# Patient Record
Sex: MALE | Race: WHITE | NOT HISPANIC OR LATINO | Employment: OTHER | URBAN - NONMETROPOLITAN AREA
[De-identification: names, ages, dates, MRNs, and addresses within clinical notes are randomized per-mention and may not be internally consistent; named-entity substitution may affect disease eponyms.]

---

## 2022-08-19 ENCOUNTER — OFFICE VISIT (OUTPATIENT)
Dept: URGENT CARE | Facility: MEDICAL CENTER | Age: 87
End: 2022-08-19
Payer: COMMERCIAL

## 2022-08-19 VITALS
WEIGHT: 227 LBS | OXYGEN SATURATION: 98 % | BODY MASS INDEX: 32.5 KG/M2 | HEIGHT: 70 IN | TEMPERATURE: 97.9 F | RESPIRATION RATE: 18 BRPM | HEART RATE: 79 BPM

## 2022-08-19 DIAGNOSIS — H02.105 ECTROPION OF LEFT LOWER EYELID, UNSPECIFIED ECTROPION TYPE: Primary | ICD-10-CM

## 2022-08-19 DIAGNOSIS — H00.15 CHALAZION OF LEFT LOWER EYELID: ICD-10-CM

## 2022-08-19 PROCEDURE — S9083 URGENT CARE CENTER GLOBAL: HCPCS | Performed by: PHYSICIAN ASSISTANT

## 2022-08-19 PROCEDURE — 99203 OFFICE O/P NEW LOW 30 MIN: CPT | Performed by: PHYSICIAN ASSISTANT

## 2022-08-19 RX ORDER — CEPHALEXIN 500 MG/1
500 CAPSULE ORAL EVERY 8 HOURS SCHEDULED
Qty: 21 CAPSULE | Refills: 0 | Status: SHIPPED | OUTPATIENT
Start: 2022-08-19 | End: 2022-08-26

## 2022-08-19 RX ORDER — AMLODIPINE BESYLATE 10 MG/1
10 TABLET ORAL DAILY
COMMUNITY
Start: 2022-07-25

## 2022-08-19 RX ORDER — ENALAPRIL MALEATE 2.5 MG/1
2.5 TABLET ORAL 2 TIMES DAILY
COMMUNITY
Start: 2022-06-06

## 2022-08-19 RX ORDER — METOPROLOL TARTRATE 50 MG/1
50 TABLET, FILM COATED ORAL DAILY
COMMUNITY
Start: 2022-07-21

## 2022-08-19 RX ORDER — ATORVASTATIN CALCIUM 40 MG/1
20 TABLET, FILM COATED ORAL DAILY
COMMUNITY
Start: 2022-07-30

## 2022-08-19 RX ORDER — LANOLIN ALCOHOL/MO/W.PET/CERES
CREAM (GRAM) TOPICAL
COMMUNITY

## 2022-08-19 NOTE — PROGRESS NOTES
[facility]        NAME: Cee Sanchez is a 81 y o  male  : 1935    MRN: [id number]  DATE: 2022  TIME: 7:13 PM    Assessment and Plan   Ectropion of left lower eyelid, unspecified ectropion type [H02 105]  1  Ectropion of left lower eyelid, unspecified ectropion type     2  Chalazion of left lower eyelid  cephalexin (KEFLEX) 500 mg capsule         Patient Instructions       Follow up with PCP in 3-5 days  Proceed to  ER if symptoms worsen  Chief Complaint     Chief Complaint   Patient presents with    Eye Problem     Left eye has been draining and he has been rubbing left eye, using otc allergy eye drop,pt  Reports his eye is now red and irritated, small swollen area to lower eye lid         History of Present Illness       Patient presents with redness and drainage to left eye which has been there for the past few days which worsened after he started rubbing the eye  Yuan Heckler He now has swelling and redness the left lateral lower eyelid  Family when out and purchased allergy eyedrops which seem to have made the symptoms worse  He had been using eyedrops for dry eyes but states he was never formally diagnosed with dry eye  Has not seen an eye doctor in the past few years  Review of Systems   Review of Systems   Constitutional: Negative for chills and fever  Eyes: Positive for discharge and redness  Negative for pain and visual disturbance  Hematological: Negative for adenopathy           Current Medications       Current Outpatient Medications:     apixaban (ELIQUIS) 5 mg, Take 5 mg by mouth 2 (two) times a day, Disp: , Rfl:     cephalexin (KEFLEX) 500 mg capsule, Take 1 capsule (500 mg total) by mouth every 8 (eight) hours for 7 days, Disp: 21 capsule, Rfl: 0    amLODIPine (NORVASC) 10 mg tablet, Take 10 mg by mouth daily, Disp: , Rfl:     atorvastatin (LIPITOR) 40 mg tablet, 20 mg daily, Disp: , Rfl:     Cholecalciferol 25 MCG (1000 UT) tablet, Take by mouth, Disp: , Rfl:     enalapril (VASOTEC) 2 5 mg tablet, Take 2 5 mg by mouth 2 (two) times a day, Disp: , Rfl:     folic acid (FOLVITE) 933 mcg tablet, Take by mouth, Disp: , Rfl:     Melatonin 5 MG CAPS, Take by mouth, Disp: , Rfl:     metoprolol tartrate (LOPRESSOR) 50 mg tablet, Take 50 mg by mouth daily, Disp: , Rfl:     Current Allergies     Allergies as of 08/19/2022    (No Known Allergies)            The following portions of the patient's history were reviewed and updated as appropriate: allergies, current medications, past family history, past medical history, past social history, past surgical history and problem list      Past Medical History:   Diagnosis Date    Hyperlipemia     Hypertension        Past Surgical History:   Procedure Laterality Date    A-V CARDIAC PACEMAKER INSERTION      AORTIC VALVE REPLACEMENT      CORONARY ANGIOPLASTY WITH STENT PLACEMENT         No family history on file  Medications have been verified  Objective   Pulse 79   Temp 97 9 °F (36 6 °C)   Resp 18   Ht 5' 10" (1 778 m)   Wt 103 kg (227 lb)   SpO2 98%   BMI 32 57 kg/m²   No LMP for male patient  Physical Exam     Physical Exam  Vitals and nursing note reviewed  Constitutional:       Appearance: Normal appearance  HENT:      Head: Normocephalic and atraumatic  Eyes:      Extraocular Movements: Extraocular movements intact  Pupils: Pupils are equal, round, and reactive to light  Comments: Ectropion of left lower eyelid with erythema and swelling  Pustule at lateral aspect  No purulent drainage  Conjunctiva injected  Pupil reactive  EOMI   Cardiovascular:      Rate and Rhythm: Normal rate and regular rhythm  Pulmonary:      Effort: Pulmonary effort is normal    Skin:     General: Skin is warm  Neurological:      Mental Status: He is alert

## 2022-12-02 ENCOUNTER — OFFICE VISIT (OUTPATIENT)
Dept: CARDIOLOGY CLINIC | Facility: CLINIC | Age: 87
End: 2022-12-02

## 2022-12-02 VITALS
SYSTOLIC BLOOD PRESSURE: 128 MMHG | BODY MASS INDEX: 33.87 KG/M2 | DIASTOLIC BLOOD PRESSURE: 62 MMHG | WEIGHT: 236.6 LBS | HEIGHT: 70 IN | TEMPERATURE: 97.8 F | HEART RATE: 72 BPM

## 2022-12-02 DIAGNOSIS — Z95.2 S/P AVR: ICD-10-CM

## 2022-12-02 DIAGNOSIS — R60.0 LOCALIZED EDEMA: ICD-10-CM

## 2022-12-02 DIAGNOSIS — I65.23 BILATERAL CAROTID ARTERY STENOSIS: ICD-10-CM

## 2022-12-02 DIAGNOSIS — I10 PRIMARY HYPERTENSION: ICD-10-CM

## 2022-12-02 DIAGNOSIS — I25.10 CORONARY ARTERY DISEASE INVOLVING NATIVE CORONARY ARTERY OF NATIVE HEART WITHOUT ANGINA PECTORIS: Primary | ICD-10-CM

## 2022-12-02 DIAGNOSIS — E78.2 MIXED HYPERLIPIDEMIA: ICD-10-CM

## 2022-12-02 DIAGNOSIS — I35.0 AORTIC VALVE STENOSIS, ETIOLOGY OF CARDIAC VALVE DISEASE UNSPECIFIED: ICD-10-CM

## 2022-12-02 NOTE — PROGRESS NOTES
Cardiology Office Visit    Henrry Suggs  [id number]  1935    [facility] [address] [address] [phone]      Dear Gretel Fernández DO,    I had the pleasure of seeing your patient at our [address] [facility] [place] office today 12/2/2022  As you know he is a pleasant 81y o  year old male with a medical history as described below  Reason for office visit: Establish care for coronary artery disease, aortic stenosis s/p AVR, hypertension, hyperlipidemia, carotid artery disease and pacemaker  1  Coronary artery disease involving native coronary artery of native heart without angina pectoris  -     POCT ECG  -     Echo complete w/ contrast if indicated; Future; Expected date: 12/02/2022  -     Basic metabolic panel; Future  -     Magnesium  -     CBC and differential; Future    2  Aortic valve stenosis, etiology of cardiac valve disease unspecified  -     POCT ECG  -     Echo complete w/ contrast if indicated; Future; Expected date: 12/02/2022  -     Basic metabolic panel; Future  -     Magnesium  -     CBC and differential; Future    3  S/P AVR  -     POCT ECG  -     Echo complete w/ contrast if indicated; Future; Expected date: 12/02/2022    4  Primary hypertension  -     Basic metabolic panel; Future  -     Magnesium  -     CBC and differential; Future    5  Mixed hyperlipidemia    6  Bilateral carotid artery stenosis  -     VAS carotid complete study; Future; Expected date: 12/02/2022    7  Localized edema  -     NT-BNP PRO; Future       EKG today shows ventricular paced rhythm with likely underlying atrial fibrillation  Atrial fibrillation is an abnormal heart rhythm which can increase the risk of stroke and this is why replaced on Eliquis  I would recommend continuing Eliquis 5 mg twice daily  I would recommend updated echocardiogram to reassess aortic and mitral valve  Updated carotid ultrasound    Blood work to check kidney function and to look for stretch in the heart  I will transition pacemaker management to our office  HPI     Lucila Mortimer has a past medical history of coronary artery disease, aortic stenosis s/p AVR, hypertension, hyperlipidemia, carotid artery disease and pacemaker placement for sick sinus syndrome  Patient Active Problem List   Diagnosis   • Hypertension   • Hyperlipemia   • Aortic stenosis   • S/P AVR   • Carotid artery disease (HCC)   • Coronary artery disease   • Pacemaker     Past Medical History:   Diagnosis Date   • Hyperlipemia    • Hypertension      Social History     Socioeconomic History   • Marital status:      Spouse name: Not on file   • Number of children: Not on file   • Years of education: Not on file   • Highest education level: Not on file   Occupational History   • Not on file   Tobacco Use   • Smoking status: Former   • Smokeless tobacco: Never   Vaping Use   • Vaping Use: Never used   Substance and Sexual Activity   • Alcohol use: Yes     Comment: social   • Drug use: Never   • Sexual activity: Never   Other Topics Concern   • Not on file   Social History Narrative   • Not on file     Social Determinants of Health     Financial Resource Strain: Not on file   Food Insecurity: Not on file   Transportation Needs: Not on file   Physical Activity: Not on file   Stress: Not on file   Social Connections: Not on file   Intimate Partner Violence: Not on file   Housing Stability: Not on file      History reviewed  No pertinent family history    Past Surgical History:   Procedure Laterality Date   • A-V CARDIAC PACEMAKER INSERTION     • AORTIC VALVE REPLACEMENT     • CARDIAC PACEMAKER PLACEMENT Left    • CORONARY ANGIOPLASTY WITH STENT PLACEMENT         Current Outpatient Medications:   •  amLODIPine (NORVASC) 10 mg tablet, Take 10 mg by mouth daily, Disp: , Rfl:   •  apixaban (ELIQUIS) 5 mg, Take 5 mg by mouth 2 (two) times a day, Disp: , Rfl:   •  atorvastatin (LIPITOR) 40 mg tablet, 20 mg daily, Disp: , Rfl:   •  enalapril (VASOTEC) 2 5 mg tablet, Take 2 5 mg by mouth 2 (two) times a day, Disp: , Rfl:   •  folic acid (FOLVITE) 568 mcg tablet, Take by mouth, Disp: , Rfl:   •  metoprolol tartrate (LOPRESSOR) 50 mg tablet, Take 50 mg by mouth daily, Disp: , Rfl:   •  Cholecalciferol 25 MCG (1000 UT) tablet, Take by mouth (Patient not taking: Reported on 12/2/2022), Disp: , Rfl:   •  Melatonin 5 MG CAPS, Take by mouth (Patient not taking: Reported on 12/2/2022), Disp: , Rfl:   Allergies   Allergen Reactions   • Ezetimibe-Simvastatin Dizziness and Lightheadedness     Other reaction(s): MAKES HIM DIZZY     • Simvastatin Lightheadedness     Other reaction(s): Dizziness         Cardiac Test Results:         Review of Systems:    Review of Systems   Constitutional: Negative for activity change, appetite change and fatigue  HENT: Negative for congestion, hearing loss, tinnitus and trouble swallowing  Eyes: Negative for visual disturbance  Respiratory: Negative for cough, chest tightness, shortness of breath and wheezing  Cardiovascular: Negative for chest pain, palpitations and leg swelling  Gastrointestinal: Negative for abdominal distention, abdominal pain, nausea and vomiting  Genitourinary: Negative for difficulty urinating  Musculoskeletal: Negative for arthralgias  Skin: Negative for rash  Neurological: Negative for dizziness, syncope and light-headedness  Hematological: Does not bruise/bleed easily  Psychiatric/Behavioral: Negative for confusion  The patient is not nervous/anxious  All other systems reviewed and are negative          Vitals:    12/02/22 1329 12/02/22 1404   BP: 152/78 128/62   BP Location: Left arm    Patient Position: Sitting    Cuff Size: Large    Pulse: 72    Temp: 97 8 °F (36 6 °C)    Weight: 107 kg (236 lb 9 6 oz)    Height: 5' 10" (1 778 m)      Vitals:    12/02/22 1329   Weight: 107 kg (236 lb 9 6 oz)     Height: 5' 10" (177 8 cm) Physical Exam  Vitals reviewed  Constitutional:       Appearance: He is well-developed and well-nourished  HENT:      Head: Normocephalic and atraumatic  Eyes:      Conjunctiva/sclera: Conjunctivae normal       Pupils: Pupils are equal, round, and reactive to light  Neck:      Vascular: No JVD  Cardiovascular:      Rate and Rhythm: Normal rate and regular rhythm  Heart sounds: Normal heart sounds  No murmur heard  No friction rub  No gallop  Pulmonary:      Effort: Pulmonary effort is normal       Breath sounds: Normal breath sounds  Abdominal:      General: Bowel sounds are normal       Palpations: Abdomen is soft  Musculoskeletal:         General: No edema  Cervical back: Normal range of motion  Skin:     General: Skin is warm and dry  Neurological:      Mental Status: He is alert and oriented to person, place, and time     Psychiatric:         Mood and Affect: Mood and affect normal          Behavior: Behavior normal

## 2022-12-02 NOTE — PATIENT INSTRUCTIONS
EKG today shows ventricular paced rhythm with likely underlying atrial fibrillation  Atrial fibrillation is an abnormal heart rhythm which can increase the risk of stroke and this is why replaced on Eliquis  I would recommend continuing Eliquis 5 mg twice daily  I would recommend updated echocardiogram to reassess aortic and mitral valve  Updated carotid ultrasound  Blood work to check kidney function and to look for stretch in the heart  I will transition pacemaker management to our office

## 2022-12-03 PROBLEM — Z95.0 PACEMAKER: Status: ACTIVE | Noted: 2022-12-03

## 2022-12-09 ENCOUNTER — APPOINTMENT (OUTPATIENT)
Dept: LAB | Facility: HOSPITAL | Age: 87
End: 2022-12-09

## 2022-12-09 ENCOUNTER — HOSPITAL ENCOUNTER (OUTPATIENT)
Dept: NON INVASIVE DIAGNOSTICS | Facility: HOSPITAL | Age: 87
Discharge: HOME | End: 2022-12-09
Attending: INTERNAL MEDICINE

## 2022-12-09 DIAGNOSIS — I10 PRIMARY HYPERTENSION: ICD-10-CM

## 2022-12-09 DIAGNOSIS — I35.0 AORTIC VALVE STENOSIS, ETIOLOGY OF CARDIAC VALVE DISEASE UNSPECIFIED: ICD-10-CM

## 2022-12-09 DIAGNOSIS — I65.23 BILATERAL CAROTID ARTERY STENOSIS: ICD-10-CM

## 2022-12-09 DIAGNOSIS — R60.0 LOCALIZED EDEMA: ICD-10-CM

## 2022-12-09 DIAGNOSIS — I25.10 CORONARY ARTERY DISEASE INVOLVING NATIVE CORONARY ARTERY OF NATIVE HEART WITHOUT ANGINA PECTORIS: ICD-10-CM

## 2022-12-09 LAB
ANION GAP SERPL CALCULATED.3IONS-SCNC: 11 MMOL/L (ref 4–13)
BASOPHILS # BLD AUTO: 0.08 THOUSANDS/ÂΜL (ref 0–0.1)
BASOPHILS NFR BLD AUTO: 1 % (ref 0–1)
BUN SERPL-MCNC: 21 MG/DL (ref 5–25)
CALCIUM SERPL-MCNC: 9.2 MG/DL (ref 8.3–10.1)
CHLORIDE SERPL-SCNC: 100 MMOL/L (ref 96–108)
CO2 SERPL-SCNC: 25 MMOL/L (ref 21–32)
CREAT SERPL-MCNC: 1.11 MG/DL (ref 0.6–1.3)
EOSINOPHIL # BLD AUTO: 0.14 THOUSAND/ÂΜL (ref 0–0.61)
EOSINOPHIL NFR BLD AUTO: 2 % (ref 0–6)
ERYTHROCYTE [DISTWIDTH] IN BLOOD BY AUTOMATED COUNT: 13.3 % (ref 11.6–15.1)
GFR SERPL CREATININE-BSD FRML MDRD: 59 ML/MIN/1.73SQ M
GLUCOSE SERPL-MCNC: 112 MG/DL (ref 65–140)
HCT VFR BLD AUTO: 43.3 % (ref 36.5–49.3)
HGB BLD-MCNC: 14.3 G/DL (ref 12–17)
IMM GRANULOCYTES # BLD AUTO: 0.04 THOUSAND/UL (ref 0–0.2)
IMM GRANULOCYTES NFR BLD AUTO: 0 % (ref 0–2)
LYMPHOCYTES # BLD AUTO: 2.41 THOUSANDS/ÂΜL (ref 0.6–4.47)
LYMPHOCYTES NFR BLD AUTO: 26 % (ref 14–44)
MAGNESIUM SERPL-MCNC: 2 MG/DL (ref 1.6–2.6)
MCH RBC QN AUTO: 29.8 PG (ref 26.8–34.3)
MCHC RBC AUTO-ENTMCNC: 33 G/DL (ref 31.4–37.4)
MCV RBC AUTO: 90 FL (ref 82–98)
MONOCYTES # BLD AUTO: 1.05 THOUSAND/ÂΜL (ref 0.17–1.22)
MONOCYTES NFR BLD AUTO: 11 % (ref 4–12)
NEUTROPHILS # BLD AUTO: 5.65 THOUSANDS/ÂΜL (ref 1.85–7.62)
NEUTS SEG NFR BLD AUTO: 60 % (ref 43–75)
NRBC BLD AUTO-RTO: 0 /100 WBCS
NT-PROBNP SERPL-MCNC: 1109 PG/ML
PLATELET # BLD AUTO: 234 THOUSANDS/UL (ref 149–390)
PMV BLD AUTO: 10.5 FL (ref 8.9–12.7)
POTASSIUM SERPL-SCNC: 4.4 MMOL/L (ref 3.5–5.3)
RBC # BLD AUTO: 4.8 MILLION/UL (ref 3.88–5.62)
SODIUM SERPL-SCNC: 136 MMOL/L (ref 135–147)
WBC # BLD AUTO: 9.37 THOUSAND/UL (ref 4.31–10.16)

## 2022-12-10 ENCOUNTER — HOSPITAL ENCOUNTER (OUTPATIENT)
Dept: NON INVASIVE DIAGNOSTICS | Facility: HOSPITAL | Age: 87
Discharge: HOME | End: 2022-12-10
Attending: INTERNAL MEDICINE

## 2022-12-10 VITALS
WEIGHT: 236 LBS | DIASTOLIC BLOOD PRESSURE: 62 MMHG | SYSTOLIC BLOOD PRESSURE: 128 MMHG | HEIGHT: 70 IN | BODY MASS INDEX: 33.79 KG/M2 | HEART RATE: 82 BPM

## 2022-12-10 DIAGNOSIS — I35.0 AORTIC VALVE STENOSIS, ETIOLOGY OF CARDIAC VALVE DISEASE UNSPECIFIED: ICD-10-CM

## 2022-12-10 DIAGNOSIS — I25.10 CORONARY ARTERY DISEASE INVOLVING NATIVE CORONARY ARTERY OF NATIVE HEART WITHOUT ANGINA PECTORIS: ICD-10-CM

## 2022-12-10 DIAGNOSIS — Z95.2 S/P AVR: ICD-10-CM

## 2022-12-10 LAB
AORTIC VALVE MEAN VELOCITY: 16.3 M/S
APICAL FOUR CHAMBER EJECTION FRACTION: 68 %
AV AREA BY CONTINUOUS VTI: 1.5 CM2
AV AREA PEAK VELOCITY: 1.3 CM2
AV LVOT MEAN GRADIENT: 4 MMHG
AV LVOT PEAK GRADIENT: 6 MMHG
AV MEAN GRADIENT: 12 MMHG
AV PEAK GRADIENT: 23 MMHG
AV VALVE AREA: 1.45 CM2
AV VELOCITY RATIO: 0.5
DOP CALC AO PEAK VEL: 2.41 M/S
DOP CALC AO VTI: 50.01 CM
DOP CALC LVOT AREA: 2.54 CM2
DOP CALC LVOT DIAMETER: 1.8 CM
DOP CALC LVOT PEAK VEL VTI: 28.59 CM
DOP CALC LVOT PEAK VEL: 1.2 M/S
DOP CALC LVOT STROKE INDEX: 31.2 ML/M2
DOP CALC LVOT STROKE VOLUME: 72.72 CM3
DOP CALC MV VTI: 48.18 CM
FRACTIONAL SHORTENING: 33 % (ref 28–44)
INTERVENTRICULAR SEPTUM IN DIASTOLE (PARASTERNAL SHORT AXIS VIEW): 1.4 CM
INTERVENTRICULAR SEPTUM: 1.4 CM (ref 0.6–1.1)
LAAS-AP2: 34.1 CM2
LAAS-AP4: 31.4 CM2
LEFT ATRIUM AREA SYSTOLE SINGLE PLANE A4C: 33.9 CM2
LEFT ATRIUM SIZE: 4.9 CM
LEFT INTERNAL DIMENSION IN SYSTOLE: 2.4 CM (ref 2.1–4)
LEFT VENTRICULAR INTERNAL DIMENSION IN DIASTOLE: 3.6 CM (ref 3.5–6)
LEFT VENTRICULAR POSTERIOR WALL IN END DIASTOLE: 1.5 CM
LEFT VENTRICULAR STROKE VOLUME: 35 ML
LVSV (TEICH): 35 ML
MV E'TISSUE VEL-LAT: 9 CM/S
MV E'TISSUE VEL-SEP: 8 CM/S
MV MEAN GRADIENT: 6 MMHG
MV PEAK GRADIENT: 15 MMHG
MV STENOSIS PRESSURE HALF TIME: 115 MS
MV VALVE AREA BY CONTINUITY EQUATION: 1.51 CM2
MV VALVE AREA P 1/2 METHOD: 1.91 CM2
RIGHT ATRIUM AREA SYSTOLE A4C: 30.9 CM2
RIGHT VENTRICLE ID DIMENSION: 5.8 CM
SL CV LEFT ATRIUM LENGTH A2C: 6.2 CM
SL CV PED ECHO LEFT VENTRICLE DIASTOLIC VOLUME (MOD BIPLANE) 2D: 54 ML
SL CV PED ECHO LEFT VENTRICLE SYSTOLIC VOLUME (MOD BIPLANE) 2D: 20 ML
TR MAX PG: 49 MMHG
TR PEAK VELOCITY: 3.5 M/S
TRICUSPID VALVE PEAK REGURGITATION VELOCITY: 3.51 M/S

## 2022-12-12 ENCOUNTER — TELEPHONE (OUTPATIENT)
Dept: CARDIOLOGY CLINIC | Facility: CLINIC | Age: 87
End: 2022-12-12

## 2022-12-12 DIAGNOSIS — R60.0 LOCALIZED EDEMA: Primary | ICD-10-CM

## 2022-12-12 RX ORDER — FUROSEMIDE 20 MG/1
20 TABLET ORAL DAILY
Qty: 30 TABLET | Refills: 11 | Status: ON HOLD | OUTPATIENT
Start: 2022-12-12 | End: 2023-01-12

## 2022-12-12 NOTE — TELEPHONE ENCOUNTER
I called the patient to discuss lab results  BNP is elevated in the setting of increased dyspnea on exertion and lower extremity edema  I did recommend a trial of furosemide 20 mg daily with repeat blood work to be done 12/27/2022  He was instructed to call me if he has any difficulty with starting the medication

## 2022-12-30 ENCOUNTER — APPOINTMENT (OUTPATIENT)
Dept: RADIOLOGY | Facility: HOSPITAL | Age: 87
End: 2022-12-30

## 2022-12-30 ENCOUNTER — APPOINTMENT (EMERGENCY)
Dept: CT IMAGING | Facility: HOSPITAL | Age: 87
End: 2022-12-30

## 2022-12-30 ENCOUNTER — TELEPHONE (OUTPATIENT)
Dept: OTHER | Facility: HOSPITAL | Age: 87
End: 2022-12-30

## 2022-12-30 ENCOUNTER — HOSPITAL ENCOUNTER (EMERGENCY)
Facility: HOSPITAL | Age: 87
Discharge: HOME | End: 2022-12-30
Attending: EMERGENCY MEDICINE

## 2022-12-30 VITALS
HEART RATE: 94 BPM | OXYGEN SATURATION: 98 % | BODY MASS INDEX: 34.15 KG/M2 | SYSTOLIC BLOOD PRESSURE: 159 MMHG | RESPIRATION RATE: 16 BRPM | TEMPERATURE: 97.6 F | WEIGHT: 238 LBS | DIASTOLIC BLOOD PRESSURE: 87 MMHG

## 2022-12-30 DIAGNOSIS — N20.1 URETERAL STONE: Primary | ICD-10-CM

## 2022-12-30 LAB
2HR DELTA HS TROPONIN: 0 NG/L
ALBUMIN SERPL BCP-MCNC: 3.5 G/DL (ref 3.5–5)
ALP SERPL-CCNC: 102 U/L (ref 46–116)
ALT SERPL W P-5'-P-CCNC: 21 U/L (ref 12–78)
ANION GAP SERPL CALCULATED.3IONS-SCNC: 13 MMOL/L (ref 4–13)
AST SERPL W P-5'-P-CCNC: 20 U/L (ref 5–45)
BACTERIA UR QL AUTO: ABNORMAL /HPF
BASOPHILS # BLD AUTO: 0.06 THOUSANDS/ÂΜL (ref 0–0.1)
BASOPHILS NFR BLD AUTO: 1 % (ref 0–1)
BILIRUB SERPL-MCNC: 1.09 MG/DL (ref 0.2–1)
BILIRUB UR QL STRIP: ABNORMAL
BUN SERPL-MCNC: 20 MG/DL (ref 5–25)
CALCIUM SERPL-MCNC: 9.6 MG/DL (ref 8.3–10.1)
CARDIAC TROPONIN I PNL SERPL HS: 24 NG/L
CARDIAC TROPONIN I PNL SERPL HS: 24 NG/L
CHLORIDE SERPL-SCNC: 99 MMOL/L (ref 96–108)
CLARITY UR: ABNORMAL
CO2 SERPL-SCNC: 25 MMOL/L (ref 21–32)
COLOR UR: YELLOW
CREAT SERPL-MCNC: 1.2 MG/DL (ref 0.6–1.3)
EOSINOPHIL # BLD AUTO: 0.03 THOUSAND/ÂΜL (ref 0–0.61)
EOSINOPHIL NFR BLD AUTO: 0 % (ref 0–6)
ERYTHROCYTE [DISTWIDTH] IN BLOOD BY AUTOMATED COUNT: 13.8 % (ref 11.6–15.1)
GFR SERPL CREATININE-BSD FRML MDRD: 54 ML/MIN/1.73SQ M
GLUCOSE SERPL-MCNC: 136 MG/DL (ref 65–140)
GLUCOSE UR STRIP-MCNC: NEGATIVE MG/DL
HCT VFR BLD AUTO: 47.6 % (ref 36.5–49.3)
HGB BLD-MCNC: 15.2 G/DL (ref 12–17)
HGB UR QL STRIP.AUTO: ABNORMAL
IMM GRANULOCYTES # BLD AUTO: 0.05 THOUSAND/UL (ref 0–0.2)
IMM GRANULOCYTES NFR BLD AUTO: 0 % (ref 0–2)
KETONES UR STRIP-MCNC: ABNORMAL MG/DL
LEUKOCYTE ESTERASE UR QL STRIP: ABNORMAL
LYMPHOCYTES # BLD AUTO: 1.06 THOUSANDS/ÂΜL (ref 0.6–4.47)
LYMPHOCYTES NFR BLD AUTO: 9 % (ref 14–44)
MCH RBC QN AUTO: 29 PG (ref 26.8–34.3)
MCHC RBC AUTO-ENTMCNC: 31.9 G/DL (ref 31.4–37.4)
MCV RBC AUTO: 91 FL (ref 82–98)
MONOCYTES # BLD AUTO: 0.89 THOUSAND/ÂΜL (ref 0.17–1.22)
MONOCYTES NFR BLD AUTO: 8 % (ref 4–12)
NEUTROPHILS # BLD AUTO: 9.28 THOUSANDS/ÂΜL (ref 1.85–7.62)
NEUTS SEG NFR BLD AUTO: 82 % (ref 43–75)
NITRITE UR QL STRIP: NEGATIVE
NON-SQ EPI CELLS URNS QL MICRO: ABNORMAL /HPF
NRBC BLD AUTO-RTO: 0 /100 WBCS
PH UR STRIP.AUTO: 6 [PH]
PLATELET # BLD AUTO: 207 THOUSANDS/UL (ref 149–390)
PMV BLD AUTO: 10.2 FL (ref 8.9–12.7)
POTASSIUM SERPL-SCNC: 3.8 MMOL/L (ref 3.5–5.3)
PROT SERPL-MCNC: 7.5 G/DL (ref 6.4–8.4)
PROT UR STRIP-MCNC: ABNORMAL MG/DL
RBC # BLD AUTO: 5.25 MILLION/UL (ref 3.88–5.62)
RBC #/AREA URNS AUTO: ABNORMAL /HPF
SODIUM SERPL-SCNC: 137 MMOL/L (ref 135–147)
SP GR UR STRIP.AUTO: 1.02 (ref 1–1.03)
UROBILINOGEN UR QL STRIP.AUTO: 0.2 E.U./DL
WBC # BLD AUTO: 11.37 THOUSAND/UL (ref 4.31–10.16)
WBC #/AREA URNS AUTO: ABNORMAL /HPF

## 2022-12-30 RX ORDER — HYDROCODONE BITARTRATE AND ACETAMINOPHEN 5; 325 MG/1; MG/1
1 TABLET ORAL EVERY 6 HOURS PRN
Qty: 20 TABLET | Refills: 0 | Status: SHIPPED | OUTPATIENT
Start: 2022-12-30 | End: 2023-01-04

## 2022-12-30 RX ORDER — CEPHALEXIN 500 MG/1
500 CAPSULE ORAL EVERY 6 HOURS SCHEDULED
Qty: 20 CAPSULE | Refills: 0 | Status: SHIPPED | OUTPATIENT
Start: 2022-12-30 | End: 2023-01-04

## 2022-12-30 RX ORDER — MORPHINE SULFATE 4 MG/ML
4 INJECTION, SOLUTION INTRAMUSCULAR; INTRAVENOUS ONCE
Status: COMPLETED | OUTPATIENT
Start: 2022-12-30 | End: 2022-12-30

## 2022-12-30 RX ORDER — OXYCODONE HYDROCHLORIDE AND ACETAMINOPHEN 5; 325 MG/1; MG/1
1 TABLET ORAL ONCE
Status: COMPLETED | OUTPATIENT
Start: 2022-12-30 | End: 2022-12-30

## 2022-12-30 RX ORDER — ONDANSETRON 4 MG/1
4 TABLET, FILM COATED ORAL EVERY 8 HOURS PRN
Qty: 9 TABLET | Refills: 0 | Status: ON HOLD | OUTPATIENT
Start: 2022-12-30 | End: 2023-01-12

## 2022-12-30 RX ORDER — CEPHALEXIN 250 MG/1
500 CAPSULE ORAL ONCE
Status: COMPLETED | OUTPATIENT
Start: 2022-12-30 | End: 2022-12-30

## 2022-12-30 RX ORDER — TAMSULOSIN HYDROCHLORIDE 0.4 MG/1
0.4 CAPSULE ORAL
Qty: 30 CAPSULE | Refills: 0 | Status: ON HOLD | OUTPATIENT
Start: 2022-12-30 | End: 2023-01-29

## 2022-12-30 RX ADMIN — MORPHINE SULFATE 4 MG: 4 INJECTION INTRAVENOUS at 15:13

## 2022-12-30 RX ADMIN — OXYCODONE HYDROCHLORIDE AND ACETAMINOPHEN 1 TABLET: 5; 325 TABLET ORAL at 19:24

## 2022-12-30 RX ADMIN — CEPHALEXIN 500 MG: 250 CAPSULE ORAL at 19:24

## 2022-12-30 NOTE — ED PROVIDER NOTES
History  Chief Complaint   Patient presents with   • Fall     Sunday night early Monday morning and now has back pain since the fall  Patient fell on [place], complains of low back pain since that time  No radiation to the legs  No leg numbness or tingling  No bowel or bladder incontinence  No urinary retention  No saddle anesthesia  Patient complains of ongoing pain and difficulty with ambulation  Patient did not hit head, pass out, or throw up  History provided by:  Patient and relative   used: No    Fall  Mechanism of injury: fall    Injury location: Low back  Incident location:  Home  Time since incident:  5 days  Arrived directly from scene: no    Fall:     Fall occurred:  Standing    Impact surface:  Hard floor    Entrapped after fall: no    Suspicion of drug use: no    Prior to arrival data:     Bystander interventions:  None    Patient ambulatory at scene: yes      Blood loss:  None    Responsiveness at scene:  Alert    Orientation at scene:  Person, place, situation and time    Loss of consciousness: no      Amnesic to event: no      Airway interventions:  None    Airway condition since incident:  Stable    Breathing condition since incident:  Stable    Circulation condition since incident:  Stable    Mental status condition since incident:  Stable    Disability condition since incident: No improvement, continues with difficulty with ambulation  Associated symptoms: back pain    Associated symptoms: no abdominal pain, no chest pain, no difficulty breathing, no headaches, no hearing loss, no loss of consciousness, no nausea, no neck pain, no seizures and no vomiting        Prior to Admission Medications   Prescriptions Last Dose Informant Patient Reported? Taking?    Cholecalciferol 25 MCG (1000 UT) tablet Past Week  Yes Yes   Sig: Take by mouth   Melatonin 5 MG CAPS Past Week  Yes Yes   Sig: Take by mouth   amLODIPine (NORVASC) 10 mg tablet Past Week  Yes Yes   Sig: Take 10 mg by mouth daily   apixaban (ELIQUIS) 5 mg   Yes No   Sig: Take 5 mg by mouth 2 (two) times a day   atorvastatin (LIPITOR) 40 mg tablet Past Week  Yes Yes   Si mg daily   enalapril (VASOTEC) 2 5 mg tablet Past Week  Yes Yes   Sig: Take 2 5 mg by mouth 2 (two) times a day   folic acid (FOLVITE) 915 mcg tablet Past Week  Yes Yes   Sig: Take by mouth   furosemide (LASIX) 20 mg tablet Past Week  No Yes   Sig: Take 1 tablet (20 mg total) by mouth daily   metoprolol tartrate (LOPRESSOR) 50 mg tablet Past Week  Yes Yes   Sig: Take 50 mg by mouth daily      Facility-Administered Medications: None       Past Medical History:   Diagnosis Date   • Hyperlipemia    • Hypertension        Past Surgical History:   Procedure Laterality Date   • A-V CARDIAC PACEMAKER INSERTION     • AORTIC VALVE REPLACEMENT     • CARDIAC PACEMAKER PLACEMENT Left    • CORONARY ANGIOPLASTY WITH STENT PLACEMENT         History reviewed  No pertinent family history  I have reviewed and agree with the history as documented  E-Cigarette/Vaping   • E-Cigarette Use Never User      E-Cigarette/Vaping Substances   • Nicotine No    • THC No    • CBD No    • Flavoring No      Social History     Tobacco Use   • Smoking status: Former   • Smokeless tobacco: Never   Vaping Use   • Vaping Use: Never used   Substance Use Topics   • Alcohol use: Yes     Comment: social   • Drug use: Never       Review of Systems   Constitutional: Negative for chills and fever  HENT: Negative for ear pain, hearing loss, sore throat, trouble swallowing and voice change  Eyes: Negative for pain and discharge  Respiratory: Negative for cough, shortness of breath and wheezing  Cardiovascular: Negative for chest pain and palpitations  Gastrointestinal: Negative for abdominal pain, blood in stool, constipation, diarrhea, nausea and vomiting  Genitourinary: Negative for dysuria, flank pain, frequency and hematuria  Musculoskeletal: Positive for back pain  Negative for joint swelling, neck pain and neck stiffness  Skin: Negative for rash and wound  Neurological: Negative for dizziness, seizures, loss of consciousness, syncope, facial asymmetry and headaches  Psychiatric/Behavioral: Negative for hallucinations, self-injury and suicidal ideas  All other systems reviewed and are negative  Physical Exam  Physical Exam  Vitals and nursing note reviewed  Constitutional:       General: He is not in acute distress  Appearance: He is well-developed  HENT:      Head: Normocephalic and atraumatic  Right Ear: External ear normal       Left Ear: External ear normal    Eyes:      General: No scleral icterus  Right eye: No discharge  Left eye: No discharge  Extraocular Movements: Extraocular movements intact  Conjunctiva/sclera: Conjunctivae normal    Cardiovascular:      Rate and Rhythm: Normal rate and regular rhythm  Heart sounds: Normal heart sounds  No murmur heard  Pulmonary:      Effort: Pulmonary effort is normal       Breath sounds: Normal breath sounds  No wheezing or rales  Abdominal:      General: Bowel sounds are normal  There is no distension  Palpations: Abdomen is soft  Tenderness: There is no abdominal tenderness  There is no guarding or rebound  Musculoskeletal:         General: No deformity  Normal range of motion  Cervical back: Normal range of motion and neck supple  Comments: No deformity  Mild lumbar tenderness  No step-off  No cervical or thoracic tenderness or deformity  Skin:     General: Skin is warm and dry  Findings: No rash  Neurological:      General: No focal deficit present  Mental Status: He is alert and oriented to person, place, and time  Cranial Nerves: No cranial nerve deficit  Psychiatric:         Mood and Affect: Mood normal          Behavior: Behavior normal          Thought Content:  Thought content normal          Judgment: Judgment normal          Vital Signs  ED Triage Vitals [12/30/22 1303]   Temperature Pulse Respirations Blood Pressure SpO2   97 6 °F (36 4 °C) 94 16 159/87 98 %      Temp Source Heart Rate Source Patient Position - Orthostatic VS BP Location FiO2 (%)   Temporal Monitor Sitting Left arm --      Pain Score       10 - Worst Possible Pain           Vitals:    12/30/22 1303   BP: 159/87   Pulse: 94   Patient Position - Orthostatic VS: Sitting         Visual Acuity  Visual Acuity    Flowsheet Row Most Recent Value   L Pupil Size (mm) 3   R Pupil Size (mm) 3          ED Medications  Medications   cephalexin (KEFLEX) capsule 500 mg (has no administration in time range)   oxyCODONE-acetaminophen (PERCOCET) 5-325 mg per tablet 1 tablet (has no administration in time range)   morphine injection 4 mg (4 mg Intravenous Given 12/30/22 1513)       Diagnostic Studies  Results Reviewed     Procedure Component Value Units Date/Time    Urine Microscopic [[id number]]  (Abnormal) Collected: 12/30/22 1745    Lab Status: Final result Specimen: Urine, Clean Catch Updated: 12/30/22 1817     RBC, UA None Seen /hpf      WBC, UA Innumerable /hpf      Epithelial Cells None Seen /hpf      Bacteria, UA None Seen /hpf     Urine culture [[id number]] Collected: 12/30/22 1745    Lab Status:  In process Specimen: Urine, Clean Catch Updated: 12/30/22 1817    UA w Reflex to Microscopic w Reflex to Culture [[id number]]  (Abnormal) Collected: 12/30/22 1745    Lab Status: Final result Specimen: Urine, Clean Catch Updated: 12/30/22 1759     Color, UA Yellow     Clarity, UA Cloudy     Specific Carpenter, UA 1 020     pH, UA 6 0     Leukocytes, UA Moderate     Nitrite, UA Negative     Protein, UA 30 (1+) mg/dl      Glucose, UA Negative mg/dl      Ketones, UA 40 (2+) mg/dl      Urobilinogen, UA 0 2 E U /dl      Bilirubin, UA Moderate     Occult Blood, UA Large    HS Troponin I 2hr [[id number]]  (Normal) Collected: 12/30/22 1715    Lab Status: Final result Specimen: Blood from Arm, Left Updated: 12/30/22 1753     hs TnI 2hr 24 ng/L      Delta 2hr hsTnI 0 ng/L     Comprehensive metabolic panel [[id number]]  (Abnormal) Collected: 12/30/22 1512    Lab Status: Final result Specimen: Blood from Arm, Left Updated: 12/30/22 1558     Sodium 137 mmol/L      Potassium 3 8 mmol/L      Chloride 99 mmol/L      CO2 25 mmol/L      ANION GAP 13 mmol/L      BUN 20 mg/dL      Creatinine 1 20 mg/dL      Glucose 136 mg/dL      Calcium 9 6 mg/dL      AST 20 U/L      ALT 21 U/L      Alkaline Phosphatase 102 U/L      Total Protein 7 5 g/dL      Albumin 3 5 g/dL      Total Bilirubin 1 09 mg/dL      eGFR 54 ml/min/1 73sq m     Narrative:      [place] guidelines for Chronic Kidney Disease (CKD):   •  Stage 1 with normal or high GFR (GFR > 90 mL/min/1 73 square meters)  •  Stage 2 Mild CKD (GFR = 60-89 mL/min/1 73 square meters)  •  Stage 3A Moderate CKD (GFR = 45-59 mL/min/1 73 square meters)  •  Stage 3B Moderate CKD (GFR = 30-44 mL/min/1 73 square meters)  •  Stage 4 Severe CKD (GFR = 15-29 mL/min/1 73 square meters)  •  Stage 5 End Stage CKD (GFR <15 mL/min/1 73 square meters)  Note: GFR calculation is accurate only with a steady state creatinine    HS Troponin 0hr (reflex protocol) [[id number]]  (Normal) Collected: 12/30/22 1512    Lab Status: Final result Specimen: Blood from Arm, Left Updated: 12/30/22 1543     hs TnI 0hr 24 ng/L     CBC and differential [[id number]]  (Abnormal) Collected: 12/30/22 1512    Lab Status: Final result Specimen: Blood from Arm, Left Updated: 12/30/22 1520     WBC 11 37 Thousand/uL      RBC 5 25 Million/uL      Hemoglobin 15 2 g/dL      Hematocrit 47 6 %      MCV 91 fL      MCH 29 0 pg      MCHC 31 9 g/dL      RDW 13 8 %      MPV 10 2 fL      Platelets 968 Thousands/uL      nRBC 0 /100 WBCs      Neutrophils Relative 82 %      Immat GRANS % 0 %      Lymphocytes Relative 9 %      Monocytes Relative 8 %      Eosinophils Relative 0 %      Basophils Relative 1 %      Neutrophils Absolute 9 28 Thousands/µL      Immature Grans Absolute 0 05 Thousand/uL      Lymphocytes Absolute 1 06 Thousands/µL      Monocytes Absolute 0 89 Thousand/µL      Eosinophils Absolute 0 03 Thousand/µL      Basophils Absolute 0 06 Thousands/µL                  CT spine lumbar without contrast   Final Result by More Hall MD (12/30 1804)      1  Right 5 mm x 3 mm proximal ureteral stone with upstream severe ureterectasis and pelvocaliectasis  2   Severe discogenic degenerative thecal sac impingement and foraminal stenoses at L4-5  Moderate degenerative changes at other levels  3   Anterior wedging noted on recent radiograph at L3 is mild  No acute fracture planes noted, edematous changes in the osteopenic marrow, or posterior cortical buckling  In the absence of focal point tenderness at the midline at the L3 level, this is    likely chronic and degenerative in nature  No other areas concerning for acute fracture  4   Chronic findings on abdomen/pelvis reconstruction including colonic diverticulosis, cholelithiasis, and large right inguinal hernia containing loop of nonobstructed small bowel  I personally discussed this study with Rosy Doctor on 12/30/2022 at 5:21 PM       Workstation performed: [id number]         XR spine lumbar 2 or 3 views injury   Final Result by Judy Chavez MD (12/30 7530)      Subtle superior endplate wedge compression deformity at L3 which is age indeterminate  No spinal malalignment  Advanced multilevel degenerative changes  Severe atherosclerotic disease  The study was marked in [facility] for immediate notification           Workstation performed: [id number]                    Procedures  Procedures         ED Course  ED Course as of 12/30/22 1920   Fri Dec 30, 2022   1752 Patient able to ambulate to restroom without difficulty   1825 Urology contacted regarding nephrolithiasis and UTI, awaiting call back   1915 Response from urology service as follows:  Kadeem Corley    Chart reviewed  I provided prescriptions for Keflex, Flomax, Zofran and pain medication (Norco)  Clinical education placed in the AVS in addition to our office information  I have personally contacted the office to place a call for symptom reassessment by nursing staff and to schedule same-day/next day evaluating patient in the office for potential FasTRACT  Please provide strainer for straining urine and review emergency room precautions including fever, chills or intractable symptoms  Thank you so much  1915 Strainer provided                                               MDM  Number of Diagnoses or Management Options     Amount and/or Complexity of Data Reviewed  Clinical lab tests: ordered and reviewed  Tests in the radiology section of CPT®: ordered and reviewed  Decide to obtain previous medical records or to obtain history from someone other than the patient: yes  Review and summarize past medical records: yes  Independent visualization of images, tracings, or specimens: yes        Disposition  Final diagnoses:   Ureteral stone     Time reflects when diagnosis was documented in both MDM as applicable and the Disposition within this note     Time User Action Codes Description Comment    12/30/2022  7:09 PM Lili Navarrete Add [N20 1] Ureteral stone     12/30/2022  7:19 PM Jennifer Lainez Modify [N20 1] Ureteral stone       ED Disposition     ED Disposition   Discharge    Condition   Stable    Date/Time   Fri Dec 30, 2022  7:16 PM    Sandip Cheney discharge to home/self care  Follow-up Information     Follow up With Specialties Details Why Contact Info Additional [address] [facility] Follow up in 1 week(s) Service will contact patient/caregiver with hospital follow-up appointment date and time once discharged   [address] [id number] [phone] KARINA [address] [facility] [place], [address] [place], [place], [facility] Lat[address]ie Trejo, DO Family Medicine  As needed [address] [facility]  [phone]             Current Discharge Medication List      START taking these medications    Details   cephalexin (KEFLEX) 500 mg capsule Take 1 capsule (500 mg total) by mouth every 6 (six) hours for 5 days  Qty: 20 capsule, Refills: 0    Associated Diagnoses: Ureteral stone      HYDROcodone-acetaminophen (Norco) 5-325 mg per tablet Take 1 tablet by mouth every 6 (six) hours as needed for pain for up to 5 days Max Daily Amount: 4 tablets  Qty: 20 tablet, Refills: 0    Associated Diagnoses: Ureteral stone      ondansetron (ZOFRAN) 4 mg tablet Take 1 tablet (4 mg total) by mouth every 8 (eight) hours as needed for nausea or vomiting for up to 3 days  Qty: 9 tablet, Refills: 0    Associated Diagnoses: Ureteral stone      tamsulosin (FLOMAX) 0 4 mg Take 1 capsule (0 4 mg total) by mouth daily with dinner  Qty: 30 capsule, Refills: 0    Associated Diagnoses: Ureteral stone         CONTINUE these medications which have NOT CHANGED    Details   amLODIPine (NORVASC) 10 mg tablet Take 10 mg by mouth daily      atorvastatin (LIPITOR) 40 mg tablet 20 mg daily      Cholecalciferol 25 MCG (1000 UT) tablet Take by mouth      enalapril (VASOTEC) 2 5 mg tablet Take 2 5 mg by mouth 2 (two) times a day      folic acid (FOLVITE) 847 mcg tablet Take by mouth      furosemide (LASIX) 20 mg tablet Take 1 tablet (20 mg total) by mouth daily  Qty: 30 tablet, Refills: 11    Associated Diagnoses: Localized edema      Melatonin 5 MG CAPS Take by mouth      metoprolol tartrate (LOPRESSOR) 50 mg tablet Take 50 mg by mouth daily      apixaban (ELIQUIS) 5 mg Take 5 mg by mouth 2 (two) times a day             No discharge procedures on file      PDMP Review     None          ED Provider  Electronically Signed by           Lashay Gamino MD  12/30/22 4758

## 2022-12-30 NOTE — LETTER
[address] [place] [address] Mar[place] Paula [address]      January 12, 2023    MRN: [id number]     Phone: [phone]     Dear Mr Miguel Camara recently had a(n)  performed on  at  [address] [place] [address] that was requested by Marek Ogden MD  The study was reviewed by a radiologist, which is a physician who specializes in medical imaging  The radiologist issued a report describing his or her findings  In that report there was a finding that the radiologist felt warranted further discussion with your health care provider and that discussion would be beneficial to you  The results were sent to Marek Ogden MD on    We recommend that you contact Marek Ogden MD at  or set up an appointment to discuss the results of the imaging test  If you have already heard from Marek Ogden MD regarding the results of your study, you can disregard this letter  This letter is not meant to alarm you, but intended to encourage you to follow-up on your results with the provider that sent you for the imaging study  In addition, we have enclosed answers to frequently asked questions by other patients who have also received a letter to review results with their health care provider (see page two)  Thank you for choosing [facility] for your medical imaging needs  FREQUENTLY ASKED QUESTIONS    1  Why am I receiving this letter? [facility] requires us to notify patients who have findings on imaging exams that may require more testing or follow-up with a health professional within the next 3 months  2  How serious is the finding on the imaging test?  This letter is sent to all patients who may need follow-up or more testing within the next 3 months    Receiving this letter does not necessarily mean you have a life-threatening imaging finding or disease  Recommendations in the radiologist’s imaging report are general in nature and it is up to your healthcare provider to say whether those recommendations make sense for your situation  You are strongly encouraged to talk to your health care provider about the results and ask whether additional steps need to be taken  3  Where can I get a copy of the final report for my recent radiology exam?  To get a full copy of the report you can access your records online at [email or web address] or please contact [facility] Medical Records Department at [phone] Monday through Friday between 8 am and 6 pm          4  What do I need to do now? Please contact your health care provider who requested the imaging study to discuss what further actions (if any) are needed  You may have already heard from (your ordering provider) in regard to this test in which case you can disregard this letter  NOTICE IN ACCORDANCE WITH THE [place] STATE “PATIENT TEST RESULT INFORMATION ACT OF 2018”    You are receiving this notice as a result of a determination by your diagnostic imaging service that further discussions of your test results are warranted and would be beneficial to you  The complete results of your test or tests have been or will be sent to the health care practitioner that ordered the test or tests  It is recommended that you contact your health care practitioner to discuss your results as soon as possible

## 2022-12-31 NOTE — TELEPHONE ENCOUNTER
Armando Ingram is an 15-year-old male passing right ureteral stone  Will require immediate office evaluation for potential blackstrap  Would recommend nurse call/for symptom reassessment within 72 hours  Also scheduling out next day AP visit  Patient was discharged with prescriptions for pain, nausea and Flomax  ER precautions reviewed  These contact patient with hospital follow-up appointment date and time

## 2023-01-01 LAB
QRS AXIS: 128 DEGREES
QRSD INTERVAL: 142 MS
QT INTERVAL: 328 MS
QTC INTERVAL: 433 MS
T WAVE AXIS: 15 DEGREES
VENTRICULAR RATE: 105 BPM

## 2023-01-03 LAB — BACTERIA UR CULT: ABNORMAL

## 2023-01-12 ENCOUNTER — APPOINTMENT (EMERGENCY)
Dept: CT IMAGING | Facility: HOSPITAL | Age: 88
End: 2023-01-12

## 2023-01-12 ENCOUNTER — HOSPITAL ENCOUNTER (INPATIENT)
Facility: HOSPITAL | Age: 88
LOS: 4 days | Discharge: HOME HEALTH | End: 2023-01-16
Attending: EMERGENCY MEDICINE | Admitting: FAMILY MEDICINE

## 2023-01-12 DIAGNOSIS — B95.2 ENTEROCOCCUS UTI: ICD-10-CM

## 2023-01-12 DIAGNOSIS — N39.0 ENTEROCOCCUS UTI: ICD-10-CM

## 2023-01-12 DIAGNOSIS — N30.01 ACUTE CYSTITIS WITH HEMATURIA: ICD-10-CM

## 2023-01-12 DIAGNOSIS — N13.8 URINARY TRACT OBSTRUCTION BY KIDNEY STONE: ICD-10-CM

## 2023-01-12 DIAGNOSIS — N20.0 URINARY TRACT OBSTRUCTION BY KIDNEY STONE: ICD-10-CM

## 2023-01-12 DIAGNOSIS — N30.00 ACUTE CYSTITIS: ICD-10-CM

## 2023-01-12 DIAGNOSIS — N20.1 URETERAL STONE: Primary | ICD-10-CM

## 2023-01-12 PROBLEM — I48.92 ATRIAL FLUTTER (HCC): Status: ACTIVE | Noted: 2023-01-12

## 2023-01-12 LAB
ALBUMIN SERPL BCP-MCNC: 3.4 G/DL (ref 3.5–5)
ALP SERPL-CCNC: 151 U/L (ref 46–116)
ALT SERPL W P-5'-P-CCNC: 18 U/L (ref 12–78)
ANION GAP SERPL CALCULATED.3IONS-SCNC: 8 MMOL/L (ref 4–13)
AST SERPL W P-5'-P-CCNC: 23 U/L (ref 5–45)
BACTERIA UR QL AUTO: ABNORMAL /HPF
BASOPHILS # BLD AUTO: 0.08 THOUSANDS/ÂΜL (ref 0–0.1)
BASOPHILS NFR BLD AUTO: 1 % (ref 0–1)
BILIRUB SERPL-MCNC: 0.78 MG/DL (ref 0.2–1)
BILIRUB UR QL STRIP: ABNORMAL
BUN SERPL-MCNC: 12 MG/DL (ref 5–25)
CALCIUM ALBUM COR SERPL-MCNC: 9.4 MG/DL (ref 8.3–10.1)
CALCIUM SERPL-MCNC: 8.9 MG/DL (ref 8.3–10.1)
CHLORIDE SERPL-SCNC: 103 MMOL/L (ref 96–108)
CLARITY UR: ABNORMAL
CO2 SERPL-SCNC: 25 MMOL/L (ref 21–32)
COLOR UR: YELLOW
CREAT SERPL-MCNC: 1.02 MG/DL (ref 0.6–1.3)
EOSINOPHIL # BLD AUTO: 0.09 THOUSAND/ÂΜL (ref 0–0.61)
EOSINOPHIL NFR BLD AUTO: 1 % (ref 0–6)
ERYTHROCYTE [DISTWIDTH] IN BLOOD BY AUTOMATED COUNT: 14.1 % (ref 11.6–15.1)
GFR SERPL CREATININE-BSD FRML MDRD: 66 ML/MIN/1.73SQ M
GLUCOSE SERPL-MCNC: 104 MG/DL (ref 65–140)
GLUCOSE UR STRIP-MCNC: NEGATIVE MG/DL
HCT VFR BLD AUTO: 43.4 % (ref 36.5–49.3)
HGB BLD-MCNC: 13.8 G/DL (ref 12–17)
HGB UR QL STRIP.AUTO: ABNORMAL
HYALINE CASTS #/AREA URNS LPF: ABNORMAL /LPF
IMM GRANULOCYTES # BLD AUTO: 0.06 THOUSAND/UL (ref 0–0.2)
IMM GRANULOCYTES NFR BLD AUTO: 1 % (ref 0–2)
KETONES UR STRIP-MCNC: ABNORMAL MG/DL
LEUKOCYTE ESTERASE UR QL STRIP: ABNORMAL
LYMPHOCYTES # BLD AUTO: 1.02 THOUSANDS/ÂΜL (ref 0.6–4.47)
LYMPHOCYTES NFR BLD AUTO: 10 % (ref 14–44)
MCH RBC QN AUTO: 29.1 PG (ref 26.8–34.3)
MCHC RBC AUTO-ENTMCNC: 31.8 G/DL (ref 31.4–37.4)
MCV RBC AUTO: 92 FL (ref 82–98)
MONOCYTES # BLD AUTO: 1 THOUSAND/ÂΜL (ref 0.17–1.22)
MONOCYTES NFR BLD AUTO: 10 % (ref 4–12)
NEUTROPHILS # BLD AUTO: 8.06 THOUSANDS/ÂΜL (ref 1.85–7.62)
NEUTS SEG NFR BLD AUTO: 77 % (ref 43–75)
NITRITE UR QL STRIP: NEGATIVE
NON-SQ EPI CELLS URNS QL MICRO: ABNORMAL /HPF
NRBC BLD AUTO-RTO: 0 /100 WBCS
PH UR STRIP.AUTO: 6 [PH]
PLATELET # BLD AUTO: 238 THOUSANDS/UL (ref 149–390)
PMV BLD AUTO: 9.8 FL (ref 8.9–12.7)
POTASSIUM SERPL-SCNC: 4.1 MMOL/L (ref 3.5–5.3)
PROT SERPL-MCNC: 6.5 G/DL (ref 6.4–8.4)
PROT UR STRIP-MCNC: ABNORMAL MG/DL
RBC # BLD AUTO: 4.74 MILLION/UL (ref 3.88–5.62)
RBC #/AREA URNS AUTO: ABNORMAL /HPF
SODIUM SERPL-SCNC: 136 MMOL/L (ref 135–147)
SP GR UR STRIP.AUTO: 1.01 (ref 1–1.03)
UROBILINOGEN UR QL STRIP.AUTO: 0.2 E.U./DL
WBC # BLD AUTO: 10.31 THOUSAND/UL (ref 4.31–10.16)
WBC #/AREA URNS AUTO: ABNORMAL /HPF
WBC CLUMPS # UR AUTO: PRESENT /UL

## 2023-01-12 RX ORDER — LEVOFLOXACIN 5 MG/ML
750 INJECTION, SOLUTION INTRAVENOUS
Status: COMPLETED | OUTPATIENT
Start: 2023-01-13 | End: 2023-01-13

## 2023-01-12 RX ORDER — LANOLIN ALCOHOL/MO/W.PET/CERES
6 CREAM (GRAM) TOPICAL
Status: DISCONTINUED | OUTPATIENT
Start: 2023-01-12 | End: 2023-01-16 | Stop reason: HOSPADM

## 2023-01-12 RX ORDER — OXYCODONE HYDROCHLORIDE 5 MG/1
5 TABLET ORAL EVERY 4 HOURS PRN
Status: DISCONTINUED | OUTPATIENT
Start: 2023-01-12 | End: 2023-01-16 | Stop reason: HOSPADM

## 2023-01-12 RX ORDER — TAMSULOSIN HYDROCHLORIDE 0.4 MG/1
0.4 CAPSULE ORAL
Status: DISCONTINUED | OUTPATIENT
Start: 2023-01-13 | End: 2023-01-16 | Stop reason: HOSPADM

## 2023-01-12 RX ORDER — OXYCODONE HYDROCHLORIDE 5 MG/1
2.5 TABLET ORAL EVERY 4 HOURS PRN
Status: DISCONTINUED | OUTPATIENT
Start: 2023-01-12 | End: 2023-01-16 | Stop reason: HOSPADM

## 2023-01-12 RX ORDER — ATORVASTATIN CALCIUM 20 MG/1
20 TABLET, FILM COATED ORAL DAILY
Status: DISCONTINUED | OUTPATIENT
Start: 2023-01-13 | End: 2023-01-16 | Stop reason: HOSPADM

## 2023-01-12 RX ORDER — AMLODIPINE BESYLATE 10 MG/1
10 TABLET ORAL DAILY
Status: DISCONTINUED | OUTPATIENT
Start: 2023-01-13 | End: 2023-01-16 | Stop reason: HOSPADM

## 2023-01-12 RX ORDER — METOPROLOL TARTRATE 50 MG/1
50 TABLET, FILM COATED ORAL DAILY
Status: DISCONTINUED | OUTPATIENT
Start: 2023-01-13 | End: 2023-01-16 | Stop reason: HOSPADM

## 2023-01-12 RX ORDER — KETOROLAC TROMETHAMINE 30 MG/ML
15 INJECTION, SOLUTION INTRAMUSCULAR; INTRAVENOUS ONCE
Status: COMPLETED | OUTPATIENT
Start: 2023-01-12 | End: 2023-01-12

## 2023-01-12 RX ORDER — ACETAMINOPHEN 325 MG/1
650 TABLET ORAL EVERY 6 HOURS PRN
Status: DISCONTINUED | OUTPATIENT
Start: 2023-01-12 | End: 2023-01-16 | Stop reason: HOSPADM

## 2023-01-12 RX ORDER — CEFAZOLIN SODIUM 2 G/50ML
2000 SOLUTION INTRAVENOUS ONCE
Status: CANCELLED | OUTPATIENT
Start: 2023-01-12 | End: 2023-01-12

## 2023-01-12 RX ADMIN — KETOROLAC TROMETHAMINE 15 MG: 30 INJECTION, SOLUTION INTRAMUSCULAR at 16:25

## 2023-01-12 RX ADMIN — Medication 6 MG: at 21:50

## 2023-01-12 RX ADMIN — MORPHINE SULFATE 2 MG: 2 INJECTION, SOLUTION INTRAMUSCULAR; INTRAVENOUS at 16:25

## 2023-01-12 NOTE — TELEPHONE ENCOUNTER
Patient's daughter called stating patient is having a lot of pain and she will be taking him to the ER   Patient has a new patient appointment for 01/24/23

## 2023-01-12 NOTE — ASSESSMENT & PLAN NOTE
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

## 2023-01-12 NOTE — PLAN OF CARE
Problem: Potential for Falls  Goal: Patient will remain free of falls  Description: INTERVENTIONS:  - Educate patient/family on patient safety including physical limitations  - Instruct patient to call for assistance with activity   - Consult OT/PT to assist with strengthening/mobility   - Keep Call bell within reach  - Keep bed low and locked with side rails adjusted as appropriate  - Keep care items and personal belongings within reach  - Initiate and maintain comfort rounds  - Make Fall Risk Sign visible to staff  - Offer Toileting every 2 Hours, in advance of need  - Initiate/Maintain bed alarm  - Obtain necessary fall risk management equipment: nurse assist  - Apply yellow socks and bracelet for high fall risk patients  - Consider moving patient to room near nurses station  Outcome: Progressing     Problem: PAIN - ADULT  Goal: Verbalizes/displays adequate comfort level or baseline comfort level  Description: Interventions:  - Encourage patient to monitor pain and request assistance  - Assess pain using appropriate pain scale  - Administer analgesics based on type and severity of pain and evaluate response  - Implement non-pharmacological measures as appropriate and evaluate response  - Consider cultural and social influences on pain and pain management  - Notify physician/advanced practitioner if interventions unsuccessful or patient reports new pain  Outcome: Progressing     Problem: INFECTION - ADULT  Goal: Absence or prevention of progression during hospitalization  Description: INTERVENTIONS:  - Assess and monitor for signs and symptoms of infection  - Monitor lab/diagnostic results  - Monitor all insertion sites, i e  indwelling lines, tubes, and drains  - Monitor endotracheal if appropriate and nasal secretions for changes in amount and color  - [place] appropriate cooling/warming therapies per order  - Administer medications as ordered  - Instruct and encourage patient and family to use good hand hygiene technique  - Identify and instruct in appropriate isolation precautions for identified infection/condition  Outcome: Progressing  Goal: Absence of fever/infection during neutropenic period  Description: INTERVENTIONS:  - Monitor WBC    Outcome: Progressing     Problem: SAFETY ADULT  Goal: Patient will remain free of falls  Description: INTERVENTIONS:  - Educate patient/family on patient safety including physical limitations  - Instruct patient to call for assistance with activity   - Consult OT/PT to assist with strengthening/mobility   - Keep Call bell within reach  - Keep bed low and locked with side rails adjusted as appropriate  - Keep care items and personal belongings within reach  - Initiate and maintain comfort rounds  - Make Fall Risk Sign visible to staff  - Offer Toileting every 2 Hours, in advance of need  - Initiate/Maintain bed alarm  - Obtain necessary fall risk management equipment: nurse assist  - Apply yellow socks and bracelet for high fall risk patients  - Consider moving patient to room near nurses station  Outcome: Progressing  Goal: Maintain or return to baseline ADL function  Description: INTERVENTIONS:  -  Assess patient's ability to carry out ADLs; assess patient's baseline for ADL function and identify physical deficits which impact ability to perform ADLs (bathing, care of mouth/teeth, toileting, grooming, dressing, etc )  - Assess/evaluate cause of self-care deficits   - Assess range of motion  - Assess patient's mobility; develop plan if impaired  - Assess patient's need for assistive devices and provide as appropriate  - Encourage maximum independence but intervene and supervise when necessary  - Involve family in performance of ADLs  - Assess for home care needs following discharge   - Consider OT consult to assist with ADL evaluation and planning for discharge  - Provide patient education as appropriate  Outcome: Progressing  Goal: Maintains/Returns to pre admission functional level  Description: INTERVENTIONS:  - Perform BMAT or MOVE assessment daily    - Set and communicate daily mobility goal to care team and patient/family/caregiver  - Collaborate with rehabilitation services on mobility goals if consulted  - Perform Range of Motion 3 times a day  - Reposition patient every 2 hours    - Dangle patient 3 times a day  - Stand patient 3 times a day  - Ambulate patient 3 times a day  - Out of bed to chair 3 times a day   - Out of bed for meals 3 times a day  - Out of bed for toileting  - Record patient progress and toleration of activity level   Outcome: Progressing     Problem: DISCHARGE PLANNING  Goal: Discharge to home or other facility with appropriate resources  Description: INTERVENTIONS:  - Identify barriers to discharge w/patient and caregiver  - Arrange for needed discharge resources and transportation as appropriate  - Identify discharge learning needs (meds, wound care, etc )  - Arrange for interpretive services to assist at discharge as needed  - Refer to Case Management Department for coordinating discharge planning if the patient needs post-hospital services based on physician/advanced practitioner order or complex needs related to functional status, cognitive ability, or social support system  Outcome: Progressing

## 2023-01-12 NOTE — ASSESSMENT & PLAN NOTE
· Trolled continue Norvasc and continue metoprolol we will hold enalapril light of obstructive uropathy

## 2023-01-12 NOTE — ASSESSMENT & PLAN NOTE
· Obstructive 10 mm proximal right ureteral calculus causes severe right hydroureteronephrosis  · Was seen in the ER back in 1230 he just completed a course of antibiotics as he had a urinary infection but he continued to have pain    Discussed with urology on-call n p o  at midnight for stent placement  · Awaiting UA today  · Pain control  · Hold Eliquis  · Fortunately creatinine is normal

## 2023-01-12 NOTE — ED PROVIDER NOTES
History  Chief Complaint   Patient presents with   • Back Pain     Bilat lower back pain for past 2 weeks, Seen here on Dec 30th and dx with kidney stone, still hasnt passed it, pain increasing   Patient seen here on  for back pain, diagnosed at that time with arthritis as well as right-sided ureteral stone, has been unable to see the urologist up to this point, continues to complain of bilateral low back pain  However he denies fevers or chills  He denies nausea or vomiting  He denies abdominal pain  He denies dysuria  No other complaints  History provided by:  Patient   used: No    Back Pain  Pain location: Bilateral paralumbar and parathoracic  Quality:  Aching  Radiates to:  Does not radiate  Pain severity:  Moderate  Pain is:  Same all the time  Onset quality:  Gradual  Duration:  2 weeks  Timing:  Constant  Progression:  Unchanged  Chronicity:  New  Relieved by:  Nothing  Worsened by:  Nothing  Ineffective treatments:  None tried  Associated symptoms: no abdominal pain, no bladder incontinence, no bowel incontinence, no chest pain, no dysuria, no fever, no headaches, no numbness, no paresthesias, no perianal numbness, no tingling and no weakness        Prior to Admission Medications   Prescriptions Last Dose Informant Patient Reported? Taking?    Cholecalciferol 25 MCG (1000 UT) tablet   Yes No   Sig: Take by mouth   Melatonin 5 MG CAPS   Yes No   Sig: Take by mouth   amLODIPine (NORVASC) 10 mg tablet   Yes No   Sig: Take 10 mg by mouth daily   apixaban (ELIQUIS) 5 mg   Yes No   Sig: Take 5 mg by mouth 2 (two) times a day   atorvastatin (LIPITOR) 40 mg tablet   Yes No   Si mg daily   enalapril (VASOTEC) 2 5 mg tablet   Yes No   Sig: Take 2 5 mg by mouth 2 (two) times a day   folic acid (FOLVITE) 151 mcg tablet   Yes No   Sig: Take by mouth   furosemide (LASIX) 20 mg tablet Not Taking  No No   Sig: Take 1 tablet (20 mg total) by mouth daily   Patient not taking: Reported on 1/12/2023   metoprolol tartrate (LOPRESSOR) 50 mg tablet   Yes No   Sig: Take 50 mg by mouth daily   ondansetron (ZOFRAN) 4 mg tablet   No No   Sig: Take 1 tablet (4 mg total) by mouth every 8 (eight) hours as needed for nausea or vomiting for up to 3 days   tamsulosin (FLOMAX) 0 4 mg   No No   Sig: Take 1 capsule (0 4 mg total) by mouth daily with dinner      Facility-Administered Medications: None       Past Medical History:   Diagnosis Date   • Hyperlipemia    • Hypertension        Past Surgical History:   Procedure Laterality Date   • A-V CARDIAC PACEMAKER INSERTION     • AORTIC VALVE REPLACEMENT     • CARDIAC PACEMAKER PLACEMENT Left    • CORONARY ANGIOPLASTY WITH STENT PLACEMENT         History reviewed  No pertinent family history  I have reviewed and agree with the history as documented  E-Cigarette/Vaping   • E-Cigarette Use Never User      E-Cigarette/Vaping Substances   • Nicotine No    • THC No    • CBD No    • Flavoring No      Social History     Tobacco Use   • Smoking status: Former   • Smokeless tobacco: Never   Vaping Use   • Vaping Use: Never used   Substance Use Topics   • Alcohol use: Yes     Comment: social   • Drug use: Never       Review of Systems   Constitutional: Negative for chills and fever  HENT: Negative for ear pain, hearing loss, sore throat, trouble swallowing and voice change  Eyes: Negative for pain and discharge  Respiratory: Negative for cough, shortness of breath and wheezing  Cardiovascular: Negative for chest pain and palpitations  Gastrointestinal: Negative for abdominal pain, blood in stool, bowel incontinence, constipation, diarrhea, nausea and vomiting  Genitourinary: Negative for bladder incontinence, dysuria, flank pain, frequency and hematuria  Musculoskeletal: Positive for back pain  Negative for joint swelling, neck pain and neck stiffness  Skin: Negative for rash and wound     Neurological: Negative for dizziness, tingling, seizures, syncope, facial asymmetry, weakness, numbness, headaches and paresthesias  Psychiatric/Behavioral: Negative for hallucinations, self-injury and suicidal ideas  All other systems reviewed and are negative  Physical Exam  Physical Exam  Vitals and nursing note reviewed  Constitutional:       General: He is not in acute distress  Appearance: He is well-developed  HENT:      Head: Normocephalic and atraumatic  Right Ear: External ear normal       Left Ear: External ear normal    Eyes:      General: No scleral icterus  Right eye: No discharge  Left eye: No discharge  Extraocular Movements: Extraocular movements intact  Conjunctiva/sclera: Conjunctivae normal    Cardiovascular:      Rate and Rhythm: Normal rate and regular rhythm  Heart sounds: Normal heart sounds  No murmur heard  Pulmonary:      Effort: Pulmonary effort is normal       Breath sounds: Normal breath sounds  No wheezing or rales  Abdominal:      General: Bowel sounds are normal  There is no distension  Palpations: Abdomen is soft  Tenderness: There is no abdominal tenderness  There is no right CVA tenderness, left CVA tenderness, guarding or rebound  Musculoskeletal:         General: No deformity  Normal range of motion  Cervical back: Normal range of motion and neck supple  Right lower leg: No edema  Left lower leg: No edema  Comments: Positional changes reproduce back pain   Skin:     General: Skin is warm and dry  Findings: No rash  Neurological:      General: No focal deficit present  Mental Status: He is alert and oriented to person, place, and time  Cranial Nerves: No cranial nerve deficit  Psychiatric:         Mood and Affect: Mood normal          Behavior: Behavior normal          Thought Content:  Thought content normal          Judgment: Judgment normal          Vital Signs  ED Triage Vitals [01/12/23 1548]   Temperature Pulse Respirations Blood Pressure SpO2   (!) 97 4 °F (36 3 °C) 72 22 128/61 97 %      Temp Source Heart Rate Source Patient Position - Orthostatic VS BP Location FiO2 (%)   Temporal Monitor Lying Right arm --      Pain Score       10 - Worst Possible Pain           Vitals:    01/12/23 1548   BP: 128/61   Pulse: 72   Patient Position - Orthostatic VS: Lying         Visual Acuity      ED Medications  Medications   morphine injection 2 mg (2 mg Intravenous Given 1/12/23 1625)   ketorolac (TORADOL) injection 15 mg (15 mg Intravenous Given 1/12/23 1625)       Diagnostic Studies  Results Reviewed     Procedure Component Value Units Date/Time    Comprehensive metabolic panel [[id number]]  (Abnormal) Collected: 01/12/23 1624    Lab Status: Final result Specimen: Blood from Arm, Right Updated: 01/12/23 1653     Sodium 136 mmol/L      Potassium 4 1 mmol/L      Chloride 103 mmol/L      CO2 25 mmol/L      ANION GAP 8 mmol/L      BUN 12 mg/dL      Creatinine 1 02 mg/dL      Glucose 104 mg/dL      Calcium 8 9 mg/dL      Corrected Calcium 9 4 mg/dL      AST 23 U/L      ALT 18 U/L      Alkaline Phosphatase 151 U/L      Total Protein 6 5 g/dL      Albumin 3 4 g/dL      Total Bilirubin 0 78 mg/dL      eGFR 66 ml/min/1 73sq m     Narrative:      [place] guidelines for Chronic Kidney Disease (CKD):   •  Stage 1 with normal or high GFR (GFR > 90 mL/min/1 73 square meters)  •  Stage 2 Mild CKD (GFR = 60-89 mL/min/1 73 square meters)  •  Stage 3A Moderate CKD (GFR = 45-59 mL/min/1 73 square meters)  •  Stage 3B Moderate CKD (GFR = 30-44 mL/min/1 73 square meters)  •  Stage 4 Severe CKD (GFR = 15-29 mL/min/1 73 square meters)  •  Stage 5 End Stage CKD (GFR <15 mL/min/1 73 square meters)  Note: GFR calculation is accurate only with a steady state creatinine    CBC and differential [[id number]]  (Abnormal) Collected: 01/12/23 1624    Lab Status: Final result Specimen: Blood from Arm, Right Updated: 01/12/23 1632     WBC 10 31 Thousand/uL      RBC 4 74 Million/uL      Hemoglobin 13 8 g/dL      Hematocrit 43 4 %      MCV 92 fL      MCH 29 1 pg      MCHC 31 8 g/dL      RDW 14 1 %      MPV 9 8 fL      Platelets 162 Thousands/uL      nRBC 0 /100 WBCs      Neutrophils Relative 77 %      Immat GRANS % 1 %      Lymphocytes Relative 10 %      Monocytes Relative 10 %      Eosinophils Relative 1 %      Basophils Relative 1 %      Neutrophils Absolute 8 06 Thousands/µL      Immature Grans Absolute 0 06 Thousand/uL      Lymphocytes Absolute 1 02 Thousands/µL      Monocytes Absolute 1 00 Thousand/µL      Eosinophils Absolute 0 09 Thousand/µL      Basophils Absolute 0 08 Thousands/µL     UA w Reflex to Microscopic w Reflex to Culture [[id number]]     Lab Status: No result Specimen: Urine                  CT renal stone study abdomen pelvis wo contrast   Final Result by Maico Crandall MD (01/12 1720)      Obstructive 10 mm proximal right ureteral calculus causes severe right hydroureteronephrosis  The study was marked in [place] [facility] for immediate notification  Workstation performed: [id number]                    Procedures  Procedures         ED Course  ED Course as of 01/12/23 1746   Thu Jan 12, 2023   1737 Discussed with on-call urology, recommend medical admission  Discussed with medicine, agreeable to admit patient                                             Medical Decision Making  Ongoing back pain, seen here on 12/30 with ureteral stone and degenerative changes of the spine, has not passed stone, has not had a chance to follow-up with urology continues to complain of back pain without fever or nausea or vomiting  Obtained work-up including repeat CT scan which shows increase in size of stone from 5 mm to 10 mm  Ureteral stone: acute illness or injury  Amount and/or Complexity of Data Reviewed  Independent Historian:      Details: Daughter  External Data Reviewed: labs, radiology and notes  Labs: ordered   Decision-making details documented in ED Course  Details: Essentially negative  Radiology: ordered and independent interpretation performed  Decision-making details documented in ED Course  Details: Increase in size of ureteral stone  Discussion of management or test interpretation with external provider(s): Hospitalist, urology    Risk  Prescription drug management  Decision regarding hospitalization  Disposition  Final diagnoses:   Ureteral stone     Time reflects when diagnosis was documented in both MDM as applicable and the Disposition within this note     Time User Action Codes Description Comment    1/12/2023  5:44 PM Dionisio Torres Add [N20 1] Ureteral stone       ED Disposition     ED Disposition   Admit    Condition   Stable    Date/Time   Thu Jan 12, 2023  5:44 PM    Comment              Follow-up Information    None         Patient's Medications   Discharge Prescriptions    No medications on file       No discharge procedures on file      PDMP Review     None          ED Provider  Electronically Signed by           Rocio Miller MD  01/12/23 4474

## 2023-01-12 NOTE — H&P
[address]  H&P- Shorty Hitchcockle 1935, 81 y o  male MRN: [id number]  Unit/Bed#: -[facility] Encounter: [id number]  Primary Care Provider: Vickie Ojeda DO   Date and time admitted to hospital: 1/12/2023  3:44 PM    * Urinary tract obstruction by kidney stone  Assessment & Plan  · Obstructive 10 mm proximal right ureteral calculus causes severe right hydroureteronephrosis  · Was seen in the ER back in 1230 he just completed a course of antibiotics as he had a urinary infection but he continued to have pain  Discussed with urology on-call n p o  at midnight for stent placement  · Awaiting UA today  · Pain control  · Hold Eliquis  · Fortunately creatinine is normal    Atrial flutter (HCC)  Assessment & Plan  · Continue metoprolol but hold Eliquis for procedure tomorrow    Pacemaker  Assessment & Plan  · History of sick sinus syndrome    Coronary artery disease  Assessment & Plan  · Status post stent continue Lipitor  We will hold enalapril in light of obstructive uropathy  Continue beta-blocker    S/P AVR  Assessment & Plan  · Bioprosthetic valve    Hypertension  Assessment & Plan  · Trolled continue Norvasc and continue metoprolol we will hold enalapril light of obstructive uropathy      VTE Pharmacologic Prophylaxis: VTE Score: 3 Moderate Risk (Score 3-4) - Pharmacological DVT Prophylaxis Contraindicated  Sequential Compression Devices Ordered  Code Status: No Order full code  Discussion with family: Updated  (daughter) at bedside  Anticipated Length of Stay: Patient will be admitted on an inpatient basis with an anticipated length of stay of greater than 2 midnights secondary to Obstruction of the urinary tract with a stone and severe hydro  Total Time for Visit, including Counseling / Coordination of Care: 60 minutes Greater than 50% of this total time spent on direct patient counseling and coordination of care      Chief Complaint: Back pain    History of Present Illness:  Juan Payne is a 81 y o  male with a PMH of bioprosthetic aortic valve who presents with right sided back pain radiating to the left has been going on for 2 weeks he was in the ER before diagnosed with kidney stones and a UTI completed antibiotics but the pain has worsened no dysuria no chest pain no shortness of breath       Review of Systems:  Review of Systems   Constitutional: Negative for chills and fever  HENT: Negative for ear pain and sore throat  Eyes: Negative for pain and visual disturbance  Respiratory: Negative for cough and shortness of breath  Cardiovascular: Negative for chest pain and palpitations  Gastrointestinal: Negative for abdominal pain and vomiting  Genitourinary: Negative for dysuria and hematuria  Musculoskeletal: Positive for back pain  Negative for arthralgias  Skin: Negative for color change and rash  Neurological: Negative for seizures and syncope  All other systems reviewed and are negative  Past Medical and Surgical History:   Past Medical History:   Diagnosis Date   • Coronary artery disease    • H/O atrial flutter    • History of aortic valve replacement with bioprosthetic valve    • Hyperlipemia    • Hypertension    • Pacemaker        Past Surgical History:   Procedure Laterality Date   • A-V CARDIAC PACEMAKER INSERTION     • AORTIC VALVE REPLACEMENT     • CARDIAC PACEMAKER PLACEMENT Left    • CORONARY ANGIOPLASTY WITH STENT PLACEMENT         Meds/Allergies:  Prior to Admission medications    Medication Sig Start Date End Date Taking?  Authorizing Provider   amLODIPine (NORVASC) 10 mg tablet Take 10 mg by mouth daily 7/25/22  Yes Historical Provider, MD   apixaban (ELIQUIS) 5 mg Take 5 mg by mouth 2 (two) times a day 9/1/21 1/12/23 Yes Historical Provider, MD   atorvastatin (LIPITOR) 40 mg tablet 20 mg daily 7/30/22  Yes Historical Provider, MD   Cholecalciferol 25 MCG (1000 UT) tablet Take by mouth   Yes Historical Provider, MD enalapril (VASOTEC) 2 5 mg tablet Take 2 5 mg by mouth 2 (two) times a day 6/6/22  Yes Historical Provider, MD   folic acid (FOLVITE) 901 mcg tablet Take by mouth   Yes Historical Provider, MD   Melatonin 5 MG CAPS Take by mouth   Yes Historical Provider, MD   metoprolol tartrate (LOPRESSOR) 50 mg tablet Take 50 mg by mouth daily 7/21/22  Yes Historical Provider, MD   tamsulosin (FLOMAX) 0 4 mg Take 1 capsule (0 4 mg total) by mouth daily with dinner 12/30/22 1/29/23 Yes MICHAEL Reeder   furosemide (LASIX) 20 mg tablet Take 1 tablet (20 mg total) by mouth daily  Patient not taking: Reported on 1/12/2023 12/12/22 1/12/23  Jeniffer Villalpando DO   ondansetron (ZOFRAN) 4 mg tablet Take 1 tablet (4 mg total) by mouth every 8 (eight) hours as needed for nausea or vomiting for up to 3 days 12/30/22 1/12/23  MICHAEL Reeder     I have reviewed home medications with patient personally  Allergies: Allergies   Allergen Reactions   • Ezetimibe-Simvastatin Dizziness and Lightheadedness     Other reaction(s): MAKES HIM DIZZY     • Simvastatin Lightheadedness     Other reaction(s): Dizziness       Social History:  Marital Status:    Substance Use History:   Social History     Substance and Sexual Activity   Alcohol Use Yes    Comment: social     Social History     Tobacco Use   Smoking Status Former   Smokeless Tobacco Never     Social History     Substance and Sexual Activity   Drug Use Never       Family History:  History reviewed  No pertinent family history  Physical Exam:     Vitals:   Blood Pressure: 145/71 (01/12/23 1834)  Pulse: 76 (01/12/23 1834)  Temperature: 97 9 °F (36 6 °C) (01/12/23 1834)  Temp Source: Temporal (01/12/23 1834)  Respirations: 18 (01/12/23 1834)  Height: 5' 10" (177 8 cm) (01/12/23 1834)  Weight - Scale: 103 kg (227 lb 11 8 oz) (01/12/23 1834)  SpO2: 97 % (01/12/23 1834)    Physical Exam  Vitals and nursing note reviewed     Constitutional:       General: He is not in acute distress  Appearance: He is well-developed  HENT:      Head: Normocephalic and atraumatic  Eyes:      Conjunctiva/sclera: Conjunctivae normal    Cardiovascular:      Rate and Rhythm: Normal rate and regular rhythm  Heart sounds: No murmur heard  Pulmonary:      Effort: Pulmonary effort is normal  No respiratory distress  Breath sounds: Normal breath sounds  Abdominal:      General: There is no distension  Palpations: Abdomen is soft  Tenderness: There is no abdominal tenderness  There is no right CVA tenderness or left CVA tenderness  Musculoskeletal:         General: No swelling  Cervical back: Neck supple  Skin:     General: Skin is warm and dry  Capillary Refill: Capillary refill takes less than 2 seconds  Neurological:      Mental Status: He is alert and oriented to person, place, and time     Psychiatric:         Mood and Affect: Mood normal           Additional Data:     Lab Results:  Results from last 7 days   Lab Units 01/12/23  1624   WBC Thousand/uL 10 31*   HEMOGLOBIN g/dL 13 8   HEMATOCRIT % 43 4   PLATELETS Thousands/uL 238   NEUTROS PCT % 77*   LYMPHS PCT % 10*   MONOS PCT % 10   EOS PCT % 1     Results from last 7 days   Lab Units 01/12/23  1624   SODIUM mmol/L 136   POTASSIUM mmol/L 4 1   CHLORIDE mmol/L 103   CO2 mmol/L 25   BUN mg/dL 12   CREATININE mg/dL 1 02   ANION GAP mmol/L 8   CALCIUM mg/dL 8 9   ALBUMIN g/dL 3 4*   TOTAL BILIRUBIN mg/dL 0 78   ALK PHOS U/L 151*   ALT U/L 18   AST U/L 23   GLUCOSE RANDOM mg/dL 104                       Lines/Drains:  Invasive Devices     Peripheral Intravenous Line  Duration           Peripheral IV 01/12/23 Right;Ventral (anterior) Forearm <1 day                    Imaging: Reviewed radiology reports from this admission including: abdominal/pelvic CT  CT renal stone study abdomen pelvis wo contrast   Final Result by Wesley Hannah MD (01/12 1720)      Obstructive 10 mm proximal right ureteral calculus causes severe right hydroureteronephrosis  The study was marked in [facility] for immediate notification  Workstation performed: [id number]             EKG and Other Studies Reviewed on Admission:   · EKG: Atrial fibrillation  HR 70     ** Please Note: This note has been constructed using a voice recognition system   **

## 2023-01-13 ENCOUNTER — APPOINTMENT (INPATIENT)
Dept: RADIOLOGY | Facility: HOSPITAL | Age: 88
End: 2023-01-13

## 2023-01-13 ENCOUNTER — ANESTHESIA (INPATIENT)
Dept: PERIOP | Facility: HOSPITAL | Age: 88
End: 2023-01-13

## 2023-01-13 ENCOUNTER — ANESTHESIA EVENT (INPATIENT)
Dept: PERIOP | Facility: HOSPITAL | Age: 88
End: 2023-01-13

## 2023-01-13 PROBLEM — N30.01 ACUTE CYSTITIS WITH HEMATURIA: Status: ACTIVE | Noted: 2023-01-13

## 2023-01-13 LAB
ANION GAP SERPL CALCULATED.3IONS-SCNC: 8 MMOL/L (ref 4–13)
BASOPHILS # BLD AUTO: 0.07 THOUSANDS/ÂΜL (ref 0–0.1)
BASOPHILS NFR BLD AUTO: 1 % (ref 0–1)
BUN SERPL-MCNC: 16 MG/DL (ref 5–25)
CALCIUM SERPL-MCNC: 8.3 MG/DL (ref 8.3–10.1)
CHLORIDE SERPL-SCNC: 101 MMOL/L (ref 96–108)
CO2 SERPL-SCNC: 26 MMOL/L (ref 21–32)
CREAT SERPL-MCNC: 1 MG/DL (ref 0.6–1.3)
EOSINOPHIL # BLD AUTO: 0.19 THOUSAND/ÂΜL (ref 0–0.61)
EOSINOPHIL NFR BLD AUTO: 3 % (ref 0–6)
ERYTHROCYTE [DISTWIDTH] IN BLOOD BY AUTOMATED COUNT: 14.3 % (ref 11.6–15.1)
GFR SERPL CREATININE-BSD FRML MDRD: 67 ML/MIN/1.73SQ M
GLUCOSE SERPL-MCNC: 96 MG/DL (ref 65–140)
HCT VFR BLD AUTO: 36.7 % (ref 36.5–49.3)
HGB BLD-MCNC: 11.9 G/DL (ref 12–17)
IMM GRANULOCYTES # BLD AUTO: 0.03 THOUSAND/UL (ref 0–0.2)
IMM GRANULOCYTES NFR BLD AUTO: 0 % (ref 0–2)
LYMPHOCYTES # BLD AUTO: 1.48 THOUSANDS/ÂΜL (ref 0.6–4.47)
LYMPHOCYTES NFR BLD AUTO: 22 % (ref 14–44)
MCH RBC QN AUTO: 29 PG (ref 26.8–34.3)
MCHC RBC AUTO-ENTMCNC: 32.4 G/DL (ref 31.4–37.4)
MCV RBC AUTO: 90 FL (ref 82–98)
MONOCYTES # BLD AUTO: 0.73 THOUSAND/ÂΜL (ref 0.17–1.22)
MONOCYTES NFR BLD AUTO: 11 % (ref 4–12)
NEUTROPHILS # BLD AUTO: 4.32 THOUSANDS/ÂΜL (ref 1.85–7.62)
NEUTS SEG NFR BLD AUTO: 63 % (ref 43–75)
NRBC BLD AUTO-RTO: 0 /100 WBCS
PLATELET # BLD AUTO: 208 THOUSANDS/UL (ref 149–390)
PMV BLD AUTO: 9.4 FL (ref 8.9–12.7)
POTASSIUM SERPL-SCNC: 4 MMOL/L (ref 3.5–5.3)
RBC # BLD AUTO: 4.1 MILLION/UL (ref 3.88–5.62)
SODIUM SERPL-SCNC: 135 MMOL/L (ref 135–147)
WBC # BLD AUTO: 6.82 THOUSAND/UL (ref 4.31–10.16)

## 2023-01-13 PROCEDURE — 0T768DZ DILATION OF RIGHT URETER WITH INTRALUMINAL DEVICE, VIA NATURAL OR ARTIFICIAL OPENING ENDOSCOPIC: ICD-10-PCS | Performed by: UROLOGY

## 2023-01-13 PROCEDURE — BT1D1ZZ FLUOROSCOPY OF RIGHT KIDNEY, URETER AND BLADDER USING LOW OSMOLAR CONTRAST: ICD-10-PCS | Performed by: UROLOGY

## 2023-01-13 DEVICE — INLAY OPTIMA URETERAL STENT W/O GUIDEWIRE
Type: IMPLANTABLE DEVICE | Site: URETER | Status: FUNCTIONAL
Brand: BARD® INLAY OPTIMA® URETERAL STENT

## 2023-01-13 RX ORDER — POLYETHYLENE GLYCOL 3350 17 G/17G
17 POWDER, FOR SOLUTION ORAL DAILY PRN
Status: DISCONTINUED | OUTPATIENT
Start: 2023-01-13 | End: 2023-01-16 | Stop reason: HOSPADM

## 2023-01-13 RX ORDER — HYDROMORPHONE HCL IN WATER/PF 6 MG/30 ML
0.2 PATIENT CONTROLLED ANALGESIA SYRINGE INTRAVENOUS
Status: DISCONTINUED | OUTPATIENT
Start: 2023-01-13 | End: 2023-01-13 | Stop reason: HOSPADM

## 2023-01-13 RX ORDER — SUCCINYLCHOLINE/SOD CL,ISO/PF 100 MG/5ML
SYRINGE (ML) INTRAVENOUS AS NEEDED
Status: DISCONTINUED | OUTPATIENT
Start: 2023-01-13 | End: 2023-01-13

## 2023-01-13 RX ORDER — LIDOCAINE HYDROCHLORIDE 10 MG/ML
0.5 INJECTION, SOLUTION EPIDURAL; INFILTRATION; INTRACAUDAL; PERINEURAL ONCE AS NEEDED
Status: DISCONTINUED | OUTPATIENT
Start: 2023-01-13 | End: 2023-01-13

## 2023-01-13 RX ORDER — ONDANSETRON 2 MG/ML
4 INJECTION INTRAMUSCULAR; INTRAVENOUS ONCE AS NEEDED
Status: DISCONTINUED | OUTPATIENT
Start: 2023-01-13 | End: 2023-01-13 | Stop reason: HOSPADM

## 2023-01-13 RX ORDER — LIDOCAINE HYDROCHLORIDE 10 MG/ML
INJECTION, SOLUTION EPIDURAL; INFILTRATION; INTRACAUDAL; PERINEURAL AS NEEDED
Status: DISCONTINUED | OUTPATIENT
Start: 2023-01-13 | End: 2023-01-13

## 2023-01-13 RX ORDER — FENTANYL CITRATE 50 UG/ML
INJECTION, SOLUTION INTRAMUSCULAR; INTRAVENOUS AS NEEDED
Status: DISCONTINUED | OUTPATIENT
Start: 2023-01-13 | End: 2023-01-13

## 2023-01-13 RX ORDER — FENTANYL CITRATE/PF 50 MCG/ML
50 SYRINGE (ML) INJECTION
Status: DISCONTINUED | OUTPATIENT
Start: 2023-01-13 | End: 2023-01-13 | Stop reason: HOSPADM

## 2023-01-13 RX ORDER — METOCLOPRAMIDE HYDROCHLORIDE 5 MG/ML
10 INJECTION INTRAMUSCULAR; INTRAVENOUS ONCE AS NEEDED
Status: DISCONTINUED | OUTPATIENT
Start: 2023-01-13 | End: 2023-01-13 | Stop reason: HOSPADM

## 2023-01-13 RX ORDER — DIPHENHYDRAMINE HYDROCHLORIDE 50 MG/ML
12.5 INJECTION INTRAMUSCULAR; INTRAVENOUS ONCE AS NEEDED
Status: DISCONTINUED | OUTPATIENT
Start: 2023-01-13 | End: 2023-01-13 | Stop reason: HOSPADM

## 2023-01-13 RX ORDER — HYDROMORPHONE HCL/PF 1 MG/ML
0.5 SYRINGE (ML) INJECTION
Status: DISCONTINUED | OUTPATIENT
Start: 2023-01-13 | End: 2023-01-13 | Stop reason: HOSPADM

## 2023-01-13 RX ORDER — DEXAMETHASONE SODIUM PHOSPHATE 10 MG/ML
INJECTION, SOLUTION INTRAMUSCULAR; INTRAVENOUS AS NEEDED
Status: DISCONTINUED | OUTPATIENT
Start: 2023-01-13 | End: 2023-01-13

## 2023-01-13 RX ORDER — SENNOSIDES 8.6 MG
1 TABLET ORAL
Status: DISCONTINUED | OUTPATIENT
Start: 2023-01-13 | End: 2023-01-16 | Stop reason: HOSPADM

## 2023-01-13 RX ORDER — MAGNESIUM HYDROXIDE 1200 MG/15ML
LIQUID ORAL AS NEEDED
Status: DISCONTINUED | OUTPATIENT
Start: 2023-01-13 | End: 2023-01-13 | Stop reason: HOSPADM

## 2023-01-13 RX ORDER — ONDANSETRON 2 MG/ML
INJECTION INTRAMUSCULAR; INTRAVENOUS AS NEEDED
Status: DISCONTINUED | OUTPATIENT
Start: 2023-01-13 | End: 2023-01-13

## 2023-01-13 RX ORDER — PROPOFOL 10 MG/ML
INJECTION, EMULSION INTRAVENOUS AS NEEDED
Status: DISCONTINUED | OUTPATIENT
Start: 2023-01-13 | End: 2023-01-13

## 2023-01-13 RX ORDER — SODIUM CHLORIDE, SODIUM LACTATE, POTASSIUM CHLORIDE, CALCIUM CHLORIDE 600; 310; 30; 20 MG/100ML; MG/100ML; MG/100ML; MG/100ML
INJECTION, SOLUTION INTRAVENOUS CONTINUOUS PRN
Status: DISCONTINUED | OUTPATIENT
Start: 2023-01-13 | End: 2023-01-13

## 2023-01-13 RX ADMIN — AMPICILLIN SODIUM 2000 MG: 2 INJECTION, POWDER, FOR SOLUTION INTRAMUSCULAR; INTRAVENOUS at 20:21

## 2023-01-13 RX ADMIN — ATORVASTATIN CALCIUM 20 MG: 20 TABLET, FILM COATED ORAL at 12:43

## 2023-01-13 RX ADMIN — AMPICILLIN SODIUM 2000 MG: 2 INJECTION, POWDER, FOR SOLUTION INTRAMUSCULAR; INTRAVENOUS at 12:30

## 2023-01-13 RX ADMIN — FENTANYL CITRATE 25 MCG: 50 INJECTION INTRAMUSCULAR; INTRAVENOUS at 08:56

## 2023-01-13 RX ADMIN — Medication 80 MG: at 08:40

## 2023-01-13 RX ADMIN — FENTANYL CITRATE 25 MCG: 50 INJECTION INTRAMUSCULAR; INTRAVENOUS at 09:04

## 2023-01-13 RX ADMIN — OXYCODONE HYDROCHLORIDE 2.5 MG: 5 TABLET ORAL at 11:56

## 2023-01-13 RX ADMIN — LIDOCAINE HYDROCHLORIDE 50 MG: 10 INJECTION, SOLUTION EPIDURAL; INFILTRATION; INTRACAUDAL at 08:36

## 2023-01-13 RX ADMIN — LEVOFLOXACIN 750 MG: 750 INJECTION, SOLUTION INTRAVENOUS at 07:54

## 2023-01-13 RX ADMIN — AMPICILLIN SODIUM 2000 MG: 2 INJECTION, POWDER, FOR SOLUTION INTRAMUSCULAR; INTRAVENOUS at 16:19

## 2023-01-13 RX ADMIN — DEXAMETHASONE SODIUM PHOSPHATE 10 MG: 10 INJECTION, SOLUTION INTRAMUSCULAR; INTRAVENOUS at 08:36

## 2023-01-13 RX ADMIN — Medication 6 MG: at 20:22

## 2023-01-13 RX ADMIN — METOPROLOL TARTRATE 50 MG: 50 TABLET, FILM COATED ORAL at 12:43

## 2023-01-13 RX ADMIN — PROPOFOL 150 MG: 10 INJECTION, EMULSION INTRAVENOUS at 08:36

## 2023-01-13 RX ADMIN — ONDANSETRON 4 MG: 2 INJECTION INTRAMUSCULAR; INTRAVENOUS at 08:36

## 2023-01-13 RX ADMIN — TAMSULOSIN HYDROCHLORIDE 0.4 MG: 0.4 CAPSULE ORAL at 16:19

## 2023-01-13 RX ADMIN — OXYCODONE HYDROCHLORIDE 5 MG: 5 TABLET ORAL at 20:21

## 2023-01-13 RX ADMIN — FENTANYL CITRATE 50 MCG: 50 INJECTION INTRAMUSCULAR; INTRAVENOUS at 08:36

## 2023-01-13 RX ADMIN — SODIUM CHLORIDE, SODIUM LACTATE, POTASSIUM CHLORIDE, AND CALCIUM CHLORIDE: .6; .31; .03; .02 INJECTION, SOLUTION INTRAVENOUS at 08:31

## 2023-01-13 RX ADMIN — PROPOFOL 50 MG: 10 INJECTION, EMULSION INTRAVENOUS at 08:40

## 2023-01-13 RX ADMIN — AMLODIPINE BESYLATE 10 MG: 10 TABLET ORAL at 12:43

## 2023-01-13 NOTE — PHYSICAL THERAPY NOTE
PHYSICAL THERAPY EVALUATION  NAME:  Magy Gerber  DATE: 01/13/23    AGE:   81 y o  Mrn:   [id number]  ADMIT DX:  Ureteral stone [N20 1]  Flank pain [R10 9]    Past Medical History:   Diagnosis Date   • Coronary artery disease    • H/O atrial flutter    • History of aortic valve replacement with bioprosthetic valve    • Hyperlipemia    • Hypertension    • Pacemaker      Length Of Stay: 1  Performed at least 2 patient identifiers during session: Name and Birthday  PHYSICAL THERAPY EVALUATION :        01/13/23 1023   Note Type   Note type Evaluation   Pain Assessment   Pain Assessment Tool 0-10   Pain Score 10 - Worst Possible Pain   Pain Location/Orientation Location: Back   Restrictions/Precautions   Other Precautions Chair Alarm; Bed Alarm; Fall Risk;Pain   Home Living   Type of Home House  (4 ASH R or 6 ASH R)   Home Layout Two level;Performs ADLs on one level; Able to live on main level with bedroom/bathroom   Bathroom Shower/Tub Tub/shower unit   Bathroom Toilet Raised   Bathroom Equipment Shower chair;Grab bars around toilet   Home Equipment Walker;Cane  (lift chair, no AD use at baseline, RW belongs to [employer] reports it is not currently in use, should pt be unable to use, recommend RW for home)   Additional Comments Pt's dtr present assisting with PLOF and home setup stating pt lives with her and her SO in [address] [place] [address] with 4 or 6 ASH, owns RW and SPC but didn't use PTA  Pt recently staying on first floor, sleeping in lift chair due to back pain for last 1-2 weeks, prior to this pt negotiating stairs to second floor bedroom   Prior Function   Level of [place] Independent with ADLs; Independent with IADLS; Independent with functional mobility   Lives With Daughter  ([employer]TIE)   Receives Help From Family   IADLs Independent with driving   Falls in the last 6 months 1 to 4   Comments Prior to new onset back pain, pt was I for ADLS, light IADLs, mobility without AD and drives   Cognition   Orientation Level Oriented to person;Disoriented to time;Oriented to place;Oriented to situation   Following Commands Follows one step commands without difficulty   RLE Assessment   RLE Assessment WFL  (grossly assessed at least 3+/5 strength)   LLE Assessment   LLE Assessment WFL  (grossly assessed at least 3+/5 strength)   Bed Mobility   Supine to Sit   (SBA)   Additional items Increased time required;Verbal cues;HOB elevated; Bedrails   Additional Comments HOB elevated, bedrails, SBA and VC for technique, log roll completed due to increased back pain   Transfers   Sit to Stand   (SBA)   Additional items Increased time required;Verbal cues   Stand to Sit   (SBA)   Additional items Increased time required;Verbal cues   Stand pivot   (SBA)   Additional items Increased time required;Verbal cues   Additional Comments STS with RW, SBA for safety and hand placement, SPT with SBA and VC for RW management   Ambulation/Elevation   Gait pattern Improper Weight shift; Wide GILMER; Decreased foot clearance; Short stride; Excessively slow   Gait Assistance   (SBA)   Additional items Verbal cues   Assistive Device Rolling walker   Distance 25' with RW, VC for increased step length and RW management, limited by fatigue and back pain   Balance   Static Sitting Good   Dynamic Sitting Fair +   Static Standing Fair   Dynamic Standing Fair -   Ambulatory Fair -   Endurance Deficit   Endurance Deficit Yes   Endurance Deficit Description pain, fatigue   Activity Tolerance   Activity Tolerance Patient limited by pain; Patient limited by fatigue   Medical Staff Made Aware Radha CARUSO   Nurse Made Aware RN aware   Assessment   Prognosis Good   Problem List Decreased strength;Decreased endurance; Impaired balance;Decreased mobility; Decreased safety awareness; Obesity;Pain;Decreased skin integrity   Barriers to Discharge None   Barriers to Discharge Comments pt has assistance at home from [employer] who is retired   Goals   Patient Goals Go home   STG Expiration Date 01/27/23   Plan Treatment/Interventions Functional transfer training;LE strengthening/ROM; Elevations; Therapeutic exercise; Endurance training;Patient/family training;Equipment eval/education; Bed mobility;Gait training; Compensatory technique education;Spoke to nursing;OT   PT Frequency 3-5x/wk   Recommendation   PT Discharge Recommendation Home with home health rehabilitation   Equipment Recommended Maddie Mobley  (pt's family may have RW for pt to use, if unable to use, recommend RW)   AM-PAC Basic Mobility Inpatient   Turning in Flat Bed Without Bedrails 3   Lying on Back to Sitting on Edge of Flat Bed Without Bedrails 3   Moving Bed to Chair 3   Standing Up From Chair Using Arms 3   Walk in Room 3   Climb 3-5 Stairs With Railing 3   Basic Mobility Inpatient Raw Score 18   Basic Mobility Standardized Score 41 05   Highest Level Of Mobility   JH-HLM Goal 6: Walk 10 steps or more   JH-HLM Achieved 7: Walk 25 feet or more   End of Consult   Patient Position at End of Consult Bedside chair;Bed/Chair alarm activated; All needs within reach     The patient's AM-PAC Basic Mobility Inpatient Short Form Raw Score is 18  A Raw score of greater than 16 suggests the patient may benefit from discharge to home  Please also refer to the recommendation of the Physical Therapist for safe discharge planning  (Please find full objective findings from PT assessment regarding body systems outlined above)  Assessment: Pt is a 81 y o  male seen for PT evaluation s/p admission to [address] [place] [address] on 1/12/2023 with Urinary tract obstruction by kidney stone  Order placed for PT services    Upon evaluation: Pt is presenting with impaired functional mobility due to pain, decreased strength, decreased endurance, impaired balance, gait deviations, decreased safety awareness, fall risk and impaired skin integrity requiring SBA assistance for bed mobility, transfers, and ambulation with RW  Pt's clinical presentation is currently unstable/unpredictable given the functional mobility deficits above coupled with fall risks as indicated by AM-PAC 6-Clicks: 42/35 as well as hx of falls, impaired balance, polypharmacy and decreased safety awareness and combined with medical complications of hypertension , abnormal H&H and need for input for mobility technique/safety  Pt's PMHx and comorbidities that may affect physical performance and progress include: HTN, CAD, obesity and a flutter  Personal factors affecting pt at time of IE include: step(s) to enter environment, multi-level environment, advanced age, inability to perform IADLs, inability to navigate level surfaces without external assistance, inability to navigate community distances, decreased initiation and engagement and recent fall(s)/fall history  Pt will benefit from continued skilled PT services to address deficits as defined above and to maximize level of functional mobility to facilitate return toward PLOF and improved QOL  From PT/mobility standpoint, recommendation at time of d/c would be Home PT pending progress in order to reduce fall risk and maximize pt's functional independence and consistency with mobility in order to facilitate return to PLOF  Recommend trial with walker next 1-2 sessions and ther ex next 1-2 sessions  Goals: Pt will: Perform bed mobility tasks with modified I to reposition in bed and prepare for transfers  Pt will perform transfers with modified I to increase Indep in home environment and prepare for ambulation  Pt will ambulate with LRAD for >/= 150' with  modified I  to improve gait quality and promote proper use of assistive device and to access home environment  Pt will complete >/= 12 steps with with unilateral handrail with modified I to return to home with ASH and return to multilevel home  Increase bilateral LE strength 1/2 grade to facilitate independent mobility and Increase all balance 1/2 grade to decrease risk for falls          [address] Emilia Peck, PT,DPT

## 2023-01-13 NOTE — PROGRESS NOTES
[address]  Progress Note Abe Delfino 1935, 81 y o  male MRN: [id number]  Unit/Bed#: -01 Encounter: [id number]  Primary Care Provider: Boyd Leonardo DO   Date and time admitted to hospital: 1/12/2023  3:44 PM    * Urinary tract obstruction by kidney stone  Assessment & Plan  · Obstructive 10 mm proximal right ureteral calculus causes severe right hydroureteronephrosis  · Status post right-sided stent for lithotripsy he will need to be seen as an outpatient  · UA consistent with cystitis and previous culture was Enterococcus  · Pain control  · Hold Eliquis she is having some hematuria Dickens has been in the hematuria resolves will restart Eliquis as per urology tomorrow otherwise if continues we will have to hold further  · Fortunately creatinine is normal    Acute cystitis with hematuria  Assessment & Plan  · Start ampicillin 2 g IV every 4 hours as discussed with infectious disease recent culture of Enterococcus  Due to prosthetic valve and pacemaker he is at risk for bacteremia to check blood cultures if his blood cultures are negative at 24 hours, be discharged on Levaquin till 1/19    Atrial flutter (HCC)  Assessment & Plan  · Continue metoprolol hold Eliquis till hematuria resolves    Pacemaker  Assessment & Plan  · History of sick sinus syndrome    Coronary artery disease  Assessment & Plan  · Status post stent continue Lipitor  We will hold enalapril in light of obstructive uropathy  Continue beta-blocker    S/P AVR  Assessment & Plan  · Bioprosthetic valve    Hypertension  Assessment & Plan  · Trolled continue Norvasc and continue metoprolol we will hold enalapril light of obstructive uropathy if creatinine remains stable in 24 hours as needed we will restart          VTE Pharmacologic Prophylaxis: VTE Score: 3 Moderate Risk (Score 3-4) - Pharmacological DVT Prophylaxis Contraindicated  Sequential Compression Devices Ordered  Patient Centered Rounds:  I performed bedside rounds with nursing staff today  Discussions with Specialists or Other Care Team Provider: I discussed with urology and CM    Education and Discussions with Family / Patient: Updated  (daughter) at bedside  Time Spent for Care: 30 minutes  More than 50% of total time spent on counseling and coordination of care as described above  Current Length of Stay: 1 day(s)  Current Patient Status: Inpatient   Certification Statement: The patient will continue to require additional inpatient hospital stay due to Secondary to stent and hematuria and UTI  Discharge Plan: Anticipate discharge in 48 hrs to discharge location to be determined pending rehab evaluations  Code Status: Level 1 - Full Code    Subjective:   Patient seen and examined at  states the pain has improved after the procedure no chest pain or shortness of breath    Objective:     Vitals:   Temp (24hrs), Av °F (36 7 °C), Min:97 4 °F (36 3 °C), Max:98 6 °F (37 °C)    Temp:  [97 4 °F (36 3 °C)-98 6 °F (37 °C)] 98 1 °F (36 7 °C)  HR:  [72-93] 93  Resp:  [15-22] 18  BP: ()/(58-71) 145/70  SpO2:  [93 %-99 %] 93 %  Body mass index is 32 57 kg/m²  Input and Output Summary (last 24 hours): Intake/Output Summary (Last 24 hours) at 2023 1302  Last data filed at 2023 0930  Gross per 24 hour   Intake 660 ml   Output 550 ml   Net 110 ml       Physical Exam:   Physical Exam  Vitals and nursing note reviewed  Constitutional:       General: He is not in acute distress  Appearance: He is well-developed  HENT:      Head: Normocephalic and atraumatic  Eyes:      Conjunctiva/sclera: Conjunctivae normal    Cardiovascular:      Rate and Rhythm: Normal rate  Rhythm irregular  Heart sounds: No murmur heard  Pulmonary:      Effort: Pulmonary effort is normal  No respiratory distress  Breath sounds: Normal breath sounds  No wheezing or rales  Abdominal:      General: There is no distension  Palpations: Abdomen is soft  Tenderness: There is no abdominal tenderness  Musculoskeletal:         General: No swelling  Cervical back: Neck supple  Skin:     General: Skin is warm and dry  Capillary Refill: Capillary refill takes less than 2 seconds  Neurological:      Mental Status: He is alert and oriented to person, place, and time     Psychiatric:         Mood and Affect: Mood normal          Additional Data:     Labs:  Results from last 7 days   Lab Units 01/13/23  0532   WBC Thousand/uL 6 82   HEMOGLOBIN g/dL 11 9*   HEMATOCRIT % 36 7   PLATELETS Thousands/uL 208   NEUTROS PCT % 63   LYMPHS PCT % 22   MONOS PCT % 11   EOS PCT % 3     Results from last 7 days   Lab Units 01/13/23  0532 01/12/23  1624   SODIUM mmol/L 135 136   POTASSIUM mmol/L 4 0 4 1   CHLORIDE mmol/L 101 103   CO2 mmol/L 26 25   BUN mg/dL 16 12   CREATININE mg/dL 1 00 1 02   ANION GAP mmol/L 8 8   CALCIUM mg/dL 8 3 8 9   ALBUMIN g/dL  --  3 4*   TOTAL BILIRUBIN mg/dL  --  0 78   ALK PHOS U/L  --  151*   ALT U/L  --  18   AST U/L  --  23   GLUCOSE RANDOM mg/dL 96 104                       Lines/Drains:  Invasive Devices     Peripheral Intravenous Line  Duration           Peripheral IV 01/12/23 Right;Ventral (anterior) Forearm <1 day          Drain  Duration           Ureteral Internal Stent Right ureter 6 Fr  <1 day    Urethral Catheter Latex 18 Fr  <1 day              Urinary Catheter:  Goal for removal: Remove after 48 hrs of I/O monitoring               Imaging: Reviewed radiology reports from this admission including: abdominal/pelvic CT    Recent Cultures (last 7 days):         Last 24 Hours Medication List:   Current Facility-Administered Medications   Medication Dose Route Frequency Provider Last Rate   • acetaminophen  650 mg Oral Q6H PRN Anupam Salmeron MD     • amLODIPine  10 mg Oral Daily Anupam Salmeron MD     • ampicillin  2,000 mg Intravenous Q4H Nghia Butler MD 2,000 mg (01/13/23 1230)   • atorvastatin  20 mg Oral Daily Jarrell Gallardo MD     • melatonin  6 mg Oral HS Jarrell Gallardo MD     • metoprolol tartrate  50 mg Oral Daily Jarrell Gallardo MD     • morphine injection  2 mg Intravenous Q6H PRN Jarrell Gallardo MD     • oxyCODONE  2 5 mg Oral Q4H PRN Jarrell Gallardo MD     • oxyCODONE  5 mg Oral Q4H PRN Jarrell Gallardo MD     • tamsulosin  0 4 mg Oral Daily With Susan Fernandez MD          Today, Patient Was Seen By: Maira Velasco MD    **Please Note: This note may have been constructed using a voice recognition system  **

## 2023-01-13 NOTE — ASSESSMENT & PLAN NOTE
· Trolled continue Norvasc and continue metoprolol we will hold enalapril light of obstructive uropathy if creatinine remains stable in 24 hours as needed we will restart

## 2023-01-13 NOTE — OCCUPATIONAL THERAPY NOTE
Occupational Therapy Evaluation     Patient Name: Sondra Palmer  [id number] Date: 1/13/2023  Problem List  Principal Problem:    Urinary tract obstruction by kidney stone  Active Problems:    Hypertension    S/P AVR    Coronary artery disease    Pacemaker    Atrial flutter [facility])    Past Medical History  Past Medical History:   Diagnosis Date    Coronary artery disease     H/O atrial flutter     History of aortic valve replacement with bioprosthetic valve     Hyperlipemia     Hypertension     Pacemaker      Past Surgical History  Past Surgical History:   Procedure Laterality Date    A-V CARDIAC PACEMAKER INSERTION      AORTIC VALVE REPLACEMENT      CARDIAC PACEMAKER PLACEMENT Left     CORONARY ANGIOPLASTY WITH STENT PLACEMENT      CT CYSTO BLADDER W/URETERAL CATHETERIZATION Right 1/13/2023    Procedure: CYSTOSCOPY RETROGRADE PYELOGRAM WITH INSERTION STENT URETERAL;  Surgeon: Gia Krishna MD;  Location:  MAIN OR;  Service: Urology           01/13/23 1024   Note Type   Note type Evaluation   Pain Assessment   Pain Assessment Tool 0-10   Pain Score 10 - Worst Possible Pain   Pain Location/Orientation Orientation: Lower; Location: Back   Restrictions/Precautions   Other Precautions Chair Alarm; Bed Alarm; Fall Risk;Pain   Home Living   Type of Home House  (4 ASH L HR or 6 ASH B HR)   Home Layout Two level;Performs ADLs on one level; Able to live on main level with bedroom/bathroom   Bathroom Shower/Tub Tub/shower unit   Bathroom Toilet Raised   Bathroom Equipment Shower chair;Grab bars around toilet   Home Equipment Walker;Cane  (did not use AD PTA  Pt sleeps in recliner lift chair)   Additional Comments Pt reports living in a 2 story home with although has a full 1st floor set-up  Pt ambulates with no AD at baseline although has availablty for a RW and SPC from his daughter who is not using the AD at this time  Prior Function   Level of [place] Independent with ADLs; Independent with IADLS; Independent with functional mobility   Lives With Daughter  (BETTIE)   Receives Help From Family   IADLs Independent with driving   Falls in the last 6 months 1 to 4   Comments Pt reports completing ADLs, IADLs, functional ambulation and community mobility @ I although recently d/t new onset back pain, Pt has required increased assistance with LB ADLs and IADL tasks   ADL   Where Assessed Edge of bed   Eating Assistance 6  Modified independent   Eating Deficit Setup   Grooming Assistance 6  Modified Independent   Grooming Deficit Setup   UB Dressing Assistance 5  Supervision/Setup   UB Dressing Deficit Setup   LB Dressing Assistance 3  Moderate Assistance   LB Dressing Deficit Requires assistive device for steadying;Verbal cueing;Supervision/safety; Increased time to complete; Don/doff R sock; Don/doff L sock; Thread RLE into pants; Thread LLE into pants   Additional Comments Pt ocmpleting ADL tasks while seated at EOB  UB Dressing @ S after set-up  grooming and self-feeding tasks @ Mod I after set-up  LB Dressing @ Mod A for donning socks/pants around feet d/t increased back pain  Min A for CM around waist while standing at RW for balnace and thoroughness  Pt reports "that feels better" in regards to his back pain while standing   Bed Mobility   Supine to Sit   (SBA)   Additional items Increased time required;Verbal cues; Bedrails;HOB elevated   Additional Comments HOB elevated, use of bedrails  Pt sleeps in recliner lift chair at baseline   Transfers   Sit to Stand   (SBA)   Additional items Increased time required;Verbal cues   Stand to Sit   (SBA)   Additional items Increased time required;Verbal cues   Stand pivot   (SBA)   Additional items Increased time required;Verbal cues   Additional Comments Pt completing functional transfers with use of RW for UB support   SBA for STS and SPT with increased time and vc'ing for asfety/technique   Balance   Static Sitting Good   Dynamic Sitting Fair +   Static Standing [place]ril 83 - Activity Tolerance   Activity Tolerance Patient limited by fatigue;Patient limited by pain   Medical Staff Made Aware Spoke with PT, Kelsy   Nurse Made Aware Spoke with RN   RUE Assessment   RUE Assessment WFL   LUE Assessment   LUE Assessment WFL   Hand Function   Gross Motor Coordination Functional   Fine Motor Coordination Functional   Sensation   Light Touch No apparent deficits   Cognition   Arousal/Participation Alert; Responsive; Cooperative   Attention Attends with cues to redirect   Orientation Level Oriented to person;Disoriented to time;Oriented to place;Oriented to situation   Memory Within functional limits   Following Commands Follows one step commands without difficulty   Assessment   Limitation Decreased ADL status; Decreased UE strength;Decreased Safe judgement during ADL;Decreased endurance;Decreased self-care trans;Decreased high-level ADLs   Prognosis Good   Assessment Pt is a 81 y o  male, admitted to [address] [place] [address] 1/12/2023 d/t experiencing R sided back pain  Dx: urinary tract obstruction by kidney stone  Pt underwent "CYSTOSCOPY RETROGRADE PYELOGRAM WITH INSERTION STENT URETERAL (Right)" on 1/13/2023  Pt with PMHx impacting their performance during ADL tasks, including: CAD, hx A-lfutter, hc aortic valve replacement with bioprosthetic valve, hyperlipidemia, HTN, pacemaker  Prior to admission to the hospital Pt was performing ADLs without physical assistance  IADLs without physical assistance  Functional transfers/ambulation without physical assistance  Cognitive status was PTA was intact  OT order placed to assess Pt's ADLs, cognitive status, and performance during functional tasks in order to maximize safety and independence while making most appropriate d/c recommendations   Pt's clinical presentation is currently unstable/unpredictable given new onset deficits that effect Pt's occupational performance and ability to safely return to OF including decrease activity tolerance, decrease standing balance, decrease performance during ADL tasks, decrease safety awareness , increased pain, decrease generalized strength, decrease activity engagement, decrease performance during functional transfers, high fall risk and limited insight to deficits combined with medical complications of pain impacting overall mobility status, edema/swelling, decreased skin integrity, fear/retreat, need for input for mobility technique/safety and surgery intervention required  Personal factors affecting Pt at time of initial evaluation include: step(s) to enter environment, availability as recommended, inability to perform current job functions, inability to perform IADLs, inability to perform ADLs, new need for AD, inability to ambulate household distances, inability to navigate community distances, limited insight into impairments and decreased initiation and engagement  Pt will benefit from continued skilled OT services to address deficits as defined above and to maximize level independence/participation during ADLs and functional tasks to facilitate return toward PLOF and improved quality of life  From an occupational therapy standpoint, recommendation at time of d/c would be [address] [place] [address] with increased family support  Plan   Treatment Interventions ADL retraining;Functional transfer training;UE strengthening/ROM; Endurance training;Patient/family training;Equipment evaluation/education; Neuromuscular reeducation; Compensatory technique education;Continued evaluation; Energy conservation; Activityengagement   Goal Expiration Date 01/27/23   OT Frequency 3-5x/wk   Recommendation   OT Discharge Recommendation Home with home health rehabilitation   AM-PAC Daily Activity Inpatient   Lower Body Dressing 2   Bathing 3   Toileting 3   Upper Body Dressing 3   Grooming 4   Eating 4   Daily Activity Raw Score 19   Daily Activity Standardized Score (Calc for Raw Score >=11) 40 22   AM-PAC Applied Cognition Inpatient   Following a Speech/Presentation 4   Understanding Ordinary Conversation 3   Taking Medications 3   Remembering Where Things Are Placed or Put Away 4   Remembering List of 4-5 Errands 3   Taking Care of Complicated Tasks 3   Applied Cognition Raw Score 20   Applied Cognition Standardized Score 41 76     The patient's raw score on the AM-PAC Daily Activity inpatient short form is 19, standardized score is 40 22, greater than 39 4  Patients at this level are likely to benefit from DC to home  Please refer to the recommendation of the Occupational Therapist for safe DC planning  Pt goals to be met by 1/27/2023    Pt will demonstrate ability to complete LB dressing @ S in order to increase safety and independence during meaningful tasks  Pt will demonstrate ability to delmer/doff socks/shoes while sitting EOB @ S in order to increase safety and independence during meaningful tasks  Pt will demonstrate ability to complete toileting tasks including CM and pericare @ S in order to increase safety and independence during meaningful tasks  Pt will demonstrate ability to complete EOB, chair, toilet/commode transfers @ S in order to increase performance and participation during functional tasks  Pt will demonstrate ability to stand for 7-8 minutes while maintaining G balance with use of RW for UB support PRN  Pt will demonstrate ability to tolerate 30-35 minute OT session with no vc'ing for deep breathing or use of energy conservation techniques in order to increase activity tolerance during functional tasks  Pt will demonstrate Good carryover of use of energy conservation/compensatory strategies during ADLs and functional tasks in order to increase safety and reduce risk for falls  Pt will demonstrate Good attention and participation in continued evaluation of functional ambulation house hold distances in order to assist with safe d/c planning    Pt will attend to continued cognitive assessments 100% of the time in order to provide most appropriate d/c recommendations  Pt will follow 100% simple 2-step commands and be A&O x4 consistently with environmental cues to increase participation in functional activities  Pt will identify 3 areas of interest/hobbies and 1 intervention on how to incorporate into daily life in order to increase interaction with environment and peers as well as increase participation in meaningful tasks  Pt will demonstrate 100% carryover of BUE HEP in order to increase BUE MS and increase performance during functional tasks upon d/c home      Anila Chan OTR/L

## 2023-01-13 NOTE — PROGRESS NOTES
Blayne Espinoza is a 81 y o  male who is currently ordered Vancomycin IV with management by the Pharmacy Consult service  Relevant clinical data and objective / subjective history reviewed  Vancomycin Assessment:  Indication and Goal AUC/Trough: Urinary tract infection (goal -600, trough >10)  Clinical Status: New Start  Micro:   No Results  Renal Function:  SCr: 1 00 mg/dL  CrCl: 62 8 mL/min  Renal replacement: Not on dialysis  Days of Therapy: 1  Current Dose: --  Vancomycin Plan:  New Dosin mg IV q 12 hrs  Estimated AUC: 987 mcg*hr/mL  Estimated Trough: 15 8 mcg/mL  Next Level: 1/15/23 at 1200  Renal Function Monitoring: Daily BMP and [place] will continue to follow closely for s/sx of nephrotoxicity, infusion reactions and appropriateness of therapy  BMP and CBC will be ordered per protocol  We will continue to follow the patient’s culture results and clinical progress daily      Jaci Andres, Pharmacist CHIEF COMPLAINT:  Chief Complaint   Patient presents with   • Lesion       HISTORY OF PRESENT ILLNESS:  The patient is a 76 year old female.   She called today with concerns regarding a abnormal fingernail on the left middle digit    The patient presents for  Recheck of left middle finger     Specific areas of concern to the patient include:    The patient presents for the following lesion:  Location:  The lesion is located on the following part of the body:   Left middle finger cuticle   Description:  Nail has a hole   Duration:  The lesion was first noticed by the patient around the following time:   2 months   Quality:     The lesion  is asymptomatic  Severity:    The severity of the symptoms are  moderate  Modifying Factors: Previous treatments include none         DERMATOLOGY PMH:      The patient has history of skin cancer:  Yes BCC and SCC     BCC left preauricular area 11/01/2018  BCC Right shin 1/12/2018  SCC left dorsum hand 11/05/2013  Right cheek 06/25/2018  AK       The patient has a history of tanning bed use:  Yes    REVIEW OF SYSTEMS:  Constitutional:   In general, the patient feels well:  Yes  Integument:    The patient denies any other signs or symptoms of skin disease:  Yes  Cardiovascular:   The patient has a pacemaker:  No       The patient has a defibrillator:  No  Hematologic:  The patient bleeds easily because of being on aspirin or an anticoagulant:  Yes  ASA 81 mg     The patient is pregnant:           [] Yes   [] No    [x] Not applicable.    The patient uses sunscreen No  The soap the patient is using is Caress,which is used 1-2 times per week   The moisturizer the patient is using is [employer] brand on legs,which is used occasionally      ALLERGIES:   Allergen Reactions   • Avacados [Avacado] SHORTNESS OF BREATH   • Colcrys DIARRHEA and Other (See Comments)     Tongue purple   • Indomethacin SWELLING and SHORTNESS OF BREATH     Tongue swelling, mouth sore, sore throat, SOB   • Flexeril  [Cyclobenzaprine Hcl] DIZZINESS     (difficulty walking and talking)   • Seasonal Other (See Comments)     Itchy watery eyes   • Hctz [Hydrochlorothiazide] DIZZINESS       Current Outpatient Medications   Medication Sig Dispense Refill   • atorvastatin (LIPITOR) 20 MG tablet Take 1 tablet by mouth daily. 90 tablet 1   • metoPROLOL succinate (TOPROL-XL) 100 MG 24 hr tablet Take 1 tablet by mouth daily. 90 tablet 1   • metformin (GLUCOPHAGE) 1000 MG tablet Take 1 tablet by mouth 2 times daily. 180 tablet 1   • lisinopril (ZESTRIL) 20 MG tablet Take 1 tablet by mouth daily. 90 tablet 1   • famotidine (Pepcid) 20 MG tablet Take 1 tablet by mouth nightly as needed (gerd). 90 tablet 1   • empagliflozin (Jardiance) 10 MG tablet Take 1 tablet by mouth daily (before breakfast). 90 tablet 1   • amLODIPine (NORVASC) 5 MG tablet TAKE 1 TABLET BY MOUTH DAILY 90 tablet 1   • DISPENSE 4 wheeled walker with seat 1 each 0   • Acetaminophen (Tylenol) 325 MG Cap Take 650 mg by mouth as needed. take 2 tablets to equal 650 mg as needed for pain     • docusate sodium (COLACE) 100 MG capsule Take 100 mg by mouth 2 times daily as needed. take 1-3 soft gels daily     • Sennosides (Senna) 8.6 MG Tab Take 8.6 mg by mouth as needed. take one to two tabs daily     • aspirin 81 MG EC tablet Take 1 tablet by mouth daily. 30 tablet 0   • Ferrous Sulfate (IRON) 325 (65 Fe) MG Tab Take 1 tablet by mouth daily. (Patient taking differently: Take 1 tablet by mouth every other day. ) 30 tablet    • cyanocobalamin (VITAMIN B-12) 500 MCG tablet Take 500 mcg by mouth every other day.     • hydroCORTisone (CORTIZONE) 2.5 % ointment Apply topically 2 times daily. 30 g 0   • fluticasone (FLONASE) 50 MCG/ACT nasal spray Spray 1 spray in each nostril daily. (Patient taking differently: Spray 1 spray in each nostril daily. prn with seasonal allergies) 3 Bottle 1   • nystatin (MYCOSTATIN) 560406 UNIT/GM powder Apply to rash area bid (Patient taking differently: 2  times daily. Apply to rash area bid prn) 1 Bottle 0   • fluorouracil (EFUDEX) 5 % cream Use qhs on lesions for 4 weeks 40 g 0   • Simethicone (GAS RELIEF PO) Prn     • CALCIUM CARBONATE PO Take 600 mg by mouth daily.      • calcium carbonate (TUMS) 500 MG chewable tablet Chew 2 tablets by mouth as needed for Heartburn (Pt takes 1 1/2 tabs PRN).      • Fluocinonide (VANOS) 0.1 % CREA Apply  topically 2 times daily as needed. Use after removal of skin lesions       No current facility-administered medications for this visit.       Past Medical History:   Diagnosis Date   • Allergic rhinitis 2/21/2014   • Arthritis    • Arthritis of knee 2/21/2014   • Basal cell carcinoma 01/2018 right shin   • Benign essential hypertension    • Depression    • Diabetes mellitus, type 2 (CMS/HCC)    • Esophageal reflux    • Exertional shortness of breath 4/20/2017    Normal CXR and echo April 2017. Very mild anemia. May be due to deconditioning.   • GERD (gastroesophageal reflux disease)    • HTN (hypertension)    • Hyperlipidemia    • Iron deficiency anemia 8/25/2014   • Keratoacanthoma of hand 10/31/2013   • Keratosis, actinic    • Morbid obesity with BMI of 40.0-44.9, adult (CMS/HCC) 02/25/2015   • Pericarditis 8/6/2014 7/29/14   • Positive colorectal cancer screening using Cologuard test 09/2018    patient informed and refused to have colonoscopy.    • Squamous cell carcinoma     top of the rt hand   • Vertigo 4/7/2014           FH:  The patient has a family history of melanoma, basal cell or squamous cell skin cancer:  Yes Brother had unknown type     SH: Retired , [facility]    PHYSICAL EXAM:    The patient is a 76 year old female who presents for  Up[per body .  The patient is in no acute distress, alert and oriented x3 who appears well and is well groomed.  See the diagram scanned into Epic.  Vitals are reviewed.  Significant findings include:    Lateral posturing of left hand     left hand lateral side   3 rd finger 7 mm  crusting at base of cuticle                No other significant findings on UPPER BODY SKIN EXAMINATION which is a  DETAILED  EXAMINATION  including palpation and inspection of the scalp and hair, and inspection of the head and face, eyelids, lips, neck, chest (including breasts and underarms-including eccrine and apocrine glands), abdomen, back, and right and left upper extremities, digits and nails.      ASSESSMENT AND PLAN:    1. 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    -  I recommended destruction with liquid nitrogen and after a discussion of risks and benefits of liquid nitrogen treatment the patient gave oral consent for the same.  -  Therefore liquid nitrogen was used to destroy the lesion(s).  -  Wound care and expected healing process discussed.  Bactroban ointment given to apply twice a day for 2 weeks   -I explained that the nail is made under the cuticle and it can take 6 months to grow a new 1  Follow up for regular scheduled appointment in July     On 6/14/2021, IRonal CMA, scribed the services personally performed by Leonarda Reyes MD.The documentation recorded by the scribe accurately and completely reflects the service(s) I personally performed and the decisions made by me.

## 2023-01-13 NOTE — PLAN OF CARE
Problem: PAIN - ADULT  Goal: Verbalizes/displays adequate comfort level or baseline comfort level  Description: Interventions:  - Encourage patient to monitor pain and request assistance  - Assess pain using appropriate pain scale0=10  - Administer analgesics based on type and severity of pain and evaluate response  - Implement non-pharmacological measures as appropriate and evaluate response  - Consider cultural and social influences on pain and pain management  - Notify physician/advanced practitioner if interventions unsuccessful or patient reports new pain  Outcome: Progressing

## 2023-01-13 NOTE — ASSESSMENT & PLAN NOTE
· Start ampicillin 2 g IV every 4 hours as discussed with infectious disease recent culture of Enterococcus    Due to prosthetic valve and pacemaker he is at risk for bacteremia to check blood cultures if his blood cultures are negative at 24 hours, be discharged on Levaquin till 1/19

## 2023-01-13 NOTE — CONSULTS
Consultation - Infectious Disease   Jaleesa Velasquez 81 y o  male MRN: [id number]  Unit/Bed#: OR [place] Encounter: [id number]      Inpatient consult to Infectious Diseases  Consult performed by: Christine Young MD  Consult ordered by: Maira Velasco MD            REQUIRED DOCUMENTATION:     1  This service was provided via Telemedicine  2  Provider located at [facility]  3  TeleMed provider: Christine Young MD   4  Identify all parties in room with patient during tele consult:RN  5  After connecting through [employer], patient was identified by name and date of birth and assistant checked wristband  Patient was then informed that this was a Telemedicine visit and that the exam was being conducted confidentially over secure lines  My office door was closed  No one else was in the room  Patient acknowledged consent and understanding of privacy and security of the Telemedicine visit, and gave us permission to have the assistant stay in the room in order to assist with the history and to conduct the exam   I informed the patient that I have reviewed their record in Epic and presented the opportunity for them to ask any questions regarding the visit today  The patient agreed to participate  Assessment/Recommendations     1    Possible acute obstructive UTI  -Diagnosis suspected with back pain, pyuria, obstructing right proximal ureteral stone, however patient is afebrile without leukocytosis, clinically well and has no localizing urinary symptoms  -Recent urine culture on 12/30 with Enterococcus faecalis  -Given his underlying cardiac device, prosthetic valve and recent instrumentation, he is at risk for bacteremia/device seeding from urinary source    · Start ampicillin 2q every 4h  · Check baseline blood cultures   · Follow-up repeat urine cultures  · Monitor clinical course, if patient remains afebrile without leukocytosis and clinically stable with negative blood cultures, patient can be transitioned to p o  Levaquin 750 mg daily to complete 7 days of antibiotics from 1/13 through 1/19    2  Severe right hydroureteronephrosis  -Secondary to large proximal ureteral stone, possible tumor at ureteral orifice  -S/p cystoscopy and stent placement, Intra-Op no purulent urine noted    · Outpatient follow-up with urology    3  Sick sinus syndrome, bioprosthetic aortic valve  -S/p recent pacemaker: Cardiac device and prosthetic valve are risk factors for bacterial seeding    · Follow-up blood cultures    Thank you for involving me in the care of your patient  Please call with questions, change in clinical status or if tests recommended above are abnormal      Discussed in detail with the primary service  History     Reason for Consult: UTI  HPI: Becki France is a 81y o  year old male with sick sinus syndrome, a flutter, s/p pacemaker, bioprosthetic aortic valve  He was seen in the ER on 12/30 for bilateral low back pain  CT imaging at that time noted a right sided ureteral stone  Urine culture had grown E faecalis  He was discharged on Keflex   The patient presented to the ER on 1/12 with worsening bilateral low back pain and had not passed the stone  He was otherwise well with no dysuria, suprapubic pain, no fever or chills  Tolerating PO  In the ER, vitals were notable for tachypnea  Labs were notable for white count of 10 3 and pyuria  EKG noted no acute ST changes  CT abdomen showed obstructive 10 mm proximal right ureteral calculus and severe right hydroureteronephrosis  He was taken to the OR today for cystoscopy and right ureteral stent insertion, Intra-Op there was concern for for possible papillary tumor v/s debris at right ureteral orifice  There were no intra-op signs of infection  Overnight has had no fever or leukocytosis  The patient currently has no complaints except for some left sided back pain  Denies fevers, chills, or sweats  Denies nausea, vomiting, or diarrhea    Infectious disease is being consulted for diagnostic work up and antibiotic management  Review of Systems  Pertinent positives and negatives as noted in HPI  Rest complete 12 point system-based review of systems is otherwise negative  PAST MEDICAL HISTORY:  Past Medical History:   Diagnosis Date   • Coronary artery disease    • H/O atrial flutter    • History of aortic valve replacement with bioprosthetic valve    • Hyperlipemia    • Hypertension    • Pacemaker      Past Surgical History:   Procedure Laterality Date   • A-V CARDIAC PACEMAKER INSERTION     • AORTIC VALVE REPLACEMENT     • CARDIAC PACEMAKER PLACEMENT Left    • CORONARY ANGIOPLASTY WITH STENT PLACEMENT         FAMILY HISTORY:  Non-contributory    SOCIAL HISTORY:  Social History     Social History     Substance and Sexual Activity   Alcohol Use Yes    Comment: social     Social History     Substance and Sexual Activity   Drug Use Never     Social History     Tobacco Use   Smoking Status Former   Smokeless Tobacco Never       ALLERGIES:  Allergies   Allergen Reactions   • Ezetimibe-Simvastatin Dizziness and Lightheadedness     Other reaction(s): MAKES HIM DIZZY     • Simvastatin Lightheadedness     Other reaction(s): Dizziness       MEDICATIONS:  All current active medications have been reviewed      Physical Exam     Temp:  [97 4 °F (36 3 °C)-98 6 °F (37 °C)] 97 5 °F (36 4 °C)  HR:  [72-92] 80  Resp:  [16-22] 18  BP: (120-159)/(59-71) 125/59  SpO2:  [96 %-99 %] 97 %  Temp (24hrs), Av °F (36 7 °C), Min:97 4 °F (36 3 °C), Max:98 6 °F (37 °C)  Current: Temperature: 97 5 °F (36 4 °C)    Intake/Output Summary (Last 24 hours) at 2023 1009  Last data filed at 2023 0930  Gross per 24 hour   Intake 660 ml   Output 550 ml   Net 110 ml         Physical exam findings reported by bedside and primary medical team staff    General Appearance:  Appearing well, nontoxic, and in no distress, appears stated age   Head:  Normocephalic, without obvious abnormality, atraumatic   Eyes:  PERRL, conjunctiva pink and sclera anicteric, both eyes   Nose: Nares normal, mucosa normal, no drainage   Throat: Oropharynx moist without lesions; lips, mucosa, and tongue normal; teeth and gums normal   Neck: Supple, symmetrical, trachea midline, no adenopathy, no tenderness/mass/nodules   Back:   Symmetric, no curvature, ROM normal, no CVA tenderness   Lungs:   Clear to auscultation bilaterally, no audible wheezes, rhonchi and rales, respirations unlabored   Chest Wall:  No tenderness or deformity  Pacemaker site c/d/i   Heart:  Regular rate and rhythm, S1, S2 normal, no murmur, rub or gallop   Abdomen:   Soft, non-tender, non-distended, positive bowel sounds, no masses, no organomegaly    No CVA tenderness   Extremities: Extremities normal, atraumatic, no cyanosis, clubbing or edema   Skin: Skin color, texture, turgor normal, no rashes or lesions  No draining wounds noted  Lymph nodes: Cervical, supraclavicular, and axillary nodes normal   Neurologic: Alert and oriented times 3, extremity strength 5/5 and symmetric       Invasive Devices:   Peripheral IV 01/12/23 Right;Ventral (anterior) Forearm (Active)   Site Assessment WDL; Clean;Dry; Intact 01/13/23 0945   Dressing Type Transparent 01/13/23 0945   Line Status Infusing 01/13/23 0945   Dressing Status Clean;Dry; Intact 01/13/23 0945       Urethral Catheter Latex 18 Fr  (Active)   Reasons to continue Urinary Catheter  Post-operative urological requirements 01/13/23 0945   Goal for Removal No longer needed- Will place order to discontinue 01/13/23 0945   Site Assessment Clean;Skin intact 01/13/23 0945   Dickens Care Done 01/13/23 0945   Collection Container Standard drainage bag 01/13/23 0945   Securement Method Securing device (Describe) 01/13/23 0945       Ureteral Internal Stent Right ureter 6 Fr   (Active)   Site Assessment STARR 01/13/23 0945   Collection Container Standard drainage bag 01/13/23 0945       Labs, Imaging, & Other Studies Lab Results:    I have personally reviewed pertinent labs  Results from last 7 days   Lab Units 01/13/23  0532 01/12/23  1624   WBC Thousand/uL 6 82 10 31*   HEMOGLOBIN g/dL 11 9* 13 8   PLATELETS Thousands/uL 208 238     Results from last 7 days   Lab Units 01/13/23  0532 01/12/23  1624   POTASSIUM mmol/L 4 0 4 1   CHLORIDE mmol/L 101 103   CO2 mmol/L 26 25   BUN mg/dL 16 12   CREATININE mg/dL 1 00 1 02   EGFR ml/min/1 73sq m 67 66   CALCIUM mg/dL 8 3 8 9   AST U/L  --  23   ALT U/L  --  18   ALK PHOS U/L  --  151*           Imaging Studies:   I have personally reviewed pertinent imaging study reports and images in PACS  EKG, Pathology, and Other Studies:   I have personally reviewed pertinent reports and reviewed external records  Counseling/Coordination of care: Total 70 minutes communication with the patient via telehealth  Labs, medical tests and imaging studies were independently and extensively reviewed by me as noted above in HPI and old records were obtained and summarized as noted above in HPI  My recommendations were discussed with the patient in detail who verbalized understanding

## 2023-01-13 NOTE — ASSESSMENT & PLAN NOTE
· Obstructive 10 mm proximal right ureteral calculus causes severe right hydroureteronephrosis  · Status post right-sided stent for lithotripsy he will need to be seen as an outpatient  · UA consistent with cystitis and previous culture was Enterococcus  · Pain control  · Hold Eliquis she is having some hematuria Dickens has been in the hematuria resolves will restart Eliquis as per urology tomorrow otherwise if continues we will have to hold further  · Fortunately creatinine is normal

## 2023-01-13 NOTE — PLAN OF CARE
Problem: Potential for Falls  Goal: Patient will remain free of falls  Description: INTERVENTIONS:  - Educate patient/family on patient safety including physical limitations  - Instruct patient to call for assistance with activity   - Consult OT/PT to assist with strengthening/mobility   - Keep Call bell within reach  - Keep bed low and locked with side rails adjusted as appropriate  - Keep care items and personal belongings within reach  - Initiate and maintain comfort rounds  - Make Fall Risk Sign visible to staff  - Offer Toileting every 2 Hours, in advance of need  - Initiate/Maintain bed alarm  - Obtain necessary fall risk management equipment: nurse assist  - Apply yellow socks and bracelet for high fall risk patients  - Consider moving patient to room near nurses station  Outcome: Progressing     Problem: PAIN - ADULT  Goal: Verbalizes/displays adequate comfort level or baseline comfort level  Description: Interventions:  - Encourage patient to monitor pain and request assistance  - Assess pain using appropriate pain scale0=10  - Administer analgesics based on type and severity of pain and evaluate response  - Implement non-pharmacological measures as appropriate and evaluate response  - Consider cultural and social influences on pain and pain management  - Notify physician/advanced practitioner if interventions unsuccessful or patient reports new pain  Outcome: Progressing     Problem: INFECTION - ADULT  Goal: Absence or prevention of progression during hospitalization  Description: INTERVENTIONS:  - Assess and monitor for signs and symptoms of infection  - Monitor lab/diagnostic results  - Monitor all insertion sites, i e  indwelling lines, tubes, and drains  - Monitor endotracheal if appropriate and nasal secretions for changes in amount and color  - [place] appropriate cooling/warming therapies per order  - Administer medications as ordered  - Instruct and encourage patient and family to use good hand hygiene technique  - Identify and instruct in appropriate isolation precautions for identified infection/condition  Outcome: Progressing  Goal: Absence of fever/infection during neutropenic period  Description: INTERVENTIONS:  - Monitor WBC    Outcome: Progressing     Problem: SAFETY ADULT  Goal: Patient will remain free of falls  Description: INTERVENTIONS:  - Educate patient/family on patient safety including physical limitations  - Instruct patient to call for assistance with activity   - Consult OT/PT to assist with strengthening/mobility   - Keep Call bell within reach  - Keep bed low and locked with side rails adjusted as appropriate  - Keep care items and personal belongings within reach  - Initiate and maintain comfort rounds  - Make Fall Risk Sign visible to staff  - Offer Toileting every 2 Hours, in advance of need  - Initiate/Maintain bed alarm  - Obtain necessary fall risk management equipment: nurse assist  - Apply yellow socks and bracelet for high fall risk patients  - Consider moving patient to room near nurses station  Outcome: Progressing  Goal: Maintain or return to baseline ADL function  Description: INTERVENTIONS:  -  Assess patient's ability to carry out ADLs; assess patient's baseline for ADL function and identify physical deficits which impact ability to perform ADLs (bathing, care of mouth/teeth, toileting, grooming, dressing, etc )  - Assess/evaluate cause of self-care deficits   - Assess range of motion  - Assess patient's mobility; develop plan if impaired  - Assess patient's need for assistive devices and provide as appropriate  - Encourage maximum independence but intervene and supervise when necessary  - Involve family in performance of ADLs  - Assess for home care needs following discharge   - Consider OT consult to assist with ADL evaluation and planning for discharge  - Provide patient education as appropriate  Outcome: Progressing  Goal: Maintains/Returns to pre admission functional level  Description: INTERVENTIONS:  - Perform BMAT or MOVE assessment daily    - Set and communicate daily mobility goal to care team and patient/family/caregiver  - Collaborate with rehabilitation services on mobility goals if consulted  - Out of bed for toileting  - Record patient progress and toleration of activity level   Outcome: Progressing     Problem: DISCHARGE PLANNING  Goal: Discharge to home or other facility with appropriate resources  Description: INTERVENTIONS:  - Identify barriers to discharge w/patient and caregiver  - Arrange for needed discharge resources and transportation as appropriate  - Identify discharge learning needs (meds, wound care, etc )  - Arrange for interpretive services to assist at discharge as needed  - Refer to Case Management Department for coordinating discharge planning if the patient needs post-hospital services based on physician/advanced practitioner order or complex needs related to functional status, cognitive ability, or social support system  Outcome: Progressing     Problem: Knowledge Deficit  Goal: Patient/family/caregiver demonstrates understanding of disease process, treatment plan, medications, and discharge instructions  Description: Complete learning assessment and assess knowledge base    Interventions:  - Provide teaching at level of understanding  - Provide teaching via preferred learning methods  Outcome: Progressing     Problem: MOBILITY - ADULT  Goal: Maintain or return to baseline ADL function  Description: INTERVENTIONS:  -  Assess patient's ability to carry out ADLs; assess patient's baseline for ADL function and identify physical deficits which impact ability to perform ADLs (bathing, care of mouth/teeth, toileting, grooming, dressing, etc )  - Assess/evaluate cause of self-care deficits   - Assess range of motion  - Assess patient's mobility; develop plan if impaired  - Assess patient's need for assistive devices and provide as appropriate  - Encourage maximum independence but intervene and supervise when necessary  - Involve family in performance of ADLs  - Assess for home care needs following discharge   - Consider OT consult to assist with ADL evaluation and planning for discharge  - Provide patient education as appropriate  Outcome: Progressing  Goal: Maintains/Returns to pre admission functional level  Description: INTERVENTIONS:  - Perform BMAT or MOVE assessment daily    - Set and communicate daily mobility goal to care team and patient/family/caregiver  - Collaborate with rehabilitation services on mobility goals if consulted  - Reposition patient every 2 hours    - Dangle patient 3 times a day  - Stand patient 3 times a day  - Ambulate patient 3 times a day  - Out of bed to chair 3 times a day   - Out of bed for meals 3 times a day  - Out of bed for toileting  - Record patient progress and toleration of activity level   Outcome: Progressing

## 2023-01-13 NOTE — UTILIZATION REVIEW
Initial Clinical Review    Admission: Date/Time/Statement:   Admission Orders (From admission, onward)     Ordered        01/12/23 [address] [place]   Once                      Orders Placed This Encounter   Procedures   • INPATIENT ADMISSION     Standing Status:   Standing     Number of Occurrences:   1     Order Specific Question:   Level of Care     Answer:   Med Surg [16]     Order Specific Question:   Estimated length of stay     Answer:   More than 2 Midnights     Order Specific Question:   Certification     Answer:   I certify that inpatient services are medically necessary for this patient for a duration of greater than two midnights  See H&P and MD Progress Notes for additional information about the patient's course of treatment  ED Arrival Information     Expected   -    Arrival   1/12/2023 15:15    Acuity   Urgent            Means of arrival   Wheelchair    Escorted by   Spouse    Service   Hospitalist    Admission type   Emergency            Arrival complaint   flank pain           Chief Complaint   Patient presents with   • Back Pain     Bilat lower back pain for past 2 weeks, Seen here on Dec 30th and dx with kidney stone, still hasnt passed it, pain increasing   Initial Presentation: 81 y o  male with PMHx of CAD, s/p AVR, HTN, A-flutter presents to ED via wheelchair with c/o R-sided back pain radiating to the left on going for 2 wks, previously dx'd in ED 2 wks ago with kidney stones and a UTI, completed bx but the pain has worsened  WBC 10 31, Alk phos 151  CT renal stone study a/p -- obstructive 10 mm proximal right ureteral calculus causes severe right hydroureteronephrosis  Cr wnl  EKG: Atrial fibrillation  HR 70  Admit inpatient to M/S unit with Urinary tract obstruction and severe R-sided hydronephrosis -- UA pending  Analgesics prn  Hold Eliquis  Continue pta home po meds  Urology consulted with plan for OR in the AM for cysto with retrograded R stent placement      Date: 1/13  Day 2:     OPERATIVE REPORT  SURGERY DATE: 1/13/2023  Procedure(s) (LRB):  CYSTOSCOPY RETROGRADE PYELOGRAM WITH INSERTION STENT URETERAL (Right)  Anesthesia Type:   General   Operative Indications:  Ureteral stone [N20 1]  With hydronephrosis  Patient anticoagulated  Failed medical management  Large proximal stone  Operative Findings:  Patient brought to the operating room  He was identified  He had SCDs in place  He had perioperative antibiotics on board  Timeout was performed  He is placed in dorsal thigh position prepped and draped in sterile fashion  Informed consent has been obtained  Cystoscopy with a 25 [place] was performed  This revealed a normal urethra up until the bulbous urethra where a pinpoint stricture was noted  A guidewire was placed through this  The stricture was dilated  The 25 [place] scope was then used to visualize the enlarged prostate gland  The bladder had no tumors and 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  A biopsy was not performed because of the anticoagulation  Eventually I was able to get a guidewire with an open-ended catheter to get into the orifice  A retrograde was performed  There was an obstructing nonopaque stone in the proximal ureter with no contrast going past it  Subsequently with an angled wire and eventually was able to get past the stone with the help of an open-ended catheter again  Once I got past the stone I was able to place a 6 [place] 28 cm double-J stent  Most of the string was removed  Coil in the kidney coil in the bladder  A [place] Dickens was placed 18 [place] Leeann over guidewire  Urine was slightly bloody      Patient will need right ureteroscopy with biopsy of ureteral tumor  Laser lithotripsy  Di Gabe He will need to be off of his anticoagulation we will do this in 1 to 2-week  I will discuss this with the patient and his daughter  The patient can go home on antibiotics    I would wait until tomorrow to restart his anticoagulation  If he is bleeding it may need to be held  ID consult -- Possible acute obstructive UTI, Severe right hydroureteronephrosis  Recent urine cx on 12/30 with Enterococcus faecalis  Given his underlying cardiac device, prosthetic valve and recent instrumentation, he is at risk for bacteremia/device seeding from urinary source  Start ampicillin 2q every 4h  Check baseline blood cxs  F/u repeat urine cxs  Date: 1/14    Day 3: Has surpassed a 2nd midnight with active treatments and services  Continue monitor post-op -- Urine of wynn bag noted to be very dark red  Manual irrigation has been ordered Q4  Continue to hold Eliquis   Monitor H&H        ED Triage Vitals [01/12/23 1548]   Temperature Pulse Respirations Blood Pressure SpO2   (!) 97 4 °F (36 3 °C) 72 22 128/61 97 %      Temp Source Heart Rate Source Patient Position - Orthostatic VS BP Location FiO2 (%)   Temporal Monitor Lying Right arm --      Pain Score       10 - Worst Possible Pain          Wt Readings from Last 1 Encounters:   01/13/23 103 kg (227 lb)     Additional Vital Signs:   Date/Time Temp Pulse Resp BP MAP (mmHg) SpO2 O2 Device Patient Position - Orthostatic VS   01/13/23 12:31:05 -- 93 -- 145/70 95 93 % -- --   01/13/23 11:57:50 -- 93 -- 99/71 80 97 % -- --   01/13/23 1024 98 1 °F (36 7 °C) 84 18 128/70 89 95 % -- --   01/13/23 1000 -- 80 16 129/58 84 98 % None (Room air) --   01/13/23 0950 -- 82 15 130/62 88 97 % None (Room air) --   01/13/23 0945 -- 80 18 125/59 85 97 % None (Room air) --   01/13/23 0935 97 5 °F (36 4 °C) 78 16 120/59 85 99 % None (Room air) --   01/13/23 0744 98 2 °F (36 8 °C) 92 20 159/69 -- 97 % None (Room air) --   01/13/23 0725 98 2 °F (36 8 °C) 86 16 124/67 -- 97 % None (Room air) Lying   01/13/23 07:18:11 98 2 °F (36 8 °C) 84 -- 124/67 86 96 % -- --   01/12/23 22:13:18 98 6 °F (37 °C) 77 17 133/59 84 96 % -- --   01/12/23 2200 -- -- -- -- -- -- None (Room air) -- 01/12/23 18:34:11 97 9 °F (36 6 °C) 76 18 145/71 96 97 % -- --   01/12/23 1633 -- -- -- -- -- -- None (Room air) --   01/12/23 1548 97 4 °F (36 3 °C) Abnormal  72 22 128/61 -- 97 % None (Room air) Lying     Pertinent Labs/Diagnostic Test Results:   XR abdomen 1 view kub   Final Result by Brett Gupta MD (01/13 1113)      CT renal stone study abdomen pelvis wo contrast   Final Result by Reny Ardon MD (01/12 1720)      Obstructive 10 mm proximal right ureteral calculus causes severe right hydroureteronephrosis                 Results from last 7 days   Lab Units 01/13/23  0532 01/12/23  1624   WBC Thousand/uL 6 82 10 31*   HEMOGLOBIN g/dL 11 9* 13 8   HEMATOCRIT % 36 7 43 4   PLATELETS Thousands/uL 208 238   NEUTROS ABS Thousands/µL 4 32 8 06*     Results from last 7 days   Lab Units 01/13/23  0532 01/12/23  1624   SODIUM mmol/L 135 136   POTASSIUM mmol/L 4 0 4 1   CHLORIDE mmol/L 101 103   CO2 mmol/L 26 25   ANION GAP mmol/L 8 8   BUN mg/dL 16 12   CREATININE mg/dL 1 00 1 02   EGFR ml/min/1 73sq m 67 66   CALCIUM mg/dL 8 3 8 9     Results from last 7 days   Lab Units 01/12/23  1624   AST U/L 23   ALT U/L 18   ALK PHOS U/L 151*   TOTAL PROTEIN g/dL 6 5   ALBUMIN g/dL 3 4*   TOTAL BILIRUBIN mg/dL 0 78       Results from last 7 days   Lab Units 01/13/23  0532 01/12/23  1624   GLUCOSE RANDOM mg/dL 96 104     Results from last 7 days   Lab Units 01/12/23 2057   CLARITY UA  Slightly Cloudy   COLOR UA  Yellow   SPEC GRAV UA  1 015   PH UA  6 0   GLUCOSE UA mg/dl Negative   KETONES UA mg/dl Trace*   BLOOD UA  Large*   PROTEIN UA mg/dl Trace*   NITRITE UA  Negative   BILIRUBIN UA  Small*   UROBILINOGEN UA E U /dl 0 2   LEUKOCYTES UA  Moderate*   WBC UA /hpf 30-50*   RBC UA /hpf 30-50*   BACTERIA UA /hpf Occasional   EPITHELIAL CELLS WET PREP /hpf Occasional       ED Treatment:   Medication Administration from 01/12/2023 1514 to 01/12/2023 1830       Date/Time Order Dose Route Action 01/12/2023 1625 EST morphine injection 2 mg 2 mg Intravenous Given     01/12/2023 1625 EST ketorolac (TORADOL) injection 15 mg 15 mg Intravenous Given     Past Medical History:   Diagnosis Date   • Coronary artery disease    • H/O atrial flutter    • History of aortic valve replacement with bioprosthetic valve    • Hyperlipemia    • Hypertension    • Pacemaker      Present on Admission:  • Coronary artery disease  • Hypertension  • Pacemaker      Admitting Diagnosis: Ureteral stone [N20 1]  Flank pain [R10 9]  Age/Sex: 81 y o  male  Admission Orders:  Scheduled Medications:  amLODIPine, 10 mg, Oral, Daily  ampicillin, 2,000 mg, Intravenous, Q4H  atorvastatin, 20 mg, Oral, Daily  melatonin, 6 mg, Oral, HS  metoprolol tartrate, 50 mg, Oral, Daily  tamsulosin, 0 4 mg, Oral, Daily With Dinner    PRN Meds:  acetaminophen, 650 mg, Oral, Q6H PRN  morphine injection, 2 mg, Intravenous, Q6H PRN  oxyCODONE, 2 5 mg, Oral, Q4H PRN 1/13 x1  oxyCODONE, 5 mg, Oral, Q4H PRN          Network Utilization Review Department  ATTENTION: Please call with any questions or concerns to [phone] and carefully listen to the prompts so that you are directed to the right person  All voicemails are confidential   Liberty Massey all requests for admission clinical reviews, approved or denied determinations and any other requests to dedicated fax number below belonging to the campus where the patient is receiving treatment   List of dedicated fax numbers for the Facilities:  [address] DENIALS (Administrative/Medical Necessity) [phone]   [address] (Maternity/NICU/Pediatrics) Jeanie Rivera 172 [phone]   [facility] 77 [phone]   [address] 7 [address] 310 [facility] [place] [address] [phone]

## 2023-01-13 NOTE — ANESTHESIA PREPROCEDURE EVALUATION
Procedure:  CYSTOSCOPY RETROGRADE PYELOGRAM WITH INSERTION STENT URETERAL (Right: Ureter)    Relevant Problems   ANESTHESIA (within normal limits)      CARDIO   (+) Aortic stenosis   (+) Atrial flutter (HCC)   (+) Carotid artery disease (HCC)   (+) Coronary artery disease   (+) Hyperlipemia   (+) Hypertension   (+) Pacemaker   (+) S/P AVR      ENDO (within normal limits)      GI/HEPATIC (within normal limits)      /RENAL (within normal limits)      HEMATOLOGY (within normal limits)      MUSCULOSKELETAL (within normal limits)      NEURO/PSYCH (within normal limits)      PULMONARY (within normal limits)      Genitourinary   (+) Urinary tract obstruction by kidney stone        Physical Exam    Airway    Mallampati score: II  TM Distance: >3 FB  Neck ROM: full     Dental   No notable dental hx     Cardiovascular  Rhythm: regular, Rate: normal, Cardiovascular exam normal    Pulmonary  Pulmonary exam normal Breath sounds clear to auscultation,     Other Findings        Anesthesia Plan  ASA Score- 3     Anesthesia Type- general with ASA Monitors  Additional Monitors:   Airway Plan: LMA  Plan Factors-Exercise tolerance (METS): >4 METS  Chart reviewed  EKG reviewed  Existing labs reviewed  Patient summary reviewed  Induction- intravenous  Postoperative Plan- Plan for postoperative opioid use  Informed Consent- Anesthetic plan and risks discussed with patient  I personally reviewed this patient with the CRNA  Discussed and agreed on the Anesthesia Plan with the CRNA  Rubén Jeffries Recent labs personally reviewed:  Lab Results   Component Value Date    WBC 6 82 01/13/2023    HGB 11 9 (L) 01/13/2023     01/13/2023     Lab Results   Component Value Date    K 4 0 01/13/2023    BUN 16 01/13/2023    CREATININE 1 00 01/13/2023     I, Sabas Mueller MD, have personally seen and evaluated the patient prior to anesthetic care    I have reviewed the pre-anesthetic record, and other medical records if appropriate to the anesthetic care  If a CRNA is involved in the case, I have reviewed the CRNA assessment, if present, and agree  Risks/benefits and alternatives discussed with patient including possible PONV, sore throat, and possibility of rare anesthetic and surgical emergencies

## 2023-01-13 NOTE — OP NOTE
OPERATIVE REPORT  PATIENT NAME: Armand Primrose    :  1935  MRN: [id number]  Pt Location: [facility] OR ROOM 02    SURGERY DATE: 2023    Surgeon(s) and Role:     * Silvestre Ibrahim MD - Primary    Preop Diagnosis:  Ureteral stone [N20 1]    Post-Op Diagnosis Codes:     * Ureteral stone [N20 1]    Procedure(s) (LRB):  CYSTOSCOPY RETROGRADE PYELOGRAM WITH INSERTION STENT URETERAL (Right)    Specimen(s):  * No specimens in log *    Estimated Blood Loss:   Minimal    Drains:  Urethral Catheter Latex 18 Fr  (Active)   Number of days: 0       Ureteral Internal Stent Right ureter 6 Fr  (Active)   Number of days: 0       Anesthesia Type:   General    Operative Indications:  Ureteral stone [N20 1]  With hydronephrosis  Patient anticoagulated  Failed medical management  Large proximal stone  Operative Findings:  Patient brought to the operating room  He was identified  He had SCDs in place  He had perioperative antibiotics on board  Timeout was performed  He is placed in dorsal thigh position prepped and draped in sterile fashion  Informed consent has been obtained  Cystoscopy with a 25 [place] was performed  This revealed a normal urethra up until the bulbous urethra where a pinpoint stricture was noted  A guidewire was placed through this  The stricture was dilated  The 25 [place] scope was then used to visualize the enlarged prostate gland  The bladder had no tumors and 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  A biopsy was not performed because of the anticoagulation  Eventually I was able to get a guidewire with an open-ended catheter to get into the orifice  A retrograde was performed  There was an obstructing nonopaque stone in the proximal ureter with no contrast going past it  Subsequently with an angled wire and eventually was able to get past the stone with the help of an open-ended catheter again    Once I got past the stone I was able to place a 6 [place] 28 cm double-J stent  Most of the string was removed  Coil in the kidney coil in the bladder  A [place] Dickens was placed 18 [place] over guidewire  Urine was slightly bloody  Patient will need right ureteroscopy with biopsy of ureteral tumor  Laser lithotripsy  Abby Dubon He will need to be off of his anticoagulation we will do this in 1 to 2-week  I will discuss this with the patient and his daughter  The patient can go home on antibiotics  I would wait until tomorrow to restart his anticoagulation  If he is bleeding it may need to be held      Complications:   None    Procedure and Technique:  See above   I was present for the entire procedure    Patient Disposition:  PACU         SIGNATURE: Marquis Mikayla MD  DATE: January 13, 2023  TIME: 9:26 AM

## 2023-01-13 NOTE — PLAN OF CARE
Problem: PHYSICAL THERAPY ADULT  Goal: Performs mobility at highest level of function for planned discharge setting  See evaluation for individualized goals  Description: Treatment/Interventions: Functional transfer training, LE strengthening/ROM, Elevations, Therapeutic exercise, Endurance training, Patient/family training, Equipment eval/education, Bed mobility, Gait training, Compensatory technique education, Spoke to nursing, OT  Equipment Recommended: Beni Garrett (pt's family may have RW for pt to use, if unable to use, recommend RW)       See flowsheet documentation for full assessment, interventions and recommendations  Note: Prognosis: Good  Problem List: Decreased strength, Decreased endurance, Impaired balance, Decreased mobility, Decreased safety awareness, Obesity, Pain, Decreased skin integrity  Assessment: Pt is a 81 y o  male seen for PT evaluation s/p admission to [address] [place] [address] on 1/12/2023 with Urinary tract obstruction by kidney stone  Order placed for PT services  Upon evaluation: Pt is presenting with impaired functional mobility due to pain, decreased strength, decreased endurance, impaired balance, gait deviations, decreased safety awareness, fall risk and impaired skin integrity requiring SBA assistance for bed mobility, transfers, and ambulation with RW  Pt's clinical presentation is currently unstable/unpredictable given the functional mobility deficits above coupled with fall risks as indicated by AM-PAC 6-Clicks: 02/89 as well as hx of falls, impaired balance, polypharmacy and decreased safety awareness and combined with medical complications of hypertension , abnormal H&H and need for input for mobility technique/safety  Pt's PMHx and comorbidities that may affect physical performance and progress include: HTN, CAD, obesity and a flutter   Personal factors affecting pt at time of IE include: step(s) to enter environment, multi-level environment, advanced age, inability to perform IADLs, inability to navigate level surfaces without external assistance, inability to navigate community distances, decreased initiation and engagement and recent fall(s)/fall history  Pt will benefit from continued skilled PT services to address deficits as defined above and to maximize level of functional mobility to facilitate return toward PLOF and improved QOL  From PT/mobility standpoint, recommendation at time of d/c would be Home PT pending progress in order to reduce fall risk and maximize pt's functional independence and consistency with mobility in order to facilitate return to PLOF  Recommend trial with walker next 1-2 sessions and ther ex next 1-2 sessions  Barriers to Discharge: None  Barriers to Discharge Comments: pt has assistance at home from BETTIE who is retired  PT Discharge Recommendation: Home with home health rehabilitation    See flowsheet documentation for full assessment

## 2023-01-13 NOTE — NURSING NOTE
PCA reported blood coming out through the urethra around the catheter  RN assessed and reached out to urology  Provider is aware

## 2023-01-13 NOTE — PLAN OF CARE
Problem: OCCUPATIONAL THERAPY ADULT  Goal: Performs self-care activities at highest level of function for planned discharge setting  See evaluation for individualized goals  Description: Treatment Interventions: ADL retraining, Functional transfer training, UE strengthening/ROM, Endurance training, Patient/family training, Equipment evaluation/education, Neuromuscular reeducation, Compensatory technique education, Continued evaluation, Energy conservation, Activityengagement          See flowsheet documentation for full assessment, interventions and recommendations  Note: Limitation: Decreased ADL status, Decreased UE strength, Decreased Safe judgement during ADL, Decreased endurance, Decreased self-care trans, Decreased high-level ADLs  Prognosis: Good  Assessment: Pt is a 81 y o  male, admitted to [address] [place] [address] 1/12/2023 d/t experiencing R sided back pain  Dx: urinary tract obstruction by kidney stone  Pt underwent "CYSTOSCOPY RETROGRADE PYELOGRAM WITH INSERTION STENT URETERAL (Right)" on 1/13/2023  Pt with PMHx impacting their performance during ADL tasks, including: CAD, hx A-lfutter, hc aortic valve replacement with bioprosthetic valve, hyperlipidemia, HTN, pacemaker  Prior to admission to the hospital Pt was performing ADLs without physical assistance  IADLs without physical assistance  Functional transfers/ambulation without physical assistance  Cognitive status was PTA was intact  OT order placed to assess Pt's ADLs, cognitive status, and performance during functional tasks in order to maximize safety and independence while making most appropriate d/c recommendations   Pt's clinical presentation is currently unstable/unpredictable given new onset deficits that effect Pt's occupational performance and ability to safely return to OF including decrease activity tolerance, decrease standing balance, decrease performance during ADL tasks, decrease safety awareness , increased pain, decrease generalized strength, decrease activity engagement, decrease performance during functional transfers, high fall risk and limited insight to deficits combined with medical complications of pain impacting overall mobility status, edema/swelling, decreased skin integrity, fear/retreat, need for input for mobility technique/safety and surgery intervention required  Personal factors affecting Pt at time of initial evaluation include: step(s) to enter environment, availability as recommended, inability to perform current job functions, inability to perform IADLs, inability to perform ADLs, new need for AD, inability to ambulate household distances, inability to navigate community distances, limited insight into impairments and decreased initiation and engagement  Pt will benefit from continued skilled OT services to address deficits as defined above and to maximize level independence/participation during ADLs and functional tasks to facilitate return toward PLOF and improved quality of life  From an occupational therapy standpoint, recommendation at time of d/c would be [facility] [address] with increased family support       OT Discharge Recommendation: Home with home health rehabilitation

## 2023-01-13 NOTE — ANESTHESIA POSTPROCEDURE EVALUATION
Post-Op Assessment Note    CV Status:  Stable  Pain Score: 0    Pain management: adequate     Mental Status:  Alert and awake   Hydration Status:  Euvolemic   PONV Controlled:  Controlled   Airway Patency:  Patent      Post Op Vitals Reviewed: Yes      Staff: CRNA         No notable events documented      BP   120/59   Temp   97 5   Pulse  80   Resp   18   SpO2   97

## 2023-01-13 NOTE — CONSULTS
Consultation - Urology   Jillian Sibley 81 y o  male MRN: [id number]  Unit/Bed#: [facility] Encounter: [id number]      Assessment/Plan      Assessment:  Right ureteral calculus with hydronephrosis  He has failed medical management  We are planning on a stent  Ureteroscopy laser lithotripsy at a later date  He is currently anticoagulated  Plan:  Cystoscopy retrograde right stent  Ureteroscopy laser lithotripsy 1 to 2 weeks from now  History of Present Illness   Attending: Deedee Clement MD  Reason for Consult / Principal Problem: Right ureteral calculus  HPI: Jillian Sibley is a 81y o  year old male who presents with large right ureteral stone 1 cm  With severe hydronephrosis  Patient has continued pain  He has failed medical management  No nausea or vomiting  No fever or chills  He does have some frequency and urgency of urination  His Eliquis was stopped      REVIEW OF SYSTEMS  Const: Denies chills, fever and weight loss  CV: Denies chest pain  Resp: Denies SOB  GI: Denies abdominal pain, nausea and vomiting  : Denies symptoms other than stated above  Musculo: Denies back pain      Historical Information   Past Medical History:   Diagnosis Date   • Coronary artery disease    • H/O atrial flutter    • History of aortic valve replacement with bioprosthetic valve    • Hyperlipemia    • Hypertension    • Pacemaker      Past Surgical History:   Procedure Laterality Date   • A-V CARDIAC PACEMAKER INSERTION     • AORTIC VALVE REPLACEMENT     • CARDIAC PACEMAKER PLACEMENT Left    • CORONARY ANGIOPLASTY WITH STENT PLACEMENT       Social History   Social History     Substance and Sexual Activity   Alcohol Use Yes    Comment: social     @[email or web address]  E-Cigarette/Vaping   • E-Cigarette Use Never User      E-Cigarette/Vaping Substances   • Nicotine No    • THC No    • CBD No    • Flavoring No      Social History     Tobacco Use   Smoking Status Former   Smokeless Tobacco Never     Family History: non-contributory    Meds/Allergies   all current active meds have been reviewed, current meds:   Current Facility-Administered Medications   Medication Dose Route Frequency   • [MAR Hold] acetaminophen (TYLENOL) tablet 650 mg  650 mg Oral Q6H PRN   • [MAR Hold] amLODIPine (NORVASC) tablet 10 mg  10 mg Oral Daily   • [MAR Hold] atorvastatin (LIPITOR) tablet 20 mg  20 mg Oral Daily   • levofloxacin (LEVAQUIN) IVPB (premix in dextrose) 750 mg 150 mL  750 mg Intravenous On Call To OR   • lidocaine (PF) (XYLOCAINE-MPF) 1 % injection 0 5 mL  0 5 mL Infiltration Once PRN   • [MAR Hold] melatonin tablet 6 mg  6 mg Oral HS   • [MAR Hold] metoprolol tartrate (LOPRESSOR) tablet 50 mg  50 mg Oral Daily   • [MAR Hold] morphine injection 2 mg  2 mg Intravenous Q6H PRN   • [MAR Hold] oxyCODONE (ROXICODONE) IR tablet 2 5 mg  2 5 mg Oral Q4H PRN   • [MAR Hold] oxyCODONE (ROXICODONE) IR tablet 5 mg  5 mg Oral Q4H PRN   • [MAR Hold] tamsulosin (FLOMAX) capsule 0 4 mg  0 4 mg Oral Daily With Dinner   , and PTA meds:   Prior to Admission Medications   Prescriptions Last Dose Informant Patient Reported? Taking?    Cholecalciferol 25 MCG (1000 UT) tablet 2023  Yes Yes   Sig: Take by mouth   Melatonin 5 MG CAPS 2023  Yes Yes   Sig: Take by mouth   amLODIPine (NORVASC) 10 mg tablet 2023  Yes Yes   Sig: Take 10 mg by mouth daily   apixaban (ELIQUIS) 5 mg   Yes Yes   Sig: Take 5 mg by mouth 2 (two) times a day   atorvastatin (LIPITOR) 40 mg tablet 2023  Yes Yes   Si mg daily   enalapril (VASOTEC) 2 5 mg tablet 2023  Yes Yes   Sig: Take 2 5 mg by mouth 2 (two) times a day   folic acid (FOLVITE) 020 mcg tablet 2023  Yes Yes   Sig: Take by mouth   metoprolol tartrate (LOPRESSOR) 50 mg tablet 2023  Yes Yes   Sig: Take 50 mg by mouth daily   tamsulosin (FLOMAX) 0 4 mg 2023  No Yes   Sig: Take 1 capsule (0 4 mg total) by mouth daily with dinner      Facility-Administered Medications: None     Allergies Allergen Reactions   • Ezetimibe-Simvastatin Dizziness and Lightheadedness     Other reaction(s): MAKES HIM DIZZY     • Simvastatin Lightheadedness     Other reaction(s): Dizziness       Objective   Vitals: Blood pressure 159/69, pulse 92, temperature 98 2 °F (36 8 °C), temperature source Oral, resp  rate 20, height 5' 10" (1 778 m), weight 103 kg (227 lb), SpO2 97 %  I/O last 24 hours: In: 360 [P O :360]  Out: 550 [Urine:550]    Invasive Devices     Peripheral Intravenous Line  Duration           Peripheral IV 01/12/23 Right;Ventral (anterior) Forearm <1 day                PHYSICAL EXAM  Const: Appears healthy and well developed  No signs of acute distress present  Resp: Respirations are regular and unlabored  CV: Rate is regular  Rhythm is regular  Abdomen: Abdomen is soft, nontender, and nondistended  Kidneys are not palpable  : Abdomen soft and nontender  No CVA tenderness  No suprapubic tenderness  Psych: Patient's attitude is cooperative  Mood is normal  Affect is normal     Lab Results: I have personally reviewed pertinent reports      CBC:   Lab Results   Component Value Date    WBC 6 82 01/13/2023    HGB 11 9 (L) 01/13/2023    HCT 36 7 01/13/2023    MCV 90 01/13/2023     01/13/2023    MCH 29 0 01/13/2023    MCHC 32 4 01/13/2023    RDW 14 3 01/13/2023    MPV 9 4 01/13/2023    NRBC 0 01/13/2023     CMP:   Lab Results   Component Value Date    SODIUM 135 01/13/2023     01/13/2023    CO2 26 01/13/2023    BUN 16 01/13/2023    CREATININE 1 00 01/13/2023    CALCIUM 8 3 01/13/2023    AST 23 01/12/2023    ALT 18 01/12/2023    ALKPHOS 151 (H) 01/12/2023    EGFR 67 01/13/2023     Urinalysis:   Lab Results   Component Value Date    COLORU Yellow 01/12/2023    CLARITYU Slightly Cloudy 01/12/2023    SPECGRAV 1 015 01/12/2023    PHUR 6 0 01/12/2023    LEUKOCYTESUR Moderate (A) 01/12/2023    NITRITE Negative 01/12/2023    GLUCOSEU Negative 01/12/2023    KETONESU Trace (A) 01/12/2023 BILIRUBINUR Small (A) 01/12/2023    BLOODU Large (A) 01/12/2023     Urine Culture: No results found for: URINECX  PSA: No results found for: PSA  Imaging Studies: I have personally reviewed pertinent reports  and I have personally reviewed pertinent films in PACS  EKG, Pathology, and Other Studies: I have personally reviewed pertinent reports  and I have personally reviewed pertinent films in PACS  VTE Prophylaxis: Sequential compression device (Venodyne)     Code Status: Level 1 - Full Code  Advance Directive and Living Will:      Power of :    POLST:      Counseling / Coordination of Care  Total floor / unit time spent today 30 minutes  Greater than 50% of total time was spent with the patient and / or family counseling and / or coordination of care   A description of the counseling / coordination of care: 45

## 2023-01-14 LAB
ANION GAP SERPL CALCULATED.3IONS-SCNC: 9 MMOL/L (ref 4–13)
BASOPHILS # BLD AUTO: 0.01 THOUSANDS/ÂΜL (ref 0–0.1)
BASOPHILS NFR BLD AUTO: 0 % (ref 0–1)
BUN SERPL-MCNC: 16 MG/DL (ref 5–25)
CALCIUM SERPL-MCNC: 8.6 MG/DL (ref 8.3–10.1)
CHLORIDE SERPL-SCNC: 102 MMOL/L (ref 96–108)
CO2 SERPL-SCNC: 24 MMOL/L (ref 21–32)
CREAT SERPL-MCNC: 0.98 MG/DL (ref 0.6–1.3)
EOSINOPHIL # BLD AUTO: 0.01 THOUSAND/ÂΜL (ref 0–0.61)
EOSINOPHIL NFR BLD AUTO: 0 % (ref 0–6)
ERYTHROCYTE [DISTWIDTH] IN BLOOD BY AUTOMATED COUNT: 14.2 % (ref 11.6–15.1)
FLUAV RNA RESP QL NAA+PROBE: NEGATIVE
FLUBV RNA RESP QL NAA+PROBE: NEGATIVE
GFR SERPL CREATININE-BSD FRML MDRD: 69 ML/MIN/1.73SQ M
GLUCOSE SERPL-MCNC: 157 MG/DL (ref 65–140)
HCT VFR BLD AUTO: 38 % (ref 36.5–49.3)
HGB BLD-MCNC: 12.3 G/DL (ref 12–17)
IMM GRANULOCYTES # BLD AUTO: 0.04 THOUSAND/UL (ref 0–0.2)
IMM GRANULOCYTES NFR BLD AUTO: 1 % (ref 0–2)
LYMPHOCYTES # BLD AUTO: 0.71 THOUSANDS/ÂΜL (ref 0.6–4.47)
LYMPHOCYTES NFR BLD AUTO: 9 % (ref 14–44)
MCH RBC QN AUTO: 28.9 PG (ref 26.8–34.3)
MCHC RBC AUTO-ENTMCNC: 32.4 G/DL (ref 31.4–37.4)
MCV RBC AUTO: 89 FL (ref 82–98)
MONOCYTES # BLD AUTO: 0.41 THOUSAND/ÂΜL (ref 0.17–1.22)
MONOCYTES NFR BLD AUTO: 5 % (ref 4–12)
NEUTROPHILS # BLD AUTO: 6.39 THOUSANDS/ÂΜL (ref 1.85–7.62)
NEUTS SEG NFR BLD AUTO: 85 % (ref 43–75)
NRBC BLD AUTO-RTO: 0 /100 WBCS
PLATELET # BLD AUTO: 231 THOUSANDS/UL (ref 149–390)
PMV BLD AUTO: 10.2 FL (ref 8.9–12.7)
POTASSIUM SERPL-SCNC: 4.5 MMOL/L (ref 3.5–5.3)
RBC # BLD AUTO: 4.25 MILLION/UL (ref 3.88–5.62)
RSV RNA RESP QL NAA+PROBE: NEGATIVE
SARS-COV-2 RNA RESP QL NAA+PROBE: NEGATIVE
SODIUM SERPL-SCNC: 135 MMOL/L (ref 135–147)
WBC # BLD AUTO: 7.57 THOUSAND/UL (ref 4.31–10.16)

## 2023-01-14 RX ADMIN — AMPICILLIN SODIUM 2000 MG: 2 INJECTION, POWDER, FOR SOLUTION INTRAMUSCULAR; INTRAVENOUS at 01:00

## 2023-01-14 RX ADMIN — METOPROLOL TARTRATE 50 MG: 50 TABLET, FILM COATED ORAL at 08:27

## 2023-01-14 RX ADMIN — AMPICILLIN SODIUM 2000 MG: 2 INJECTION, POWDER, FOR SOLUTION INTRAMUSCULAR; INTRAVENOUS at 05:00

## 2023-01-14 RX ADMIN — ATORVASTATIN CALCIUM 20 MG: 20 TABLET, FILM COATED ORAL at 08:27

## 2023-01-14 RX ADMIN — AMPICILLIN SODIUM 2000 MG: 2 INJECTION, POWDER, FOR SOLUTION INTRAMUSCULAR; INTRAVENOUS at 12:28

## 2023-01-14 RX ADMIN — AMPICILLIN SODIUM 2000 MG: 2 INJECTION, POWDER, FOR SOLUTION INTRAMUSCULAR; INTRAVENOUS at 08:28

## 2023-01-14 RX ADMIN — AMPICILLIN SODIUM 2000 MG: 2 INJECTION, POWDER, FOR SOLUTION INTRAMUSCULAR; INTRAVENOUS at 16:55

## 2023-01-14 RX ADMIN — AMLODIPINE BESYLATE 10 MG: 10 TABLET ORAL at 08:27

## 2023-01-14 RX ADMIN — AMPICILLIN SODIUM 2000 MG: 2 INJECTION, POWDER, FOR SOLUTION INTRAMUSCULAR; INTRAVENOUS at 20:17

## 2023-01-14 RX ADMIN — OXYCODONE HYDROCHLORIDE 5 MG: 5 TABLET ORAL at 01:23

## 2023-01-14 RX ADMIN — Medication 6 MG: at 20:21

## 2023-01-14 RX ADMIN — OXYCODONE HYDROCHLORIDE 5 MG: 5 TABLET ORAL at 20:20

## 2023-01-14 RX ADMIN — TAMSULOSIN HYDROCHLORIDE 0.4 MG: 0.4 CAPSULE ORAL at 16:54

## 2023-01-14 RX ADMIN — STANDARDIZED SENNA CONCENTRATE 8.6 MG: 8.6 TABLET ORAL at 20:20

## 2023-01-14 NOTE — NURSING NOTE
Pt transferred from room 334 to 336-1  Report received from [facility]  Patient transported via bed with nursing staff  A&Ox4, Wynn catheter in place, bloody drainage in wynn bag; patient denies pain at this time; 20G IV to R FA flushed and patent; patient denies any needs at this time, call light within reach

## 2023-01-14 NOTE — ASSESSMENT & PLAN NOTE
· Pressure controlled on Norvasc and metoprolol we will hold off on restarting ARB to avoid any hypotension in light of hematuria

## 2023-01-14 NOTE — ASSESSMENT & PLAN NOTE
· Obstructive 10 mm proximal right ureteral calculus causes severe right hydroureteronephrosis  · Status post right-sided stent for lithotripsy he will need to be seen as an outpatient  · UA consistent with cystitis and previous culture was Enterococcus  · Pain control  · Patient continues to have hematuria continue to hold Eliquis I did discuss with urology due to having a bulbar dilatation yesterday on anticoagulation as the cause of the hematuria and just manually irrigate we will start irrigation with normal saline 150 mL every 4 hours    Hemoglobin is stable no hypotensive  · Fortunately creatinine is normal

## 2023-01-14 NOTE — PROGRESS NOTES
[address]  Progress Note Gonzalo Horse 1935, 81 y o  male MRN: [id number]  Unit/Bed#: [facility] Encounter: [id number]  Primary Care Provider: Jasen Tam DO   Date and time admitted to hospital: 1/12/2023  3:44 PM    * Urinary tract obstruction by kidney stone  Assessment & Plan  · Obstructive 10 mm proximal right ureteral calculus causes severe right hydroureteronephrosis  · Status post right-sided stent for lithotripsy he will need to be seen as an outpatient  · UA consistent with cystitis and previous culture was Enterococcus  · Pain control  · Patient continues to have hematuria continue to hold Eliquis I did discuss with urology due to having a bulbar dilatation yesterday on anticoagulation as the cause of the hematuria and just manually irrigate we will start irrigation with normal saline 150 mL every 4 hours  Hemoglobin is stable no hypotensive  · Fortunately creatinine is normal    Acute cystitis with hematuria  Assessment & Plan  · Start ampicillin 2 g IV every 4 hours as discussed with infectious disease recent culture of Enterococcus  Due to prosthetic valve and pacemaker he is at risk for bacteremia to check blood cultures if his blood cultures are negative at 24 hours, be discharged on Levaquin till 1/19    Atrial flutter (HCC)  Assessment & Plan  · Continue metoprolol hold Eliquis till hematuria resolves    Pacemaker  Assessment & Plan  · History of sick sinus syndrome    Coronary artery disease  Assessment & Plan  · Status post stent continue Lipitor  We will hold enalapril in light of obstructive uropathy    Continue beta-blocker    S/P AVR  Assessment & Plan  · Bioprosthetic valve    Hypertension  Assessment & Plan  · Pressure controlled on Norvasc and metoprolol we will hold off on restarting ARB to avoid any hypotension in light of hematuria          VTE Pharmacologic Prophylaxis: VTE Score: 3 Moderate Risk (Score 3-4) - Pharmacological DVT Prophylaxis Contraindicated  Sequential Compression Devices Ordered  Patient Centered Rounds: I performed bedside rounds with nursing staff today  Discussions with Specialists or Other Care Team Provider: Discussed with urology    Education and Discussions with Family / Patient: Patient will update daughter  Time Spent for Care: 30 minutes  More than 50% of total time spent on counseling and coordination of care as described above  Current Length of Stay: 2 day(s)  Current Patient Status: Inpatient   Certification Statement: The patient will continue to require additional inpatient hospital stay due to Gross hematuria postprocedure  Discharge Plan: Anticipate discharge in 48-72 hrs to home with home services  Code Status: Level 1 - Full Code    Subjective:   Patient seen and examined he denies any complaints    Objective:     Vitals:   Temp (24hrs), Av 1 °F (36 7 °C), Min:98 1 °F (36 7 °C), Max:98 2 °F (36 8 °C)    Temp:  [98 1 °F (36 7 °C)-98 2 °F (36 8 °C)] 98 1 °F (36 7 °C)  HR:  [80-94] 94  Resp:  [18-20] 18  BP: ()/(69-71) 124/69  SpO2:  [93 %-97 %] 95 %  Body mass index is 32 57 kg/m²  Input and Output Summary (last 24 hours): Intake/Output Summary (Last 24 hours) at 2023 1015  Last data filed at 2023 0536  Gross per 24 hour   Intake 330 ml   Output 975 ml   Net -645 ml       Physical Exam:   Physical Exam  Vitals and nursing note reviewed  Constitutional:       General: He is not in acute distress  Appearance: He is well-developed  HENT:      Head: Normocephalic and atraumatic  Eyes:      Conjunctiva/sclera: Conjunctivae normal    Cardiovascular:      Rate and Rhythm: Normal rate and regular rhythm  Heart sounds: No murmur heard  Pulmonary:      Effort: Pulmonary effort is normal  No respiratory distress  Breath sounds: Normal breath sounds  No wheezing or rales  Abdominal:      General: There is no distension  Palpations: Abdomen is soft        Tenderness: There is no abdominal tenderness  Musculoskeletal:         General: No swelling  Cervical back: Neck supple  Skin:     General: Skin is warm and dry  Capillary Refill: Capillary refill takes less than 2 seconds  Neurological:      Mental Status: He is alert and oriented to person, place, and time  Psychiatric:         Mood and Affect: Mood normal          Additional Data:     Labs:  Results from last 7 days   Lab Units 01/14/23  0533   WBC Thousand/uL 7 57   HEMOGLOBIN g/dL 12 3   HEMATOCRIT % 38 0   PLATELETS Thousands/uL 231   NEUTROS PCT % 85*   LYMPHS PCT % 9*   MONOS PCT % 5   EOS PCT % 0     Results from last 7 days   Lab Units 01/14/23  0533 01/13/23  0532 01/12/23  1624   SODIUM mmol/L 135   < > 136   POTASSIUM mmol/L 4 5   < > 4 1   CHLORIDE mmol/L 102   < > 103   CO2 mmol/L 24   < > 25   BUN mg/dL 16   < > 12   CREATININE mg/dL 0 98   < > 1 02   ANION GAP mmol/L 9   < > 8   CALCIUM mg/dL 8 6   < > 8 9   ALBUMIN g/dL  --   --  3 4*   TOTAL BILIRUBIN mg/dL  --   --  0 78   ALK PHOS U/L  --   --  151*   ALT U/L  --   --  18   AST U/L  --   --  23   GLUCOSE RANDOM mg/dL 157*   < > 104    < > = values in this interval not displayed  Lines/Drains:  Invasive Devices     Peripheral Intravenous Line  Duration           Peripheral IV 01/12/23 Right;Ventral (anterior) Forearm 1 day          Drain  Duration           Ureteral Internal Stent Right ureter 6 Fr  1 day    Urethral Catheter Latex 18 Fr  1 day              Urinary Catheter:  Goal for removal: Remove after 48 hrs of I/O monitoring               Imaging: Reviewed radiology reports from this admission including: abdominal/pelvic CT    Recent Cultures (last 7 days):   Results from last 7 days   Lab Units 01/13/23  1153 01/13/23  1152 01/12/23 2057   BLOOD CULTURE  Received in Microbiology Lab  Culture in Progress  Received in Microbiology Lab  Culture in Progress    --    URINE CULTURE   --   --  Culture results to follow  Last 24 Hours Medication List:   Current Facility-Administered Medications   Medication Dose Route Frequency Provider Last Rate   • acetaminophen  650 mg Oral Q6H PRN Cornelia Muhammad MD     • amLODIPine  10 mg Oral Daily Cornelia Muhammad MD     • ampicillin  2,000 mg Intravenous Q4H Jordan Banks MD 2,000 mg (01/14/23 9370)   • atorvastatin  20 mg Oral Daily Cornelia Muhammad MD     • glycerin-hypromellose-  1 drop Both Eyes Q6H PRN MICHAEL Francis     • melatonin  6 mg Oral HS Cornelia Muhammad MD     • metoprolol tartrate  50 mg Oral Daily Cornelia Muhammad MD     • morphine injection  2 mg Intravenous Q6H PRN Cornelia Muhammad MD     • oxyCODONE  2 5 mg Oral Q4H PRN Cornelia Muhammad MD     • oxyCODONE  5 mg Oral Q4H PRN Cornelia Muhammad MD     • polyethylene glycol  17 g Oral Daily PRN MICHAEL Francis     • senna  1 tablet Oral HS MICHAEL Francis     • tamsulosin  0 4 mg Oral Daily With Velia Brady MD          Today, Patient Was Seen By: Jordan Banks MD    **Please Note: This note may have been constructed using a voice recognition system  **

## 2023-01-14 NOTE — QUICK NOTE
Patient seen during morning rounds due to medicine team concern for blood in urine  Urine of wynn bag noted to be very dark red  Manual irrigation has been ordered Q4  Patient explains he just had irrigation performed by nursing  There is evidence of some improvement in coloration although urine of wynn line remains cherry colored  No significant clots noted  Hgb and patient stable at this time  Continue to monitor      Annell Paget Billig  1/14/2023

## 2023-01-14 NOTE — CASE MANAGEMENT
Case Management Discharge Planning Note    Patient name Teresa Alonzo  Location /-96 MRN [id number]  : 1935 Date 2023       Current Admission Date: 2023  Current Admission Diagnosis:Urinary tract obstruction by kidney stone   Patient Active Problem List    Diagnosis Date Noted   • Acute cystitis with hematuria 2023   • Urinary tract obstruction by kidney stone 2023   • Atrial flutter ([address] ) 2023   • Pacemaker 2022   • Hypertension    • Hyperlipemia    • Aortic stenosis    • S/P AVR    • Carotid artery disease ([address] )    • Coronary artery disease       LOS (days): 2  Geometric Mean LOS (GMLOS) (days): 2 00  Days to GMLOS:0 1     OBJECTIVE:  Risk of Unplanned Readmission Score: 8 82         Current admission status: Inpatient   Preferred Pharmacy:   [facility] [address], [address] [place] [address]  Phone: [phone] Fax: [phone]    [facility] #[id number] [place]  Devin Johnsonneftali [facility] [address]  Phone: [phone] Fax: [phone]    Primary Care Provider: Zhane Beltre DO    Primary Insurance: Norma Hoffmann [facility]  Secondary Insurance:     DISCHARGE DETAILS:           HHC has been recommended for pt at time of discharge  Cm discussing HHC options with pt  Pt sts he would like to discuss with his daughter, sts he will let CM know after he speaks with daughter   Cm to follow

## 2023-01-14 NOTE — PLAN OF CARE
Problem: Potential for Falls  Goal: Patient will remain free of falls  Description: INTERVENTIONS:  - Educate patient/family on patient safety including physical limitations  - Instruct patient to call for assistance with activity   - Consult OT/PT to assist with strengthening/mobility   - Keep Call bell within reach  - Keep bed low and locked with side rails adjusted as appropriate  - Keep care items and personal belongings within reach  - Initiate and maintain comfort rounds  - Make Fall Risk Sign visible to staff  - Offer Toileting every 2 Hours, in advance of need  - Initiate/Maintain bed alarm  - Obtain necessary fall risk management equipment: nurse assist  - Apply yellow socks and bracelet for high fall risk patients  - Consider moving patient to room near nurses station  Outcome: Progressing     Problem: PAIN - ADULT  Goal: Verbalizes/displays adequate comfort level or baseline comfort level  Description: Interventions:  - Encourage patient to monitor pain and request assistance  - Assess pain using appropriate pain scale0=10  - Administer analgesics based on type and severity of pain and evaluate response  - Implement non-pharmacological measures as appropriate and evaluate response  - Consider cultural and social influences on pain and pain management  - Notify physician/advanced practitioner if interventions unsuccessful or patient reports new pain  Outcome: Progressing     Problem: INFECTION - ADULT  Goal: Absence or prevention of progression during hospitalization  Description: INTERVENTIONS:  - Assess and monitor for signs and symptoms of infection  - Monitor lab/diagnostic results  - Monitor all insertion sites, i e  indwelling lines, tubes, and drains  - Monitor endotracheal if appropriate and nasal secretions for changes in amount and color  - [place] appropriate cooling/warming therapies per order  - Administer medications as ordered  - Instruct and encourage patient and family to use good hand hygiene technique  - Identify and instruct in appropriate isolation precautions for identified infection/condition  Outcome: Progressing  Goal: Absence of fever/infection during neutropenic period  Description: INTERVENTIONS:  - Monitor WBC    Outcome: Progressing     Problem: SAFETY ADULT  Goal: Patient will remain free of falls  Description: INTERVENTIONS:  - Educate patient/family on patient safety including physical limitations  - Instruct patient to call for assistance with activity   - Consult OT/PT to assist with strengthening/mobility   - Keep Call bell within reach  - Keep bed low and locked with side rails adjusted as appropriate  - Keep care items and personal belongings within reach  - Initiate and maintain comfort rounds  - Make Fall Risk Sign visible to staff  - Offer Toileting every 2 Hours, in advance of need  - Initiate/Maintain bed alarm  - Obtain necessary fall risk management equipment: nurse assist  - Apply yellow socks and bracelet for high fall risk patients  - Consider moving patient to room near nurses station  Outcome: Progressing  Goal: Maintain or return to baseline ADL function  Description: INTERVENTIONS:  -  Assess patient's ability to carry out ADLs; assess patient's baseline for ADL function and identify physical deficits which impact ability to perform ADLs (bathing, care of mouth/teeth, toileting, grooming, dressing, etc )  - Assess/evaluate cause of self-care deficits   - Assess range of motion  - Assess patient's mobility; develop plan if impaired  - Assess patient's need for assistive devices and provide as appropriate  - Encourage maximum independence but intervene and supervise when necessary  - Involve family in performance of ADLs  - Assess for home care needs following discharge   - Consider OT consult to assist with ADL evaluation and planning for discharge  - Provide patient education as appropriate  Outcome: Progressing  Goal: Maintains/Returns to pre admission functional level  Description: INTERVENTIONS:  - Perform BMAT or MOVE assessment daily    - Set and communicate daily mobility goal to care team and patient/family/caregiver  - Collaborate with rehabilitation services on mobility goals if consulted  - Out of bed for toileting  - Record patient progress and toleration of activity level   Outcome: Progressing     Problem: DISCHARGE PLANNING  Goal: Discharge to home or other facility with appropriate resources  Description: INTERVENTIONS:  - Identify barriers to discharge w/patient and caregiver  - Arrange for needed discharge resources and transportation as appropriate  - Identify discharge learning needs (meds, wound care, etc )  - Arrange for interpretive services to assist at discharge as needed  - Refer to Case Management Department for coordinating discharge planning if the patient needs post-hospital services based on physician/advanced practitioner order or complex needs related to functional status, cognitive ability, or social support system  Outcome: Progressing     Problem: Knowledge Deficit  Goal: Patient/family/caregiver demonstrates understanding of disease process, treatment plan, medications, and discharge instructions  Description: Complete learning assessment and assess knowledge base    Interventions:  - Provide teaching at level of understanding  - Provide teaching via preferred learning methods  Outcome: Progressing     Problem: MOBILITY - ADULT  Goal: Maintain or return to baseline ADL function  Description: INTERVENTIONS:  -  Assess patient's ability to carry out ADLs; assess patient's baseline for ADL function and identify physical deficits which impact ability to perform ADLs (bathing, care of mouth/teeth, toileting, grooming, dressing, etc )  - Assess/evaluate cause of self-care deficits   - Assess range of motion  - Assess patient's mobility; develop plan if impaired  - Assess patient's need for assistive devices and provide as appropriate  - Encourage maximum independence but intervene and supervise when necessary  - Involve family in performance of ADLs  - Assess for home care needs following discharge   - Consider OT consult to assist with ADL evaluation and planning for discharge  - Provide patient education as appropriate  Outcome: Progressing  Goal: Maintains/Returns to pre admission functional level  Description: INTERVENTIONS:  - Perform BMAT or MOVE assessment daily    - Set and communicate daily mobility goal to care team and patient/family/caregiver     - Collaborate with rehabilitation services on mobility goals if consulted  - Dangle patient 3 times a day  - Stand patient 3 times a day  - Ambulate patient 3 times a day  - Out of bed to chair 3 times a day   - Out of bed for meals 3 times a day  - Out of bed for toileting  - Record patient progress and toleration of activity level   Outcome: Progressing     Problem: Prexisting or High Potential for Compromised Skin Integrity  Goal: Skin integrity is maintained or improved  Description: INTERVENTIONS:  - Identify patients at risk for skin breakdown  - Assess and monitor skin integrity  - Assess and monitor nutrition and hydration status  - Monitor labs   - Assess for incontinence   - Turn and reposition patient  - Assist with mobility/ambulation  - Relieve pressure over bony prominences  - Avoid friction and shearing  - Provide appropriate hygiene as needed including keeping skin clean and dry  - Evaluate need for skin moisturizer/barrier cream  - Collaborate with interdisciplinary team   - Patient/family teaching  - Consider wound care consult   Outcome: Progressing

## 2023-01-15 LAB
ANION GAP SERPL CALCULATED.3IONS-SCNC: 8 MMOL/L (ref 4–13)
BACTERIA UR CULT: ABNORMAL
BASOPHILS # BLD AUTO: 0.01 THOUSANDS/ÂΜL (ref 0–0.1)
BASOPHILS NFR BLD AUTO: 0 % (ref 0–1)
BUN SERPL-MCNC: 26 MG/DL (ref 5–25)
CALCIUM SERPL-MCNC: 8.4 MG/DL (ref 8.3–10.1)
CHLORIDE SERPL-SCNC: 102 MMOL/L (ref 96–108)
CO2 SERPL-SCNC: 26 MMOL/L (ref 21–32)
CREAT SERPL-MCNC: 1.07 MG/DL (ref 0.6–1.3)
EOSINOPHIL # BLD AUTO: 0.01 THOUSAND/ÂΜL (ref 0–0.61)
EOSINOPHIL NFR BLD AUTO: 0 % (ref 0–6)
ERYTHROCYTE [DISTWIDTH] IN BLOOD BY AUTOMATED COUNT: 14.4 % (ref 11.6–15.1)
GFR SERPL CREATININE-BSD FRML MDRD: 62 ML/MIN/1.73SQ M
GLUCOSE SERPL-MCNC: 124 MG/DL (ref 65–140)
HCT VFR BLD AUTO: 35.7 % (ref 36.5–49.3)
HGB BLD-MCNC: 11.4 G/DL (ref 12–17)
IMM GRANULOCYTES # BLD AUTO: 0.04 THOUSAND/UL (ref 0–0.2)
IMM GRANULOCYTES NFR BLD AUTO: 1 % (ref 0–2)
LYMPHOCYTES # BLD AUTO: 1.02 THOUSANDS/ÂΜL (ref 0.6–4.47)
LYMPHOCYTES NFR BLD AUTO: 13 % (ref 14–44)
MCH RBC QN AUTO: 29 PG (ref 26.8–34.3)
MCHC RBC AUTO-ENTMCNC: 31.9 G/DL (ref 31.4–37.4)
MCV RBC AUTO: 91 FL (ref 82–98)
MONOCYTES # BLD AUTO: 0.69 THOUSAND/ÂΜL (ref 0.17–1.22)
MONOCYTES NFR BLD AUTO: 9 % (ref 4–12)
NEUTROPHILS # BLD AUTO: 5.88 THOUSANDS/ÂΜL (ref 1.85–7.62)
NEUTS SEG NFR BLD AUTO: 77 % (ref 43–75)
NRBC BLD AUTO-RTO: 0 /100 WBCS
PLATELET # BLD AUTO: 194 THOUSANDS/UL (ref 149–390)
PMV BLD AUTO: 9.8 FL (ref 8.9–12.7)
POTASSIUM SERPL-SCNC: 4.1 MMOL/L (ref 3.5–5.3)
RBC # BLD AUTO: 3.93 MILLION/UL (ref 3.88–5.62)
SODIUM SERPL-SCNC: 136 MMOL/L (ref 135–147)
WBC # BLD AUTO: 7.65 THOUSAND/UL (ref 4.31–10.16)

## 2023-01-15 RX ORDER — LIDOCAINE 50 MG/G
1 PATCH TOPICAL DAILY
Status: DISCONTINUED | OUTPATIENT
Start: 2023-01-15 | End: 2023-01-16 | Stop reason: HOSPADM

## 2023-01-15 RX ORDER — LANOLIN ALCOHOL/MO/W.PET/CERES
3 CREAM (GRAM) TOPICAL ONCE
Status: COMPLETED | OUTPATIENT
Start: 2023-01-15 | End: 2023-01-15

## 2023-01-15 RX ADMIN — LIDOCAINE 5% 1 PATCH: 700 PATCH TOPICAL at 10:45

## 2023-01-15 RX ADMIN — TAMSULOSIN HYDROCHLORIDE 0.4 MG: 0.4 CAPSULE ORAL at 17:12

## 2023-01-15 RX ADMIN — AMPICILLIN SODIUM 2000 MG: 2 INJECTION, POWDER, FOR SOLUTION INTRAMUSCULAR; INTRAVENOUS at 13:58

## 2023-01-15 RX ADMIN — ATORVASTATIN CALCIUM 20 MG: 20 TABLET, FILM COATED ORAL at 08:13

## 2023-01-15 RX ADMIN — Medication 3 MG: at 03:55

## 2023-01-15 RX ADMIN — METOPROLOL TARTRATE 50 MG: 50 TABLET, FILM COATED ORAL at 08:13

## 2023-01-15 RX ADMIN — AMPICILLIN SODIUM 2000 MG: 2 INJECTION, POWDER, FOR SOLUTION INTRAMUSCULAR; INTRAVENOUS at 05:20

## 2023-01-15 RX ADMIN — AMLODIPINE BESYLATE 10 MG: 10 TABLET ORAL at 08:13

## 2023-01-15 RX ADMIN — AMPICILLIN SODIUM 2000 MG: 2 INJECTION, POWDER, FOR SOLUTION INTRAMUSCULAR; INTRAVENOUS at 08:20

## 2023-01-15 RX ADMIN — STANDARDIZED SENNA CONCENTRATE 8.6 MG: 8.6 TABLET ORAL at 20:20

## 2023-01-15 RX ADMIN — AMPICILLIN SODIUM 2000 MG: 2 INJECTION, POWDER, FOR SOLUTION INTRAMUSCULAR; INTRAVENOUS at 01:58

## 2023-01-15 RX ADMIN — Medication 6 MG: at 20:20

## 2023-01-15 RX ADMIN — AMPICILLIN SODIUM 2000 MG: 2 INJECTION, POWDER, FOR SOLUTION INTRAMUSCULAR; INTRAVENOUS at 18:01

## 2023-01-15 RX ADMIN — AMPICILLIN SODIUM 2000 MG: 2 INJECTION, POWDER, FOR SOLUTION INTRAMUSCULAR; INTRAVENOUS at 22:21

## 2023-01-15 RX ADMIN — OXYCODONE HYDROCHLORIDE 5 MG: 5 TABLET ORAL at 20:20

## 2023-01-15 NOTE — ASSESSMENT & PLAN NOTE
· Start ampicillin 2 g IV every 4 hours as discussed with infectious disease recent culture of Enterococcus    Due to prosthetic valve and pacemaker he is at risk for bacteremia to check blood cultures if his blood cultures are negative at 24 hours, be discharged on Levaquin till 1/19 blood cultures are negative we will switch on discharge to Levaquin

## 2023-01-15 NOTE — PROGRESS NOTES
[address]  Progress Note Geoffrey Penny 1935, 81 y o  male MRN: [id number]  Unit/Bed#: -01 Encounter: [id number]  Primary Care Provider: Jannet Vilchis DO   Date and time admitted to hospital: 1/12/2023  3:44 PM    * Urinary tract obstruction by kidney stone  Assessment & Plan  · Obstructive 10 mm proximal right ureteral calculus causes severe right hydroureteronephrosis  · Status post right-sided stent for lithotripsy he will need to be seen as an outpatient  · UA consistent with cystitis and previous culture was Enterococcus  · Pain control  · Hematuria is improving with manual irrigation will continue for another day if completely resolved we will discharge to hold Eliquis for another 3 days  This is secondary to 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 and being on Eliquis prior as discussed with urology  Hemoglobin is stable vitals are stable  · Fortunately creatinine is normal    Acute cystitis with hematuria  Assessment & Plan  · Start ampicillin 2 g IV every 4 hours as discussed with infectious disease recent culture of Enterococcus  Due to prosthetic valve and pacemaker he is at risk for bacteremia to check blood cultures if his blood cultures are negative at 24 hours, be discharged on Levaquin till 1/19 blood cultures are negative we will switch on discharge to Levaquin    Atrial flutter (HCC)  Assessment & Plan  · Continue metoprolol hold Eliquis till hematuria resolves    Pacemaker  Assessment & Plan  · History of sick sinus syndrome    Coronary artery disease  Assessment & Plan  · Status post stent continue Lipitor  We will hold enalapril in light of obstructive uropathy    Continue beta-blocker    S/P AVR  Assessment & Plan  · Bioprosthetic valve    Hypertension  Assessment & Plan  · Pressure controlled on Norvasc and metoprolol we will hold off on restarting ARB to avoid any hypotension in light of hematuria          VTE Pharmacologic Prophylaxis: VTE Score: 3 Moderate Risk (Score 3-4) - Pharmacological DVT Prophylaxis Contraindicated  Sequential Compression Devices Ordered  Patient Centered Rounds: I performed bedside rounds with nursing staff today  Discussions with Specialists or Other Care Team Provider: Discussed with urology    Education and Discussions with Family / Patient: Patient will update daughter  Time Spent for Care: 30 minutes  More than 50% of total time spent on counseling and coordination of care as described above  Current Length of Stay: 3 day(s)  Current Patient Status: Inpatient   Certification Statement: The patient will continue to require additional inpatient hospital stay due to Secondary to hematuria  Discharge Plan: Anticipate discharge in 24-48 hrs to home with home services  Code Status: Level 1 - Full Code    Subjective:   Patient seen and examined he is just complaining of some back pain asking for patch  Objective:     Vitals:   Temp (24hrs), Av 1 °F (36 7 °C), Min:98 1 °F (36 7 °C), Max:98 1 °F (36 7 °C)    Temp:  [98 1 °F (36 7 °C)] 98 1 °F (36 7 °C)  HR:  [75-78] 78  Resp:  [18] 18  BP: (129-147)/(69-85) 147/85  SpO2:  [93 %] 93 %  Body mass index is 32 57 kg/m²  Input and Output Summary (last 24 hours): Intake/Output Summary (Last 24 hours) at 1/15/2023 1128  Last data filed at 1/15/2023 1045  Gross per 24 hour   Intake 380 ml   Output 2050 ml   Net -1670 ml       Physical Exam:   Physical Exam  Vitals and nursing note reviewed  Constitutional:       General: He is not in acute distress  Appearance: He is well-developed  HENT:      Head: Normocephalic and atraumatic  Eyes:      Conjunctiva/sclera: Conjunctivae normal    Cardiovascular:      Rate and Rhythm: Normal rate and regular rhythm  Heart sounds: No murmur heard  Pulmonary:      Effort: Pulmonary effort is normal  No respiratory distress  Breath sounds: Normal breath sounds  No wheezing or rales     Abdominal:      General: There is no distension  Palpations: Abdomen is soft  Tenderness: There is no abdominal tenderness  Musculoskeletal:         General: No swelling  Cervical back: Neck supple  Skin:     General: Skin is warm and dry  Capillary Refill: Capillary refill takes less than 2 seconds  Neurological:      Mental Status: He is alert and oriented to person, place, and time  Psychiatric:         Mood and Affect: Mood normal          Additional Data:     Labs:  Results from last 7 days   Lab Units 01/15/23  0517   WBC Thousand/uL 7 65   HEMOGLOBIN g/dL 11 4*   HEMATOCRIT % 35 7*   PLATELETS Thousands/uL 194   NEUTROS PCT % 77*   LYMPHS PCT % 13*   MONOS PCT % 9   EOS PCT % 0     Results from last 7 days   Lab Units 01/15/23  0517 01/13/23  0532 01/12/23  1624   SODIUM mmol/L 136   < > 136   POTASSIUM mmol/L 4 1   < > 4 1   CHLORIDE mmol/L 102   < > 103   CO2 mmol/L 26   < > 25   BUN mg/dL 26*   < > 12   CREATININE mg/dL 1 07   < > 1 02   ANION GAP mmol/L 8   < > 8   CALCIUM mg/dL 8 4   < > 8 9   ALBUMIN g/dL  --   --  3 4*   TOTAL BILIRUBIN mg/dL  --   --  0 78   ALK PHOS U/L  --   --  151*   ALT U/L  --   --  18   AST U/L  --   --  23   GLUCOSE RANDOM mg/dL 124   < > 104    < > = values in this interval not displayed  Lines/Drains:  Invasive Devices     Peripheral Intravenous Line  Duration           Peripheral IV 01/12/23 Right;Ventral (anterior) Forearm 2 days          Drain  Duration           Ureteral Internal Stent Right ureter 6 Fr  2 days    Urethral Catheter Latex 18 Fr  2 days              Urinary Catheter:  Goal for removal: N/A- Discharging with [place]               Imaging: Reviewed radiology reports from this admission including: abdominal/pelvic CT and Personally reviewed the following imaging: abdominal/pelvic CT    Recent Cultures (last 7 days):   Results from last 7 days   Lab Units 01/13/23  1153 01/13/23  1152 01/12/23 2057   BLOOD CULTURE  No Growth at 24 hrs   No Growth at 24 hrs   --    URINE CULTURE   --   --  50,000-59,000 cfu/ml Enterococcus faecalis*       Last 24 Hours Medication List:   Current Facility-Administered Medications   Medication Dose Route Frequency Provider Last Rate   • acetaminophen  650 mg Oral Q6H PRN Ray De La Garza MD     • amLODIPine  10 mg Oral Daily Ray De La Garza MD     • ampicillin  2,000 mg Intravenous Q4H Adry Prasad MD 2,000 mg (01/15/23 0820)   • atorvastatin  20 mg Oral Daily Ray De La Garza MD     • glycerin-hypromellose-  1 drop Both Eyes Q6H PRN MICHAEL Madrigal     • lidocaine  1 patch Topical Daily Adry Prasad MD     • melatonin  6 mg Oral HS Ray De La Garza MD     • metoprolol tartrate  50 mg Oral Daily Ray De La Garza MD     • morphine injection  2 mg Intravenous Q6H PRN Ray De La Garza MD     • oxyCODONE  2 5 mg Oral Q4H PRN Ray De La Garza MD     • oxyCODONE  5 mg Oral Q4H PRN Ray De La Garza MD     • polyethylene glycol  17 g Oral Daily PRN MICHAEL Madrigal     • senna  1 tablet Oral HS MICHAEL Madrigal     • tamsulosin  0 4 mg Oral Daily With Rosalee Millan MD          Today, Patient Was Seen By: Adry Prasad MD    **Please Note: This note may have been constructed using a voice recognition system  **

## 2023-01-15 NOTE — PLAN OF CARE
Problem: Potential for Falls  Goal: Patient will remain free of falls  Description: INTERVENTIONS:  - Educate patient/family on patient safety including physical limitations  - Instruct patient to call for assistance with activity   - Consult OT/PT to assist with strengthening/mobility   - Keep Call bell within reach  - Keep bed low and locked with side rails adjusted as appropriate  - Keep care items and personal belongings within reach  - Initiate and maintain comfort rounds  - Make Fall Risk Sign visible to staff  - Offer Toileting every 2 Hours, in advance of need  - Initiate/Maintain bed alarm  - Obtain necessary fall risk management equipment: nurse assist  - Apply yellow socks and bracelet for high fall risk patients  - Consider moving patient to room near nurses station  Outcome: Progressing     Problem: PAIN - ADULT  Goal: Verbalizes/displays adequate comfort level or baseline comfort level  Description: Interventions:  - Encourage patient to monitor pain and request assistance  - Assess pain using appropriate pain scale0=10  - Administer analgesics based on type and severity of pain and evaluate response  - Implement non-pharmacological measures as appropriate and evaluate response  - Consider cultural and social influences on pain and pain management  - Notify physician/advanced practitioner if interventions unsuccessful or patient reports new pain  Outcome: Progressing     Problem: INFECTION - ADULT  Goal: Absence or prevention of progression during hospitalization  Description: INTERVENTIONS:  - Assess and monitor for signs and symptoms of infection  - Monitor lab/diagnostic results  - Monitor all insertion sites, i e  indwelling lines, tubes, and drains  - Monitor endotracheal if appropriate and nasal secretions for changes in amount and color  - [place] appropriate cooling/warming therapies per order  - Administer medications as ordered  - Instruct and encourage patient and family to use good hand hygiene technique  - Identify and instruct in appropriate isolation precautions for identified infection/condition  Outcome: Progressing  Goal: Absence of fever/infection during neutropenic period  Description: INTERVENTIONS:  - Monitor WBC    Outcome: Progressing     Problem: SAFETY ADULT  Goal: Patient will remain free of falls  Description: INTERVENTIONS:  - Educate patient/family on patient safety including physical limitations  - Instruct patient to call for assistance with activity   - Consult OT/PT to assist with strengthening/mobility   - Keep Call bell within reach  - Keep bed low and locked with side rails adjusted as appropriate  - Keep care items and personal belongings within reach  - Initiate and maintain comfort rounds  - Make Fall Risk Sign visible to staff  - Offer Toileting every 2 Hours, in advance of need  - Initiate/Maintain bed alarm  - Obtain necessary fall risk management equipment: nurse assist  - Apply yellow socks and bracelet for high fall risk patients  - Consider moving patient to room near nurses station  Outcome: Progressing  Goal: Maintain or return to baseline ADL function  Description: INTERVENTIONS:  -  Assess patient's ability to carry out ADLs; assess patient's baseline for ADL function and identify physical deficits which impact ability to perform ADLs (bathing, care of mouth/teeth, toileting, grooming, dressing, etc )  - Assess/evaluate cause of self-care deficits   - Assess range of motion  - Assess patient's mobility; develop plan if impaired  - Assess patient's need for assistive devices and provide as appropriate  - Encourage maximum independence but intervene and supervise when necessary  - Involve family in performance of ADLs  - Assess for home care needs following discharge   - Consider OT consult to assist with ADL evaluation and planning for discharge  - Provide patient education as appropriate  Outcome: Progressing  Goal: Maintains/Returns to pre admission functional level  Description: INTERVENTIONS:  - Perform BMAT or MOVE assessment daily    - Set and communicate daily mobility goal to care team and patient/family/caregiver  - Collaborate with rehabilitation services on mobility goals if consulted  - Out of bed for toileting  - Record patient progress and toleration of activity level   Outcome: Progressing     Problem: DISCHARGE PLANNING  Goal: Discharge to home or other facility with appropriate resources  Description: INTERVENTIONS:  - Identify barriers to discharge w/patient and caregiver  - Arrange for needed discharge resources and transportation as appropriate  - Identify discharge learning needs (meds, wound care, etc )  - Arrange for interpretive services to assist at discharge as needed  - Refer to Case Management Department for coordinating discharge planning if the patient needs post-hospital services based on physician/advanced practitioner order or complex needs related to functional status, cognitive ability, or social support system  Outcome: Progressing     Problem: Knowledge Deficit  Goal: Patient/family/caregiver demonstrates understanding of disease process, treatment plan, medications, and discharge instructions  Description: Complete learning assessment and assess knowledge base    Interventions:  - Provide teaching at level of understanding  - Provide teaching via preferred learning methods  Outcome: Progressing     Problem: MOBILITY - ADULT  Goal: Maintain or return to baseline ADL function  Description: INTERVENTIONS:  -  Assess patient's ability to carry out ADLs; assess patient's baseline for ADL function and identify physical deficits which impact ability to perform ADLs (bathing, care of mouth/teeth, toileting, grooming, dressing, etc )  - Assess/evaluate cause of self-care deficits   - Assess range of motion  - Assess patient's mobility; develop plan if impaired  - Assess patient's need for assistive devices and provide as appropriate  - Encourage maximum independence but intervene and supervise when necessary  - Involve family in performance of ADLs  - Assess for home care needs following discharge   - Consider OT consult to assist with ADL evaluation and planning for discharge  - Provide patient education as appropriate  Outcome: Progressing  Goal: Maintains/Returns to pre admission functional level  Description: INTERVENTIONS:  - Perform BMAT or MOVE assessment daily    - Set and communicate daily mobility goal to care team and patient/family/caregiver     - Collaborate with rehabilitation services on mobility goals if consulted  - Out of bed for toileting  - Record patient progress and toleration of activity level   Outcome: Progressing     Problem: Prexisting or High Potential for Compromised Skin Integrity  Goal: Skin integrity is maintained or improved  Description: INTERVENTIONS:  - Identify patients at risk for skin breakdown  - Assess and monitor skin integrity  - Assess and monitor nutrition and hydration status  - Monitor labs   - Assess for incontinence   - Turn and reposition patient  - Assist with mobility/ambulation  - Relieve pressure over bony prominences  - Avoid friction and shearing  - Provide appropriate hygiene as needed including keeping skin clean and dry  - Evaluate need for skin moisturizer/barrier cream  - Collaborate with interdisciplinary team   - Patient/family teaching  - Consider wound care consult   Outcome: Progressing

## 2023-01-15 NOTE — PLAN OF CARE
Problem: Potential for Falls  Goal: Patient will remain free of falls  Description: INTERVENTIONS:  - Educate patient/family on patient safety including physical limitations  - Instruct patient to call for assistance with activity   - Consult OT/PT to assist with strengthening/mobility   - Keep Call bell within reach  - Keep bed low and locked with side rails adjusted as appropriate  - Keep care items and personal belongings within reach  - Initiate and maintain comfort rounds  - Make Fall Risk Sign visible to staff  - Offer Toileting every 2 Hours, in advance of need  - Initiate/Maintain bed alarm  - Obtain necessary fall risk management equipment: nurse assist  - Apply yellow socks and bracelet for high fall risk patients  - Consider moving patient to room near nurses station  Outcome: Progressing     Problem: PAIN - ADULT  Goal: Verbalizes/displays adequate comfort level or baseline comfort level  Description: Interventions:  - Encourage patient to monitor pain and request assistance  - Assess pain using appropriate pain scale0=10  - Administer analgesics based on type and severity of pain and evaluate response  - Implement non-pharmacological measures as appropriate and evaluate response  - Consider cultural and social influences on pain and pain management  - Notify physician/advanced practitioner if interventions unsuccessful or patient reports new pain  Outcome: Progressing     Problem: INFECTION - ADULT  Goal: Absence or prevention of progression during hospitalization  Description: INTERVENTIONS:  - Assess and monitor for signs and symptoms of infection  - Monitor lab/diagnostic results  - Monitor all insertion sites, i e  indwelling lines, tubes, and drains  - Monitor endotracheal if appropriate and nasal secretions for changes in amount and color  - [place] appropriate cooling/warming therapies per order  - Administer medications as ordered  - Instruct and encourage patient and family to use good hand hygiene technique  - Identify and instruct in appropriate isolation precautions for identified infection/condition  Outcome: Progressing

## 2023-01-15 NOTE — ASSESSMENT & PLAN NOTE
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

## 2023-01-15 NOTE — QUICK NOTE
Checked in on patient POD2 s/p cysto retrograde pyelogram with stent insertion on the right  Patient remains without pain and is feeling well  His urine is noted to be significantly improved from prior exam  Urine in wynn tubing appears cleared with some streaks of blood  Wynn bag has lightened - red/orange in color  No significant clot or sediment noted  Discussed with Taylor Padilla, Urology CRNP  Would recommend continued irrigation x 1 more day monitoring urine  Hold A/c x 3 more days      Juan Pablo Nava [place]  1/15/2023

## 2023-01-16 VITALS
SYSTOLIC BLOOD PRESSURE: 155 MMHG | BODY MASS INDEX: 32.5 KG/M2 | TEMPERATURE: 97.9 F | RESPIRATION RATE: 19 BRPM | WEIGHT: 227 LBS | HEIGHT: 70 IN | DIASTOLIC BLOOD PRESSURE: 75 MMHG | HEART RATE: 86 BPM | OXYGEN SATURATION: 95 %

## 2023-01-16 LAB
ANION GAP SERPL CALCULATED.3IONS-SCNC: 7 MMOL/L (ref 4–13)
BASOPHILS # BLD AUTO: 0.02 THOUSANDS/ÂΜL (ref 0–0.1)
BASOPHILS NFR BLD AUTO: 0 % (ref 0–1)
BUN SERPL-MCNC: 27 MG/DL (ref 5–25)
CALCIUM SERPL-MCNC: 8.4 MG/DL (ref 8.3–10.1)
CHLORIDE SERPL-SCNC: 102 MMOL/L (ref 96–108)
CO2 SERPL-SCNC: 28 MMOL/L (ref 21–32)
CREAT SERPL-MCNC: 1.01 MG/DL (ref 0.6–1.3)
EOSINOPHIL # BLD AUTO: 0.09 THOUSAND/ÂΜL (ref 0–0.61)
EOSINOPHIL NFR BLD AUTO: 1 % (ref 0–6)
ERYTHROCYTE [DISTWIDTH] IN BLOOD BY AUTOMATED COUNT: 14.4 % (ref 11.6–15.1)
GFR SERPL CREATININE-BSD FRML MDRD: 67 ML/MIN/1.73SQ M
GLUCOSE SERPL-MCNC: 118 MG/DL (ref 65–140)
HCT VFR BLD AUTO: 37.9 % (ref 36.5–49.3)
HGB BLD-MCNC: 12.1 G/DL (ref 12–17)
IMM GRANULOCYTES # BLD AUTO: 0.02 THOUSAND/UL (ref 0–0.2)
IMM GRANULOCYTES NFR BLD AUTO: 0 % (ref 0–2)
LYMPHOCYTES # BLD AUTO: 1.33 THOUSANDS/ÂΜL (ref 0.6–4.47)
LYMPHOCYTES NFR BLD AUTO: 19 % (ref 14–44)
MCH RBC QN AUTO: 29 PG (ref 26.8–34.3)
MCHC RBC AUTO-ENTMCNC: 31.9 G/DL (ref 31.4–37.4)
MCV RBC AUTO: 91 FL (ref 82–98)
MONOCYTES # BLD AUTO: 0.77 THOUSAND/ÂΜL (ref 0.17–1.22)
MONOCYTES NFR BLD AUTO: 11 % (ref 4–12)
NEUTROPHILS # BLD AUTO: 4.85 THOUSANDS/ÂΜL (ref 1.85–7.62)
NEUTS SEG NFR BLD AUTO: 69 % (ref 43–75)
NRBC BLD AUTO-RTO: 0 /100 WBCS
PLATELET # BLD AUTO: 202 THOUSANDS/UL (ref 149–390)
PMV BLD AUTO: 9.7 FL (ref 8.9–12.7)
POTASSIUM SERPL-SCNC: 4.1 MMOL/L (ref 3.5–5.3)
RBC # BLD AUTO: 4.17 MILLION/UL (ref 3.88–5.62)
SODIUM SERPL-SCNC: 137 MMOL/L (ref 135–147)
WBC # BLD AUTO: 7.08 THOUSAND/UL (ref 4.31–10.16)

## 2023-01-16 RX ORDER — LEVOFLOXACIN 750 MG/1
750 TABLET ORAL EVERY 24 HOURS
Qty: 3 TABLET | Refills: 0 | Status: SHIPPED | OUTPATIENT
Start: 2023-01-16 | End: 2023-01-19

## 2023-01-16 RX ADMIN — AMPICILLIN SODIUM 2000 MG: 2 INJECTION, POWDER, FOR SOLUTION INTRAMUSCULAR; INTRAVENOUS at 03:13

## 2023-01-16 RX ADMIN — LIDOCAINE 5% 1 PATCH: 700 PATCH TOPICAL at 09:13

## 2023-01-16 RX ADMIN — AMLODIPINE BESYLATE 10 MG: 10 TABLET ORAL at 09:12

## 2023-01-16 RX ADMIN — AMPICILLIN SODIUM 2000 MG: 2 INJECTION, POWDER, FOR SOLUTION INTRAMUSCULAR; INTRAVENOUS at 09:12

## 2023-01-16 RX ADMIN — ATORVASTATIN CALCIUM 20 MG: 20 TABLET, FILM COATED ORAL at 09:12

## 2023-01-16 RX ADMIN — METOPROLOL TARTRATE 50 MG: 50 TABLET, FILM COATED ORAL at 09:12

## 2023-01-16 RX ADMIN — AMPICILLIN SODIUM 2000 MG: 2 INJECTION, POWDER, FOR SOLUTION INTRAMUSCULAR; INTRAVENOUS at 06:05

## 2023-01-16 NOTE — PROGRESS NOTES
-- Patient:  -- MRN: [id number]  -- Aidin Request ID: [id number]  -- Level of care reserved: [address] [place] [address]  -- Partner Reserved: CORINA SALAS [facility], [address] [facility] [phone]  -- Clinical needs requested: occupational therapy, skilled nursing, physical therapy  -- Geography searched: [id number]  -- Start of Service:  -- Request sent: 10:01am EST on 1/16/2023 by Shine Cook  -- Partner reserved: 2:59pm EST on 1/16/2023 by Marco Antonio Sanchez  -- Choice list shared: 12:29pm EST on 1/16/2023 by Shine Cook

## 2023-01-16 NOTE — ASSESSMENT & PLAN NOTE
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

## 2023-01-16 NOTE — PLAN OF CARE
Problem: Potential for Falls  Goal: Patient will remain free of falls  Description: INTERVENTIONS:  - Educate patient/family on patient safety including physical limitations  - Instruct patient to call for assistance with activity   - Consult OT/PT to assist with strengthening/mobility   - Keep Call bell within reach  - Keep bed low and locked with side rails adjusted as appropriate  - Keep care items and personal belongings within reach  - Initiate and maintain comfort rounds  - Make Fall Risk Sign visible to staff  - Offer Toileting every 2 Hours, in advance of need  - Initiate/Maintain bed alarm  - Obtain necessary fall risk management equipment: nurse assist  - Apply yellow socks and bracelet for high fall risk patients  - Consider moving patient to room near nurses station  Outcome: Progressing     Problem: PAIN - ADULT  Goal: Verbalizes/displays adequate comfort level or baseline comfort level  Description: Interventions:  - Encourage patient to monitor pain and request assistance  - Assess pain using appropriate pain scale0=10  - Administer analgesics based on type and severity of pain and evaluate response  - Implement non-pharmacological measures as appropriate and evaluate response  - Consider cultural and social influences on pain and pain management  - Notify physician/advanced practitioner if interventions unsuccessful or patient reports new pain  Outcome: Progressing     Problem: INFECTION - ADULT  Goal: Absence or prevention of progression during hospitalization  Description: INTERVENTIONS:  - Assess and monitor for signs and symptoms of infection  - Monitor lab/diagnostic results  - Monitor all insertion sites, i e  indwelling lines, tubes, and drains  - Monitor endotracheal if appropriate and nasal secretions for changes in amount and color  - [place] appropriate cooling/warming therapies per order  - Administer medications as ordered  - Instruct and encourage patient and family to use good hand hygiene technique  - Identify and instruct in appropriate isolation precautions for identified infection/condition  Outcome: Progressing  Goal: Absence of fever/infection during neutropenic period  Description: INTERVENTIONS:  - Monitor WBC    Outcome: Progressing     Problem: SAFETY ADULT  Goal: Patient will remain free of falls  Description: INTERVENTIONS:  - Educate patient/family on patient safety including physical limitations  - Instruct patient to call for assistance with activity   - Consult OT/PT to assist with strengthening/mobility   - Keep Call bell within reach  - Keep bed low and locked with side rails adjusted as appropriate  - Keep care items and personal belongings within reach  - Initiate and maintain comfort rounds  - Make Fall Risk Sign visible to staff  - Offer Toileting every 2 Hours, in advance of need  - Initiate/Maintain bed alarm  - Obtain necessary fall risk management equipment: nurse assist  - Apply yellow socks and bracelet for high fall risk patients  - Consider moving patient to room near nurses station  Outcome: Progressing  Goal: Maintain or return to baseline ADL function  Description: INTERVENTIONS:  -  Assess patient's ability to carry out ADLs; assess patient's baseline for ADL function and identify physical deficits which impact ability to perform ADLs (bathing, care of mouth/teeth, toileting, grooming, dressing, etc )  - Assess/evaluate cause of self-care deficits   - Assess range of motion  - Assess patient's mobility; develop plan if impaired  - Assess patient's need for assistive devices and provide as appropriate  - Encourage maximum independence but intervene and supervise when necessary  - Involve family in performance of ADLs  - Assess for home care needs following discharge   - Consider OT consult to assist with ADL evaluation and planning for discharge  - Provide patient education as appropriate  Outcome: Progressing  Goal: Maintains/Returns to pre admission functional level  Description: INTERVENTIONS:  - Perform BMAT or MOVE assessment daily    - Set and communicate daily mobility goal to care team and patient/family/caregiver  - Collaborate with rehabilitation services on mobility goals if consulted  - Out of bed for toileting  - Record patient progress and toleration of activity level   Outcome: Progressing     Problem: DISCHARGE PLANNING  Goal: Discharge to home or other facility with appropriate resources  Description: INTERVENTIONS:  - Identify barriers to discharge w/patient and caregiver  - Arrange for needed discharge resources and transportation as appropriate  - Identify discharge learning needs (meds, wound care, etc )  - Arrange for interpretive services to assist at discharge as needed  - Refer to Case Management Department for coordinating discharge planning if the patient needs post-hospital services based on physician/advanced practitioner order or complex needs related to functional status, cognitive ability, or social support system  Outcome: Progressing     Problem: Knowledge Deficit  Goal: Patient/family/caregiver demonstrates understanding of disease process, treatment plan, medications, and discharge instructions  Description: Complete learning assessment and assess knowledge base    Interventions:  - Provide teaching at level of understanding  - Provide teaching via preferred learning methods  Outcome: Progressing     Problem: MOBILITY - ADULT  Goal: Maintain or return to baseline ADL function  Description: INTERVENTIONS:  -  Assess patient's ability to carry out ADLs; assess patient's baseline for ADL function and identify physical deficits which impact ability to perform ADLs (bathing, care of mouth/teeth, toileting, grooming, dressing, etc )  - Assess/evaluate cause of self-care deficits   - Assess range of motion  - Assess patient's mobility; develop plan if impaired  - Assess patient's need for assistive devices and provide as appropriate  - Encourage maximum independence but intervene and supervise when necessary  - Involve family in performance of ADLs  - Assess for home care needs following discharge   - Consider OT consult to assist with ADL evaluation and planning for discharge  - Provide patient education as appropriate  Outcome: Progressing  Goal: Maintains/Returns to pre admission functional level  Description: INTERVENTIONS:  - Perform BMAT or MOVE assessment daily    - Set and communicate daily mobility goal to care team and patient/family/caregiver     - Collaborate with rehabilitation services on mobility goals if consulted  - Out of bed for toileting  - Record patient progress and toleration of activity level   Outcome: Progressing     Problem: Prexisting or High Potential for Compromised Skin Integrity  Goal: Skin integrity is maintained or improved  Description: INTERVENTIONS:  - Identify patients at risk for skin breakdown  - Assess and monitor skin integrity  - Assess and monitor nutrition and hydration status  - Monitor labs   - Assess for incontinence   - Turn and reposition patient  - Assist with mobility/ambulation  - Relieve pressure over bony prominences  - Avoid friction and shearing  - Provide appropriate hygiene as needed including keeping skin clean and dry  - Evaluate need for skin moisturizer/barrier cream  - Collaborate with interdisciplinary team   - Patient/family teaching  - Consider wound care consult   Outcome: Progressing

## 2023-01-16 NOTE — CASE MANAGEMENT
Case Management Assessment & Discharge Planning Note    Patient name Juan Payne  Location /-37 MRN [id number]  : 1935 Date 2023       Current Admission Date: 2023  Current Admission Diagnosis:Urinary tract obstruction by kidney stone   Patient Active Problem List    Diagnosis Date Noted   • Acute cystitis with hematuria 2023   • Urinary tract obstruction by kidney stone 2023   • Atrial flutter ([address] ) 2023   • Pacemaker 2022   • Hypertension    • Hyperlipemia    • Aortic stenosis    • S/P AVR    • Carotid artery disease ([address] )    • Coronary artery disease       LOS (days): 4  Geometric Mean LOS (GMLOS) (days): 2 00  Days to GMLOS:-1 7     OBJECTIVE:    Risk of Unplanned Readmission Score: 10 45     CM met with patient at the bedside,baseline information  was obtained  CM discussed the role of CM in helping the patient develop a discharge plan and assist the patient in carry out their plan  Current admission status: Inpatient  Referral Reason: Other (Discharge planning)    Preferred Pharmacy:   [facility] Anjana Padilla, [address] [place] [id number]  Phone: [phone] Fax: [phone]    [id number] [place] [address] [place] - [address]  [address]  [facility] [address]  Phone: [phone] Fax: [phone]    Primary Care Provider: Ally Hernandez DO    Primary Insurance: [facility] [place] [facility]  Secondary Insurance:     ASSESSMENT:  [place] 26 Proxies    There are no active Health Care Proxies on file         Advance Directives  Does patient have a [address] [facility] [address]?: Yes  Does patient have Advance Directives?: Yes  Advance Directives: Living will  Primary Contact: Donny Holland         Readmission Root Cause  30 Day Readmission: No    Patient Information  Admitted from[de-identified] Home  Mental Status: Alert  During Assessment patient was accompanied by: Not accompanied during assessment  Assessment information provided by[de-identified] Patient  Primary Caregiver: Self  Support Systems: Family members, Daughter  [place] of Residence: [facility] do you live in?: [address] [place] [address],[facility] E entry access options   Select all that apply : Stairs  Number of steps to enter home : 6  Do the steps have railings?: Yes  Type of Current Residence: [address] [place] home  Upon entering residence, is there a bedroom on the main floor (no further steps)?: Yes  Upon entering residence, is there a bathroom on the main floor (no further steps)?: Yes  In the last 12 months, was there a time when you were not able to pay the mortgage or rent on time?: No  In the last 12 months, how many places have you lived?: 1  In the last 12 months, was there a time when you did not have a steady place to sleep or slept in a shelter (including now)?: No  Homeless/housing insecurity resource given?: No  Living Arrangements: Lives w/ Daughter  Is patient a ?: Yes    Activities of Daily Living Prior to Admission  Functional Status: Assistance  Completes ADLs independently?: No  Level of ADL dependence: Assistance  Ambulates independently?: No  Level of ambulatory dependence: Assistance  Does patient use assisted devices?: Yes  Assisted Devices (DME) used: [employer]  Does patient currently own DME?: Yes  What DME does the patient currently own?: [employer]  Does patient have a history of Outpatient Therapy (PT/OT)?: Yes  Does the patient have a history of Short-Term Rehab?: No  Does patient have a history of C?: Yes  [facility]: No         Patient Information Continued  Income Source: Pension/long term  Does patient have prescription coverage?: Yes  Within the past 12 months, you worried that your food would run out before you got the money to buy more : Never true  Within the past 12 months, the food you bought just didn't last and you didn't have money to get more : Never true  Food insecurity resource given?: No  Does patient receive dialysis treatments?: No  Does patient have a history of substance abuse?: No  Does patient have a history of Mental Health Diagnosis?: No         Means of Transportation  Means of Transport to Appts[de-identified] Family transport  In the past 12 months, has lack of transportation kept you from medical appointments or from getting medications?: No  In the past 12 months, has lack of transportation kept you from meetings, work, or from getting things needed for daily living?: No  Was application for public transport provided?: No        DISCHARGE DETAILS:  Discussed need for SN for wynn care and PT,OT for mobility with pt and with pt's daughter on the phone at bedside  Explained a blanket referral would be made for [facility]  Referral made in [place]  IMM explained to pt and pt's daughter  Copy of IMM given to pt  Pt does not need a walker as family has one

## 2023-01-16 NOTE — ASSESSMENT & PLAN NOTE
· Start ampicillin 2 g IV every 4 hours as discussed with infectious disease recent culture of Enterococcus    Due to prosthetic valve and pacemaker he is at risk for bacteremia to check blood cultures if his blood cultures are negative at 24 hours, be discharged on Levaquin till 1/19 blood cultures are negative for 24 hours will discharge on Levaquin

## 2023-01-16 NOTE — CASE MANAGEMENT
Case Management Discharge Planning Note    Patient name Shorty Saavedra  Location /-34 MRN [id number]  : 1935 Date 2023       Current Admission Date: 2023  Current Admission Diagnosis:Urinary tract obstruction by kidney stone   Patient Active Problem List    Diagnosis Date Noted   • Acute cystitis with hematuria 2023   • Urinary tract obstruction by kidney stone 2023   • Atrial flutter ([facility] [address] [facility] ) 2023   • Pacemaker 2022   • Hypertension    • Hyperlipemia    • Aortic stenosis    • S/P AVR    • Carotid artery disease ([address] )    • Coronary artery disease       LOS (days): 4  Geometric Mean LOS (GMLOS) (days): 2 00  Days to GMLOS:-1 8     OBJECTIVE:  Risk of Unplanned Readmission Score: 10 59         Current admission status: Inpatient   Preferred Pharmacy:   [address] [place] [address], [address] [place] [id number]  Phone: [phone] Fax: [phone]    [facility] [address]  [address] [place] [address]  Phone: [phone] Fax: [phone]    Primary Care Provider: Vickie Ojeda DO    Primary Insurance: [facility]  Secondary Insurance:     DISCHARGE DETAILS:    Patient was dc prior to following up with accepted [facility]  CM called and spoke to daughter Arcelia Hussein via Phone and reviewed the accepting home health agencies-- Choice is [facility]  CM called [facility], spoke to liaison and they will see Rosy Albert tomorrow  They are calling Rosy Albert today to schedule an appointment  CM faxed the AVS to [facility]  3:35  CM received a call from Ramos at [facility] that she spoke to the Daughter and they do not have a walker at home  CM called Daughter and she would like me to order a walker, informed of the business relationship with [employer], she would like to use [employer]  CM ordered Walker via Lilliana Aguilar         4:25 CM called the Daughter and she is coming to the hospital and will  the walker at the lobby  Delivery slip was signed

## 2023-01-16 NOTE — CASE MANAGEMENT
Case Management Discharge Planning Note    Patient name Jaleesa Ennis /-45 MRN [id number]  : 1935 Date 2023       Current Admission Date: 2023  Current Admission Diagnosis:Urinary tract obstruction by kidney stone   Patient Active Problem List    Diagnosis Date Noted   • Acute cystitis with hematuria 2023   • Urinary tract obstruction by kidney stone 2023   • Atrial flutter ([facility] ) 2023   • Pacemaker 2022   • Hypertension    • Hyperlipemia    • Aortic stenosis    • S/P AVR    • Carotid artery disease ([address] )    • Coronary artery disease       LOS (days): 4  Geometric Mean LOS (GMLOS) (days): 2 00  Days to GMLOS:-1 7     OBJECTIVE:  Risk of Unplanned Readmission Score: 10 45         Current admission status: Inpatient   Preferred Pharmacy:   [address] [place] [address], [address] [place] [address]  Phone: [phone] Fax: [phone]    [id number] [address]  [address]  [address] [facility] [address]  Phone: [phone] Fax: [phone]    Primary Care Provider: Jean Paul Carson DO    Primary Insurance: [facility]  Secondary Insurance:     DISCHARGE DETAILS:    Discharge planning discussed with[de-identified] Pt        CM contacted family/caregiver?: Yes [facility])  Were Treatment Team discharge recommendations reviewed with patient/caregiver?: Yes  Did patient/caregiver verbalize understanding of patient care needs?: Yes  Were patient/caregiver advised of the risks associated with not following Treatment Team discharge recommendations?: Yes    Contacts  Reason/Outcome: Discharge [address]         Is the patient interested in [facility] at discharge?: Yes  [address] requested[de-identified] Occupational Therapy, Physical Therapy, [facility] Provider[de-identified] PCP  Supporting Clincal Findings[de-identified] Limited Endurance         Other Referral/Resources/Interventions Provided:  Interventions: Re Gutierrez         Treatment Team Recommendation: Home with [address] [place] [address]  Discharge Destination Plan[de-identified] Home with [place] at Discharge : Family      Currently pending accepting Re Gutierrez provider

## 2023-01-16 NOTE — PLAN OF CARE
Problem: Potential for Falls  Goal: Patient will remain free of falls  Description: INTERVENTIONS:  - Educate patient/family on patient safety including physical limitations  - Instruct patient to call for assistance with activity   - Consult OT/PT to assist with strengthening/mobility   - Keep Call bell within reach  - Keep bed low and locked with side rails adjusted as appropriate  - Keep care items and personal belongings within reach  - Initiate and maintain comfort rounds  - Make Fall Risk Sign visible to staff  - Offer Toileting every 2 Hours, in advance of need  - Initiate/Maintain bed alarm  - Obtain necessary fall risk management equipment: nurse assist  - Apply yellow socks and bracelet for high fall risk patients  - Consider moving patient to room near nurses station  Outcome: Progressing     Problem: PAIN - ADULT  Goal: Verbalizes/displays adequate comfort level or baseline comfort level  Description: Interventions:  - Encourage patient to monitor pain and request assistance  - Assess pain using appropriate pain scale0=10  - Administer analgesics based on type and severity of pain and evaluate response  - Implement non-pharmacological measures as appropriate and evaluate response  - Consider cultural and social influences on pain and pain management  - Notify physician/advanced practitioner if interventions unsuccessful or patient reports new pain  Outcome: Progressing     Problem: INFECTION - ADULT  Goal: Absence or prevention of progression during hospitalization  Description: INTERVENTIONS:  - Assess and monitor for signs and symptoms of infection  - Monitor lab/diagnostic results  - Monitor all insertion sites, i e  indwelling lines, tubes, and drains  - Monitor endotracheal if appropriate and nasal secretions for changes in amount and color  - [place] appropriate cooling/warming therapies per order  - Administer medications as ordered  - Instruct and encourage patient and family to use good hand hygiene technique  - Identify and instruct in appropriate isolation precautions for identified infection/condition  Outcome: Progressing  Goal: Absence of fever/infection during neutropenic period  Description: INTERVENTIONS:  - Monitor WBC    Outcome: Progressing     Problem: SAFETY ADULT  Goal: Patient will remain free of falls  Description: INTERVENTIONS:  - Educate patient/family on patient safety including physical limitations  - Instruct patient to call for assistance with activity   - Consult OT/PT to assist with strengthening/mobility   - Keep Call bell within reach  - Keep bed low and locked with side rails adjusted as appropriate  - Keep care items and personal belongings within reach  - Initiate and maintain comfort rounds  - Make Fall Risk Sign visible to staff  - Offer Toileting every 2 Hours, in advance of need  - Initiate/Maintain bed alarm  - Obtain necessary fall risk management equipment: nurse assist  - Apply yellow socks and bracelet for high fall risk patients  - Consider moving patient to room near nurses station  Outcome: Progressing  Goal: Maintain or return to baseline ADL function  Description: INTERVENTIONS:  -  Assess patient's ability to carry out ADLs; assess patient's baseline for ADL function and identify physical deficits which impact ability to perform ADLs (bathing, care of mouth/teeth, toileting, grooming, dressing, etc )  - Assess/evaluate cause of self-care deficits   - Assess range of motion  - Assess patient's mobility; develop plan if impaired  - Assess patient's need for assistive devices and provide as appropriate  - Encourage maximum independence but intervene and supervise when necessary  - Involve family in performance of ADLs  - Assess for home care needs following discharge   - Consider OT consult to assist with ADL evaluation and planning for discharge  - Provide patient education as appropriate  Outcome: Progressing  Goal: Maintains/Returns to pre admission functional level  Description: INTERVENTIONS:  - Perform BMAT or MOVE assessment daily    - Set and communicate daily mobility goal to care team and patient/family/caregiver  - Collaborate with rehabilitation services on mobility goals if consulted  - Perform Range of Motion   times a day  - Reposition patient every   hours  - Dangle patient   times a day  - Stand patient   times a day  - Ambulate patient   times a day  - Out of bed to chair   times a day   - Out of bed for meals     times a day  - Out of bed for toileting  - Record patient progress and toleration of activity level   Outcome: Progressing     Problem: DISCHARGE PLANNING  Goal: Discharge to home or other facility with appropriate resources  Description: INTERVENTIONS:  - Identify barriers to discharge w/patient and caregiver  - Arrange for needed discharge resources and transportation as appropriate  - Identify discharge learning needs (meds, wound care, etc )  - Arrange for interpretive services to assist at discharge as needed  - Refer to Case Management Department for coordinating discharge planning if the patient needs post-hospital services based on physician/advanced practitioner order or complex needs related to functional status, cognitive ability, or social support system  Outcome: Progressing     Problem: Knowledge Deficit  Goal: Patient/family/caregiver demonstrates understanding of disease process, treatment plan, medications, and discharge instructions  Description: Complete learning assessment and assess knowledge base    Interventions:  - Provide teaching at level of understanding  - Provide teaching via preferred learning methods  Outcome: Progressing     Problem: MOBILITY - ADULT  Goal: Maintain or return to baseline ADL function  Description: INTERVENTIONS:  -  Assess patient's ability to carry out ADLs; assess patient's baseline for ADL function and identify physical deficits which impact ability to perform ADLs (bathing, care of mouth/teeth, toileting, grooming, dressing, etc )  - Assess/evaluate cause of self-care deficits   - Assess range of motion  - Assess patient's mobility; develop plan if impaired  - Assess patient's need for assistive devices and provide as appropriate  - Encourage maximum independence but intervene and supervise when necessary  - Involve family in performance of ADLs  - Assess for home care needs following discharge   - Consider OT consult to assist with ADL evaluation and planning for discharge  - Provide patient education as appropriate  Outcome: Progressing  Goal: Maintains/Returns to pre admission functional level  Description: INTERVENTIONS:  - Perform BMAT or MOVE assessment daily    - Set and communicate daily mobility goal to care team and patient/family/caregiver  - Collaborate with rehabilitation services on mobility goals if consulted  - Perform Range of Motion   [place]   times a day  - Reposition patient every   hours  - Dangle patient   times a day  - Stand patient   times a day  - Ambulate patient   times a day  - Out of bed to chair   times a day   - Out of bed for meals    times a day  - Out of bed for toileting  - Record patient progress and toleration of activity level   Outcome: Progressing     Problem: Prexisting or High Potential for Compromised Skin Integrity  Goal: Skin integrity is maintained or improved  Description: INTERVENTIONS:  - Identify patients at risk for skin breakdown  - Assess and monitor skin integrity  - Assess and monitor nutrition and hydration status  - Monitor labs   - Assess for incontinence   - Turn and reposition patient  - Assist with mobility/ambulation  - Relieve pressure over bony prominences  - Avoid friction and shearing  - Provide appropriate hygiene as needed including keeping skin clean and dry  - Evaluate need for skin moisturizer/barrier cream  - Collaborate with interdisciplinary team   - Patient/family teaching  - Consider wound care consult   Outcome: Progressing     Problem: GENITOURINARY - ADULT  Goal: Maintains or returns to baseline urinary function  Description: INTERVENTIONS:  - Assess urinary function  - Encourage oral fluids to ensure adequate hydration if ordered  - Administer IV fluids as ordered to ensure adequate hydration  - Administer ordered medications as needed  - Offer frequent toileting  - Follow urinary retention protocol if ordered  Outcome: Progressing  Goal: Absence of urinary retention  Description: INTERVENTIONS:  - Assess patient’s ability to void and empty bladder  - Monitor I/O  - Bladder scan as needed  - Discuss with physician/AP medications to alleviate retention as needed  - Discuss catheterization for long term situations as appropriate  Outcome: Progressing  Goal: Urinary catheter remains patent  Description: INTERVENTIONS:  - Assess patency of urinary catheter  - If patient has a chronic wynn, consider changing catheter if non-functioning  - Follow guidelines for intermittent irrigation of non-functioning urinary catheter  Outcome: Progressing     Problem: MUSCULOSKELETAL - ADULT  Goal: Maintain or return mobility to safest level of function  Description: INTERVENTIONS:  - Assess patient's ability to carry out ADLs; assess patient's baseline for ADL function and identify physical deficits which impact ability to perform ADLs (bathing, care of mouth/teeth, toileting, grooming, dressing, etc )  - Assess/evaluate cause of self-care deficits   - Assess range of motion  - Assess patient's mobility  - Assess patient's need for assistive devices and provide as appropriate  - Encourage maximum independence but intervene and supervise when necessary  - Involve family in performance of ADLs  - Assess for home care needs following discharge   - Consider OT consult to assist with ADL evaluation and planning for discharge  - Provide patient education as appropriate  Outcome: Progressing  Goal: Maintain proper alignment of affected body part  Description: INTERVENTIONS:  - Support, maintain and protect limb and body alignment  - Provide patient/ family with appropriate education  Outcome: Progressing

## 2023-01-16 NOTE — ASSESSMENT & PLAN NOTE
· Continue metoprolol continue to hold Eliquis till his stent is removed later this week I did discuss with his cardiologist as well and let her know

## 2023-01-16 NOTE — DISCHARGE SUMMARY
[address]  Discharge- [place] [facility] 1935, 81 y o  male MRN: [id number]  Unit/Bed#: -01 Encounter: [id number]  Primary Care Provider: Bird Camarena DO   Date and time admitted to hospital: 1/12/2023  3:44 PM    * Urinary tract obstruction by kidney stone  Assessment & Plan  · Obstructive 10 mm proximal right ureteral calculus causes severe right hydroureteronephrosis  · Status post right-sided stent for lithotripsy he will need to be seen as an outpatient  · UA consistent with cystitis and previous culture was Enterococcus  · Pain control  · Hematuria resolved with manual irrigation will continue for another day if completely resolved we will discharge to hold Eliquis until after he has his stent removal on Wednesday  this is secondary to 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 and being on Eliquis prior as discussed with urology  Hemoglobin is stable vitals are stable  · Fortunately creatinine is normal    Acute cystitis with hematuria  Assessment & Plan  · Start ampicillin 2 g IV every 4 hours as discussed with infectious disease recent culture of Enterococcus  Due to prosthetic valve and pacemaker he is at risk for bacteremia to check blood cultures if his blood cultures are negative at 24 hours, be discharged on Levaquin till 1/19 blood cultures are negative for 24 hours will discharge on Levaquin    Atrial flutter ([address] )  Assessment & Plan  · Continue metoprolol continue to hold Eliquis till his stent is removed later this week I did discuss with his cardiologist as well and let her know    Pacemaker  Assessment & Plan  · History of sick sinus syndrome    Coronary artery disease  Assessment & Plan  · Status post stent continue Lipitor    continue enalapril continue beta-blocker    S/P AVR  Assessment & Plan  · Bioprosthetic valve    Hypertension  Assessment & Plan  · stable will continue patient's home medications          Medical Problems     Resolved Problems  Date Reviewed: 1/16/2023   None       Discharging Physician / Practitioner: Zen Barnes MD  PCP: Collins Lilly DO  Admission Date:   Admission Orders (From admission, onward)     Ordered        01/12/23 115[facility]  Once                      Discharge Date: 01/16/23    Consultations During Hospital Stay:  · Urology  · Infectious disease    Procedures Performed:   CYSTOSCOPY RETROGRADE PYELOGRAM WITH INSERTION STENT URETERAL (Right)    Significant Findings / Test Results:   · CT abdomen and pelvis- Obstructive 10 mm proximal right ureteral calculus causes severe right hydroureteronephrosis    Incidental Findings:   · None    Test Results Pending at Discharge (will require follow up):   · none     Outpatient Tests Requested:  · none    Complications:  none    Reason for Admission: Obstructive bone    Hospital Course:   Orlando Limon is a 81 y o  male patient who originally presented to the hospital on 1/12/2023 due to UTI secondary to a obstructive 10 mm right ureteral calculus causing severe right hydronephrosis was placed on antibiotics ampicillin with Enterococcus previously recent urine culture also urology was consulted and infectious disease he underwent a cystoscopy with a stent placement and also dilatation of the urethra complication is hematuria post that as he was on anticoagulation which has resolved with manual irrigation he will be discharged with a Dickens till he follows up with urology as an outpatient later this week to have the stent removed as discussed with urology to keep Eliquis on hold till then I did a discussed that with his cardiologist as well and let him know  Otherwise labs were stable    Vitals were stable he is medical cleared to be discharged home is blood cultures were negative for 24 hours as discussed with infectious disease we will to switch to Levaquin 750 mg daily till January 19        Please see above list of diagnoses and related plan for additional information  Condition at Discharge: stable    Discharge Day Visit / Exam:   Subjective: Patient seen and examined denies any complaints  Vitals: Blood Pressure: 155/75 (01/16/23 0727)  Pulse: 86 (01/16/23 0727)  Temperature: 97 9 °F (36 6 °C) (01/16/23 0727)  Temp Source: Temporal (01/14/23 2230)  Respirations: 19 (01/16/23 0727)  Height: 5' 10" (177 8 cm) (01/13/23 0744)  Weight - Scale: 103 kg (227 lb) (01/13/23 0744)  SpO2: 95 % (01/16/23 0727)  Exam:   Physical Exam  Vitals and nursing note reviewed  Constitutional:       General: He is not in acute distress  Appearance: He is well-developed  HENT:      Head: Normocephalic and atraumatic  Eyes:      Conjunctiva/sclera: Conjunctivae normal    Cardiovascular:      Rate and Rhythm: Normal rate  Rhythm irregular  Heart sounds: No murmur heard  Pulmonary:      Effort: Pulmonary effort is normal  No respiratory distress  Breath sounds: Normal breath sounds  No wheezing or rales  Abdominal:      Palpations: Abdomen is soft  Tenderness: There is no abdominal tenderness  Musculoskeletal:         General: No swelling  Cervical back: Neck supple  Skin:     General: Skin is warm and dry  Capillary Refill: Capillary refill takes less than 2 seconds  Neurological:      Mental Status: He is alert and oriented to person, place, and time  Psychiatric:         Mood and Affect: Mood normal          Discussion with Family: Updated  (daughter) via phone  Discharge instructions/Information to patient and family:   See after visit summary for information provided to patient and family  Provisions for Follow-Up Care:  See after visit summary for information related to follow-up care and any pertinent home health orders  Disposition:   Home with VNA Services (Reminder: Complete face to face encounter)    Planned Readmission: no     Discharge Statement:  I spent >35 minutes discharging the patient   This time was spent on the day of discharge  I had direct contact with the patient on the day of discharge  Greater than 50% of the total time was spent examining patient, answering all patient questions, arranging and discussing plan of care with patient as well as directly providing post-discharge instructions  Additional time then spent on discharge activities  Discharge Medications:  See after visit summary for reconciled discharge medications provided to patient and/or family        **Please Note: This note may have been constructed using a voice recognition system**

## 2023-01-16 NOTE — PROGRESS NOTES
-- Patient:  -- MRN: [id number]  -- Aidin Request ID: [id number]  -- Level of care reserved: [address] [place] [address]  -- Partner Reserved: CORINA SALAS [facility], [address] [facility] [phone]  -- Clinical needs requested: occupational therapy, skilled nursing, physical therapy  -- Geography searched: [phone]  -- Start of Service: 1/17/2023  -- Request sent: 10:01am EST on 1/16/2023 by Lieutenant Carcamo  -- Partner reserved: 2:59pm EST on 1/16/2023 by Tarun Atkinson  -- Choice list shared: 12:29pm EST on 1/16/2023 by Lieutenant Carcamo

## 2023-01-16 NOTE — PROGRESS NOTES
Progress Note - Mariana Ren 81 y o  male MRN: [id number]    Unit/Bed#: -01 Encounter: [id number]      Assessment:  Patient with right ureteral obstruction as well as potential right distal ureteral tumor status post right ureteral stent placement  Urine is clear today  Possible discharge today  Spoke with internal medicine  Plan:  Okay for discharge today urologically  DC with Dickens catheter in place  We will see patient in the office  Urologic findings discussed with the patient  DC on Levaquin per ID    Subjective:   Doing well and urine clear today  No pain  No flank pain  Objective:     Vitals: Blood pressure 155/75, pulse 86, temperature 97 9 °F (36 6 °C), resp  rate 19, height 5' 10" (1 778 m), weight 103 kg (227 lb), SpO2 95 %  ,Body mass index is 32 57 kg/m²  Intake/Output Summary (Last 24 hours) at 1/16/2023 0956  Last data filed at 1/16/2023 0900  Gross per 24 hour   Intake 1986 66 ml   Output 1830 ml   Net 156 66 ml       Physical Exam:  Const: Appears healthy and well developed  No signs of acute distress present  Resp: Respirations are regular and unlabored  CV: Rate is regular  Rhythm is regular  Abdomen: Abdomen is soft, nontender, and nondistended  Kidneys are not palpable  : Abdomen soft and nontender bladder not percussible or tender no flank tenderness  Dickens catheter in place  Urine clear  Psych: Patient's attitude is cooperative  Mood is normal  Affect is normal     Invasive Devices     Peripheral Intravenous Line  Duration           Peripheral IV 01/12/23 Right;Ventral (anterior) Forearm 3 days          Drain  Duration           Ureteral Internal Stent Right ureter 6 Fr  3 days    Urethral Catheter Latex 18 Fr  3 days                Lab, Imaging and other studies: I have personally reviewed pertinent reports      VTE Pharmacologic Prophylaxis: RX contraindicated due to: Urologic bleeding  VTE Mechanical Prophylaxis: sequential compression device

## 2023-01-17 NOTE — UTILIZATION REVIEW
NOTIFICATION OF ADMISSION DISCHARGE   This is a Notification of Discharge from [address]  Please be advised that this patient has been discharge from our facility  Below you will find the admission and discharge date and time including the patient’s disposition  UTILIZATION REVIEW CONTACT:  [address] [place]  Utilization   Network Utilization Review Department  Phone: [phone] x carefully listen to the prompts  All voicemails are confidential   Email: [email or web address]     ADMISSION INFORMATION  PRESENTATION DATE: 1/12/2023  3:44 PM  OBERVATION ADMISSION DATE:   INPATIENT ADMISSION DATE: 1/12/23  5:44 PM   DISCHARGE DATE: 1/16/2023 12:19 PM   DISPOSITION:Home with Home Health Care    IMPORTANT INFORMATION:  Send all requests for admission clinical reviews, approved or denied determinations and any other requests to dedicated fax number below belonging to the campus where the patient is receiving treatment   List of dedicated fax numbers:  [address] DENIALS (Administrative/Medical Necessity) [phone]   [address] (Maternity/NICU/Pediatrics) [phone]   [facility] [phone]   [place] [address] [phone]   [address] [phone]   [address] [facility] [phone] [facility] [phone]   [address] [place] [address] 119 [phone]   [facility]  [phone] [address] [facility] [phone]   412 [facility] [address] [facility] [phone]

## 2023-01-18 ENCOUNTER — OFFICE VISIT (OUTPATIENT)
Dept: UROLOGY | Facility: CLINIC | Age: 88
End: 2023-01-18

## 2023-01-18 ENCOUNTER — PREP FOR PROCEDURE (OUTPATIENT)
Dept: UROLOGY | Facility: CLINIC | Age: 88
End: 2023-01-18

## 2023-01-18 ENCOUNTER — TELEPHONE (OUTPATIENT)
Dept: UROLOGY | Facility: CLINIC | Age: 88
End: 2023-01-18

## 2023-01-18 ENCOUNTER — TELEPHONE (OUTPATIENT)
Dept: UROLOGY | Facility: MEDICAL CENTER | Age: 88
End: 2023-01-18

## 2023-01-18 VITALS
SYSTOLIC BLOOD PRESSURE: 142 MMHG | HEART RATE: 70 BPM | HEIGHT: 70 IN | OXYGEN SATURATION: 99 % | BODY MASS INDEX: 31.9 KG/M2 | DIASTOLIC BLOOD PRESSURE: 78 MMHG | WEIGHT: 222.8 LBS

## 2023-01-18 DIAGNOSIS — N20.1 RIGHT URETERAL STONE: Primary | ICD-10-CM

## 2023-01-18 LAB
BACTERIA BLD CULT: NORMAL
BACTERIA BLD CULT: NORMAL

## 2023-01-18 RX ORDER — HYDROCODONE BITARTRATE AND ACETAMINOPHEN 5; 325 MG/1; MG/1
1-2 TABLET ORAL EVERY 6 HOURS PRN
Qty: 20 TABLET | Refills: 0 | Status: SHIPPED | OUTPATIENT
Start: 2023-01-18

## 2023-01-18 RX ORDER — CEFUROXIME AXETIL 500 MG/1
500 TABLET ORAL EVERY 12 HOURS SCHEDULED
Qty: 10 TABLET | Refills: 0 | Status: SHIPPED | OUTPATIENT
Start: 2023-01-18 | End: 2023-01-23

## 2023-01-18 RX ORDER — CEFAZOLIN SODIUM 1 G/50ML
1000 SOLUTION INTRAVENOUS ONCE
Status: CANCELLED | OUTPATIENT
Start: 2023-01-26 | End: 2023-01-18

## 2023-01-18 RX ORDER — DEXTROSE, SODIUM CHLORIDE, SODIUM LACTATE, POTASSIUM CHLORIDE, AND CALCIUM CHLORIDE 5; .6; .31; .03; .02 G/100ML; G/100ML; G/100ML; G/100ML; G/100ML
125 INJECTION, SOLUTION INTRAVENOUS CONTINUOUS
Status: CANCELLED | OUTPATIENT
Start: 2023-01-26

## 2023-01-18 NOTE — PROGRESS NOTES
UROLOGY PROGRESS NOTE         NAME: Nacho Schofield  AGE: 81 y o  SEX: male  : 1935   MRN: [id number]    DATE: 2023  TIME: 10:27 AM    Assessment and Plan      Impression:   1  Right ureteral stone  -     Bladder instillation  -     cefuroxime (CEFTIN) 500 mg tablet; Take 1 tablet (500 mg total) by mouth every 12 (twelve) hours for 10 doses  -     HYDROcodone-acetaminophen (NORCO) 5-325 mg per tablet; Take 1-2 tablets by mouth every 6 (six) hours as needed for pain Max Daily Amount: 8 tablets       Plan: Patient will receive oral antibiotic as well as pain medication  I spoke with Dr Eric Huynh   We will proceed with right ureteroscopy with laser lithotripsy of 1 cm impacted stone  Patient will be further evaluated at that time regarding potential tumor of right ureter  Patient will need to be off Eliquis for a few days prior to the procedure  Informed consent was obtained  All pertinent risk imitations outcomes and alternatives were reviewed with the patient and his daughter  Their questions were answered  Dr Eric Huynh did also discussed the above with him  Chief Complaint     Chief Complaint   Patient presents with   • Post-op     Post CYSTOSCOPY RETROGRADE PYELOGRAM WITH INSERTION STENT URETERAL (Right: Ureter) 2023- daughter reports that they did not receive any paperwork from the nurse at discharge- he had flomax Monday but not since then  States that he has not eaten enough and that insert says ti have with large meal       History of Present Illness     HPI: Nacho Schofield is a 81y o  year old male who presents with history of right ureteral calculus upper ureter with obstruction status post right ureteral stent placement  The time of that procedure tumor was noted coming out of the right ureteral orifice  Patient has a Dickens catheter in place and urine is clear at this point                The following portions of the patient's history were reviewed and updated as appropriate: allergies, current medications, past family history, past medical history, past social history, past surgical history and problem list   Past Medical History:   Diagnosis Date   • Coronary artery disease    • H/O atrial flutter    • History of aortic valve replacement with bioprosthetic valve    • Hyperlipemia    • Hypertension    • Pacemaker      Past Surgical History:   Procedure Laterality Date   • A-V CARDIAC PACEMAKER INSERTION     • AORTIC VALVE REPLACEMENT     • CARDIAC PACEMAKER PLACEMENT Left    • CORONARY ANGIOPLASTY WITH STENT PLACEMENT     • DC CYSTO BLADDER W/URETERAL CATHETERIZATION Right 1/13/2023    Procedure: CYSTOSCOPY RETROGRADE PYELOGRAM WITH INSERTION STENT URETERAL;  Surgeon: Marquis Mikayla MD;  [facility]: [facility];  Service: Urology     shoulder  Review of Systems     Const: Denies chills, fever and weight loss  CV: Denies chest pain  Resp: Denies SOB  GI: Denies abdominal pain, nausea and vomiting  : Denies symptoms other than stated above  Musculo: Denies back pain  Objective   /78 (BP Location: Left arm, Patient Position: Sitting, Cuff Size: Standard)   Pulse 70   Ht 5' 10" (1 778 m)   Wt 101 kg (222 lb 12 8 oz)   SpO2 99%   BMI 31 97 kg/m²     Physical Exam  Const: Appears healthy and well developed  No signs of acute distress present  Resp: Respirations are regular and unlabored  CV: Rate is regular  Rhythm is regular  Abdomen: Abdomen is soft, nontender, and nondistended  Kidneys are not palpable  : Dickens catheter in place draining clear urine  Psych: Patient's attitude is cooperative  Mood is normal  Affect is normal     Procedure         Bladder instillation     Date/Time 1/18/2023 10:11 AM     Performed by  Parris Barber MD     Authorized by Parris Barber MD      Universal Protocol   Timeout called at: 1/18/2023 10:11 AM      Procedure Details   Procedure Notes: Bladder irrigated with 150 cc of saline  No clots no debris    Dickens catheter removed without difficulty    Patient tolerance: patient tolerated the procedure well with no immediate complications              Current Medications     Current Outpatient Medications:   •  amLODIPine (NORVASC) 10 mg tablet, Take 10 mg by mouth daily, Disp: , Rfl:   •  atorvastatin (LIPITOR) 40 mg tablet, 20 mg daily, Disp: , Rfl:   •  cefuroxime (CEFTIN) 500 mg tablet, Take 1 tablet (500 mg total) by mouth every 12 (twelve) hours for 10 doses, Disp: 10 tablet, Rfl: 0  •  Cholecalciferol 25 MCG (1000 UT) tablet, Take by mouth, Disp: , Rfl:   •  enalapril (VASOTEC) 2 5 mg tablet, Take 2 5 mg by mouth 2 (two) times a day, Disp: , Rfl:   •  folic acid (FOLVITE) 570 mcg tablet, Take by mouth, Disp: , Rfl:   •  HYDROcodone-acetaminophen (NORCO) 5-325 mg per tablet, Take 1-2 tablets by mouth every 6 (six) hours as needed for pain Max Daily Amount: 8 tablets, Disp: 20 tablet, Rfl: 0  •  levofloxacin (LEVAQUIN) 750 mg tablet, Take 1 tablet (750 mg total) by mouth every 24 hours for 3 days, Disp: 3 tablet, Rfl: 0  •  Melatonin 5 MG CAPS, Take by mouth, Disp: , Rfl:   •  metoprolol tartrate (LOPRESSOR) 50 mg tablet, Take 50 mg by mouth daily, Disp: , Rfl:   •  tamsulosin (FLOMAX) 0 4 mg, Take 1 capsule (0 4 mg total) by mouth daily with dinner (Patient not taking: Reported on 1/18/2023), Disp: 30 capsule, Rfl: 0        Omaira Greenberg MD

## 2023-01-18 NOTE — H&P (VIEW-ONLY)
UROLOGY PROGRESS NOTE         NAME: Damien Cheney  AGE: 81 y o  SEX: male  : 1935   MRN: [id number]    DATE: 2023  TIME: 10:27 AM    Assessment and Plan      Impression:   1  Right ureteral stone  -     Bladder instillation  -     cefuroxime (CEFTIN) 500 mg tablet; Take 1 tablet (500 mg total) by mouth every 12 (twelve) hours for 10 doses  -     HYDROcodone-acetaminophen (NORCO) 5-325 mg per tablet; Take 1-2 tablets by mouth every 6 (six) hours as needed for pain Max Daily Amount: 8 tablets       Plan: Patient will receive oral antibiotic as well as pain medication  I spoke with Dr Juan Manuel Farmer   We will proceed with right ureteroscopy with laser lithotripsy of 1 cm impacted stone  Patient will be further evaluated at that time regarding potential tumor of right ureter  Patient will need to be off Eliquis for a few days prior to the procedure  Informed consent was obtained  All pertinent risk imitations outcomes and alternatives were reviewed with the patient and his daughter  Their questions were answered  Dr Juan Manuel Farmer did also discussed the above with him  Chief Complaint     Chief Complaint   Patient presents with   • Post-op     Post CYSTOSCOPY RETROGRADE PYELOGRAM WITH INSERTION STENT URETERAL (Right: Ureter) 2023- daughter reports that they did not receive any paperwork from the nurse at discharge- he had flomax Monday but not since then  States that he has not eaten enough and that insert says ti have with large meal       History of Present Illness     HPI: Damien Cheney is a 81y o  year old male who presents with history of right ureteral calculus upper ureter with obstruction status post right ureteral stent placement  The time of that procedure tumor was noted coming out of the right ureteral orifice  Patient has a Dickens catheter in place and urine is clear at this point                The following portions of the patient's history were reviewed and updated as appropriate: allergies, current medications, past family history, past medical history, past social history, past surgical history and problem list   Past Medical History:   Diagnosis Date   • Coronary artery disease    • H/O atrial flutter    • History of aortic valve replacement with bioprosthetic valve    • Hyperlipemia    • Hypertension    • Pacemaker      Past Surgical History:   Procedure Laterality Date   • A-V CARDIAC PACEMAKER INSERTION     • AORTIC VALVE REPLACEMENT     • CARDIAC PACEMAKER PLACEMENT Left    • CORONARY ANGIOPLASTY WITH STENT PLACEMENT     • GA CYSTO BLADDER W/URETERAL CATHETERIZATION Right 1/13/2023    Procedure: CYSTOSCOPY RETROGRADE PYELOGRAM WITH INSERTION STENT URETERAL;  Surgeon: Belkys Neff MD;  Location:  MAIN OR;  Service: Urology     shoulder  Review of Systems     Const: Denies chills, fever and weight loss  CV: Denies chest pain  Resp: Denies SOB  GI: Denies abdominal pain, nausea and vomiting  : Denies symptoms other than stated above  Musculo: Denies back pain  Objective   /78 (BP Location: Left arm, Patient Position: Sitting, Cuff Size: Standard)   Pulse 70   Ht 5' 10" (1 778 m)   Wt 101 kg (222 lb 12 8 oz)   SpO2 99%   BMI 31 97 kg/m²     Physical Exam  Const: Appears healthy and well developed  No signs of acute distress present  Resp: Respirations are regular and unlabored  CV: Rate is regular  Rhythm is regular  Abdomen: Abdomen is soft, nontender, and nondistended  Kidneys are not palpable  : Dickens catheter in place draining clear urine  Psych: Patient's attitude is cooperative  Mood is normal  Affect is normal     Procedure         Bladder instillation     Date/Time 1/18/2023 10:11 AM     Performed by  Marsha Liu MD     Authorized by Marsha Liu MD      Universal Protocol   Timeout called at: 1/18/2023 10:11 AM      Procedure Details   Procedure Notes: Bladder irrigated with 150 cc of saline  No clots no debris    Dickens catheter removed without difficulty    Patient tolerance: patient tolerated the procedure well with no immediate complications              Current Medications     Current Outpatient Medications:   •  amLODIPine (NORVASC) 10 mg tablet, Take 10 mg by mouth daily, Disp: , Rfl:   •  atorvastatin (LIPITOR) 40 mg tablet, 20 mg daily, Disp: , Rfl:   •  cefuroxime (CEFTIN) 500 mg tablet, Take 1 tablet (500 mg total) by mouth every 12 (twelve) hours for 10 doses, Disp: 10 tablet, Rfl: 0  •  Cholecalciferol 25 MCG (1000 UT) tablet, Take by mouth, Disp: , Rfl:   •  enalapril (VASOTEC) 2 5 mg tablet, Take 2 5 mg by mouth 2 (two) times a day, Disp: , Rfl:   •  folic acid (FOLVITE) 069 mcg tablet, Take by mouth, Disp: , Rfl:   •  HYDROcodone-acetaminophen (NORCO) 5-325 mg per tablet, Take 1-2 tablets by mouth every 6 (six) hours as needed for pain Max Daily Amount: 8 tablets, Disp: 20 tablet, Rfl: 0  •  levofloxacin (LEVAQUIN) 750 mg tablet, Take 1 tablet (750 mg total) by mouth every 24 hours for 3 days, Disp: 3 tablet, Rfl: 0  •  Melatonin 5 MG CAPS, Take by mouth, Disp: , Rfl:   •  metoprolol tartrate (LOPRESSOR) 50 mg tablet, Take 50 mg by mouth daily, Disp: , Rfl:   •  tamsulosin (FLOMAX) 0 4 mg, Take 1 capsule (0 4 mg total) by mouth daily with dinner (Patient not taking: Reported on 1/18/2023), Disp: 30 capsule, Rfl: 0        Bill Humphreys MD

## 2023-01-18 NOTE — TELEPHONE ENCOUNTER
Questioned Katie Mooney regarding adding pt case to Dr Edgar Siegel for next Thurs  He does not have any OR time that day  Hina Morales gave permission to add  Called pt to notify of scheduled OR procedure Right #5 and preop instructions of NPO,  needed, remain off Eliquis and any blood thinning meds   Faxed consent to [facility]

## 2023-01-18 NOTE — H&P
UROLOGY PROGRESS NOTE         NAME: Delfina Whitfield  AGE: 81 y o  SEX: male  : 1935   MRN: [id number]    DATE: 2023  TIME: 10:27 AM    Assessment and Plan      Impression:   1  Right ureteral stone  -     Bladder instillation  -     cefuroxime (CEFTIN) 500 mg tablet; Take 1 tablet (500 mg total) by mouth every 12 (twelve) hours for 10 doses  -     HYDROcodone-acetaminophen (NORCO) 5-325 mg per tablet; Take 1-2 tablets by mouth every 6 (six) hours as needed for pain Max Daily Amount: 8 tablets       Plan: Patient will receive oral antibiotic as well as pain medication  I spoke with Dr Christal Buck   We will proceed with right ureteroscopy with laser lithotripsy of 1 cm impacted stone  Patient will be further evaluated at that time regarding potential tumor of right ureter  Patient will need to be off Eliquis for a few days prior to the procedure  Informed consent was obtained  All pertinent risk imitations outcomes and alternatives were reviewed with the patient and his daughter  Their questions were answered  Dr Christal Buck did also discussed the above with him  Chief Complaint     Chief Complaint   Patient presents with   • Post-op     Post CYSTOSCOPY RETROGRADE PYELOGRAM WITH INSERTION STENT URETERAL (Right: Ureter) 2023- daughter reports that they did not receive any paperwork from the nurse at discharge- he had flomax Monday but not since then  States that he has not eaten enough and that insert says ti have with large meal       History of Present Illness     HPI: Delfina Whitfield is a 81y o  year old male who presents with history of right ureteral calculus upper ureter with obstruction status post right ureteral stent placement  The time of that procedure tumor was noted coming out of the right ureteral orifice  Patient has a Dickens catheter in place and urine is clear at this point                The following portions of the patient's history were reviewed and updated as appropriate: allergies, current medications, past family history, past medical history, past social history, past surgical history and problem list   Past Medical History:   Diagnosis Date   • Coronary artery disease    • H/O atrial flutter    • History of aortic valve replacement with bioprosthetic valve    • Hyperlipemia    • Hypertension    • Pacemaker      Past Surgical History:   Procedure Laterality Date   • A-V CARDIAC PACEMAKER INSERTION     • AORTIC VALVE REPLACEMENT     • CARDIAC PACEMAKER PLACEMENT Left    • CORONARY ANGIOPLASTY WITH STENT PLACEMENT     • NM CYSTO BLADDER W/URETERAL CATHETERIZATION Right 1/13/2023    Procedure: CYSTOSCOPY RETROGRADE PYELOGRAM WITH INSERTION STENT URETERAL;  Surgeon: Malik Burleson MD;  [facility];  Service: Urology     shoulder  Review of Systems     Const: Denies chills, fever and weight loss  CV: Denies chest pain  Resp: Denies SOB  GI: Denies abdominal pain, nausea and vomiting  : Denies symptoms other than stated above  Musculo: Denies back pain  Objective   /78 (BP Location: Left arm, Patient Position: Sitting, Cuff Size: Standard)   Pulse 70   Ht 5' 10" (1 778 m)   Wt 101 kg (222 lb 12 8 oz)   SpO2 99%   BMI 31 97 kg/m²     Physical Exam  Const: Appears healthy and well developed  No signs of acute distress present  Resp: Respirations are regular and unlabored  CV: Rate is regular  Rhythm is regular  Abdomen: Abdomen is soft, nontender, and nondistended  Kidneys are not palpable  : Dickens catheter in place draining clear urine  Psych: Patient's attitude is cooperative  Mood is normal  Affect is normal     Procedure         Bladder instillation     Date/Time 1/18/2023 10:11 AM     Performed by  Ted Barth MD     Authorized by Ted Barth MD      [facility] Protocol   Timeout called at: 1/18/2023 10:11 AM      Procedure Details   Procedure Notes: Bladder irrigated with 150 cc of saline  No clots no debris    Dickens catheter removed without difficulty    Patient tolerance: patient tolerated the procedure well with no immediate complications              Current Medications     Current Outpatient Medications:   •  amLODIPine (NORVASC) 10 mg tablet, Take 10 mg by mouth daily, Disp: , Rfl:   •  atorvastatin (LIPITOR) 40 mg tablet, 20 mg daily, Disp: , Rfl:   •  cefuroxime (CEFTIN) 500 mg tablet, Take 1 tablet (500 mg total) by mouth every 12 (twelve) hours for 10 doses, Disp: 10 tablet, Rfl: 0  •  Cholecalciferol 25 MCG (1000 UT) tablet, Take by mouth, Disp: , Rfl:   •  enalapril (VASOTEC) 2 5 mg tablet, Take 2 5 mg by mouth 2 (two) times a day, Disp: , Rfl:   •  folic acid (FOLVITE) 707 mcg tablet, Take by mouth, Disp: , Rfl:   •  HYDROcodone-acetaminophen (NORCO) 5-325 mg per tablet, Take 1-2 tablets by mouth every 6 (six) hours as needed for pain Max Daily Amount: 8 tablets, Disp: 20 tablet, Rfl: 0  •  levofloxacin (LEVAQUIN) 750 mg tablet, Take 1 tablet (750 mg total) by mouth every 24 hours for 3 days, Disp: 3 tablet, Rfl: 0  •  Melatonin 5 MG CAPS, Take by mouth, Disp: , Rfl:   •  metoprolol tartrate (LOPRESSOR) 50 mg tablet, Take 50 mg by mouth daily, Disp: , Rfl:   •  tamsulosin (FLOMAX) 0 4 mg, Take 1 capsule (0 4 mg total) by mouth daily with dinner (Patient not taking: Reported on 1/18/2023), Disp: 30 capsule, Rfl: 0        Pamela Kahn MD

## 2023-01-18 NOTE — PATIENT INSTRUCTIONS
Jaswant Santoro will be calling you regarding scheduling of your procedure for next week  This will be with Dr Americo Jamison   Please  your medications at the pharmacy which will include an antibiotic and a pain pill  Please continue your Flomax  Call if issues    Stay off Eliquis

## 2023-01-19 LAB
DME PARACHUTE DELIVERY DATE ACTUAL: NORMAL
DME PARACHUTE DELIVERY DATE REQUESTED: NORMAL
DME PARACHUTE ITEM DESCRIPTION: NORMAL
DME PARACHUTE ORDER STATUS: NORMAL
DME PARACHUTE SUPPLIER NAME: NORMAL
DME PARACHUTE SUPPLIER PHONE: NORMAL

## 2023-01-23 NOTE — CASE MANAGEMENT
Case Management Progress Note    Patient name Carmen Steele  Location /-87 MRN [id number]  : 1935 Date 2023       LOS (days): 4  Geometric Mean LOS (GMLOS) (days): 2 00  Days to GMLOS:-1 8        OBJECTIVE:        Current admission status: Inpatient  Preferred Pharmacy:   [address] [place] [address] [id number] [place] [id number]  Phone: [phone] Fax: [phone]    [id number] [place], [place] - [address]  [facility] [address]  Phone: [phone] Fax: [phone]    Primary Care Provider: Fuad Saldana DO    Primary Insurance: [facility]  Secondary Insurance:     PROGRESS NOTE:   Received info from [facility] in [address] post discharge  Family was upset that agency was not able to supply and change pt's briefs  PT attempted to schedule and eval but was cancelled by family  Family declined continued [facility]

## 2023-01-24 RX ORDER — ACETAMINOPHEN 500 MG
1000 TABLET ORAL EVERY 6 HOURS PRN
Status: ON HOLD | COMMUNITY

## 2023-01-24 RX ORDER — NAPROXEN SODIUM 220 MG
220 TABLET ORAL 2 TIMES DAILY WITH MEALS
COMMUNITY
End: 2023-02-01

## 2023-01-24 NOTE — PRE-PROCEDURE INSTRUCTIONS
Pre-Surgery Instructions:   Medication Instructions   • acetaminophen (TYLENOL) 500 mg tablet Uses PRN- OK to take day of surgery   • amLODIPine (NORVASC) 10 mg tablet Take day of surgery  • atorvastatin (LIPITOR) 40 mg tablet Take night before surgery   • Cholecalciferol 25 MCG (1000 UT) tablet Instructions provided by MD   • folic acid (FOLVITE) 823 mcg tablet Hold day of surgery  • HYDROcodone-acetaminophen (NORCO) 5-325 mg per tablet Uses PRN- OK to take day of surgery   • Melatonin 5 MG CAPS Take night before surgery   • metoprolol tartrate (LOPRESSOR) 50 mg tablet Take day of surgery  • naproxen sodium (Aleve) 220 MG tablet Stop taking 3 days prior to surgery     • tamsulosin (FLOMAX) 0 4 mg Take night before surgery   See above

## 2023-01-25 ENCOUNTER — ANESTHESIA EVENT (OUTPATIENT)
Dept: PERIOP | Facility: HOSPITAL | Age: 88
End: 2023-01-25

## 2023-01-26 ENCOUNTER — HOSPITAL ENCOUNTER (OUTPATIENT)
Facility: HOSPITAL | Age: 88
Setting detail: OUTPATIENT SURGERY
Discharge: HOME | End: 2023-01-26
Attending: UROLOGY | Admitting: UROLOGY

## 2023-01-26 ENCOUNTER — APPOINTMENT (OUTPATIENT)
Dept: RADIOLOGY | Facility: HOSPITAL | Age: 88
End: 2023-01-26

## 2023-01-26 ENCOUNTER — ANESTHESIA (OUTPATIENT)
Dept: PERIOP | Facility: HOSPITAL | Age: 88
End: 2023-01-26

## 2023-01-26 ENCOUNTER — TELEPHONE (OUTPATIENT)
Dept: UROLOGY | Facility: CLINIC | Age: 88
End: 2023-01-26

## 2023-01-26 VITALS
RESPIRATION RATE: 18 BRPM | DIASTOLIC BLOOD PRESSURE: 60 MMHG | OXYGEN SATURATION: 98 % | TEMPERATURE: 97.8 F | SYSTOLIC BLOOD PRESSURE: 133 MMHG | HEIGHT: 70 IN | WEIGHT: 222 LBS | HEART RATE: 74 BPM | BODY MASS INDEX: 31.78 KG/M2

## 2023-01-26 DIAGNOSIS — N20.1 RIGHT URETERAL STONE: Primary | ICD-10-CM

## 2023-01-26 DIAGNOSIS — N20.1 RIGHT URETERAL STONE: ICD-10-CM

## 2023-01-26 PROBLEM — E66.9 CLASS 1 OBESITY IN ADULT: Status: ACTIVE | Noted: 2023-01-26

## 2023-01-26 DEVICE — STENT URETERAL 6 FR 26CM INLAY OPTIMA: Type: IMPLANTABLE DEVICE | Site: URETER | Status: FUNCTIONAL

## 2023-01-26 RX ORDER — FENTANYL CITRATE 50 UG/ML
INJECTION, SOLUTION INTRAMUSCULAR; INTRAVENOUS AS NEEDED
Status: DISCONTINUED | OUTPATIENT
Start: 2023-01-26 | End: 2023-01-26

## 2023-01-26 RX ORDER — CEFAZOLIN SODIUM 1 G/3ML
INJECTION, POWDER, FOR SOLUTION INTRAMUSCULAR; INTRAVENOUS AS NEEDED
Status: DISCONTINUED | OUTPATIENT
Start: 2023-01-26 | End: 2023-01-26

## 2023-01-26 RX ORDER — PHENYLEPHRINE HCL IN 0.9% NACL 1 MG/10 ML
SYRINGE (ML) INTRAVENOUS AS NEEDED
Status: DISCONTINUED | OUTPATIENT
Start: 2023-01-26 | End: 2023-01-26

## 2023-01-26 RX ORDER — LIDOCAINE HYDROCHLORIDE 10 MG/ML
0.5 INJECTION, SOLUTION EPIDURAL; INFILTRATION; INTRACAUDAL; PERINEURAL ONCE AS NEEDED
Status: DISCONTINUED | OUTPATIENT
Start: 2023-01-26 | End: 2023-01-26 | Stop reason: HOSPADM

## 2023-01-26 RX ORDER — MAGNESIUM HYDROXIDE 1200 MG/15ML
LIQUID ORAL AS NEEDED
Status: DISCONTINUED | OUTPATIENT
Start: 2023-01-26 | End: 2023-01-26 | Stop reason: HOSPADM

## 2023-01-26 RX ORDER — ONDANSETRON 2 MG/ML
4 INJECTION INTRAMUSCULAR; INTRAVENOUS ONCE AS NEEDED
Status: DISCONTINUED | OUTPATIENT
Start: 2023-01-26 | End: 2023-01-26 | Stop reason: HOSPADM

## 2023-01-26 RX ORDER — SUCCINYLCHOLINE/SOD CL,ISO/PF 100 MG/5ML
SYRINGE (ML) INTRAVENOUS AS NEEDED
Status: DISCONTINUED | OUTPATIENT
Start: 2023-01-26 | End: 2023-01-26

## 2023-01-26 RX ORDER — HYDROCODONE BITARTRATE AND ACETAMINOPHEN 5; 325 MG/1; MG/1
1-2 TABLET ORAL EVERY 6 HOURS PRN
Qty: 20 TABLET | Refills: 0 | Status: ON HOLD | OUTPATIENT
Start: 2023-01-26

## 2023-01-26 RX ORDER — DEXTROSE, SODIUM CHLORIDE, SODIUM LACTATE, POTASSIUM CHLORIDE, AND CALCIUM CHLORIDE 5; .6; .31; .03; .02 G/100ML; G/100ML; G/100ML; G/100ML; G/100ML
125 INJECTION, SOLUTION INTRAVENOUS CONTINUOUS
Status: DISCONTINUED | OUTPATIENT
Start: 2023-01-26 | End: 2023-01-26 | Stop reason: HOSPADM

## 2023-01-26 RX ORDER — DEXAMETHASONE SODIUM PHOSPHATE 10 MG/ML
INJECTION, SOLUTION INTRAMUSCULAR; INTRAVENOUS AS NEEDED
Status: DISCONTINUED | OUTPATIENT
Start: 2023-01-26 | End: 2023-01-26

## 2023-01-26 RX ORDER — SODIUM CHLORIDE 9 MG/ML
INJECTION, SOLUTION INTRAVENOUS AS NEEDED
Status: DISCONTINUED | OUTPATIENT
Start: 2023-01-26 | End: 2023-01-26 | Stop reason: HOSPADM

## 2023-01-26 RX ORDER — SODIUM CHLORIDE, SODIUM LACTATE, POTASSIUM CHLORIDE, CALCIUM CHLORIDE 600; 310; 30; 20 MG/100ML; MG/100ML; MG/100ML; MG/100ML
INJECTION, SOLUTION INTRAVENOUS CONTINUOUS PRN
Status: DISCONTINUED | OUTPATIENT
Start: 2023-01-26 | End: 2023-01-26

## 2023-01-26 RX ORDER — CEFUROXIME AXETIL 500 MG/1
500 TABLET ORAL EVERY 12 HOURS SCHEDULED
Qty: 10 TABLET | Refills: 0 | Status: ON HOLD | OUTPATIENT
Start: 2023-01-26 | End: 2023-02-01

## 2023-01-26 RX ORDER — ONDANSETRON 2 MG/ML
INJECTION INTRAMUSCULAR; INTRAVENOUS AS NEEDED
Status: DISCONTINUED | OUTPATIENT
Start: 2023-01-26 | End: 2023-01-26

## 2023-01-26 RX ORDER — CEFAZOLIN SODIUM 1 G/50ML
1000 SOLUTION INTRAVENOUS ONCE
Status: COMPLETED | OUTPATIENT
Start: 2023-01-26 | End: 2023-01-26

## 2023-01-26 RX ORDER — EPHEDRINE SULFATE 50 MG/ML
INJECTION INTRAVENOUS AS NEEDED
Status: DISCONTINUED | OUTPATIENT
Start: 2023-01-26 | End: 2023-01-26

## 2023-01-26 RX ORDER — PROPOFOL 10 MG/ML
INJECTION, EMULSION INTRAVENOUS AS NEEDED
Status: DISCONTINUED | OUTPATIENT
Start: 2023-01-26 | End: 2023-01-26

## 2023-01-26 RX ORDER — FENTANYL CITRATE/PF 50 MCG/ML
25 SYRINGE (ML) INJECTION
Status: DISCONTINUED | OUTPATIENT
Start: 2023-01-26 | End: 2023-01-26 | Stop reason: HOSPADM

## 2023-01-26 RX ORDER — LIDOCAINE HYDROCHLORIDE 10 MG/ML
INJECTION, SOLUTION EPIDURAL; INFILTRATION; INTRACAUDAL; PERINEURAL AS NEEDED
Status: DISCONTINUED | OUTPATIENT
Start: 2023-01-26 | End: 2023-01-26

## 2023-01-26 RX ADMIN — CEFAZOLIN 1000 MG: 1 INJECTION, POWDER, FOR SOLUTION INTRAMUSCULAR; INTRAVENOUS at 07:49

## 2023-01-26 RX ADMIN — EPHEDRINE SULFATE 5 MG: 50 INJECTION, SOLUTION INTRAVENOUS at 09:09

## 2023-01-26 RX ADMIN — EPHEDRINE SULFATE 10 MG: 50 INJECTION, SOLUTION INTRAVENOUS at 08:08

## 2023-01-26 RX ADMIN — FENTANYL CITRATE 25 MCG: 50 INJECTION INTRAMUSCULAR; INTRAVENOUS at 07:16

## 2023-01-26 RX ADMIN — EPHEDRINE SULFATE 10 MG: 50 INJECTION, SOLUTION INTRAVENOUS at 08:14

## 2023-01-26 RX ADMIN — PROPOFOL 40 MG: 10 INJECTION, EMULSION INTRAVENOUS at 07:40

## 2023-01-26 RX ADMIN — ONDANSETRON 4 MG: 2 INJECTION INTRAMUSCULAR; INTRAVENOUS at 07:57

## 2023-01-26 RX ADMIN — DEXAMETHASONE SODIUM PHOSPHATE 10 MG: 10 INJECTION, SOLUTION INTRAMUSCULAR; INTRAVENOUS at 07:36

## 2023-01-26 RX ADMIN — FENTANYL CITRATE 25 MCG: 50 INJECTION INTRAMUSCULAR; INTRAVENOUS at 07:36

## 2023-01-26 RX ADMIN — Medication 100 MCG: at 07:57

## 2023-01-26 RX ADMIN — EPHEDRINE SULFATE 10 MG: 50 INJECTION, SOLUTION INTRAVENOUS at 08:45

## 2023-01-26 RX ADMIN — SODIUM CHLORIDE, SODIUM LACTATE, POTASSIUM CHLORIDE, AND CALCIUM CHLORIDE: .6; .31; .03; .02 INJECTION, SOLUTION INTRAVENOUS at 07:51

## 2023-01-26 RX ADMIN — SODIUM CHLORIDE, SODIUM LACTATE, POTASSIUM CHLORIDE, AND CALCIUM CHLORIDE: .6; .31; .03; .02 INJECTION, SOLUTION INTRAVENOUS at 07:31

## 2023-01-26 RX ADMIN — SODIUM CHLORIDE, SODIUM LACTATE, POTASSIUM CHLORIDE, AND CALCIUM CHLORIDE: .6; .31; .03; .02 INJECTION, SOLUTION INTRAVENOUS at 08:47

## 2023-01-26 RX ADMIN — FENTANYL CITRATE 25 MCG: 50 INJECTION INTRAMUSCULAR; INTRAVENOUS at 09:09

## 2023-01-26 RX ADMIN — Medication 100 MG: at 07:42

## 2023-01-26 RX ADMIN — Medication 200 MCG: at 08:02

## 2023-01-26 RX ADMIN — EPHEDRINE SULFATE 5 MG: 50 INJECTION, SOLUTION INTRAVENOUS at 08:25

## 2023-01-26 RX ADMIN — Medication 100 MCG: at 07:53

## 2023-01-26 RX ADMIN — Medication 100 MCG: at 08:14

## 2023-01-26 RX ADMIN — LIDOCAINE HYDROCHLORIDE 50 MG: 10 INJECTION, SOLUTION EPIDURAL; INFILTRATION; INTRACAUDAL at 07:36

## 2023-01-26 RX ADMIN — PROPOFOL 100 MG: 10 INJECTION, EMULSION INTRAVENOUS at 07:37

## 2023-01-26 RX ADMIN — CEFAZOLIN SODIUM 1000 MG: 1 SOLUTION INTRAVENOUS at 07:30

## 2023-01-26 RX ADMIN — FENTANYL CITRATE 25 MCG: 50 INJECTION INTRAMUSCULAR; INTRAVENOUS at 08:25

## 2023-01-26 RX ADMIN — Medication 100 MCG: at 08:25

## 2023-01-26 RX ADMIN — PROPOFOL 30 MG: 10 INJECTION, EMULSION INTRAVENOUS at 08:25

## 2023-01-26 RX ADMIN — Medication 100 MCG: at 07:46

## 2023-01-26 NOTE — TELEPHONE ENCOUNTER
----- Message from Anupam Salmeron MD sent at 1/26/2023  1:33 PM EST -----  Please have him in for a voiding trial next Tuesday    Thank you

## 2023-01-26 NOTE — INTERVAL H&P NOTE
H&P reviewed  After examining the patient I find no changes in the patients condition since the H&P had been written      Vitals:    01/26/23 0614   BP: 130/67   Pulse: 98   Resp: 18   Temp: 98 °F (36 7 °C)   SpO2: 97%

## 2023-01-26 NOTE — ANESTHESIA POSTPROCEDURE EVALUATION
Post-Op Assessment Note    CV Status:  Stable  Pain Score: 0    Pain management: adequate     Mental Status:  Alert and awake   Hydration Status:  Stable   PONV Controlled:  Controlled   Airway Patency:  Patent      Post Op Vitals Reviewed: Yes      Staff: CRNA         No notable events documented      /68 (01/26/23 0920)    Temp 97 7 °F (36 5 °C) (01/26/23 0920)    Pulse 78 (01/26/23 0920)   Resp 16 (01/26/23 0920)    SpO2 99 % (01/26/23 0920)

## 2023-01-26 NOTE — OP NOTE
OPERATIVE REPORT  PATIENT NAME: Katrina Lynn    :  1935  MRN: [id number]  Pt Location: [facility] OR ROOM 01    SURGERY DATE: 2023    Surgeon(s) and Role:     Dima Reinoso MD - Primary    Preop Diagnosis:  Right ureteral stone [N20 1]    Post-Op Diagnosis Codes:     * Right ureteral stone [N20 1]    Procedure(s):  Right - CYSTOSCOPY URETEROSCOPY WITH LITHOTRIPSY HOLMIUM LASER  RETROGRADE PYELOGRAM AND INSERTION STENT URETERAL  Right - CYSTOSCOPY WITH RETROGRADE PYELOGRAM    Specimen(s):  ID Type Source Tests Collected by Time Destination   A : right ureteral stone  Calculus Ureter, Right STONE ANALYSIS Kate Kumar MD 2023  8:30 AM        Estimated Blood Loss:   Minimal    Drains:  Urethral Catheter Latex 18 Fr  (Active)   Number of days: 13       Ureteral Internal Stent Right ureter 6 Fr  (Active)   Number of days: 13       Anesthesia Type:   General    Operative Indications:  Right ureteral stone [N20 1]  Possible right distal ureteral tumor    Operative Findings:  Cystoscopy reveals an enlarged prostate gland with no significant median lobe  No tumors in the bladder  Inflammatory changes in the distal ureter stent was removed  Impacted stone in the proximal ureter  Fragmented  5 mm fragment removed  Ureter quite inflamed will need to heal   Stent left in place 6 x [facility]  No strength    Complications:   None    Procedure and Technique:  Patient brought to the operating room he was given a general anesthetic  He was identified  He had SCDs in place  A timeout was taken place  He was given a perioperative antibiotic  Cystoscopy with a 25 Polish scope revealed normal urethra  His urethra was well-healed  His prostate was mildly obstructing  Stent was noted coming from the right orifice  There was no evidence of tumor at that point  The stent was brought out to the tip of the meatus  A guidewire was placed through the stent up to the level the kidney  Fluoroscopy was used  An semirigid ureteroscope was used to visualize the distal right ureter  There was no evidence of ureteral tumor  The original findings were likely inflammatory  Subsequently semirigid scope was removed  A reentry sheath was placed up to the level of the stone  12 x 14 [place] 45 cm  Flexible disposable ureteroscope was used to visualize the stone which was impacted in the ureter  Ureter was severely inflamed  It was difficult to fragment the stone eventually I was able to fragment the stone and remove one of the pieces with an engage basket some of the fragments washed out  Some of the fragments washed up in the kidney and were followed  This kidney was severely hydronephrotic  At the end of the case a double-J stent was placed 6 [employer] by 26 cm  Coil in the kidney coil in the bladder under fluoroscopic guidance and direct vision  There were no complication of the procedure  Given the significant inflammation the stent will need to stay in for 3 weeks    Patient was brought the recovery room with plan discharge I was present for the entire procedure    Patient Disposition:  PACU         SIGNATURE: Sharee Tay MD  DATE: January 26, 2023  TIME: 9:17 AM

## 2023-01-26 NOTE — ANESTHESIA PREPROCEDURE EVALUATION
Procedure:  CYSTOSCOPY URETEROSCOPY WITH LITHOTRIPSY HOLMIUM LASER, RETROGRADE PYELOGRAM AND INSERTION STENT URETERAL (Right: Ureter)  CYSTOSCOPY WITH RETROGRADE PYELOGRAM (Right: Ureter)    Relevant Problems   ANESTHESIA (within normal limits)      CARDIO  Echo: 65%EF,   Aortic Valve: Patient is status post bioprosthetic aortic valve replacement  Size of the prosthesis is not known at this time  Measured transaortic velocity of 2 4 m/s corresponds to peak and mean gradients of 23 and 12 mmHg respectively  Findings suggest appropriate leaflet excursion  There is no clear insufficiency  •  Mitral Valve: Heavy mitral annular calcification  Measured mean transmitral inflow gradient of 6 mmHg suggest moderate mitral stenosis  There is trace mitral vegetation  •  Tricuspid Valve: Mild thickening of the tricuspid leaflets with mild insufficiency  Measure tricuspid regurgitant velocity of 3 5 m/s corresponds to a gradient of 49 mmHg  With addition of 10 mmHg for estimated right atrial pressure, estimated pulmonary pressure is increased to 59 mmHg  (+) Aortic stenosis   (+) Atrial flutter (HCC)   (+) Carotid artery disease (HCC)   (+) Coronary artery disease   (+) Hyperlipemia   (+) Hypertension   (+) MS (mitral stenosis)   (+) Pacemaker   (+) S/P AVR      PULMONARY (within normal limits)      Genitourinary   (+) Acute cystitis with hematuria   (+) Right ureteral stone      Other   (+) Class 1 obesity in adult   (+) H/O heart artery stent        Physical Exam    Airway    Mallampati score: II  TM Distance: >3 FB  Neck ROM: full     Dental   No notable dental hx     Cardiovascular      Pulmonary      Other Findings        Anesthesia Plan  ASA Score- 3     Anesthesia Type- general with ASA Monitors  Additional Monitors:   Airway Plan: LMA  Comment: Poss ETT if needed  Plan Factors-Exercise tolerance (METS): >4 METS  Chart reviewed  EKG reviewed  Existing labs reviewed   Patient summary reviewed  Patient is not a current smoker  Induction- intravenous  Postoperative Plan-     Informed Consent- Anesthetic plan and risks discussed with patient  I personally reviewed this patient with the CRNA  Discussed and agreed on the Anesthesia Plan with the CRNA  Geovanna Bernal

## 2023-01-31 LAB
CALCIUM OXALATE DIHYDRATE MFR STONE IR: 30 %
COLOR STONE: NORMAL
COM MFR STONE: 65 %
COMMENT-STONE3: NORMAL
COMPOSITION: NORMAL
HYDROXYAPATITE 24H ENGDIFF UR: 5 %
LABORATORY COMMENT REPORT: NORMAL
PHOTO: NORMAL
SIZE STONE: NORMAL MM
SPEC SOURCE SUBJ: NORMAL
STONE ANALYSIS-IMP: NORMAL
WT STONE: 15 MG

## 2023-02-01 ENCOUNTER — HOSPITAL ENCOUNTER (INPATIENT)
Facility: HOSPITAL | Age: 88
LOS: 14 days | Discharge: NURSING, REHAB OR LONG-TERM CARE | End: 2023-02-15
Attending: SURGERY | Admitting: SURGERY

## 2023-02-01 ENCOUNTER — HOSPITAL ENCOUNTER (EMERGENCY)
Facility: HOSPITAL | Age: 88
End: 2023-02-01
Attending: EMERGENCY MEDICINE

## 2023-02-01 ENCOUNTER — APPOINTMENT (EMERGENCY)
Dept: RADIOLOGY | Facility: HOSPITAL | Age: 88
End: 2023-02-01

## 2023-02-01 ENCOUNTER — APPOINTMENT (EMERGENCY)
Dept: CT IMAGING | Facility: HOSPITAL | Age: 88
End: 2023-02-01

## 2023-02-01 VITALS
OXYGEN SATURATION: 98 % | HEART RATE: 82 BPM | TEMPERATURE: 97.8 F | SYSTOLIC BLOOD PRESSURE: 138 MMHG | RESPIRATION RATE: 18 BRPM | DIASTOLIC BLOOD PRESSURE: 65 MMHG

## 2023-02-01 DIAGNOSIS — I65.23 BILATERAL CAROTID ARTERY STENOSIS: ICD-10-CM

## 2023-02-01 DIAGNOSIS — I25.10 CORONARY ARTERY DISEASE INVOLVING NATIVE CORONARY ARTERY OF NATIVE HEART WITHOUT ANGINA PECTORIS: ICD-10-CM
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
## 2023-02-01 DIAGNOSIS — M54.9 BACK PAIN: ICD-10-CM

## 2023-02-01 DIAGNOSIS — I48.92 ATRIAL FLUTTER, UNSPECIFIED TYPE (HCC): ICD-10-CM
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
## 2023-02-01 DIAGNOSIS — Z98.890 POST-OPERATIVE STATE: ICD-10-CM

## 2023-02-01 DIAGNOSIS — N20.0 URINARY TRACT OBSTRUCTION BY KIDNEY STONE: ICD-10-CM

## 2023-02-01 DIAGNOSIS — S22.089A TRAUMATIC FRACTURES OF T12 AND L1 VERTEBRAE (HCC): Primary | ICD-10-CM

## 2023-02-01 DIAGNOSIS — S22.089A CLOSED T12 FRACTURE (HCC): ICD-10-CM

## 2023-02-01 DIAGNOSIS — I35.0 AORTIC VALVE STENOSIS, ETIOLOGY OF CARDIAC VALVE DISEASE UNSPECIFIED: ICD-10-CM

## 2023-02-01 DIAGNOSIS — N13.8 URINARY TRACT OBSTRUCTION BY KIDNEY STONE: ICD-10-CM

## 2023-02-01 DIAGNOSIS — Z95.0 PACEMAKER: ICD-10-CM

## 2023-02-01 DIAGNOSIS — N20.1 RIGHT URETERAL STONE: ICD-10-CM

## 2023-02-01 DIAGNOSIS — S32.019A TRAUMATIC FRACTURES OF T12 AND L1 VERTEBRAE (HCC): Primary | ICD-10-CM

## 2023-02-01 PROBLEM — M25.551 RIGHT HIP PAIN: Status: ACTIVE | Noted: 2023-02-01

## 2023-02-01 LAB
ALBUMIN SERPL BCP-MCNC: 3.7 G/DL (ref 3.5–5)
ALP SERPL-CCNC: 141 U/L (ref 34–104)
ALT SERPL W P-5'-P-CCNC: 10 U/L (ref 7–52)
ANION GAP SERPL CALCULATED.3IONS-SCNC: 10 MMOL/L (ref 4–13)
APTT PPP: 40 SECONDS (ref 23–37)
AST SERPL W P-5'-P-CCNC: 11 U/L (ref 13–39)
BASOPHILS # BLD AUTO: 0.03 THOUSANDS/ÂΜL (ref 0–0.1)
BASOPHILS NFR BLD AUTO: 0 % (ref 0–1)
BILIRUB SERPL-MCNC: 1 MG/DL (ref 0.2–1)
BUN SERPL-MCNC: 8 MG/DL (ref 5–25)
CALCIUM SERPL-MCNC: 8.5 MG/DL (ref 8.4–10.2)
CHLORIDE SERPL-SCNC: 101 MMOL/L (ref 96–108)
CO2 SERPL-SCNC: 24 MMOL/L (ref 21–32)
CREAT SERPL-MCNC: 0.75 MG/DL (ref 0.6–1.3)
EOSINOPHIL # BLD AUTO: 0.07 THOUSAND/ÂΜL (ref 0–0.61)
EOSINOPHIL NFR BLD AUTO: 1 % (ref 0–6)
ERYTHROCYTE [DISTWIDTH] IN BLOOD BY AUTOMATED COUNT: 14.6 % (ref 11.6–15.1)
GFR SERPL CREATININE-BSD FRML MDRD: 83 ML/MIN/1.73SQ M
GLUCOSE SERPL-MCNC: 127 MG/DL (ref 65–140)
HCT VFR BLD AUTO: 43.5 % (ref 36.5–49.3)
HGB BLD-MCNC: 14.1 G/DL (ref 12–17)
IMM GRANULOCYTES # BLD AUTO: 0.04 THOUSAND/UL (ref 0–0.2)
IMM GRANULOCYTES NFR BLD AUTO: 0 % (ref 0–2)
INR PPP: 1.29 (ref 0.84–1.19)
LYMPHOCYTES # BLD AUTO: 1.44 THOUSANDS/ÂΜL (ref 0.6–4.47)
LYMPHOCYTES NFR BLD AUTO: 13 % (ref 14–44)
MCH RBC QN AUTO: 28.5 PG (ref 26.8–34.3)
MCHC RBC AUTO-ENTMCNC: 32.4 G/DL (ref 31.4–37.4)
MCV RBC AUTO: 88 FL (ref 82–98)
MONOCYTES # BLD AUTO: 0.6 THOUSAND/ÂΜL (ref 0.17–1.22)
MONOCYTES NFR BLD AUTO: 6 % (ref 4–12)
NEUTROPHILS # BLD AUTO: 8.73 THOUSANDS/ÂΜL (ref 1.85–7.62)
NEUTS SEG NFR BLD AUTO: 80 % (ref 43–75)
NRBC BLD AUTO-RTO: 0 /100 WBCS
PLATELET # BLD AUTO: 233 THOUSANDS/UL (ref 149–390)
PMV BLD AUTO: 10.1 FL (ref 8.9–12.7)
POTASSIUM SERPL-SCNC: 3.9 MMOL/L (ref 3.5–5.3)
PROT SERPL-MCNC: 6.4 G/DL (ref 6.4–8.4)
PROTHROMBIN TIME: 16.2 SECONDS (ref 11.6–14.5)
RBC # BLD AUTO: 4.94 MILLION/UL (ref 3.88–5.62)
SODIUM SERPL-SCNC: 135 MMOL/L (ref 135–147)
WBC # BLD AUTO: 10.91 THOUSAND/UL (ref 4.31–10.16)

## 2023-02-01 RX ORDER — GABAPENTIN 100 MG/1
100 CAPSULE ORAL
Status: DISCONTINUED | OUTPATIENT
Start: 2023-02-01 | End: 2023-02-07

## 2023-02-01 RX ORDER — LISINOPRIL 10 MG/1
10 TABLET ORAL DAILY
Status: DISCONTINUED | OUTPATIENT
Start: 2023-02-01 | End: 2023-02-11

## 2023-02-01 RX ORDER — ACETAMINOPHEN 325 MG/1
975 TABLET ORAL EVERY 8 HOURS SCHEDULED
Status: DISCONTINUED | OUTPATIENT
Start: 2023-02-01 | End: 2023-02-15 | Stop reason: HOSPADM

## 2023-02-01 RX ORDER — HYDROCODONE BITARTRATE AND ACETAMINOPHEN 5; 325 MG/1; MG/1
1 TABLET ORAL EVERY 6 HOURS PRN
Status: DISCONTINUED | OUTPATIENT
Start: 2023-02-01 | End: 2023-02-01

## 2023-02-01 RX ORDER — OXYCODONE HYDROCHLORIDE 5 MG/1
5 TABLET ORAL EVERY 4 HOURS PRN
Status: DISCONTINUED | OUTPATIENT
Start: 2023-02-01 | End: 2023-02-15 | Stop reason: HOSPADM

## 2023-02-01 RX ORDER — OXYCODONE HYDROCHLORIDE 5 MG/1
2.5 TABLET ORAL EVERY 4 HOURS PRN
Status: DISCONTINUED | OUTPATIENT
Start: 2023-02-01 | End: 2023-02-15 | Stop reason: HOSPADM

## 2023-02-01 RX ORDER — TAMSULOSIN HYDROCHLORIDE 0.4 MG/1
0.4 CAPSULE ORAL
Status: DISCONTINUED | OUTPATIENT
Start: 2023-02-01 | End: 2023-02-15 | Stop reason: HOSPADM

## 2023-02-01 RX ORDER — MORPHINE SULFATE 4 MG/ML
4 INJECTION, SOLUTION INTRAMUSCULAR; INTRAVENOUS ONCE
Status: COMPLETED | OUTPATIENT
Start: 2023-02-01 | End: 2023-02-01

## 2023-02-01 RX ORDER — ENOXAPARIN SODIUM 100 MG/ML
30 INJECTION SUBCUTANEOUS EVERY 12 HOURS
Status: DISCONTINUED | OUTPATIENT
Start: 2023-02-01 | End: 2023-02-03

## 2023-02-01 RX ORDER — ONDANSETRON 2 MG/ML
4 INJECTION INTRAMUSCULAR; INTRAVENOUS EVERY 6 HOURS PRN
Status: DISCONTINUED | OUTPATIENT
Start: 2023-02-01 | End: 2023-02-15 | Stop reason: HOSPADM

## 2023-02-01 RX ORDER — LIDOCAINE 50 MG/G
1 PATCH TOPICAL DAILY
Status: DISCONTINUED | OUTPATIENT
Start: 2023-02-01 | End: 2023-02-15 | Stop reason: HOSPADM

## 2023-02-01 RX ORDER — POLYETHYLENE GLYCOL 3350 17 G/17G
17 POWDER, FOR SOLUTION ORAL DAILY
Status: DISCONTINUED | OUTPATIENT
Start: 2023-02-01 | End: 2023-02-15 | Stop reason: HOSPADM

## 2023-02-01 RX ORDER — SODIUM CHLORIDE 9 MG/ML
125 INJECTION, SOLUTION INTRAVENOUS CONTINUOUS
Status: DISCONTINUED | OUTPATIENT
Start: 2023-02-01 | End: 2023-02-02

## 2023-02-01 RX ORDER — LANOLIN ALCOHOL/MO/W.PET/CERES
400 CREAM (GRAM) TOPICAL DAILY
Status: DISCONTINUED | OUTPATIENT
Start: 2023-02-01 | End: 2023-02-15 | Stop reason: HOSPADM

## 2023-02-01 RX ORDER — MELATONIN
1000 DAILY
Status: DISCONTINUED | OUTPATIENT
Start: 2023-02-01 | End: 2023-02-15 | Stop reason: HOSPADM

## 2023-02-01 RX ORDER — METOPROLOL TARTRATE 50 MG/1
50 TABLET, FILM COATED ORAL DAILY
Status: DISCONTINUED | OUTPATIENT
Start: 2023-02-01 | End: 2023-02-11

## 2023-02-01 RX ORDER — CEFUROXIME AXETIL 250 MG/1
500 TABLET ORAL EVERY 12 HOURS SCHEDULED
Status: DISCONTINUED | OUTPATIENT
Start: 2023-02-01 | End: 2023-02-02

## 2023-02-01 RX ORDER — METHOCARBAMOL 500 MG/1
500 TABLET, FILM COATED ORAL EVERY 6 HOURS SCHEDULED
Status: DISCONTINUED | OUTPATIENT
Start: 2023-02-01 | End: 2023-02-15 | Stop reason: HOSPADM

## 2023-02-01 RX ORDER — AMLODIPINE BESYLATE 10 MG/1
10 TABLET ORAL DAILY
Status: DISCONTINUED | OUTPATIENT
Start: 2023-02-01 | End: 2023-02-11

## 2023-02-01 RX ORDER — DOCUSATE SODIUM 100 MG/1
100 CAPSULE, LIQUID FILLED ORAL 2 TIMES DAILY
Status: DISCONTINUED | OUTPATIENT
Start: 2023-02-01 | End: 2023-02-12

## 2023-02-01 RX ORDER — HYDROMORPHONE HCL IN WATER/PF 6 MG/30 ML
0.2 PATIENT CONTROLLED ANALGESIA SYRINGE INTRAVENOUS EVERY 4 HOURS PRN
Status: DISCONTINUED | OUTPATIENT
Start: 2023-02-01 | End: 2023-02-12

## 2023-02-01 RX ORDER — LANOLIN ALCOHOL/MO/W.PET/CERES
6 CREAM (GRAM) TOPICAL
Status: DISCONTINUED | OUTPATIENT
Start: 2023-02-01 | End: 2023-02-15 | Stop reason: HOSPADM

## 2023-02-01 RX ADMIN — FOLIC ACID TAB 400 MCG 400 MCG: 400 TAB at 17:08

## 2023-02-01 RX ADMIN — HYDROMORPHONE HYDROCHLORIDE 0.2 MG: 0.2 INJECTION, SOLUTION INTRAMUSCULAR; INTRAVENOUS; SUBCUTANEOUS at 17:07

## 2023-02-01 RX ADMIN — MORPHINE SULFATE 4 MG: 4 INJECTION INTRAVENOUS at 12:11

## 2023-02-01 RX ADMIN — Medication 1000 UNITS: at 17:08

## 2023-02-01 RX ADMIN — METOPROLOL TARTRATE 50 MG: 50 TABLET, FILM COATED ORAL at 17:08

## 2023-02-01 RX ADMIN — LISINOPRIL 10 MG: 5 TABLET ORAL at 17:08

## 2023-02-01 RX ADMIN — ACETAMINOPHEN 975 MG: 325 TABLET, FILM COATED ORAL at 20:49

## 2023-02-01 RX ADMIN — IOHEXOL 100 ML: 350 INJECTION, SOLUTION INTRAVENOUS at 09:45

## 2023-02-01 RX ADMIN — CEFUROXIME AXETIL 500 MG: 250 TABLET, FILM COATED ORAL at 20:57

## 2023-02-01 RX ADMIN — LIDOCAINE 5% 1 PATCH: 700 PATCH TOPICAL at 17:08

## 2023-02-01 RX ADMIN — DOCUSATE SODIUM 100 MG: 100 CAPSULE, LIQUID FILLED ORAL at 17:08

## 2023-02-01 RX ADMIN — METHOCARBAMOL TABLETS 500 MG: 500 TABLET, COATED ORAL at 18:05

## 2023-02-01 RX ADMIN — GABAPENTIN 100 MG: 100 CAPSULE ORAL at 21:01

## 2023-02-01 RX ADMIN — POLYETHYLENE GLYCOL 3350 17 G: 17 POWDER, FOR SOLUTION ORAL at 17:07

## 2023-02-01 RX ADMIN — TAMSULOSIN HYDROCHLORIDE 0.4 MG: 0.4 CAPSULE ORAL at 17:08

## 2023-02-01 RX ADMIN — AMLODIPINE BESYLATE 10 MG: 10 TABLET ORAL at 17:08

## 2023-02-01 RX ADMIN — SODIUM CHLORIDE 125 ML/HR: 0.9 INJECTION, SOLUTION INTRAVENOUS at 17:13

## 2023-02-01 RX ADMIN — MELATONIN 6 MG: at 21:00

## 2023-02-01 RX ADMIN — ACETAMINOPHEN 975 MG: 325 TABLET, FILM COATED ORAL at 17:07

## 2023-02-01 RX ADMIN — MORPHINE SULFATE 4 MG: 4 INJECTION INTRAVENOUS at 09:36

## 2023-02-01 RX ADMIN — ENOXAPARIN SODIUM 30 MG: 30 INJECTION SUBCUTANEOUS at 17:09

## 2023-02-01 NOTE — ASSESSMENT & PLAN NOTE
S/p right lithotripsy and insertion of ureteral stent with Dr Virginia Landaverde on 1/26/2023    Was due for f/u on 2/1/2023 for wynn removal

## 2023-02-01 NOTE — ASSESSMENT & PLAN NOTE
T12 fracture with slight extension into pedicles  · S/p mechanical fall Sunday, 1/29  · Additional fall 12/25/2022 - fracture evident retrospectively although not read at the time of prior imaging  · Endorsing significant low back pain that improved following lithotripsy  · Relates new right anterior thigh pain that shoots from hip to knee since urologic procedure on 1/26  · On exam, some mild right hip flexion/extension weakness that is antalgic  Sensation intact  No saddle anesthesia  Imaging:  · CT CAP, 2/1/2023: Transversely oriented fracture through the T12 vertebral body with very slight extension into the pedicles  No significant displacement of fracture fragments or spinal malalignment  Very slight retropulsion of bony fragments  In retrospect, this fracture was probably acute (though very difficult to detect) on CT examination of December 30, 2022  Advanced lumbar and thoracic degenerative changes including multiple level degenerative fusion is in the lower thoracic and upper lumbar spine  Plan:  · Continue to monitor neuro exam closely  · TLSO brace when upright > 45 degrees and OOB  · Upright thoracolumbar x-rays pending  · Unclear etiology of right hip/thigh pain  Does not correspond with fracture location  Could be radicular pain in setting of diffuse degenerative disease of spine  · Multimodal pain control  · Mobilize with PT/OT  · Will attempt conservative management of fracture as any surgical intervention would likely be extensive and concern for ability to tolerate surgery  · DVT ppx: LINDSEYs, Lovenox  Neurosurgery will review x-rays once completed  Please call with questions or concerns

## 2023-02-01 NOTE — EMTALA/ACUTE CARE TRANSFER
[address] [facility] [address]  [place] [address]  Dept: [phone]      EMTALA TRANSFER CONSENT    NAME Elizabet Bai                                         1935                              MRN [id number]    I have been informed of my rights regarding examination, treatment, and transfer   by Dr Pierre Schwartz DO    Benefits: Specialized equipment and/or services available at the receiving facility (Include comment)________________________ (Trauma and spine surgery services)    Risks: Potential for delay in receiving treatment, Potential deterioration of medical condition, Loss of IV, Possible worsening of condition or death during transfer, Increased discomfort during transfer      Consent for Transfer:  I acknowledge that my medical condition has been evaluated and explained to me by the emergency department physician or other qualified medical person and/or my attending physician, who has recommended that I be transferred to the service of  Accepting Physician: Dr Jeffrey Oliveira at [address], [address] : [facility]  The above potential benefits of such transfer, the potential risks associated with such transfer, and the probable risks of not being transferred have been explained to me, and I fully understand them  The doctor has explained that, in my case, the benefits of transfer outweigh the risks  I agree to be transferred  I authorize the performance of emergency medical procedures and treatments upon me in both transit and upon arrival at the receiving facility  Additionally, I authorize the release of any and all medical records to the receiving facility and request they be transported with me, if possible  I understand that the safest mode of transportation during a medical emergency is an ambulance and that the Hospital advocates the use of this mode of transport   Risks of traveling to the receiving facility by car, including absence of medical control, life sustaining equipment, such as oxygen, and medical personnel has been explained to me and I fully understand them  (ARTURO CORRECT BOX BELOW)  [  ]  I consent to the stated transfer and to be transported by ambulance/helicopter  [  ]  I consent to the stated transfer, but refuse transportation by ambulance and accept full responsibility for my transportation by car  I understand the risks of non-ambulance transfers and I exonerate the Hospital and its staff from any deterioration in my condition that results from this refusal     X___________________________________________    DATE  23  TIME________  Signature of patient or legally responsible individual signing on patient behalf           RELATIONSHIP TO PATIENT_________________________          Provider Certification    NAME Sondra Palmer                                         1935                              MRN [id number]    A medical screening exam was performed on the above named patient  Based on the examination:    Condition Necessitating Transfer The primary encounter diagnosis was Fall, initial encounter  Diagnoses of Closed T12 fracture ([address] ) and Back pain were also pertinent to this visit      Patient Condition: The patient has been stabilized such that within reasonable medical probability, no material deterioration of the patient condition or the condition of the unborn child(jair) is likely to result from the transfer    Reason for Transfer: Level of Care needed not available at this facility    Transfer Requirements: Facility [facility]   · Space available and qualified personnel available for treatment as acknowledged by    · Agreed to accept transfer and to provide appropriate medical treatment as acknowledged by       Dr Melissa Wright  · Appropriate medical records of the examination and treatment of the patient are provided at the time of transfer   [address] _______  · Transfer will be performed by qualified personnel from \[facility]\""  and appropriate transfer equipment as required, including the use of necessary and appropriate life support measures  Provider Certification: I have examined the patient and explained the following risks and benefits of being transferred/refusing transfer to the patient/family:  General risk, such as traffic hazards, adverse weather conditions, rough terrain or turbulence, possible failure of equipment (including vehicle or aircraft), or consequences of actions of persons outside the control of the transport personnel, Unanticipated needs of medical equipment and personnel during transport, Risk of worsening condition, The possibility of a transport vehicle being unavailable      Based on these reasonable risks and benefits to the patient and/or the unborn child(jair), and based upon the information available at the time of the patient’s examination, I certify that the medical benefits reasonably to be expected from the provision of appropriate medical treatments at another medical facility outweigh the increasing risks, if any, to the individual’s medical condition, and in the case of labor to the unborn child, from effecting the transfer      X____________________________________________ DATE 02/01/23        TIME_______      ORIGINAL - SEND TO MEDICAL RECORDS   COPY - SEND WITH PATIENT DURING TRANSFER

## 2023-02-01 NOTE — ED PROVIDER NOTES
Emergency Department Trauma Note  Wilber Montes 81 y o  male MRN: [id number]  Unit/Bed#: ED 09/ED 09 Encounter: [id number]      Trauma Alert: Trauma Acuity: Trauma Evaluation  Model of Arrival: Mode of Arrival: BLS via    Trauma Team: Current Providers  Attending Provider: Amada Tijerina DO  Registered Nurse: Mila Sandhu RN  Consultants:     None      History of Present Illness     Chief Complaint:   Chief Complaint   Patient presents with   • Trauma     Pt presented to this ED via EMS c/o right hip pain and lower back pain after slipping and falling in bathroom last evening at 2030  Denies hitting head, loc  +blood thinners  HPI:  Wilber Montes is a 81 y o  male who presents with fall  Mechanism:Details of Incident: Pt presented to this ED via EMS c/o right hip pain and lower back pain after slipping and falling in bathroom last evening at 2030  Denies hitting head, loc  +blood thinners  Injury Date: 01/31/23 Injury Time: 2030 Injury Occurence Location - [address] [place] [address] Way: ana    Patient is an 81-year-old male presents emergency department due to trip and fall in the bathroom last evening injured his right hip and lower back  Patient is on anticoagulant for atrial fibrillation denies striking head or loss of consciousness no neck pain no other injury  Patient was able to get up and back into bed with a walker and assistance but was unable to stand thereafter          History provided by:  Patient and EMS personnel  Fall  Mechanism of injury: fall    Injury location:  Pelvis  Pelvic injury location:  R hip  Time since incident:  1 day  Arrived directly from scene: no    Suspicion of alcohol use: no    Suspicion of drug use: no    Prior to arrival data:     Patient ambulatory at scene: no      Blood loss:  None    Responsiveness at scene:  Alert    Orientation at scene:  Person, time, situation and place    Loss of consciousness: no      Amnesic to event: no    Associated symptoms: back pain Associated symptoms: no abdominal pain, no chest pain, no headaches, no nausea and no vomiting      Review of Systems   Constitutional: Negative for activity change, appetite change, chills, fatigue and fever  HENT: Negative for congestion, ear pain, rhinorrhea and sore throat  Eyes: Negative for discharge, redness and visual disturbance  Respiratory: Negative for cough, chest tightness, shortness of breath and wheezing  Cardiovascular: Negative for chest pain and palpitations  Gastrointestinal: Negative for abdominal pain, constipation, diarrhea, nausea and vomiting  Endocrine: Negative for polydipsia and polyuria  Genitourinary: Negative for difficulty urinating, dysuria, frequency, hematuria and urgency  Musculoskeletal: Positive for back pain  Negative for arthralgias and myalgias  Right hip pain   Skin: Negative for color change, pallor and rash  Neurological: Negative for dizziness, weakness, light-headedness, numbness and headaches  Hematological: Negative for adenopathy  Does not bruise/bleed easily  All other systems reviewed and are negative        Historical Information     Immunizations:   Immunization History   Administered Date(s) Administered   • COVID-19 PFIZER VACCINE 0 3 ML IM 05/05/2021, 05/26/2021, 01/06/2022   • INFLUENZA 11/04/2016, 11/03/2017, 11/27/2018   • Pneumococcal Conjugate 13-Valent 07/15/2016   • Tdap 05/22/2018       Past Medical History:   Diagnosis Date   • Coronary artery disease    • H/O atrial flutter    • History of aortic valve replacement with bioprosthetic valve    • Hyperlipemia    • Hypertension    • Pacemaker    • Skin cancer, basal cell    • Walker as ambulation aid    • Wears dentures        Family History   Problem Relation Age of Onset   • No Known Problems Father    • No Known Problems Mother      Past Surgical History:   Procedure Laterality Date   • A-V CARDIAC PACEMAKER INSERTION     • AORTIC VALVE REPLACEMENT     • CARDIAC PACEMAKER PLACEMENT Left     2021   • CATARACT EXTRACTION     • CORONARY ANGIOPLASTY WITH STENT PLACEMENT      stents times 3   • NC CYSTO BLADDER W/URETERAL CATHETERIZATION Right 01/13/2023    Procedure: CYSTOSCOPY RETROGRADE PYELOGRAM WITH INSERTION STENT URETERAL;  Surgeon: Eloina Dave MD;  Location: [facility];  Service: Urology   • NC CYSTO BLADDER W/URETERAL CATHETERIZATION Right 1/26/2023    Procedure: CYSTOSCOPY WITH RETROGRADE PYELOGRAM;  Surgeon: Eloina Dave MD;  Location: [facility];  Service: Urology   • NC CYSTO/URETERO W/LITHOTRIPSY &INDWELL STENT INSRT Right 1/26/2023    Procedure: CYSTOSCOPY URETEROSCOPY WITH LITHOTRIPSY HOLMIUM LASER, RETROGRADE PYELOGRAM AND INSERTION STENT URETERAL;  Surgeon: Eloina Dave MD;  Location: [facility];  Service: Urology     Social History     Tobacco Use   • Smoking status: Former   • Smokeless tobacco: Never   Vaping Use   • Vaping Use: Never used   Substance Use Topics   • Alcohol use: Yes     Comment: social   • Drug use: Never     E-Cigarette/Vaping   • E-Cigarette Use Never User      E-Cigarette/Vaping Substances   • Nicotine No    • THC No    • CBD No    • Flavoring No        Family History: non-contributory    Meds/Allergies   Prior to Admission Medications   Prescriptions Last Dose Informant Patient Reported? Taking?    Apixaban (ELIQUIS PO)   Yes No   Sig: Take by mouth   Cholecalciferol 25 MCG (1000 UT) tablet   Yes No   Sig: Take by mouth   HYDROcodone-acetaminophen (NORCO) 5-325 mg per tablet   No No   Sig: Take 1-2 tablets by mouth every 6 (six) hours as needed for pain Max Daily Amount: 8 tablets   HYDROcodone-acetaminophen (NORCO) 5-325 mg per tablet   No No   Sig: Take 1-2 tablets by mouth every 6 (six) hours as needed for pain for up to 20 doses Max Daily Amount: 8 tablets   Melatonin 5 MG CAPS   Yes No   Sig: Take 5 mg by mouth if needed   acetaminophen (TYLENOL) 500 mg tablet   Yes No   Sig: Take 1,000 mg by mouth every 6 (six) hours as needed for mild pain   amLODIPine (NORVASC) 10 mg tablet   Yes No   Sig: Take 10 mg by mouth daily   atorvastatin (LIPITOR) 40 mg tablet   Yes No   Sig: Take 20 mg by mouth daily   cefuroxime (CEFTIN) 500 mg tablet   No No   Sig: Take 1 tablet (500 mg total) by mouth every 12 (twelve) hours for 5 days   enalapril (VASOTEC) 2 5 mg tablet   Yes No   Sig: Take 2 5 mg by mouth 2 (two) times a day   folic acid (FOLVITE) 813 mcg tablet   Yes No   Sig: Take 400 mcg by mouth daily   metoprolol tartrate (LOPRESSOR) 50 mg tablet   Yes No   Sig: Take 50 mg by mouth daily   naproxen sodium (Aleve) 220 MG tablet   Yes No   Sig: Take 220 mg by mouth 2 (two) times a day with meals   tamsulosin (FLOMAX) 0 4 mg   No No   Sig: Take 1 capsule (0 4 mg total) by mouth daily with dinner      Facility-Administered Medications: None       Allergies   Allergen Reactions   • Ezetimibe-Simvastatin Dizziness and Lightheadedness     Other reaction(s): MAKES HIM DIZZY     • Simvastatin Lightheadedness     Other reaction(s): Dizziness       PHYSICAL EXAM    PE limited by: none    Objective   Vitals:   First set: Temperature: 97 8 °F (36 6 °C) (02/01/23 0925)  Pulse: 85 (02/01/23 0925)  Respirations: 12 (02/01/23 0925)  Blood Pressure: 150/86 (02/01/23 0925)  SpO2: 98 % (02/01/23 0920)    Primary Survey:   (A) Airway: intact  (B) Breathing: clear b/l bs  (C) Circulation: Pulses:   normal  (D) Disabliity:  GCS Total:  15  (E) Expose:  Completed    Secondary Survey: (Click on Physical Exam tab above)  Physical Exam  Vitals and nursing note reviewed  Constitutional:       Appearance: He is well-developed  HENT:      Head: Normocephalic and atraumatic  Right Ear: External ear normal       Left Ear: External ear normal       Nose: Nose normal    Eyes:      Conjunctiva/sclera: Conjunctivae normal       Pupils: Pupils are equal, round, and reactive to light  Cardiovascular:      Rate and Rhythm: Normal rate and regular rhythm        Heart sounds: Normal heart sounds  Pulmonary:      Effort: Pulmonary effort is normal  No respiratory distress  Breath sounds: Normal breath sounds  No wheezing or rales  Chest:      Chest wall: No tenderness  Abdominal:      General: Bowel sounds are normal  There is no distension  Palpations: Abdomen is soft  Tenderness: There is no abdominal tenderness  There is no guarding  Musculoskeletal:      Cervical back: Normal range of motion and neck supple  Lumbar back: Spasms and tenderness present  Right hip: Tenderness present  No deformity  Decreased range of motion  Skin:     General: Skin is warm and dry  Neurological:      Mental Status: He is alert and oriented to person, place, and time  Cranial Nerves: No cranial nerve deficit  Sensory: No sensory deficit  Cervical spine cleared by clinical criteria?  No (imaging required)      Invasive Devices     Peripheral Intravenous Line  Duration           Peripheral IV 02/01/23 Right Antecubital <1 day          Drain  Duration           Ureteral Internal Stent Right ureter 6 Fr  19 days    Urethral Catheter 16 Fr  5 days                Lab Results:   Results Reviewed     Procedure Component Value Units Date/Time    Protime-INR [[id number]]  (Abnormal) Collected: 02/01/23 0933    Lab Status: Final result Specimen: Blood from Arm, Right Updated: 02/01/23 1004     Protime 16 2 seconds      INR 1 29    APTT [[id number]]  (Abnormal) Collected: 02/01/23 0933    Lab Status: Final result Specimen: Blood from Arm, Right Updated: 02/01/23 1004     PTT 40 seconds     CBC and differential [[id number]]  (Abnormal) Collected: 02/01/23 0933    Lab Status: Final result Specimen: Blood from Arm, Right Updated: 02/01/23 0949     WBC 10 91 Thousand/uL      RBC 4 94 Million/uL      Hemoglobin 14 1 g/dL      Hematocrit 43 5 %      MCV 88 fL      MCH 28 5 pg      MCHC 32 4 g/dL      RDW 14 6 %      MPV 10 1 fL      Platelets 841 Thousands/uL nRBC 0 /100 WBCs      Neutrophils Relative 80 %      Immat GRANS % 0 %      Lymphocytes Relative 13 %      Monocytes Relative 6 %      Eosinophils Relative 1 %      Basophils Relative 0 %      Neutrophils Absolute 8 73 Thousands/µL      Immature Grans Absolute 0 04 Thousand/uL      Lymphocytes Absolute 1 44 Thousands/µL      Monocytes Absolute 0 60 Thousand/µL      Eosinophils Absolute 0 07 Thousand/µL      Basophils Absolute 0 03 Thousands/µL     Comprehensive metabolic panel [[id number]] Collected: 02/01/23 0933    Lab Status: In process Specimen: Blood from Arm, Right Updated: 02/01/23 0941                 Imaging Studies:   Direct to CT: No  TRAUMA - CT head wo contrast   Final Result by Jeramie Decker MD (02/01 1005)      No acute intracranial abnormality  Workstation performed: [id number]         TRAUMA - CT spine cervical wo contrast   Final Result by Jeramie Decker MD (02/01 1007)      No cervical spine fracture or traumatic malalignment  Workstation performed: [id number]         TRAUMA - CT abdomen pelvis w contrast   Final Result by Jeramie Decker MD (02/01 1028)      Transversely oriented fracture through the T12 vertebral body with very slight extension into the pedicles  No significant displacement of fracture fragments or spinal malalignment  Very slight retropulsion of bony fragments  In retrospect, this    fracture was probably acute (though very difficult to detect) on CT examination of December 30, 2022  Advanced lumbar and thoracic degenerative changes including multiple level degenerative fusion is in the lower thoracic and upper lumbar spine  Renal cortical thinning and chronic moderate right-sided hydronephrosis, persistent despite the presence of a right ureteral stent  Urothelial thickening involving right renal collecting system and proximal right ureter, similar when compared to January 12, 2023        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  Trace left pleural effusion, incompletely evaluated  Reidentified large right inguinal hernia with nonobstructed loops of small bowel in the hernia    sac  No other acute or subacute fractures  No traumatic visceral injury in the abdomen or pelvis  I personally discussed this study with Konstantin Orta on 2/1/2023 at 10:25 AM                Workstation performed: [id number]         XR Trauma chest portable   ED Interpretation by Alice Kirby DO (02/01 1009)   No acute disease or post traumatic abnormality      XR pelvis ap only 1 or 2 vw    (Results Pending)         Procedures  ECG 12 Lead Documentation Only    Date/Time: 2/1/2023 9:41 AM  Performed by: Alice Kirby DO  Authorized by: Alice Kirby DO     ECG reviewed by me, the ED Provider: yes    Patient location:  ED  Previous ECG:     Comparison to cardiac monitor: Yes    Quality:     Tracing quality:  Limited by artifact  Rate:     ECG rate:  87    ECG rate assessment: normal    Rhythm:     Rhythm: atrial fibrillation and paced    Pacing:     Capture:  Complete    Type of pacing:  Ventricular  QRS:     QRS axis:  Right    QRS intervals: Wide  Conduction:     Conduction: abnormal      Abnormal conduction: complete RBBB, LPFB and non-specific intraventricular conduction delay    ST segments:     ST segments:  Non-specific  T waves:     T waves: non-specific      POC FAST US    Date/Time: 2/1/2023 10:05 AM  Performed by: Alice Kirby DO  Authorized by:  Alice Kirby DO     Patient location:  ED  Other Assisting Provider: No    Procedure details:     Exam Type:  Diagnostic    Indications: blunt abdominal trauma      Assess for:  Intra-abdominal fluid    Technique: FAST      Views obtained:  Heart - Pericardial sac, RUQ - Valadez's Pouch, LUQ - Splenorenal space and Suprapubic - Pouch of Julio C    Image availability:  Not saved  FAST Findings:     RUQ (Hepatorenal) free fluid: absent      LUQ (Splenorenal) free fluid: absent      Suprapubic free fluid: absent      Cardiac wall motion: identified      Pericardial effusion: absent    Interpretation:     Impressions: negative               ED Course  ED Course as of 02/01/23 1040   Wed Feb 01, 2023   1009 Cervical Collar Clearance: The patient had a CT scan of the cervical spine demonstrating no acute injury  On exam, the patient had no midline point tenderness or paresthesias/numbness/weakness in the extremities  The patient had full range of motion (was then able to flex, extend, and rotate head laterally) without pain  There were no distracting injuries and the patient was not intoxicated  The patient's cervical spine was cleared radiologically and clinically  Cervical collar removed at this time  Fei Trevino DO  2/1/2023 10:09 AM        1034 Spoke with Dr Sim Lopez trauma attending on-call reviewed case and findings in the emergency department accepts for transfer  Medical Decision Making  Back pain: acute illness or injury  Closed T12 fracture ([facility] [address] ): acute illness or injury  Fall, initial encounter: acute illness or injury  Amount and/or Complexity of Data Reviewed  External Data Reviewed: labs, radiology and notes  Labs: ordered  Decision-making details documented in ED Course  Radiology: ordered and independent interpretation performed  Decision-making details documented in ED Course  ECG/medicine tests: ordered and independent interpretation performed  Decision-making details documented in ED Course  Risk  Prescription drug management  Decision regarding hospitalization                    Disposition  [facility] Transfer: No  Final diagnoses:   Fall, initial encounter   Closed T12 fracture ([facility] [address] ) - Chance fracture   Back pain     Time reflects when diagnosis was documented in both MDM as applicable and the Disposition within this note     Time User Action Codes Description Comment    2/1/2023 10:35 AM Fleet Bale Add [O34  PLQL] Fall, initial encounter     2/1/2023 10:35 AM Fleet Bale Add [Q17 026X] Closed T12 fracture ([facility] [address] )     2/1/2023 10:35 AM Fleet Bale Modify [S22 089A] Closed T12 fracture ([address] ) Chance fracture    2/1/2023 10:35 AM Fleet Bale Add [M54 9] Back pain       ED Disposition     ED Disposition   Transfer to Another Facility-In Network    Condition   --    Date/Time   Wed Feb 1, 2023 10:35 AM    Comment   Carmen Steele should be transferred out to B             MD Documentation    Abhishek Bernal Most Recent Value   Patient Condition The patient has been stabilized such that within reasonable medical probability, no material deterioration of the patient condition or the condition of the unborn child(jair) is likely to result from the transfer   Reason for Transfer Level of Care needed not available at this facility   Benefits of Transfer Specialized equipment and/or services available at the receiving facility (Include comment)________________________  [facility]ula and spine surgery services]   Risks of Transfer Potential for delay in receiving treatment, Potential deterioration of medical condition, Loss of IV, Possible worsening of condition or death during transfer, Increased discomfort during transfer   Accepting Physician Bessy Ibrahim 75 by (Company and Unit #) BLS   Sending MD [facility]   Provider Certification General risk, such as traffic hazards, adverse weather conditions, rough terrain or turbulence, possible failure of equipment (including vehicle or aircraft), or consequences of actions of persons outside the control of the transport personnel, Unanticipated needs of medical equipment and personnel during transport, Risk of worsening condition, The possibility of a transport vehicle being unavailable      RN Documentation    [address] Jeanie Britt, [facility]  SLB   Transported by Eloy Garza and Unit #) BLS      Follow-up Information    None       Patient's Medications   Discharge Prescriptions    No medications on file     No discharge procedures on file      PDMP Review     None          ED Provider  Electronically Signed by         Pierre Schwartz DO  02/01/23 104

## 2023-02-01 NOTE — H&P
H&P - Trauma   Teresa Alonzo 81 y o  male MRN: [id number]  Unit/Bed#: ED 13 Encounter: [id number]    Trauma Alert: Other Transfer   Model of Arrival: Ambulance    Trauma Team: Attending Glendy Valles, [facility] and [employer]  Consultants:     Neurosurgery: routine consult; Epic consult order placed; Assessment/Plan   Active Problems / Assessment:   S/p fall in bathroom w/o HS or LOC, on blood thinners  T12 vert body fx     Plan:   Admit to Trauma  Neurosurg consult: upright XR and TLSO brace  Log roll and Tspine precautions  Diet as tolerated-pending official neurosurg recs  Hold eliquis for now  Lovenox dvt px      History of Present Illness     Chief Complaint: low back pain and R hip pain  Mechanism:Fall     HPI:    Teresa Alonzo is a 81 y o  male with pmhx of CAD, aflutter, bioprosthetic AVR, 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, R ureteral stones/hydro s/p R cysto, renal stent 1/13/23, HLD, pacemaker, skin cancer, who presents as a transfer from [address] [place] [address] after a fall with no headstrike or LOC  Pt found to have an subacute T12 fracture  The remaining workup was negative  Pt has wynn catheter placed for recent cysto and was to follow-up today with urology in clinic  He has history of recent fall around [place] of last year where workup was significant for R ureteral stone only  He states that he has had worsening back pain and limited mobility due to the pain, that they attributed to the kidney stone  Prior to his fall in December, pt daughter states that pt walked with out a cane or walker and independently performed ADL's and IADL's  Since that initial fall, he has had pain with ambulating in his back  After his cysto last Thursday, he initally felt great the first day, but then developed back pain that radiated down his right anterior thigh that he described as shooting pain  He states that has worsened since his fall last night  Review of Systems   All other systems reviewed and are negative      12-point, complete review of systems was reviewed and negative except as stated above  Historical Information     Past Medical History:   Diagnosis Date   • Coronary artery disease    • H/O atrial flutter    • History of aortic valve replacement with bioprosthetic valve    • Hyperlipemia    • Hypertension    • Pacemaker    • Skin cancer, basal cell    • Walker as ambulation aid    • Wears dentures      Past Surgical History:   Procedure Laterality Date   • A-V CARDIAC PACEMAKER INSERTION     • AORTIC VALVE REPLACEMENT     • CARDIAC PACEMAKER PLACEMENT Left     2021   • CATARACT EXTRACTION     • CORONARY ANGIOPLASTY WITH STENT PLACEMENT      stents times 3   • GA CYSTO BLADDER W/URETERAL CATHETERIZATION Right 01/13/2023    Procedure: CYSTOSCOPY RETROGRADE PYELOGRAM WITH INSERTION STENT URETERAL;  Surgeon: Josh Villanueva MD;  Location: [facility];  Service: Urology   • GA CYSTO BLADDER W/URETERAL CATHETERIZATION Right 1/26/2023    Procedure: CYSTOSCOPY WITH RETROGRADE PYELOGRAM;  Surgeon: Josh Villanueva MD;  Location: [facility];  Service: Urology   • GA CYSTO/URETERO W/LITHOTRIPSY &INDWELL STENT INSRT Right 1/26/2023    Procedure: CYSTOSCOPY URETEROSCOPY WITH LITHOTRIPSY HOLMIUM LASER, RETROGRADE PYELOGRAM AND INSERTION STENT URETERAL;  Surgeon: Josh Villanueva MD;  Location: [facility];  Service: Urology        Social History     Tobacco Use   • Smoking status: Former   • Smokeless tobacco: Never   Vaping Use   • Vaping Use: Never used   Substance Use Topics   • Alcohol use: Yes     Comment: social   • Drug use: Never     Immunization History   Administered Date(s) Administered   • COVID-19 PFIZER VACCINE 0 3 ML IM 05/05/2021, 05/26/2021, 01/06/2022   • INFLUENZA 11/04/2016, 11/03/2017, 11/27/2018   • Pneumococcal Conjugate 13-Valent 07/15/2016   • Tdap 05/22/2018     Last Tetanus: 2018  Family History: Non-contributory    1   Before the illness or injury that brought you to the Emergency, did you need someone to help you on a regular basis? 1=Yes   2  Since the illness or injury that brought you to the Emergency, have you needed more help than usual to take care of yourself? 1=Yes   3  Have you been hospitalized for one or more nights during the past 6 months (excluding a stay in the Emergency Department)? 1=Yes   4  In general, do you see well? 0=Yes   5  In general, do you have serious problems with your memory? 0=No   6  Do you take more than three different medications everyday?  1=Yes   TOTAL   4     Did you order a geriatric consult if the score was 2 or greater?: yes     Meds/Allergies   current meds:   Current Facility-Administered Medications   Medication Dose Route Frequency   • acetaminophen (TYLENOL) tablet 975 mg  975 mg Oral Q8H [address]   • amLODIPine (NORVASC) tablet 10 mg  10 mg Oral Daily   • cefuroxime (CEFTIN) tablet 500 mg  500 mg Oral Q12H [address]   • cholecalciferol (VITAMIN D3) tablet 1,000 Units  1,000 Units Oral Daily   • docusate sodium (COLACE) capsule 100 mg  100 mg Oral BID   • enoxaparin (LOVENOX) subcutaneous injection 30 mg  30 mg Subcutaneous N03R   • folic acid (FOLVITE) tablet 400 mcg  400 mcg Oral Daily   • gabapentin (NEURONTIN) capsule 100 mg  100 mg Oral HS   • HYDROmorphone HCl (DILAUDID) injection 0 2 mg  0 2 mg Intravenous Q4H PRN   • lidocaine (LIDODERM) 5 % patch 1 patch  1 patch Topical Daily   • lisinopril (ZESTRIL) tablet 10 mg  10 mg Oral Daily   • melatonin tablet 6 mg  6 mg Oral HS   • metoprolol tartrate (LOPRESSOR) tablet 50 mg  50 mg Oral Daily   • naloxone (NARCAN) 0 04 mg/mL syringe 0 04 mg  0 04 mg Intravenous Q1MIN PRN   • ondansetron (ZOFRAN) injection 4 mg  4 mg Intravenous Q6H PRN   • oxyCODONE (ROXICODONE) IR tablet 2 5 mg  2 5 mg Oral Q4H PRN   • oxyCODONE (ROXICODONE) IR tablet 5 mg  5 mg Oral Q4H PRN   • polyethylene glycol (MIRALAX) packet 17 g  17 g Oral Daily   • sodium chloride 0 9 % infusion  125 mL/hr Intravenous Continuous   • tamsulosin (FLOMAX) capsule 0 4 mg  0 4 mg Oral Daily With Dinner     Allergies have not been reviewed; Allergies   Allergen Reactions   • Ezetimibe-Simvastatin Dizziness and Lightheadedness     Other reaction(s): MAKES HIM DIZZY     • Simvastatin Lightheadedness     Other reaction(s): Dizziness       Objective   Initial Vitals:   Temperature: 97 5 °F (36 4 °C) (02/01/23 1345)  Pulse: 83 (02/01/23 1345)  Respirations: 18 (02/01/23 1345)  Blood Pressure: 134/69 (02/01/23 1345)    Primary Survey:   Airway:                         Breathing:        Pre-hospital Interventions: none       Effort: normal       Right breath sounds: normal       Left breath sounds: normal  Circulation:        Rhythm: regular       Rate: regular   Right Pulses Left Pulses    R radial: 2+  R femoral: 2+  R pedal: 2+     L radial: 2+  L femoral: 2+  L pedal: 2+       Disability:        GCS: Eye: 4; Verbal: 5 Motor: 6 Total: 15       Right Pupil: round;  reactive         Left Pupil:  round;  reactive      R Motor Strength L Motor Strength    R : 5/5  R dorsiflex: 5/5  R plantarflex: 5/5 L : 5/5  L dorsiflex: 5/5  L plantarflex: 5/5          Exposure:       Completed: Yes      Secondary Survey:  Physical Exam  Vitals reviewed  Constitutional:       General: He is not in acute distress  Appearance: He is not ill-appearing, toxic-appearing or diaphoretic  HENT:      Head: Normocephalic and atraumatic  Nose: Nose normal       Mouth/Throat:      Mouth: Mucous membranes are moist    Eyes:      General:         Left eye: Discharge present  Extraocular Movements: Extraocular movements intact  Pupils: Pupils are equal, round, and reactive to light  Cardiovascular:      Rate and Rhythm: Normal rate  Pulses:           Radial pulses are 2+ on the right side and 2+ on the left side  Femoral pulses are 2+ on the right side and 2+ on the left side  Dorsalis pedis pulses are 2+ on the right side and 2+ on the left side     Pulmonary:      Effort: Pulmonary effort is normal  No respiratory distress  Breath sounds: Normal breath sounds  Chest:      Chest wall: No lacerations, swelling or tenderness  Abdominal:      General: There is no distension  Palpations: Abdomen is soft  Tenderness: There is abdominal tenderness (Right lower quadrant/flank)  There is no guarding or rebound  Musculoskeletal:        Arms:       Cervical back: Neck supple  No rigidity or tenderness  Right lower leg: No edema  Left lower leg: No edema  Skin:     General: Skin is warm and dry  Findings: Bruising present  Neurological:      Mental Status: He is alert and oriented to person, place, and time  Mental status is at baseline  Cranial Nerves: No cranial nerve deficit  Sensory: No sensory deficit  Motor: Weakness (with R leg raise) present  Psychiatric:         Mood and Affect: Mood normal          Behavior: Behavior normal          Invasive Devices     Peripheral Intravenous Line  Duration           Peripheral IV 02/01/23 Right Antecubital <1 day          Drain  Duration           Ureteral Internal Stent Right ureter 6 Fr  19 days    Urethral Catheter 16 Fr  6 days              Lab Results:   BMP/CMP:   Lab Results   Component Value Date    SODIUM 135 02/01/2023    K 3 9 02/01/2023     02/01/2023    CO2 24 02/01/2023    BUN 8 02/01/2023    CREATININE 0 75 02/01/2023    CALCIUM 8 5 02/01/2023    AST 11 (L) 02/01/2023    ALT 10 02/01/2023    ALKPHOS 141 (H) 02/01/2023    EGFR 83 02/01/2023   , CBC:   Lab Results   Component Value Date    WBC 10 91 (H) 02/01/2023    HGB 14 1 02/01/2023    HCT 43 5 02/01/2023    MCV 88 02/01/2023     02/01/2023    MCH 28 5 02/01/2023    MCHC 32 4 02/01/2023    RDW 14 6 02/01/2023    MPV 10 1 02/01/2023    NRBC 0 02/01/2023    and Coagulation:   Lab Results   Component Value Date    INR 1 29 (H) 02/01/2023       Imaging Results: I have personally reviewed pertinent reports      Chest Xray(s): N/A FAST exam(s): N/A   CT Scan(s): negative for acute findings   Additional Xray(s): negative for acute findings     Other Studies: subacute T12 fracture noted on CTCAP    Code Status: Prior  Advance Directive and Living Will:      Power of :    POLST:    I have spent 35 minutes with Pt and Family today in which greater than 50% of this time was spent in counseling/coordination of care regarding Diagnostic results, Prognosis and Intructions for management

## 2023-02-01 NOTE — CONSULTS
[address] [place] [address] 1935, 81 y o  male MRN: [id number]  Unit/Bed#: ED 13 Encounter: [id number]  Primary Care Provider: Fuad Saldana DO   Date and time admitted to hospital: 2/1/2023  1:44 PM    Inpatient consult to Neurosurgery  Consult performed by: MICHAEL Ennis  Consult ordered by: Holly Garcia MD          Right hip pain  Assessment & Plan  See above  Closed T12 fracture [facility])  Assessment & Plan  T12 fracture with slight extension into pedicles  · S/p mechanical fall Sunday, 1/29  · Additional fall 12/25/2022 - fracture evident retrospectively although not read at the time of prior imaging  · Endorsing significant low back pain that improved following lithotripsy  · Relates new right anterior thigh pain that shoots from hip to knee since urologic procedure on 1/26  · On exam, some mild right hip flexion/extension weakness that is antalgic  Sensation intact  No saddle anesthesia  Imaging:  · CT CAP, 2/1/2023: Transversely oriented fracture through the T12 vertebral body with very slight extension into the pedicles  No significant displacement of fracture fragments or spinal malalignment  Very slight retropulsion of bony fragments  In retrospect, this fracture was probably acute (though very difficult to detect) on CT examination of December 30, 2022  Advanced lumbar and thoracic degenerative changes including multiple level degenerative fusion is in the lower thoracic and upper lumbar spine  Plan:  · Continue to monitor neuro exam closely  · TLSO brace when upright > 45 degrees and OOB  · Upright thoracolumbar x-rays pending  · Unclear etiology of right hip/thigh pain  Does not correspond with fracture location  Could be radicular pain in setting of diffuse degenerative disease of spine  · Multimodal pain control  · Mobilize with PT/OT    · Will attempt conservative management of fracture as any surgical intervention would likely be extensive and concern for ability to tolerate surgery  · DVT ppx: SCDs, Lovenox  Neurosurgery will review x-rays once completed  Please call with questions or concerns  Urinary tract obstruction by kidney stone  Assessment & Plan  S/p right lithotripsy and insertion of ureteral stent with Dr Colletta Qua on 1/26/2023  Was due for f/u on 2/1/2023 for wynn removal     S/P AVR  Assessment & Plan  On Eliquis  History of Present Illness     HPI: Sage Jaime is a 81 y o  male with PMH including atrial flutter, status post permanent pacemaker, coronary artery disease, on Eliquis, status post aortic valve replacement with bioprosthetic valve, hypertension, who presented to the [address] [place] [address] emergency department complaining of a fall 2 days prior  He had sustained a fall over [place] as well where he was noted with obstructive 10 mm proximal right ureteral calculus with severe right hydroureteral nephrosis  He states prior to the procedure he was experiencing a significant amount of low back pain  After the procedure he felt better and was ambulating up stairs and using his walker  He did develop a new right anterior thigh pain that was sharp and radiating in nature from his hip  He states that any movement exacerbates his pain  He relates that his back pain is improved somewhat although he still experiences significant discomfort with sitting upright or standing  Per his daughter lives prior to his fall in December he was in good shape and healthy  [place] and had no issues walking  He was due for an outpatient appointment with urology today to assess his Wynn catheter  Review of Systems   Constitutional: Positive for activity change  Negative for chills and fever  HENT: Negative for ear pain and sore throat  Eyes: Negative for pain and visual disturbance  Respiratory: Negative for cough and shortness of breath  Cardiovascular: Negative for chest pain and palpitations  Gastrointestinal: Negative for abdominal pain and vomiting  Genitourinary: Negative for dysuria and hematuria  Musculoskeletal: Positive for arthralgias and back pain  Skin: Negative for color change and rash  Neurological: Negative for dizziness, tremors, seizures, syncope, facial asymmetry, speech difficulty, weakness, light-headedness, numbness and headaches  All other systems reviewed and are negative        Historical Information   Past Medical History:   Diagnosis Date   • Coronary artery disease    • H/O atrial flutter    • History of aortic valve replacement with bioprosthetic valve    • Hyperlipemia    • Hypertension    • Pacemaker    • Skin cancer, basal cell    • Walker as ambulation aid    • Wears dentures      Past Surgical History:   Procedure Laterality Date   • A-V CARDIAC PACEMAKER INSERTION     • AORTIC VALVE REPLACEMENT     • CARDIAC PACEMAKER PLACEMENT Left     2021   • CATARACT EXTRACTION     • CORONARY ANGIOPLASTY WITH STENT PLACEMENT      stents times 3   • DC CYSTO BLADDER W/URETERAL CATHETERIZATION Right 01/13/2023    Procedure: CYSTOSCOPY RETROGRADE PYELOGRAM WITH INSERTION STENT URETERAL;  Surgeon: Marquis Mikayla MD;  Location: [facility];  Service: Urology   • DC CYSTO BLADDER W/URETERAL CATHETERIZATION Right 1/26/2023    Procedure: CYSTOSCOPY WITH RETROGRADE PYELOGRAM;  Surgeon: Marquis Mikayla MD;  Location: [facility];  Service: Urology   • DC CYSTO/URETERO W/LITHOTRIPSY &INDWELL STENT INSRT Right 1/26/2023    Procedure: CYSTOSCOPY URETEROSCOPY WITH LITHOTRIPSY HOLMIUM LASER, RETROGRADE PYELOGRAM AND INSERTION STENT URETERAL;  Surgeon: Marquis Mikayla MD;  Location[facility];  Service: Urology     Social History     Substance and Sexual Activity   Alcohol Use Yes    Comment: social     Social History     Substance and Sexual Activity   Drug Use Never     Social History     Tobacco Use   Smoking Status Former Smokeless Tobacco Never     Family History   Problem Relation Age of Onset   • No Known Problems Father    • No Known Problems Mother        Meds/Allergies   all current active meds have been reviewed and current meds:   Current Facility-Administered Medications   Medication Dose Route Frequency   • acetaminophen (TYLENOL) tablet 975 mg  975 mg Oral Q8H [facility]   • amLODIPine (NORVASC) tablet 10 mg  10 mg Oral Daily   • cefuroxime (CEFTIN) tablet 500 mg  500 mg Oral Q12H [facility]   • cholecalciferol (VITAMIN D3) tablet 1,000 Units  1,000 Units Oral Daily   • docusate sodium (COLACE) capsule 100 mg  100 mg Oral BID   • enoxaparin (LOVENOX) subcutaneous injection 30 mg  30 mg Subcutaneous Q55X   • folic acid (FOLVITE) tablet 400 mcg  400 mcg Oral Daily   • gabapentin (NEURONTIN) capsule 100 mg  100 mg Oral HS   • HYDROmorphone HCl (DILAUDID) injection 0 2 mg  0 2 mg Intravenous Q4H PRN   • lidocaine (LIDODERM) 5 % patch 1 patch  1 patch Topical Daily   • lisinopril (ZESTRIL) tablet 10 mg  10 mg Oral Daily   • melatonin tablet 6 mg  6 mg Oral HS   • metoprolol tartrate (LOPRESSOR) tablet 50 mg  50 mg Oral Daily   • naloxone (NARCAN) 0 04 mg/mL syringe 0 04 mg  0 04 mg Intravenous Q1MIN PRN   • ondansetron (ZOFRAN) injection 4 mg  4 mg Intravenous Q6H PRN   • oxyCODONE (ROXICODONE) IR tablet 2 5 mg  2 5 mg Oral Q4H PRN   • oxyCODONE (ROXICODONE) IR tablet 5 mg  5 mg Oral Q4H PRN   • polyethylene glycol (MIRALAX) packet 17 g  17 g Oral Daily   • sodium chloride 0 9 % infusion  125 mL/hr Intravenous Continuous   • tamsulosin (FLOMAX) capsule 0 4 mg  0 4 mg Oral Daily With Dinner     Allergies   Allergen Reactions   • Ezetimibe-Simvastatin Dizziness and Lightheadedness     Other reaction(s): MAKES HIM DIZZY     • Simvastatin Lightheadedness     Other reaction(s): Dizziness       Objective   I/O     None          Physical Exam  Constitutional:       General: He is not in acute distress  Appearance: He is well-developed   He is not diaphoretic  Eyes:      General:         Right eye: No discharge  Left eye: No discharge  Extraocular Movements: EOM normal       Conjunctiva/sclera: Conjunctivae normal       Pupils: Pupils are equal, round, and reactive to light  Pulmonary:      Effort: Pulmonary effort is normal  No respiratory distress  Abdominal:      General: Bowel sounds are normal  There is no distension  Palpations: Abdomen is soft  Tenderness: There is no abdominal tenderness  Musculoskeletal:         General: Tenderness present  Normal range of motion  Cervical back: Normal range of motion and neck supple  Skin:     General: Skin is warm and dry  Neurological:      General: No focal deficit present  Mental Status: He is alert and oriented to person, place, and time  Mental status is at baseline  Cranial Nerves: No cranial nerve deficit  Motor: Weakness present  Coordination: Finger-Nose-Finger Test normal       Deep Tendon Reflexes: Reflexes normal       Reflex Scores:       Patellar reflexes are 2+ on the right side and 2+ on the left side  Achilles reflexes are 2+ on the right side and 2+ on the left side  Psychiatric:         Speech: Speech normal          Behavior: Behavior normal          Thought Content: Thought content normal          Judgment: Judgment normal        Neurologic Exam     Mental Status   Oriented to person, place, and time  Oriented to person  Oriented to place  Oriented to time  Oriented to year, month and date  Registration: recalls 3 of 3 objects  Attention: normal  Concentration: normal    Speech: speech is normal   Level of consciousness: alert  Knowledge: good and consistent with education  Able to name object  Cranial Nerves     CN III, IV, VI   Pupils are equal, round, and reactive to light  Extraocular motions are normal    Right pupil: Size: 3 mm  Shape: regular  Reactivity: brisk  Consensual response: intact  Accommodation: intact  Left pupil: Size: 3 mm  Shape: regular  Reactivity: brisk  Consensual response: intact  Accommodation: intact  Nystagmus: none   Diplopia: none  Conjugate gaze: present    CN V   Right facial sensation deficit: none  Left facial sensation deficit: none    CN VII   Facial expression full, symmetric  CN VIII   Hearing: intact    CN IX, X   Palate: symmetric    CN XI   Right sternocleidomastoid strength: normal  Left sternocleidomastoid strength: normal  Right trapezius strength: normal  Left trapezius strength: normal    CN XII   Tongue: not atrophic  Fasciculations: absent  Tongue deviation: none    Motor Exam   Muscle bulk: normal  Overall muscle tone: normal  Right arm pronator drift: absent  Left arm pronator drift: absent    Strength   Right deltoid: 5/5  Left deltoid: 5/5  Right biceps: 5/5  Left biceps: 5/5  Right triceps: 5/5  Left triceps: 5/5  Right iliopsoas: 4/5  Left iliopsoas: 5/5  Right quadriceps: 5/5  Left quadriceps: 5/5  Right hamstrin/5  Left hamstrin/5  Right glutei: 5/5  Left glutei: 5/5  Right anterior tibial: 5/5  Left anterior tibial: 5/5  Right posterior tibial: 5/5  Left posterior tibial: 5/5  Right peroneal: 5/5  Left peroneal: 5/5  Right gastroc: 5/5  Left gastroc: 5/5    Sensory Exam   Light touch normal    Proprioception normal      Gait, Coordination, and Reflexes     Coordination   Finger to nose coordination: normal    Tremor   Resting tremor: absent  Intention tremor: absent  Action tremor: absent    Reflexes   Right patellar: 2+  Left patellar: 2+  Right achilles: 2+  Left achilles: 2+  Right Linares: absent  Left Linares: absent  Right ankle clonus: absent  Left ankle clonus: absent      Vitals:Blood pressure 137/56, pulse 84, temperature 97 5 °F (36 4 °C), temperature source Oral, resp  rate 18, SpO2 99 %  ,There is no height or weight on file to calculate BMI       Lab Results:   Results from last 7 days   Lab Units 23  0933   WBC Thousand/uL 10 91*   HEMOGLOBIN g/dL 14 1   HEMATOCRIT % 43 5   PLATELETS Thousands/uL 233   NEUTROS PCT % 80*   MONOS PCT % 6     Results from last 7 days   Lab Units 02/01/23  0933   POTASSIUM mmol/L 3 9   CHLORIDE mmol/L 101   CO2 mmol/L 24   BUN mg/dL 8   CREATININE mg/dL 0 75   CALCIUM mg/dL 8 5   ALK PHOS U/L 141*   ALT U/L 10   AST U/L 11*             Results from last 7 days   Lab Units 02/01/23  0933   INR  1 29*   PTT seconds 40*     No results found for: TROPONINT  ABG:No results found for: PHART, MDE6DCP, PO2ART, WPP9EQC, U3VFDLIS, BEART, SOURCE    Imaging Studies: I have personally reviewed pertinent reports  and I have personally reviewed pertinent films in PACS    XR Trauma chest portable    Result Date: 2/1/2023  Impression: No acute cardiopulmonary disease  Workstation performed: [id number]     TRAUMA - CT head wo contrast    Result Date: 2/1/2023  Impression: No acute intracranial abnormality  Workstation performed: [id number]     TRAUMA - CT spine cervical wo contrast    Result Date: 2/1/2023  Impression: No cervical spine fracture or traumatic malalignment  Workstation performed: [id number]     TRAUMA - CT abdomen pelvis w contrast    Result Date: 2/1/2023  Impression: Transversely oriented fracture through the T12 vertebral body with very slight extension into the pedicles  No significant displacement of fracture fragments or spinal malalignment  Very slight retropulsion of bony fragments  In retrospect, this fracture was probably acute (though very difficult to detect) on CT examination of December 30, 2022  Advanced lumbar and thoracic degenerative changes including multiple level degenerative fusion is in the lower thoracic and upper lumbar spine  Renal cortical thinning and chronic moderate right-sided hydronephrosis, persistent despite the presence of a right ureteral stent    Urothelial thickening involving right renal collecting system and proximal right ureter, similar when compared to January 12, 2023  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  Trace left pleural effusion, incompletely evaluated  Reidentified large right inguinal hernia with nonobstructed loops of small bowel in the hernia sac  No other acute or subacute fractures  No traumatic visceral injury in the abdomen or pelvis  I personally discussed this study with Konstantin Orta on 2/1/2023 at 10:25 AM  Workstation performed: [id number]       EKG, Pathology, and Other Studies: I have personally reviewed pertinent reports  and I have personally reviewed pertinent films in PACS    VTE Prophylaxis: Sequential compression device (Venodyne)  and Enoxaparin (Lovenox)    Code Status: Level 1 - Full Code  Advance Directive and Living Will:      Power of :    POLST:      Counseling / Coordination of Care  I spent 20 minutes with the patient

## 2023-02-01 NOTE — RESTORATIVE TECHNICIAN NOTE
Restorative Technician Note      Patient Name: Clara Sawyer     Restorative Tech Visit Date: 02/01/23  Note Type: Bracing, Initial consult  Patient Position Upon Consult: Supine  Brace Applied: Other (Comment) ([facility])  Education Provided: Yes  Patient Position at End of Consult: Supine; All needs within reach  Nurse Communication: Nurse aware of consult, application of brace    Please call Mobility Coordinator at ext  [phone] or on [place] text " [facility]-PT-Restorative Tech" role in regards to bracing instruction and/or adjustment      Harley Martinez BA, RT

## 2023-02-02 ENCOUNTER — APPOINTMENT (INPATIENT)
Dept: RADIOLOGY | Facility: HOSPITAL | Age: 88
End: 2023-02-02

## 2023-02-02 PROBLEM — G89.11 ACUTE PAIN DUE TO TRAUMA: Status: ACTIVE | Noted: 2023-02-02

## 2023-02-02 LAB
ANION GAP SERPL CALCULATED.3IONS-SCNC: 7 MMOL/L (ref 4–13)
BASOPHILS # BLD AUTO: 0.05 THOUSANDS/ÂΜL (ref 0–0.1)
BASOPHILS NFR BLD AUTO: 1 % (ref 0–1)
BUN SERPL-MCNC: 9 MG/DL (ref 5–25)
CALCIUM SERPL-MCNC: 8.5 MG/DL (ref 8.3–10.1)
CHLORIDE SERPL-SCNC: 107 MMOL/L (ref 96–108)
CO2 SERPL-SCNC: 25 MMOL/L (ref 21–32)
CREAT SERPL-MCNC: 0.57 MG/DL (ref 0.6–1.3)
EOSINOPHIL # BLD AUTO: 0.16 THOUSAND/ÂΜL (ref 0–0.61)
EOSINOPHIL NFR BLD AUTO: 2 % (ref 0–6)
ERYTHROCYTE [DISTWIDTH] IN BLOOD BY AUTOMATED COUNT: 14.7 % (ref 11.6–15.1)
GFR SERPL CREATININE-BSD FRML MDRD: 92 ML/MIN/1.73SQ M
GLUCOSE SERPL-MCNC: 92 MG/DL (ref 65–140)
HCT VFR BLD AUTO: 40.1 % (ref 36.5–49.3)
HGB BLD-MCNC: 12.7 G/DL (ref 12–17)
IMM GRANULOCYTES # BLD AUTO: 0.03 THOUSAND/UL (ref 0–0.2)
IMM GRANULOCYTES NFR BLD AUTO: 0 % (ref 0–2)
LYMPHOCYTES # BLD AUTO: 1.29 THOUSANDS/ÂΜL (ref 0.6–4.47)
LYMPHOCYTES NFR BLD AUTO: 18 % (ref 14–44)
MAGNESIUM SERPL-MCNC: 2.1 MG/DL (ref 1.6–2.6)
MCH RBC QN AUTO: 28.5 PG (ref 26.8–34.3)
MCHC RBC AUTO-ENTMCNC: 31.7 G/DL (ref 31.4–37.4)
MCV RBC AUTO: 90 FL (ref 82–98)
MONOCYTES # BLD AUTO: 0.56 THOUSAND/ÂΜL (ref 0.17–1.22)
MONOCYTES NFR BLD AUTO: 8 % (ref 4–12)
NEUTROPHILS # BLD AUTO: 5.13 THOUSANDS/ÂΜL (ref 1.85–7.62)
NEUTS SEG NFR BLD AUTO: 71 % (ref 43–75)
NRBC BLD AUTO-RTO: 0 /100 WBCS
PHOSPHATE SERPL-MCNC: 2.9 MG/DL (ref 2.3–4.1)
PLATELET # BLD AUTO: 198 THOUSANDS/UL (ref 149–390)
PMV BLD AUTO: 10.4 FL (ref 8.9–12.7)
POTASSIUM SERPL-SCNC: 3.8 MMOL/L (ref 3.5–5.3)
RBC # BLD AUTO: 4.45 MILLION/UL (ref 3.88–5.62)
SODIUM SERPL-SCNC: 139 MMOL/L (ref 135–147)
WBC # BLD AUTO: 7.22 THOUSAND/UL (ref 4.31–10.16)

## 2023-02-02 RX ORDER — ATORVASTATIN CALCIUM 40 MG/1
40 TABLET, FILM COATED ORAL
Status: DISCONTINUED | OUTPATIENT
Start: 2023-02-02 | End: 2023-02-15 | Stop reason: HOSPADM

## 2023-02-02 RX ADMIN — POLYETHYLENE GLYCOL 3350 17 G: 17 POWDER, FOR SOLUTION ORAL at 08:27

## 2023-02-02 RX ADMIN — ACETAMINOPHEN 975 MG: 325 TABLET, FILM COATED ORAL at 13:05

## 2023-02-02 RX ADMIN — OXYCODONE HYDROCHLORIDE 5 MG: 5 TABLET ORAL at 13:05

## 2023-02-02 RX ADMIN — AMLODIPINE BESYLATE 10 MG: 10 TABLET ORAL at 08:26

## 2023-02-02 RX ADMIN — TAMSULOSIN HYDROCHLORIDE 0.4 MG: 0.4 CAPSULE ORAL at 17:37

## 2023-02-02 RX ADMIN — METOPROLOL TARTRATE 50 MG: 50 TABLET, FILM COATED ORAL at 08:26

## 2023-02-02 RX ADMIN — Medication 1000 UNITS: at 08:26

## 2023-02-02 RX ADMIN — ACETAMINOPHEN 975 MG: 325 TABLET, FILM COATED ORAL at 05:23

## 2023-02-02 RX ADMIN — ATORVASTATIN CALCIUM 40 MG: 40 TABLET, FILM COATED ORAL at 17:37

## 2023-02-02 RX ADMIN — DOCUSATE SODIUM 100 MG: 100 CAPSULE, LIQUID FILLED ORAL at 17:37

## 2023-02-02 RX ADMIN — ACETAMINOPHEN 975 MG: 325 TABLET, FILM COATED ORAL at 21:49

## 2023-02-02 RX ADMIN — ENOXAPARIN SODIUM 30 MG: 30 INJECTION SUBCUTANEOUS at 17:37

## 2023-02-02 RX ADMIN — LISINOPRIL 10 MG: 5 TABLET ORAL at 08:27

## 2023-02-02 RX ADMIN — METHOCARBAMOL TABLETS 500 MG: 500 TABLET, COATED ORAL at 13:05

## 2023-02-02 RX ADMIN — METHOCARBAMOL TABLETS 500 MG: 500 TABLET, COATED ORAL at 05:23

## 2023-02-02 RX ADMIN — DOCUSATE SODIUM 100 MG: 100 CAPSULE, LIQUID FILLED ORAL at 08:26

## 2023-02-02 RX ADMIN — MELATONIN 6 MG: at 21:49

## 2023-02-02 RX ADMIN — OXYCODONE HYDROCHLORIDE 5 MG: 5 TABLET ORAL at 08:26

## 2023-02-02 RX ADMIN — METHOCARBAMOL TABLETS 500 MG: 500 TABLET, COATED ORAL at 17:37

## 2023-02-02 RX ADMIN — METHOCARBAMOL TABLETS 500 MG: 500 TABLET, COATED ORAL at 00:08

## 2023-02-02 RX ADMIN — OXYCODONE HYDROCHLORIDE 5 MG: 5 TABLET ORAL at 21:49

## 2023-02-02 RX ADMIN — CEFUROXIME AXETIL 500 MG: 250 TABLET, FILM COATED ORAL at 08:27

## 2023-02-02 RX ADMIN — GABAPENTIN 100 MG: 100 CAPSULE ORAL at 21:49

## 2023-02-02 RX ADMIN — FOLIC ACID TAB 400 MCG 400 MCG: 400 TAB at 08:26

## 2023-02-02 RX ADMIN — HYDROMORPHONE HYDROCHLORIDE 0.2 MG: 0.2 INJECTION, SOLUTION INTRAMUSCULAR; INTRAVENOUS; SUBCUTANEOUS at 09:17

## 2023-02-02 RX ADMIN — HYDROMORPHONE HYDROCHLORIDE 0.2 MG: 0.2 INJECTION, SOLUTION INTRAMUSCULAR; INTRAVENOUS; SUBCUTANEOUS at 15:05

## 2023-02-02 RX ADMIN — OXYCODONE HYDROCHLORIDE 5 MG: 5 TABLET ORAL at 17:37

## 2023-02-02 RX ADMIN — ENOXAPARIN SODIUM 30 MG: 30 INJECTION SUBCUTANEOUS at 05:23

## 2023-02-02 NOTE — ASSESSMENT & PLAN NOTE
- bioprosthetic AVR  - follows outpatient with  Cardiology at [facility], [facility]

## 2023-02-02 NOTE — PHYSICAL THERAPY NOTE
PHYSICAL THERAPY EVALUATION  NAME:  Valentin Cochran  DATE: 02/02/23    AGE:   81 y o  Mrn:   [id number]  ADMIT DX:  Injury Gary Shara  Fall, initial encounter [W19  XXXA]  Traumatic fractures of T12 and L1 vertebrae ([address] ) [S22 089A, M91 191Q]    Past Medical History:   Diagnosis Date   • Coronary artery disease    • H/O atrial flutter    • History of aortic valve replacement with bioprosthetic valve    • Hyperlipemia    • Hypertension    • Pacemaker    • Skin cancer, basal cell    • Walker as ambulation aid    • Wears dentures        Past Surgical History:   Procedure Laterality Date   • A-V CARDIAC PACEMAKER INSERTION     • AORTIC VALVE REPLACEMENT     • CARDIAC PACEMAKER PLACEMENT Left     2021   • CATARACT EXTRACTION     • CORONARY ANGIOPLASTY WITH STENT PLACEMENT      stents times 3   • CA CYSTO BLADDER W/URETERAL CATHETERIZATION Right 01/13/2023    Procedure: CYSTOSCOPY RETROGRADE PYELOGRAM WITH INSERTION STENT URETERAL;  Surgeon: Caleb Hansen MD;  [facility];  Service: Urology   • CA CYSTO BLADDER W/URETERAL CATHETERIZATION Right 1/26/2023    Procedure: CYSTOSCOPY WITH RETROGRADE PYELOGRAM;  Surgeon: Caleb Hansen MD;  [facility];  Service: Urology   • CA CYSTO/URETERO W/LITHOTRIPSY &INDWELL STENT INSRT Right 1/26/2023    Procedure: CYSTOSCOPY URETEROSCOPY WITH LITHOTRIPSY HOLMIUM LASER, RETROGRADE PYELOGRAM AND INSERTION STENT URETERAL;  Surgeon: Caleb Hansen MD;  [facility];  Service: Urology       Length Of Stay: 1    PHYSICAL THERAPY EVALUATION:        02/02/23 0918   Note Type   Note type Evaluation   Pain Assessment   Pain Assessment Tool 0-10   Pain Score 8   Pain Location/Orientation Location: Back   Pain Onset/Description Onset: Ongoing;Frequency: Constant/Continuous; Descriptor: Aching   Effect of Pain on Daily Activities increased pain with activity   Patient's Stated Pain Goal No pain   Hospital Pain Intervention(s) Ambulation/increased activity;Repositioned Restrictions/Precautions   Weight Bearing Precautions Per Order No   Braces or Orthoses (S)  TLSO  (backpack TLSO)   Other Precautions Chair Alarm; Bed Alarm;Multiple lines; Fall Risk;Pain;Spinal precautions   Home Living   Type of [address] Two level; Able to live on main level with bedroom/bathroom;Stairs to enter with rails  (Pt with hospital bed on first floor of home)   Home Equipment Walker;Cane   Additional Comments Patient reports living with daughter and son-in-law who are able to assist as needed  Patient states his son-in-law is retired and able to be home with him   Prior Function   Level of [place] Independent with functional mobility   Lives With Daughter;Family   Receives Help From [facility] in the last 6 months 1 to 4   Comments Patient reports use of rolling walker for ambulation prior to admission   General   Family/Caregiver Present No   Cognition   Overall Cognitive Status WFL   Arousal/Participation Alert   Orientation Level Oriented X4   Memory Decreased recall of precautions   Following Commands Follows one step commands without difficulty   RUE Assessment   RUE Assessment WFL   LUE Assessment   LUE Assessment WFL   RLE Assessment   RLE Assessment WFL   Strength RLE   RLE Overall Strength 4-/5   LLE Assessment   LLE Assessment WFL   Strength LLE   LLE Overall Strength 4-/5   Bed Mobility   Supine to Sit 3  Moderate assistance   Additional items Assist x 1; Increased time required;Verbal cues   Transfers   Sit to Stand 4  Minimal assistance   Additional items Assist x 1; Increased time required;Verbal cues   Stand to Sit 4  Minimal assistance   Additional items Assist x 1; Increased time required;Verbal cues   Additional Comments Cues needed for hand placement during transfers   Ambulation/Elevation   Gait pattern Short stride; Foward flexed; Inconsistent nataly   Gait Assistance 4  Minimal assist   Additional items Assist x 1   Assistive Device Rolling walker   Distance 15ft  (limited by pain)   Balance   Static Sitting Fair -   Static Standing Poor +   Ambulatory Poor   Endurance Deficit   Endurance Deficit Yes   Endurance Deficit Description fatigue, pain   Activity Tolerance   Activity Tolerance Patient limited by fatigue;Patient limited by pain   Medical Staff Made [address], OT; OT present for co evaluation due to pts current medical presentation   Nurse Made Aware Pt appropriate to be seen and mobilize per nsg   Assessment   Prognosis Good   Problem List Decreased strength;Decreased endurance; Impaired balance;Decreased mobility; Decreased range of motion;Pain;Orthopedic restrictions   Assessment Pt is 81 y o  male seen for PT evaluation s/p admit to [facility] on 2/1/2023  Two pt identifiers were used to confirm  Pt presented s/p fall  Pt originally presented at   Pt was admitted with a primary dx of: Closed T12 fracture, and other active problems including right hip pain, urinary tract obstruction by kidney stone, pacemaker in place, CAD, history of AVR, aortic stenosis, HLD, HTN  Neurosurgery recommending TLSO brace when out of bed PT now consulted for assessment of mobility and d/c needs  Pt with Up in chair orders  Pts current co morbidities affecting treatment include:  has a past medical history of Coronary artery disease, H/O atrial flutter, History of aortic valve replacement with bioprosthetic valve, Hyperlipemia, Hypertension, Pacemaker, Skin cancer, basal cell, Walker as ambulation aid, and Wears dentures    Pts current clinical presentation is Unstable/ Unpredictable (high complexity) due to Ongoing medical management for primary dx, Decreased activity tolerance compared to baseline, Fall risk, Increased assistance needed from caregiver at current time, Spinal precautions at current time, Continuous pulse oximetry monitoring     Upon evaluation, pt currently is requiring Mod Ax1 for bed mobility; Min Ax1 for transfers and Min Ax1 for ambulation w/ RW  Pt presents at PT eval functioning below baseline and currently w/ overall mobility deficits 2* to: BLE weakness, decreased ROM, impaired balance, decreased endurance, gait deviations, pain, decreased activity tolerance compared to baseline, fall risk, spinal precautions  Pt currently at a fall risk 2* to impairments listed above  Based on the aforementioned PT evaluation, pt will continue to benefit from skilled Acute PT interventions to address stated impairments; to maximize functional mobility; for ongoing pt/ family training; and DME needs  At conclusion of PT session pt returned back in chair and chair alarm engaged with phone and call bell within reach  Pt denies any further questions at this time  PT is currently recommending Home PT and Home with increased family support pending progress and pain control  PT will continue to follow during hospital stay  Barriers to Discharge None   Barriers to Discharge Comments Pt denies and mobility or safety concerns about returning home at time of d/c   Goals   Patient Goals " to have less pain"   [facility] Date 02/12/23   Short Term Goal #1 In 10 days pt will complete: 1) Bed mobility skills with mod I to increase safety and independence as well as decrease caregiver burden  2) Functional transfers with mod I to promote increased independence, safety, and QOL  3) Ambulate 150' using least restrictive AD with S without LOB and stable vitals so that pt can negotiate previous living environment safely and promote independence with functional mobility and return to PLOF  4) Stair training up/ down 3 step/s using rail/s with S so that pt can enter/negotiate previous living environment safely and decrease fall risk  5) Improve balance grades by 1/2 grade to increase safety with all mobility and decrease fall risk  6) Improve BLE strength by 1/2 grade to help increase overall functional mobility and decrease fall risk     Plan   Treatment/Interventions Functional transfer training;LE strengthening/ROM; Elevations; Therapeutic exercise; Endurance training;Patient/family training;Equipment eval/education; Bed mobility;Gait training;Spoke to nursing;OT   PT Frequency 3-5x/wk   Recommendation   PT Discharge Recommendation Home with home health rehabilitation  (pending progress and pain control)   Equipment Recommended Lynn Brown Package Recommended Wheeled walker   AM-PAC Basic Mobility Inpatient   Turning in Flat Bed Without Bedrails 3   Lying on Back to Sitting on Edge of Flat Bed Without Bedrails 3   Moving Bed to Chair 3   Standing Up From Chair Using Arms 3   Walk in Room 3   Climb 3-5 Stairs With Railing 2   Basic Mobility Inpatient Raw Score 17   Basic Mobility Standardized Score 39 67   Highest Level Of Mobility   -HL Goal 5: Stand one or more mins   -HLM Achieved 6: Walk 10 steps or more   Modified Anderson Scale   Modified [place] Scale 4   Barthel Index   Feeding 10   Bathing 0   Grooming Score 5   Dressing Score 5   Bladder Score 10   Bowels Score 10   Toilet Use Score 5   Transfers (Bed/Chair) Score 10   Mobility (Level Surface) Score 0   Stairs Score 0   Barthel Index Score 55   Portions of the documentation may have been created using voice recognition software  Occasional wrong word or sound alike substitutions may have occurred due to the inherent limitations of the voice recognition software  Read the chart carefully and recognize, using context, where substitutions have occurred      Leodan Pérez, PT, DPT

## 2023-02-02 NOTE — ASSESSMENT & PLAN NOTE
T12 fracture with slight extension into pedicles  · S/p mechanical fall Sunday, 1/29  · Additional fall 12/25/2022 - fracture evident retrospectively although not read at the time of prior imaging  · Endorsing significant low back pain that improved following lithotripsy  · Relates new right anterior thigh pain that shoots from hip to knee since urologic procedure on 1/26, no on left side as well  · On exam, some mild right hip flexion/extension weakness that is antalgic  Sensation intact  No saddle anesthesia  Pain greatly exacerbated with movement  Imaging:  · CT CAP, 2/1/2023: Transversely oriented fracture through the T12 vertebral body with very slight extension into the pedicles  No significant displacement of fracture fragments or spinal malalignment  Very slight retropulsion of bony fragments  In retrospect, this fracture was probably acute (though very difficult to detect) on CT examination of December 30, 2022  Advanced lumbar and thoracic degenerative changes including multiple level degenerative fusion is in the lower thoracic and upper lumbar spine  Plan:  · Continue to monitor neuro exam closely  · TLSO brace when upright > 45 degrees and OOB  · Upright thoracolumbar x-rays pending  · Unclear etiology of right hip/thigh pain  Does not correspond with fracture location  Could be radicular pain in setting of diffuse degenerative disease of spine  · MRI thoracolumbar spine ordered to further evaluate  · Multimodal pain control  · Mobilize with PT/OT  · Will attempt conservative management of fracture as any surgical intervention would likely be extensive and concern for ability to tolerate surgery  · DVT ppx: SCDs, Lovenox  Neurosurgery will continue to follow  Please call with questions or concerns

## 2023-02-02 NOTE — UTILIZATION REVIEW
Initial Clinical Review    Admission: Date/Time/Statement:   Admission Orders (From admission, onward)     Ordered        02/01/23 1414  Inpatient Admission  Once                      Orders Placed This Encounter   Procedures   • Inpatient Admission     Standing Status:   Standing     Number of Occurrences:   1     Order Specific Question:   Level of Care     Answer:   Med Surg [16]     Order Specific Question:   Bed Type     Answer:   Trauma [7]     Order Specific Question:   Estimated length of stay     Answer:   More than 2 Midnights     Order Specific Question:   Certification     Answer:   I certify that inpatient services are medically necessary for this patient for a duration of greater than two midnights  See H&P and MD Progress Notes for additional information about the patient's course of treatment  ED Arrival Information     Expected   2/1/2023 10:35    Arrival   2/1/2023 13:44    Acuity   Urgent            Means of arrival   Ambulance    Escorted by   Unknown    Service   Trauma    Admission type   Emergency            Arrival complaint   t12 fx           Chief Complaint   Patient presents with   • Trauma     Trauma transfer from [facility]  Initial Presentation: 81 y o  male  with pmhx of CAD, aflutter, bioprosthetic AVR, 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, R ureteral stones/hydro s/p R cysto, renal stent 1/13/23, HLD, pacemaker, skin cancer, who presents as a transfer from [address] [place] [address] after a fall with no headstrike or LOC  Pt found to have an subacute T12 fracture  The remaining workup was negative  Pt has wynn catheter placed for recent cysto and was to follow-up today with urology in clinic  He has history of recent fall around [place] of last year where workup was significant for R ureteral stone only  He states that he has had worsening back pain and limited mobility due to the pain, that they attributed to the kidney stone   Prior to his fall in December, pt daughter states that pt walked with out a cane or walker and independently performed ADL's and IADL's  Since that initial fall, he has had pain with ambulating in his back  After his cysto last Thursday, he initally felt great the first day, but then developed back pain that radiated down his right anterior thigh that he described as shooting pain  He states that has worsened since his fall last night  Plan: Inpatient admission for evaluation and treatment of T12 vertebral body farcture: Neurosurgery consult, upright Xray and TLSO brace, log roll and T spine precautions, diet as tolerated, hold Eliquis  Neurosurgery consult: TLSO brace when upright > 45 degrees and OOB  Upright thoracolumbar x-rays pending  Pain management  Will attempt conservative management of fracture as any surgical intervention would likely be extensive and concern for ability to tolerate surgery  Date: 2/2   Day 2:     Trauma: PT/OT eval pending, CM for dispo planning, MRI L spine and upright xrays pending, TLSO brace, pain control  Patient noted to have a renal stone on 1/13 and underwent lithotripsy and right renal stent placement on 1/26 and has a chronic wynn catheter  It appears he was to have followed up for a voiding trial with urology on 1/31  I spoke with urology who recommends voiding trial and discontinuation of Ceftin  Neurosurgery: TLSO brace when upright > 45 degrees and OOB  Upright thoracolumbar x-rays pending  MRI thoracolumbar spine ordered to further evaluate  Pain control  Mobilize with PT/OT  Will attempt conservative management of fracture as any surgical intervention would likely be extensive and concern for ability to tolerate surgery  Date: 2/3    Day 3: Has surpassed a 2nd midnight with active treatments and services  Pt requiring continued stay for PT and pain control       ED Triage Vitals   Temperature Pulse Respirations Blood Pressure SpO2   02/01/23 1345 02/01/23 1345 02/01/23 1345 02/01/23 1345 02/01/23 1345   97 5 °F (36 4 °C) 83 18 134/69 99 % Temp Source Heart Rate Source Patient Position - Orthostatic VS BP Location FiO2 (%)   02/01/23 1345 02/01/23 1345 02/01/23 1345 02/01/23 1500 --   Oral Monitor Lying Right arm       Pain Score       02/01/23 1345       4          Wt Readings from Last 1 Encounters:   01/26/23 101 kg (222 lb)     Additional Vital Signs:     Date/Time Temp Pulse Resp BP MAP (mmHg) SpO2 O2 Device   02/02/23 10:36:10 98 7 °F (37 1 °C) 72 -- 122/68 86 95 % --   02/02/23 07:51:35 98 2 °F (36 8 °C) 82 -- 125/70 88 96 % --   02/02/23 03:51:52 97 6 °F (36 4 °C) 65 18 121/64 83 98 % --   02/01/23 21:59:40 98 5 °F (36 9 °C) 68 17 121/61 81 97 % --   02/01/23 2030 98 6 °F (37 °C) 65 -- 128/65 -- 96 % None (Room air)   02/01/23 17:01:43 98 8 °F (37 1 °C) 88 -- 136/69 91 97 % --   02/01/23 1630 -- -- -- -- -- -- None (Room air)   02/01/23 1500 -- 84 18 137/56 87 99 % None (Room air)   02/01/23 1400 -- 82 20 128/67 91 99 % --     Pertinent Labs/Diagnostic Test Results:   XR spine thoracolumbar 2 vw    (Results Pending)         Results from last 7 days   Lab Units 02/02/23  0457 02/01/23  0933   WBC Thousand/uL 7 22 10 91*   HEMOGLOBIN g/dL 12 7 14 1   HEMATOCRIT % 40 1 43 5   PLATELETS Thousands/uL 198 233   NEUTROS ABS Thousands/µL 5 13 8 73*         Results from last 7 days   Lab Units 02/02/23  0457 02/01/23  0933   SODIUM mmol/L 139 135   POTASSIUM mmol/L 3 8 3 9   CHLORIDE mmol/L 107 101   CO2 mmol/L 25 24   ANION GAP mmol/L 7 10   BUN mg/dL 9 8   CREATININE mg/dL 0 57* 0 75   EGFR ml/min/1 73sq m 92 83   CALCIUM mg/dL 8 5 8 5   MAGNESIUM mg/dL 2 1  --    PHOSPHORUS mg/dL 2 9  --      Results from last 7 days   Lab Units 02/01/23  0933   AST U/L 11*   ALT U/L 10   ALK PHOS U/L 141*   TOTAL PROTEIN g/dL 6 4   ALBUMIN g/dL 3 7   TOTAL BILIRUBIN mg/dL 1 00         Results from last 7 days   Lab Units 02/02/23  0457 02/01/23  0933   GLUCOSE RANDOM mg/dL 92 127           Results from last 7 days   Lab Units 02/01/23  0933   PROTIME seconds 16 2*   INR  1 29*   PTT seconds 40*         ED Treatment:   Medication Administration from 02/01/2023 1035 to 02/01/2023 1643     None        Past Medical History:   Diagnosis Date   • Coronary artery disease    • H/O atrial flutter    • History of aortic valve replacement with bioprosthetic valve    • Hyperlipemia    • Hypertension    • Pacemaker    • Skin cancer, basal cell    • Walker as ambulation aid    • Wears dentures      Present on Admission:  • Hypertension  • Hyperlipemia  • Aortic stenosis  • [facility] artery [facility] ([facility] )  • Pacemaker  • Urinary tract obstruction by kidney stone      Admitting Diagnosis: Injury Amisha Pandey  Fall, initial encounter [W19  XXXA]  Traumatic fractures of T12 and L1 vertebrae ([facility] ) [S22 089A, P21 175H]  Age/Sex: 81 y o  male  Admission Orders:  Scheduled Medications:  acetaminophen, 975 mg, Oral, Q8H RONAK  amLODIPine, 10 mg, Oral, Daily  atorvastatin, 40 mg, Oral, Daily With Dinner  cefuroxime, 500 mg, Oral, Q12H [facility]  cholecalciferol, 1,000 Units, Oral, Daily  docusate sodium, 100 mg, Oral, BID  enoxaparin, 30 mg, Subcutaneous, W30B  folic acid, 140 mcg, Oral, Daily  gabapentin, 100 mg, Oral, HS  lidocaine, 1 patch, Topical, Daily  lisinopril, 10 mg, Oral, Daily  melatonin, 6 mg, Oral, HS  methocarbamol, 500 mg, Oral, Q6H RONAK  metoprolol tartrate, 50 mg, Oral, Daily  polyethylene glycol, 17 g, Oral, Daily  tamsulosin, 0 4 mg, Oral, Daily With Dinner      Continuous IV Infusions:     PRN Meds:  HYDROmorphone, 0 2 mg, Intravenous, Q4H PRN x2 2/2  naloxone, 0 04 mg, Intravenous, Q1MIN PRN  ondansetron, 4 mg, Intravenous, Q6H PRN  oxyCODONE, 2 5 mg, Oral, Q4H PRN  oxyCODONE, 5 mg, Oral, Q4H PRN x4 2/2        IP CONSULT TO NEUROSURGERY  IP CONSULT TO CASE MANAGEMENT  IP CONSULT TO GERONTOLOGY    Network Utilization Review Department  ATTENTION: Please call with any questions or concerns to [phone] and carefully listen to the prompts so that you are directed to the right person  All voicemails are confidential   Sen Zhu all requests for admission clinical reviews, approved or denied determinations and any other requests to dedicated fax number below belonging to the campus where the patient is receiving treatment   List of dedicated fax numbers for the Facilities:  [address] DENIALS (Administrative/Medical Necessity) [phone]   [address] (Maternity/NICU/Pediatrics) [phone]   [address] [phone]   Soco Crews  [phone]   [address] [place] [address] [phone]   1551 [facility] 134 815 [facility] [phone]

## 2023-02-02 NOTE — PROGRESS NOTES
[address] [facility]  Progress Note Paresh Ovens 1935, 81 y o  male MRN: [id number]  Unit/Bed#: [facility] 606-01 Encounter: [id number]  Primary Care Provider: Vamsi Rojas DO   Date and time admitted to hospital: 2/1/2023  1:44 PM    * Fall  Assessment & Plan  - mechanical fall, sustained below stated injury  - PT/OT evaluations pending  - Appreciate geriatrics' consulation  -  for disposition planning    Closed T12 fracture ([address] )  Assessment & Plan  - T 12 fracture evaluated by neurosurgery and recommended conservative management for now but will further discuss risks vs benefits of surgery/kyphoplasty with family today  - MRI L spine ordered per neurosurgery recommendations  - Upright x-rays pending  - TLSO brace  - neuro intact- has a h/o R ankle surgery with weakness with dorsi- /plantar flexion which is chronic  RLE with weakness due to pain in the R hip noted with hip flexion and extension    - pain control with multi modal regimen  - f/u neurosurgery recommendations after upright x-rays and MRI L spine obtained    Acute pain due to trauma  Assessment & Plan  - Acute pain secondary to traumatic injuries  - Continue multi modal analgesic regimen  - Bowel regimen as long as using opioids   - Continue to monitor pain and adjust regimen as indicated  Right hip pain  Assessment & Plan  - CT A/P negative for injury  - likely contusion and muscle strain  - PT/OT and pain control  - WBAT on RLE    Urinary tract obstruction by kidney stone  Assessment & Plan  - patient noted to have a renal stone on 1/13 and underwent lithotripsy and right renal stent placement on 1/26 and has a chronic wynn catheter  It appears he was to have followed up for a voiding trial with urology on 1/31  He also remains on Ceftin, which should have been for a 5-day course from 1/18-1/23 from chart review  - I spoke with urology who recommends voiding trial and discontinuation of Ceftin  Pacemaker  Assessment & Plan  - h/o A  Fib s/p PPM   - f/u outpatient with cardiology at [facility]    Carotid artery disease [facility])  Assessment & Plan  - s/p CABG x3  - continue home metoprolol and statin  - resume home Eliquis    S/P AVR  Assessment & Plan  - bioprosthetic AVR  - follows outpatient with  Cardiology at [facility]    Aortic stenosis  Assessment & Plan  - h/o bioprosthetic AVR    Hyperlipemia  Assessment & Plan  - continue home statin    Hypertension  Assessment & Plan  - continue home lisinopril and amlodipine  - BP adequately controlled      Bowel Regimen: colace,  miralax  VTE Prophylaxis:Sequential compression device (Venodyne)  and Enoxaparin (Lovenox)     Disposition: continue med-surg status, f/u neurosurgery plans and MRI L spine/uprights, voiding trial    Subjective   Chief Complaint: "I'm having pain in my back"    Subjective: Patient is interested in hearing about possibility of surgical options  Having pain in his back which is moderate to severe as he describes  Also having pain in his legs, moreso on the right  Confirmed no hip fracture with CT A/P yesterdya  Objective   Vitals:   Temp:  [97 5 °F (36 4 °C)-98 8 °F (37 1 °C)] 98 7 °F (37 1 °C)  HR:  [65-88] 72  Resp:  [17-20] 18  BP: (121-137)/(56-70) 122/68    I/O       01/31 0701  02/01 0700 02/01 0701  02/02 0700 02/02 0701  02/03 0700    P  O   360 480    I V    1000    Total Intake  360 1480    Urine   450    Total Output   450    Net  +360 +1030                  Physical Exam:   GENERAL APPEARANCE: NAD  NEURO: GCS 15,non-focal except decreased strength with R foot plantar and dorsiflexion and R hip/flexion and extension due to pain in the R hip and prior surgery in the R ankle  HEENT: NCAT  CV: RRR, no MGR  LUNGS: CTA bilaterally  GI: soft,non-tender, non-distended  : + wynn in place, draining clear, yellow urine  MSK: no edema, contusion or deformity  SKIN: pink, warm, dry    Invasive Devices     Peripheral Intravenous Line  Duration           Peripheral IV 02/01/23 Right Antecubital 1 day          Drain  Duration           Ureteral Internal Stent Right ureter 6 Fr  20 days    Urethral Catheter 16 Fr  6 days                      Lab Results:   Results: I have personally reviewed all pertinent laboratory/tests results, BMP/CMP:   Lab Results   Component Value Date    SODIUM 139 02/02/2023    K 3 8 02/02/2023     02/02/2023    CO2 25 02/02/2023    BUN 9 02/02/2023    CREATININE 0 57 (L) 02/02/2023    CALCIUM 8 5 02/02/2023    EGFR 92 02/02/2023    and CBC:   Lab Results   Component Value Date    WBC 7 22 02/02/2023    HGB 12 7 02/02/2023    HCT 40 1 02/02/2023    MCV 90 02/02/2023     02/02/2023    MCH 28 5 02/02/2023    MCHC 31 7 02/02/2023    RDW 14 7 02/02/2023    MPV 10 4 02/02/2023    NRBC 0 02/02/2023     Imaging: I have personally reviewed pertinent reports       Other Studies: no new

## 2023-02-02 NOTE — CASE MANAGEMENT
Case Management Assessment & Discharge Planning Note    Patient name [facility]o [facility]  Location [address] [place] [address] [address] [id number]  : 1935 Date 2023       Current Admission Date: 2023  Current Admission Diagnosis:Fall   Patient Active Problem List    Diagnosis Date Noted   • Acute pain due to trauma 2023   • Fall 2023   • Closed T12 fracture ([address] ) 2023   • Right hip pain 2023   • Class 1 obesity in adult 2023   • H/O heart artery stent    • MS (mitral stenosis)    • Right ureteral stone 2023   • Acute cystitis with hematuria 2023   • Urinary tract obstruction by kidney stone 2023   • Atrial flutter ([address] ) 2023   • Pacemaker 2022   • Hypertension    • Hyperlipemia    • Aortic stenosis    • S/P AVR    • Carotid artery disease ([address] )    • Coronary artery disease       LOS (days): 1  Geometric Mean LOS (GMLOS) (days): 2 80  Days to GMLOS:1 8     OBJECTIVE:  PATIENT READMITTED [address]  of Unplanned Readmission Score: 16 57         Current admission status: Inpatient       Preferred Pharmacy:   [facility] Anjana Padilla, [address] [place], [place] [address]nelda Paula [address]  Phone: [phone] Fax: [phone]    [id number] [place]  [address]in [address]  [address] [place], [place] [address] [address]  Phone: [phone] Fax: [phone]    Primary Care Provider: Rocio Bullock DO    Primary Insurance: [facility]  Secondary Insurance:     ASSESSMENT:  Lan Jarrett Proxies    There are no active Health Care Proxies on file         Advance Directives  Primary Contact: Nazario Truong (Daughter) [phone] (H) [phone]    Readmission Root Cause  30 Day Readmission: No    Patient Information  Admitted from[de-identified] Home  Mental Status: Alert  During Assessment patient was accompanied by: Not accompanied during assessment  Assessment information provided by[de-identified] Patient  Primary Caregiver: Self  Support Systems: Self, Daughter, Family members  [place] of Residence: [address] do you live in?: [place]  Type of Current Residence: 2 story home  In the last 12 months, was there a time when you were not able to pay the mortgage or rent on time?: No  In the last 12 months, how many places have you lived?: 1  In the last 12 months, was there a time when you did not have a steady place to sleep or slept in a shelter (including now)?: No  Homeless/housing insecurity resource given?: N/A  Living Arrangements: Lives w/ Daughter    Activities of Daily Living Prior to Admission  Functional Status: Independent  Completes ADLs independently?: Yes  Ambulates independently?: Yes  Does patient use assisted devices?: No  Does patient currently own DME?: Yes  What DME does the patient currently own?: Ingrid Tracy  Does patient have a history of Outpatient Therapy (PT/OT)?: Yes  Does the patient have a history of Short-Term Rehab?: No  Does patient have a history of HHC?: Yes  Does patient currently have [facility]?: Yes    Current [facility]  Type of Current Home Care Services: Home health aide, Nurse visit  [address][de-identified] [facility] Provider[de-identified] PCP    Patient Information Continued  Income Source: SSI/SSD  Does patient have prescription coverage?: Yes  Within the past 12 months, you worried that your food would run out before you got the money to buy more : Never true  Within the past 12 months, the food you bought just didn't last and you didn't have money to get more : Never true  Food insecurity resource given?: N/A  Does patient receive dialysis treatments?: No  Does patient have a history of substance abuse?: No  Does patient have a history of Mental Health Diagnosis?: No    Means of Transportation  Means of Transport to Appts[de-identified] Family transport  In the past 12 months, has lack of transportation kept you from medical appointments or from getting medications?: No  In the past 12 months, has lack of transportation kept you from meetings, work, or from getting things needed for daily living?: No  Was application for public transport provided?: N/A    DISCHARGE DETAILS:  Freedom of Choice: Yes     CM contacted family/caregiver?: Yes  Were Treatment Team discharge recommendations reviewed with patient/caregiver?: Yes     Were patient/caregiver advised of the risks associated with not following Treatment Team discharge recommendations?: Yes  Contacts  Patient Contacts: Lori Alcocer (Daughter) [phone] (H) [phone]  Relationship to Patient[de-identified] Family  Contact Method: Phone  Phone Number: [phone] Nati Wilkins  Reason/Outcome: Continuity of Care, Emergency Contact      Pt was seen by OT/PT and recommended for a home d/c with [facility]  Pt was previously active with [address] [place] [address]/[facility]  CM placed new referrals for them and will follow up  Family will transport pt home when medically stable    CM reviewed d/c planning process including the following: identifying help at home, patient preference for d/c planning needs, Discharge Lounge, [facility] Meds to Bed program, availability of treatment team to discuss questions or concerns patient and/or family may have regarding understanding medications and recognizing signs and symptoms once discharged  CM also encouraged patient to follow up with all recommended appointments after discharge  Patient advised of importance for patient and family to participate in managing patient’s medical well being

## 2023-02-02 NOTE — PLAN OF CARE
Problem: MOBILITY - ADULT  Goal: Maintain or return to baseline ADL function  Description: INTERVENTIONS:  -  Assess patient's ability to carry out ADLs; assess patient's baseline for ADL function and identify physical deficits which impact ability to perform ADLs (bathing, care of mouth/teeth, toileting, grooming, dressing, etc )  - Assess/evaluate cause of self-care deficits   - Assess range of motion  - Assess patient's mobility; develop plan if impaired  - Assess patient's need for assistive devices and provide as appropriate  - Encourage maximum independence but intervene and supervise when necessary  - Involve family in performance of ADLs  - Assess for home care needs following discharge   - Consider OT consult to assist with ADL evaluation and planning for discharge  - Provide patient education as appropriate  Outcome: Progressing  Goal: Maintains/Returns to pre admission functional level  Description: INTERVENTIONS:  - Perform BMAT or MOVE assessment daily    - Set and communicate daily mobility goal to care team and patient/family/caregiver  - Collaborate with rehabilitation services on mobility goals if consulted  - Perform Range of Motion *** times a day  - Reposition patient every *** hours    - Dangle patient *** times a day  - Stand patient *** times a day  - Ambulate patient *** times a day  - Out of bed to chair *** times a day   - Out of bed for meals *** times a day  - Out of bed for toileting  - Record patient progress and toleration of activity level   Outcome: Progressing

## 2023-02-02 NOTE — ASSESSMENT & PLAN NOTE
- patient noted to have a renal stone on 1/13 and underwent lithotripsy and right renal stent placement on 1/26 and has a chronic wynn catheter  It appears he was to have followed up for a voiding trial with urology on 1/31  He also remains on Ceftin, which should have been for a 5-day course from 1/18-1/23 from chart review  - I spoke with urology who recommends voiding trial and discontinuation of Ceftin

## 2023-02-02 NOTE — ASSESSMENT & PLAN NOTE
- CT A/P negative for injury  - likely contusion and muscle strain  - PT/OT and pain control  - WBAT on RLE

## 2023-02-02 NOTE — ASSESSMENT & PLAN NOTE
- h/o A   Fib s/p PPM   - f/u outpatient with cardiology at [facility]

## 2023-02-02 NOTE — ASSESSMENT & PLAN NOTE
S/p right lithotripsy and insertion of ureteral stent with Dr Eric Huynh on 1/26/2023    Was due for f/u on 2/1/2023 for wynn removal

## 2023-02-02 NOTE — PLAN OF CARE
Problem: OCCUPATIONAL THERAPY ADULT  Goal: Performs self-care activities at highest level of function for planned discharge setting  See evaluation for individualized goals  Description: Treatment Interventions: ADL retraining, Functional transfer training, Endurance training, Patient/family training, Equipment evaluation/education, Compensatory technique education, Activityengagement          See flowsheet documentation for full assessment, interventions and recommendations  Note: Limitation: Decreased ADL status, Decreased endurance, Decreased self-care trans, Decreased high-level ADLs  Prognosis: Good  Assessment: Pt is a 81 y o  male who was admitted to [facility] on 2/1/2023 with Fall   Pt's problem list also includes PMH of HTN, previous surgery and CAD, AVR, HLD, PPM, basal cell skin CA  At baseline pt was completing adls and mobility independently - was not using AD prior to fall in December but has had increased difficulty and decline in function since 2* pain  Pt lives with dtr and vijay in 2 story home with [address] with hospital bed since fall in December - family assists with iadls  Currently pt requires moderate assist for overall ADLS and min assist for functional mobility/transfers  Pt currently presents with impairments in the following categories -steps to enter environment, difficulty performing ADLS, difficulty performing IADLS , decreased initiation and engagement  and environment activity tolerance, endurance, standing balance/tolerance and sitting balance/tolerance   These impairments, as well as pt's fatigue, pain, spinal precautions, risk for falls and home environment  limit pt's ability to safely engage in all baseline areas of occupation, includingbathing, dressing, toileting, functional mobility/transfers, community mobility, laundry , driving, house maintenance, meal prep, cleaning, social participation  and leisure activities  From OT standpoint, recommend home with increased family support and home OT pending progress, pain control and families ability to provide increased support - if unable, may need inpt rehab upon D/C  OT will continue to follow to address the below stated goals       OT Discharge Recommendation: Home with home health rehabilitation (pending progress, pain control and ability for family to provide increased support)

## 2023-02-02 NOTE — OCCUPATIONAL THERAPY NOTE
Occupational Therapy Evaluation     Patient Name: Jillian DIXONIPS'A Date: 2/2/2023  Problem List  Principal Problem:    Fall  Active Problems:    Hypertension    Hyperlipemia    Aortic stenosis    S/P AVR    Carotid artery disease (HCC)    Pacemaker    Urinary tract obstruction by kidney stone    Closed T12 fracture (HCC)    Right hip pain    Acute pain due to trauma    Past Medical History  Past Medical History:   Diagnosis Date    Coronary artery disease     H/O atrial flutter     History of aortic valve replacement with bioprosthetic valve     Hyperlipemia     Hypertension     Pacemaker     Skin cancer, basal cell     Walker as ambulation aid     Wears dentures      Past Surgical History  Past Surgical History:   Procedure Laterality Date    A-V CARDIAC PACEMAKER INSERTION      AORTIC VALVE REPLACEMENT      CARDIAC PACEMAKER PLACEMENT Left     2021    CATARACT EXTRACTION      CORONARY ANGIOPLASTY WITH STENT PLACEMENT      stents times 3    MO CYSTO BLADDER W/URETERAL CATHETERIZATION Right 01/13/2023    Procedure: CYSTOSCOPY RETROGRADE PYELOGRAM WITH INSERTION STENT URETERAL;  Surgeon: Herman Velázquez MD;  [facility];  Service: Urology    MO CYSTO BLADDER W/URETERAL CATHETERIZATION Right 1/26/2023    Procedure: CYSTOSCOPY WITH RETROGRADE PYELOGRAM;  Surgeon: Herman Velázquez MD;  [facility];  Service: Urology    MO CYSTO/URETERO W/LITHOTRIPSY &INDWELL STENT INSRT Right 1/26/2023    Procedure: CYSTOSCOPY URETEROSCOPY WITH LITHOTRIPSY HOLMIUM LASER, RETROGRADE PYELOGRAM AND INSERTION STENT URETERAL;  Surgeon: Herman Velázquez MD;  [facility];  Service: Urology         02/02/23 0920   OT Last Visit   OT Visit Date 02/02/23   Note Type   Note type Evaluation   Pain Assessment   Pain Assessment Tool 0-10   Pain Score 8   Pain Location/Orientation Location: Back   Hospital Pain Intervention(s) Repositioned; Ambulation/increased activity; Emotional support;Relaxation technique Restrictions/Precautions   Weight Bearing Precautions Per Order No   Braces or Orthoses (S)  TLSO  (backpack TLSO)   Other Precautions Fall Risk;Pain;Spinal precautions; Chair Alarm; Bed Alarm   Home Living   Type of [address] [facility] Two level; Able to live on main level with bedroom/bathroom;Stairs to enter with rails   Bathroom Shower/Tub Tub/shower unit   Bathroom Toilet Standard   Home Equipment Walker;Cane   Prior Function   Level of [place] Independent with ADLs; Independent with functional mobility; Needs assistance with IADLS   Lives With [facility] Help From Family   IADLs Family/Friend/Other provides transportation; Family/Friend/Other provides meals; Family/Friend/Other provides medication management   Falls in the last 6 months 1 to 4  (2)   Vocational Retired   Lifestyle   Autonomy I adls (reports 1* spongebathing) and HH mobility with RW- reports limited mobility since first fall in December with transition to [address] with hospital bed - family assists with iadls   Reciprocal Relationships supportive family - lives with dtr and son in law - reports vijay is retired and home during the day   Service to Others retired   Intrinsic Gratification sedentary  - reports he used to go to [employer] a few x's/wk and go to grocery store for dtr prior to fall in December   Subjective   Subjective "I can't do that" in re: to getting OOB - agreeable to try with encouragement   ADL   Eating Assistance 5  Supervision/Setup   Grooming Assistance 5  Supervision/Setup   UB Bathing Assistance 4  Minimal Assistance   LB [address] 3  Moderate Assistance    [facility] 4  Minimal Assistance   LB Dressing Assistance 3  Moderate [address]  4  Minimal Assistance   Bed Mobility   Supine to Sit 4  Minimal assistance   Transfers   Sit to Stand 4  Minimal assistance   Stand to Sit 4  Minimal assistance   Functional Mobility   Functional Mobility 4  Minimal assistance   Additional items Rolling walker   Balance   Static Sitting Fair   Dynamic Sitting Fair -   Static Standing Fair -   Dynamic Standing Poor +   Ambulatory Poor +   Activity Tolerance   Activity Tolerance Patient limited by fatigue;Patient limited by pain   Medical Staff Made Aware Edin Swartz DPT present for coeval 2* medical complexity, comorbidities and limited tolerance to activity   RUE Assessment   RUE Assessment WFL   LUE Assessment   LUE Assessment WFL   Cognition   Arousal/Participation Arousable   Attention Attends with cues to redirect   Orientation Level Oriented X4   Memory Decreased recall of precautions   Following Commands Follows one step commands with increased time or repetition   Assessment   Limitation Decreased ADL status; Decreased endurance;Decreased self-care trans;Decreased high-level ADLs   Prognosis Good   Assessment Pt is a 81 y o  male who was admitted to [facility] on 2/1/2023 with Fall   Pt's problem list also includes PMH of HTN, previous surgery and CAD, AVR, HLD, PPM, basal cell skin CA  At baseline pt was completing adls and mobility independently - was not using AD prior to fall in December but has had increased difficulty and decline in function since 2* pain  Pt lives with dtr and vijay in 2 story home [address] bed since fall in December - family assists with iadls  Currently pt requires moderate assist for overall ADLS and min assist for functional mobility/transfers  Pt currently presents with impairments in the following categories -steps to enter environment, difficulty performing ADLS, difficulty performing IADLS , decreased initiation and engagement  and environment activity tolerance, endurance, standing balance/tolerance and sitting balance/tolerance   These impairments, as well as pt's fatigue, pain, spinal precautions, risk for falls and home environment  limit pt's ability to safely engage in all baseline areas of occupation, includingbathing, dressing, toileting, functional mobility/transfers, community mobility, laundry , driving, house maintenance, meal prep, cleaning, social participation  and leisure activities  From OT standpoint, recommend home with increased family support and home OT pending progress, pain control and families ability to provide increased support - if unable, may need inpt rehab upon D/C  OT will continue to follow to address the below stated goals  Goals   Patient Goals "to have less pain"   LTG Time Frame 10-14   Long Term Goal #1 refer to established goals below   Plan   Treatment Interventions ADL retraining;Functional transfer training; Endurance training;Patient/family training;Equipment evaluation/education; Compensatory technique education; Activityengagement   Goal Expiration Date 02/16/23   OT Frequency 3-5x/wk   Recommendation   OT Discharge Recommendation Home with home health rehabilitation  (pending progress, pain control and ability for family to provide increased support)   AM-PAC Daily Activity Inpatient   Lower Body Dressing 2   Bathing 2   Toileting 3   Upper Body Dressing 3   Grooming 4   Eating 4   Daily Activity Raw Score 18   Daily Activity Standardized Score (Calc for Raw Score >=11) 38 66   AM-PAC Applied Cognition Inpatient   Following a Speech/Presentation 4   Understanding Ordinary Conversation 4   Taking Medications 4   Remembering Where Things Are Placed or Put Away 3   Remembering List of 4-5 Errands 3   Taking Care of Complicated Tasks 3   Applied Cognition Raw Score 21   Applied Cognition Standardized Score 44 3   End of Consult   Education Provided Yes   Patient Position at End of Consult Bedside chair;Bed/Chair alarm activated; All needs within reach   Nurse Communication Nurse aware of consult;Nurse aware of brace application       OCCUPATIONAL THERAPY GOALS:    *SBA adls after setup with use of AE PRN  *SBA toileting and clothing management   *SBA functional mobility and transfers to/from all surfaces with fair+ dynamic balance and safety for participation in dynamic adls and iadl tasks   *Demonstrate good carryover with safe use of RW, management of TLSO and carryover with spinal precautions/use of proper body mechanics during functional tasks   *Assess DME needs   *Increase activity tolerance to 30-35 minutes for participation in adls and enjoyable activities  *Assist with safe d/c recommendations     The patient's raw score on the AM-PAC Daily Activity inpatient short form is 18, standardized score is 38 66, less than 39 4  Patients at this level are likely to benefit from discharge to post-acute rehabilitation services however anticipate pt may be able to progress to home with increased pain control and support from family   Please refer to the recommendation of the Occupational Therapist for safe discharge planning          [facility]

## 2023-02-02 NOTE — PROGRESS NOTES
[address] [facility]  Progress Note Marbella Caro 1935, 81 y o  male MRN: [id number]  Unit/Bed#: [facility] 606-01 Encounter: [id number]  Primary Care Provider: Dayan Hicks DO   Date and time admitted to hospital: 2/1/2023  1:44 PM    Right hip pain  Assessment & Plan  See above  Closed T12 fracture [facility])  Assessment & Plan  T12 fracture with slight extension into pedicles  · S/p mechanical fall Sunday, 1/29  · Additional fall 12/25/2022 - fracture evident retrospectively although not read at the time of prior imaging  · Endorsing significant low back pain that improved following lithotripsy  · Relates new right anterior thigh pain that shoots from hip to knee since urologic procedure on 1/26, no on left side as well  · On exam, some mild right hip flexion/extension weakness that is antalgic  Sensation intact  No saddle anesthesia  Pain greatly exacerbated with movement  Imaging:  · CT CAP, 2/1/2023: Transversely oriented fracture through the T12 vertebral body with very slight extension into the pedicles  No significant displacement of fracture fragments or spinal malalignment  Very slight retropulsion of bony fragments  In retrospect, this fracture was probably acute (though very difficult to detect) on CT examination of December 30, 2022  Advanced lumbar and thoracic degenerative changes including multiple level degenerative fusion is in the lower thoracic and upper lumbar spine  Plan:  · Continue to monitor neuro exam closely  · TLSO brace when upright > 45 degrees and OOB  · Upright thoracolumbar x-rays pending  · Unclear etiology of right hip/thigh pain  Does not correspond with fracture location  Could be radicular pain in setting of diffuse degenerative disease of spine  · MRI thoracolumbar spine ordered to further evaluate  · Multimodal pain control  · Mobilize with PT/OT    · Will attempt conservative management of fracture as any surgical intervention would likely be extensive and concern for ability to tolerate surgery  · DVT ppx: SCDs, Lovenox  Neurosurgery will continue to follow  Please call with questions or concerns  Urinary tract obstruction by kidney stone  Assessment & Plan  S/p right lithotripsy and insertion of ureteral stent with Dr Amada Bryson on 1/26/2023  Was due for f/u on 2/1/2023 for wynn removal     S/P AVR  Assessment & Plan  On Eliquis  Subjective/Objective   Chief Complaint: "I'm not doing too well "    Subjective: Continue to endorse significant pain across low back, worse with movement  Also endorses shooting pain radiating down anterior right thigh to knee and now also to left thigh  Denies numbness  States only tolerated mobilizing to chair for 55 mins  Sitting upright was very painful  Objective: NAD  Laying in bed flat on back  I/O       01/31 0701  02/01 0700 02/01 0701  02/02 0700 02/02 0701  02/03 0700    P  O   360 480    I V    1000    Total Intake  360 1480    Urine   450    Total Output   450    Net  +360 +1030                 Invasive Devices     Peripheral Intravenous Line  Duration           Peripheral IV 02/01/23 Right Antecubital 1 day          Drain  Duration           Ureteral Internal Stent Right ureter 6 Fr  20 days    Urethral Catheter 16 Fr  6 days                Physical Exam:  Vitals: Blood pressure 122/68, pulse 72, temperature 98 7 °F (37 1 °C), resp  rate 18, SpO2 95 %  ,There is no height or weight on file to calculate BMI        General appearance: alert, appears stated age, cooperative and no distress  Head: Normocephalic, without obvious abnormality, atraumatic  Eyes: EOMI, PERRL  Neck: supple, symmetrical, trachea midline and NT  Back: Tender to T12 area  Lungs: non labored breathing  Heart: regular heart rate  Neurologic:   Mental status: Alert, oriented x3, thought content appropriate  Cranial nerves: grossly intact (Cranial nerves II-XII)  Sensory: normal to LT  Motor: bilateral HF/HE 4/5  Lab Results:  Results from last 7 days   Lab Units 02/02/23  0457 02/01/23  0933   WBC Thousand/uL 7 22 10 91*   HEMOGLOBIN g/dL 12 7 14 1   HEMATOCRIT % 40 1 43 5   PLATELETS Thousands/uL 198 233   NEUTROS PCT % 71 80*   MONOS PCT % 8 6     Results from last 7 days   Lab Units 02/02/23  0457 02/01/23  0933   POTASSIUM mmol/L 3 8 3 9   CHLORIDE mmol/L 107 101   CO2 mmol/L 25 24   BUN mg/dL 9 8   CREATININE mg/dL 0 57* 0 75   CALCIUM mg/dL 8 5 8 5   ALK PHOS U/L  --  141*   ALT U/L  --  10   AST U/L  --  11*     Results from last 7 days   Lab Units 02/02/23  0457   MAGNESIUM mg/dL 2 1     Results from last 7 days   Lab Units 02/02/23  0457   PHOSPHORUS mg/dL 2 9     Results from last 7 days   Lab Units 02/01/23  0933   INR  1 29*   PTT seconds 40*     No results found for: TROPONINT  ABG:No results found for: PHART, RGK5XMF, PO2ART, DPX2DDX, P8VBYRJO, BEART, SOURCE    Imaging Studies: I have personally reviewed pertinent reports  and I have personally reviewed pertinent films in PACS    XR Trauma chest portable    Result Date: 2/1/2023  Impression: No acute cardiopulmonary disease  Workstation performed: [id number]     TRAUMA - CT head wo contrast    Result Date: 2/1/2023  Impression: No acute intracranial abnormality  Workstation performed: [id number]     TRAUMA - CT spine cervical wo contrast    Result Date: 2/1/2023  Impression: No cervical spine fracture or traumatic malalignment  Workstation performed: [id number]     XR pelvis ap only 1 or 2 vw    Result Date: 2/1/2023  Impression: No acute osseous abnormality  Workstation performed: [id number]     TRAUMA - CT abdomen pelvis w contrast    Result Date: 2/1/2023  Impression: Transversely oriented fracture through the T12 vertebral body with very slight extension into the pedicles  No significant displacement of fracture fragments or spinal malalignment  Very slight retropulsion of bony fragments    In retrospect, this fracture was probably acute (though very difficult to detect) on CT examination of December 30, 2022  Advanced lumbar and thoracic degenerative changes including multiple level degenerative fusion is in the lower thoracic and upper lumbar spine  Renal cortical thinning and chronic moderate right-sided hydronephrosis, persistent despite the presence of a right ureteral stent  Urothelial thickening involving right renal collecting system and proximal right ureter, similar when compared to January 12, 2023  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  Trace left pleural effusion, incompletely evaluated  Reidentified large right inguinal hernia with nonobstructed loops of small bowel in the hernia sac  No other acute or subacute fractures  No traumatic visceral injury in the abdomen or pelvis  I personally discussed this study with Griffin Avelar on 2/1/2023 at 10:25 AM  Workstation performed: [id number]       EKG, Pathology, and Other Studies: I have personally reviewed pertinent reports        VTE Pharmacologic Prophylaxis: Sequential compression device (Venodyne)  and Enoxaparin (Lovenox)    VTE Mechanical Prophylaxis: sequential compression device

## 2023-02-02 NOTE — PLAN OF CARE
Problem: PHYSICAL THERAPY ADULT  Goal: Performs mobility at highest level of function for planned discharge setting  See evaluation for individualized goals  Description: Treatment/Interventions: Functional transfer training, LE strengthening/ROM, Elevations, Therapeutic exercise, Endurance training, Patient/family training, Equipment eval/education, Bed mobility, Gait training, Spoke to nursing, OT  Equipment Recommended: Anthony Linder       See flowsheet documentation for full assessment, interventions and recommendations  Note: Prognosis: Good  Problem List: Decreased strength, Decreased endurance, Impaired balance, Decreased mobility, Decreased range of motion, Pain, Orthopedic restrictions  Assessment: Pt is 81 y o  male seen for PT evaluation s/p admit to [facility] on 2/1/2023  Two pt identifiers were used to confirm  Pt presented s/p fall  Pt originally presented at   Pt was admitted with a primary dx of: Closed T12 fracture, and other active problems including right hip pain, urinary tract obstruction by kidney stone, pacemaker in place, CAD, history of AVR, aortic stenosis, HLD, HTN  Neurosurgery recommending TLSO brace when out of bed PT now consulted for assessment of mobility and d/c needs  Pt with Up in chair orders  Pts current co morbidities affecting treatment include:  has a past medical history of Coronary artery disease, H/O atrial flutter, History of aortic valve replacement with bioprosthetic valve, Hyperlipemia, Hypertension, Pacemaker, Skin cancer, basal cell, Walker as ambulation aid, and Wears dentures    Pts current clinical presentation is Unstable/ Unpredictable (high complexity) due to Ongoing medical management for primary dx, Decreased activity tolerance compared to baseline, Fall risk, Increased assistance needed from caregiver at current time, Spinal precautions at current time, Continuous pulse oximetry monitoring       Upon evaluation, pt currently is requiring Mod Ax1 for bed mobility; Min Ax1 for transfers and Min Ax1 for ambulation w/ RW  Pt presents at PT eval functioning below baseline and currently w/ overall mobility deficits 2* to: BLE weakness, decreased ROM, impaired balance, decreased endurance, gait deviations, pain, decreased activity tolerance compared to baseline, fall risk, spinal precautions  Pt currently at a fall risk 2* to impairments listed above  Based on the aforementioned PT evaluation, pt will continue to benefit from skilled Acute PT interventions to address stated impairments; to maximize functional mobility; for ongoing pt/ family training; and DME needs  At conclusion of PT session pt returned back in chair and chair alarm engaged with phone and call bell within reach  Pt denies any further questions at this time  PT is currently recommending Home PT and Home with increased family support pending progress and pain control  PT will continue to follow during hospital stay  Barriers to Discharge: None  Barriers to Discharge Comments: Pt denies and mobility or safety concerns about returning home at time of d/c  PT Discharge Recommendation: Home with home health rehabilitation  (Pending progress and pain control)    See flowsheet documentation for full assessment

## 2023-02-02 NOTE — ASSESSMENT & PLAN NOTE
- T 12 fracture evaluated by neurosurgery and recommended conservative management for now but will further discuss risks vs benefits of surgery/kyphoplasty with family today  - MRI L spine ordered per neurosurgery recommendations  - Upright x-rays pending  - TLSO brace  - neuro intact- has a h/o R ankle surgery with weakness with dorsi- /plantar flexion which is chronic   RLE with weakness due to pain in the R hip noted with hip flexion and extension    - pain control with multi modal regimen  - f/u neurosurgery recommendations after upright x-rays and MRI L spine obtained

## 2023-02-02 NOTE — CONSULTS
Consultation - Geriatric Medicine   Orlando Chaneyr 81 y o  male MRN: [id number]  [facility] Encounter: [id number]      Assessment/Plan     Ambulatory dysfunction with fall  -Reportedly mechanical fall x2, 12/25 and 1/29  -(-) head strike (-) loss of consciousness  -injuries as outlined below  -Utilizing walker since 12/25, no assist device at baseline prior  -No prior history recurrent falls  -high risk future falls due to age, hx fall, deconditioning/debility and unfamiliar environment   -encourage good body mechanics and assist with all transfers  -keep personal items and call bell close to prevent reaching  -maintain environment free of fall hazards  -encourage appropriate footwear and adequate lighting at all times when out of bed  -consider home fall risk assessment and personal fall alert system if returning home  -PT and OT pending    Fracture of T12 vertebrae  -S/p fall as above  -Noted on CT abdomen and pelvis obtained on admission  -Continue acute pain control  -Thoracolumbar spine precautions  -Neurovascular checks per protocol  -TLSO brace   -Upright XR pending  -Nsx on consult    Acute pain due to trauma  -continue pain control per Geriatric pain protocol:  Tylenol 975mg Q8H scheduled  Roxicodone 2 5mg Q4H PRN moderate pain  Roxicodone 5mg Q4H PRN severe pain  Dilaudid 0 2mg Q4H PRN  -Consider adjuncts such as lidocaine patch topically  -encourage addition of non-pharmacologic pain treatment including ice and frequent repositioning  -recommend  bowel regimen to prevent and treat constipation due to increased risk with acute pain and opiate pain medications    Right-sided hydronephrosis  -Due to right ureteral stone  -S/p cystoscopy with lithotripsy and ureteral stent insertion 1/26/22  -Maintained on Flomax  -Persistent chronic moderate right-sided hydronephrosis, right ureteral stent in place  -Dickens catheter remains in place  -Continue close follow-up with Urology    Atrial flutter  -Home regimen includes rate control with metoprolol and anticoagulation with Eliquis  -Eliquis held for recent ureteral stent placement, not yet resumed per patient as was instructed to hold until urine is clear    Sick sinus syndrome  -S/p pacemaker placement  -Continue close outpatient follow-up with Cardiology    CAD  -S/p PCI  -Maintained on chronic systemic anticoagulation with Eliquis (for A  Fib/flutter)  -Continue secondary risk factor modifications and encouraged healthy lifestyle    Cognitive screening  -Alert and fully oriented, denies memory or cognitive concerns  -Reportedly independent with ADLs and IADLs at baseline  -No prior cognitive testing on record for review  -CTh obtained admission personally viewed, severe chronic microtraumatic changes appreciated most densely noted temporal regions R>L  -No recent TSH or B12 on record for review, consider checking with next routine labs  -Encourage use of any necessary sensory assist devices such as hearing aids or glasses to reduce risk since impairment continue to isolation, confusion, encephalopathy and cognitive decline  -Encourage patient remain physically, socially, cognitively active and engaged to maintain cognitive acuity    Impaired mastication  -Requires use of dentures -encourage use all appropriate times  -ensure meal consistency appropriate for abilities  -continue aspiration precautions    Deconditioning/debility/frailty  -Clinical frailty scale stage V, mildly frail   -Multifactorial including age, ambulatory dysfunction with recent falls, CAD, and multiple additional chronic medical comorbidities now with acute traumatic injury superimposed in elderly individual with limited physiologic and metabolic reserve  -Albumin 2 7, encourage well-balanced nutrition, consider nutritional supplements between meals if oral intake is poor  -Monitor for and treat any anxiety/mood/depression symptoms as if present may impact patient's response to therapies as well as sense of wellbeing and quality of life  -Continue to optimize chronic conditions and address acute metabolic derangements advised    Delirium precautions  -Patient is high risk of delirium due to age, fall, tragic injuries, acute pain, hospitalization  -Initiate delirium precautions  -maintain normal sleep/wake cycle  -minimize overnight interruptions, group overnight vitals/labs/nursing checks as possible  -dim lights, close blinds and turn off tv to minimize stimulation and encourage sleep environment in evenings  -ensure that pain is well controlled   -monitor for fecal and urinary retention which may precipitate delirium  -encourage early mobilization and ambulation with assistance once cleared to safely do so  -provide frequent reorientation and redirection  -Minimize use of medications which may precipitate or worsen delirium such as tramadol, benzodiazepine, anticholinergics, and benadry as much as possible  -encourage hydration and nutrition   -redirect unwanted behaviors as first line    Home medication review  [facility] [phone]:     Norvasc 10 mg daily  Eliquis 5 Mg twice daily (held from recent urologic procedure)  Atorvastatin 40 mg  Enalapril 2 5 Mg twice daily  Hydrocodone-acetaminophen 5-325 mg take 1-2 tabs Q6H PRN for up to 20 doses staring 1/26/23  Metoprolol tartrate 50 Mg daily  Flomax 0 4 mg daily    Care coordination: Rounded with Consuelo Dominguez (JERI)    History of Present Illness   Physician Requesting Consult: Kierra Leonard,*  Reason for Consult / Principal Problem: Fall   Hx and PE limited by: N/A  Additional history obtained from: Chart review and patient evaluation    HPI: Mariana Ren is a 81y o  year old male with sick sinus syndrome, CAD with PCI, vitamin D deficiency, aortic stenosis s/p AVR, mitral stenosis, hyperlipidemia, atrial flutter, hypertension, and ambulatory dysfunction who is admitted to the trauma service with fall found to have T12 vertebrae fracture on admission imaging, he is being seen in consultation by geriatrics for high risk developing delirium during hospitalization  Rosy Albert is seen in June at the bedside where he is lying resting comfortably, he explains that he sustained a mechanical fall on Sunday, 1/29/23 when he was emptying out his Dickens catheter and felt his legs get weak and go out from under him causing him to fall backwards striking his mid back, he denies head strike or loss of consciousness  He noted some immediate soreness, he did not immediately seek medical attention as he did not think had any significant injuries however as pain in his back and down right leg intensified over time he sought medical attention, he initially presented to the [place] [address] and was transferred to [facility] for further evaluation by the trauma service  He reports that since admission his pain has been well controlled and he is feeling in his normal state health otherwise  Prior to admission Rosy Albert residing home with his daughter, he is typically dependent with all ADLs and IADLs  He does not use any assist device prior to fall this past [place] since which time he has felt unsteady and has been utilizing a walker  He sustained an additional fall 1/29 as noted above, he denies history of falls prior to these recent two  He requires use of dentures, denies use of hearing aids or glasses, denies memory or cognitive concerns  Inpatient consult to Gerontology  Consult performed by: Jamie De DO  Consult ordered by: Boston Crystal MD        Review of Systems   Constitutional: Negative  Negative for chills and fever  HENT: Positive for dental problem (Wears dentures)  Eyes: Negative  Negative for visual disturbance  Respiratory: Negative  Negative for shortness of breath  Cardiovascular: Negative  Negative for chest pain  Gastrointestinal: Negative  Negative for nausea and vomiting     Genitourinary: Negative for difficulty urinating (Not currently -Dickens catheter in place from recent urologic procedure)  Musculoskeletal: Positive for back pain and gait problem  Shooting pain down right leg   Skin: Negative  Neurological: Negative for dizziness, weakness, light-headedness and headaches  Hematological: Negative  Psychiatric/Behavioral: Negative  Negative for sleep disturbance  All other systems reviewed and are negative      Historical Information   Past Medical History:   Diagnosis Date   • Coronary artery disease    • H/O atrial flutter    • History of aortic valve replacement with bioprosthetic valve    • Hyperlipemia    • Hypertension    • Pacemaker    • Skin cancer, basal cell    • Walker as ambulation aid    • Wears dentures      Past Surgical History:   Procedure Laterality Date   • A-V CARDIAC PACEMAKER INSERTION     • AORTIC VALVE REPLACEMENT     • CARDIAC PACEMAKER PLACEMENT Left     2021   • CATARACT EXTRACTION     • CORONARY ANGIOPLASTY WITH STENT PLACEMENT      stents times 3   • MO CYSTO BLADDER W/URETERAL CATHETERIZATION Right 01/13/2023    Procedure: CYSTOSCOPY RETROGRADE PYELOGRAM WITH INSERTION STENT URETERAL;  Surgeon: Debra De Santiago MD;  Location:  MAIN OR;  Service: Urology   • MO CYSTO BLADDER W/URETERAL CATHETERIZATION Right 1/26/2023    Procedure: CYSTOSCOPY WITH RETROGRADE PYELOGRAM;  Surgeon: Debra De Santiago MD;  Location:  MAIN OR;  Service: Urology   • MO CYSTO/URETERO W/LITHOTRIPSY &INDWELL STENT INSRT Right 1/26/2023    Procedure: CYSTOSCOPY URETEROSCOPY WITH LITHOTRIPSY HOLMIUM LASER, RETROGRADE PYELOGRAM AND INSERTION STENT URETERAL;  Surgeon: Debra De Santiago MD;  Location:  MAIN OR;  Service: Urology     Social History   Social History     Substance and Sexual Activity   Alcohol Use Yes    Comment: social     Social History     Substance and Sexual Activity   Drug Use Never     Social History     Tobacco Use   Smoking Status Former   Smokeless Tobacco Never     Family History:   Family History   Problem Relation Age of Onset   • No Known Problems Father    • No Known Problems Mother      Meds/Allergies   all current active meds have been reviewed    Allergies   Allergen Reactions   • Ezetimibe-Simvastatin Dizziness and Lightheadedness     Other reaction(s): MAKES HIM DIZZY     • Simvastatin Lightheadedness     Other reaction(s): Dizziness     Objective     Intake/Output Summary (Last 24 hours) at 2/2/2023 0612  Last data filed at 2/1/2023 1801  Gross per 24 hour   Intake 360 ml   Output --   Net 360 ml     Invasive Devices     Peripheral Intravenous Line  Duration           Peripheral IV 02/01/23 Right Antecubital <1 day          Drain  Duration           Ureteral Internal Stent Right ureter 6 Fr  19 days    Urethral Catheter 16 Fr  6 days              Physical Exam  Vitals and nursing note reviewed  Constitutional:       General: He is not in acute distress  Appearance: He is obese  He is not toxic-appearing  HENT:      Head: Normocephalic  Nose: Nose normal       Mouth/Throat:      Mouth: Mucous membranes are dry  Eyes:      General: No scleral icterus  Right eye: No discharge  Left eye: No discharge  Conjunctiva/sclera: Conjunctivae normal    Neck:      Comments: Trachea midline, phonation normal  Cardiovascular:      Rate and Rhythm: Normal rate  Heart sounds: Murmur heard  Pulmonary:      Effort: Pulmonary effort is normal  No respiratory distress  Breath sounds: No wheezing  Comments: Saturating well on room air  Abdominal:      General: There is no distension  Palpations: Abdomen is soft  Tenderness: There is no abdominal tenderness  Comments: Bowel sounds slow but present   Musculoskeletal:      Cervical back: Neck supple  Right lower leg: No edema  Left lower leg: No edema  Comments: Normal overall muscle mass for age and activity level   Skin:     General: Skin is warm and dry  Comments: Ecchymosis dorsum left hand   Neurological:      Mental Status: He is alert  Comments: Awake and alert, oriented, answers questions appropriately, speech clear fluent   Psychiatric:         Mood and Affect: Mood normal          Behavior: Behavior normal       Comments: Polite and cooperative       Lab Results:     I have personally reviewed pertinent lab results  I have personally reviewed the pertinent imaging study reports in PACS    -  Therapies:   PT: Pending  OT: Pending    VTE Prophylaxis: Enoxaparin (Lovenox)    Code Status: Level 1 - Full Code  Advance Directive and Living Will:      Power of :    POLST:      Family and Social Support: daughter     Goals of Care: pain control

## 2023-02-02 NOTE — ASSESSMENT & PLAN NOTE
- mechanical fall, sustained below stated injury  - PT/OT evaluations pending  - Appreciate geriatrics' consulation  - CM for disposition planning

## 2023-02-03 LAB
CK SERPL-CCNC: 20 U/L (ref 39–308)
GLUCOSE SERPL-MCNC: 143 MG/DL (ref 65–140)

## 2023-02-03 RX ADMIN — OXYCODONE HYDROCHLORIDE 5 MG: 5 TABLET ORAL at 17:25

## 2023-02-03 RX ADMIN — TAMSULOSIN HYDROCHLORIDE 0.4 MG: 0.4 CAPSULE ORAL at 17:25

## 2023-02-03 RX ADMIN — AMLODIPINE BESYLATE 10 MG: 10 TABLET ORAL at 09:35

## 2023-02-03 RX ADMIN — ACETAMINOPHEN 975 MG: 325 TABLET, FILM COATED ORAL at 22:01

## 2023-02-03 RX ADMIN — LIDOCAINE 5% 1 PATCH: 700 PATCH TOPICAL at 17:25

## 2023-02-03 RX ADMIN — APIXABAN 5 MG: 5 TABLET, FILM COATED ORAL at 17:25

## 2023-02-03 RX ADMIN — Medication 1000 UNITS: at 09:35

## 2023-02-03 RX ADMIN — FOLIC ACID TAB 400 MCG 400 MCG: 400 TAB at 09:35

## 2023-02-03 RX ADMIN — METOPROLOL TARTRATE 50 MG: 50 TABLET, FILM COATED ORAL at 09:35

## 2023-02-03 RX ADMIN — METHOCARBAMOL TABLETS 500 MG: 500 TABLET, COATED ORAL at 17:25

## 2023-02-03 RX ADMIN — MELATONIN 6 MG: at 22:01

## 2023-02-03 RX ADMIN — ACETAMINOPHEN 975 MG: 325 TABLET, FILM COATED ORAL at 05:33

## 2023-02-03 RX ADMIN — GABAPENTIN 100 MG: 100 CAPSULE ORAL at 22:01

## 2023-02-03 RX ADMIN — METHOCARBAMOL TABLETS 500 MG: 500 TABLET, COATED ORAL at 05:33

## 2023-02-03 RX ADMIN — OXYCODONE HYDROCHLORIDE 5 MG: 5 TABLET ORAL at 09:34

## 2023-02-03 RX ADMIN — METHOCARBAMOL TABLETS 500 MG: 500 TABLET, COATED ORAL at 12:13

## 2023-02-03 RX ADMIN — POLYETHYLENE GLYCOL 3350 17 G: 17 POWDER, FOR SOLUTION ORAL at 09:38

## 2023-02-03 RX ADMIN — ACETAMINOPHEN 975 MG: 325 TABLET, FILM COATED ORAL at 13:09

## 2023-02-03 RX ADMIN — DOCUSATE SODIUM 100 MG: 100 CAPSULE, LIQUID FILLED ORAL at 17:25

## 2023-02-03 RX ADMIN — ENOXAPARIN SODIUM 30 MG: 30 INJECTION SUBCUTANEOUS at 05:33

## 2023-02-03 RX ADMIN — ATORVASTATIN CALCIUM 40 MG: 40 TABLET, FILM COATED ORAL at 17:25

## 2023-02-03 RX ADMIN — METHOCARBAMOL TABLETS 500 MG: 500 TABLET, COATED ORAL at 00:36

## 2023-02-03 RX ADMIN — HYDROMORPHONE HYDROCHLORIDE 0.2 MG: 0.2 INJECTION, SOLUTION INTRAMUSCULAR; INTRAVENOUS; SUBCUTANEOUS at 00:37

## 2023-02-03 RX ADMIN — OXYCODONE HYDROCHLORIDE 5 MG: 5 TABLET ORAL at 05:32

## 2023-02-03 RX ADMIN — LISINOPRIL 10 MG: 5 TABLET ORAL at 09:35

## 2023-02-03 RX ADMIN — OXYCODONE HYDROCHLORIDE 5 MG: 5 TABLET ORAL at 22:01

## 2023-02-03 RX ADMIN — DOCUSATE SODIUM 100 MG: 100 CAPSULE, LIQUID FILLED ORAL at 09:35

## 2023-02-03 NOTE — ASSESSMENT & PLAN NOTE
- bioprosthetic AVR  - follows outpatient with  Cardiology at [facility], [facility]

## 2023-02-03 NOTE — ASSESSMENT & PLAN NOTE
T12 fracture with slight extension into pedicles  · S/p mechanical fall Sunday, 1/29  · Additional fall 12/25/2022 - fracture evident retrospectively although not read at the time of prior imaging  · Endorsing significant low back pain that improved following lithotripsy  · Relates new right anterior thigh pain that shoots from hip to knee since urologic procedure on 1/26, no on left side as well  · On exam, now with significant hip flexion/extension weakness that is antalgic  Sensation intact  No saddle anesthesia  Pain greatly exacerbated with movement  Imaging:  · Upright thoracolumbar x-rays, 2/2/2023: read pending  Stable appearance of T12 fracture with slight kyphosis  · CT CAP, 2/1/2023: Transversely oriented fracture through the T12 vertebral body with very slight extension into the pedicles  No significant displacement of fracture fragments or spinal malalignment  Very slight retropulsion of bony fragments  In retrospect, this fracture was probably acute (though very difficult to detect) on CT examination of December 30, 2022  Advanced lumbar and thoracic degenerative changes including multiple level degenerative fusion is in the lower thoracic and upper lumbar spine  Plan:  · Continue to monitor neuro exam closely  · TLSO brace when upright > 45 degrees and OOB  · Unclear etiology of bilateral hip/thigh pain  Does not correspond with fracture location  Could be radicular pain in setting of diffuse degenerative disease of spine  · MRI thoracolumbar spine ordered to further evaluate  · Multimodal pain control - APS consulted  · Mobilize with PT/OT  · Will attempt conservative management of fracture as any surgical intervention would likely be extensive and concern for ability to tolerate surgery  · DVT ppx: SCDs, Lovenox  Neurosurgery will continue to follow  Please call with questions or concerns

## 2023-02-03 NOTE — ASSESSMENT & PLAN NOTE
S/p right lithotripsy and insertion of ureteral stent with Dr Erin Wang on 1/26/2023    Was due for f/u on 2/1/2023 for wynn removal

## 2023-02-03 NOTE — RESTORATIVE TECHNICIAN NOTE
Restorative Technician Note      Patient Name: Teresa Alonzo     Restorative Tech Visit Date: 02/03/23  Note Type: Mobility  Patient Position Upon Consult: Supine    Pt declined mobility at this time 2* pain in BLE      Moon Stauffer Restorative Technician

## 2023-02-03 NOTE — ASSESSMENT & PLAN NOTE
- h/o A   Fib s/p PPM   - f/u outpatient with cardiology at [facility], [facility]

## 2023-02-03 NOTE — PROGRESS NOTES
[facility]  Progress Note Jennifer Garcia 1935, 81 y o  male MRN: [id number]  Unit/Bed#: [facility] 606-01 Encounter: [id number]  Primary Care Provider: Stephen Dale DO   Date and time admitted to hospital: 2/1/2023  1:44 PM    Right hip pain  Assessment & Plan  See above  Closed T12 fracture [facility])  Assessment & Plan  T12 fracture with slight extension into pedicles  · S/p mechanical fall Sunday, 1/29  · Additional fall 12/25/2022 - fracture evident retrospectively although not read at the time of prior imaging  · Endorsing significant low back pain that improved following lithotripsy  · Relates new right anterior thigh pain that shoots from hip to knee since urologic procedure on 1/26, no on left side as well  · On exam, now with significant hip flexion/extension weakness that is antalgic  Sensation intact  No saddle anesthesia  Pain greatly exacerbated with movement  Imaging:  · Upright thoracolumbar x-rays, 2/2/2023: read pending  Stable appearance of T12 fracture with slight kyphosis  · CT CAP, 2/1/2023: Transversely oriented fracture through the T12 vertebral body with very slight extension into the pedicles  No significant displacement of fracture fragments or spinal malalignment  Very slight retropulsion of bony fragments  In retrospect, this fracture was probably acute (though very difficult to detect) on CT examination of December 30, 2022  Advanced lumbar and thoracic degenerative changes including multiple level degenerative fusion is in the lower thoracic and upper lumbar spine  Plan:  · Continue to monitor neuro exam closely  · TLSO brace when upright > 45 degrees and OOB  · Unclear etiology of bilateral hip/thigh pain  Does not correspond with fracture location  Could be radicular pain in setting of diffuse degenerative disease of spine  · MRI thoracolumbar spine ordered to further evaluate    · Multimodal pain control - APS consulted  · Mobilize with PT/OT  · Will attempt conservative management of fracture as any surgical intervention would likely be extensive and concern for ability to tolerate surgery  · DVT ppx: SCDs, Lovenox  Neurosurgery will continue to follow  Please call with questions or concerns  Urinary tract obstruction by kidney stone  Assessment & Plan  S/p right lithotripsy and insertion of ureteral stent with Dr Radha Jerez on 1/26/2023  Was due for f/u on 2/1/2023 for wynn removal     S/P AVR  Assessment & Plan  On Eliquis  Subjective/Objective   Chief Complaint: "I am still in a lot of pain "    Subjective: Patient continues to endorse severe pain radiating from hips down anterior thighs and stops at knees bilaterally  He states any movement makes this pain as well has low back pain much worse  He was barely able to tolerate x-rays overnight and once he got back to the room was in too much pain to proceed with MRI  Objective: NAD  Resting flat in bed  I/O       02/01 0701 02/02 0700 02/02 0701 02/03 0700 02/03 0701 02/04 0700    P  O  360 480     I V   1000     Total Intake 360 1480     Urine  1400     Total Output  1400     Net +360 +80                  Invasive Devices     Peripheral Intravenous Line  Duration           Peripheral IV 02/01/23 Right Antecubital 2 days          Drain  Duration           Ureteral Internal Stent Right ureter 6 Fr  21 days                Physical Exam:  Vitals: Blood pressure 136/63, pulse 91, temperature 97 5 °F (36 4 °C), resp  rate 18, SpO2 95 %  ,There is no height or weight on file to calculate BMI        General appearance: alert, appears stated age, cooperative and no distress  Head: Normocephalic, without obvious abnormality, atraumatic  Eyes: EOMI, PERRL  Neck: supple, symmetrical, trachea midline and NT  Back: Pain across low back  Lungs: non labored breathing  Heart: regular heart rate  Neurologic:   Mental status: Alert, oriented and 3, thought content appropriate  Cranial nerves: grossly intact (Cranial nerves II-XII)  Sensory: normal to touch  Motor: Bilateral hip extension and flexion 3/5, appears grossly pain inhibited      Lab Results:  Results from last 7 days   Lab Units 02/02/23  0457 02/01/23  0933   WBC Thousand/uL 7 22 10 91*   HEMOGLOBIN g/dL 12 7 14 1   HEMATOCRIT % 40 1 43 5   PLATELETS Thousands/uL 198 233   NEUTROS PCT % 71 80*   MONOS PCT % 8 6     Results from last 7 days   Lab Units 02/02/23  0457 02/01/23  0933   POTASSIUM mmol/L 3 8 3 9   CHLORIDE mmol/L 107 101   CO2 mmol/L 25 24   BUN mg/dL 9 8   CREATININE mg/dL 0 57* 0 75   CALCIUM mg/dL 8 5 8 5   ALK PHOS U/L  --  141*   ALT U/L  --  10   AST U/L  --  11*     Results from last 7 days   Lab Units 02/02/23  0457   MAGNESIUM mg/dL 2 1     Results from last 7 days   Lab Units 02/02/23  0457   PHOSPHORUS mg/dL 2 9     Results from last 7 days   Lab Units 02/01/23  0933   INR  1 29*   PTT seconds 40*     No results found for: TROPONINT  ABG:No results found for: PHART, DMD9ENE, PO2ART, FYV8VRD, E9ODEWQW, BEART, SOURCE    Imaging Studies: I have personally reviewed pertinent reports  and I have personally reviewed pertinent films in PACS    XR Trauma chest portable    Result Date: 2/1/2023  Impression: No acute cardiopulmonary disease  Workstation performed: [id number]     TRAUMA - CT head wo contrast    Result Date: 2/1/2023  Impression: No acute intracranial abnormality  Workstation performed: [id number]     TRAUMA - CT spine cervical wo contrast    Result Date: 2/1/2023  Impression: No cervical spine fracture or traumatic malalignment  Workstation performed: [id number]     XR pelvis ap only 1 or 2 vw    Result Date: 2/1/2023  Impression: No acute osseous abnormality  Workstation performed: [id number]     TRAUMA - CT abdomen pelvis w contrast    Result Date: 2/1/2023  Impression: Transversely oriented fracture through the T12 vertebral body with very slight extension into the pedicles    No significant displacement of fracture fragments or spinal malalignment  Very slight retropulsion of bony fragments  In retrospect, this fracture was probably acute (though very difficult to detect) on CT examination of December 30, 2022  Advanced lumbar and thoracic degenerative changes including multiple level degenerative fusion is in the lower thoracic and upper lumbar spine  Renal cortical thinning and chronic moderate right-sided hydronephrosis, persistent despite the presence of a right ureteral stent  Urothelial thickening involving right renal collecting system and proximal right ureter, similar when compared to January 12, 2023  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  Trace left pleural effusion, incompletely evaluated  Reidentified large right inguinal hernia with nonobstructed loops of small bowel in the hernia sac  No other acute or subacute fractures  No traumatic visceral injury in the abdomen or pelvis  I personally discussed this study with Michaela Baxter on 2/1/2023 at 10:25 AM  Workstation performed: [id number]       EKG, Pathology, and Other Studies: I have personally reviewed pertinent reports        VTE Pharmacologic Prophylaxis: Sequential compression device (Venodyne)  and Enoxaparin (Lovenox)    VTE Mechanical Prophylaxis: sequential compression device

## 2023-02-03 NOTE — ASSESSMENT & PLAN NOTE
- patient noted to have a renal stone on 1/13 and underwent lithotripsy and right renal stent placement on 1/26 and has a chronic wynn catheter  It appears he was to have followed up for a voiding trial with urology on 1/31  He also remains on Ceftin, which should have been for a 5-day course from 1/18-1/23 from chart review  - I spoke with urology who recommended voiding trial on 2/2 and discontinuation of Ceftin  Patient has required SC x2 and may require replacement of wynn catheter

## 2023-02-03 NOTE — PROGRESS NOTES
[address] [facility]  Progress Note Chilo Quarles 1935, 81 y o  male MRN: [id number]  Unit/Bed#: [facility] 606-01 Encounter: [id number]  Primary Care Provider: Desiree Pierre DO   Date and time admitted to hospital: 2/1/2023  1:44 PM    * Fall  Assessment & Plan  - mechanical fall, sustained below stated injury  - PT/OT evaluations pending  - Appreciate geriatrics' consulation  -  for disposition planning    Closed T12 fracture ([address] )  Assessment & Plan  - T 12 fracture evaluated by neurosurgery and recommended conservative management for now but will further discuss risks vs benefits of surgery/kyphoplasty with family today  - MRI T/L spine ordered per neurosurgery recommendations - Per MRI this will happen on Monday since patient requires rep to be present to put PPM in "MRI Mode"  - Upright x-rays show stability  - TLSO brace  - neuro intact- has a h/o R ankle surgery with weakness with dorsi- /plantar flexion which is chronic  RLE with weakness due to pain in the R hip noted with hip flexion and extension    - pain control with multi modal regimen  - f/u neurosurgery recommendations after MRI T/L spine obtained    Acute pain due to trauma  Assessment & Plan  - Acute pain secondary to traumatic injuries  - Continue multi modal analgesic regimen  - Bowel regimen as long as using opioids   - Continue to monitor pain and adjust regimen as indicated  Right hip pain  Assessment & Plan  - CT A/P negative for injury  - likely contusion and muscle strain  - PT/OT and pain control  - WBAT on RLE    Urinary tract obstruction by kidney stone  Assessment & Plan  - patient noted to have a renal stone on 1/13 and underwent lithotripsy and right renal stent placement on 1/26 and has a chronic wynn catheter  It appears he was to have followed up for a voiding trial with urology on 1/31   He also remains on Ceftin, which should have been for a 5-day course from 1/18-1/23 from chart review  - I spoke with urology who recommended voiding trial on 2/2 and discontinuation of Ceftin  Patient has required SC x2 and may require replacement of wynn catheter  Pacemaker  Assessment & Plan  - h/o A  Fib s/p PPM   - f/u outpatient with cardiology at [facility]     Carotid artery disease [facility])  Assessment & Plan  - s/p CABG x3  - continue home metoprolol and statin  - resume home Eliquis    S/P AVR  Assessment & Plan  - bioprosthetic AVR  - follows outpatient with  Cardiology at [facility]     Aortic stenosis  Assessment & Plan  - h/o bioprosthetic AVR    Hyperlipemia  Assessment & Plan  - continue home statin    Hypertension  Assessment & Plan  - continue home lisinopril and amlodipine  - BP adequately controlled      Bowel Regimen: colace, miralax  VTE Prophylaxis:Sequential compression device (Venodyne)  and Enoxaparin (Lovenox)     Disposition: continue med-surg status, MRI T/L spine pending- Monday due to needing rep present to turn of PPM      Subjective   Chief Complaint: "I"m feeling sore"    Subjective: Patient reports pain in his back and hips  He is aware that he is awaiting MRI  He denies new weakness or numbness  He is unable to void since the wynn was removed  Objective   Vitals:   Temp:  [97 5 °F (36 4 °C)-98 6 °F (37 °C)] 98 5 °F (36 9 °C)  HR:  [73-91] 73  Resp:  [16-18] 16  BP: (112-138)/(61-68) 128/68    I/O       02/01 0701  02/02 0700 02/02 0701  02/03 0700 02/03 0701  02/04 0700    P  O  360 480     I V   1000     Total Intake 360 1480     Urine  1400     Total Output  1400     Net +360 +80                   Physical Exam:   GENERAL APPEARANCE: NAD  NEURO: GCS 15,non-focal  HEENT: NCAT  CV: RRR, no MGR  LUNGS: CTA bilaterally  GI: soft, non-tender,non-distended  : Urinary retention requiring straight cath  MSK: no edema, contusion or deformity  SKIN: pink, warm, dry    Invasive Devices     Peripheral Intravenous Line  Duration           Peripheral IV 02/01/23 Right Antecubital 2 days          Drain  Duration           Ureteral Internal Stent Right ureter 6 Fr  21 days                      Lab Results: Results: I have personally reviewed all pertinent laboratory/tests results, BMP/CMP: No results found for: SODIUM, K, CL, CO2, ANIONGAP, BUN, CREATININE, GLUCOSE, CALCIUM, AST, ALT, ALKPHOS, PROT, BILITOT, EGFR and CBC: No results found for: WBC, HGB, HCT, MCV, PLT, ADJUSTEDWBC, MCH, MCHC, RDW, MPV, NRBC  Imaging: I have personally reviewed pertinent reports       Other Studies: no new

## 2023-02-03 NOTE — PLAN OF CARE
Problem: MOBILITY - ADULT  Goal: Maintain or return to baseline ADL function  Description: INTERVENTIONS:  -  Assess patient's ability to carry out ADLs; assess patient's baseline for ADL function and identify physical deficits which impact ability to perform ADLs (bathing, care of mouth/teeth, toileting, grooming, dressing, etc )  - Assess/evaluate cause of self-care deficits   - Assess range of motion  - Assess patient's mobility; develop plan if impaired  - Assess patient's need for assistive devices and provide as appropriate  - Encourage maximum independence but intervene and supervise when necessary  - Involve family in performance of ADLs  - Assess for home care needs following discharge   - Consider OT consult to assist with ADL evaluation and planning for discharge  - Provide patient education as appropriate  Outcome: Progressing  Goal: Maintains/Returns to pre admission functional level  Description: INTERVENTIONS:  - Perform BMAT or MOVE assessment daily    - Set and communicate daily mobility goal to care team and patient/family/caregiver  - Collaborate with rehabilitation services on mobility goals if consulted  - Perform Range of Motion  times a day  - Reposition patient every  hours    - Dangle patient times a day  - Stand patient times a day  - Ambulate patient times a day  - Out of bed to chair times a day   - Out of bed for meals  times a day  - Out of bed for toileting  - Record patient progress and toleration of activity level   Outcome: Progressing     Problem: Prexisting or High Potential for Compromised Skin Integrity  Goal: Skin integrity is maintained or improved  Description: INTERVENTIONS:  - Identify patients at risk for skin breakdown  - Assess and monitor skin integrity  - Assess and monitor nutrition and hydration status  - Monitor labs   - Assess for incontinence   - Turn and reposition patient  - Assist with mobility/ambulation  - Relieve pressure over bony prominences  - Avoid friction and shearing  - Provide appropriate hygiene as needed including keeping skin clean and dry  - Evaluate need for skin moisturizer/barrier cream  - Collaborate with interdisciplinary team   - Patient/family teaching  - Consider wound care consult   Outcome: Progressing

## 2023-02-03 NOTE — CONSULTS
Consult Note- Acute Pain Service   Shirley Payan 81 y o  male MRN: [id number]  Unit/Bed#: [facility] 606-01 Encounter: [id number]               Assessment/Plan     Assessment:   Patient Active Problem List   Diagnosis   • Hypertension   • Hyperlipemia   • Aortic stenosis   • S/P AVR   • Carotid artery disease (HCC)   • Coronary artery disease   • Pacemaker   • Urinary tract obstruction by kidney stone   • Atrial flutter ([facility])   • Acute cystitis with hematuria   • Right ureteral stone   • Class 1 obesity in adult   • H/O heart artery stent   • MS (mitral stenosis)   • Fall   • Closed T12 fracture (HCC)   • Right hip pain   • Acute pain due to trauma      Shirley Payan is a 81 y o  male with a history of CAD, atrial flutter, AVR, CABG on Eliquis, right ureteral stone/hydrostatus post cystoscopy and stent, and pacemaker who presented status post fall  Imaging revealed T12 fracture  Neurosurgical team following, planning for conservative management at this time, MRI of thoracic and lumbar spine are ordered for further evaluation  Timing of MRI to be determined  Plan:   Multimodal analgesia:  · Tylenol 975 mg every 8 hours scheduled  · Gabapentin 100 mg at bedtime  · Estimated Creatinine Clearance: 110 8 mL/min (A) (by C-G formula based on SCr of 0 57 mg/dL (L))    · Lidocaine patch daily, on for 12 hours off for 12 hours  · Robaxin 500 mg every 6 hours scheduled, hold for sedation  · Oxycodone 2 5 mg every 4 hours as needed for moderate pain  · Oxycodone 5 mg every 4 hours as needed for severe pain  · Can consider upward titration of oxycodone to 7 5 mg for severe pain if patient continues to require IV Dilaudid for breakthrough pain or that pain is intractable despite current medication regimen  · Dilaudid 0 2 mg every 4 hours as needed for breakthrough pain    Bowel Regimen:  · Colace 100 mg twice a day  · MiraLAX daily    Treatment recommendations and plan of care discussed with bedside nursing staff and primary care service  Discussed medication options, including increasing oxycodone with patient today, however is focused on obtaining MRI, he is agreeable to continue current medication regimen  APS will continue to follow  Please contact Acute Pain Service - [facility] via [employer] from [phone] with additional questions or concerns  See Ellen or Flor for additional contacts and after hours information  History of Present Illness    Admit Date:  2/1/2023  Hospital Day:  2 days  Primary Service:  Trauma  Attending Provider:  Crystal Sanchez,*  Reason for Consult / Principal Problem: acute pain due to trauma  HPI: Becki France is a 81 y o  male with a history of CAD, atrial flutter, AVR, CABG on Eliquis, right ureteral stone/hydrostatus post cystoscopy and stent, and pacemaker who presented status post fall  Imaging revealed T12 fracture  Neurosurgical team following, planning for conservative management at this time, MRI of thoracic and lumbar spine are ordered for further evaluation  Current pain location(s): back, radiates to hips and thighs b/l  Pain Scale:   4-10  Quality: sharp, shooting   Current Analgesic regimen: Patient resting in bed, no acute distress  Continues to report pain in his back that radiates to his hips and thighs bilaterally which is mild at rest and increases with sitting up in bed or ambulation  Was ambulating to the bathroom and reported that he felt his weakness/buckeling in his knees  Tolerating current analgesic regimen, reports improvement in pain with oxycodone administration  Utilized 20 mg of oxycodone and 0 4 mg of IV Dilaudid in the last 24 hours  Pain History: No history of chronic pain  I have reviewed the patient's controlled substance dispensing history in the Prescription Drug Monitoring Program in compliance with the [facility] regulations before prescribing any controlled substances       Inpatient consult to Acute Pain Service  Consult performed by: MICHAEL Maher  Consult ordered by: Basil Robertson PA-C          Review of Systems   Respiratory: Negative for shortness of breath  Cardiovascular: Negative for chest pain  Gastrointestinal: Positive for abdominal pain and constipation  Negative for diarrhea, nausea and vomiting  Genitourinary: Negative for difficulty urinating  Musculoskeletal: Positive for back pain  Neurological: Positive for weakness  Negative for dizziness, facial asymmetry and numbness  All other systems reviewed and are negative        Historical Information   Past Medical History:   Diagnosis Date   • Coronary artery disease    • H/O atrial flutter    • History of aortic valve replacement with bioprosthetic valve    • Hyperlipemia    • Hypertension    • Pacemaker    • Skin cancer, basal cell    • Walker as ambulation aid    • Wears dentures      Past Surgical History:   Procedure Laterality Date   • A-V CARDIAC PACEMAKER INSERTION     • AORTIC VALVE REPLACEMENT     • CARDIAC PACEMAKER PLACEMENT Left     2021   • CATARACT EXTRACTION     • CORONARY ANGIOPLASTY WITH STENT PLACEMENT      stents times 3   • TX CYSTO BLADDER W/URETERAL CATHETERIZATION Right 01/13/2023    Procedure: CYSTOSCOPY RETROGRADE PYELOGRAM WITH INSERTION STENT URETERAL;  Surgeon: Belkys Neff MD;  Location: [facility];  Service: Urology   • TX CYSTO BLADDER W/URETERAL CATHETERIZATION Right 1/26/2023    Procedure: CYSTOSCOPY WITH RETROGRADE PYELOGRAM;  Surgeon: Belkys Neff MD;  Location: [facility];  Service: Urology   • TX CYSTO/URETERO W/LITHOTRIPSY &INDWELL STENT INSRT Right 1/26/2023    Procedure: CYSTOSCOPY URETEROSCOPY WITH LITHOTRIPSY HOLMIUM LASER, RETROGRADE PYELOGRAM AND INSERTION STENT URETERAL;  Surgeon: Belkys Neff MD;  Location: [facility];  Service: Urology     Social History   Social History     Substance and Sexual Activity   Alcohol Use Yes    Comment: social     Social History     Substance and Sexual Activity   Drug Use Never     Social History     Tobacco Use   Smoking Status Former   Smokeless Tobacco Never     Family History: non-contributory    Meds/Allergies   all current active meds have been reviewed, current meds:   Current Facility-Administered Medications   Medication Dose Route Frequency   • acetaminophen (TYLENOL) tablet 975 mg  975 mg Oral Q8H [address]   • amLODIPine (NORVASC) tablet 10 mg  10 mg Oral Daily   • atorvastatin (LIPITOR) tablet 40 mg  40 mg Oral Daily With Dinner   • cholecalciferol (VITAMIN D3) tablet 1,000 Units  1,000 Units Oral Daily   • docusate sodium (COLACE) capsule 100 mg  100 mg Oral BID   • enoxaparin (LOVENOX) subcutaneous injection 30 mg  30 mg Subcutaneous Q30G   • folic acid (FOLVITE) tablet 400 mcg  400 mcg Oral Daily   • gabapentin (NEURONTIN) capsule 100 mg  100 mg Oral HS   • HYDROmorphone HCl (DILAUDID) injection 0 2 mg  0 2 mg Intravenous Q4H PRN   • lidocaine (LIDODERM) 5 % patch 1 patch  1 patch Topical Daily   • lisinopril (ZESTRIL) tablet 10 mg  10 mg Oral Daily   • melatonin tablet 6 mg  6 mg Oral HS   • methocarbamol (ROBAXIN) tablet 500 mg  500 mg Oral Q6H RONAK   • metoprolol tartrate (LOPRESSOR) tablet 50 mg  50 mg Oral Daily   • naloxone (NARCAN) 0 04 mg/mL syringe 0 04 mg  0 04 mg Intravenous Q1MIN PRN   • ondansetron (ZOFRAN) injection 4 mg  4 mg Intravenous Q6H PRN   • oxyCODONE (ROXICODONE) IR tablet 2 5 mg  2 5 mg Oral Q4H PRN   • oxyCODONE (ROXICODONE) IR tablet 5 mg  5 mg Oral Q4H PRN   • polyethylene glycol (MIRALAX) packet 17 g  17 g Oral Daily   • tamsulosin (FLOMAX) capsule 0 4 mg  0 4 mg Oral Daily With Dinner    and PTA meds:   Prior to Admission Medications   Prescriptions Last Dose Informant Patient Reported? Taking?    Cholecalciferol 25 MCG (1000 UT) tablet   Yes No   Sig: Take by mouth   HYDROcodone-acetaminophen (NORCO) 5-325 mg per tablet   No No   Sig: Take 1-2 tablets by mouth every 6 (six) hours as needed for pain for up to 20 doses Max Daily Amount: 8 tablets   Melatonin 5 MG CAPS   Yes No   Sig: Take 5 mg by mouth if needed   acetaminophen (TYLENOL) 500 mg tablet   Yes No   Sig: Take 1,000 mg by mouth every 6 (six) hours as needed for mild pain   amLODIPine (NORVASC) 10 mg tablet   Yes No   Sig: Take 10 mg by mouth daily   apixaban (Eliquis) 5 mg   Yes No   Sig: Take 5 mg by mouth 2 (two) times a day   atorvastatin (LIPITOR) 40 mg tablet   Yes No   Sig: Take 40 mg by mouth daily   cefuroxime (CEFTIN) 500 mg tablet   No No   Sig: Take 1 tablet (500 mg total) by mouth every 12 (twelve) hours for 5 days   enalapril (VASOTEC) 2 5 mg tablet   Yes No   Sig: Take 2 5 mg by mouth 2 (two) times a day   folic acid (FOLVITE) 047 mcg tablet   Yes No   Sig: Take 400 mcg by mouth daily   metoprolol tartrate (LOPRESSOR) 50 mg tablet   Yes No   Sig: Take 50 mg by mouth daily   tamsulosin (FLOMAX) 0 4 mg   No No   Sig: Take 1 capsule (0 4 mg total) by mouth daily with dinner      Facility-Administered Medications: None       Allergies   Allergen Reactions   • Ezetimibe-Simvastatin Dizziness and Lightheadedness     Other reaction(s): MAKES HIM DIZZY     • Simvastatin Lightheadedness     Other reaction(s): Dizziness       Objective   Temp:  [97 5 °F (36 4 °C)-98 6 °F (37 °C)] 97 5 °F (36 4 °C)  HR:  [83-91] 87  Resp:  [16-18] 18  BP: (112-138)/(61-65) 130/65    Intake/Output Summary (Last 24 hours) at 2/3/2023 1437  Last data filed at 2/3/2023 0501  Gross per 24 hour   Intake --   Output 800 ml   Net -800 ml       Physical Exam  Vitals reviewed  Constitutional:       General: He is awake  He is not in acute distress  Appearance: Normal appearance  He is not ill-appearing, toxic-appearing or diaphoretic  HENT:      Head: Normocephalic and atraumatic  Nose: Nose normal  No congestion or rhinorrhea  Mouth/Throat:      Mouth: Mucous membranes are moist    Eyes:      Extraocular Movements: Extraocular movements intact     Cardiovascular:      Rate and Rhythm: Normal rate    Pulmonary:      Effort: Pulmonary effort is normal  No tachypnea, bradypnea or respiratory distress  Breath sounds: No wheezing, rhonchi or rales  Abdominal:      General: Bowel sounds are normal  There is no distension  Palpations: Abdomen is soft  Tenderness: There is no abdominal tenderness  Skin:     General: Skin is warm and dry  Coloration: Skin is not pale  Findings: No rash  Neurological:      Mental Status: He is alert and oriented to person, place, and time  Mental status is at baseline  Psychiatric:         Attention and Perception: Attention normal          Mood and Affect: Mood normal  Mood is not anxious  Speech: Speech normal          Behavior: Behavior normal  Behavior is cooperative  Cognition and Memory: Cognition and memory normal          Lab Results: I have personally reviewed pertinent labs  , CBC: No results found for: WBC, HGB, HCT, MCV, PLT, ADJUSTEDWBC, MCH, MCHC, RDW, MPV, NRBC, CMP: No results found for: SODIUM, K, CL, CO2, ANIONGAP, BUN, CREATININE, GLUCOSE, CALCIUM, AST, ALT, ALKPHOS, PROT, BILITOT, EGFR, BMP:No results found for: SODIUM, K, CL, CO2, BUN, CREATININE, GLUC, GLUF, CALCIUM, AGAP, EGFR, PT/PTT:No results found for: PT, PTT    Imaging Studies: I have personally reviewed pertinent reports  Please note that the APS provides consultative services regarding pain management only  With the exception of ketamine and epidural infusions and except when indicated, final decisions regarding starting or changing doses of analgesic medications are at the discretion of the consulting service  Off hours consultation and/or medication management is generally not available      MICHAEL Jonas  Acute Pain Service

## 2023-02-03 NOTE — ASSESSMENT & PLAN NOTE
- T 12 fracture evaluated by neurosurgery and recommended conservative management for now but will further discuss risks vs benefits of surgery/kyphoplasty with family today  - MRI T/L spine ordered per neurosurgery recommendations - Per MRI this will happen on Monday since patient requires rep to be present to put PPM in "MRI Mode"  - Upright x-rays show stability  - TLSO brace  - neuro intact- has a h/o R ankle surgery with weakness with dorsi- /plantar flexion which is chronic   RLE with weakness due to pain in the R hip noted with hip flexion and extension    - pain control with multi modal regimen  - f/u neurosurgery recommendations after MRI T/L spine obtained

## 2023-02-03 NOTE — QUICK NOTE
Called patient's daughter, Kar Kat, per her request and updated her on plan for MRI next week due to need for rep to be present to turn off MRI  She is very frustrated by this but verbalizes understanding  All questions were answered to her satisfaction

## 2023-02-04 RX ADMIN — APIXABAN 5 MG: 5 TABLET, FILM COATED ORAL at 17:42

## 2023-02-04 RX ADMIN — DOCUSATE SODIUM 100 MG: 100 CAPSULE, LIQUID FILLED ORAL at 08:54

## 2023-02-04 RX ADMIN — METHOCARBAMOL TABLETS 500 MG: 500 TABLET, COATED ORAL at 11:50

## 2023-02-04 RX ADMIN — TAMSULOSIN HYDROCHLORIDE 0.4 MG: 0.4 CAPSULE ORAL at 17:42

## 2023-02-04 RX ADMIN — METHOCARBAMOL TABLETS 500 MG: 500 TABLET, COATED ORAL at 17:41

## 2023-02-04 RX ADMIN — FOLIC ACID TAB 400 MCG 400 MCG: 400 TAB at 08:54

## 2023-02-04 RX ADMIN — ACETAMINOPHEN 975 MG: 325 TABLET, FILM COATED ORAL at 14:44

## 2023-02-04 RX ADMIN — AMLODIPINE BESYLATE 10 MG: 10 TABLET ORAL at 08:55

## 2023-02-04 RX ADMIN — OXYCODONE HYDROCHLORIDE 5 MG: 5 TABLET ORAL at 17:41

## 2023-02-04 RX ADMIN — METHOCARBAMOL TABLETS 500 MG: 500 TABLET, COATED ORAL at 00:24

## 2023-02-04 RX ADMIN — METHOCARBAMOL TABLETS 500 MG: 500 TABLET, COATED ORAL at 23:52

## 2023-02-04 RX ADMIN — Medication 1000 UNITS: at 08:54

## 2023-02-04 RX ADMIN — LISINOPRIL 10 MG: 5 TABLET ORAL at 08:55

## 2023-02-04 RX ADMIN — OXYCODONE HYDROCHLORIDE 5 MG: 5 TABLET ORAL at 11:50

## 2023-02-04 RX ADMIN — ACETAMINOPHEN 975 MG: 325 TABLET, FILM COATED ORAL at 21:57

## 2023-02-04 RX ADMIN — ACETAMINOPHEN 975 MG: 325 TABLET, FILM COATED ORAL at 05:37

## 2023-02-04 RX ADMIN — METHOCARBAMOL TABLETS 500 MG: 500 TABLET, COATED ORAL at 05:37

## 2023-02-04 RX ADMIN — DOCUSATE SODIUM 100 MG: 100 CAPSULE, LIQUID FILLED ORAL at 17:42

## 2023-02-04 RX ADMIN — MELATONIN 6 MG: at 21:56

## 2023-02-04 RX ADMIN — ATORVASTATIN CALCIUM 40 MG: 40 TABLET, FILM COATED ORAL at 17:41

## 2023-02-04 RX ADMIN — APIXABAN 5 MG: 5 TABLET, FILM COATED ORAL at 08:54

## 2023-02-04 RX ADMIN — METOPROLOL TARTRATE 50 MG: 50 TABLET, FILM COATED ORAL at 08:55

## 2023-02-04 RX ADMIN — GABAPENTIN 100 MG: 100 CAPSULE ORAL at 21:57

## 2023-02-04 NOTE — ASSESSMENT & PLAN NOTE
- Acute pain secondary to traumatic injuries  - Continue multi modal analgesic regimen     - Bowel regimen as long as using opioids   - Continue to monitor pain and adjust regimen as indicated, PAtient states pain is improved today

## 2023-02-04 NOTE — PHYSICAL THERAPY NOTE
Physical Therapy Progress Note     02/04/23 1230   PT Last Visit   PT Visit Date 02/04/23   Note Type   Note Type Treatment   Pain Assessment   Pain Assessment Tool 0-10   Pain Score 7   Pain Location/Orientation Location: Back   Hospital Pain Intervention(s) Repositioned; Emotional support   Restrictions/Precautions   Braces or Orthoses TLSO   Other Precautions Spinal precautions;Pain; Fall Risk   Subjective   Subjective The patient expressed frustration about not being told outright about the need to wait for his MRI  He reported severe pain with mobilizing before, but he did note that it was improved somewhat today  He also attested to BLE buckling while in stance  The patient is thankful of his care, but frustrated with his situation  Bed Mobility   Rolling L 4  Minimal assistance   Additional items Assist x 1; Increased time required; Bedrails   Supine to Sit 4  Minimal assistance   Additional items Assist x 1; Increased time required;Verbal cues; Bedrails;HOB elevated  (Log roll and bed placed in full reverse Trendelenberg position to reduce pain with supine to sit )   Sit to Supine 4  Minimal assistance   Additional items Assist x 1; Increased time required;LE management   Transfers   Sit to Stand 3  Moderate assistance  (Min assist from the elevated bed, mod assist from the chair )   Additional items Assist x 1; Increased time required;Verbal cues   Stand to Sit 4  Minimal assistance   Additional items Assist x 1;Verbal cues   Stand pivot 4  Minimal assistance   Additional items Assist x 1; Increased time required;Verbal cues   Ambulation/Elevation   Gait pattern Excessively slow; Step to;Short stride; Inconsistent nataly;Decreased foot clearance;L Knee Estela;R Knee Estela   Gait Assistance 3  Moderate assist   Additional items Assist x 1;Verbal cues; Tactile cues   Assistive Device Rolling walker   Distance (S)  3 feet, 4 feet    (3 feet with BLE buckling, 4 feet with no overt buckling when returning to the bed ) Balance   Static Sitting Fair +   Dynamic Sitting Fair   Static Standing Fair -   Ambulatory Poor   Activity Tolerance   Activity Tolerance Patient limited by pain   Nurse Made Aware [address], RN  Assessment   Prognosis Good   Problem List Decreased strength;Decreased endurance; Impaired balance;Decreased mobility; Decreased range of motion;Pain;Orthopedic restrictions   Assessment The patient remains limited due to pain, and he had buckling when getting out to the chair, but when he returned to bed later he had no buckling  The patient demonstrated good strength and balance as well as keen awareness of his deficits  He is frustrated with his limitations, but he was able to persevere through his pain today  He will certainly benefit from continued therapies to facilitate his return to full independence  Barriers to Discharge Other (Comment)  (Pending improvement in pain )   Goals   Patient Goals To have his MRI and to have less pain  STG Expiration Date 02/12/23   PT Treatment Day 1   Plan   Treatment/Interventions Functional transfer training;LE strengthening/ROM; Therapeutic exercise; Endurance training;Patient/family training;Bed mobility;Gait training;Elevations   Progress Slow progress, decreased activity tolerance   PT Frequency 3-5x/wk   Recommendation   PT Discharge Recommendation   (Pending progress with improved pain and motor control )   Equipment Recommended Dietra Kaska Package Recommended Wheeled walker   AM-PAC Basic Mobility Inpatient   Turning in Flat Bed Without Bedrails 3   Lying on Back to Sitting on Edge of Flat Bed Without Bedrails 3   Moving Bed to Chair 3   Standing Up From Chair Using Arms 3   Walk in Room 2   Climb 3-5 Stairs With Railing 1   Basic Mobility Inpatient Raw Score 15   Basic Mobility Standardized Score 36 97   Highest Level Of Mobility   -[facility] Goal 4: Move to chair/commode   -HL Achieved 5: Stand (1 or more minutes)         An AM-PAC Basic Mobility raw score less than 16 suggests the patient may benefit from discharge to post-acute rehab services      Marsha Horn, PTA

## 2023-02-04 NOTE — ASSESSMENT & PLAN NOTE
- mechanical fall, sustained below stated injury  - PT/OT evaluations pending  - Appreciate geriatrics' consulation  - CM for disposition planning 1

## 2023-02-04 NOTE — PLAN OF CARE
Problem: MOBILITY - ADULT  Goal: Maintain or return to baseline ADL function  Description: INTERVENTIONS:  -  Assess patient's ability to carry out ADLs; assess patient's baseline for ADL function and identify physical deficits which impact ability to perform ADLs (bathing, care of mouth/teeth, toileting, grooming, dressing, etc )  - Assess/evaluate cause of self-care deficits   - Assess range of motion  - Assess patient's mobility; develop plan if impaired  - Assess patient's need for assistive devices and provide as appropriate  - Encourage maximum independence but intervene and supervise when necessary  - Involve family in performance of ADLs  - Assess for home care needs following discharge   - Consider OT consult to assist with ADL evaluation and planning for discharge  - Provide patient education as appropriate  Outcome: Progressing  Goal: Maintains/Returns to pre admission functional level  Description: INTERVENTIONS:  - Perform BMAT or MOVE assessment daily    - Set and communicate daily mobility goal to care team and patient/family/caregiver  - Collaborate with rehabilitation services on mobility goals if consulted  - Perform Range of Motion 3 times a day  - Reposition patient every 2 hours    - Dangle patient  3 times a day  - Stand patient  3 times a day  - Ambulate patient 3 times a day  - Out of bed to chair 3 times a day   - Out of bed for meals 3 times a day  - Out of bed for toileting  - Record patient progress and toleration of activity level   Outcome: Progressing     Problem: Prexisting or High Potential for Compromised Skin Integrity  Goal: Skin integrity is maintained or improved  Description: INTERVENTIONS:  - Identify patients at risk for skin breakdown  - Assess and monitor skin integrity  - Assess and monitor nutrition and hydration status  - Monitor labs   - Assess for incontinence   - Turn and reposition patient  - Assist with mobility/ambulation  - Relieve pressure over bony prominences  - Avoid friction and shearing  - Provide appropriate hygiene as needed including keeping skin clean and dry  - Evaluate need for skin moisturizer/barrier cream  - Collaborate with interdisciplinary team   - Patient/family teaching  - Consider wound care consult   Outcome: Progressing

## 2023-02-04 NOTE — PROGRESS NOTES
[address] [facility]  Progress Note Srinivas Fall 1935, 81 y o  male MRN: [id number]  Unit/Bed#: [facility] 606-01 Encounter: [id number]  Primary Care Provider: Vickie Ojeda DO   Date and time admitted to hospital: 2/1/2023  1:44 PM    Acute pain due to trauma  Assessment & Plan  - Acute pain secondary to traumatic injuries  - Continue multi modal analgesic regimen  - Bowel regimen as long as using opioids   - Continue to monitor pain and adjust regimen as indicated, PAtient states pain is improved today      Right hip pain  Assessment & Plan  - CT A/P negative for injury  - likely contusion and muscle strain  - PT/OT and pain control  - WBAT on RLE    Closed T12 fracture (HCC)  Assessment & Plan  - T 12 fracture evaluated by neurosurgery and recommended conservative management for now but will further discuss risks vs benefits of surgery/kyphoplasty with family today  - MRI T/L spine ordered per neurosurgery recommendations - Per MRI this will happen on Monday since patient requires rep to be present to put PPM in "MRI Mode"  - Upright x-rays show stability  - TLSO brace  - neuro intact- has a h/o R ankle surgery with weakness with dorsi- /plantar flexion which is chronic  RLE with weakness due to pain in the R hip noted with hip flexion and extension    - pain control with multi modal regimen  - f/u neurosurgery recommendations after MRI T/L spine obtained    Urinary tract obstruction by kidney stone  Assessment & Plan  - patient noted to have a renal stone on 1/13 and underwent lithotripsy and right renal stent placement on 1/26 and has a chronic wynn catheter  It appears he was to have followed up for a voiding trial with urology on 1/31  He also remains on Ceftin, which should have been for a 5-day course from 1/18-1/23 from chart review  - I spoke with urology who recommended voiding trial on 2/2 and discontinuation of Ceftin   Patient has required SC x2 and may require replacement of wynn catheter  Pacemaker  Assessment & Plan  - h/o A  Fib s/p PPM   - f/u outpatient with cardiology at [facility]   Per Neuroradiologist, no MRI's with Pacemakers on the weekend    Carotid artery disease [facility])  Assessment & Plan  - s/p CABG x3  - continue home metoprolol and statin  - resume home Eliquis    S/P AVR  Assessment & Plan  - bioprosthetic AVR  - follows outpatient with  Cardiology at [facility], [facility]     Aortic stenosis  Assessment & Plan  - h/o bioprosthetic AVR    Hyperlipemia  Assessment & Plan  - continue home statin    Hypertension  Assessment & Plan  - continue home lisinopril and amlodipine  - BP adequately controlled    * Fall  Assessment & Plan  - mechanical fall, sustained below stated injury  - PT/OT evaluations pending  - Appreciate geriatrics' consulation  - CM for disposition planning        Bowel Regimen: colace  VTE Prophylaxis:Sequential compression device (Venodyne)  , eliqous    Disposition: rehab vs home    Subjective   Chief Complaint: Back pain    Subjective: " I actually feel a little better today"     Objective   Vitals:   Temp:  [98 2 °F (36 8 °C)-98 6 °F (37 °C)] 98 6 °F (37 °C)  HR:  [79-98] 84  Resp:  [16-20] 19  BP: (118-177)/(64-75) 156/68    I/O       02/02 0701  02/03 0700 02/03 0701  02/04 0700 02/04 0701  02/05 0700    P  O  480  480    I V  1000      Total Intake 1480  480    Urine 1400 675     Stool  0     Total Output 1400 675     Net +80 -675 +480           Unmeasured Stool Occurrence  1 x            Physical Exam:   GENERAL APPEARANCE: comfortable  NEURO: intact, GCS - 15  HEENT: EOm's intact  CV: RRR< no complaints of chest pain  LUNGS: CTA bilaterally, no shortness of breath  GI: tolerating a diet  : wynn catether  MSK: moving extremities  SKIN: warm and dry    Invasive Devices     Peripheral Intravenous Line  Duration           Peripheral IV 02/01/23 Right Antecubital 3 days          Drain  Duration           Ureteral Internal Stent Right ureter 6 Fr  22 days    Urethral Catheter Non-latex 18 Fr  <1 day                      Lab Results: none  Imaging: none  Other Studies: none

## 2023-02-04 NOTE — ASSESSMENT & PLAN NOTE
- h/o A   Fib s/p PPM   - f/u outpatient with cardiology at [facility]   Per Neuroradiologist, no MRI's with Pacemakers on the weekend

## 2023-02-04 NOTE — PLAN OF CARE
Problem: PHYSICAL THERAPY ADULT  Goal: Performs mobility at highest level of function for planned discharge setting  See evaluation for individualized goals  Description: Treatment/Interventions: Functional transfer training, LE strengthening/ROM, Elevations, Therapeutic exercise, Endurance training, Patient/family training, Equipment eval/education, Bed mobility, Gait training, Spoke to nursing, OT  Equipment Recommended: Velasquez Glez       See flowsheet documentation for full assessment, interventions and recommendations  Outcome: Progressing  Note: Prognosis: Good  Problem List: Decreased strength, Decreased endurance, Impaired balance, Decreased mobility, Decreased range of motion, Pain, Orthopedic restrictions  Assessment: The patient remains limited due to pain, and he had buckling when getting out to the chair, but when he returned to bed later he had no buckling  The patient demonstrated good strength and balance as well as keen awareness of his deficits  He is frustrated with his limitations, but he was able to persevere through his pain today  He will certainly benefit from continued therapies to facilitate his return to full independence  Barriers to Discharge: Other (Comment) (Pending improvement in pain )  Barriers to Discharge Comments: Pt denies and mobility or safety concerns about returning home at time of d/c  PT Discharge Recommendation:  (Pending progress with improved pain and motor control )    See flowsheet documentation for full assessment

## 2023-02-05 LAB
ANION GAP SERPL CALCULATED.3IONS-SCNC: 6 MMOL/L (ref 4–13)
BASOPHILS # BLD AUTO: 0.03 THOUSANDS/ÂΜL (ref 0–0.1)
BASOPHILS NFR BLD AUTO: 0 % (ref 0–1)
BUN SERPL-MCNC: 9 MG/DL (ref 5–25)
CALCIUM SERPL-MCNC: 8.7 MG/DL (ref 8.3–10.1)
CHLORIDE SERPL-SCNC: 105 MMOL/L (ref 96–108)
CO2 SERPL-SCNC: 25 MMOL/L (ref 21–32)
CREAT SERPL-MCNC: 0.59 MG/DL (ref 0.6–1.3)
EOSINOPHIL # BLD AUTO: 0.14 THOUSAND/ÂΜL (ref 0–0.61)
EOSINOPHIL NFR BLD AUTO: 2 % (ref 0–6)
ERYTHROCYTE [DISTWIDTH] IN BLOOD BY AUTOMATED COUNT: 15 % (ref 11.6–15.1)
GFR SERPL CREATININE-BSD FRML MDRD: 91 ML/MIN/1.73SQ M
GLUCOSE SERPL-MCNC: 114 MG/DL (ref 65–140)
HCT VFR BLD AUTO: 38.1 % (ref 36.5–49.3)
HGB BLD-MCNC: 12.4 G/DL (ref 12–17)
IMM GRANULOCYTES # BLD AUTO: 0.03 THOUSAND/UL (ref 0–0.2)
IMM GRANULOCYTES NFR BLD AUTO: 0 % (ref 0–2)
LYMPHOCYTES # BLD AUTO: 1.35 THOUSANDS/ÂΜL (ref 0.6–4.47)
LYMPHOCYTES NFR BLD AUTO: 19 % (ref 14–44)
MCH RBC QN AUTO: 29.2 PG (ref 26.8–34.3)
MCHC RBC AUTO-ENTMCNC: 32.5 G/DL (ref 31.4–37.4)
MCV RBC AUTO: 90 FL (ref 82–98)
MONOCYTES # BLD AUTO: 0.7 THOUSAND/ÂΜL (ref 0.17–1.22)
MONOCYTES NFR BLD AUTO: 10 % (ref 4–12)
NEUTROPHILS # BLD AUTO: 4.79 THOUSANDS/ÂΜL (ref 1.85–7.62)
NEUTS SEG NFR BLD AUTO: 69 % (ref 43–75)
NRBC BLD AUTO-RTO: 0 /100 WBCS
PLATELET # BLD AUTO: 204 THOUSANDS/UL (ref 149–390)
PMV BLD AUTO: 9.9 FL (ref 8.9–12.7)
POTASSIUM SERPL-SCNC: 3.7 MMOL/L (ref 3.5–5.3)
RBC # BLD AUTO: 4.24 MILLION/UL (ref 3.88–5.62)
SODIUM SERPL-SCNC: 136 MMOL/L (ref 135–147)
WBC # BLD AUTO: 7.04 THOUSAND/UL (ref 4.31–10.16)

## 2023-02-05 RX ADMIN — HYDROMORPHONE HYDROCHLORIDE 0.2 MG: 0.2 INJECTION, SOLUTION INTRAMUSCULAR; INTRAVENOUS; SUBCUTANEOUS at 04:45

## 2023-02-05 RX ADMIN — METHOCARBAMOL TABLETS 500 MG: 500 TABLET, COATED ORAL at 04:45

## 2023-02-05 RX ADMIN — AMLODIPINE BESYLATE 10 MG: 10 TABLET ORAL at 08:30

## 2023-02-05 RX ADMIN — METHOCARBAMOL TABLETS 500 MG: 500 TABLET, COATED ORAL at 23:51

## 2023-02-05 RX ADMIN — OXYCODONE HYDROCHLORIDE 5 MG: 5 TABLET ORAL at 08:31

## 2023-02-05 RX ADMIN — FOLIC ACID TAB 400 MCG 400 MCG: 400 TAB at 08:30

## 2023-02-05 RX ADMIN — APIXABAN 5 MG: 5 TABLET, FILM COATED ORAL at 08:30

## 2023-02-05 RX ADMIN — MELATONIN 6 MG: at 21:44

## 2023-02-05 RX ADMIN — METHOCARBAMOL TABLETS 500 MG: 500 TABLET, COATED ORAL at 12:01

## 2023-02-05 RX ADMIN — METOPROLOL TARTRATE 50 MG: 50 TABLET, FILM COATED ORAL at 08:30

## 2023-02-05 RX ADMIN — APIXABAN 5 MG: 5 TABLET, FILM COATED ORAL at 17:00

## 2023-02-05 RX ADMIN — ACETAMINOPHEN 975 MG: 325 TABLET, FILM COATED ORAL at 13:00

## 2023-02-05 RX ADMIN — GABAPENTIN 100 MG: 100 CAPSULE ORAL at 21:44

## 2023-02-05 RX ADMIN — Medication 1000 UNITS: at 08:30

## 2023-02-05 RX ADMIN — ACETAMINOPHEN 975 MG: 325 TABLET, FILM COATED ORAL at 04:46

## 2023-02-05 RX ADMIN — ATORVASTATIN CALCIUM 40 MG: 40 TABLET, FILM COATED ORAL at 16:59

## 2023-02-05 RX ADMIN — DOCUSATE SODIUM 100 MG: 100 CAPSULE, LIQUID FILLED ORAL at 17:00

## 2023-02-05 RX ADMIN — OXYCODONE HYDROCHLORIDE 5 MG: 5 TABLET ORAL at 12:59

## 2023-02-05 RX ADMIN — DOCUSATE SODIUM 100 MG: 100 CAPSULE, LIQUID FILLED ORAL at 08:30

## 2023-02-05 RX ADMIN — LISINOPRIL 10 MG: 5 TABLET ORAL at 08:29

## 2023-02-05 RX ADMIN — ACETAMINOPHEN 975 MG: 325 TABLET, FILM COATED ORAL at 21:44

## 2023-02-05 RX ADMIN — METHOCARBAMOL TABLETS 500 MG: 500 TABLET, COATED ORAL at 17:00

## 2023-02-05 RX ADMIN — OXYCODONE HYDROCHLORIDE 5 MG: 5 TABLET ORAL at 02:44

## 2023-02-05 RX ADMIN — OXYCODONE HYDROCHLORIDE 5 MG: 5 TABLET ORAL at 21:44

## 2023-02-05 RX ADMIN — OXYCODONE HYDROCHLORIDE 5 MG: 5 TABLET ORAL at 16:59

## 2023-02-05 RX ADMIN — TAMSULOSIN HYDROCHLORIDE 0.4 MG: 0.4 CAPSULE ORAL at 16:59

## 2023-02-05 RX ADMIN — POLYETHYLENE GLYCOL 3350 17 G: 17 POWDER, FOR SOLUTION ORAL at 08:29

## 2023-02-05 NOTE — PLAN OF CARE
Problem: MOBILITY - ADULT  Goal: Maintain or return to baseline ADL function  Description: INTERVENTIONS:  -  Assess patient's ability to carry out ADLs; assess patient's baseline for ADL function and identify physical deficits which impact ability to perform ADLs (bathing, care of mouth/teeth, toileting, grooming, dressing, etc )  - Assess/evaluate cause of self-care deficits   - Assess range of motion  - Assess patient's mobility; develop plan if impaired  - Assess patient's need for assistive devices and provide as appropriate  - Encourage maximum independence but intervene and supervise when necessary  - Involve family in performance of ADLs  - Assess for home care needs following discharge   - Consider OT consult to assist with ADL evaluation and planning for discharge  - Provide patient education as appropriate  Outcome: Progressing  Goal: Maintains/Returns to pre admission functional level  Description: INTERVENTIONS:  - Perform BMAT or MOVE assessment daily    - Set and communicate daily mobility goal to care team and patient/family/caregiver  - Collaborate with rehabilitation services on mobility goals if consulted  - Perform Range of Motion *3** times a day  - Reposition patient every *2** hours    - Dangle patient *3** times a day  - Stand patient *3** times a day  - Ambulate patient *3** times a day  - Out of bed to chair **3* times a day   - Out of bed for meals *3** times a day  - Out of bed for toileting  - Record patient progress and toleration of activity level   Outcome: Progressing     Problem: Prexisting or High Potential for Compromised Skin Integrity  Goal: Skin integrity is maintained or improved  Description: INTERVENTIONS:  - Identify patients at risk for skin breakdown  - Assess and monitor skin integrity  - Assess and monitor nutrition and hydration status  - Monitor labs   - Assess for incontinence   - Turn and reposition patient  - Assist with mobility/ambulation  - Relieve pressure over bony prominences  - Avoid friction and shearing  - Provide appropriate hygiene as needed including keeping skin clean and dry  - Evaluate need for skin moisturizer/barrier cream  - Collaborate with interdisciplinary team   - Patient/family teaching  - Consider wound care consult   Outcome: Progressing     Problem: NEUROSENSORY - ADULT  Goal: Achieves stable or improved neurological status  Description: INTERVENTIONS  - Monitor and report changes in neurological status  - Monitor vital signs such as temperature, blood pressure, glucose, and any other labs ordered   - Initiate measures to prevent increased intracranial pressure  - Monitor for seizure activity and implement precautions if appropriate      Outcome: Progressing  Goal: Achieves maximal functionality and self care  Description: INTERVENTIONS  - Monitor swallowing and airway patency with patient fatigue and changes in neurological status  - Encourage and assist patient to increase activity and self care     - Encourage visually impaired, hearing impaired and aphasic patients to use assistive/communication devices  Outcome: Progressing

## 2023-02-05 NOTE — ASSESSMENT & PLAN NOTE
- Acute pain secondary to traumatic injuries  - Continue multi modal analgesic regimen     - Bowel regimen as long as using opioids   - Continue to monitor pain and adjust regimen as indicated, PAtient states pain is improving

## 2023-02-05 NOTE — ASSESSMENT & PLAN NOTE
- patient noted to have a renal stone on 1/13 and underwent lithotripsy and right renal stent placement on 1/26 and has a chronic wynn catheter  It appears he was to have followed up for a voiding trial with urology on 1/31  He also remains on Ceftin, which should have been for a 5-day course from 1/18-1/23 from chart review  - I spoke with urology who recommended voiding trial on 2/2 and discontinuation of Ceftin  Patient has required SC x2 and may require replacement of wynn catheter  - per patient cannot void lying down   Catheter in place

## 2023-02-05 NOTE — ASSESSMENT & PLAN NOTE
- h/o A   Fib s/p PPM   - f/u outpatient with cardiology at [facility]  Per Neuroradiologist, no MRI's with Pacemakers on the weekend

## 2023-02-05 NOTE — PROGRESS NOTES
[address] [facility]  Progress Note Beckie Braun 1935, 81 y o  male MRN: [id number]  Unit/Bed#: [facility] 606-01 Encounter: [id number]  Primary Care Provider: Kaylan Reed DO   Date and time admitted to hospital: 2/1/2023  1:44 PM    Acute pain due to trauma  Assessment & Plan  - Acute pain secondary to traumatic injuries  - Continue multi modal analgesic regimen  - Bowel regimen as long as using opioids   - Continue to monitor pain and adjust regimen as indicated, PAtient states pain is improving      Right hip pain  Assessment & Plan  - CT A/P negative for injury  - likely contusion and muscle strain  - PT/OT and pain control  - WBAT on RLE    Closed T12 fracture (HCC)  Assessment & Plan  - T 12 fracture evaluated by neurosurgery and recommended conservative management for now but will further discuss risks vs benefits of surgery/kyphoplasty with family today  - MRI T/L spine ordered per neurosurgery recommendations - Per MRI this will happen on Monday since patient requires rep to be present to put PPM in "MRI Mode"  - Upright x-rays show stability  - TLSO brace  - neuro intact- has a h/o R ankle surgery with weakness with dorsi- /plantar flexion which is chronic  RLE with weakness due to pain in the R hip noted with hip flexion and extension    - pain control with multi modal regimen  - f/u neurosurgery recommendations after MRI T/L spine obtained    Urinary tract obstruction by kidney stone  Assessment & Plan  - patient noted to have a renal stone on 1/13 and underwent lithotripsy and right renal stent placement on 1/26 and has a chronic wynn catheter  It appears he was to have followed up for a voiding trial with urology on 1/31  He also remains on Ceftin, which should have been for a 5-day course from 1/18-1/23 from chart review  - I spoke with urology who recommended voiding trial on 2/2 and discontinuation of Ceftin   Patient has required SC x2 and may require replacement of wynn catheter  - per patient cannot void lying down  Catheter in place    Pacemaker  Assessment & Plan  - h/o A  Fib s/p PPM   - f/u outpatient with cardiology at [facility]   Per Neuroradiologist, no MRI's with Pacemakers on the weekend    Carotid artery disease [facility])  Assessment & Plan  - s/p CABG x3  - continue home metoprolol and statin  - resume home Eliquis    S/P AVR  Assessment & Plan  - bioprosthetic AVR  - follows outpatient with  Cardiology at [facility], [facility]     Aortic stenosis  Assessment & Plan  - h/o bioprosthetic AVR    Hyperlipemia  Assessment & Plan  - continue home statin    Hypertension  Assessment & Plan  - continue home lisinopril and amlodipine  - BP adequately controlled    * Fall  Assessment & Plan  - mechanical fall, sustained below stated injury  - PT/OT evaluations pending  - Appreciate geriatrics' consulation  - CM for disposition planning        Bowel Regimen: Colace  VTE Prophylaxis:Sequential compression device (Venodyne) , Eliqous     Disposition: rehab    Subjective   Chief Complaint: b/l leg pain    Subjective: I get the MRI tomorrow"     Objective   Vitals:   Temp:  [98 2 °F (36 8 °C)-98 6 °F (37 °C)] 98 2 °F (36 8 °C)  HR:  [71-98] 80  Resp:  [18-20] 18  BP: (126-177)/(67-71) 126/68    I/O       02/03 0701  02/04 0700 02/04 0701  02/05 0700 02/05 0701  02/06 0700    P  O   480     I V         Total Intake  480     Urine 675 1000     Stool 0      Total Output 675 1000     Net -675 -520            Unmeasured Stool Occurrence 1 x             Physical Exam:   GENERAL APPEARANCE: pleasant  NEURO: intact, GCS - 15  HEENT: EOm's intact  CV: RRR< no complaints of chest pain  LUNGS: CTA bilaterally, no shortness of breath  GI: tolerating a diet  : Catheter  MSK: moving extremities  SKIN: warm and dry    Invasive Devices     Peripheral Intravenous Line  Duration           Peripheral IV 02/05/23 Left Wrist <1 day          Drain  Duration           Ureteral Internal Stent Right ureter 6 Fr  22 days    Urethral Catheter Non-latex 18 Fr  <1 day                      Lab Results:    Latest Reference Range & Units 02/05/23 04:47   Sodium 135 - 147 mmol/L 136   Potassium 3 5 - 5 3 mmol/L 3 7   Chloride 96 - 108 mmol/L 105   CO2 21 - 32 mmol/L 25   Anion Gap 4 - 13 mmol/L 6   BUN 5 - 25 mg/dL 9   Creatinine 0 60 - 1 30 mg/dL 0 59 (L)   Glucose, Random 65 - 140 mg/dL 114   Calcium 8 3 - 10 1 mg/dL 8 7   eGFR ml/min/1 73sq m 91   WBC 4 31 - 10 16 Thousand/uL 7 04   Red Blood Cell Count 3 88 - 5 62 Million/uL 4 24   Hemoglobin 12 0 - 17 0 g/dL 12 4   HCT 36 5 - 49 3 % 38 1   (L): Data is abnormally low  Imaging: none  Other Studies: none

## 2023-02-05 NOTE — ASSESSMENT & PLAN NOTE
- bioprosthetic AVR  - follows outpatient with  Cardiology at [facility]

## 2023-02-06 ENCOUNTER — APPOINTMENT (INPATIENT)
Dept: RADIOLOGY | Facility: HOSPITAL | Age: 88
End: 2023-02-06

## 2023-02-06 LAB
ANION GAP SERPL CALCULATED.3IONS-SCNC: 6 MMOL/L (ref 4–13)
BASOPHILS # BLD AUTO: 0.04 THOUSANDS/ÂΜL (ref 0–0.1)
BASOPHILS NFR BLD AUTO: 1 % (ref 0–1)
BUN SERPL-MCNC: 12 MG/DL (ref 5–25)
CALCIUM SERPL-MCNC: 9.1 MG/DL (ref 8.3–10.1)
CHLORIDE SERPL-SCNC: 102 MMOL/L (ref 96–108)
CK SERPL-CCNC: 21 U/L (ref 39–308)
CO2 SERPL-SCNC: 29 MMOL/L (ref 21–32)
CREAT SERPL-MCNC: 0.7 MG/DL (ref 0.6–1.3)
EOSINOPHIL # BLD AUTO: 0.15 THOUSAND/ÂΜL (ref 0–0.61)
EOSINOPHIL NFR BLD AUTO: 2 % (ref 0–6)
ERYTHROCYTE [DISTWIDTH] IN BLOOD BY AUTOMATED COUNT: 15 % (ref 11.6–15.1)
GFR SERPL CREATININE-BSD FRML MDRD: 85 ML/MIN/1.73SQ M
GLUCOSE SERPL-MCNC: 119 MG/DL (ref 65–140)
HCT VFR BLD AUTO: 43.4 % (ref 36.5–49.3)
HGB BLD-MCNC: 13.9 G/DL (ref 12–17)
IMM GRANULOCYTES # BLD AUTO: 0.04 THOUSAND/UL (ref 0–0.2)
IMM GRANULOCYTES NFR BLD AUTO: 1 % (ref 0–2)
LYMPHOCYTES # BLD AUTO: 1.33 THOUSANDS/ÂΜL (ref 0.6–4.47)
LYMPHOCYTES NFR BLD AUTO: 17 % (ref 14–44)
MCH RBC QN AUTO: 29 PG (ref 26.8–34.3)
MCHC RBC AUTO-ENTMCNC: 32 G/DL (ref 31.4–37.4)
MCV RBC AUTO: 91 FL (ref 82–98)
MONOCYTES # BLD AUTO: 0.58 THOUSAND/ÂΜL (ref 0.17–1.22)
MONOCYTES NFR BLD AUTO: 7 % (ref 4–12)
NEUTROPHILS # BLD AUTO: 5.83 THOUSANDS/ÂΜL (ref 1.85–7.62)
NEUTS SEG NFR BLD AUTO: 72 % (ref 43–75)
NRBC BLD AUTO-RTO: 0 /100 WBCS
PLATELET # BLD AUTO: 214 THOUSANDS/UL (ref 149–390)
PMV BLD AUTO: 9.9 FL (ref 8.9–12.7)
POTASSIUM SERPL-SCNC: 4.1 MMOL/L (ref 3.5–5.3)
QRS AXIS: 154 DEGREES
QRSD INTERVAL: 158 MS
QT INTERVAL: 408 MS
QTC INTERVAL: 490 MS
RBC # BLD AUTO: 4.79 MILLION/UL (ref 3.88–5.62)
SODIUM SERPL-SCNC: 137 MMOL/L (ref 135–147)
T WAVE AXIS: 18 DEGREES
VENTRICULAR RATE: 87 BPM
WBC # BLD AUTO: 7.97 THOUSAND/UL (ref 4.31–10.16)

## 2023-02-06 RX ORDER — OLANZAPINE 5 MG/1
2.5 TABLET, ORALLY DISINTEGRATING ORAL ONCE
Status: COMPLETED | OUTPATIENT
Start: 2023-02-06 | End: 2023-02-06

## 2023-02-06 RX ORDER — LEVOFLOXACIN 750 MG/1
750 TABLET ORAL EVERY 24 HOURS
Status: DISCONTINUED | OUTPATIENT
Start: 2023-02-06 | End: 2023-02-06

## 2023-02-06 RX ORDER — SODIUM CHLORIDE, SODIUM GLUCONATE, SODIUM ACETATE, POTASSIUM CHLORIDE, MAGNESIUM CHLORIDE, SODIUM PHOSPHATE, DIBASIC, AND POTASSIUM PHOSPHATE .53; .5; .37; .037; .03; .012; .00082 G/100ML; G/100ML; G/100ML; G/100ML; G/100ML; G/100ML; G/100ML
75 INJECTION, SOLUTION INTRAVENOUS CONTINUOUS
Status: DISCONTINUED | OUTPATIENT
Start: 2023-02-06 | End: 2023-02-09

## 2023-02-06 RX ADMIN — METOPROLOL TARTRATE 50 MG: 50 TABLET, FILM COATED ORAL at 08:38

## 2023-02-06 RX ADMIN — OXYCODONE HYDROCHLORIDE 5 MG: 5 TABLET ORAL at 07:36

## 2023-02-06 RX ADMIN — OXYCODONE HYDROCHLORIDE 5 MG: 5 TABLET ORAL at 17:26

## 2023-02-06 RX ADMIN — POLYETHYLENE GLYCOL 3350 17 G: 17 POWDER, FOR SOLUTION ORAL at 13:14

## 2023-02-06 RX ADMIN — APIXABAN 5 MG: 5 TABLET, FILM COATED ORAL at 08:38

## 2023-02-06 RX ADMIN — METHOCARBAMOL TABLETS 500 MG: 500 TABLET, COATED ORAL at 17:26

## 2023-02-06 RX ADMIN — GABAPENTIN 100 MG: 100 CAPSULE ORAL at 21:27

## 2023-02-06 RX ADMIN — ACETAMINOPHEN 975 MG: 325 TABLET, FILM COATED ORAL at 21:27

## 2023-02-06 RX ADMIN — ACETAMINOPHEN 975 MG: 325 TABLET, FILM COATED ORAL at 13:14

## 2023-02-06 RX ADMIN — METHOCARBAMOL TABLETS 500 MG: 500 TABLET, COATED ORAL at 13:14

## 2023-02-06 RX ADMIN — ATORVASTATIN CALCIUM 40 MG: 40 TABLET, FILM COATED ORAL at 17:26

## 2023-02-06 RX ADMIN — METHOCARBAMOL TABLETS 500 MG: 500 TABLET, COATED ORAL at 05:12

## 2023-02-06 RX ADMIN — Medication 1000 UNITS: at 08:38

## 2023-02-06 RX ADMIN — TAMSULOSIN HYDROCHLORIDE 0.4 MG: 0.4 CAPSULE ORAL at 17:25

## 2023-02-06 RX ADMIN — DOCUSATE SODIUM 100 MG: 100 CAPSULE, LIQUID FILLED ORAL at 08:38

## 2023-02-06 RX ADMIN — OXYCODONE HYDROCHLORIDE 5 MG: 5 TABLET ORAL at 13:14

## 2023-02-06 RX ADMIN — ACETAMINOPHEN 975 MG: 325 TABLET, FILM COATED ORAL at 05:12

## 2023-02-06 RX ADMIN — MELATONIN 6 MG: at 21:27

## 2023-02-06 RX ADMIN — LISINOPRIL 10 MG: 5 TABLET ORAL at 08:38

## 2023-02-06 RX ADMIN — OLANZAPINE 2.5 MG: 5 TABLET, ORALLY DISINTEGRATING ORAL at 09:52

## 2023-02-06 RX ADMIN — APIXABAN 5 MG: 5 TABLET, FILM COATED ORAL at 17:25

## 2023-02-06 RX ADMIN — SODIUM CHLORIDE, SODIUM GLUCONATE, SODIUM ACETATE, POTASSIUM CHLORIDE, MAGNESIUM CHLORIDE, SODIUM PHOSPHATE, DIBASIC, AND POTASSIUM PHOSPHATE 75 ML/HR: .53; .5; .37; .037; .03; .012; .00082 INJECTION, SOLUTION INTRAVENOUS at 08:39

## 2023-02-06 RX ADMIN — FOLIC ACID TAB 400 MCG 400 MCG: 400 TAB at 08:38

## 2023-02-06 RX ADMIN — OXYCODONE HYDROCHLORIDE 5 MG: 5 TABLET ORAL at 02:31

## 2023-02-06 RX ADMIN — AMLODIPINE BESYLATE 10 MG: 10 TABLET ORAL at 08:38

## 2023-02-06 RX ADMIN — HYDROMORPHONE HYDROCHLORIDE 0.2 MG: 0.2 INJECTION, SOLUTION INTRAMUSCULAR; INTRAVENOUS; SUBCUTANEOUS at 20:00

## 2023-02-06 RX ADMIN — DOCUSATE SODIUM 100 MG: 100 CAPSULE, LIQUID FILLED ORAL at 17:25

## 2023-02-06 NOTE — ASSESSMENT & PLAN NOTE
- T 12 fracture evaluated by neurosurgery and recommended conservative management for now but will further discuss risks vs benefits of surgery/kyphoplasty with family today  - MRI T/L spine ordered per neurosurgery recommendations - Per MRI this will happen on Monday since patient requires rep to be present to put PPM in "MRI Mode"  - Upright x-rays show stability  - TLSO brace  - neuro intact- has a h/o R ankle surgery with weakness with dorsi- /plantar flexion which is chronic   RLE with weakness due to pain in the R hip noted with hip flexion and extension    - pain control with multi modal regimen  - f/u neurosurgery recommendations after MRI T/L spine obtained - today at 10 am

## 2023-02-06 NOTE — ASSESSMENT & PLAN NOTE
- patient noted to have a renal stone on 1/13 and underwent lithotripsy and right renal stent placement on 1/26 and has a chronic wynn catheter  It appears he was to have followed up for a voiding trial with urology on 1/31  He also remains on Ceftin, which should have been for a 5-day course from 1/18-1/23 from chart review  - I spoke with urology who recommended voiding trial on 2/2 and discontinuation of Ceftin  Patient has required SC x2 and may require replacement of wynn catheter  - per patient cannot void lying down  Catheter in place  Urine is dark with sediment    Fluids started to see if increase output and urine clears

## 2023-02-06 NOTE — PROGRESS NOTES
Progress Note - Geriatric Medicine   Nahum Delacruz 81 y o  male MRN: [id number]  Unit/Bed#: [facility] 606-01 Encounter: [id number]      Assessment/Plan:    Ambulatory dysfunction with fall  -Reportedly mechanical fall x2, 12/25 and 1/29, none prior   -using walker since initial fall, no assist device required prior  -injuries as outlined below   -Remains high future falls, continue fall precautions, assist w transfers   -PT/OT following      Fracture of T12 vertebrae  -Noted on CT abdomen and pelvis 2/1/23  -Nsx following, recommend conservative management   -continue acute pain control, APS on consult   -d/w Nsx, pain radiating down legs worsening and cannot be fully attributed to this specific injury, MRI thoracic and lumbar spine obtained earlier today for further evaluation, read pending   -continue thoracolumbar spine precautions  -Neurovascular checks per protocol  -TLSO brace as directed     Acute pain due to trauma  -Continue acute multimodal pain control  -APS on consult, currently on Geriatric pain protocol      Right-sided hydronephrosis  -Due to right ureteral stone  -S/p cystoscopy with lithotripsy and ureteral stent insertion 1/26/22  -Maintained on Flomax  -Persistent chronic moderate right-sided hydronephrosis, right ureteral stent in place  -wynn removed earlier in admission and since replaced due to failed retention protocol      Atrial flutter  -Home regimen includes rate control with metoprolol and anticoagulation with Eliquis  -Eliquis held for recent ureteral stent placement, since been resumed     Sick sinus syndrome  -S/p pacemaker placement  -required rep to place in "MRI" mode for studies earlier today  -Continue close outpatient follow-up with Cardiology     CAD  -S/p PCI  -Maintained on chronic systemic anticoagulation with Eliquis (for A  Fib/flutter)  -Continue secondary risk factor modifications and encourage healthy lifestyle modifications       Impaired mastication  -Requires use of dentures -encourage use all appropriate times  -ensure meal consistency appropriate for abilities  -continue aspiration precautions     Deconditioning/debility/frailty  -Clinical frailty scale stage V, mildly frail  -Due to age, ambulatory dysfunction with recent falls, CAD, and multiple additional chronic medical co-morbidities  -Optimize chronic conditions  -Encourage well-balanced nutritional intake     Delirium precautions  -high risk due to age, pain, prolonged hospitalization   -maintain normal sleep/wake cycle  -Ensure pain controlled  -Monitor for fecal and urinary retention, now with replacement of wynn for failure retention protocol     Care coordination: rounded with Steph Vergara (RN)    Subjective:     Lottie Oquendo is seen and examined at bedside where he is lying resting, pain is currently exacerbated from lying still for MRI, he notes that it is progressing further down his legs daily now down as far as his knees  He has been sleeping well and appetite is good, he offers no additional acute complaints  Review of Systems   Constitutional: Negative  Negative for appetite change, chills and fever  HENT: Negative  Eyes: Negative  Respiratory: Negative  Cardiovascular: Negative  Gastrointestinal: Negative  Genitourinary: Negative  Musculoskeletal: Negative  Skin: Negative  Neurological: Negative for numbness  Shooting pain from lower back down legs bilaterally wrapping around hips and down to knees, worse with any movement    Hematological: Negative  Psychiatric/Behavioral: Negative  All other systems reviewed and are negative  Objective:     Vitals: Blood pressure 115/64, pulse 81, temperature 98 4 °F (36 9 °C), resp  rate 16, SpO2 95 %  ,There is no height or weight on file to calculate BMI        Intake/Output Summary (Last 24 hours) at 2/6/2023 1317  Last data filed at 2/6/2023 0954  Gross per 24 hour   Intake 480 ml   Output 975 ml   Net -495 ml     Current Medications: Reviewed    Physical Exam:   Physical Exam  Vitals and nursing note reviewed  Constitutional:       General: He is not in acute distress  Comments: Elderly male appears uncomfortable   HENT:      Head: Normocephalic  Nose: Nose normal       Mouth/Throat:      Mouth: Mucous membranes are dry  Eyes:      General: No scleral icterus  Right eye: No discharge  Left eye: No discharge  Conjunctiva/sclera: Conjunctivae normal    Neck:      Comments: Trachea midline   Cardiovascular:      Rate and Rhythm: Normal rate and regular rhythm  Pulmonary:      Effort: Pulmonary effort is normal  No respiratory distress  Breath sounds: No wheezing  Abdominal:      General: There is no distension  Palpations: Abdomen is soft  Tenderness: There is no abdominal tenderness  Genitourinary:     Comments: Urine in wynn appears mid to dark brown  Musculoskeletal:      Cervical back: Neck supple  Comments: Reduced overall muscle mass    Skin:     General: Skin is warm and dry  Neurological:      Mental Status: He is alert  Comments: Awake and alert, ans ques appropriately    Psychiatric:      Comments: Calm and cooperative        Invasive Devices     Peripheral Intravenous Line  Duration           Peripheral IV 02/05/23 Left Wrist 1 day          Drain  Duration           Ureteral Internal Stent Right ureter 6 Fr  24 days    Urethral Catheter Non-latex 18 Fr  1 day              Lab, Imaging and other studies: I have personally reviewed pertinent reports

## 2023-02-06 NOTE — PLAN OF CARE
Problem: MOBILITY - ADULT  Goal: Maintain or return to baseline ADL function  Description: INTERVENTIONS:  -  Assess patient's ability to carry out ADLs; assess patient's baseline for ADL function and identify physical deficits which impact ability to perform ADLs (bathing, care of mouth/teeth, toileting, grooming, dressing, etc )  - Assess/evaluate cause of self-care deficits   - Assess range of motion  - Assess patient's mobility; develop plan if impaired  - Assess patient's need for assistive devices and provide as appropriate  - Encourage maximum independence but intervene and supervise when necessary  - Involve family in performance of ADLs  - Assess for home care needs following discharge   - Consider OT consult to assist with ADL evaluation and planning for discharge  - Provide patient education as appropriate  Outcome: Progressing     Problem: Prexisting or High Potential for Compromised Skin Integrity  Goal: Skin integrity is maintained or improved  Description: INTERVENTIONS:  - Identify patients at risk for skin breakdown  - Assess and monitor skin integrity  - Assess and monitor nutrition and hydration status  - Monitor labs   - Assess for incontinence   - Turn and reposition patient  - Assist with mobility/ambulation  - Relieve pressure over bony prominences  - Avoid friction and shearing  - Provide appropriate hygiene as needed including keeping skin clean and dry  - Evaluate need for skin moisturizer/barrier cream  - Collaborate with interdisciplinary team   - Patient/family teaching  - Consider wound care consult   Outcome: Progressing     Problem: NEUROSENSORY - ADULT  Goal: Achieves stable or improved neurological status  Description: INTERVENTIONS  - Monitor and report changes in neurological status  - Monitor vital signs such as temperature, blood pressure, glucose, and any other labs ordered   - Initiate measures to prevent increased intracranial pressure  - Monitor for seizure activity and implement precautions if appropriate      Outcome: Progressing

## 2023-02-06 NOTE — ASSESSMENT & PLAN NOTE
T12 fracture with slight extension into pedicles  · S/p mechanical fall Sunday, 1/29  · Additional fall 12/25/2022 - fracture evident retrospectively although not read at the time of prior imaging  · On exam, endorsing significant bilateral leg pain R>L, cannot lift legs off of bed due to pain, up-going babinski reflex, could not tolerate exam for clonus, sensation intact no saddle marc     Imaging:  · Upright thoracolumbar x-rays, 2/2/2023:Stable appearance of T12 fracture with slight kyphosis  · CT CAP, 2/1/2023: Transversely oriented fracture through the T12 vertebral body with very slight extension into the pedicles  No significant displacement of fracture fragments or spinal malalignment  Very slight retropulsion of bony fragments  In retrospect, this fracture was probably acute (though very difficult to detect) on CT examination of December 30, 2022  Advanced lumbar and thoracic degenerative changes including multiple level degenerative fusion is in the lower thoracic and upper lumbar spine  · MRI 2/6/2023: Subacute T12 compression fracture with a new, acute compression fracture of the superior endplate of L1  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 of T11-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 best seen on series 3 image Despite this compression, the cord maintains normal signal   Severe right foraminal narrowing noted at the T12-L1 level  See separate MRI thoracic spine dictation also performed today for more detailed description of canal stenosis in the lower thoracic spine  Lumbar degenerative disc disease L2-3, L3-4 and L4-5 with multilevel canal stenosis and foraminal narrowing, most pronounced at the L4-5 level  Plan:  · Continue to monitor neuro exam closely  · TLSO brace when upright > 45 degrees and OOB  · Unclear etiology of bilateral hip/thigh pain  Does not correspond with fracture location   Could be radicular pain in setting of diffuse degenerative disease of spine  · Repeat upright XR ordered thoracic & lumbar spine  · Multimodal pain control - APS consulted  · Mobilize with PT/OT  · Will attempt conservative management of fracture as any surgical intervention would likely be extensive and concern for ability to tolerate surgery  · DVT ppx: SCDs, Lovenox  Neurosurgery will continue to follow  Please call with questions or concerns

## 2023-02-06 NOTE — PROGRESS NOTES
[address] [facility]  Progress Note Geoffrey Penny 1935, 81 y o  male MRN: [id number]  Unit/Bed#: [facility] 606-01 Encounter: [id number]  Primary Care Provider: Jannet Vilchis DO   Date and time admitted to hospital: 2/1/2023  1:44 PM    Closed T12 fracture [facility])  Assessment & Plan  T12 fracture with slight extension into pedicles  · S/p mechanical fall Sunday, 1/29  · Additional fall 12/25/2022 - fracture evident retrospectively although not read at the time of prior imaging  · On exam, endorsing significant bilateral leg pain R>L, cannot lift legs off of bed due to pain, up-going babinski reflex, could not tolerate exam for clonus, sensation intact no saddle marc     Imaging:  · Upright thoracolumbar x-rays, 2/2/2023:Stable appearance of T12 fracture with slight kyphosis  · CT CAP, 2/1/2023: Transversely oriented fracture through the T12 vertebral body with very slight extension into the pedicles  No significant displacement of fracture fragments or spinal malalignment  Very slight retropulsion of bony fragments  In retrospect, this fracture was probably acute (though very difficult to detect) on CT examination of December 30, 2022  Advanced lumbar and thoracic degenerative changes including multiple level degenerative fusion is in the lower thoracic and upper lumbar spine  · MRI 2/6/2023: Subacute T12 compression fracture with a new, acute compression fracture of the superior endplate of L1  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 of T11-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 best seen on series 3 image Despite this compression, the cord maintains normal signal   Severe right foraminal narrowing noted at the T12-L1 level  See separate MRI thoracic spine dictation also performed today for more detailed description of canal stenosis in the lower thoracic spine   Lumbar degenerative disc disease L2-3, L3-4 and L4-5 with multilevel canal stenosis and foraminal narrowing, most pronounced at the L4-5 level  Plan:  · Continue to monitor neuro exam closely  · TLSO brace when upright > 45 degrees and OOB  · Unclear etiology of bilateral hip/thigh pain  Does not correspond with fracture location  Could be radicular pain in setting of diffuse degenerative disease of spine  · Repeat upright XR ordered thoracic & lumbar spine  · Multimodal pain control - APS consulted  · Mobilize with PT/OT  · Will attempt conservative management of fracture as any surgical intervention would likely be extensive and concern for ability to tolerate surgery  · DVT ppx: SCDs, Lovenox  Neurosurgery will continue to follow  Please call with questions or concerns  Subjective/Objective   Chief Complaint: Back Pain    Subjective: patient has significant bilateral leg pain that is excruciating and worse since MRI     Objective:     I/O       02/04 0701 02/05 0700 02/05 0701 02/06 0700 02/06 0701 02/07 0700    P  O  480 720     Total Intake 480 720     Urine 1000 775 200    Stool       Total Output 1000 775 200    Net -520 -55 -200                 Invasive Devices     Peripheral Intravenous Line  Duration           Peripheral IV 02/05/23 Left Wrist 1 day          Drain  Duration           Ureteral Internal Stent Right ureter 6 Fr  24 days    Urethral Catheter Non-latex 18 Fr  1 day                Physical Exam:  Vitals: Blood pressure 115/64, pulse 81, temperature 98 4 °F (36 9 °C), resp  rate 16, SpO2 95 %  ,There is no height or weight on file to calculate BMI      Hemodynamic Monitoring: MAP:      General appearance: alert, appears stated age, cooperative and no distress  Head: Normocephalic, without obvious abnormality, atraumatic  Eyes: EOMI, PERRL  Neck: supple, symmetrical, trachea midline and NT  Back: no kyphosis present, no tenderness to percussion or palpation, pain with movement of bilateral legs  Lungs: non labored breathing  Heart: regular heart rate  Neurologic: Mental status: Alert, oriented x3, thought content appropriate  Cranial nerves: grossly intact (Cranial nerves II-XII)  Sensory: normal throughout without saddle marc or paraesthesia   Motor: patient is not moving lower extremities secondary to pain, he can wiggle toes b/l but will not bend knees, babinski upwards bilaterally   Reflexes: could not test secondary to pain, patient yelling   Coordination: finger to nose normal bilaterally, no drift bilaterally      Lab Results:  Results from last 7 days   Lab Units 02/06/23  1252 02/05/23 0447 02/02/23  0457   WBC Thousand/uL 7 97 7 04 7 22   HEMOGLOBIN g/dL 13 9 12 4 12 7   HEMATOCRIT % 43 4 38 1 40 1   PLATELETS Thousands/uL 214 204 198   NEUTROS PCT % 72 69 71   MONOS PCT % 7 10 8     Results from last 7 days   Lab Units 02/06/23  1252 02/05/23 0447 02/02/23  0457 02/01/23  0933   POTASSIUM mmol/L 4 1 3 7 3 8 3 9   CHLORIDE mmol/L 102 105 107 101   CO2 mmol/L 29 25 25 24   BUN mg/dL 12 9 9 8   CREATININE mg/dL 0 70 0 59* 0 57* 0 75   CALCIUM mg/dL 9 1 8 7 8 5 8 5   ALK PHOS U/L  --   --   --  141*   ALT U/L  --   --   --  10   AST U/L  --   --   --  11*     Results from last 7 days   Lab Units 02/02/23  0457   MAGNESIUM mg/dL 2 1     Results from last 7 days   Lab Units 02/02/23  0457   PHOSPHORUS mg/dL 2 9     Results from last 7 days   Lab Units 02/01/23  0933   INR  1 29*   PTT seconds 40*     No results found for: TROPONINT  ABG:No results found for: PHART, JOV3CQG, PO2ART, PJT4TGO, C7ANJNUV, BEART, SOURCE    Imaging Studies: I have personally reviewed pertinent reports  and I have personally reviewed pertinent films in PACS    XR Trauma chest portable    Result Date: 2/1/2023  Impression: No acute cardiopulmonary disease  Workstation performed: [id number]     XR spine thoracolumbar 2 vw    Result Date: 2/3/2023  Impression: Poorly visualized T12 fracture with intact posterior column  Mild to moderate loss of height suggested   Workstation performed: [id number]     TRAUMA - CT head wo contrast    Result Date: 2/1/2023  Impression: No acute intracranial abnormality  Workstation performed: [id number]     TRAUMA - CT spine cervical wo contrast    Result Date: 2/1/2023  Impression: No cervical spine fracture or traumatic malalignment  Workstation performed: [id number]     XR pelvis ap only 1 or 2 vw    Result Date: 2/1/2023  Impression: No acute osseous abnormality  Workstation performed: [id number]     TRAUMA - CT abdomen pelvis w contrast    Result Date: 2/1/2023  Impression: Transversely oriented fracture through the T12 vertebral body with very slight extension into the pedicles  No significant displacement of fracture fragments or spinal malalignment  Very slight retropulsion of bony fragments  In retrospect, this fracture was probably acute (though very difficult to detect) on CT examination of December 30, 2022  Advanced lumbar and thoracic degenerative changes including multiple level degenerative fusion is in the lower thoracic and upper lumbar spine  Renal cortical thinning and chronic moderate right-sided hydronephrosis, persistent despite the presence of a right ureteral stent  Urothelial thickening involving right renal collecting system and proximal right ureter, similar when compared to January 12, 2023  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  Trace left pleural effusion, incompletely evaluated  Reidentified large right inguinal hernia with nonobstructed loops of small bowel in the hernia sac  No other acute or subacute fractures  No traumatic visceral injury in the abdomen or pelvis  I personally discussed this study with Geovanni Lawson on 2/1/2023 at 10:25 AM  Workstation performed: [id number]       EKG, Pathology, and Other Studies: I have personally reviewed pertinent reports        VTE Pharmacologic Prophylaxis: Sequential compression device (Venodyne)     VTE Mechanical Prophylaxis: sequential compression device

## 2023-02-06 NOTE — PROGRESS NOTES
[address] [facility]  Progress Note Jt Olmedo 1935, 81 y o  male MRN: [id number]  Unit/Bed#: [facility] 606-01 Encounter: [id number]  Primary Care Provider: Sondra Christianson DO   Date and time admitted to hospital: 2/1/2023  1:44 PM    Acute pain due to trauma  Assessment & Plan  - Acute pain secondary to traumatic injuries  - Continue multi modal analgesic regimen  - Bowel regimen as long as using opioids   - Continue to monitor pain and adjust regimen as indicated, PAtient states pain is improving, however continues with spasms in his legs      Right hip pain  Assessment & Plan  - CT A/P negative for injury  - likely contusion and muscle strain  - PT/OT and pain control  - WBAT on RLE   - continues to have spasms    Closed T12 fracture (HCC)  Assessment & Plan  - T 12 fracture evaluated by neurosurgery and recommended conservative management for now but will further discuss risks vs benefits of surgery/kyphoplasty with family today  - MRI T/L spine ordered per neurosurgery recommendations - Per MRI this will happen on Monday since patient requires rep to be present to put PPM in "MRI Mode"  - Upright x-rays show stability  - TLSO brace  - neuro intact- has a h/o R ankle surgery with weakness with dorsi- /plantar flexion which is chronic  RLE with weakness due to pain in the R hip noted with hip flexion and extension    - pain control with multi modal regimen  - f/u neurosurgery recommendations after MRI T/L spine obtained - today at 10 am    Urinary tract obstruction by kidney stone  Assessment & Plan  - patient noted to have a renal stone on 1/13 and underwent lithotripsy and right renal stent placement on 1/26 and has a chronic wynn catheter  It appears he was to have followed up for a voiding trial with urology on 1/31  He also remains on Ceftin, which should have been for a 5-day course from 1/18-1/23 from chart review     - I spoke with urology who recommended voiding trial on 2/2 and discontinuation of Ceftin  Patient has required SC x2 and may require replacement of wynn catheter  - per patient cannot void lying down  Catheter in place  Urine is dark with sediment  Fluids started to see if increase output and urine clears    Pacemaker  Assessment & Plan  - h/o A  Fib s/p PPM   - f/u outpatient with cardiology at [facility]  Per Neuroradiologist, no MRI's with Pacemakers on the weekend, scheduled for 10 am today    Carotid artery disease ([address] )  Assessment & Plan  - s/p CABG x3  - continue home metoprolol and statin  - resume home Eliquis    S/P AVR  Assessment & Plan  - bioprosthetic AVR  - follows outpatient with  Cardiology at [facility]    Aortic stenosis  Assessment & Plan  - h/o bioprosthetic AVR    Hyperlipemia  Assessment & Plan  - continue home statin    Hypertension  Assessment & Plan  - continue home lisinopril and amlodipine  - BP adequately controlled    * Fall  Assessment & Plan  - mechanical fall, sustained below stated injury  - PT/OT evaluations pending  - Appreciate geriatrics' consulation  - CM for disposition planning        Bowel Regimen: colace, will add senna  VTE Prophylaxis:Sequential compression device (Venodyne)  , Eliqous    Disposition: rehab    Subjective   Chief Complaint: b/l leg spasms and pain    Subjective: "I would like something to keep me calm for the MRI"     Objective   Vitals:   Temp:  [98 3 °F (36 8 °C)-98 5 °F (36 9 °C)] 98 4 °F (36 9 °C)  HR:  [65-83] 81  Resp:  [16-18] 16  BP: (115-118)/(57-65) 115/64    I/O       02/04 0701  02/05 0700 02/05 0701  02/06 0700 02/06 0701  02/07 0700    P  O  480 720     Total Intake 480 720     Urine 1000 775     Stool       Total Output 1000 775     Net -520 -55                   Physical Exam:   GENERAL APPEARANCE: comfortable  NEURO: intact, GCS - 15  HEENT: EOm's intact  CV: RRR< no complaints of chest pain  LUNGS: CTA bilaterally, no shortness of breath  GI: tolerating a diet  : catheter in place, urine dark with sediment, fluids initiated  MSK: moving extremities  SKIN: warm and dry    Invasive Devices     Peripheral Intravenous Line  Duration           Peripheral IV 02/05/23 Left Wrist 1 day          Drain  Duration           Ureteral Internal Stent Right ureter 6 Fr  24 days    Urethral Catheter Non-latex 18 Fr  1 day                      Lab Results: Urinalysis  Imaging: MRI this morning at 10 am  Other Studies: none

## 2023-02-06 NOTE — PROGRESS NOTES
Progress Note - Acute Pain Service    Juanita Joya 81 y o  male MRN: [id number]  Unit/Bed#: [facility] 606-01 Encounter: [id number]      Assessment:   Principal Problem:    Fall  Active Problems:    Hypertension    Hyperlipemia    Aortic stenosis    S/P AVR    Carotid artery disease ([address] )    Pacemaker    Urinary tract obstruction by kidney stone    Closed T12 fracture ([facility])    Right hip pain    Acute pain due to trauma    Juanita Joya is a 81 y o  male with history of CAD, atrial flutter, AVR, CABG on Eliquis, right ureteral stone/hydro status post cystoscopy and stent placement, and pacemaker who presented status post fall on 2/1/2023  Imaging revealed T12 fracture  Neurosurgical team following, planning for conservative management at this time, he is s/p MRI of thoracic and lumbar spine today  Acute pain service was consulted for assistant in management of acute pain due to trauma  Patient seen at bedside this afternoon, s/p MRI of thoracic and lumbar spine  Patient continues to report pain in his back with radiation to his hips and thighs bilaterally  He reports that overall his pain has improved throughout the weekend, and he was able to ambulate on Saturday  He reports good pain reduction with use of oxycodone and has not required any IV opioids for breakthrough pain in previous 24 hours  Continues to report bilateral lower extremity muscle spasms, but reports improvement with use of Robaxin  Plan:   Multimodal analgesia:  · Tylenol 975 mg every 8 hours scheduled  · Gabapentin 100 mg daily at bedtime  Estimated Creatinine Clearance: 107 mL/min (A) (by C-G formula based on SCr of 0 59 mg/dL (L))     · Lidocaine patch daily, 12 hours on/12 hours off  · Robaxin 500 mg every 6 hours scheduled, hold for sedation  · Oxycodone 2 5 mg every 4 hours as needed, for moderate pain  · Oxycodone 5 mg every 4 hours as needed, for severe pain  · IV Dilaudid 0 2 mg every 4 hours as needed, for breakthrough pain    Bowel Regimen:  · Colace 100 mg twice daily  · MiraLAX daily    Treatment plan discussed with bedside nursing staff and primary care service  At this time we will continue with current multimodal regimen, patient is in agreement  APS will continue to follow  Please contact Acute Pain Service - [facility] via [employer] from [phone] with additional questions or concerns  See Ellen or Flor for additional contacts and after hours information  Pain History  Current pain location(s): Back, radiation to bilateral hips and thighs  Pain Scale:  7-10/10  Quality: Sharp, shooting  24 hour history: Patient resting in bed, no current acute distress  Patient is tolerating current medication regimen and reports good improvement in pain levels with use of as needed oxycodone (2/10)  No current complaints of shortness of breath, nausea/vomiting, constipation/diarrhea      Opioid requirement previous 24 hours:   20 mg oxycodone  0 mg IV Dilaudid for breakthrough pain    Meds/Allergies   all current active meds have been reviewed and current meds:   Current Facility-Administered Medications   Medication Dose Route Frequency   • acetaminophen (TYLENOL) tablet 975 mg  975 mg Oral Q8H [facility]   • amLODIPine (NORVASC) tablet 10 mg  10 mg Oral Daily   • apixaban (ELIQUIS) tablet 5 mg  5 mg Oral BID   • atorvastatin (LIPITOR) tablet 40 mg  40 mg Oral Daily With Dinner   • cholecalciferol (VITAMIN D3) tablet 1,000 Units  1,000 Units Oral Daily   • docusate sodium (COLACE) capsule 100 mg  100 mg Oral BID   • folic acid (FOLVITE) tablet 400 mcg  400 mcg Oral Daily   • gabapentin (NEURONTIN) capsule 100 mg  100 mg Oral HS   • HYDROmorphone HCl (DILAUDID) injection 0 2 mg  0 2 mg Intravenous Q4H PRN   • lidocaine (LIDODERM) 5 % patch 1 patch  1 patch Topical Daily   • lisinopril (ZESTRIL) tablet 10 mg  10 mg Oral Daily   • melatonin tablet 6 mg  6 mg Oral HS   • methocarbamol (ROBAXIN) tablet 500 mg  500 mg Oral Q6H [facility]   • metoprolol tartrate (LOPRESSOR) tablet 50 mg  50 mg Oral Daily   • multi-electrolyte (PLASMALYTE-A/ISOLYTE-S PH 7 4) IV solution  75 mL/hr Intravenous Continuous   • naloxone (NARCAN) 0 04 mg/mL syringe 0 04 mg  0 04 mg Intravenous Q1MIN PRN   • ondansetron (ZOFRAN) injection 4 mg  4 mg Intravenous Q6H PRN   • oxyCODONE (ROXICODONE) IR tablet 2 5 mg  2 5 mg Oral Q4H PRN   • oxyCODONE (ROXICODONE) IR tablet 5 mg  5 mg Oral Q4H PRN   • polyethylene glycol (MIRALAX) packet 17 g  17 g Oral Daily   • tamsulosin (FLOMAX) capsule 0 4 mg  0 4 mg Oral Daily With Dinner       Allergies   Allergen Reactions   • Ezetimibe-Simvastatin Dizziness and Lightheadedness     Other reaction(s): MAKES HIM DIZZY     • Simvastatin Lightheadedness     Other reaction(s): Dizziness       Objective     Temp:  [98 3 °F (36 8 °C)-98 4 °F (36 9 °C)] 98 4 °F (36 9 °C)  HR:  [79-83] 81  Resp:  [16-18] 16  BP: (115-118)/(57-65) 115/64    Physical Exam  Vitals reviewed  Constitutional:       General: He is not in acute distress  Appearance: He is not ill-appearing, toxic-appearing or diaphoretic  HENT:      Nose: Nose normal    Cardiovascular:      Rate and Rhythm: Normal rate  Pulmonary:      Effort: Pulmonary effort is normal  No tachypnea, bradypnea or respiratory distress  Abdominal:      General: There is no distension  Tenderness: There is no abdominal tenderness  Musculoskeletal:         General: Normal range of motion  Cervical back: Normal range of motion  Right lower leg: No edema  Left lower leg: No edema  Skin:     General: Skin is warm and dry  Coloration: Skin is not pale  Findings: No rash  Neurological:      Mental Status: He is alert and oriented to person, place, and time  Mental status is at baseline  Sensory: No sensory deficit     Psychiatric:         Mood and Affect: Mood normal          Behavior: Behavior normal          Lab Results:   Results from last 7 days   Lab Units 02/05/23  0447   WBC Thousand/uL 7 04   HEMOGLOBIN g/dL 12 4   HEMATOCRIT % 38 1   PLATELETS Thousands/uL 204      Results from last 7 days   Lab Units 02/05/23 0447 02/02/23  0457 02/01/23  0933   POTASSIUM mmol/L 3 7   < > 3 9   CHLORIDE mmol/L 105   < > 101   CO2 mmol/L 25   < > 24   BUN mg/dL 9   < > 8   CREATININE mg/dL 0 59*   < > 0 75   CALCIUM mg/dL 8 7   < > 8 5   ALK PHOS U/L  --   --  141*   ALT U/L  --   --  10   AST U/L  --   --  11*    < > = values in this interval not displayed  Imaging Studies: I have personally reviewed pertinent reports  Please note that the APS provides consultative services regarding pain management only  With the exception of ketamine and epidural infusions and except when indicated, final decisions regarding starting or changing doses of analgesic medications are at the discretion of the consulting service  Off hours consultation and/or medication management is generally not available      Debbe Curling, CRNP  Acute Pain Service

## 2023-02-06 NOTE — QUICK NOTE
Patient found to have a UTI today  Chose to treat with LEvofloxacin, was previously treated after a cysto withCefuroxine      Chose Levofloxacin based off current culture from this admission

## 2023-02-06 NOTE — ASSESSMENT & PLAN NOTE
- h/o A   Fib s/p PPM   - f/u outpatient with cardiology at [facility]   Per Neuroradiologist, no MRI's with Pacemakers on the weekend, scheduled for 10 am today

## 2023-02-06 NOTE — ASSESSMENT & PLAN NOTE
- CT A/P negative for injury  - likely contusion and muscle strain  - PT/OT and pain control  - WBAT on RLE   - continues to have spasms

## 2023-02-06 NOTE — ASSESSMENT & PLAN NOTE
- Acute pain secondary to traumatic injuries  - Continue multi modal analgesic regimen     - Bowel regimen as long as using opioids   - Continue to monitor pain and adjust regimen as indicated, PAtient states pain is improving, however continues with spasms in his legs

## 2023-02-07 ENCOUNTER — APPOINTMENT (INPATIENT)
Dept: RADIOLOGY | Facility: HOSPITAL | Age: 88
End: 2023-02-07

## 2023-02-07 LAB
BACTERIA UR QL AUTO: ABNORMAL /HPF
BILIRUB UR QL STRIP: NEGATIVE
BUDDING YEAST: PRESENT
CLARITY UR: ABNORMAL
COLOR UR: ABNORMAL
GLUCOSE UR STRIP-MCNC: ABNORMAL MG/DL
HGB UR QL STRIP.AUTO: ABNORMAL
KETONES UR STRIP-MCNC: NEGATIVE MG/DL
LEUKOCYTE ESTERASE UR QL STRIP: ABNORMAL
MUCOUS THREADS UR QL AUTO: ABNORMAL
NITRITE UR QL STRIP: POSITIVE
NON-SQ EPI CELLS URNS QL MICRO: ABNORMAL /HPF
PH UR STRIP.AUTO: 7 [PH]
PROT UR STRIP-MCNC: ABNORMAL MG/DL
RBC #/AREA URNS AUTO: ABNORMAL /HPF
SP GR UR STRIP.AUTO: 1.01 (ref 1–1.03)
UROBILINOGEN UR STRIP-ACNC: <2 MG/DL
WBC #/AREA URNS AUTO: ABNORMAL /HPF

## 2023-02-07 RX ORDER — GABAPENTIN 100 MG/1
100 CAPSULE ORAL 3 TIMES DAILY
Status: DISCONTINUED | OUTPATIENT
Start: 2023-02-07 | End: 2023-02-15 | Stop reason: HOSPADM

## 2023-02-07 RX ADMIN — ATORVASTATIN CALCIUM 40 MG: 40 TABLET, FILM COATED ORAL at 17:27

## 2023-02-07 RX ADMIN — LIDOCAINE 5% 1 PATCH: 700 PATCH TOPICAL at 17:26

## 2023-02-07 RX ADMIN — LISINOPRIL 10 MG: 5 TABLET ORAL at 08:44

## 2023-02-07 RX ADMIN — METHOCARBAMOL TABLETS 500 MG: 500 TABLET, COATED ORAL at 05:23

## 2023-02-07 RX ADMIN — DOCUSATE SODIUM 100 MG: 100 CAPSULE, LIQUID FILLED ORAL at 08:44

## 2023-02-07 RX ADMIN — ACETAMINOPHEN 975 MG: 325 TABLET, FILM COATED ORAL at 05:24

## 2023-02-07 RX ADMIN — APIXABAN 5 MG: 5 TABLET, FILM COATED ORAL at 08:43

## 2023-02-07 RX ADMIN — TAMSULOSIN HYDROCHLORIDE 0.4 MG: 0.4 CAPSULE ORAL at 17:26

## 2023-02-07 RX ADMIN — METHOCARBAMOL TABLETS 500 MG: 500 TABLET, COATED ORAL at 12:44

## 2023-02-07 RX ADMIN — OXYCODONE HYDROCHLORIDE 5 MG: 5 TABLET ORAL at 12:46

## 2023-02-07 RX ADMIN — DOCUSATE SODIUM 100 MG: 100 CAPSULE, LIQUID FILLED ORAL at 17:27

## 2023-02-07 RX ADMIN — MELATONIN 6 MG: at 21:28

## 2023-02-07 RX ADMIN — METOPROLOL TARTRATE 50 MG: 50 TABLET, FILM COATED ORAL at 08:44

## 2023-02-07 RX ADMIN — AMLODIPINE BESYLATE 10 MG: 10 TABLET ORAL at 08:44

## 2023-02-07 RX ADMIN — ACETAMINOPHEN 975 MG: 325 TABLET, FILM COATED ORAL at 21:28

## 2023-02-07 RX ADMIN — Medication 1000 UNITS: at 08:44

## 2023-02-07 RX ADMIN — GABAPENTIN 100 MG: 100 CAPSULE ORAL at 17:26

## 2023-02-07 RX ADMIN — ACETAMINOPHEN 975 MG: 325 TABLET, FILM COATED ORAL at 15:49

## 2023-02-07 RX ADMIN — METHOCARBAMOL TABLETS 500 MG: 500 TABLET, COATED ORAL at 00:13

## 2023-02-07 RX ADMIN — GABAPENTIN 100 MG: 100 CAPSULE ORAL at 21:28

## 2023-02-07 RX ADMIN — POLYETHYLENE GLYCOL 3350 17 G: 17 POWDER, FOR SOLUTION ORAL at 08:44

## 2023-02-07 RX ADMIN — FOLIC ACID TAB 400 MCG 400 MCG: 400 TAB at 08:44

## 2023-02-07 RX ADMIN — METHOCARBAMOL TABLETS 500 MG: 500 TABLET, COATED ORAL at 17:26

## 2023-02-07 NOTE — CASE MANAGEMENT
Case Management Discharge Planning Note    Patient name Elizabet Bai  Location [address] [place] [address] [address] [facility] [id number]  : 1935 Date 2023       Current Admission Date: 2023  Current Admission Diagnosis:Fall   Patient Active Problem List    Diagnosis Date Noted   • Acute pain due to trauma 2023   • Fall 2023   • Closed T12 fracture ([address] ) 2023   • Right hip pain 2023   • Class 1 obesity in adult 2023   • H/O heart artery stent    • MS (mitral stenosis)    • Right ureteral stone 2023   • Acute cystitis with hematuria 2023   • Urinary tract obstruction by kidney stone 2023   • Atrial flutter ([address] ) 2023   • Pacemaker 2022   • Hypertension    • Hyperlipemia    • Aortic stenosis    • S/P AVR    • Carotid artery disease ([address] )    • Coronary artery disease       LOS (days): 6  Geometric Mean LOS (GMLOS) (days): 2 80  Days to GMLOS:-3 2     OBJECTIVE:  Risk of Unplanned Readmission Score: 14 56         Current admission status: Inpatient   Preferred Pharmacy:   [address] [place] [address], [address] [place] [address] [address] [facility] [address]  Phone: [phone] Fax: [phone]    [facility]/pharmacy #[id number] [place] [address] Devin Johnson[address] [address]  Phone: [phone] Fax: [phone]    Primary Care Provider: Jesse Escobar DO    Primary Insurance: Natalya Browne [place] REP  Secondary Insurance:     DISCHARGE DETAILS:    [address]         Is the patient interested in [employer] at discharge?: No    DME Referral Provided  Referral made for DME?: No    Other Referral/Resources/Interventions Provided:  Interventions: SNF    Treatment Team Recommendation: SNF  Discharge Destination Plan[de-identified] SNF    CM met with pt and his dtr to discuss d/c plan  Pt and dtr acknowledged the pt's decline from a therapy perspective and would like SNF referrals placed at [facility], [facility], and [facility] placed and will follow up

## 2023-02-07 NOTE — ASSESSMENT & PLAN NOTE
T12 fracture with slight extension into pedicles  · S/p mechanical fall Sunday, 1/29  · Additional fall 12/25/2022 - fracture evident retrospectively although not read at the time of prior imaging  · On exam, endorsing significant bilateral leg pain R>L, cannot lift legs off of bed due to pain, up-going babinski reflex, could not tolerate exam for clonus, sensation intact no saddle marc     Imaging:  · Upright thoracolumbar x-rays, 2/2/2023:Stable appearance of T12 fracture with slight kyphosis  · CT CAP, 2/1/2023: Transversely oriented fracture through the T12 vertebral body with very slight extension into the pedicles  No significant displacement of fracture fragments or spinal malalignment  Very slight retropulsion of bony fragments  In retrospect, this fracture was probably acute (though very difficult to detect) on CT examination of December 30, 2022  Advanced lumbar and thoracic degenerative changes including multiple level degenerative fusion is in the lower thoracic and upper lumbar spine  · MRI 2/6/2023: Subacute T12 compression fracture with a new, acute compression fracture of the superior endplate of L1  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 of T11-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 best seen on series 3 image Despite this compression, the cord maintains normal signal   Severe right foraminal narrowing noted at the T12-L1 level  See separate MRI thoracic spine dictation also performed today for more detailed description of canal stenosis in the lower thoracic spine  Lumbar degenerative disc disease L2-3, L3-4 and L4-5 with multilevel canal stenosis and foraminal narrowing, most pronounced at the L4-5 level  Plan:  · Continue to monitor neuro exam closely  · TLSO brace when upright > 45 degrees and OOB  · Unclear etiology of bilateral hip/thigh pain  Does not correspond with fracture location   Could be radicular pain in setting of diffuse degenerative disease of spine  · Repeat upright XR ordered thoracic & lumbar spine for 2/8  · Multimodal pain control - APS consulted  · Mobilize with PT/OT  · Will attempt conservative management of fracture as any surgical intervention would likely be extensive and concern for ability to tolerate surgery  · DVT ppx: SCDs, Lovenox  Neurosurgery will continue to follow  Please call with questions or concerns

## 2023-02-07 NOTE — PLAN OF CARE
Problem: PAIN - ADULT  Goal: Verbalizes/displays adequate comfort level or baseline comfort level  Description: Interventions:  - Encourage patient to monitor pain and request assistance  - Assess pain using appropriate pain scale  - Administer analgesics based on type and severity of pain and evaluate response  - Implement non-pharmacological measures as appropriate and evaluate response  - Consider cultural and social influences on pain and pain management  - Notify physician/advanced practitioner if interventions unsuccessful or patient reports new pain  Outcome: Progressing     Problem: INFECTION - ADULT  Goal: Absence or prevention of progression during hospitalization  Description: INTERVENTIONS:  - Assess and monitor for signs and symptoms of infection  - Monitor lab/diagnostic results  - Monitor all insertion sites, i e  indwelling lines, tubes, and drains  - [place] appropriate cooling/warming therapies per order  - Administer medications as ordered  - Instruct and encourage patient and family to use good hand hygiene technique  - Identify and instruct in appropriate isolation precautions for identified infection/condition  Outcome: Progressing

## 2023-02-07 NOTE — ASSESSMENT & PLAN NOTE
- h/o A   Fib s/p PPM   - f/u outpatient with cardiology at [facility]   Per Neuroradiologist, no MRI's with Pacemaker, did have MRI coordinated with EP

## 2023-02-07 NOTE — PROGRESS NOTES
Progress Note - Acute Pain Service    Juanita Joya 81 y o  male MRN: [id number]  Unit/Bed#: [facility] 606-01 Encounter: [id number]      Assessment:   Principal Problem:    Fall  Active Problems:    Hypertension    Hyperlipemia    Aortic stenosis    S/P AVR    Carotid artery disease ([address] )    Pacemaker    Urinary tract obstruction by kidney stone    Closed T12 fracture ([facility])    Right hip pain    Acute pain due to trauma    Juanita Joya is a 81 y o  male  with history of CAD, atrial flutter, AVR, CABG on Eliquis, right ureteral stone/hydro status post cystoscopy and stent placement, and pacemaker who presented status post fall on 2/1/2023  Imaging revealed T12 fracture  Neurosurgical team following, planning for conservative management at this time, he is s/p MRI of thoracic and lumbar spine today which again showed subacute T12 compression fracture also with new acute compression fracture of the superior endplate of L1  Acute pain service was consulted for assistant in management of acute pain due to trauma  Patient seen and examined at bedside this morning  He complains of bilateral thigh pain R>L, and he is unable to lift his legs up off the bed secondary to pain  Pain is described as sharp and shooting, also with complaints of lower back pain  He reports moderate pain reduction with use of oxycodone, which she has been using sparingly      Plan:   Continue low-dose oxycodone per geriatric order set:  · Oxycodone 2 5 mg every 4 hours as needed, for moderate pain  · Oxycodone 5 mg every 4 hours as needed, for severe pain   -Educated patient on appropriate use of as needed oxycodone regimen, instructed patient to utilize prior to movement and ambulation to facilitate better pain control    · IV Dilaudid 0 2 mg every 4 hours as needed, for breakthrough pain   -If pain remains stable would consider decreased frequency or discontinuation of IV opioids    Multimodal analgesia:  · Tylenol 975 mg every 8 hours scheduled  · Increase gabapentin to 100 mg 3 times daily  · Estimated Creatinine Clearance: 90 2 mL/min (by C-G formula based on SCr of 0 7 mg/dL)  · Lidocaine patch x1, 12 hours on/12 hours off, to affected area  · Robaxin 500 mg every 6 hours scheduled, hold for sedation    Bowel Regimen:  MiraLAX daily  Colace 100 mg twice daily    Plan discussed with bedside nursing staff and primary care service  APS will continue to follow  Please contact Acute Pain Service - [facility] via [employer] from [phone] with additional questions or concerns  See Ellen or Flor for additional contacts and after hours information  Pain History  Current pain location(s): B/L thighs, low back  Pain Scale:   5/10  Quality: Sharp/shooting  24 hour history: No acute events overnight, patient hemodynamically stable at this time  Patient underwent MRI imaging of his back yesterday, initially complained of increased pain after his scan  And relatively well controlled while at rest, also reports moderate improvement of pain with use of oxycodone  No current complaints of shortness of breath, nausea/vomiting, constipation/diarrhea      Opioid requirement previous 24 hours:   10 mg oxycodone  0 2 mg IV Dilaudid    Meds/Allergies   all current active meds have been reviewed and current meds:   Current Facility-Administered Medications   Medication Dose Route Frequency   • acetaminophen (TYLENOL) tablet 975 mg  975 mg Oral Q8H [address]   • amLODIPine (NORVASC) tablet 10 mg  10 mg Oral Daily   • apixaban (ELIQUIS) tablet 5 mg  5 mg Oral BID   • atorvastatin (LIPITOR) tablet 40 mg  40 mg Oral Daily With Dinner   • cholecalciferol (VITAMIN D3) tablet 1,000 Units  1,000 Units Oral Daily   • docusate sodium (COLACE) capsule 100 mg  100 mg Oral BID   • folic acid (FOLVITE) tablet 400 mcg  400 mcg Oral Daily   • gabapentin (NEURONTIN) capsule 100 mg  100 mg Oral TID   • HYDROmorphone HCl (DILAUDID) injection 0 2 mg  0 2 mg Intravenous Q4H PRN   • lidocaine (LIDODERM) 5 % patch 1 patch  1 patch Topical Daily   • lisinopril (ZESTRIL) tablet 10 mg  10 mg Oral Daily   • melatonin tablet 6 mg  6 mg Oral HS   • methocarbamol (ROBAXIN) tablet 500 mg  500 mg Oral Q6H [address]   • metoprolol tartrate (LOPRESSOR) tablet 50 mg  50 mg Oral Daily   • multi-electrolyte (PLASMALYTE-A/ISOLYTE-S PH 7 4) IV solution  75 mL/hr Intravenous Continuous   • naloxone (NARCAN) 0 04 mg/mL syringe 0 04 mg  0 04 mg Intravenous Q1MIN PRN   • ondansetron (ZOFRAN) injection 4 mg  4 mg Intravenous Q6H PRN   • oxyCODONE (ROXICODONE) IR tablet 2 5 mg  2 5 mg Oral Q4H PRN   • oxyCODONE (ROXICODONE) IR tablet 5 mg  5 mg Oral Q4H PRN   • polyethylene glycol (MIRALAX) packet 17 g  17 g Oral Daily   • tamsulosin (FLOMAX) capsule 0 4 mg  0 4 mg Oral Daily With Dinner       Allergies   Allergen Reactions   • Ezetimibe-Simvastatin Dizziness and Lightheadedness     Other reaction(s): MAKES HIM DIZZY     • Simvastatin Lightheadedness     Other reaction(s): Dizziness       Objective     Temp:  [97 3 °F (36 3 °C)-98 4 °F (36 9 °C)] 97 3 °F (36 3 °C)  HR:  [80-92] 92  Resp:  [16-18] 16  BP: (101-143)/(52-75) 143/71    Physical Exam  Vitals reviewed  Constitutional:       General: He is not in acute distress  Appearance: He is not ill-appearing, toxic-appearing or diaphoretic  HENT:      Head: Normocephalic and atraumatic  Cardiovascular:      Rate and Rhythm: Normal rate  Pulmonary:      Effort: Pulmonary effort is normal  No respiratory distress  Abdominal:      General: There is no distension  Tenderness: There is no abdominal tenderness  Musculoskeletal:      Cervical back: Normal range of motion  Right lower leg: No edema  Left lower leg: No edema  Skin:     General: Skin is warm and dry  Coloration: Skin is not pale  Findings: No rash  Neurological:      Mental Status: He is alert and oriented to person, place, and time  Mental status is at baseline  Sensory: No sensory deficit  Motor: Weakness (B/L LE secondary to pain) present  Lab Results:   Results from last 7 days   Lab Units 02/06/23  1252   WBC Thousand/uL 7 97   HEMOGLOBIN g/dL 13 9   HEMATOCRIT % 43 4   PLATELETS Thousands/uL 214      Results from last 7 days   Lab Units 02/06/23  1252 02/02/23  0457 02/01/23  0933   POTASSIUM mmol/L 4 1   < > 3 9   CHLORIDE mmol/L 102   < > 101   CO2 mmol/L 29   < > 24   BUN mg/dL 12   < > 8   CREATININE mg/dL 0 70   < > 0 75   CALCIUM mg/dL 9 1   < > 8 5   ALK PHOS U/L  --   --  141*   ALT U/L  --   --  10   AST U/L  --   --  11*    < > = values in this interval not displayed  Imaging Studies: I have personally reviewed pertinent reports  Please note that the APS provides consultative services regarding pain management only  With the exception of ketamine and epidural infusions and except when indicated, final decisions regarding starting or changing doses of analgesic medications are at the discretion of the consulting service  Off hours consultation and/or medication management is generally not available      MICHAEL Putnam  Acute Pain Service

## 2023-02-07 NOTE — ASSESSMENT & PLAN NOTE
- bioprosthetic AVR  - follows outpatient with  Cardiology at [facility]

## 2023-02-07 NOTE — PROGRESS NOTES
[address] [facility]  Progress Note Alena Number 1935, 81 y o  male MRN: [id number]  Unit/Bed#: [facility] 606-01 Encounter: [id number]  Primary Care Provider: Joe Bonilla DO   Date and time admitted to hospital: 2/1/2023  1:44 PM    Acute pain due to trauma  Assessment & Plan  - Acute pain secondary to traumatic injuries  - Continue multi modal analgesic regimen  - Bowel regimen as long as using opioids   - Continue to monitor pain and adjust regimen as indicated, PAtient states pain is improving, however continues with spasms in his legs      Right hip pain  Assessment & Plan  - CT A/P negative for injury  - likely contusion and muscle strain  - PT/OT and pain control  - WBAT on RLE   - continues to have spasms    Closed T12 fracture (HCC)  Assessment & Plan  - T 12 fracture evaluated by neurosurgery and recommended conservative management for now but will further discuss risks vs benefits of surgery/kyphoplasty with family today  - MRI T/L spine ordered per neurosurgery recommendations - Per MRI this will happen on Monday since patient requires rep to be present to put PPM in "MRI Mode"  Completed  - Upright x-rays show stability  - TLSO brace  - neuro intact- has a h/o R ankle surgery with weakness with dorsi- /plantar flexion which is chronic   RLE with weakness due to pain in the R hip noted with hip flexion and extension    - pain control with multi modal regimen  - f/u neurosurgery recommendations after MRI T/L spine obtained - yesterday  - MRI: MRI 2/6/2023: Subacute T12 compression fracture with a new, acute compression fracture of the superior endplate of L1   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 of T11-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 best seen on series 3 image Despite this compression, the cord maintains normal signal   Severe right foraminal narrowing noted at the T12-L1 level   See separate MRI thoracic spine dictation also performed today for more detailed description of canal stenosis in the lower thoracic spine  Lumbar degenerative disc disease L2-3, L3-4 and L4-5 with multilevel canal stenosis and foraminal narrowing, most pronounced at the L4-5 level    Urinary tract obstruction by kidney stone  Assessment & Plan  - patient noted to have a renal stone on 1/13 and underwent lithotripsy and right renal stent placement on 1/26 and has a chronic wynn catheter  It appears he was to have followed up for a voiding trial with urology on 1/31  He also remains on Ceftin, which should have been for a 5-day course from 1/18-1/23 from chart review  - I spoke with urology who recommended voiding trial on 2/2 and discontinuation of Ceftin  Patient has required SC x2 and may require replacement of wynn catheter  - per patient cannot void lying down  Catheter in place  Urine is dark with sediment  Fluids started to see if increase output and urine clears, discontinued  - Sending UA today to check     Pacemaker  Assessment & Plan  - h/o A   Fib s/p PPM   - f/u outpatient with cardiology at [facility], [facility]   Per Neuroradiologist, no MRI's with Pacemaker, did have MRI coordinated with EP    Carotid artery disease ([facility] [address] )  Assessment & Plan  - s/p CABG x3  - continue home metoprolol and statin  - resume home Eliquis    S/P AVR  Assessment & Plan  - bioprosthetic AVR  - follows outpatient with  Cardiology at [facility], [facility]     Aortic stenosis  Assessment & Plan  - h/o bioprosthetic AVR    Hyperlipemia  Assessment & Plan  - continue home statin    Hypertension  Assessment & Plan  - continue home lisinopril and amlodipine  - BP adequately controlled    * Fall  Assessment & Plan  - mechanical fall, sustained below stated injury  - PT/OT evaluations pending  - Appreciate geriatrics' consulation  - CM for disposition planning      Bowel Regimen: Colace, MiraLAX  VTE Prophylaxis:Eliquis restarted 2/3    Disposition: TBD    Subjective   Chief Complaint: B/L femur pain    Subjective: Patient complains of B/L femur pain specifically right greater than left, can pin point anterior area of midshaft femur  Unable to lift right leg off bed  Has difficulty states due to pain  Objective   Vitals:   Temp:  [97 3 °F (36 3 °C)-98 4 °F (36 9 °C)] 97 3 °F (36 3 °C)  HR:  [80-92] 92  Resp:  [16-18] 16  BP: (101-143)/(52-75) 143/71    I/O       02/05 0701 02/06 0700 02/06 0701 02/07 0700 02/07 0701 02/08 0700    P  O  720      Total Intake 720      Urine 775 750     Total Output 775 750     Net -55 -750                   Physical Exam:   GENERAL APPEARANCE: NAD, appears comfortable in bed  NEURO: Intact, GCS 15 alert and oriented x 3  HEENT: PERRL  CV: RRR  LUNGS: CTA B/L throughout  GI: Abdomen soft, NT, ND<, +BS x 4, last BM 2/4  : Dickens catheter with some bloody drainage and sediment  MSK: Right leg unable to lift, does have 4/5 strength with extension, left leg able to lift off bed, 4/5 str  SKIN: intact    Invasive Devices     Peripheral Intravenous Line  Duration           Peripheral IV 02/05/23 Left Wrist 2 days          Drain  Duration           Ureteral Internal Stent Right ureter 6 Fr  25 days    Urethral Catheter Non-latex 18 Fr  2 days                      Lab Results:   Results: I have personally reviewed all pertinent laboratory/tests results, BMP/CMP:   Lab Results   Component Value Date    SODIUM 137 02/06/2023    K 4 1 02/06/2023     02/06/2023    CO2 29 02/06/2023    BUN 12 02/06/2023    CREATININE 0 70 02/06/2023    CALCIUM 9 1 02/06/2023    EGFR 85 02/06/2023    and CBC:   Lab Results   Component Value Date    WBC 7 97 02/06/2023    HGB 13 9 02/06/2023    HCT 43 4 02/06/2023    MCV 91 02/06/2023     02/06/2023    MCH 29 0 02/06/2023    MCHC 32 0 02/06/2023    RDW 15 0 02/06/2023    MPV 9 9 02/06/2023    NRBC 0 02/06/2023     Imaging: I have personally reviewed pertinent reports       Other Studies: XRAY of Right femur and left pending

## 2023-02-07 NOTE — RESTORATIVE TECHNICIAN NOTE
Restorative Technician Note      Patient Name: Teresa Alonzo     Restorative Tech Visit Date: 02/07/23  Note Type: Mobility  Patient Position Upon Consult: Supine  Activity Performed: Repositioned  Patient Position at End of Consult: Supine;  All needs within reach; Bed/Chair alarm activated      Moon Stauffer Restorative Technician

## 2023-02-07 NOTE — PROGRESS NOTES
[address] [facility]  Progress Note Richard Socks 1935, 81 y o  male MRN: [id number]  Unit/Bed#: [facility] 606-01 Encounter: [id number]  Primary Care Provider: Marley Goldmann, DO   Date and time admitted to hospital: 2/1/2023  1:44 PM    Addendum:  Discussed imaging results again with attending  Will tentatively plan for fixation fusion and decompression on Friday of this week if patient is agreeable and he is found to be medically stable for the OR  Given his fracture appearance, 2 levels of stenosis and clinical complaints, would likely benefit from surgery  Will need to hold eliquis at this time  Will need medical and cardiac clearance  Will discuss this with primary team   Will continue to follow  Closed T12 fracture [facility])  Assessment & Plan  T12 fracture with slight extension into pedicles  · S/p mechanical fall Sunday, 1/29  · Additional fall 12/25/2022 - fracture evident retrospectively although not read at the time of prior imaging  · On exam, endorsing significant bilateral leg pain R>L, cannot lift legs off of bed due to pain, up-going babinski reflex, could not tolerate exam for clonus, sensation intact no saddle marc     Imaging:  · Upright thoracolumbar x-rays, 2/2/2023:Stable appearance of T12 fracture with slight kyphosis  · CT CAP, 2/1/2023: Transversely oriented fracture through the T12 vertebral body with very slight extension into the pedicles  No significant displacement of fracture fragments or spinal malalignment  Very slight retropulsion of bony fragments  In retrospect, this fracture was probably acute (though very difficult to detect) on CT examination of December 30, 2022  Advanced lumbar and thoracic degenerative changes including multiple level degenerative fusion is in the lower thoracic and upper lumbar spine  · MRI 2/6/2023: Subacute T12 compression fracture with a new, acute compression fracture of the superior endplate of L1  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 of T11-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 best seen on series 3 image Despite this compression, the cord maintains normal signal   Severe right foraminal narrowing noted at the T12-L1 level  See separate MRI thoracic spine dictation also performed today for more detailed description of canal stenosis in the lower thoracic spine  Lumbar degenerative disc disease L2-3, L3-4 and L4-5 with multilevel canal stenosis and foraminal narrowing, most pronounced at the L4-5 level  Plan:  · Continue to monitor neuro exam closely  · TLSO brace when upright > 45 degrees and OOB  · Unclear etiology of bilateral hip/thigh pain  Does not correspond with fracture location  Could be radicular pain in setting of diffuse degenerative disease of spine  · Repeat upright XR ordered thoracic & lumbar spine for 2/8  · Multimodal pain control - APS consulted  · Mobilize with PT/OT  · Will attempt conservative management of fracture as any surgical intervention would likely be extensive and concern for ability to tolerate surgery  · DVT ppx: SCDs, Lovenox  Neurosurgery will continue to follow  Please call with questions or concerns  Subjective/Objective   Chief Complaint: Lower Extremity Pain    Subjective: Patient has stable lower extremity pain    Objective:     I/O       02/05 0701 02/06 0700 02/06 0701 02/07 0700 02/07 0701 02/08 0700    P  O  720      Total Intake 720      Urine 775 750     Total Output 775 750     Net -55 -750                  Invasive Devices     Peripheral Intravenous Line  Duration           Peripheral IV 02/05/23 Left Wrist 2 days          Drain  Duration           Ureteral Internal Stent Right ureter 6 Fr  25 days    Urethral Catheter Non-latex 18 Fr  2 days                Physical Exam:  Vitals: Blood pressure 140/66, pulse 64, temperature 97 5 °F (36 4 °C), resp  rate 16, SpO2 96 %  ,There is no height or weight on file to calculate BMI     Hemodynamic Monitoring: MAP:      General appearance: alert, appears stated age, cooperative and no distress  Head: Normocephalic, without obvious abnormality, atraumatic  Eyes: EOMI, PERRL  Neck: supple, symmetrical, trachea midline and NT  Back: no kyphosis present, tenderness to palpation  Lungs: non labored breathing  Heart: regular heart rate  Neurologic:   Mental status: Alert, oriented x3, thought content appropriate  Cranial nerves: grossly intact (Cranial nerves II-XII)  Sensory: normal throughout UE/LE  Motor: moving all extremities without focal weakness, movement of LE 2/2 pain  Reflexes: 2+ and symmetric  Coordination: finger to nose normal bilaterally, no drift bilaterally      Lab Results:  Results from last 7 days   Lab Units 02/06/23  1252 02/05/23  0447 02/02/23  0457   WBC Thousand/uL 7 97 7 04 7 22   HEMOGLOBIN g/dL 13 9 12 4 12 7   HEMATOCRIT % 43 4 38 1 40 1   PLATELETS Thousands/uL 214 204 198   NEUTROS PCT % 72 69 71   MONOS PCT % 7 10 8     Results from last 7 days   Lab Units 02/06/23  1252 02/05/23 0447 02/02/23  0457 02/01/23  0933   POTASSIUM mmol/L 4 1 3 7 3 8 3 9   CHLORIDE mmol/L 102 105 107 101   CO2 mmol/L 29 25 25 24   BUN mg/dL 12 9 9 8   CREATININE mg/dL 0 70 0 59* 0 57* 0 75   CALCIUM mg/dL 9 1 8 7 8 5 8 5   ALK PHOS U/L  --   --   --  141*   ALT U/L  --   --   --  10   AST U/L  --   --   --  11*     Results from last 7 days   Lab Units 02/02/23  0457   MAGNESIUM mg/dL 2 1     Results from last 7 days   Lab Units 02/02/23  0457   PHOSPHORUS mg/dL 2 9     Results from last 7 days   Lab Units 02/01/23  0933   INR  1 29*   PTT seconds 40*     No results found for: TROPONINT  ABG:No results found for: PHART, EQE4LUM, PO2ART, LXD1BUJ, K4TKMTCV, BEART, SOURCE    Imaging Studies: I have personally reviewed pertinent reports     and I have personally reviewed pertinent films in PACS    XR Trauma chest portable    Result Date: 2/1/2023  Impression: No acute cardiopulmonary disease  Workstation performed: [id number]     XR spine thoracolumbar 2 vw    Result Date: 2/3/2023  Impression: Poorly visualized T12 fracture with intact posterior column  Mild to moderate loss of height suggested  Workstation performed: [id number]     TRAUMA - CT head wo contrast    Result Date: 2/1/2023  Impression: No acute intracranial abnormality  Workstation performed: [id number]     TRAUMA - CT spine cervical wo contrast    Result Date: 2/1/2023  Impression: No cervical spine fracture or traumatic malalignment  Workstation performed: [id number]     MRI thoracic spine wo contrast    Result Date: 2/6/2023  Impression: There is a subacute compression fracture of T12 with a horizontally oriented fracture cleft through the vertebral body now filled with fluid  Fracture does extend to the posterior elements in particular on the right where there is involvement of the pedicle and lamina  Stable approximately 10% loss of height of the vertebral body  There is a new mild compression fracture of the L1 superior endplate with moderate amount of edema present within the vertebral body  Advanced canal stenosis within the lower thoracic spine worst at the T11-12 level secondary to degenerative disc disease, bony retropulsion of the T12 vertebral body into the anterior epidural space and posterior element hypertrophic change with compression of the distal cord/conus  At T10-11 there is degenerative disc disease as well as a small right posterior lateral extradural mass/fluid collection which may represent hematoma, extending inferiorly to the T11-12 level  Moderate canal stenosis at T10-11 with bilateral foraminal narrowing  Severe right foraminal narrowing at the T12-L1 level  This examination was marked "immediate notification" in Epic in order to begin the standard process by which the radiology reading room liaison alerts the referring practitioner    Workstation performed: [id number]     MRI lumbar spine wo contrast    Result Date: 2/6/2023  Impression: Subacute T12 compression fracture with a new, acute compression fracture of the superior endplate of L1  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 of T11-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 best seen on series 3 image 9  Despite this  compression, the cord maintains normal signal   Severe right foraminal narrowing noted at the T12-L1 level  See separate MRI thoracic spine dictation also performed today for more detailed description of canal stenosis in the lower thoracic spine  Lumbar degenerative disc disease L2-3, L3-4 and L4-5 with multilevel canal stenosis and foraminal narrowing, most pronounced at the L4-5 level  Workstation performed: [id number]     XR pelvis ap only 1 or 2 vw    Result Date: 2/1/2023  Impression: No acute osseous abnormality  Workstation performed: [id number]     TRAUMA - CT abdomen pelvis w contrast    Result Date: 2/1/2023  Impression: Transversely oriented fracture through the T12 vertebral body with very slight extension into the pedicles  No significant displacement of fracture fragments or spinal malalignment  Very slight retropulsion of bony fragments  In retrospect, this fracture was probably acute (though very difficult to detect) on CT examination of December 30, 2022  Advanced lumbar and thoracic degenerative changes including multiple level degenerative fusion is in the lower thoracic and upper lumbar spine  Renal cortical thinning and chronic moderate right-sided hydronephrosis, persistent despite the presence of a right ureteral stent  Urothelial thickening involving right renal collecting system and proximal right ureter, similar when compared to January 12, 2023  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

## 2023-02-07 NOTE — ASSESSMENT & PLAN NOTE
- T 12 fracture evaluated by neurosurgery and recommended conservative management for now but will further discuss risks vs benefits of surgery/kyphoplasty with family today  - MRI T/L spine ordered per neurosurgery recommendations - Per MRI this will happen on Monday since patient requires rep to be present to put PPM in "MRI Mode"  Completed  - Upright x-rays show stability  - TLSO brace  - neuro intact- has a h/o R ankle surgery with weakness with dorsi- /plantar flexion which is chronic  RLE with weakness due to pain in the R hip noted with hip flexion and extension    - pain control with multi modal regimen  - f/u neurosurgery recommendations after MRI T/L spine obtained - yesterday  - MRI: MRI 2/6/2023: Subacute T12 compression fracture with a new, acute compression fracture of the superior endplate of L1   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 of T11-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 best seen on series 3 image Despite this compression, the cord maintains normal signal   Severe right foraminal narrowing noted at the T12-L1 level   See separate MRI thoracic spine dictation also performed today for more detailed description of canal stenosis in the lower thoracic spine   Lumbar degenerative disc disease L2-3, L3-4 and L4-5 with multilevel canal stenosis and foraminal narrowing, most pronounced at the L4-5 level

## 2023-02-07 NOTE — OCCUPATIONAL THERAPY NOTE
Occupational Therapy Treatment Note:      02/07/23 0900   OT Last Visit   OT Visit Date 02/07/23   Note Type   Note Type Cancelled Session   Cancel Reasons   (pt for mri yesterday and is pending xrays   tx cancelled, will reattempt as appropriate )

## 2023-02-07 NOTE — ASSESSMENT & PLAN NOTE
- patient noted to have a renal stone on 1/13 and underwent lithotripsy and right renal stent placement on 1/26 and has a chronic wynn catheter  It appears he was to have followed up for a voiding trial with urology on 1/31  He also remains on Ceftin, which should have been for a 5-day course from 1/18-1/23 from chart review  - I spoke with urology who recommended voiding trial on 2/2 and discontinuation of Ceftin  Patient has required SC x2 and may require replacement of wynn catheter  - per patient cannot void lying down  Catheter in place  Urine is dark with sediment  Fluids started to see if increase output and urine clears, discontinued    - Sending UA today to check

## 2023-02-08 ENCOUNTER — APPOINTMENT (INPATIENT)
Dept: RADIOLOGY | Facility: HOSPITAL | Age: 88
End: 2023-02-08

## 2023-02-08 RX ORDER — SENNOSIDES 8.6 MG
2 TABLET ORAL
Status: DISCONTINUED | OUTPATIENT
Start: 2023-02-08 | End: 2023-02-12

## 2023-02-08 RX ORDER — ENOXAPARIN SODIUM 100 MG/ML
30 INJECTION SUBCUTANEOUS EVERY 12 HOURS SCHEDULED
Status: COMPLETED | OUTPATIENT
Start: 2023-02-08 | End: 2023-02-09

## 2023-02-08 RX ADMIN — ENOXAPARIN SODIUM 30 MG: 30 INJECTION SUBCUTANEOUS at 11:59

## 2023-02-08 RX ADMIN — Medication 1000 UNITS: at 08:22

## 2023-02-08 RX ADMIN — OXYCODONE HYDROCHLORIDE 5 MG: 5 TABLET ORAL at 17:49

## 2023-02-08 RX ADMIN — OXYCODONE HYDROCHLORIDE 5 MG: 5 TABLET ORAL at 12:01

## 2023-02-08 RX ADMIN — OXYCODONE HYDROCHLORIDE 5 MG: 5 TABLET ORAL at 08:13

## 2023-02-08 RX ADMIN — ACETAMINOPHEN 975 MG: 325 TABLET, FILM COATED ORAL at 21:16

## 2023-02-08 RX ADMIN — METOPROLOL TARTRATE 50 MG: 50 TABLET, FILM COATED ORAL at 08:25

## 2023-02-08 RX ADMIN — ACETAMINOPHEN 975 MG: 325 TABLET, FILM COATED ORAL at 05:22

## 2023-02-08 RX ADMIN — DOCUSATE SODIUM 100 MG: 100 CAPSULE, LIQUID FILLED ORAL at 08:19

## 2023-02-08 RX ADMIN — METHOCARBAMOL TABLETS 500 MG: 500 TABLET, COATED ORAL at 11:59

## 2023-02-08 RX ADMIN — AMLODIPINE BESYLATE 10 MG: 10 TABLET ORAL at 08:27

## 2023-02-08 RX ADMIN — METHOCARBAMOL TABLETS 500 MG: 500 TABLET, COATED ORAL at 17:46

## 2023-02-08 RX ADMIN — GABAPENTIN 100 MG: 100 CAPSULE ORAL at 21:16

## 2023-02-08 RX ADMIN — METHOCARBAMOL TABLETS 500 MG: 500 TABLET, COATED ORAL at 05:22

## 2023-02-08 RX ADMIN — OXYCODONE HYDROCHLORIDE 5 MG: 5 TABLET ORAL at 03:03

## 2023-02-08 RX ADMIN — ATORVASTATIN CALCIUM 40 MG: 40 TABLET, FILM COATED ORAL at 17:47

## 2023-02-08 RX ADMIN — LISINOPRIL 10 MG: 5 TABLET ORAL at 08:22

## 2023-02-08 RX ADMIN — GABAPENTIN 100 MG: 100 CAPSULE ORAL at 17:46

## 2023-02-08 RX ADMIN — MELATONIN 6 MG: at 21:16

## 2023-02-08 RX ADMIN — DOCUSATE SODIUM 100 MG: 100 CAPSULE, LIQUID FILLED ORAL at 17:46

## 2023-02-08 RX ADMIN — ENOXAPARIN SODIUM 30 MG: 30 INJECTION SUBCUTANEOUS at 21:16

## 2023-02-08 RX ADMIN — ACETAMINOPHEN 975 MG: 325 TABLET, FILM COATED ORAL at 14:13

## 2023-02-08 RX ADMIN — POLYETHYLENE GLYCOL 3350 17 G: 17 POWDER, FOR SOLUTION ORAL at 08:18

## 2023-02-08 RX ADMIN — GABAPENTIN 100 MG: 100 CAPSULE ORAL at 08:21

## 2023-02-08 RX ADMIN — TAMSULOSIN HYDROCHLORIDE 0.4 MG: 0.4 CAPSULE ORAL at 17:47

## 2023-02-08 RX ADMIN — FOLIC ACID TAB 400 MCG 400 MCG: 400 TAB at 08:21

## 2023-02-08 NOTE — ASSESSMENT & PLAN NOTE
T12 fracture with slight extension into pedicles  · S/p mechanical fall Sunday, 1/29  · Additional fall 12/25/2022 - fracture evident retrospectively although not read at the time of prior imaging  · On exam, endorsing significant bilateral leg pain R>L, cannot lift legs off of bed due to pain, up-going babinski reflex, could not tolerate exam for clonus, sensation intact no saddle marc     Imaging:  · Upright thoracolumbar x-rays, 2/2/2023:Stable appearance of T12 fracture with slight kyphosis  · CT CAP, 2/1/2023: Transversely oriented fracture through the T12 vertebral body with very slight extension into the pedicles  No significant displacement of fracture fragments or spinal malalignment  Very slight retropulsion of bony fragments  In retrospect, this fracture was probably acute (though very difficult to detect) on CT examination of December 30, 2022  Advanced lumbar and thoracic degenerative changes including multiple level degenerative fusion is in the lower thoracic and upper lumbar spine  · MRI 2/6/2023: Subacute T12 compression fracture with a new, acute compression fracture of the superior endplate of L1  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 of T11-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 best seen on series 3 image Despite this compression, the cord maintains normal signal   Severe right foraminal narrowing noted at the T12-L1 level  See separate MRI thoracic spine dictation also performed today for more detailed description of canal stenosis in the lower thoracic spine  Lumbar degenerative disc disease L2-3, L3-4 and L4-5 with multilevel canal stenosis and foraminal narrowing, most pronounced at the L4-5 level  Plan:  · Continue to monitor neuro exam closely  · TLSO brace when upright > 45 degrees and OOB  · Unclear etiology of bilateral hip/thigh pain  Does not correspond with fracture location   Could be radicular pain in setting of diffuse degenerative disease of spine  · Repeat upright XR ordered thoracic & lumbar spine for 2/8  · Multimodal pain control - APS consulted  · Mobilize with PT/OT  · DVT ppx: SCDs, Lovenox  · Surgical planning for Friday due to continued failure of conservative management pending uprights     Neurosurgery will continue to follow  Please call with questions or concerns

## 2023-02-08 NOTE — ASSESSMENT & PLAN NOTE
- patient noted to have a renal stone on 1/13 and underwent lithotripsy and right renal stent placement on 1/26 and has a chronic wynn catheter  It appears he was to have followed up for a voiding trial with urology on 1/31  He also remains on Ceftin, which should have been for a 5-day course from 1/18-1/23 from chart review  - I spoke with urology who recommended voiding trial on 2/2 and discontinuation of Ceftin  Patient has required SC x2 and may require replacement of wynn catheter  - per patient cannot void lying down  Catheter in place  Urine is dark with sediment  Fluids started to see if increase output and urine clears, discontinued  26.8

## 2023-02-08 NOTE — ASSESSMENT & PLAN NOTE
- h/o A   Fib s/p PPM   - f/u outpatient with cardiology at [facility]   Per Neuroradiologist, no MRI's with Pacemaker, did have MRI coordinated with EP - completed

## 2023-02-08 NOTE — PLAN OF CARE
Problem: MOBILITY - ADULT  Goal: Maintain or return to baseline ADL function  Description: INTERVENTIONS:  -  Assess patient's ability to carry out ADLs; assess patient's baseline for ADL function and identify physical deficits which impact ability to perform ADLs (bathing, care of mouth/teeth, toileting, grooming, dressing, etc )  - Assess/evaluate cause of self-care deficits   - Assess range of motion  - Assess patient's mobility; develop plan if impaired  - Assess patient's need for assistive devices and provide as appropriate  - Encourage maximum independence but intervene and supervise when necessary  - Involve family in performance of ADLs  - Assess for home care needs following discharge   - Consider OT consult to assist with ADL evaluation and planning for discharge  - Provide patient education as appropriate  Outcome: Progressing  Goal: Maintains/Returns to pre admission functional level  Description: INTERVENTIONS:  - Perform BMAT or MOVE assessment daily    - Set and communicate daily mobility goal to care team and patient/family/caregiver  - Collaborate with rehabilitation services on mobility goals if consulted  - Perform Range of Motion    times a day  - Reposition patient every    hours    - Dangle patient    times a day  - Stand patient      times a day  - Ambulate patient    times a day  - Out of bed to chair    times a day   - Out of bed for meals    times a day  - Out of bed for toileting  - Record patient progress and toleration of activity level   Outcome: Progressing     Problem: Prexisting or High Potential for Compromised Skin Integrity  Goal: Skin integrity is maintained or improved  Description: INTERVENTIONS:  - Identify patients at risk for skin breakdown  - Assess and monitor skin integrity  - Assess and monitor nutrition and hydration status  - Monitor labs   - Assess for incontinence   - Turn and reposition patient  - Assist with mobility/ambulation  - Relieve pressure over bony prominences  - Avoid friction and shearing  - Provide appropriate hygiene as needed including keeping skin clean and dry  - Evaluate need for skin moisturizer/barrier cream  - Collaborate with interdisciplinary team   - Patient/family teaching  - Consider wound care consult   Outcome: Progressing     Problem: NEUROSENSORY - ADULT  Goal: Achieves stable or improved neurological status  Description: INTERVENTIONS  - Monitor and report changes in neurological status  - Monitor vital signs such as temperature, blood pressure, glucose, and any other labs ordered   - Initiate measures to prevent increased intracranial pressure  - Monitor for seizure activity and implement precautions if appropriate      Outcome: Progressing  Goal: Achieves maximal functionality and self care  Description: INTERVENTIONS  - Monitor swallowing and airway patency with patient fatigue and changes in neurological status  - Encourage and assist patient to increase activity and self care     - Encourage visually impaired, hearing impaired and aphasic patients to use assistive/communication devices  Outcome: Progressing     Problem: PAIN - ADULT  Goal: Verbalizes/displays adequate comfort level or baseline comfort level  Description: Interventions:  - Encourage patient to monitor pain and request assistance  - Assess pain using appropriate pain scale  - Administer analgesics based on type and severity of pain and evaluate response  - Implement non-pharmacological measures as appropriate and evaluate response  - Consider cultural and social influences on pain and pain management  - Notify physician/advanced practitioner if interventions unsuccessful or patient reports new pain  Outcome: Progressing     Problem: SAFETY ADULT  Goal: Maintain or return to baseline ADL function  Description: INTERVENTIONS:  -  Assess patient's ability to carry out ADLs; assess patient's baseline for ADL function and identify physical deficits which impact ability to perform ADLs (bathing, care of mouth/teeth, toileting, grooming, dressing, etc )  - Assess/evaluate cause of self-care deficits   - Assess range of motion  - Assess patient's mobility; develop plan if impaired  - Assess patient's need for assistive devices and provide as appropriate  - Encourage maximum independence but intervene and supervise when necessary  - Involve family in performance of ADLs  - Assess for home care needs following discharge   - Consider OT consult to assist with ADL evaluation and planning for discharge  - Provide patient education as appropriate  Outcome: Progressing  Goal: Maintains/Returns to pre admission functional level  Description: INTERVENTIONS:  - Perform BMAT or MOVE assessment daily    - Set and communicate daily mobility goal to care team and patient/family/caregiver  - Collaborate with rehabilitation services on mobility goals if consulted  - Perform Range of Motion    times a day  - Reposition patient every    hours    - Dangle patient      times a day  - Stand patient    times a day  - Ambulate patient    times a day  - Out of bed to chair    times a day   - Out of bed for meals      times a day  - Out of bed for toileting  - Record patient progress and toleration of activity level   Outcome: Progressing  Goal: Patient will remain free of falls  Description: INTERVENTIONS:  - Educate patient/family on patient safety including physical limitations  - Instruct patient to call for assistance with activity   - Consult OT/PT to assist with strengthening/mobility   - Keep Call bell within reach  - Keep bed low and locked with side rails adjusted as appropriate  - Keep care items and personal belongings within reach  - Initiate and maintain comfort rounds  - Make Fall Risk Sign visible to staff  - Offer Toileting every Hours, in advance of need  - Initiate/Maintain alarm  - Obtain necessary fall risk management equipment:   - Apply yellow socks and bracelet for high fall risk patients  - Consider moving patient to room near nurses station  Outcome: Progressing     Problem: DISCHARGE PLANNING  Goal: Discharge to home or other facility with appropriate resources  Description: INTERVENTIONS:  - Identify barriers to discharge w/patient and caregiver  - Arrange for needed discharge resources and transportation as appropriate  - Identify discharge learning needs (meds, wound care, etc )  - Arrange for interpretive services to assist at discharge as needed  - Refer to Case Management Department for coordinating discharge planning if the patient needs post-hospital services based on physician/advanced practitioner order or complex needs related to functional status, cognitive ability, or social support system  Outcome: Progressing     Problem: Knowledge Deficit  Goal: Patient/family/caregiver demonstrates understanding of disease process, treatment plan, medications, and discharge instructions  Description: Complete learning assessment and assess knowledge base    Interventions:  - Provide teaching at level of understanding  - Provide teaching via preferred learning methods  Outcome: Progressing

## 2023-02-08 NOTE — PROGRESS NOTES
[facility]  Progress Note Gustabo Donato 1935, 81 y o  male MRN: [id number]  Unit/Bed#: [facility] 606-01 Encounter: [id number]  Primary Care Provider: Rosi Beverly DO   Date and time admitted to hospital: 2/1/2023  1:44 PM    Acute pain due to trauma  Assessment & Plan  - Acute pain secondary to traumatic injuries  - Continue multi modal analgesic regimen  - Bowel regimen as long as using opioids   - Continue to monitor pain and adjust regimen as indicated, PAtient states pain is improving, however continues with spasms in his legs      Right hip pain  Assessment & Plan  - CT A/P negative for injury  - likely contusion and muscle strain  - PT/OT and pain control  - WBAT on RLE   - continues to have spasms    Closed T12 fracture (HCC)  Assessment & Plan  - T 12 fracture evaluated by neurosurgery and recommended conservative management for now but will further discuss risks vs benefits of surgery/kyphoplasty with family today  - MRI T/L spine ordered per neurosurgery recommendations - Per MRI this will happen on Monday since patient requires rep to be present to put PPM in "MRI Mode"  Completed  - Upright x-rays show stability  - TLSO brace  - neuro intact- has a h/o R ankle surgery with weakness with dorsi- /plantar flexion which is chronic   RLE with weakness due to pain in the R hip noted with hip flexion and extension    - pain control with multi modal regimen  - f/u neurosurgery recommendations after MRI T/L spine obtained - yesterday  - MRI: MRI 2/6/2023: Subacute T12 compression fracture with a new, acute compression fracture of the superior endplate of L1   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 of T11-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 best seen on series 3 image Despite this compression, the cord maintains normal signal   Severe right foraminal narrowing noted at the T12-L1 level   See separate MRI thoracic spine dictation also performed today for more detailed description of canal stenosis in the lower thoracic spine  Lumbar degenerative disc disease L2-3, L3-4 and L4-5 with multilevel canal stenosis and foraminal narrowing, most pronounced at the L4-5 level    Urinary tract obstruction by kidney stone  Assessment & Plan  - patient noted to have a renal stone on 1/13 and underwent lithotripsy and right renal stent placement on 1/26 and has a chronic wynn catheter  It appears he was to have followed up for a voiding trial with urology on 1/31  He also remains on Ceftin, which should have been for a 5-day course from 1/18-1/23 from chart review  - I spoke with urology who recommended voiding trial on 2/2 and discontinuation of Ceftin  Patient has required SC x2 and may require replacement of wynn catheter  - per patient cannot void lying down  Catheter in place  Urine is dark with sediment  Fluids started to see if increase output and urine clears, discontinued  Pacemaker  Assessment & Plan  - h/o A   Fib s/p PPM   - f/u outpatient with cardiology at [facility]   Per Neuroradiologist, no MRI's with Pacemaker, did have MRI coordinated with EP - completed    Carotid artery disease ([facility] [address] )  Assessment & Plan  - s/p CABG x3  - continue home metoprolol and statin  - resume home Eliquis    S/P AVR  Assessment & Plan  - bioprosthetic AVR  - follows outpatient with  Cardiology at [facility]     Aortic stenosis  Assessment & Plan  - h/o bioprosthetic AVR    Hyperlipemia  Assessment & Plan  - continue home statin    Hypertension  Assessment & Plan  - continue home lisinopril and amlodipine  - BP adequately controlled    * Fall  Assessment & Plan  - mechanical fall, sustained below stated injury  - PT/OT evaluations pending  - Appreciate geriatrics' consulation  - CM for disposition planning        Bowel Regimen: colace, SEnna  VTE Prophylaxis:Sequential compression device Afhad Eyad)      Disposition: rehab    Subjective   Chief Complaint: I can't really do much, pain in legs  Subjective: " I guess I am doing okay"     Objective   Vitals:   Temp:  [97 3 °F (36 3 °C)-98 °F (36 7 °C)] 97 5 °F (36 4 °C)  HR:  [64-92] 73  Resp:  [16-18] 18  BP: (137-143)/(66-75) 138/75    I/O       02/06 0701  02/07 0700 02/07 0701  02/08 0700 02/08 0701  02/09 0700    P  O   358     Total Intake  358     Urine 750 850     Stool  0     Total Output 750 850     Net -750 -492            Unmeasured Stool Occurrence  1 x            Physical Exam:   GENERAL APPEARANCE: comfortable  NEURO: GCS - 15  HEENT: EOm's intact  CV: RRR< no complaints of chest pain  LUNGS: CTA bilaterally, no shortness of breath  GI: tolerating a diet, adding Senna to Bowel regimen  : wynn catheter  MSK: Sensation intact, has a back brace  SKIN: warm and dry    Invasive Devices     Peripheral Intravenous Line  Duration           Peripheral IV 02/05/23 Left Wrist 3 days          Drain  Duration           Ureteral Internal Stent Right ureter 6 Fr  25 days    Urethral Catheter Non-latex 18 Fr  3 days                      Lab Results: none  Imaging: none  Other Studies: none

## 2023-02-08 NOTE — CASE MANAGEMENT
Case Management Discharge Planning Note    Patient name Delfina Whitfield  Location [address] [place] [address] [address] [facility] [id number]  : 1935 Date 2023       Current Admission Date: 2023  Current Admission Diagnosis:Fall   Patient Active Problem List    Diagnosis Date Noted   • Acute pain due to trauma 2023   • Fall 2023   • Closed T12 fracture ([address] ) 2023   • Right hip pain 2023   • Class 1 obesity in adult 2023   • H/O heart artery stent    • MS (mitral stenosis)    • Right ureteral stone 2023   • Acute cystitis with hematuria 2023   • Urinary tract obstruction by kidney stone 2023   • Atrial flutter ([address] ) 2023   • Pacemaker 2022   • Hypertension    • Hyperlipemia    • Aortic stenosis    • S/P AVR    • Carotid artery disease ([address] )    • Coronary artery disease       LOS (days): 7  Geometric Mean LOS (GMLOS) (days): 2 80  Days to GMLOS:-4     OBJECTIVE:  Risk of Unplanned Readmission Score: 14 85         Current admission status: Inpatient   Preferred Pharmacy:   [address] [place] [address], [address] [place] [id number]  Phone: [phone] Fax: [phone]    [facility]/pharmacy #[id number] Devin Falk [address] [place] [address]  Phone: [phone] Fax: [phone]    Primary Care Provider: Jose Antonio Galvez DO    Primary Insurance: Karan Cano [facility] REP  Secondary Insurance:     DISCHARGE DETAILS:    Pt accepted to [facility]  [address] can't accept and [facility] doesn't accept the pt's insurance     CM reserved [facility] but did tell the facility there are talks of possible OR this week with Nsg

## 2023-02-08 NOTE — PROGRESS NOTES
Progress Note - Acute Pain Service    Valentin Cochran 81 y o  male MRN: [id number]  Unit/Bed#: [facility] 606-01 Encounter: [id number]      Assessment:   Principal Problem:    Fall  Active Problems:    Hypertension    Hyperlipemia    Aortic stenosis    S/P AVR    Carotid artery disease ([address] )    Pacemaker    Urinary tract obstruction by kidney stone    Closed T12 fracture (HCC)    Right hip pain    Acute pain due to trauma    Valentin Cochran is a 81 y o  male  with history of CAD, atrial flutter, AVR, CABG on Eliquis, right ureteral stone/hydro status post cystoscopy and stent placement, and pacemaker who presented status post fall on 2/1/2023  Debbie Callow revealed T12 fracture   Neurosurgical team following, planning for conservative management at this time, he is s/p MRI of thoracic and lumbar spine today which again showed subacute T12 compression fracture also with new acute compression fracture of the superior endplate of L1  Patient is scheduled for potential operative intervention with neurosurgery on Friday   Acute pain service was consulted for assistant in management of acute pain due to trauma  Plan:   Continue low-dose oxycodone per geriatric order set:  · Oxycodone 2 5 mg every 4 hours as needed, for moderate pain  · Oxycodone 5 mg every 4 hours as needed, for severe pain  · IV Dilaudid 0 2 mg every 4 hours as needed, for breakthrough pain    Multimodal analgesia:  · Tylenol 975 mg every 8 hours scheduled  · Gabapentin 100 mg 3 times daily  · Estimated Creatinine Clearance: 90 2 mL/min (by C-G formula based on SCr of 0 7 mg/dL)  · Lidocaine patch x1, 12 hours on/12 hours off, to affected area  · Robaxin 500 mg every 6 hours scheduled, hold for sedation    Bowel Regimen:  MiraLAX daily  Colace 100 mg twice daily    Treatment plan discussed with bedside nursing and primary care service  APS will continue to follow   Please contact Acute Pain Service - [facility] via [employer] from [phone] with additional questions or concerns  See Ellen Ray for additional contacts and after hours information  Pain History  Current pain location(s): Bilateral lower extremities  Pain Scale:   5-10  Quality: Sharp  24 hour history: No acute events overnight, patient hemodynamically stable at this time  Patient reports increased pain after upright x-rays yesterday  He is plan for potential surgery this Friday with neurosurgery  Tolerating current medication regimen and reports adequate pain reduction with use of oxycodone  No current complaints of shortness of breath, nausea/vomiting, constipation/diarrhea      Opioid requirement previous 24 hours:   Oxycodone 15 mg    Meds/Allergies   all current active meds have been reviewed and current meds:   Current Facility-Administered Medications   Medication Dose Route Frequency   • acetaminophen (TYLENOL) tablet 975 mg  975 mg Oral Q8H [address]   • amLODIPine (NORVASC) tablet 10 mg  10 mg Oral Daily   • atorvastatin (LIPITOR) tablet 40 mg  40 mg Oral Daily With Dinner   • cholecalciferol (VITAMIN D3) tablet 1,000 Units  1,000 Units Oral Daily   • docusate sodium (COLACE) capsule 100 mg  100 mg Oral BID   • enoxaparin (LOVENOX) subcutaneous injection 30 mg  30 mg Subcutaneous E75G RONAK   • folic acid (FOLVITE) tablet 400 mcg  400 mcg Oral Daily   • gabapentin (NEURONTIN) capsule 100 mg  100 mg Oral TID   • HYDROmorphone HCl (DILAUDID) injection 0 2 mg  0 2 mg Intravenous Q4H PRN   • lidocaine (LIDODERM) 5 % patch 1 patch  1 patch Topical Daily   • lisinopril (ZESTRIL) tablet 10 mg  10 mg Oral Daily   • melatonin tablet 6 mg  6 mg Oral HS   • methocarbamol (ROBAXIN) tablet 500 mg  500 mg Oral Q6H RONAK   • metoprolol tartrate (LOPRESSOR) tablet 50 mg  50 mg Oral Daily   • multi-electrolyte (PLASMALYTE-A/ISOLYTE-S PH 7 4) IV solution  75 mL/hr Intravenous Continuous   • naloxone (NARCAN) 0 04 mg/mL syringe 0 04 mg  0 04 mg Intravenous Q1MIN PRN   • ondansetron (ZOFRAN) injection 4 mg  4 mg Intravenous Q6H PRN   • oxyCODONE (ROXICODONE) IR tablet 2 5 mg  2 5 mg Oral Q4H PRN   • oxyCODONE (ROXICODONE) IR tablet 5 mg  5 mg Oral Q4H PRN   • polyethylene glycol (MIRALAX) packet 17 g  17 g Oral Daily   • senna (SENOKOT) tablet 17 2 mg  2 tablet Oral HS   • tamsulosin (FLOMAX) capsule 0 4 mg  0 4 mg Oral Daily With Dinner       Allergies   Allergen Reactions   • Ezetimibe-Simvastatin Dizziness and Lightheadedness     Other reaction(s): MAKES HIM DIZZY     • Simvastatin Lightheadedness     Other reaction(s): Dizziness       Objective     Temp:  [97 5 °F (36 4 °C)-98 °F (36 7 °C)] 97 8 °F (36 6 °C)  HR:  [64-88] 88  Resp:  [16-18] 16  BP: (137-147)/(66-77) 147/77    Physical Exam  Vitals reviewed  Constitutional:       General: He is not in acute distress  Appearance: He is not ill-appearing  HENT:      Head: Normocephalic and atraumatic  Nose: Nose normal  No congestion or rhinorrhea  Cardiovascular:      Rate and Rhythm: Normal rate  Pulmonary:      Effort: Pulmonary effort is normal  No tachypnea, bradypnea or respiratory distress  Abdominal:      General: There is no distension  Tenderness: There is no abdominal tenderness  Musculoskeletal:         General: Tenderness (B/L LE) present  Cervical back: Normal range of motion  Skin:     General: Skin is warm and dry  Coloration: Skin is not pale  Findings: No rash  Neurological:      Mental Status: He is alert and oriented to person, place, and time  Mental status is at baseline  Sensory: Sensation is intact  No sensory deficit  Motor: Weakness (LE secondary to pain) present  No tremor     Psychiatric:         Attention and Perception: Attention normal          Mood and Affect: Mood normal          Speech: Speech normal          Behavior: Behavior normal          Lab Results:   Results from last 7 days   Lab Units 02/06/23  1252   WBC Thousand/uL 7 97   HEMOGLOBIN g/dL 13 9   HEMATOCRIT % 43 4   PLATELETS Thousands/uL 214      Results from last 7 days   Lab Units 02/06/23  1252   POTASSIUM mmol/L 4 1   CHLORIDE mmol/L 102   CO2 mmol/L 29   BUN mg/dL 12   CREATININE mg/dL 0 70   CALCIUM mg/dL 9 1       Imaging Studies: I have personally reviewed pertinent reports  Please note that the APS provides consultative services regarding pain management only  With the exception of ketamine and epidural infusions and except when indicated, final decisions regarding starting or changing doses of analgesic medications are at the discretion of the consulting service  Off hours consultation and/or medication management is generally not available      MICHAEL Mendosa  Acute Pain Service

## 2023-02-08 NOTE — CONSULTS
Consultation - General Cardiology Team 2  Chay Villalta 81 y o  male MRN: [id number]  Unit/Bed#: [facility] 606-01 Encounter: [id number]      Inpatient consult to Cardiology  Consult performed by: Dulce Barry  Consult ordered by: MICHAEL Nelson        PCP: Brando Riddle DO   Outpatient Cardiologist: Jessica Sanchez DO    History of Present Illness   Physician Requesting Consult: Melissa Yoo,*  Reason for Consult / Principal Problem: Clearance for neurosurgical intervention given AVR, pacemaker, bilateral carotid artery stenosis, CAD with multiple stents    HPI: Chay Villalta is a 81y o  year old male with a PMHx of CAD s/p multiple stent placement, pacemaker placement in October 2021 for sick sinus syndrome, AVR with bioprosthetic valve for aortic stenosis in 2007, bilateral internal carotid artery stenosis, severe external carotid artery stenosis, HTN, and HLD who presented s/p fall on 1/29  He reports tripping, which led to the fall  He denies any loss of consciousness, head strike, or any issues prior to the fall  He also reportedly fell on 12/25 and states he had a bit too much to drink that night and tripped  He again denies any head strike, loss of consciousness, or difficulty remembering the event  X-ray showed a poorly visualized T12 fracture  MRI showed involvement of the pedicle and lamina and advanced canal stenosis worst at the T11-T12 level from degenerative disc disease and retropulsion of T12 vertebral body into the anterior epidural space  Medical management was attempted without improvement, and the decision was made to pursue with surgery  Cardiology was consulted for clearance  He was also recently hospitalized in mid-January for a UTI with hydronephrosis 2/2 obstructive ureteral calculus  He was treated with antibiotics, stent placement, and urethral dilation  His cardiology history is significant for CAD with multiple stents placed in the past for occlusions  He had two stents placed in the left circumflex artery in 2011 and had another one placed before that, but is not entirely sure when (per Care Everywhere it appears to be 2009)  He had an aortic valve replacement (bovine) in 2007 for aortic stenosis  In 2021 he had a single chamber pacemaker (Medtronic DC PPM) placed for sick sinus syndrome  He has also been noted to have bilateral carotid artery stenosis, with <50% stenosis of bilateral internal carotid arteries and severe left external carotid artery stenosis seen on VAS on 12/9/22  Patient is unsure of his last cardiac cath or nuclear stress test  Per Care Everywhere, the last stress test on file was in 2017 and was non-ischemic  His last cardiac cath appears to have been in 2011  Patient reports that about 1 year ago he was able to walk a mile, but does not feel he can walk that far anymore  He reports he can go up 1 flight of stairs without issue, but does not typically do more because he doesn't need to  He denies any recent worsening of shortness of breath or chest pain with exertion  He denies any syncopal episodes, recent illness, fever, chills, nausea, vomiting, or other acute symptoms  He continues to have back pain and pain in both legs  He denies any weakness  Review of Systems   Constitutional: Negative for chills and fever  HENT: Negative  Eyes: Negative  Respiratory: Negative for chest tightness and shortness of breath  Cardiovascular: Negative for chest pain, palpitations and leg swelling  Gastrointestinal: Negative for abdominal pain, constipation, diarrhea, nausea and vomiting  Genitourinary: Negative for difficulty urinating and dysuria  Musculoskeletal: Positive for back pain  Leg pain   Skin: Negative  Neurological: Negative for dizziness, weakness and light-headedness  Review of system was conducted and was negative except for as stated in the HPI        Historical Information   Past Medical History: Diagnosis Date   • Coronary artery disease    • H/O atrial flutter    • History of aortic valve replacement with bioprosthetic valve    • Hyperlipemia    • Hypertension    • Pacemaker    • Skin cancer, basal cell    • Walker as ambulation aid    • Wears dentures      Past Surgical History:   Procedure Laterality Date   • A-V CARDIAC PACEMAKER INSERTION     • AORTIC VALVE REPLACEMENT     • CARDIAC PACEMAKER PLACEMENT Left     2021   • CATARACT EXTRACTION     • CORONARY ANGIOPLASTY WITH STENT PLACEMENT      stents times 3   • OK CYSTO BLADDER W/URETERAL CATHETERIZATION Right 01/13/2023    Procedure: CYSTOSCOPY RETROGRADE PYELOGRAM WITH INSERTION STENT URETERAL;  Surgeon: Eloina Dave MD;  Location: [facility];  Service: Urology   • OK CYSTO BLADDER W/URETERAL CATHETERIZATION Right 1/26/2023    Procedure: CYSTOSCOPY WITH RETROGRADE PYELOGRAM;  Surgeon: Eloina Dave MD;  Location: [facility];  Service: Urology   • OK CYSTO/URETERO W/LITHOTRIPSY &INDWELL STENT INSRT Right 1/26/2023    Procedure: CYSTOSCOPY URETEROSCOPY WITH LITHOTRIPSY HOLMIUM LASER, RETROGRADE PYELOGRAM AND INSERTION STENT URETERAL;  Surgeon: Eloina Dave MD;  Location: [facility];  Service: Urology     Social History     Substance and Sexual Activity   Alcohol Use Yes    Comment: social     Social History     Substance and Sexual Activity   Drug Use Never     Social History     Tobacco Use   Smoking Status Former   Smokeless Tobacco Never       Meds/Allergies   Hospital Medications:   Current Facility-Administered Medications   Medication Dose Route Frequency   • acetaminophen (TYLENOL) tablet 975 mg  975 mg Oral Q8H [address]   • amLODIPine (NORVASC) tablet 10 mg  10 mg Oral Daily   • atorvastatin (LIPITOR) tablet 40 mg  40 mg Oral Daily With Dinner   • cholecalciferol (VITAMIN D3) tablet 1,000 Units  1,000 Units Oral Daily   • docusate sodium (COLACE) capsule 100 mg  100 mg Oral BID   • enoxaparin (LOVENOX) subcutaneous injection 30 mg  30 mg Subcutaneous I40Y [address]   • folic acid (FOLVITE) tablet 400 mcg  400 mcg Oral Daily   • gabapentin (NEURONTIN) capsule 100 mg  100 mg Oral TID   • HYDROmorphone HCl (DILAUDID) injection 0 2 mg  0 2 mg Intravenous Q4H PRN   • lidocaine (LIDODERM) 5 % patch 1 patch  1 patch Topical Daily   • lisinopril (ZESTRIL) tablet 10 mg  10 mg Oral Daily   • melatonin tablet 6 mg  6 mg Oral HS   • methocarbamol (ROBAXIN) tablet 500 mg  500 mg Oral Q6H [address]   • metoprolol tartrate (LOPRESSOR) tablet 50 mg  50 mg Oral Daily   • multi-electrolyte (PLASMALYTE-A/ISOLYTE-S PH 7 4) IV solution  75 mL/hr Intravenous Continuous   • naloxone (NARCAN) 0 04 mg/mL syringe 0 04 mg  0 04 mg Intravenous Q1MIN PRN   • ondansetron (ZOFRAN) injection 4 mg  4 mg Intravenous Q6H PRN   • oxyCODONE (ROXICODONE) IR tablet 2 5 mg  2 5 mg Oral Q4H PRN   • oxyCODONE (ROXICODONE) IR tablet 5 mg  5 mg Oral Q4H PRN   • polyethylene glycol (MIRALAX) packet 17 g  17 g Oral Daily   • senna (SENOKOT) tablet 17 2 mg  2 tablet Oral HS   • tamsulosin (FLOMAX) capsule 0 4 mg  0 4 mg Oral Daily With Dinner     Home Medications:   Medications Prior to Admission   Medication   • acetaminophen (TYLENOL) 500 mg tablet   • amLODIPine (NORVASC) 10 mg tablet   • apixaban (Eliquis) 5 mg   • atorvastatin (LIPITOR) 40 mg tablet   • [] cefuroxime (CEFTIN) 500 mg tablet   • Cholecalciferol 25 MCG (1000 UT) tablet   • enalapril (VASOTEC) 2 5 mg tablet   • folic acid (FOLVITE) 144 mcg tablet   • HYDROcodone-acetaminophen (NORCO) 5-325 mg per tablet   • Melatonin 5 MG CAPS   • metoprolol tartrate (LOPRESSOR) 50 mg tablet   • tamsulosin (FLOMAX) 0 4 mg       Allergies   Allergen Reactions   • Ezetimibe-Simvastatin Dizziness and Lightheadedness     Other reaction(s): MAKES HIM DIZZY     • Simvastatin Lightheadedness     Other reaction(s): Dizziness       Objective   Vitals: Blood pressure 118/58, pulse 71, temperature 97 7 °F (36 5 °C), resp  rate 16, SpO2 96 %    Orthostatic Blood Pressures    Flowsheet Row Most Recent Value   Blood Pressure 118/58 filed at 02/08/2023 1058   Patient Position - Orthostatic VS Lying filed at 02/05/2023 0818            Invasive Devices     Peripheral Intravenous Line  Duration           Peripheral IV 02/05/23 Left Wrist 3 days          Drain  Duration           Ureteral Internal Stent Right ureter 6 Fr  26 days    Urethral Catheter Non-latex 18 Fr  3 days                Physical Exam  Vitals reviewed  Constitutional:       General: He is not in acute distress  Appearance: Normal appearance  HENT:      Head: Normocephalic and atraumatic  Right Ear: External ear normal       Left Ear: External ear normal       Nose: Nose normal    Eyes:      Extraocular Movements: Extraocular movements intact  Conjunctiva/sclera: Conjunctivae normal    Cardiovascular:      Rate and Rhythm: Normal rate and regular rhythm  Pulses: Normal pulses  Heart sounds: Normal heart sounds  No murmur heard  Pulmonary:      Effort: Pulmonary effort is normal  No respiratory distress  Breath sounds: Normal breath sounds  Abdominal:      General: Bowel sounds are normal  There is no distension  Palpations: Abdomen is soft  Tenderness: There is no abdominal tenderness  Musculoskeletal:      Right lower leg: No edema  Left lower leg: No edema  Skin:     General: Skin is warm and dry  Neurological:      Mental Status: He is alert  Lab Results: I have personally reviewed pertinent lab results              Results from last 7 days   Lab Units 02/06/23  1252 02/05/23  0447 02/02/23  0457   POTASSIUM mmol/L 4 1 3 7 3 8   CO2 mmol/L 29 25 25   CHLORIDE mmol/L 102 105 107   BUN mg/dL 12 9 9   CREATININE mg/dL 0 70 0 59* 0 57*     Results from last 7 days   Lab Units 02/06/23  1252 02/05/23  0447 02/02/23  0457   HEMOGLOBIN g/dL 13 9 12 4 12 7   HEMATOCRIT % 43 4 38 1 40 1   PLATELETS Thousands/uL 214 204 198             Imaging: I have personally reviewed pertinent reports  EKG:   Date: 2/1/23  Interpretation: Atrial fibrillation with premature ventricular or aberrantly conducted complexes, right bundle branch block, left posterior fascicular block      Previous STRESS TEST:  No results found for this or any previous visit  No results found for this or any previous visit  No results found for this or any previous visit  Previous Cath/PCI:  No results found for this or any previous visit  No results found for this or any previous visit  No results found for this or any previous visit  ECHO:  No results found for this or any previous visit  Results for orders placed during the hospital encounter of 12/10/22    Echo complete w/ contrast if indicated    Interpretation Summary  •  Left Ventricle: Moderate concentric left ventricular hypertrophy with normal LV cavity size and preserved function  Estimated ejection fraction 65%  No obvious segmental wall motion normality identified  There is variability in contractility with rhythm  Diastolic characterization was not assessed  •  Right Ventricle: The right ventricle appears enlarged with intact RV function  Lead artifact seen within the right ventricle  •  Left Atrium: The atrium is dilated  •  Right Atrium: The atrium is dilated  •  Aortic Valve: Patient is status post bioprosthetic aortic valve replacement  Size of the prosthesis is not known at this time  Measured transaortic velocity of 2 4 m/s corresponds to peak and mean gradients of 23 and 12 mmHg respectively  Findings suggest appropriate leaflet excursion  There is no clear insufficiency  •  Mitral Valve: Heavy mitral annular calcification  Measured mean transmitral inflow gradient of 6 mmHg suggest moderate mitral stenosis  There is trace mitral vegetation  •  Tricuspid Valve: Mild thickening of the tricuspid leaflets with mild insufficiency    Measure tricuspid regurgitant velocity of 3 5 m/s corresponds to a gradient of 49 mmHg  With addition of 10 mmHg for estimated right atrial pressure, estimated pulmonary pressure is increased to 59 mmHg  •  Pulmonic Valve: Grossly normal-appearing pulmonic leaflets with trace insufficiency  Compared to study performed 8/2021 at an outside institution, findings at that time were as follows:  Images are not available for review:  •  Left Ventricle: Left ventricle is normal in size  There is mild  concentric hypertrophy  Systolic function is normal with an ejection  fraction of 55-60%  Wall motion is within normal limits  •  Left Atrium: Left atrium cavity is moderately dilated  •  Aortic Valve: There is a bioprosthetic aortic valve  The valve appears  well-seated without evidence of bioprosthetic restenosis or insufficiency  •  Mitral Valve: There is severe posterior annular calcification  Also  noted is severely calcified anterior annulus along with subvalvular  apparatus  There is trivial to mild stenosis caused by annular  calcification, mean pressure gradient of 7 mmHg  •  IVC/SVC: The inferior vena cava demonstrates a diameter of <=21 mm and  collapses >50%; therefore, the right atrial pressure is estimated at 0-5  mmHg  •  Pulmonic Valve: The estimated pulmonary artery systolic pressure is  53 2 mmHg  DAVID:  No results found for this or any previous visit  No results found for this or any previous visit  CMR:  No results found for this or any previous visit  No results found for this or any previous visit  No results found for this or any previous visit  HOLTER  No results found for this or any previous visit  No results found for this or any previous visit  VTE Prophylaxis: Enoxaparin (Lovenox)       Assessment/Plan     Assessment/Plan:    1  Closed T12 Fracture  Patient with closed T12 fracture after fall   MRI showing involvement of the pedicle and lamina with retropulsion of T12 vertebral body into the anterior epidural space  Nonsurgical management was attempted, but surgical fixation, fusion, and decompression has been considered due to non-improving status  He reports having occlusions in the past necessitating stents in his left circumflex artery  RCRI score of 1 point (Class II risk) for potential history of ischemic heart disease requiring stents  He does not have current or worsening anginal symptoms and he was able to ambulate at home prior to admission  There is no cardiac contraindication for surgery as he is moderate-high risk for a moderate risk surgery, and do not anticipate that additional testing and further optimization from a cardiac standpoint will significantly alter his risk profile  Has moderate mitral stenosis - try to avoid tachycardia  · Continue Lipitor and Lopressor    2  Bioprosthetic Aortic Valve  Patient with history of aortic valve repair with bovine valve placed in 2007 due to severe aortic stenosis  12/10/22 TTE suggesting appropriate leaflet excursion and no clear insufficiency  3  Single Chamber Pacemaker for Sick Sinus Syndrome  Patient developed sick sinus syndrome in 2021 and had single-chamber pacemaker placed (Medtronic DC PPM)  12/10 TTE with lead artifact within right ventricle  4  Bilateral Carotid Artery Stenosis  History of bilateral carotid artery stenosis  Most recent VAS of carotids in December 2022 showing <50% stenosis of right and left internal carotid arteries  Severe stenosis seen of left external carotid artery  Patient without reported syncopal events, lightheadedness, or dizziness at home  · Continue Lipitor and anticoagulation    5  History of CAD s/p Multiple Stent Placement  Patient with history of CAD s/p multiple stent placement (2 in left circumflex artery in 2011 and another stent believed to be placed in 2009)  He has not had anginal symptoms and states as of last year he was able to walk one mile   He is no longer able to do that, but does not find that chest pain or shortness of breath have altered his lifestyle  · Continue Lipitor and anticoagulation    6  Atrial fibrillation  Patient with Afib on EKG on 2/1  Also noted to be in Afib in outpatient cardiology appointment in December  Has been rate controlled with HR 66-88 over last 24 hours  · Continue Lopressor 50 mg  · Should be on therapeutic anticoagulation    Case discussed and reviewed with Dr Tyler Flores who agrees with my assessment and plan  Thank you for involving us in the care of your patient        Marely Lorenzo  MS-4

## 2023-02-08 NOTE — PHYSICAL THERAPY NOTE
Physical Therapy Progress Note     02/08/23 1109   PT Last Visit   PT Visit Date 02/08/23   Note Type   Note Type Treatment   Pain Assessment   Pain Assessment Tool FLACC   Pain Rating: FLACC (Rest) - Face 1   Pain Rating: FLACC (Rest) - Legs 1   Pain Rating: FLACC (Rest) - Activity 1   Pain Rating: FLACC (Rest) - Cry 1   Pain Rating: FLACC (Rest) - Consolability 0   Score: FLACC (Rest) 4   Pain Rating: FLACC (Activity) - Face 2   Pain Rating: FLACC (Activity) - Legs 1   Pain Rating: FLACC (Activity) - Activity 1   Pain Rating: FLACC (Activity) - Cry 2   Pain Rating: FLACC (Activity) - Consolability 1   Score: FLACC (Activity) 7   Restrictions/Precautions   Braces or Orthoses TLSO   Other Precautions Spinal precautions;Pain; Fall Risk; Chair Alarm; Bed Alarm   Subjective   Subjective Pt encountered supine in bed, pleasant and agreeable to treatment  reports ongoing pain into thighs at rest, extending to feet with activity  Pt reports anxiousness regarding LE weakness & daughter reports he is more fearful of falling recently  Bed Mobility   Rolling L 3  Moderate assistance   Additional items Assist x 1; Increased time required;LE management;Verbal cues   Supine to Sit 3  Moderate assistance   Additional items Assist x 1; Increased time required;Verbal cues;LE management  (log roll)   Transfers   Sit to Stand 3  Moderate assistance   Additional items Assist x 1; Armrests; Increased time required;Verbal cues   Stand to Sit 3  Moderate assistance   Additional items Assist x 1; Armrests; Increased time required   Stand pivot 3  Moderate assistance   Additional items Assist x 2  (+ R knee guarding)   Additional Comments ~1 minute static standing from recliner at RW with mod A & R knee blocking   Balance   Static Sitting Fair +   Static Standing Poor +   Dynamic Standing Poor   Activity Tolerance   Activity Tolerance Patient tolerated treatment well;Patient limited by pain; Patient limited by fatigue   Nurse Made Aware Ye Ochoa RN   Assessment   Prognosis Good   Problem List Decreased strength;Decreased endurance; Impaired balance;Decreased mobility; Decreased range of motion;Pain;Orthopedic restrictions   Assessment pt continues to require Ax1-2 for all tasks due to pain & associated LE weakness, combined with pt's general anxiousness regarding pain & risk for falls  He demosntrated good understanding of mobiltiy techniques with instructions throughout for safety, but requires instructiosn to redirect when pain increases at nilson  he is dependent for fitting TLSO while EOB & adjusting it s/p activity  Discussed importance of mobility at this time to maintain strength in anticipation of surgical interventions this week, with goals to return to baseline mobitiy as pain & weakness reside  Both pt & family report understanding & offer no further questions regarding the same  PT will continue to follow pending evolving medical POC  If pt does have surgical intervention prior to next session, will require updated mobilty orders after once cleared for activity  Goals   Patient Goals to have less pain   STG Expiration Date 02/12/23   PT Treatment Day 2   Plan   Treatment/Interventions LE strengthening/ROM; Functional transfer training; Therapeutic exercise; Endurance training;Patient/family training;Equipment eval/education; Bed mobility;Gait training;Elevations   Progress Slow progress, decreased activity tolerance   PT Frequency 3-5x/wk   Recommendation   PT Discharge Recommendation Post acute rehabilitation services  (pending medical course & pt progress)   Equipment Recommended [facility] Recommended Wheeled walker   AM-PAC Basic Mobility Inpatient   Turning in Flat Bed Without Bedrails 2   Lying on Back to Sitting on Edge of Flat Bed Without Bedrails 2   Moving Bed to Chair 2   Standing Up From Chair Using Arms 2   Walk in Room 1   Climb 3-5 Stairs With Railing 1   Basic Mobility Inpatient Raw Score 10   Turning Head Towards Sound 3   Follow Simple Instructions 4   Low Function Basic Mobility Raw Score 17   Low Function Basic Mobility Standardized Score 27 46   Highest Level Of Mobility   -M Goal 4: Move to chair/commode   -HLM Achieved 5: Stand (1 or more minutes)       Jhoan English, PTA    An [facility] Basic Mobility Raw Score less than 17 suggests pt would benefit from post acute rehab  Please also refer to the recommendation of the Physical Therapist for safe discharge planning

## 2023-02-08 NOTE — PLAN OF CARE
Problem: PHYSICAL THERAPY ADULT  Goal: Performs mobility at highest level of function for planned discharge setting  See evaluation for individualized goals  Description: Treatment/Interventions: Functional transfer training, LE strengthening/ROM, Elevations, Therapeutic exercise, Endurance training, Patient/family training, Equipment eval/education, Bed mobility, Gait training, Spoke to nursing, OT  Equipment Recommended: Gabrielle Rodriguez       See flowsheet documentation for full assessment, interventions and recommendations  Outcome: Progressing  Note: Prognosis: Good  Problem List: Decreased strength, Decreased endurance, Impaired balance, Decreased mobility, Decreased range of motion, Pain, Orthopedic restrictions  Assessment: pt continues to require Ax1-2 for all tasks due to pain & associated LE weakness, combined with pt's general anxiousness regarding pain & risk for falls  He demosntrated good understanding of mobiltiy techniques with instructions throughout for safety, but requires instructiosn to redirect when pain increases at nilson  he is dependent for fitting TLSO while EOB & adjusting it s/p activity  Discussed importance of mobility at this time to maintain strength in anticipation of surgical interventions this week, with goals to return to baseline mobitiy as pain & weakness reside  Both pt & family report understanding & offer no further questions regarding the same  PT will continue to follow pending evolving medical POC  If pt does have surgical intervention prior to next session, will require updated mobilty orders after once cleared for activity  Barriers to Discharge: Other (Comment) (Pending improvement in pain )  Barriers to Discharge Comments: Pt denies and mobility or safety concerns about returning home at time of d/c  PT Discharge Recommendation: Post acute rehabilitation services (pending medical course & pt progress)    See flowsheet documentation for full assessment

## 2023-02-08 NOTE — PROGRESS NOTES
[address] [facility]  Progress Note Abe Saleh 1935, 81 y o  male MRN: [id number]  Unit/Bed#: [facility] 606-01 Encounter: [id number]  Primary Care Provider: Boyd Leonardo DO   Date and time admitted to hospital: 2/1/2023  1:44 PM    Closed T12 fracture [facility])  Assessment & Plan  T12 fracture with slight extension into pedicles  · S/p mechanical fall Sunday, 1/29  · Additional fall 12/25/2022 - fracture evident retrospectively although not read at the time of prior imaging  · On exam, endorsing significant bilateral leg pain R>L, cannot lift legs off of bed due to pain, up-going babinski reflex, could not tolerate exam for clonus, sensation intact no saddle marc     Imaging:  · Upright thoracolumbar x-rays, 2/2/2023:Stable appearance of T12 fracture with slight kyphosis  · CT CAP, 2/1/2023: Transversely oriented fracture through the T12 vertebral body with very slight extension into the pedicles  No significant displacement of fracture fragments or spinal malalignment  Very slight retropulsion of bony fragments  In retrospect, this fracture was probably acute (though very difficult to detect) on CT examination of December 30, 2022  Advanced lumbar and thoracic degenerative changes including multiple level degenerative fusion is in the lower thoracic and upper lumbar spine  · MRI 2/6/2023: Subacute T12 compression fracture with a new, acute compression fracture of the superior endplate of L1  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 of T11-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 best seen on series 3 image Despite this compression, the cord maintains normal signal   Severe right foraminal narrowing noted at the T12-L1 level  See separate MRI thoracic spine dictation also performed today for more detailed description of canal stenosis in the lower thoracic spine   Lumbar degenerative disc disease L2-3, L3-4 and L4-5 with multilevel canal stenosis and foraminal narrowing, most pronounced at the L4-5 level  Plan:  · Continue to monitor neuro exam closely  · TLSO brace when upright > 45 degrees and OOB  · Unclear etiology of bilateral hip/thigh pain  Does not correspond with fracture location  Could be radicular pain in setting of diffuse degenerative disease of spine  · Repeat upright XR ordered thoracic & lumbar spine for 2/8  · Multimodal pain control - APS consulted  · Mobilize with PT/OT  · DVT ppx: SCDs, Lovenox  · Surgical planning for Friday due to continued failure of conservative management pending uprights     Neurosurgery will continue to follow  Please call with questions or concerns  Subjective/Objective   Chief Complaint: Bilateral leg pain    Subjective: patient has stable leg pain    Objective:     I/O       02/06 0701 02/07 0700 02/07 0701 02/08 0700 02/08 0701 02/09 0700    P  O   358     Total Intake  358     Urine 750 850     Stool  0     Total Output 750 850     Net -750 -492            Unmeasured Stool Occurrence  1 x           Invasive Devices     Peripheral Intravenous Line  Duration           Peripheral IV 02/05/23 Left Wrist 3 days          Drain  Duration           Ureteral Internal Stent Right ureter 6 Fr  25 days    Urethral Catheter Non-latex 18 Fr  3 days                Physical Exam:  Vitals: Blood pressure 147/77, pulse 88, temperature 97 8 °F (36 6 °C), resp  rate 16, SpO2 96 %  ,There is no height or weight on file to calculate BMI      Hemodynamic Monitoring: MAP:      General appearance: alert, appears stated age, cooperative and no distress  Head: Normocephalic, without obvious abnormality, atraumatic  Eyes: EOMI, PERRL  Neck: supple, symmetrical, trachea midline and NT  Back: no kyphosis present, tenderness to lumbar percussion  Lungs: non labored breathing  Heart: regular heart rate  Neurologic:   Mental status: Alert, oriented x3, thought content appropriate  Cranial nerves: grossly intact (Cranial nerves II-XII)  Sensory: normal throughout   Motor: moving all extremities without focal weakness, LE movement limited due to pain      Lab Results:  Results from last 7 days   Lab Units 02/06/23  1252 02/05/23  0447 02/02/23  0457   WBC Thousand/uL 7 97 7 04 7 22   HEMOGLOBIN g/dL 13 9 12 4 12 7   HEMATOCRIT % 43 4 38 1 40 1   PLATELETS Thousands/uL 214 204 198   NEUTROS PCT % 72 69 71   MONOS PCT % 7 10 8     Results from last 7 days   Lab Units 02/06/23  1252 02/05/23  0447 02/02/23  0457 02/01/23  0933   POTASSIUM mmol/L 4 1 3 7 3 8 3 9   CHLORIDE mmol/L 102 105 107 101   CO2 mmol/L 29 25 25 24   BUN mg/dL 12 9 9 8   CREATININE mg/dL 0 70 0 59* 0 57* 0 75   CALCIUM mg/dL 9 1 8 7 8 5 8 5   ALK PHOS U/L  --   --   --  141*   ALT U/L  --   --   --  10   AST U/L  --   --   --  11*     Results from last 7 days   Lab Units 02/02/23  0457   MAGNESIUM mg/dL 2 1     Results from last 7 days   Lab Units 02/02/23  0457   PHOSPHORUS mg/dL 2 9     Results from last 7 days   Lab Units 02/01/23  0933   INR  1 29*   PTT seconds 40*     No results found for: TROPONINT  ABG:No results found for: PHART, EPQ1AYJ, PO2ART, SVL3NFY, A5QLJPBX, BEART, SOURCE    Imaging Studies: I have personally reviewed pertinent reports  and I have personally reviewed pertinent films in PACS    XR Trauma chest portable    Result Date: 2/1/2023  Impression: No acute cardiopulmonary disease  Workstation performed: [id number]     XR spine thoracolumbar 2 vw    Result Date: 2/3/2023  Impression: Poorly visualized T12 fracture with intact posterior column  Mild to moderate loss of height suggested  Workstation performed: [id number]     TRAUMA - CT head wo contrast    Result Date: 2/1/2023  Impression: No acute intracranial abnormality  Workstation performed: [id number]     TRAUMA - CT spine cervical wo contrast    Result Date: 2/1/2023  Impression: No cervical spine fracture or traumatic malalignment    Workstation performed: [id number]     MRI thoracic spine wo contrast    Result Date: 2/6/2023  Impression: There is a subacute compression fracture of T12 with a horizontally oriented fracture cleft through the vertebral body now filled with fluid  Fracture does extend to the posterior elements in particular on the right where there is involvement of the pedicle and lamina  Stable approximately 10% loss of height of the vertebral body  There is a new mild compression fracture of the L1 superior endplate with moderate amount of edema present within the vertebral body  Advanced canal stenosis within the lower thoracic spine worst at the T11-12 level secondary to degenerative disc disease, bony retropulsion of the T12 vertebral body into the anterior epidural space and posterior element hypertrophic change with compression of the distal cord/conus  At T10-11 there is degenerative disc disease as well as a small right posterior lateral extradural mass/fluid collection which may represent hematoma, extending inferiorly to the T11-12 level  Moderate canal stenosis at T10-11 with bilateral foraminal narrowing  Severe right foraminal narrowing at the T12-L1 level  This examination was marked "immediate notification" in Epic in order to begin the standard process by which the radiology reading room liaison alerts the referring practitioner  Workstation performed: [id number]     MRI lumbar spine wo contrast    Result Date: 2/6/2023  Impression: Subacute T12 compression fracture with a new, acute compression fracture of the superior endplate of L1  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 of T11-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 best seen on series 3 image 9  Despite this  compression, the cord maintains normal signal   Severe right foraminal narrowing noted at the T12-L1 level  See separate MRI thoracic spine dictation also performed today for more detailed description of canal stenosis in the lower thoracic spine   Lumbar degenerative disc disease L2-3, L3-4 and L4-5 with multilevel canal stenosis and foraminal narrowing, most pronounced at the L4-5 level  Workstation performed: [id number]     XR pelvis ap only 1 or 2 vw    Result Date: 2/1/2023  Impression: No acute osseous abnormality  Workstation performed: [id number]     TRAUMA - CT abdomen pelvis w contrast    Result Date: 2/1/2023  Impression: Transversely oriented fracture through the T12 vertebral body with very slight extension into the pedicles  No significant displacement of fracture fragments or spinal malalignment  Very slight retropulsion of bony fragments  In retrospect, this fracture was probably acute (though very difficult to detect) on CT examination of December 30, 2022  Advanced lumbar and thoracic degenerative changes including multiple level degenerative fusion is in the lower thoracic and upper lumbar spine  Renal cortical thinning and chronic moderate right-sided hydronephrosis, persistent despite the presence of a right ureteral stent  Urothelial thickening involving right renal collecting system and proximal right ureter, similar when compared to January 12, 2023  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

## 2023-02-09 ENCOUNTER — APPOINTMENT (INPATIENT)
Dept: RADIOLOGY | Facility: HOSPITAL | Age: 88
End: 2023-02-09

## 2023-02-09 LAB
ANION GAP SERPL CALCULATED.3IONS-SCNC: 5 MMOL/L (ref 4–13)
BACTERIA UR CULT: ABNORMAL
BASOPHILS # BLD AUTO: 0.05 THOUSANDS/ÂΜL (ref 0–0.1)
BASOPHILS NFR BLD AUTO: 1 % (ref 0–1)
BUN SERPL-MCNC: 12 MG/DL (ref 5–25)
CALCIUM SERPL-MCNC: 8.9 MG/DL (ref 8.3–10.1)
CHLORIDE SERPL-SCNC: 103 MMOL/L (ref 96–108)
CO2 SERPL-SCNC: 27 MMOL/L (ref 21–32)
CREAT SERPL-MCNC: 0.7 MG/DL (ref 0.6–1.3)
EOSINOPHIL # BLD AUTO: 0.2 THOUSAND/ÂΜL (ref 0–0.61)
EOSINOPHIL NFR BLD AUTO: 2 % (ref 0–6)
ERYTHROCYTE [DISTWIDTH] IN BLOOD BY AUTOMATED COUNT: 15 % (ref 11.6–15.1)
GFR SERPL CREATININE-BSD FRML MDRD: 85 ML/MIN/1.73SQ M
GLUCOSE SERPL-MCNC: 139 MG/DL (ref 65–140)
HCT VFR BLD AUTO: 40.4 % (ref 36.5–49.3)
HGB BLD-MCNC: 13.1 G/DL (ref 12–17)
IMM GRANULOCYTES # BLD AUTO: 0.04 THOUSAND/UL (ref 0–0.2)
IMM GRANULOCYTES NFR BLD AUTO: 0 % (ref 0–2)
LYMPHOCYTES # BLD AUTO: 1.6 THOUSANDS/ÂΜL (ref 0.6–4.47)
LYMPHOCYTES NFR BLD AUTO: 18 % (ref 14–44)
MCH RBC QN AUTO: 29.2 PG (ref 26.8–34.3)
MCHC RBC AUTO-ENTMCNC: 32.4 G/DL (ref 31.4–37.4)
MCV RBC AUTO: 90 FL (ref 82–98)
MONOCYTES # BLD AUTO: 0.75 THOUSAND/ÂΜL (ref 0.17–1.22)
MONOCYTES NFR BLD AUTO: 8 % (ref 4–12)
NEUTROPHILS # BLD AUTO: 6.27 THOUSANDS/ÂΜL (ref 1.85–7.62)
NEUTS SEG NFR BLD AUTO: 71 % (ref 43–75)
NRBC BLD AUTO-RTO: 0 /100 WBCS
PLATELET # BLD AUTO: 230 THOUSANDS/UL (ref 149–390)
PMV BLD AUTO: 10.1 FL (ref 8.9–12.7)
POTASSIUM SERPL-SCNC: 4.8 MMOL/L (ref 3.5–5.3)
RBC # BLD AUTO: 4.48 MILLION/UL (ref 3.88–5.62)
SODIUM SERPL-SCNC: 135 MMOL/L (ref 135–147)
WBC # BLD AUTO: 8.91 THOUSAND/UL (ref 4.31–10.16)

## 2023-02-09 RX ADMIN — FOLIC ACID TAB 400 MCG 400 MCG: 400 TAB at 08:59

## 2023-02-09 RX ADMIN — ACETAMINOPHEN 975 MG: 325 TABLET, FILM COATED ORAL at 21:16

## 2023-02-09 RX ADMIN — AMLODIPINE BESYLATE 10 MG: 10 TABLET ORAL at 08:59

## 2023-02-09 RX ADMIN — VANCOMYCIN HYDROCHLORIDE 2000 MG: 10 INJECTION, POWDER, LYOPHILIZED, FOR SOLUTION INTRAVENOUS at 14:58

## 2023-02-09 RX ADMIN — ENOXAPARIN SODIUM 30 MG: 30 INJECTION SUBCUTANEOUS at 21:17

## 2023-02-09 RX ADMIN — GABAPENTIN 100 MG: 100 CAPSULE ORAL at 08:59

## 2023-02-09 RX ADMIN — ATORVASTATIN CALCIUM 40 MG: 40 TABLET, FILM COATED ORAL at 17:27

## 2023-02-09 RX ADMIN — LISINOPRIL 10 MG: 5 TABLET ORAL at 08:59

## 2023-02-09 RX ADMIN — METOPROLOL TARTRATE 50 MG: 50 TABLET, FILM COATED ORAL at 08:59

## 2023-02-09 RX ADMIN — ACETAMINOPHEN 975 MG: 325 TABLET, FILM COATED ORAL at 14:23

## 2023-02-09 RX ADMIN — GABAPENTIN 100 MG: 100 CAPSULE ORAL at 17:30

## 2023-02-09 RX ADMIN — METHOCARBAMOL TABLETS 500 MG: 500 TABLET, COATED ORAL at 02:06

## 2023-02-09 RX ADMIN — OXYCODONE HYDROCHLORIDE 5 MG: 5 TABLET ORAL at 21:17

## 2023-02-09 RX ADMIN — OXYCODONE HYDROCHLORIDE 5 MG: 5 TABLET ORAL at 11:54

## 2023-02-09 RX ADMIN — ACETAMINOPHEN 975 MG: 325 TABLET, FILM COATED ORAL at 05:45

## 2023-02-09 RX ADMIN — GABAPENTIN 100 MG: 100 CAPSULE ORAL at 21:17

## 2023-02-09 RX ADMIN — DOCUSATE SODIUM 100 MG: 100 CAPSULE, LIQUID FILLED ORAL at 17:27

## 2023-02-09 RX ADMIN — METHOCARBAMOL TABLETS 500 MG: 500 TABLET, COATED ORAL at 17:27

## 2023-02-09 RX ADMIN — DOCUSATE SODIUM 100 MG: 100 CAPSULE, LIQUID FILLED ORAL at 08:59

## 2023-02-09 RX ADMIN — ENOXAPARIN SODIUM 30 MG: 30 INJECTION SUBCUTANEOUS at 08:58

## 2023-02-09 RX ADMIN — POLYETHYLENE GLYCOL 3350 17 G: 17 POWDER, FOR SOLUTION ORAL at 08:58

## 2023-02-09 RX ADMIN — OXYCODONE HYDROCHLORIDE 5 MG: 5 TABLET ORAL at 05:45

## 2023-02-09 RX ADMIN — METHOCARBAMOL TABLETS 500 MG: 500 TABLET, COATED ORAL at 05:45

## 2023-02-09 RX ADMIN — MELATONIN 6 MG: at 21:17

## 2023-02-09 RX ADMIN — OXYCODONE HYDROCHLORIDE 5 MG: 5 TABLET ORAL at 17:30

## 2023-02-09 RX ADMIN — Medication 1000 UNITS: at 08:59

## 2023-02-09 RX ADMIN — TAMSULOSIN HYDROCHLORIDE 0.4 MG: 0.4 CAPSULE ORAL at 17:27

## 2023-02-09 RX ADMIN — METHOCARBAMOL TABLETS 500 MG: 500 TABLET, COATED ORAL at 11:54

## 2023-02-09 NOTE — ASSESSMENT & PLAN NOTE
- patient noted to have a renal stone on 1/13 and underwent lithotripsy and right renal stent placement on 1/26 and has a chronic wynn catheter  It appears he was to have followed up for a voiding trial with urology on 1/31  He also remains on Ceftin, which should have been for a 5-day course from 1/18-1/23 from chart review  - I spoke with urology who recommended voiding trial on 2/2 and discontinuation of Ceftin  Patient has required SC x2 and may require replacement of wynn catheter  - per patient cannot void lying down  Catheter in place  Urine is dark with sediment  Fluids started to see if increase output and urine clears, discontinued    Started vancomycin due to patient resistant on urine culture- pharmacy following Addended by: NEENA CONTRERAS on: 11/9/2021 11:45 AM     Modules accepted: Orders

## 2023-02-09 NOTE — PROGRESS NOTES
Carmen Steele is a 81 y o  male who is currently ordered Vancomycin IV with management by the Pharmacy Consult service  Relevant clinical data and objective / subjective history reviewed  Vancomycin Assessment:  Indication and Goal AUC/Trough: Urinary tract infection (goal -600, trough >10)    Micro:     Renal Function:  SCr: 0 7 mg/dL  CrCl: 90 mL/min  Renal replacement: Not on dialysis  Days of Therapy: 1  Current Dose: 1500mg IV Q12h  Vancomycin Plan:  New Dosinmg IV once; then 1750mg IV Q24h  Estimated AUC: 426 mcg*hr/mL  Estimated Trough: 11 2 mcg/mL  Next Level: Trough level on  @1400  Renal Function Monitoring: Daily BMP and UOP  Pharmacy will continue to follow closely for s/sx of nephrotoxicity, infusion reactions and appropriateness of therapy  BMP and CBC will be ordered per protocol  We will continue to follow the patient’s culture results and clinical progress daily  Maria Del Carmen Mackenzie, Pharmacist

## 2023-02-09 NOTE — ASSESSMENT & PLAN NOTE
- h/o A   Fib s/p PPM   - f/u outpatient with cardiology at [facility]   Per Neuroradiologist, no MRI's with Pacemaker, did have MRI coordinated with EP - completed

## 2023-02-09 NOTE — ASSESSMENT & PLAN NOTE
T12 fracture with slight extension into pedicles  · S/p mechanical fall Sunday, 1/29  · Additional fall 12/25/2022 - fracture evident retrospectively although not read at the time of prior imaging  · On exam, endorsing significant bilateral leg pain R>L, cannot lift legs off of bed due to pain, up-going babinski reflex, sensation intact no saddle marc     Imaging:  · Upright thoracolumbar x-rays, 2/2/2023:Stable appearance of T12 fracture with slight kyphosis  · CT CAP, 2/1/2023: Transversely oriented fracture through the T12 vertebral body with very slight extension into the pedicles  No significant displacement of fracture fragments or spinal malalignment  Very slight retropulsion of bony fragments  In retrospect, this fracture was probably acute (though very difficult to detect) on CT examination of December 30, 2022  Advanced lumbar and thoracic degenerative changes including multiple level degenerative fusion is in the lower thoracic and upper lumbar spine  · MRI 2/6/2023: Subacute T12 compression fracture with a new, acute compression fracture of the superior endplate of L1  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 of T11-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 best seen on series 3 image Despite this compression, the cord maintains normal signal   Severe right foraminal narrowing noted at the T12-L1 level  See separate MRI thoracic spine dictation also performed today for more detailed description of canal stenosis in the lower thoracic spine  Lumbar degenerative disc disease L2-3, L3-4 and L4-5 with multilevel canal stenosis and foraminal narrowing, most pronounced at the L4-5 level  Plan:  · Continue to monitor neuro exam closely  · TLSO brace when upright > 45 degrees and OOB  · Unclear etiology of bilateral hip/thigh pain  Does not correspond with fracture location  Could be radicular pain in setting of diffuse degenerative disease of spine    · Repeat upright XR ordered thoracic & lumbar spine for 2/8, read pending  · Multimodal pain control - APS consulted  · Mobilize with PT/OT  · DVT ppx: SCDs, Lovenox  OR Friday with Dr Radha Rincon, cleared by cardiology, NPO at midnight   Neurosurgery will continue to follow  Please call with questions or concerns

## 2023-02-09 NOTE — ASSESSMENT & PLAN NOTE
- T 12 fracture evaluated by neurosurgery and recommended conservative management for now but will further discuss risks vs benefits of surgery/kyphoplasty with family today  - Upright x-rays show stability  - TLSO brace  - neuro intact- has a h/o R ankle surgery with weakness with dorsi- /plantar flexion which is chronic  RLE with weakness due to pain in the R hip noted with hip flexion and extension    - pain control with multi modal regimen  - f/u neurosurgery recommendations after MRI T/L spine obtained   - MRI: MRI 2/6/2023: Subacute T12 compression fracture with a new, acute compression fracture of the superior endplate of L1   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 of T11-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 best seen on series 3 image Despite this compression, the cord maintains normal signal   Severe right foraminal narrowing noted at the T12-L1 level   See separate MRI thoracic spine dictation also performed today for more detailed description of canal stenosis in the lower thoracic spine   Lumbar degenerative disc disease L2-3, L3-4 and L4-5 with multilevel canal stenosis and foraminal narrowing, most pronounced at the L4-5 level  -Neurosurgery taking patient to OR 2/10/23

## 2023-02-09 NOTE — ASSESSMENT & PLAN NOTE
- bioprosthetic AVR  - follows outpatient with  Cardiology at [facility], [facility]

## 2023-02-09 NOTE — PROGRESS NOTES
[address] [facility]  Progress Note Hernán Myrick 1935, 81 y o  male MRN: [id number]  Unit/Bed#: [facility] 606-01 Encounter: [id number]  Primary Care Provider: Chadd Uribe DO   Date and time admitted to hospital: 2/1/2023  1:44 PM    Acute pain due to trauma  Assessment & Plan  - Acute pain secondary to traumatic injuries  - Continue multi modal analgesic regimen  - Bowel regimen as long as using opioids   - Continue to monitor pain and adjust regimen as indicated, Patient states pain is improving, however continues with spasms in his legs      Right hip pain  Assessment & Plan  - CT A/P negative for injury  - likely contusion and muscle strain  - PT/OT and pain control  - WBAT on RLE   - continues to have spasms    Closed T12 fracture (HCC)  Assessment & Plan  - T 12 fracture evaluated by neurosurgery and recommended conservative management for now but will further discuss risks vs benefits of surgery/kyphoplasty with family today  - Upright x-rays show stability  - TLSO brace  - neuro intact- has a h/o R ankle surgery with weakness with dorsi- /plantar flexion which is chronic  RLE with weakness due to pain in the R hip noted with hip flexion and extension    - pain control with multi modal regimen  - f/u neurosurgery recommendations after MRI T/L spine obtained   - MRI: MRI 2/6/2023: Subacute T12 compression fracture with a new, acute compression fracture of the superior endplate of L1   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 of T11-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 best seen on series 3 image Despite this compression, the cord maintains normal signal   Severe right foraminal narrowing noted at the T12-L1 level   See separate MRI thoracic spine dictation also performed today for more detailed description of canal stenosis in the lower thoracic spine   Lumbar degenerative disc disease L2-3, L3-4 and L4-5 with multilevel canal stenosis and foraminal narrowing, most pronounced at the L4-5 level  -Neurosurgery taking patient to OR 2/10/23    Urinary tract obstruction by kidney stone  Assessment & Plan  - patient noted to have a renal stone on 1/13 and underwent lithotripsy and right renal stent placement on 1/26 and has a chronic wynn catheter  It appears he was to have followed up for a voiding trial with urology on 1/31  He also remains on Ceftin, which should have been for a 5-day course from 1/18-1/23 from chart review  - I spoke with urology who recommended voiding trial on 2/2 and discontinuation of Ceftin  Patient has required SC x2 and may require replacement of wynn catheter  - per patient cannot void lying down  Catheter in place  Urine is dark with sediment  Fluids started to see if increase output and urine clears, discontinued  Started vancomycin due to patient resistant on urine culture   Urology consults      Pacemaker  Assessment & Plan  - h/o A   Fib s/p PPM   - f/u outpatient with cardiology at [facility], [facility]   Per Neuroradiologist, no MRI's with Pacemaker, did have MRI coordinated with EP - completed    Carotid artery disease ([address] )  Assessment & Plan  - s/p CABG x3  - continue home metoprolol and statin  - resume home Eliquis    S/P AVR  Assessment & Plan  - bioprosthetic AVR  - follows outpatient with  Cardiology at [facility]     Aortic stenosis  Assessment & Plan  - h/o bioprosthetic AVR    Hyperlipemia  Assessment & Plan  - continue home statin    Hypertension  Assessment & Plan  - continue home lisinopril and amlodipine  - BP adequately controlled    * Fall  Assessment & Plan  - mechanical fall, sustained below stated injury  - PT/OT evaluations pending  - Appreciate geriatrics' consulation  -  for disposition planning      Bowel Regimen: Colace, Senna  VTE Prophylaxis:Sequential compression device Yael Moulding)      Disposition: OR tomorrow with neurosurgery    Subjective   Chief Complaint: "I am tired"    Subjective: Patient stated that this writer woke him up and would like to go back to sleep  Patient stated that he didn't get much sleep last night  Complains of pain in b/l legs primarily thighs  Patient started that this is not new from what he was having  Denies any other complaints  Tolerating PO intake  Objective   Vitals:   Temp:  [97 4 °F (36 3 °C)-98 5 °F (36 9 °C)] 98 4 °F (36 9 °C)  HR:  [62-86] 62  Resp:  [16-18] 16  BP: (104-120)/(37-60) 104/60    I/O       02/07 0701  02/08 0700 02/08 0701  02/09 0700 02/09 0701  02/10 0700    P  O  358 118     Total Intake 358 118     Urine 850 1225     Stool 0 0     Total Output 850 1225     Net -492 -1107            Unmeasured Stool Occurrence 1 x 2 x            Physical Exam  Cardiovascular:      Rate and Rhythm: Normal rate and regular rhythm  Pulses: Normal pulses  Heart sounds: Normal heart sounds  Pulmonary:      Effort: Pulmonary effort is normal       Breath sounds: Normal breath sounds  Abdominal:      General: Abdomen is protuberant  Bowel sounds are normal       Palpations: Abdomen is soft  Genitourinary:     Comments: Dickens in place  Musculoskeletal:      Comments: Pain in b/l thighs  Pain increased with ROM  Neurovascularly intact   Skin:     General: Skin is warm  Neurological:      Mental Status: Mental status is at baseline     Psychiatric:         Mood and Affect: Mood normal          Invasive Devices     Peripheral Intravenous Line  Duration           Peripheral IV 02/09/23 Distal;Right;Ventral (anterior) Forearm <1 day          Drain  Duration           Ureteral Internal Stent Right ureter 6 Fr  27 days    Urethral Catheter Non-latex 18 Fr  4 days                      Lab Results:   BMP/CMP:   Lab Results   Component Value Date    SODIUM 135 02/09/2023    K 4 8 02/09/2023     02/09/2023    CO2 27 02/09/2023    BUN 12 02/09/2023    CREATININE 0 70 02/09/2023    CALCIUM 8 9 02/09/2023    EGFR 85 02/09/2023    and CBC:   Lab Results   Component Value Date    WBC 8 91 02/09/2023    HGB 13 1 02/09/2023    HCT 40 4 02/09/2023    MCV 90 02/09/2023     02/09/2023    MCH 29 2 02/09/2023    MCHC 32 4 02/09/2023    RDW 15 0 02/09/2023    MPV 10 1 02/09/2023    NRBC 0 02/09/2023     Imaging: I have personally reviewed pertinent reports       XR spine uprights pending  Other Studies: no other imaging

## 2023-02-09 NOTE — ASSESSMENT & PLAN NOTE
- T 12 fracture evaluated by neurosurgery and recommended conservative management for now but will further discuss risks vs benefits of surgery/kyphoplasty with family today  - Upright x-rays show stability  - TLSO brace  - neuro intact- has a h/o R ankle surgery with weakness with dorsi- /plantar flexion which is chronic  RLE with weakness due to pain in the R hip noted with hip flexion and extension    - pain control with multi modal regimen  - f/u neurosurgery recommendations after MRI T/L spine obtained   - MRI: MRI 2/6/2023: Subacute T12 compression fracture with a new, acute compression fracture of the superior endplate of L1   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 of T11-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 best seen on series 3 image Despite this compression, the cord maintains normal signal   Severe right foraminal narrowing noted at the T12-L1 level   See separate MRI thoracic spine dictation also performed today for more detailed description of canal stenosis in the lower thoracic spine   Lumbar degenerative disc disease L2-3, L3-4 and L4-5 with multilevel canal stenosis and foraminal narrowing, most pronounced at the L4-5 level  -Neurosurgery taking patient to OR today

## 2023-02-09 NOTE — ASSESSMENT & PLAN NOTE
- h/o A   Fib s/p PPM   - f/u outpatient with cardiology at [facility], [facility]

## 2023-02-09 NOTE — ASSESSMENT & PLAN NOTE
- patient noted to have a renal stone on 1/13 and underwent lithotripsy and right renal stent placement on 1/26 and has a chronic wynn catheter  It appears he was to have followed up for a voiding trial with urology on 1/31  He also remains on Ceftin, which should have been for a 5-day course from 1/18-1/23 from chart review  - I spoke with urology who recommended voiding trial on 2/2 and discontinuation of Ceftin  Patient has required SC x2 and may require replacement of wynn catheter  - per patient cannot void lying down  Catheter in place  Urine is dark with sediment  Fluids started to see if increase output and urine clears, discontinued

## 2023-02-09 NOTE — PROGRESS NOTES
Pastoral Care Progress Note    2023  Patient: Carina Cruz : 1935  Admission Date & Time: 2023 1344  MRN: [id number] CSN: [id number]         23 1000   Clinical Encounter Type   Visited With Patient   Zoroastrianism Encounters   Zoroastrianism Needs Prayer   Sacramental Encounters   Sacrament of Sick-Anointing Patient declined anointing     Chas Fleming visited with the pt and provided prayers and blessings  The pt declined Fr's offer for anointing  No further needs expressed at this time      Chaplains still remain available

## 2023-02-09 NOTE — PROGRESS NOTES
[address] [facility]  Progress Note Nohelia Figueredo 1935, 81 y o  male MRN: [id number]  Unit/Bed#: [facility] 606-01 Encounter: [id number]  Primary Care Provider: Katherin Meyers DO   Date and time admitted to hospital: 2/1/2023  1:44 PM    Closed T12 fracture [facility])  Assessment & Plan  T12 fracture with slight extension into pedicles  · S/p mechanical fall Sunday, 1/29  · Additional fall 12/25/2022 - fracture evident retrospectively although not read at the time of prior imaging  · On exam, endorsing significant bilateral leg pain R>L, cannot lift legs off of bed due to pain, up-going babinski reflex, sensation intact no saddle marc     Imaging:  · Upright thoracolumbar x-rays, 2/2/2023:Stable appearance of T12 fracture with slight kyphosis  · CT CAP, 2/1/2023: Transversely oriented fracture through the T12 vertebral body with very slight extension into the pedicles  No significant displacement of fracture fragments or spinal malalignment  Very slight retropulsion of bony fragments  In retrospect, this fracture was probably acute (though very difficult to detect) on CT examination of December 30, 2022  Advanced lumbar and thoracic degenerative changes including multiple level degenerative fusion is in the lower thoracic and upper lumbar spine  · MRI 2/6/2023: Subacute T12 compression fracture with a new, acute compression fracture of the superior endplate of L1  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 of T11-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 best seen on series 3 image Despite this compression, the cord maintains normal signal   Severe right foraminal narrowing noted at the T12-L1 level  See separate MRI thoracic spine dictation also performed today for more detailed description of canal stenosis in the lower thoracic spine   Lumbar degenerative disc disease L2-3, L3-4 and L4-5 with multilevel canal stenosis and foraminal narrowing, most pronounced at the L4-5 level  Plan:  · Continue to monitor neuro exam closely  · TLSO brace when upright > 45 degrees and OOB  · Unclear etiology of bilateral hip/thigh pain  Does not correspond with fracture location  Could be radicular pain in setting of diffuse degenerative disease of spine  · Repeat upright XR ordered thoracic & lumbar spine for 2/8, read pending  · Multimodal pain control - APS consulted  · Mobilize with PT/OT  · DVT ppx: SCDs, Lovenox  OR Friday with Dr Yeison Qureshi, cleared by cardiology, NPO at midnight   Neurosurgery will continue to follow  Please call with questions or concerns  Subjective/Objective   Chief Complaint: leg pain    Subjective: patient is doing the same  Objective:     I/O       02/07 0701  02/08 0700 02/08 0701  02/09 0700 02/09 0701  02/10 0700    P  O  358 118     Total Intake 358 118     Urine 850 1225     Stool 0 0     Total Output 850 1225     Net -492 -1107            Unmeasured Stool Occurrence 1 x 2 x           Invasive Devices     Peripheral Intravenous Line  Duration           Peripheral IV 02/09/23 Distal;Right;Ventral (anterior) Forearm <1 day          Drain  Duration           Ureteral Internal Stent Right ureter 6 Fr  27 days    Urethral Catheter Non-latex 18 Fr  4 days                Physical Exam:  Vitals: Blood pressure 120/58, pulse 86, temperature (!) 97 4 °F (36 3 °C), resp  rate 16, SpO2 98 %  ,There is no height or weight on file to calculate BMI      Hemodynamic Monitoring: MAP:      General appearance: alert, appears stated age, cooperative and no distress  Head: Normocephalic, without obvious abnormality, atraumatic  Eyes: EOMI, PERRL  Neck: supple, symmetrical, trachea midline and NT  Back: no kyphosis present, tenderness to lumbar palpation  Lungs: non labored breathing  Heart: regular heart rate  Neurologic:   Mental status: Alert, oriented x3, thought content appropriate  Cranial nerves: grossly intact (Cranial nerves II-XII)  Sensory: normal b/l  Motor: moving all extremities movement limited by bilateral pain   Reflexes: 2+ and symmetric  Coordination: finger to nose normal bilaterally, no drift bilaterally      Lab Results:  Results from last 7 days   Lab Units 02/09/23  0556 02/06/23  1252 02/05/23  0447   WBC Thousand/uL 8 91 7 97 7 04   HEMOGLOBIN g/dL 13 1 13 9 12 4   HEMATOCRIT % 40 4 43 4 38 1   PLATELETS Thousands/uL 230 214 204   NEUTROS PCT % 71 72 69   MONOS PCT % 8 7 10     Results from last 7 days   Lab Units 02/09/23  0556 02/06/23  1252 02/05/23  0447   POTASSIUM mmol/L 4 8 4 1 3 7   CHLORIDE mmol/L 103 102 105   CO2 mmol/L 27 29 25   BUN mg/dL 12 12 9   CREATININE mg/dL 0 70 0 70 0 59*   CALCIUM mg/dL 8 9 9 1 8 7                 No results found for: TROPONINT  ABG:No results found for: PHART, UYL9STH, PO2ART, MHG9SDD, S9MLQSWW, BEART, SOURCE    Imaging Studies: I have personally reviewed pertinent reports  and I have personally reviewed pertinent films in PACS    XR Trauma chest portable    Result Date: 2/1/2023  Impression: No acute cardiopulmonary disease  Workstation performed: [id number]     XR spine thoracolumbar 2 vw    Result Date: 2/3/2023  Impression: Poorly visualized T12 fracture with intact posterior column  Mild to moderate loss of height suggested  Workstation performed: [id number]     TRAUMA - CT head wo contrast    Result Date: 2/1/2023  Impression: No acute intracranial abnormality  Workstation performed: [id number]     TRAUMA - CT spine cervical wo contrast    Result Date: 2/1/2023  Impression: No cervical spine fracture or traumatic malalignment  Workstation performed: [id number]     MRI thoracic spine wo contrast    Result Date: 2/6/2023  Impression: There is a subacute compression fracture of T12 with a horizontally oriented fracture cleft through the vertebral body now filled with fluid    Fracture does extend to the posterior elements in particular on the right where there is involvement of the pedicle and lamina  Stable approximately 10% loss of height of the vertebral body  There is a new mild compression fracture of the L1 superior endplate with moderate amount of edema present within the vertebral body  Advanced canal stenosis within the lower thoracic spine worst at the T11-12 level secondary to degenerative disc disease, bony retropulsion of the T12 vertebral body into the anterior epidural space and posterior element hypertrophic change with compression of the distal cord/conus  At T10-11 there is degenerative disc disease as well as a small right posterior lateral extradural mass/fluid collection which may represent hematoma, extending inferiorly to the T11-12 level  Moderate canal stenosis at T10-11 with bilateral foraminal narrowing  Severe right foraminal narrowing at the T12-L1 level  This examination was marked "immediate notification" in Epic in order to begin the standard process by which the radiology reading room liaison alerts the referring practitioner  Workstation performed: [id number]     MRI lumbar spine wo contrast    Result Date: 2/6/2023  Impression: Subacute T12 compression fracture with a new, acute compression fracture of the superior endplate of L1  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 of T11-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 best seen on series 3 image 9  Despite this  compression, the cord maintains normal signal   Severe right foraminal narrowing noted at the T12-L1 level  See separate MRI thoracic spine dictation also performed today for more detailed description of canal stenosis in the lower thoracic spine  Lumbar degenerative disc disease L2-3, L3-4 and L4-5 with multilevel canal stenosis and foraminal narrowing, most pronounced at the L4-5 level  Workstation performed: [id number]     XR pelvis ap only 1 or 2 vw    Result Date: 2/1/2023  Impression: No acute osseous abnormality   Workstation performed: [id number]     TRAUMA - CT abdomen pelvis w contrast    Result Date: 2/1/2023  Impression: Transversely oriented fracture through the T12 vertebral body with very slight extension into the pedicles  No significant displacement of fracture fragments or spinal malalignment  Very slight retropulsion of bony fragments  In retrospect, this fracture was probably acute (though very difficult to detect) on CT examination of December 30, 2022  Advanced lumbar and thoracic degenerative changes including multiple level degenerative fusion is in the lower thoracic and upper lumbar spine  Renal cortical thinning and chronic moderate right-sided hydronephrosis, persistent despite the presence of a right ureteral stent  Urothelial thickening involving right renal collecting system and proximal right ureter, similar when compared to January 12, 2023  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

## 2023-02-09 NOTE — CASE MANAGEMENT
Case Management Discharge Planning Note    Patient name Fransisca Ao  [facility] [address] [facility] [id number]  : 1935 Date 2023       Current Admission Date: 2023  Current Admission Diagnosis:Fall   Patient Active Problem List    Diagnosis Date Noted   • Acute pain due to trauma 2023   • Fall 2023   • Closed T12 fracture ([address] ) 2023   • Right hip pain 2023   • Class 1 obesity in adult 2023   • H/O heart artery stent    • MS (mitral stenosis)    • Right ureteral stone 2023   • Acute cystitis with hematuria 2023   • Urinary tract obstruction by kidney stone 2023   • Atrial flutter ([address] ) 2023   • Pacemaker 2022   • Hypertension    • Hyperlipemia    • Aortic stenosis    • S/P AVR    • Carotid artery disease ([address] )    • Coronary artery disease       LOS (days): 8  Geometric Mean LOS (GMLOS) (days): 2 80  Days to GMLOS:-5 2     OBJECTIVE:  Risk of Unplanned Readmission Score: 15 43         Current admission status: Inpatient   Preferred Pharmacy:   [address] [place] [address], [address] [place], [place] [id number]  Phone: [phone] Fax: [phone]    [facility]/pharmacy [address] [place] [address] [place] - Devin Falk [address] [place] [id number]  Phone: [phone] Fax: [phone]    Primary Care Provider: Boyd Leonardo DO    Primary Insurance: [facility] REP  Secondary Insurance:     DISCHARGE DETAILS:    Plan for OR tomorrow with Nsg  CM will follow for post op recs   Pt is accepted to [facility] for SNF

## 2023-02-09 NOTE — ASSESSMENT & PLAN NOTE
- bioprosthetic AVR  - follows outpatient with  Cardiology at [facility], [facility]

## 2023-02-09 NOTE — PROGRESS NOTES
Progress Note - Geriatric Medicine   Shorty Saavedra 81 y o  male MRN: [id number]  [facility] Encounter: [id number]      Assessment/Plan:    Ambulatory dysfunction with fall  -Reportedly mechanical fall x2, 12/25 and 1/29, no prior hx of recurrent falls   -using walker since initial fall, no assist device required prior  -injuries as outlined below   -high future falls, continue fall precautions, assist w transfers and maintain env free of fall hazards  -PT/OT following      Fracture of T12 vertebrae  -Noted on CT abdomen and pelvis 2/1/23  -Nsx following, failed conservative management with persistent pain and progressive LE weakness, tentative for surgery tomorrow   -continue thoracolumbar spine precautions  -acute pain control   -Neurovascular checks per protocol  -TLSO brace as directed     Acute pain due to trauma  -Continue acute multimodal pain control  -APS on consult, currently on Geriatric pain protocol      Right-sided hydronephrosis  -Due to right ureteral stone  -S/p cystoscopy with lithotripsy and ureteral stent insertion 1/26/22  -Maintained on Flomax  -Persistent chronic moderate right-sided hydronephrosis, right ureteral stent in place  -wynn removed earlier in admission and since replaced due to failed retention protocol      Atrial flutter  -Home regimen includes rate control with metoprolol and anticoagulation with Eliquis  -Eliquis held for recent ureteral stent placement, since been resumed     Sick sinus syndrome  -S/p pacemaker placement  -Continue close outpatient follow-up with Cardiology     CAD  -S/p PCI  -Maintained on chronic systemic anticoagulation with Eliquis (for A  Fib/flutter)  -Continue secondary risk factor modifications and encourage healthy lifestyle modifications       Impaired mastication  -Requires use of dentures -encourage use all appropriate times  -ensure meal consistency appropriate for abilities  -continue aspiration precautions     Deconditioning/debility/frailty  -Clinical frailty scale stage V, mildly frail  -Due to age, ambulatory dysfunction with recent falls, CAD, and multiple additional chronic medical co-morbidities  -Optimize chronic conditions  -Encourage well-balanced nutritional intake     Delirium precautions  -high risk due to age, pain, prolonged hospitalization   -maintain normal sleep/wake cycle  -Ensure pain controlled  -Monitor for fecal and urinary retention, now with replacement of wynn for failure retention protocol     Care coordination: rounded with Awilda Hensley (RN)    Subjective:     [place] is seen and examined at bedside where he is lying resting, he reports continued pain down his legs to his knees, it is well controlled with his current medication regimen  He is anticipating surgery tomorrow with Neurosurgery, he offers no additional acute complaints  Review of Systems   Constitutional: Negative for appetite change, chills and fever  HENT: Negative  Eyes: Negative  Respiratory: Negative  Cardiovascular: Negative  Gastrointestinal: Negative  Genitourinary: Negative  Musculoskeletal: Positive for back pain and gait problem  Pain radiating down legs to knees, improves with current analgesic regimen    Skin: Negative  Neurological: Positive for weakness (BLE)  Negative for dizziness, light-headedness and headaches  Hematological: Negative  Psychiatric/Behavioral: Negative  Negative for sleep disturbance  All other systems reviewed and are negative  Objective:     Vitals: Blood pressure 104/60, pulse 62, temperature 98 4 °F (36 9 °C), resp  rate 16, SpO2 95 %  ,There is no height or weight on file to calculate BMI        Intake/Output Summary (Last 24 hours) at 2/9/2023 1143  Last data filed at 2/8/2023 2243  Gross per 24 hour   Intake 118 ml   Output 500 ml   Net -382 ml     Current Medications: Reviewed    Physical Exam:   Physical Exam  Vitals and nursing note reviewed  Constitutional:       General: He is not in acute distress  HENT:      Head: Normocephalic  Nose: Nose normal       Mouth/Throat:      Mouth: Mucous membranes are dry  Eyes:      General: No scleral icterus  Right eye: No discharge  Left eye: No discharge  Conjunctiva/sclera: Conjunctivae normal    Neck:      Comments: Phonation normal   Cardiovascular:      Rate and Rhythm: Normal rate  Pulmonary:      Effort: Pulmonary effort is normal  No respiratory distress  Breath sounds: No wheezing  Comments: Sat well on room air   Abdominal:      General: Bowel sounds are normal  There is no distension  Palpations: Abdomen is soft  Tenderness: There is no abdominal tenderness  Musculoskeletal:      Cervical back: Neck supple  Right lower leg: No edema  Left lower leg: No edema  Comments: Reduced overall muscle mass   Skin:     General: Skin is warm and dry  Coloration: Skin is pale  Neurological:      Mental Status: He is alert  Comments: Awake and alert, oriented and answering questions appropriately   Psychiatric:      Comments: Pleasant and cooperative        Invasive Devices     Peripheral Intravenous Line  Duration           Peripheral IV 02/09/23 Distal;Right;Ventral (anterior) Forearm <1 day          Drain  Duration           Ureteral Internal Stent Right ureter 6 Fr  27 days    Urethral Catheter Non-latex 18 Fr  4 days              Lab, Imaging and other studies: I have personally reviewed pertinent reports

## 2023-02-09 NOTE — PLAN OF CARE
Problem: MOBILITY - ADULT  Goal: Maintain or return to baseline ADL function  Description: INTERVENTIONS:  -  Assess patient's ability to carry out ADLs; assess patient's baseline for ADL function and identify physical deficits which impact ability to perform ADLs (bathing, care of mouth/teeth, toileting, grooming, dressing, etc )  - Assess/evaluate cause of self-care deficits   - Assess range of motion  - Assess patient's mobility; develop plan if impaired  - Assess patient's need for assistive devices and provide as appropriate  - Encourage maximum independence but intervene and supervise when necessary  - Involve family in performance of ADLs  - Assess for home care needs following discharge   - Consider OT consult to assist with ADL evaluation and planning for discharge  - Provide patient education as appropriate  Outcome: Progressing  Goal: Maintains/Returns to pre admission functional level  Description: INTERVENTIONS:  - Perform BMAT or MOVE assessment daily    - Set and communicate daily mobility goal to care team and patient/family/caregiver  - Collaborate with rehabilitation services on mobility goals if consulted  - Perform Range of Motion    times a day  - Reposition patient every    hours    - Dangle patient    times a day  - Stand patient    times a day  - Ambulate patient    times a day  - Out of bed to chair    times a day   - Out of bed for meals    times a day  - Out of bed for toileting  - Record patient progress and toleration of activity level   Outcome: Progressing     Problem: Prexisting or High Potential for Compromised Skin Integrity  Goal: Skin integrity is maintained or improved  Description: INTERVENTIONS:  - Identify patients at risk for skin breakdown  - Assess and monitor skin integrity  - Assess and monitor nutrition and hydration status  - Monitor labs   - Assess for incontinence   - Turn and reposition patient  - Assist with mobility/ambulation  - Relieve pressure over bony prominences  - Avoid friction and shearing  - Provide appropriate hygiene as needed including keeping skin clean and dry  - Evaluate need for skin moisturizer/barrier cream  - Collaborate with interdisciplinary team   - Patient/family teaching  - Consider wound care consult   Outcome: Progressing     Problem: NEUROSENSORY - ADULT  Goal: Achieves stable or improved neurological status  Description: INTERVENTIONS  - Monitor and report changes in neurological status  - Monitor vital signs such as temperature, blood pressure, glucose, and any other labs ordered   - Initiate measures to prevent increased intracranial pressure  - Monitor for seizure activity and implement precautions if appropriate      Outcome: Progressing  Goal: Achieves maximal functionality and self care  Description: INTERVENTIONS  - Monitor swallowing and airway patency with patient fatigue and changes in neurological status  - Encourage and assist patient to increase activity and self care     - Encourage visually impaired, hearing impaired and aphasic patients to use assistive/communication devices  Outcome: Progressing     Problem: PAIN - ADULT  Goal: Verbalizes/displays adequate comfort level or baseline comfort level  Description: Interventions:  - Encourage patient to monitor pain and request assistance  - Assess pain using appropriate pain scale  - Administer analgesics based on type and severity of pain and evaluate response  - Implement non-pharmacological measures as appropriate and evaluate response  - Consider cultural and social influences on pain and pain management  - Notify physician/advanced practitioner if interventions unsuccessful or patient reports new pain  Outcome: Progressing     Problem: INFECTION - ADULT  Goal: Absence or prevention of progression during hospitalization  Description: INTERVENTIONS:  - Assess and monitor for signs and symptoms of infection  - Monitor lab/diagnostic results  - Monitor all insertion sites, i e  indwelling lines, tubes, and drains  - Monitor endotracheal if appropriate and nasal secretions for changes in amount and color  - [place] appropriate cooling/warming therapies per order  - Administer medications as ordered  - Instruct and encourage patient and family to use good hand hygiene technique  - Identify and instruct in appropriate isolation precautions for identified infection/condition  Outcome: Progressing  Goal: Absence of fever/infection during neutropenic period  Description: INTERVENTIONS:  - Monitor WBC    Outcome: Progressing     Problem: SAFETY ADULT  Goal: Maintain or return to baseline ADL function  Description: INTERVENTIONS:  -  Assess patient's ability to carry out ADLs; assess patient's baseline for ADL function and identify physical deficits which impact ability to perform ADLs (bathing, care of mouth/teeth, toileting, grooming, dressing, etc )  - Assess/evaluate cause of self-care deficits   - Assess range of motion  - Assess patient's mobility; develop plan if impaired  - Assess patient's need for assistive devices and provide as appropriate  - Encourage maximum independence but intervene and supervise when necessary  - Involve family in performance of ADLs  - Assess for home care needs following discharge   - Consider OT consult to assist with ADL evaluation and planning for discharge  - Provide patient education as appropriate  Outcome: Progressing  Goal: Maintains/Returns to pre admission functional level  Description: INTERVENTIONS:  - Perform BMAT or MOVE assessment daily    - Set and communicate daily mobility goal to care team and patient/family/caregiver  - Collaborate with rehabilitation services on mobility goals if consulted  - Perform Range of Motion    times a day  - Reposition patient every    hours    - Dangle patient    times a day  - Stand patient    times a day  - Ambulate patient    times a day  - Out of bed to chair    times a day   - Out of bed for meals    times a day  - Out of bed for toileting  - Record patient progress and toleration of activity level   Outcome: Progressing  Goal: Patient will remain free of falls  Description: INTERVENTIONS:  - Educate patient/family on patient safety including physical limitations  - Instruct patient to call for assistance with activity   - Consult OT/PT to assist with strengthening/mobility   - Keep Call bell within reach  - Keep bed low and locked with side rails adjusted as appropriate  - Keep care items and personal belongings within reach  - Initiate and maintain comfort rounds  - Make Fall Risk Sign visible to staff  - Offer Toileting every    Hours, in advance of need  - Initiate/Maintain   alarm  - Obtain necessary fall risk management equipment:     - Apply yellow socks and bracelet for high fall risk patients  - Consider moving patient to room near nurses station  Outcome: Progressing     Problem: DISCHARGE PLANNING  Goal: Discharge to home or other facility with appropriate resources  Description: INTERVENTIONS:  - Identify barriers to discharge w/patient and caregiver  - Arrange for needed discharge resources and transportation as appropriate  - Identify discharge learning needs (meds, wound care, etc )  - Arrange for interpretive services to assist at discharge as needed  - Refer to Case Management Department for coordinating discharge planning if the patient needs post-hospital services based on physician/advanced practitioner order or complex needs related to functional status, cognitive ability, or social support system  Outcome: Progressing     Problem: Knowledge Deficit  Goal: Patient/family/caregiver demonstrates understanding of disease process, treatment plan, medications, and discharge instructions  Description: Complete learning assessment and assess knowledge base    Interventions:  - Provide teaching at level of understanding  - Provide teaching via preferred learning methods  Outcome: Progressing Problem: MOBILITY - ADULT  Goal: Maintain or return to baseline ADL function  Description: INTERVENTIONS:  -  Assess patient's ability to carry out ADLs; assess patient's baseline for ADL function and identify physical deficits which impact ability to perform ADLs (bathing, care of mouth/teeth, toileting, grooming, dressing, etc )  - Assess/evaluate cause of self-care deficits   - Assess range of motion  - Assess patient's mobility; develop plan if impaired  - Assess patient's need for assistive devices and provide as appropriate  - Encourage maximum independence but intervene and supervise when necessary  - Involve family in performance of ADLs  - Assess for home care needs following discharge   - Consider OT consult to assist with ADL evaluation and planning for discharge  - Provide patient education as appropriate  Outcome: Progressing  Goal: Maintains/Returns to pre admission functional level  Description: INTERVENTIONS:  - Perform BMAT or MOVE assessment daily    - Set and communicate daily mobility goal to care team and patient/family/caregiver  - Collaborate with rehabilitation services on mobility goals if consulted  - Perform Range of Motion    times a day  - Reposition patient every  hours    - Dangle patient    times a day  - Stand patient    times a day  - Ambulate patient    times a day  - Out of bed to chair    times a day   - Out of bed for meals    times a day  - Out of bed for toileting  - Record patient progress and toleration of activity level   Outcome: Progressing     Problem: Prexisting or High Potential for Compromised Skin Integrity  Goal: Skin integrity is maintained or improved  Description: INTERVENTIONS:  - Identify patients at risk for skin breakdown  - Assess and monitor skin integrity  - Assess and monitor nutrition and hydration status  - Monitor labs   - Assess for incontinence   - Turn and reposition patient  - Assist with mobility/ambulation  - Relieve pressure over bony prominences  - Avoid friction and shearing  - Provide appropriate hygiene as needed including keeping skin clean and dry  - Evaluate need for skin moisturizer/barrier cream  - Collaborate with interdisciplinary team   - Patient/family teaching  - Consider wound care consult   Outcome: Progressing     Problem: NEUROSENSORY - ADULT  Goal: Achieves stable or improved neurological status  Description: INTERVENTIONS  - Monitor and report changes in neurological status  - Monitor vital signs such as temperature, blood pressure, glucose, and any other labs ordered   - Initiate measures to prevent increased intracranial pressure  - Monitor for seizure activity and implement precautions if appropriate      Outcome: Progressing  Goal: Achieves maximal functionality and self care  Description: INTERVENTIONS  - Monitor swallowing and airway patency with patient fatigue and changes in neurological status  - Encourage and assist patient to increase activity and self care     - Encourage visually impaired, hearing impaired and aphasic patients to use assistive/communication devices  Outcome: Progressing     Problem: PAIN - ADULT  Goal: Verbalizes/displays adequate comfort level or baseline comfort level  Description: Interventions:  - Encourage patient to monitor pain and request assistance  - Assess pain using appropriate pain scale  - Administer analgesics based on type and severity of pain and evaluate response  - Implement non-pharmacological measures as appropriate and evaluate response  - Consider cultural and social influences on pain and pain management  - Notify physician/advanced practitioner if interventions unsuccessful or patient reports new pain  Outcome: Progressing     Problem: INFECTION - ADULT  Goal: Absence or prevention of progression during hospitalization  Description: INTERVENTIONS:  - Assess and monitor for signs and symptoms of infection  - Monitor lab/diagnostic results  - Monitor all insertion sites, i e  indwelling lines, tubes, and drains  - Monitor endotracheal if appropriate and nasal secretions for changes in amount and color  - [place] appropriate cooling/warming therapies per order  - Administer medications as ordered  - Instruct and encourage patient and family to use good hand hygiene technique  - Identify and instruct in appropriate isolation precautions for identified infection/condition  Outcome: Progressing  Goal: Absence of fever/infection during neutropenic period  Description: INTERVENTIONS:  - Monitor WBC    Outcome: Progressing     Problem: SAFETY ADULT  Goal: Maintain or return to baseline ADL function  Description: INTERVENTIONS:  -  Assess patient's ability to carry out ADLs; assess patient's baseline for ADL function and identify physical deficits which impact ability to perform ADLs (bathing, care of mouth/teeth, toileting, grooming, dressing, etc )  - Assess/evaluate cause of self-care deficits   - Assess range of motion  - Assess patient's mobility; develop plan if impaired  - Assess patient's need for assistive devices and provide as appropriate  - Encourage maximum independence but intervene and supervise when necessary  - Involve family in performance of ADLs  - Assess for home care needs following discharge   - Consider OT consult to assist with ADL evaluation and planning for discharge  - Provide patient education as appropriate  Outcome: Progressing  Goal: Maintains/Returns to pre admission functional level  Description: INTERVENTIONS:  - Perform BMAT or MOVE assessment daily    - Set and communicate daily mobility goal to care team and patient/family/caregiver  - Collaborate with rehabilitation services on mobility goals if consulted  - Perform Range of Motion    times a day  - Reposition patient every    hours    - Dangle patient    times a day  - Stand patient    times a day  - Ambulate patient    times a day  - Out of bed to chair    times a day   - Out of bed for meals    times a day  - Out of bed for toileting  - Record patient progress and toleration of activity level   Outcome: Progressing  Goal: Patient will remain free of falls  Description: INTERVENTIONS:  - Educate patient/family on patient safety including physical limitations  - Instruct patient to call for assistance with activity   - Consult OT/PT to assist with strengthening/mobility   - Keep Call bell within reach  - Keep bed low and locked with side rails adjusted as appropriate  - Keep care items and personal belongings within reach  - Initiate and maintain comfort rounds  - Make Fall Risk Sign visible to staff  - Offer Toileting every    Hours, in advance of need  - Initiate/Maintain   alarm  - Obtain necessary fall risk management equipment:     - Apply yellow socks and bracelet for high fall risk patients  - Consider moving patient to room near nurses station  Outcome: Progressing     Problem: SAFETY ADULT  Goal: Maintain or return to baseline ADL function  Description: INTERVENTIONS:  -  Assess patient's ability to carry out ADLs; assess patient's baseline for ADL function and identify physical deficits which impact ability to perform ADLs (bathing, care of mouth/teeth, toileting, grooming, dressing, etc )  - Assess/evaluate cause of self-care deficits   - Assess range of motion  - Assess patient's mobility; develop plan if impaired  - Assess patient's need for assistive devices and provide as appropriate  - Encourage maximum independence but intervene and supervise when necessary  - Involve family in performance of ADLs  - Assess for home care needs following discharge   - Consider OT consult to assist with ADL evaluation and planning for discharge  - Provide patient education as appropriate  Outcome: Progressing  Goal: Maintains/Returns to pre admission functional level  Description: INTERVENTIONS:  - Perform BMAT or MOVE assessment daily    - Set and communicate daily mobility goal to care team and patient/family/caregiver  - Collaborate with rehabilitation services on mobility goals if consulted  - Perform Range of Motion    times a day  - Reposition patient every    hours    - Dangle patient    times a day  - Stand patient    times a day  - Ambulate patient    times a day  - Out of bed to chair    times a day   - Out of bed for meals    times a day  - Out of bed for toileting  - Record patient progress and toleration of activity level   Outcome: Progressing  Goal: Patient will remain free of falls  Description: INTERVENTIONS:  - Educate patient/family on patient safety including physical limitations  - Instruct patient to call for assistance with activity   - Consult OT/PT to assist with strengthening/mobility   - Keep Call bell within reach  - Keep bed low and locked with side rails adjusted as appropriate  - Keep care items and personal belongings within reach  - Initiate and maintain comfort rounds  - Make Fall Risk Sign visible to staff  - Offer Toileting every    Hours, in advance of need  - Initiate/Maintain   alarm  - Obtain necessary fall risk management equipment:     - Apply yellow socks and bracelet for high fall risk patients  - Consider moving patient to room near nurses station  Outcome: Progressing     Problem: DISCHARGE PLANNING  Goal: Discharge to home or other facility with appropriate resources  Description: INTERVENTIONS:  - Identify barriers to discharge w/patient and caregiver  - Arrange for needed discharge resources and transportation as appropriate  - Identify discharge learning needs (meds, wound care, etc )  - Arrange for interpretive services to assist at discharge as needed  - Refer to Case Management Department for coordinating discharge planning if the patient needs post-hospital services based on physician/advanced practitioner order or complex needs related to functional status, cognitive ability, or social support system  Outcome: Progressing     Problem: Knowledge Deficit  Goal: Patient/family/caregiver demonstrates understanding of disease process, treatment plan, medications, and discharge instructions  Description: Complete learning assessment and assess knowledge base    Interventions:  - Provide teaching at level of understanding  - Provide teaching via preferred learning methods  Outcome: Progressing

## 2023-02-10 ENCOUNTER — ANESTHESIA EVENT (INPATIENT)
Dept: PERIOP | Facility: HOSPITAL | Age: 88
End: 2023-02-10

## 2023-02-10 ENCOUNTER — ANESTHESIA (INPATIENT)
Dept: PERIOP | Facility: HOSPITAL | Age: 88
End: 2023-02-10

## 2023-02-10 ENCOUNTER — APPOINTMENT (INPATIENT)
Dept: RADIOLOGY | Facility: HOSPITAL | Age: 88
End: 2023-02-10

## 2023-02-10 LAB
ABO GROUP BLD: NORMAL
ABO GROUP BLD: NORMAL
APTT PPP: 38 SECONDS (ref 23–37)
BLD GP AB SCN SERPL QL: NEGATIVE
GLUCOSE SERPL-MCNC: 196 MG/DL (ref 65–140)
INR PPP: 1.1 (ref 0.84–1.19)
PLATELET # BLD AUTO: 194 THOUSANDS/UL (ref 149–390)
PMV BLD AUTO: 9.5 FL (ref 8.9–12.7)
PROTHROMBIN TIME: 14.4 SECONDS (ref 11.6–14.5)
RH BLD: POSITIVE
RH BLD: POSITIVE
SPECIMEN EXPIRATION DATE: NORMAL

## 2023-02-10 PROCEDURE — 0RG74J1 FUSION OF 2 TO 7 THORACIC VERTEBRAL JOINTS WITH SYNTHETIC SUBSTITUTE, POSTERIOR APPROACH, POSTERIOR COLUMN, PERCUTANEOUS ENDOSCOPIC APPROACH: ICD-10-PCS | Performed by: NEUROLOGICAL SURGERY

## 2023-02-10 PROCEDURE — 8E0W3CZ ROBOTIC ASSISTED PROCEDURE OF TRUNK REGION, PERCUTANEOUS APPROACH: ICD-10-PCS | Performed by: NEUROLOGICAL SURGERY

## 2023-02-10 PROCEDURE — 00NX4ZZ RELEASE THORACIC SPINAL CORD, PERCUTANEOUS ENDOSCOPIC APPROACH: ICD-10-PCS | Performed by: NEUROLOGICAL SURGERY

## 2023-02-10 PROCEDURE — 0SG04J1 FUSION OF LUMBAR VERTEBRAL JOINT WITH SYNTHETIC SUBSTITUTE, POSTERIOR APPROACH, POSTERIOR COLUMN, PERCUTANEOUS ENDOSCOPIC APPROACH: ICD-10-PCS | Performed by: NEUROLOGICAL SURGERY

## 2023-02-10 PROCEDURE — 0RGA4J1 FUSION OF THORACOLUMBAR VERTEBRAL JOINT WITH SYNTHETIC SUBSTITUTE, POSTERIOR APPROACH, POSTERIOR COLUMN, PERCUTANEOUS ENDOSCOPIC APPROACH: ICD-10-PCS | Performed by: NEUROLOGICAL SURGERY

## 2023-02-10 DEVICE — SCREW SET 4.75MM SOLERA PERC: Type: IMPLANTABLE DEVICE | Site: SPINE LUMBAR | Status: FUNCTIONAL

## 2023-02-10 DEVICE — SCREW 54850045545 4.75VOYAGER ATS 5.5X45
Type: IMPLANTABLE DEVICE | Site: SPINE LUMBAR | Status: FUNCTIONAL
Brand: CD HORIZON® SOLERA® VOYAGER™ SPINAL SYSTEM

## 2023-02-10 DEVICE — ROD 1476006220 4.75 CCM+ STRT PERC 220MM
Type: IMPLANTABLE DEVICE | Site: SPINE LUMBAR | Status: FUNCTIONAL
Brand: CD HORIZON® SPINAL SYSTEM

## 2023-02-10 RX ORDER — CEFAZOLIN SODIUM 2 G/50ML
2000 SOLUTION INTRAVENOUS EVERY 8 HOURS
Status: COMPLETED | OUTPATIENT
Start: 2023-02-10 | End: 2023-02-11

## 2023-02-10 RX ORDER — CHLORHEXIDINE GLUCONATE 0.12 MG/ML
15 RINSE ORAL ONCE
Status: DISCONTINUED | OUTPATIENT
Start: 2023-02-10 | End: 2023-02-10

## 2023-02-10 RX ORDER — CEFAZOLIN SODIUM 2 G/50ML
2000 SOLUTION INTRAVENOUS ONCE
Status: DISCONTINUED | OUTPATIENT
Start: 2023-02-10 | End: 2023-02-10 | Stop reason: SDUPTHER

## 2023-02-10 RX ORDER — BUPIVACAINE HYDROCHLORIDE AND EPINEPHRINE 5; 5 MG/ML; UG/ML
INJECTION, SOLUTION EPIDURAL; INTRACAUDAL; PERINEURAL AS NEEDED
Status: DISCONTINUED | OUTPATIENT
Start: 2023-02-10 | End: 2023-02-10 | Stop reason: HOSPADM

## 2023-02-10 RX ORDER — CHLORHEXIDINE GLUCONATE 0.12 MG/ML
15 RINSE ORAL ONCE
Status: COMPLETED | OUTPATIENT
Start: 2023-02-10 | End: 2023-02-10

## 2023-02-10 RX ORDER — MAGNESIUM HYDROXIDE 1200 MG/15ML
LIQUID ORAL AS NEEDED
Status: DISCONTINUED | OUTPATIENT
Start: 2023-02-10 | End: 2023-02-10 | Stop reason: HOSPADM

## 2023-02-10 RX ORDER — LIDOCAINE HYDROCHLORIDE AND EPINEPHRINE 10; 10 MG/ML; UG/ML
INJECTION, SOLUTION INFILTRATION; PERINEURAL AS NEEDED
Status: DISCONTINUED | OUTPATIENT
Start: 2023-02-10 | End: 2023-02-10 | Stop reason: HOSPADM

## 2023-02-10 RX ORDER — CHLORHEXIDINE GLUCONATE 0.12 MG/ML
15 RINSE ORAL ONCE
Status: DISCONTINUED | OUTPATIENT
Start: 2023-02-10 | End: 2023-02-10 | Stop reason: SDUPTHER

## 2023-02-10 RX ORDER — NOREPINEPHRINE BITARTRATE 1 MG/ML
INJECTION, SOLUTION INTRAVENOUS AS NEEDED
Status: DISCONTINUED | OUTPATIENT
Start: 2023-02-10 | End: 2023-02-10

## 2023-02-10 RX ORDER — HEPARIN SODIUM 5000 [USP'U]/ML
5000 INJECTION, SOLUTION INTRAVENOUS; SUBCUTANEOUS EVERY 8 HOURS SCHEDULED
Status: DISCONTINUED | OUTPATIENT
Start: 2023-02-11 | End: 2023-02-15 | Stop reason: HOSPADM

## 2023-02-10 RX ORDER — INSULIN LISPRO 100 [IU]/ML
2-12 INJECTION, SOLUTION INTRAVENOUS; SUBCUTANEOUS
Status: DISCONTINUED | OUTPATIENT
Start: 2023-02-11 | End: 2023-02-15 | Stop reason: HOSPADM

## 2023-02-10 RX ORDER — CEFAZOLIN SODIUM 2 G/50ML
2000 SOLUTION INTRAVENOUS ONCE
Status: COMPLETED | OUTPATIENT
Start: 2023-02-10 | End: 2023-02-10

## 2023-02-10 RX ORDER — SODIUM CHLORIDE 9 MG/ML
75 INJECTION, SOLUTION INTRAVENOUS CONTINUOUS
Status: DISCONTINUED | OUTPATIENT
Start: 2023-02-10 | End: 2023-02-10

## 2023-02-10 RX ORDER — ONDANSETRON 2 MG/ML
4 INJECTION INTRAMUSCULAR; INTRAVENOUS ONCE AS NEEDED
Status: DISCONTINUED | OUTPATIENT
Start: 2023-02-10 | End: 2023-02-10 | Stop reason: HOSPADM

## 2023-02-10 RX ORDER — FENTANYL CITRATE 50 UG/ML
INJECTION, SOLUTION INTRAMUSCULAR; INTRAVENOUS AS NEEDED
Status: DISCONTINUED | OUTPATIENT
Start: 2023-02-10 | End: 2023-02-10

## 2023-02-10 RX ORDER — ALBUMIN, HUMAN INJ 5% 5 %
SOLUTION INTRAVENOUS CONTINUOUS PRN
Status: DISCONTINUED | OUTPATIENT
Start: 2023-02-10 | End: 2023-02-10

## 2023-02-10 RX ORDER — CEFAZOLIN SODIUM 1 G/3ML
INJECTION, POWDER, FOR SOLUTION INTRAMUSCULAR; INTRAVENOUS AS NEEDED
Status: DISCONTINUED | OUTPATIENT
Start: 2023-02-10 | End: 2023-02-10

## 2023-02-10 RX ORDER — HYDROMORPHONE HCL/PF 1 MG/ML
0.5 SYRINGE (ML) INJECTION
Status: DISCONTINUED | OUTPATIENT
Start: 2023-02-10 | End: 2023-02-10 | Stop reason: HOSPADM

## 2023-02-10 RX ORDER — PROPOFOL 10 MG/ML
INJECTION, EMULSION INTRAVENOUS CONTINUOUS PRN
Status: DISCONTINUED | OUTPATIENT
Start: 2023-02-10 | End: 2023-02-10

## 2023-02-10 RX ORDER — SODIUM CHLORIDE 9 MG/ML
75 INJECTION, SOLUTION INTRAVENOUS CONTINUOUS
Status: DISCONTINUED | OUTPATIENT
Start: 2023-02-10 | End: 2023-02-11

## 2023-02-10 RX ORDER — ROCURONIUM BROMIDE 10 MG/ML
INJECTION, SOLUTION INTRAVENOUS AS NEEDED
Status: DISCONTINUED | OUTPATIENT
Start: 2023-02-10 | End: 2023-02-10

## 2023-02-10 RX ORDER — LIDOCAINE HYDROCHLORIDE 10 MG/ML
INJECTION, SOLUTION EPIDURAL; INFILTRATION; INTRACAUDAL; PERINEURAL AS NEEDED
Status: DISCONTINUED | OUTPATIENT
Start: 2023-02-10 | End: 2023-02-10

## 2023-02-10 RX ORDER — BISACODYL 10 MG
10 SUPPOSITORY, RECTAL RECTAL DAILY PRN
Status: DISCONTINUED | OUTPATIENT
Start: 2023-02-10 | End: 2023-02-15 | Stop reason: HOSPADM

## 2023-02-10 RX ORDER — HYDROMORPHONE HCL/PF 1 MG/ML
SYRINGE (ML) INJECTION AS NEEDED
Status: DISCONTINUED | OUTPATIENT
Start: 2023-02-10 | End: 2023-02-10

## 2023-02-10 RX ORDER — DEXAMETHASONE SODIUM PHOSPHATE 10 MG/ML
INJECTION, SOLUTION INTRAMUSCULAR; INTRAVENOUS AS NEEDED
Status: DISCONTINUED | OUTPATIENT
Start: 2023-02-10 | End: 2023-02-10

## 2023-02-10 RX ORDER — ONDANSETRON 2 MG/ML
INJECTION INTRAMUSCULAR; INTRAVENOUS AS NEEDED
Status: DISCONTINUED | OUTPATIENT
Start: 2023-02-10 | End: 2023-02-10

## 2023-02-10 RX ORDER — PROPOFOL 10 MG/ML
INJECTION, EMULSION INTRAVENOUS AS NEEDED
Status: DISCONTINUED | OUTPATIENT
Start: 2023-02-10 | End: 2023-02-10

## 2023-02-10 RX ADMIN — CHLORHEXIDINE GLUCONATE 0.12% ORAL RINSE 15 ML: 1.2 LIQUID ORAL at 07:48

## 2023-02-10 RX ADMIN — GABAPENTIN 100 MG: 100 CAPSULE ORAL at 07:51

## 2023-02-10 RX ADMIN — HYDROMORPHONE HYDROCHLORIDE 0.5 MG: 1 INJECTION, SOLUTION INTRAMUSCULAR; INTRAVENOUS; SUBCUTANEOUS at 14:36

## 2023-02-10 RX ADMIN — CEFAZOLIN SODIUM 2000 MG: 2 SOLUTION INTRAVENOUS at 11:07

## 2023-02-10 RX ADMIN — AMLODIPINE BESYLATE 10 MG: 10 TABLET ORAL at 07:51

## 2023-02-10 RX ADMIN — METHOCARBAMOL TABLETS 500 MG: 500 TABLET, COATED ORAL at 05:07

## 2023-02-10 RX ADMIN — CEFAZOLIN SODIUM 2000 MG: 2 SOLUTION INTRAVENOUS at 22:33

## 2023-02-10 RX ADMIN — SODIUM CHLORIDE: 0.9 INJECTION, SOLUTION INTRAVENOUS at 12:18

## 2023-02-10 RX ADMIN — NOREPINEPHRINE BITARTRATE 3 MCG/MIN: 1 INJECTION INTRAVENOUS at 11:33

## 2023-02-10 RX ADMIN — Medication 1000 UNITS: at 07:48

## 2023-02-10 RX ADMIN — PHENYLEPHRINE HYDROCHLORIDE 100 MCG/MIN: 10 INJECTION INTRAVENOUS at 10:04

## 2023-02-10 RX ADMIN — ACETAMINOPHEN 975 MG: 325 TABLET, FILM COATED ORAL at 05:07

## 2023-02-10 RX ADMIN — ALBUMIN (HUMAN): 12.5 INJECTION, SOLUTION INTRAVENOUS at 11:57

## 2023-02-10 RX ADMIN — OXYCODONE HYDROCHLORIDE 2.5 MG: 5 TABLET ORAL at 20:30

## 2023-02-10 RX ADMIN — HYDROMORPHONE HYDROCHLORIDE 0.5 MG: 1 INJECTION, SOLUTION INTRAMUSCULAR; INTRAVENOUS; SUBCUTANEOUS at 11:07

## 2023-02-10 RX ADMIN — SODIUM CHLORIDE: 0.9 INJECTION, SOLUTION INTRAVENOUS at 13:37

## 2023-02-10 RX ADMIN — METHOCARBAMOL TABLETS 500 MG: 500 TABLET, COATED ORAL at 17:59

## 2023-02-10 RX ADMIN — HYDROMORPHONE HYDROCHLORIDE 0.5 MG: 1 INJECTION, SOLUTION INTRAMUSCULAR; INTRAVENOUS; SUBCUTANEOUS at 16:44

## 2023-02-10 RX ADMIN — MELATONIN 6 MG: at 22:31

## 2023-02-10 RX ADMIN — FENTANYL CITRATE 50 MCG: 50 INJECTION, SOLUTION INTRAMUSCULAR; INTRAVENOUS at 10:28

## 2023-02-10 RX ADMIN — OXYCODONE HYDROCHLORIDE 5 MG: 5 TABLET ORAL at 02:07

## 2023-02-10 RX ADMIN — DOCUSATE SODIUM 100 MG: 100 CAPSULE, LIQUID FILLED ORAL at 17:59

## 2023-02-10 RX ADMIN — LIDOCAINE HYDROCHLORIDE 50 MG: 10 INJECTION, SOLUTION EPIDURAL; INFILTRATION; INTRACAUDAL; PERINEURAL at 10:08

## 2023-02-10 RX ADMIN — ROCURONIUM BROMIDE 50 MG: 10 INJECTION INTRAVENOUS at 10:08

## 2023-02-10 RX ADMIN — METOPROLOL TARTRATE 50 MG: 50 TABLET, FILM COATED ORAL at 07:48

## 2023-02-10 RX ADMIN — ONDANSETRON HYDROCHLORIDE 4 MG: 2 INJECTION, SOLUTION INTRAMUSCULAR; INTRAVENOUS at 15:19

## 2023-02-10 RX ADMIN — FENTANYL CITRATE 50 MCG: 50 INJECTION, SOLUTION INTRAMUSCULAR; INTRAVENOUS at 10:05

## 2023-02-10 RX ADMIN — SODIUM CHLORIDE 75 ML/HR: 0.9 INJECTION, SOLUTION INTRAVENOUS at 07:37

## 2023-02-10 RX ADMIN — PHENYLEPHRINE HYDROCHLORIDE 10 MCG/MIN: 10 INJECTION INTRAVENOUS at 16:48

## 2023-02-10 RX ADMIN — PROPOFOL 15 MCG/KG/MIN: 10 INJECTION, EMULSION INTRAVENOUS at 10:33

## 2023-02-10 RX ADMIN — NOREPINEPHRINE BITARTRATE 8 MCG: 1 INJECTION INTRAVENOUS at 11:26

## 2023-02-10 RX ADMIN — DOCUSATE SODIUM 100 MG: 100 CAPSULE, LIQUID FILLED ORAL at 07:48

## 2023-02-10 RX ADMIN — STANDARDIZED SENNA CONCENTRATE 17.2 MG: 8.6 TABLET ORAL at 22:31

## 2023-02-10 RX ADMIN — CEFAZOLIN 2000 MG: 1 INJECTION, POWDER, FOR SOLUTION INTRAMUSCULAR; INTRAVENOUS at 14:47

## 2023-02-10 RX ADMIN — ACETAMINOPHEN 975 MG: 325 TABLET, FILM COATED ORAL at 22:31

## 2023-02-10 RX ADMIN — NOREPINEPHRINE BITARTRATE 8 MCG: 1 INJECTION INTRAVENOUS at 11:17

## 2023-02-10 RX ADMIN — GABAPENTIN 100 MG: 100 CAPSULE ORAL at 20:30

## 2023-02-10 RX ADMIN — PROPOFOL 100 MG: 10 INJECTION, EMULSION INTRAVENOUS at 10:08

## 2023-02-10 RX ADMIN — ALBUMIN (HUMAN): 12.5 INJECTION, SOLUTION INTRAVENOUS at 13:16

## 2023-02-10 RX ADMIN — PROPOFOL 50 MG: 10 INJECTION, EMULSION INTRAVENOUS at 14:51

## 2023-02-10 RX ADMIN — HYDROMORPHONE HYDROCHLORIDE 0.5 MG: 1 INJECTION, SOLUTION INTRAMUSCULAR; INTRAVENOUS; SUBCUTANEOUS at 16:08

## 2023-02-10 RX ADMIN — SODIUM CHLORIDE: 0.9 INJECTION, SOLUTION INTRAVENOUS at 15:18

## 2023-02-10 RX ADMIN — OXYCODONE HYDROCHLORIDE 5 MG: 5 TABLET ORAL at 07:49

## 2023-02-10 RX ADMIN — HYDROMORPHONE HYDROCHLORIDE 0.5 MG: 1 INJECTION, SOLUTION INTRAMUSCULAR; INTRAVENOUS; SUBCUTANEOUS at 16:00

## 2023-02-10 RX ADMIN — DEXAMETHASONE SODIUM PHOSPHATE 10 MG: 10 INJECTION INTRAMUSCULAR; INTRAVENOUS at 10:22

## 2023-02-10 RX ADMIN — METHOCARBAMOL TABLETS 500 MG: 500 TABLET, COATED ORAL at 00:33

## 2023-02-10 RX ADMIN — LISINOPRIL 10 MG: 5 TABLET ORAL at 07:48

## 2023-02-10 RX ADMIN — PHENYLEPHRINE HYDROCHLORIDE 20 MCG/MIN: 10 INJECTION INTRAVENOUS at 17:04

## 2023-02-10 RX ADMIN — FOLIC ACID TAB 400 MCG 400 MCG: 400 TAB at 07:48

## 2023-02-10 RX ADMIN — SUGAMMADEX 200 MG: 100 INJECTION, SOLUTION INTRAVENOUS at 10:58

## 2023-02-10 NOTE — PLAN OF CARE
Problem: MOBILITY - ADULT  Goal: Maintain or return to baseline ADL function  Description: INTERVENTIONS:  -  Assess patient's ability to carry out ADLs; assess patient's baseline for ADL function and identify physical deficits which impact ability to perform ADLs (bathing, care of mouth/teeth, toileting, grooming, dressing, etc )  - Assess/evaluate cause of self-care deficits   - Assess range of motion  - Assess patient's mobility; develop plan if impaired  - Assess patient's need for assistive devices and provide as appropriate  - Encourage maximum independence but intervene and supervise when necessary  - Involve family in performance of ADLs  - Assess for home care needs following discharge   - Consider OT consult to assist with ADL evaluation and planning for discharge  - Provide patient education as appropriate  Outcome: Progressing  Goal: Maintains/Returns to pre admission functional level  Description: INTERVENTIONS:  - Perform BMAT or MOVE assessment daily    - Set and communicate daily mobility goal to care team and patient/family/caregiver  - Collaborate with rehabilitation services on mobility goals if consulted  - Perform Range of Motion 3 times a day  - Reposition patient every 2 hours    - Dangle patient 3 times a day  - Stand patient 3 times a day  - Ambulate patient 3 times a day  - Out of bed to chair 3 times a day   - Out of bed for meals 3 times a day  - Out of bed for toileting  - Record patient progress and toleration of activity level   Outcome: Progressing     Problem: Prexisting or High Potential for Compromised Skin Integrity  Goal: Skin integrity is maintained or improved  Description: INTERVENTIONS:  - Identify patients at risk for skin breakdown  - Assess and monitor skin integrity  - Assess and monitor nutrition and hydration status  - Monitor labs   - Assess for incontinence   - Turn and reposition patient  - Assist with mobility/ambulation  - Relieve pressure over bony prominences  - Avoid friction and shearing  - Provide appropriate hygiene as needed including keeping skin clean and dry  - Evaluate need for skin moisturizer/barrier cream  - Collaborate with interdisciplinary team   - Patient/family teaching  - Consider wound care consult   Outcome: Progressing     Problem: NEUROSENSORY - ADULT  Goal: Achieves stable or improved neurological status  Description: INTERVENTIONS  - Monitor and report changes in neurological status  - Monitor vital signs such as temperature, blood pressure, glucose, and any other labs ordered   - Initiate measures to prevent increased intracranial pressure  - Monitor for seizure activity and implement precautions if appropriate      Outcome: Progressing  Goal: Achieves maximal functionality and self care  Description: INTERVENTIONS  - Monitor swallowing and airway patency with patient fatigue and changes in neurological status  - Encourage and assist patient to increase activity and self care     - Encourage visually impaired, hearing impaired and aphasic patients to use assistive/communication devices  Outcome: Progressing     Problem: PAIN - ADULT  Goal: Verbalizes/displays adequate comfort level or baseline comfort level  Description: Interventions:  - Encourage patient to monitor pain and request assistance  - Assess pain using appropriate pain scale  - Administer analgesics based on type and severity of pain and evaluate response  - Implement non-pharmacological measures as appropriate and evaluate response  - Consider cultural and social influences on pain and pain management  - Notify physician/advanced practitioner if interventions unsuccessful or patient reports new pain  Outcome: Progressing     Problem: INFECTION - ADULT  Goal: Absence or prevention of progression during hospitalization  Description: INTERVENTIONS:  - Assess and monitor for signs and symptoms of infection  - Monitor lab/diagnostic results  - Monitor all insertion sites, i e  indwelling lines, tubes, and drains  - Monitor endotracheal if appropriate and nasal secretions for changes in amount and color  - [place] appropriate cooling/warming therapies per order  - Administer medications as ordered  - Instruct and encourage patient and family to use good hand hygiene technique  - Identify and instruct in appropriate isolation precautions for identified infection/condition  Outcome: Progressing  Goal: Absence of fever/infection during neutropenic period  Description: INTERVENTIONS:  - Monitor WBC    Outcome: Progressing     Problem: SAFETY ADULT  Goal: Maintain or return to baseline ADL function  Description: INTERVENTIONS:  -  Assess patient's ability to carry out ADLs; assess patient's baseline for ADL function and identify physical deficits which impact ability to perform ADLs (bathing, care of mouth/teeth, toileting, grooming, dressing, etc )  - Assess/evaluate cause of self-care deficits   - Assess range of motion  - Assess patient's mobility; develop plan if impaired  - Assess patient's need for assistive devices and provide as appropriate  - Encourage maximum independence but intervene and supervise when necessary  - Involve family in performance of ADLs  - Assess for home care needs following discharge   - Consider OT consult to assist with ADL evaluation and planning for discharge  - Provide patient education as appropriate  Outcome: Progressing  Goal: Maintains/Returns to pre admission functional level  Description: INTERVENTIONS:  - Perform BMAT or MOVE assessment daily    - Set and communicate daily mobility goal to care team and patient/family/caregiver  - Collaborate with rehabilitation services on mobility goals if consulted  - Perform Range of Motion 3 times a day  - Reposition patient every 2 hours    - Dangle patient 3 times a day  - Stand patient 3 times a day  - Ambulate patient 3 times a day  - Out of bed to chair 3 times a day   - Out of bed for meals 3 times a day  - Out of bed for toileting  - Record patient progress and toleration of activity level   Outcome: Progressing  Goal: Patient will remain free of falls  Description: INTERVENTIONS:  - Educate patient/family on patient safety including physical limitations  - Instruct patient to call for assistance with activity   - Consult OT/PT to assist with strengthening/mobility   - Keep Call bell within reach  - Keep bed low and locked with side rails adjusted as appropriate  - Keep care items and personal belongings within reach  - Initiate and maintain comfort rounds  - Make Fall Risk Sign visible to staff  - Offer Toileting every 2 Hours, in advance of need  - Initiate/Maintain bed alarm  - Obtain necessary fall risk management equipment:   - Apply yellow socks and bracelet for high fall risk patients  - Consider moving patient to room near nurses station  Outcome: Progressing     Problem: DISCHARGE PLANNING  Goal: Discharge to home or other facility with appropriate resources  Description: INTERVENTIONS:  - Identify barriers to discharge w/patient and caregiver  - Arrange for needed discharge resources and transportation as appropriate  - Identify discharge learning needs (meds, wound care, etc )  - Arrange for interpretive services to assist at discharge as needed  - Refer to Case Management Department for coordinating discharge planning if the patient needs post-hospital services based on physician/advanced practitioner order or complex needs related to functional status, cognitive ability, or social support system  Outcome: Progressing     Problem: Knowledge Deficit  Goal: Patient/family/caregiver demonstrates understanding of disease process, treatment plan, medications, and discharge instructions  Description: Complete learning assessment and assess knowledge base    Interventions:  - Provide teaching at level of understanding  - Provide teaching via preferred learning methods  Outcome: Progressing

## 2023-02-10 NOTE — CASE MANAGEMENT
Case Management Discharge Planning Note    Patient name Wilber Montes  [address]  MRN [id number]  : 1935 Date 2/10/2023       Current Admission Date: 2023  Current Admission Diagnosis:Fall   Patient Active Problem List    Diagnosis Date Noted   • Acute pain due to trauma 2023   • Fall 2023   • Closed T12 fracture ([address] ) 2023   • Right hip pain 2023   • Class 1 obesity in adult 2023   • H/O heart artery stent    • MS (mitral stenosis)    • Right ureteral stone 2023   • Acute cystitis with hematuria 2023   • Urinary tract obstruction by kidney stone 2023   • Atrial flutter ([address] ) 2023   • Pacemaker 2022   • Hypertension    • Hyperlipemia    • Aortic stenosis    • S/P AVR    • Carotid artery disease ([address] )    • Coronary artery disease       LOS (days): 9  Geometric Mean LOS (GMLOS) (days): 2 80  Days to GMLOS:-6 1     OBJECTIVE:  Risk of Unplanned Readmission Score: 19 33         Current admission status: Inpatient   Preferred Pharmacy:   [address] [place] [address] [address] [place] [address], [address] [place] [id number]  Phone: [phone] Fax: [phone]    [id number] Devin Johnsonneftali [address] [place] [address]  Phone: [phone] Fax: [phone]    Primary Care Provider: Katherin Meyers DO    Primary Insurance: Joint venture between [facility] [employer]  Secondary Insurance:     DISCHARGE DETAILS:    Pt in [address] [place] [address] Street today with Nsg  CM will follow up post-op for recs

## 2023-02-10 NOTE — ANESTHESIA PROCEDURE NOTES
Arterial Line Insertion  Performed by: Judy South CRNA  Authorized by: Dilcia Chi MD   Consent: Verbal consent obtained  Written consent obtained  Risks and benefits: risks, benefits and alternatives were discussed  Consent given by: patient  Patient understanding: patient states understanding of the procedure being performed  Patient consent: the patient's understanding of the procedure matches consent given  Procedure consent: procedure consent matches procedure scheduled  Relevant documents: relevant documents present and verified  Test results: test results available and properly labeled  Required items: required blood products, implants, devices, and special equipment available  Patient identity confirmed: verbally with patient, arm band, provided demographic data and hospital-assigned identification number  Time out: Immediately prior to procedure a "time out" was called to verify the correct patient, procedure, equipment, support staff and site/side marked as required  Preparation: Patient was prepped and draped in the usual sterile fashion    Indications: hemodynamic monitoring  Orientation:  Right  Location: radial artery  Procedure Details:  Needle gauge: 20  Seldinger technique: Seldinger technique used  Number of attempts: 2    Post-procedure:  Waveform: good waveform and waveform confirmed  Post-procedure CNS: normal  Patient tolerance: patient tolerated the procedure well with no immediate complications

## 2023-02-10 NOTE — PROGRESS NOTES
[facility]  Progress Note Chilo Quarles 1935, 81 y o  male MRN: [id number]  Unit/Bed#: OR [place] Encounter: [id number]  Primary Care Provider: Desiree Pierre DO   Date and time admitted to hospital: 2/1/2023  1:44 PM    Acute pain due to trauma  Assessment & Plan  - Acute pain secondary to traumatic injuries  - Continue multi modal analgesic regimen  - Bowel regimen as long as using opioids   - Continue to monitor pain and adjust regimen as indicated, Patient states pain is improving, however continues with spasms in his legs      Right hip pain  Assessment & Plan  - CT A/P negative for injury  - likely contusion and muscle strain  - PT/OT and pain control  - WBAT on RLE   - continues to have spasms    Closed T12 fracture (HCC)  Assessment & Plan  - T 12 fracture evaluated by neurosurgery and recommended conservative management for now but will further discuss risks vs benefits of surgery/kyphoplasty with family today  - Upright x-rays show stability  - TLSO brace  - neuro intact- has a h/o R ankle surgery with weakness with dorsi- /plantar flexion which is chronic  RLE with weakness due to pain in the R hip noted with hip flexion and extension    - pain control with multi modal regimen  - f/u neurosurgery recommendations after MRI T/L spine obtained   - MRI: MRI 2/6/2023: Subacute T12 compression fracture with a new, acute compression fracture of the superior endplate of L1   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 of T11-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 best seen on series 3 image Despite this compression, the cord maintains normal signal   Severe right foraminal narrowing noted at the T12-L1 level   See separate MRI thoracic spine dictation also performed today for more detailed description of canal stenosis in the lower thoracic spine   Lumbar degenerative disc disease L2-3, L3-4 and L4-5 with multilevel canal stenosis and foraminal narrowing, most pronounced at the L4-5 level  -Neurosurgery taking patient to OR today    Urinary tract obstruction by kidney stone  Assessment & Plan  - patient noted to have a renal stone on 1/13 and underwent lithotripsy and right renal stent placement on 1/26 and has a chronic wynn catheter  It appears he was to have followed up for a voiding trial with urology on 1/31  He also remains on Ceftin, which should have been for a 5-day course from 1/18-1/23 from chart review  - I spoke with urology who recommended voiding trial on 2/2 and discontinuation of Ceftin  Patient has required SC x2 and may require replacement of wynn catheter  - per patient cannot void lying down  Catheter in place  Urine is dark with sediment  Fluids started to see if increase output and urine clears, discontinued  Started vancomycin due to patient resistant on urine culture- pharmacy following      Pacemaker  Assessment & Plan  - h/o A  Fib s/p PPM   - f/u outpatient with cardiology at [facility]       Carotid artery disease [facility])  Assessment & Plan  - s/p CABG x3  - continue home metoprolol and statin  - resume home Eliquis    S/P AVR  Assessment & Plan  - bioprosthetic AVR  - follows outpatient with  Cardiology at [facility]     Aortic stenosis  Assessment & Plan  - h/o bioprosthetic AVR    Hyperlipemia  Assessment & Plan  - continue home statin    Hypertension  Assessment & Plan  - continue home lisinopril and amlodipine  - BP adequately controlled    * Fall  Assessment & Plan  - mechanical fall, sustained below stated injury  - PT/OT evaluations pending  - Appreciate geriatrics' consulation  - CM for disposition planning      Bowel Regimen: Colace, Senna  VTE Prophylaxis:Sequential compression device (Venodyne)      Disposition: OR today with neurosurgery    Subjective   Chief Complaint: "I am a little nervous about surgery"    Subjective: Patient stated that he was a little nervous about his surgery this morning   He stated that he daughter and family are aware about plan  Patient states his pain is the same as it has been  Patient passing flatus  Denies any other complaints at this time  Objective   Vitals:   Temp:  [98 2 °F (36 8 °C)-98 5 °F (36 9 °C)] 98 2 °F (36 8 °C)  HR:  [62-92] 76  Resp:  [16-18] 18  BP: (104-128)/(60-64) 125/64    I/O       02/08 0701  02/09 0700 02/09 0701  02/10 0700    P  O  118 120    Total Intake 118 120    Urine 1225 1350    Stool 0     Total Output 1225 1350    Net -1107 -1230          Unmeasured Stool Occurrence 2 x            Physical Exam  Cardiovascular:      Rate and Rhythm: Normal rate and regular rhythm  Pulses: Normal pulses  Heart sounds: Normal heart sounds  Pulmonary:      Effort: Pulmonary effort is normal       Breath sounds: Normal breath sounds  Abdominal:      General: Abdomen is protuberant  Palpations: Abdomen is soft  Genitourinary:     Comments: Dickens in place  Musculoskeletal:      Comments: Moving all extremities  Increased pain with range of motion b/l lower legs     Skin:     General: Skin is warm  Capillary Refill: Capillary refill takes less than 2 seconds  Neurological:      Mental Status: He is alert  Mental status is at baseline  *    Invasive Devices     Peripheral Intravenous Line  Duration           Peripheral IV 02/09/23 Distal;Right;Ventral (anterior) Forearm 1 day          Drain  Duration           Ureteral Internal Stent Right ureter 6 Fr  27 days    Urethral Catheter Non-latex 18 Fr  5 days                 No new labs, imaging, or studies within the last 24 hours

## 2023-02-10 NOTE — ANESTHESIA POSTPROCEDURE EVALUATION
Post-Op Assessment Note    CV Status:  Stable  Pain Score: 0    Pain management: adequate     Mental Status:  Alert and awake   Hydration Status:  Euvolemic   PONV Controlled:  Controlled   Airway Patency:  Patent      Post Op Vitals Reviewed: Yes      Staff: CRNA         No notable events documented      /59 (02/10/23 1545)    Temp 97 8 °F (36 6 °C) (02/10/23 1537)    Pulse 86 (02/10/23 1545)   Resp 21 (02/10/23 1545)    SpO2 98 % (02/10/23 1545)

## 2023-02-10 NOTE — PROGRESS NOTES
Nella Abebe is a 81 y o  male who is currently receiving Vancomycin IV with management by the Pharmacy Consult service  Relevant clinical data and objective / subjective history reviewed  Vancomycin Assessment:  Indication and Goal AUC/Trough:  Urinary tract infection (goal -600, trough >10)      Clinical Status: stable  Micro:     Renal Function:  SCr: 0 7 mg/dL  CrCl: 88 mL/min  Renal replacement: Not on dialysis  Days of Therapy: 2  Current Dose:  1750 mg q24h    Vancomycin Plan:  New Dosin mg q24h  Estimated AUC: 423 mcg*hr/mL  Estimated Trough: 11 2 mcg/mL  Next Level: Trough / @ 1400  Renal Function Monitoring: Daily BMP and UOP  Pharmacy will continue to follow closely for s/sx of nephrotoxicity, infusion reactions and appropriateness of therapy  BMP and CBC will be ordered per protocol  We will continue to follow the patient’s culture results and clinical progress daily      Asmita Sorto, PharmD  Pharmacist

## 2023-02-10 NOTE — H&P
H&P Exam - Critical Care   Sondra Palmer 81 y o  male MRN: [id number]  Unit/Bed#: ICU 09 Encounter: [id number]      -------------------------------------------------------------------------------------------------------------  Chief Complaint: "im fine"    History of Present Illness     Sondra Palmer is a 81 y o  male with pmhx of CAD, aflutter, bioprosthetic AVR, 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, R ureteral stones/hydro s/p R cysto, renal stent 1/13/23, HLD, pacemaker, skin cancer, who originally presented as a trauma transfer from [address] [place] [address] 2/2 to subacute T12 fx with increasing pain limiting mobility  NSGY trialed conservative management, but pt failed  Today NSGY took patient to OR for T9-L3 fusion w T10-T12 decompressive laminectomies  During the case, the patient lose motor evoked potentials when MAPs fell, but returned with normalization of blood pressure  Patient transferred to critical care post operatively for close neuromonitoring and MAP pushes       History obtained from chart review and the patient   -------------------------------------------------------------------------------------------------------------  Assessment and Plan:    Neuro:   • Diagnosis: Acute pain due to trauma/surgery  o Plan:   - Geriatric acute pain regimen  • Tylenol 975 mg q8 hr scheduled  • Oxycodone 2 5 mg q4 hr PRN moderate pain  • Oxycodone 5 mg q4 hr PRN severe pain  • Dilaudid 0 2 mg q4 PRN breakthrough pain      CV:   • Diagnosis: Hx of afib s/p pacemaker  o Plan:   - Holding home eliquis post operatively  - Continuous cardiac monitoring  - Maintain MAPs > 65  • Diagnosis: Hx of HTN, CAD s/p CABG x3  o Plan:   - Continue home lopressor and statin  - Zestril ordered in lieu of home enalapril      Pulm:  • Diagnosis: No acute issues  o Plan:   - Continuous pulse ox  - Maintain O2 saturation > 92%      GI:   • Diagnosis: Hx of HLD  o Plan:   - Continue home statin  - Continue bowel regimen while requiring opioid medications      : • Diagnosis: Urinary tract obstruction by kidney stone  o Plan:   - Renal stone 1/13 underwent lithotripsy and R renal stent placement 1/26 w a chronic wynn catheter  - Failed void trial 2/2  - Vancomycin 2/2 urine cx demonstrating resistance       F/E/N:   • Plan:   o NaCl 75 cc/hr  o Monitor and replete lytes PRN  o Cardiac diet      Heme/Onc:   • Diagnosis: No acute issues  o Plan:   - Monitor hemoglobin  - Transfuse PRN hgb < 7      Endo:   • Diagnosis: No acute issues  o Plan:   - POC glucose checks  - Maintain blood glucose 140-180  - SSI      ID:   • Diagnosis: UTI  o Plan:   - Urine culture w > 100,000 coag neg staph resistant to many abx, susceptible to vanco  - Pharmacy consult while requiring vanco      MSK/Skin:   • Diagnosis: Closed T12 fracture  o Plan:   - MRI 2/6/2023: Subacute T12 compression fracture with a new, acute compression fracture of the superior endplate of L1  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 of T11-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; cord maintains normal signal  Severe right foraminal narrowing at T12-L1 level  - Patient failed conservative management   - POD0 T9-L3 fusion w NSGY, MEP dependent on MAPs intra-operatively  - Multimodal pain regimen  - PT/OT as appropriate      Disposition: Transfer to Critical Care   Code Status: Level 1 - Full Code  --------------------------------------------------------------------------------------------------------------  Review of Systems    A 12-point, complete review of systems was reviewed and negative except as stated above     Physical Exam  Vitals and nursing note reviewed  Constitutional:       General: He is not in acute distress  Appearance: He is well-developed  He is obese  HENT:      Head: Normocephalic and atraumatic  Right Ear: External ear normal       Left Ear: External ear normal       Nose: Nose normal  No congestion or rhinorrhea        Mouth/Throat:      Mouth: Mucous membranes are moist       Pharynx: Oropharynx is clear  No oropharyngeal exudate or posterior oropharyngeal erythema  Eyes:      General: No scleral icterus  Right eye: No discharge  Left eye: No discharge  Extraocular Movements: Extraocular movements intact  Conjunctiva/sclera: Conjunctivae normal       Pupils: Pupils are equal, round, and reactive to light  Cardiovascular:      Rate and Rhythm: Normal rate and regular rhythm  Pulses: Normal pulses  Pulmonary:      Effort: Pulmonary effort is normal  No respiratory distress  Abdominal:      General: There is no distension  Palpations: Abdomen is soft  Tenderness: There is no abdominal tenderness  Musculoskeletal:         General: No swelling  Cervical back: Normal range of motion and neck supple  Skin:     General: Skin is warm and dry  Capillary Refill: Capillary refill takes less than 2 seconds  Neurological:      Mental Status: He is alert  GCS: GCS eye subscore is 4  GCS verbal subscore is 5  GCS motor subscore is 6  Sensory: Sensation is intact  Motor: Weakness present  Comments: Weakness in R ankle plantar/dorsi flexion, chronic 2/2 to prior orthopedic hardware  Neurovascularly intact   Psychiatric:         Mood and Affect: Mood normal        --------------------------------------------------------------------------------------------------------------  Vitals:   Vitals:    02/10/23 1644 02/10/23 1645 02/10/23 1654 02/10/23 1700   BP: 99/55  110/51 115/70   Pulse: 98 98 92 90   Resp: 17 17 16 16   Temp:    97 6 °F (36 4 °C)   TempSrc:    Oral   SpO2: 99% 99% 100% 99%   Weight:    99 8 kg (220 lb 0 3 oz)   Height:    5' 10" (1 778 m)     Temp  Min: 97 3 °F (36 3 °C)  Max: 98 8 °F (37 1 °C)  IBW (Ideal Body Weight): 73 kg  Height: 5' 10" (177 8 cm)  Body mass index is 31 57 kg/m²    N/A    Laboratory and Diagnostics:  Results from last 7 days   Lab Units 02/10/23  1605 02/09/23  456 02/06/23  1252 02/05/23  0447   WBC Thousand/uL  --  8 91 7 97 7 04   HEMOGLOBIN g/dL  --  13 1 13 9 12 4   HEMATOCRIT %  --  40 4 43 4 38 1   PLATELETS Thousands/uL 194 230 214 204   NEUTROS PCT %  --  71 72 69   MONOS PCT %  --  8 7 10     Results from last 7 days   Lab Units 02/09/23  0556 02/06/23  1252 02/05/23  0447   SODIUM mmol/L 135 137 136   POTASSIUM mmol/L 4 8 4 1 3 7   CHLORIDE mmol/L 103 102 105   CO2 mmol/L 27 29 25   ANION GAP mmol/L 5 6 6   BUN mg/dL 12 12 9   CREATININE mg/dL 0 70 0 70 0 59*   CALCIUM mg/dL 8 9 9 1 8 7   GLUCOSE RANDOM mg/dL 139 119 114          Results from last 7 days   Lab Units 02/10/23  0741   INR  1 10   PTT seconds 38*              ABG:    VBG:          Micro:  Results from last 7 days   Lab Units 02/07/23  0954   URINE CULTURE  >100,000 cfu/ml Staphylococcus coagulase negative*       EKG: NSR on tele  Imaging: I have personally reviewed pertinent reports  CT spine thoracic & lumbar wo contrast   Final Result by Julio Cesar Vick MD (02/10 5212)      Acute on subacute T12 burst fracture with extension into bilateral posterior elements (bilateral pedicles and bilateral superior articular facets) with mild height loss and small retropulsed bone fragments into the spinal canal resulting in severe canal    stenosis  Subacute L1 superior endplate compression fracture with minimal height loss  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) with degenerative changes of thoracic and lumbar spine, detailed above  Additional chronic/incidental findings as detailed above  Workstation performed: [id number]         XR spine lumbar 2 or 3 views injury   Final Result by Kusum Avelar MD (02/09 1278)      Again seen is T12 compression fracture, with interval increase in loss of height, previously 10%, now 30%        There is no loss of height related to the mild superior endplate compression fracture of L1 seen on recent MRI             Workstation performed: [id number]         XR spine thoracic 3 vw   Final Result by Dorcas Wilson MD (02/09 1049)      No thoracic spine fractures noted on these films  Please note that the T12 vertebral body fracture is better seen on the lumbar spine plain films and will be described on that report  Workstation performed: [id number]         XR femur 2 vw right   Final Result by Nereyda Burgess MD (02/08 0932)      No acute osseous abnormality  Workstation performed: [id number]         XR femur 2 vw left   Final Result by Nereyda Burgess MD (02/08 0467)      No acute osseous abnormality  Workstation performed: [id number]         MRI lumbar spine wo contrast   Final Result by Chandu Umana DO (02/06 1438)      Subacute T12 compression fracture with a new, acute compression fracture of the superior endplate of L1  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 of T11-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 best seen on series 3 image 9  Despite this    compression, the cord maintains normal signal   Severe right foraminal narrowing noted at the T12-L1 level  See separate MRI thoracic spine dictation also performed today for more detailed description of canal stenosis in the lower thoracic spine  Lumbar degenerative disc disease L2-3, L3-4 and L4-5 with multilevel canal stenosis and foraminal narrowing, most pronounced at the L4-5 level  Workstation performed: [id number]         MRI thoracic spine wo contrast   Final Result by Chandu Umana DO (02/06 1411)      There is a subacute compression fracture of T12 with a horizontally oriented fracture cleft through the vertebral body now filled with fluid  Fracture does extend to the posterior elements in particular on the right where there is involvement of the    pedicle and lamina  Stable approximately 10% loss of height of the vertebral body    There is a new mild compression fracture of the L1 superior endplate with moderate amount of edema present within the vertebral body  Advanced canal stenosis within the lower thoracic spine worst at the T11-12 level secondary to degenerative disc disease, bony retropulsion of the T12 vertebral body into the anterior epidural space and posterior element hypertrophic change with    compression of the distal cord/conus  At T10-11 there is degenerative disc disease as well as a small right posterior lateral extradural mass/fluid collection which may represent hematoma, extending inferiorly to the T11-12 level  Moderate canal stenosis at T10-11 with bilateral foraminal    narrowing  Severe right foraminal narrowing at the T12-L1 level  This examination was marked "immediate notification" in Epic in order to begin the standard process by which the radiology reading room liaison alerts the referring practitioner  Workstation performed: [id number]         XR spine thoracolumbar 2 vw   Final Result by Shere Calvert MD (02/03 1231)   Poorly visualized T12 fracture with intact posterior column  Mild to moderate loss of height suggested        Workstation performed: [id number]         XR spine thoracolumbar 2 vw    (Results Pending)   XR spine thoracolumbar 2 vw    (Results Pending)       Historical Information   Past Medical History:   Diagnosis Date   • Coronary artery disease    • H/O atrial flutter    • History of aortic valve replacement with bioprosthetic valve    • Hyperlipemia    • Hypertension    • Pacemaker    • Skin cancer, basal cell    • Walker as ambulation aid    • Wears dentures      Past Surgical History:   Procedure Laterality Date   • A-V CARDIAC PACEMAKER INSERTION     • AORTIC VALVE REPLACEMENT     • CARDIAC PACEMAKER PLACEMENT Left     2021   • CATARACT EXTRACTION     • CORONARY ANGIOPLASTY WITH STENT PLACEMENT      stents times 3   • [place] CYSTO BLADDER W/URETERAL CATHETERIZATION Right 01/13/2023    Procedure: CYSTOSCOPY RETROGRADE PYELOGRAM WITH INSERTION STENT URETERAL;  Surgeon: Eloina Dave MD;  Location: [facility];  Service: Urology   • WI CYSTO BLADDER W/URETERAL CATHETERIZATION Right 1/26/2023    Procedure: CYSTOSCOPY WITH RETROGRADE PYELOGRAM;  Surgeon: Eloina Dave MD;  Location: [facility];  Service: Urology   • WI CYSTO/URETERO W/LITHOTRIPSY &INDWELL STENT INSRT Right 1/26/2023    Procedure: CYSTOSCOPY URETEROSCOPY WITH LITHOTRIPSY HOLMIUM LASER, RETROGRADE PYELOGRAM AND INSERTION STENT URETERAL;  Surgeon: Eloina Dave MD;  Location: [facility];  Service: Urology     Social History   Social History     Substance and Sexual Activity   Alcohol Use Yes    Comment: social     Social History     Substance and Sexual Activity   Drug Use Never     Social History     Tobacco Use   Smoking Status Former   Smokeless Tobacco Never     Exercise History: na  Family History:   Family History   Problem Relation Age of Onset   • No Known Problems Father    • No Known Problems Mother              Medications:  Current Facility-Administered Medications   Medication Dose Route Frequency   • acetaminophen (TYLENOL) tablet 975 mg  975 mg Oral Q8H [address]   • amLODIPine (NORVASC) tablet 10 mg  10 mg Oral Daily   • atorvastatin (LIPITOR) tablet 40 mg  40 mg Oral Daily With Dinner   • bisacodyl (DULCOLAX) rectal suppository 10 mg  10 mg Rectal Daily PRN   • ceFAZolin (ANCEF) IVPB (premix in dextrose) 2,000 mg 50 mL  2,000 mg Intravenous Q8H   • cholecalciferol (VITAMIN D3) tablet 1,000 Units  1,000 Units Oral Daily   • docusate sodium (COLACE) capsule 100 mg  100 mg Oral BID   • folic acid (FOLVITE) tablet 400 mcg  400 mcg Oral Daily   • gabapentin (NEURONTIN) capsule 100 mg  100 mg Oral TID   • [START ON 2/11/2023] heparin (porcine) subcutaneous injection 5,000 Units  5,000 Units Subcutaneous Q8H [address]   • HYDROmorphone HCl (DILAUDID) injection 0 2 mg  0 2 mg Intravenous Q4H PRN   • lidocaine (LIDODERM) 5 % patch 1 patch  1 patch Topical Daily   • lisinopril (ZESTRIL) tablet 10 mg  10 mg Oral Daily   • melatonin tablet 6 mg  6 mg Oral HS   • methocarbamol (ROBAXIN) tablet 500 mg  500 mg Oral Q6H [address]   • metoprolol tartrate (LOPRESSOR) tablet 50 mg  50 mg Oral Daily   • naloxone (NARCAN) 0 04 mg/mL syringe 0 04 mg  0 04 mg Intravenous Q1MIN PRN   • ondansetron (ZOFRAN) injection 4 mg  4 mg Intravenous Q6H PRN   • oxyCODONE (ROXICODONE) IR tablet 2 5 mg  2 5 mg Oral Q4H PRN   • oxyCODONE (ROXICODONE) IR tablet 5 mg  5 mg Oral Q4H PRN   • phenylephrine (MANUEL-SYNEPHRINE) 50 mg (STANDARD CONCENTRATION) in sodium chloride 0 9% 250 mL   mcg/min Intravenous Titrated   • polyethylene glycol (MIRALAX) packet 17 g  17 g Oral Daily   • senna (SENOKOT) tablet 17 2 mg  2 tablet Oral HS   • sodium chloride 0 9 % infusion  75 mL/hr Intravenous Continuous   • tamsulosin (FLOMAX) capsule 0 4 mg  0 4 mg Oral Daily With Dinner   • vancomycin (VANCOCIN) 1,750 mg in sodium chloride 0 9 % 500 mL IVPB  1,750 mg Intravenous Q24H     Home medications:  Prior to Admission Medications   Prescriptions Last Dose Informant Patient Reported? Taking?    Cholecalciferol 25 MCG (1000 UT) tablet   Yes No   Sig: Take by mouth   HYDROcodone-acetaminophen (NORCO) 5-325 mg per tablet   No No   Sig: Take 1-2 tablets by mouth every 6 (six) hours as needed for pain for up to 20 doses Max Daily Amount: 8 tablets   Melatonin 5 MG CAPS   Yes No   Sig: Take 5 mg by mouth if needed   acetaminophen (TYLENOL) 500 mg tablet   Yes No   Sig: Take 1,000 mg by mouth every 6 (six) hours as needed for mild pain   amLODIPine (NORVASC) 10 mg tablet   Yes No   Sig: Take 10 mg by mouth daily   apixaban (Eliquis) 5 mg   Yes No   Sig: Take 5 mg by mouth 2 (two) times a day   atorvastatin (LIPITOR) 40 mg tablet   Yes No   Sig: Take 40 mg by mouth daily   cefuroxime (CEFTIN) 500 mg tablet   No No   Sig: Take 1 tablet (500 mg total) by mouth every 12 (twelve) hours for 5 days enalapril (VASOTEC) 2 5 mg tablet   Yes No   Sig: Take 2 5 mg by mouth 2 (two) times a day   folic acid (FOLVITE) 702 mcg tablet   Yes No   Sig: Take 400 mcg by mouth daily   metoprolol tartrate (LOPRESSOR) 50 mg tablet   Yes No   Sig: Take 50 mg by mouth daily   tamsulosin (FLOMAX) 0 4 mg   No No   Sig: Take 1 capsule (0 4 mg total) by mouth daily with dinner      Facility-Administered Medications: None     Allergies: Allergies   Allergen Reactions   • Ezetimibe-Simvastatin Dizziness and Lightheadedness     Other reaction(s): MAKES HIM DIZZY     • Simvastatin Lightheadedness     Other reaction(s): Dizziness     ------------------------------------------------------------------------------------------------------------  Advance Directive and Living Will:      Power of :    POLST:    ------------------------------------------------------------------------------------------------------------  Anticipated Length of Stay is > 2 midnights    Care Time Delivered:   No Critical Care time spent       Jeff Zavaleta MD        Portions of the record may have been created with voice recognition software  Occasional wrong word or "sound a like" substitutions may have occurred due to the inherent limitations of voice recognition software    Read the chart carefully and recognize, using context, where substitutions have occurred

## 2023-02-10 NOTE — OP NOTE
OPERATIVE REPORT  PATIENT NAME: Chay Villalta    :  1935  MRN: [id number]  Pt Location: [facility] OR ROOM 09    SURGERY DATE: 2/10/2023    Surgeon(s) and Role:     * Fuad Pete MD - Primary    Preop Diagnosis:  Traumatic fractures of T12 and L1 vertebrae ([address] ) [S22 089A, X53 275O]  Closed T12 fracture ([address] ) [R55 702S]    Post-Op Diagnosis Codes:     * Traumatic fractures of T12 and L1 vertebrae ([address] ) [S22 089A, S32 019A]     * Closed T12 fracture ([address] ) [T93 118M]    Procedure(s):  T9-L3 FUSION LUMBAR/THORACIC POSTERIOR MINIMALLY INVASIVE W ROBOT T10-T12 decompressive laminectomy    Specimen(s):  * No specimens in log *    Estimated Blood Loss:   100 mL    Drains:  Closed/Suction Drain Posterior; Left Back Bulb 7 Fr  (Active)   Site Description Healing 02/10/23 1700   Dressing Status Clean;Dry; Intact 02/10/23 1700   Drainage Appearance Bloody 02/10/23 1700   Status To bulb suction 02/10/23 1700   Output (mL) 30 mL 02/10/23 1646   Number of days: 0       Urethral Catheter Non-latex 18 Fr  (Active)   Reasons to continue Urinary Catheter  Acute urinary retention/obstruction failing urinary retention protocol 02/10/23 0505   Goal for Removal Voiding trial when ambulation improves 02/10/23 0505   Site Assessment Clean;Skin intact 02/10/23 1700   Dickens Care Done 02/10/23 0900   Collection Container Standard drainage bag 02/10/23 1700   Securement Method Securing device (Describe) 02/10/23 1700   Output (mL) 100 mL 02/10/23 1609   Number of days: 6       Ureteral Internal Stent Right ureter 6 Fr   (Active)   Number of days: 28       Anesthesia Type:   General    Operative Indications:  Traumatic fractures of T12 and L1 vertebrae ([address] ) [S22 089A, S32 019A]  Closed T12 fracture ([address] ) [S22 089A]  Closed T11 fracture  Spinal stenosis      Operative Findings:  Motor evoked potentials were dependent on mean arterial pressures    Complications:   None    Procedure and Technique:  After adequate general endotracheal anesthesia the patient was placed prone on the Julio C table  A lesion in the midline approximately 2 and half centimeters by 2 and half centimeters was located over the T3-T4 spinous process  The family was aware of this and they were seeking attention for this  The back was prepped with Betadine soap and DuraPrep double layer drapes were placed in normal fashion and a 3M Betadine impregnated sticky drape was placed over these  The Conexus-ITor robotic system was registered and calibrated  This was used throughout the procedure to aid in the placement of a percutaneous screw and mercy system  The image guidance platform was used as well  Image guidance with the use of the Conexus-ITor robotic system was used throughout this case  Images were carefully loaded into the system and used for preoperative planning as well as intraoperative guidance it was critically useful for navigation and avoidance of critically important structures  The procedure began by making an incision over the T10-T12 regions in the midline  Fluoroscopic guidance was utilized to identify this region  The skin was infiltrated with 1% lidocaine with 1 100,000 epinephrine No  15 blade was used to incise the skin  Bovie and bipolar used throughout the procedure to maintain hemostasis  Bovie and a cutting setting was used divide tissues to the spinous processes  In this region two threaded rods were placed  These were then affixed to a bridge and the Conexus-ITor system was connected to this  The robot was then registered and calibrated  All screw positions had been preplanned from T9-L3 bilaterally  Each screw was placed by infiltrating the skin with 1% lidocaine with 1 100,000 epinephrine in a preplanned position  This was mapped out with the use of image guidance  Next the robot was brought into position and each incision was made with the available knife system each was then probed tapped and a screw was placed    Motor evoked potentials and somatosensory evoked potentials were recorded throughout this process and there were no alterations noted  Continuously updating the position of the Mazor system was carried out  2 registration systems were utilized because of the length of the construct  Solera Voyager screws systems were used percutaneously for this procedure  Screws were placed at T9-T10 and T11 in the T12 position was skipped because of the extensive nature of the vertebral body fracture  For the second registration the bridge at T10 - T12 was removed and a chance screw was placed at the iliac bone on the right side  The robot was redocked and recalibrated in this position  L1-L2 and L3 pedicle screws were placed with the above-noted system without difficulty     Next rods were contoured so as to fit into the extenders of the aforementioned screws  Caps were placed and tightened to the appropriate torque  The extenders were removed  Next these were assessed in the AP and lateral planes and found to be in acceptable position  Next attention was placed to the T10-T12 midline positions for decompression  In this region the Bovie was utilized to form a subperiosteal dissection over the spinous processes and lamina of T10-T11 and T12  Cerebellar*wheat [place] retractors were used to maintain operative exposure  The Midas Dusty drill with a 3 mm ball bur was utilized to form a trough in the lateral aspect of the lamina and just medial to the pars interarticularis at T10-T11 and T12 bilaterally  The spinous processes and lamina were removed together in the ligamentum flavum was removed  Copious quite severe irrigation were used to cleanse out the region  There was tight compression of the dura and a thinning of the dura was observed as expected given this degree of compression  For this reason DuraSeal was placed over the dura  At this point the motor evoked potentials were noted to be depended upon the mean arterial pressure  That is to say when the mean arterial pressure was less than 70 the motor potentials were difficult to elicit and then when they were raised to over 80 they returned  Copious quantities of antibiotic irrigation were used to cleanse out the region  The muscle was approximated with interrupted 2-0 Vicryl suture  The fascia was closed with interrupted inverted 2-0 Vicryl suture  A 6 submillimeter flat Hayden-Olivas drain was placed  Subcutaneous tissues were closed with interrupted inverted 2-0 Vicryl suture and staples  Each of the percutaneous incisions was closed with interrupted inverted 2-0 Vicryl suture and staples  Final x-rays were obtained  The instrumentation was noted to be in good position  Motor evoked potentials and somatosensory evoked potentials were at baseline at the end of the procedure           I was present for the entire procedure    Patient Disposition:  PACU         SIGNATURE: Josesito Kaplan MD  DATE: February 10, 2023  TIME: 5:06 PM

## 2023-02-10 NOTE — PHYSICAL THERAPY NOTE
Physical Therapy Cancellation Note     02/10/23 0737   Note Type   Note Type Cancelled Session   Cancel Reasons Patient to operating room  (OR with NSx today, will follow up post op when updated activity orders received)         Jose L Vela, PTA

## 2023-02-10 NOTE — OCCUPATIONAL THERAPY NOTE
Occupational Therapy Treatment Note:      02/10/23 1500   Note Type   Note Type Cancelled Session   Cancel Reasons Patient to operating room  (pt in or this date, will await reorders for re evaluation for ot serivces)     April A Storm

## 2023-02-10 NOTE — ANESTHESIA POSTPROCEDURE EVALUATION
Post-Op Assessment Note    CV Status:  Stable  Pain Score: 0    Pain management: adequate     Mental Status:  Alert and awake   Hydration Status:  Euvolemic   PONV Controlled:  Controlled   Airway Patency:  Patent      Post Op Vitals Reviewed: Yes      Staff: CRNA         No notable events documented      BP (!) 81/50 (02/10/23 1537)    Temp 97 8 °F (36 6 °C) (02/10/23 1537)    Pulse (!) 106 (02/10/23 1537)   Resp 16 (02/10/23 1537)    SpO2 97 % (02/10/23 1537)

## 2023-02-10 NOTE — ANESTHESIA PREPROCEDURE EVALUATION
Procedure:  T9-L3 FUSION LUMBAR/THORACIC POSTERIOR MINIMALLY INVASIVE W ROBOT T10-T12 decompressive laminectomy (Spine Lumbar)    Relevant Problems   CARDIO   (+) Aortic stenosis   (+) Atrial flutter (HCC)   (+) Carotid artery disease (HCC)   (+) Coronary artery disease   (+) Hyperlipemia   (+) Hypertension   (+) MS (mitral stenosis)   (+) Pacemaker   (+) S/P AVR     Left Ventricle: Moderate concentric left ventricular hypertrophy with    normal LV cavity size and preserved function   Estimated ejection fraction    65%   No obvious segmental wall motion normality identified   There is    variability in contractility with rhythm   Diastolic characterization was    not assessed   •  Right Ventricle: The right ventricle appears enlarged with intact RV    function   Lead artifact seen within the right ventricle  •  Left Atrium: The atrium is dilated  •  Right Atrium: The atrium is dilated  •  Aortic Valve: Patient is status post bioprosthetic aortic valve    replacement   Size of the prosthesis is not known at this time   Measured    transaortic velocity of 2 4 m/s corresponds to peak and mean gradients of    23 and 12 mmHg respectively   Findings suggest appropriate leaflet    excursion   There is no clear insufficiency  •  Mitral Valve: Heavy mitral annular calcification   Measured mean   transmitral inflow gradient of 6 mmHg suggest moderate mitral stenosis        Physical Exam    Airway    Mallampati score: II  TM Distance: >3 FB  Neck ROM: full     Dental       Cardiovascular      Pulmonary      Other Findings        Anesthesia Plan  ASA Score- 3     Anesthesia Type- general with ASA Monitors  Additional Monitors: arterial line  Airway Plan: ETT  Plan Factors-    Chart reviewed  Obstructive sleep apnea risk education given perioperatively  Induction- intravenous  Postoperative Plan-     Informed Consent- Anesthetic plan and risks discussed with patient    I personally reviewed this patient with the CRNA  Discussed and agreed on the Anesthesia Plan with the CRNA  Zeina Guadarrama

## 2023-02-11 ENCOUNTER — APPOINTMENT (INPATIENT)
Dept: RADIOLOGY | Facility: HOSPITAL | Age: 88
End: 2023-02-11

## 2023-02-11 LAB
ANION GAP SERPL CALCULATED.3IONS-SCNC: 5 MMOL/L (ref 4–13)
APTT PPP: 33 SECONDS (ref 23–37)
BUN SERPL-MCNC: 9 MG/DL (ref 5–25)
CALCIUM SERPL-MCNC: 8.3 MG/DL (ref 8.3–10.1)
CHLORIDE SERPL-SCNC: 111 MMOL/L (ref 96–108)
CO2 SERPL-SCNC: 22 MMOL/L (ref 21–32)
CREAT SERPL-MCNC: 0.6 MG/DL (ref 0.6–1.3)
ERYTHROCYTE [DISTWIDTH] IN BLOOD BY AUTOMATED COUNT: 14.7 % (ref 11.6–15.1)
GFR SERPL CREATININE-BSD FRML MDRD: 91 ML/MIN/1.73SQ M
GLUCOSE SERPL-MCNC: 156 MG/DL (ref 65–140)
GLUCOSE SERPL-MCNC: 159 MG/DL (ref 65–140)
GLUCOSE SERPL-MCNC: 170 MG/DL (ref 65–140)
GLUCOSE SERPL-MCNC: 176 MG/DL (ref 65–140)
GLUCOSE SERPL-MCNC: 199 MG/DL (ref 65–140)
HCT VFR BLD AUTO: 35 % (ref 36.5–49.3)
HGB BLD-MCNC: 11.1 G/DL (ref 12–17)
INR PPP: 1.13 (ref 0.84–1.19)
MCH RBC QN AUTO: 28.5 PG (ref 26.8–34.3)
MCHC RBC AUTO-ENTMCNC: 31.7 G/DL (ref 31.4–37.4)
MCV RBC AUTO: 90 FL (ref 82–98)
PLATELET # BLD AUTO: 242 THOUSANDS/UL (ref 149–390)
PMV BLD AUTO: 9.4 FL (ref 8.9–12.7)
POTASSIUM SERPL-SCNC: 4.2 MMOL/L (ref 3.5–5.3)
PROTHROMBIN TIME: 14.7 SECONDS (ref 11.6–14.5)
RBC # BLD AUTO: 3.89 MILLION/UL (ref 3.88–5.62)
SODIUM SERPL-SCNC: 138 MMOL/L (ref 135–147)
WBC # BLD AUTO: 12.8 THOUSAND/UL (ref 4.31–10.16)

## 2023-02-11 PROCEDURE — 02HV33Z INSERTION OF INFUSION DEVICE INTO SUPERIOR VENA CAVA, PERCUTANEOUS APPROACH: ICD-10-PCS | Performed by: STUDENT IN AN ORGANIZED HEALTH CARE EDUCATION/TRAINING PROGRAM

## 2023-02-11 RX ORDER — SODIUM CHLORIDE, SODIUM GLUCONATE, SODIUM ACETATE, POTASSIUM CHLORIDE, MAGNESIUM CHLORIDE, SODIUM PHOSPHATE, DIBASIC, AND POTASSIUM PHOSPHATE .53; .5; .37; .037; .03; .012; .00082 G/100ML; G/100ML; G/100ML; G/100ML; G/100ML; G/100ML; G/100ML
75 INJECTION, SOLUTION INTRAVENOUS CONTINUOUS
Status: DISCONTINUED | OUTPATIENT
Start: 2023-02-11 | End: 2023-02-11

## 2023-02-11 RX ADMIN — PHENYLEPHRINE HYDROCHLORIDE 90 MCG/MIN: 10 INJECTION INTRAVENOUS at 12:16

## 2023-02-11 RX ADMIN — FOLIC ACID TAB 400 MCG 400 MCG: 400 TAB at 09:15

## 2023-02-11 RX ADMIN — TAMSULOSIN HYDROCHLORIDE 0.4 MG: 0.4 CAPSULE ORAL at 16:53

## 2023-02-11 RX ADMIN — PHENYLEPHRINE HYDROCHLORIDE 160 MCG/MIN: 10 INJECTION INTRAVENOUS at 02:23

## 2023-02-11 RX ADMIN — PHENYLEPHRINE HYDROCHLORIDE 100 MCG/MIN: 10 INJECTION INTRAVENOUS at 18:44

## 2023-02-11 RX ADMIN — METHOCARBAMOL TABLETS 500 MG: 500 TABLET, COATED ORAL at 17:01

## 2023-02-11 RX ADMIN — METHOCARBAMOL TABLETS 500 MG: 500 TABLET, COATED ORAL at 01:34

## 2023-02-11 RX ADMIN — HEPARIN SODIUM 5000 UNITS: 5000 INJECTION INTRAVENOUS; SUBCUTANEOUS at 22:00

## 2023-02-11 RX ADMIN — ACETAMINOPHEN 975 MG: 325 TABLET, FILM COATED ORAL at 06:09

## 2023-02-11 RX ADMIN — GABAPENTIN 100 MG: 100 CAPSULE ORAL at 16:53

## 2023-02-11 RX ADMIN — METHOCARBAMOL TABLETS 500 MG: 500 TABLET, COATED ORAL at 13:16

## 2023-02-11 RX ADMIN — DOCUSATE SODIUM 100 MG: 100 CAPSULE, LIQUID FILLED ORAL at 17:01

## 2023-02-11 RX ADMIN — METHOCARBAMOL TABLETS 500 MG: 500 TABLET, COATED ORAL at 06:08

## 2023-02-11 RX ADMIN — INSULIN LISPRO 2 UNITS: 100 INJECTION, SOLUTION INTRAVENOUS; SUBCUTANEOUS at 09:19

## 2023-02-11 RX ADMIN — SODIUM CHLORIDE, SODIUM GLUCONATE, SODIUM ACETATE, POTASSIUM CHLORIDE, MAGNESIUM CHLORIDE, SODIUM PHOSPHATE, DIBASIC, AND POTASSIUM PHOSPHATE 75 ML/HR: .53; .5; .37; .037; .03; .012; .00082 INJECTION, SOLUTION INTRAVENOUS at 06:06

## 2023-02-11 RX ADMIN — GABAPENTIN 100 MG: 100 CAPSULE ORAL at 09:15

## 2023-02-11 RX ADMIN — INSULIN LISPRO 2 UNITS: 100 INJECTION, SOLUTION INTRAVENOUS; SUBCUTANEOUS at 13:17

## 2023-02-11 RX ADMIN — POLYETHYLENE GLYCOL 3350 17 G: 17 POWDER, FOR SOLUTION ORAL at 09:15

## 2023-02-11 RX ADMIN — CEFAZOLIN SODIUM 2000 MG: 2 SOLUTION INTRAVENOUS at 16:00

## 2023-02-11 RX ADMIN — INSULIN LISPRO 2 UNITS: 100 INJECTION, SOLUTION INTRAVENOUS; SUBCUTANEOUS at 17:13

## 2023-02-11 RX ADMIN — Medication 1000 UNITS: at 09:15

## 2023-02-11 RX ADMIN — ATORVASTATIN CALCIUM 40 MG: 40 TABLET, FILM COATED ORAL at 16:53

## 2023-02-11 RX ADMIN — ACETAMINOPHEN 975 MG: 325 TABLET, FILM COATED ORAL at 13:16

## 2023-02-11 RX ADMIN — MELATONIN 6 MG: at 21:59

## 2023-02-11 RX ADMIN — ACETAMINOPHEN 975 MG: 325 TABLET, FILM COATED ORAL at 21:59

## 2023-02-11 RX ADMIN — CEFAZOLIN SODIUM 2000 MG: 2 SOLUTION INTRAVENOUS at 06:07

## 2023-02-11 RX ADMIN — DOCUSATE SODIUM 100 MG: 100 CAPSULE, LIQUID FILLED ORAL at 09:15

## 2023-02-11 RX ADMIN — PHENYLEPHRINE HYDROCHLORIDE 160 MCG/MIN: 10 INJECTION INTRAVENOUS at 04:27

## 2023-02-11 RX ADMIN — STANDARDIZED SENNA CONCENTRATE 17.2 MG: 8.6 TABLET ORAL at 21:59

## 2023-02-11 RX ADMIN — GABAPENTIN 100 MG: 100 CAPSULE ORAL at 21:59

## 2023-02-11 NOTE — PROGRESS NOTES
[address] [facility]  Progress Note Andi Mcgregor 1935, 81 y o  male MRN: [id number]  Unit/Bed#: ICU 09 Encounter: [id number]  Primary Care Provider: Matthew Teague DO   Date and time admitted to hospital: 2/1/2023  1:44 PM    Closed T12 fracture [facility])  Assessment & Plan  POD#1 T9-L3 FUSION LUMBAR/THORACIC POSTERIOR MINIMALLY INVASIVE W ROBOT T10-T12 decompressive laminectomy (Dr Gann Rise 2/10/2023)  · T12 fracture with slight extension into pedicles  · S/p mechanical fall Sunday, 1/29  · Additional fall 12/25/2022 - fracture evident retrospectively although not read at the time of prior imaging  Imaging:  · Uprights pending post op    Plan:  · Continue to monitor neuro exam closely  Reporting much better pain control in his back without radicular symptoms  · No longer needs a brace  · DEAN drain to full suction, 180 cc since surgery, remains in  · Map goal above 85 for 48 hours postop, requiring pressor support  · Medical management and pain control per primary team  · DVT prophylaxis: SCDs, heparin subcu    Neurosurgery will continue to follow, please call with questions or concerns  Subjective/Objective   Chief Complaint: " I feel great"    Subjective: Patient is feeling much better after surgery  States that his back pain is minimal, only slight pain when he moves but not nearly as bad as before surgery  His leg pain is completely resolved  He is very happy with his surgical results  No other complaints  Objective: Sitting up in bed, NAD    I/O       02/09 0701  02/10 0700 02/10 0701 02/11 0700 02/11 0701 02/12 0700    P  O  120  300    I V  (mL/kg)  5572 7 (55 8)     IV Piggyback  800     Total Intake(mL/kg) 120 6372 7 (63 9) 300 (3)    Urine (mL/kg/hr) 1350 1825 (0 8) 1150 (2 4)    Drains  180     Stool  0     Blood  100     Total Output 1350 2105 1150    Net -1230 +4267 7 -850           Unmeasured Stool Occurrence  1 x           Invasive Devices Central Venous Catheter Line  Duration           CVC Central Lines 02/11/23 <1 day          Peripheral Intravenous Line  Duration           Peripheral IV 02/09/23 Distal;Right;Ventral (anterior) Forearm 2 days    Peripheral IV 02/10/23 Left;Dorsal (posterior) Hand 1 day    Peripheral IV 02/10/23 Left;Dorsal (posterior) Wrist 1 day          Arterial Line  Duration           Arterial Line 02/10/23 Right Radial 1 day          Drain  Duration           Ureteral Internal Stent Right ureter 6 Fr  29 days    Urethral Catheter Non-latex 18 Fr  6 days    Closed/Suction Drain Posterior; Left Back Bulb 7 Fr  <1 day                Physical Exam:  Vitals: Blood pressure 115/70, pulse 80, temperature 97 6 °F (36 4 °C), temperature source Oral, resp  rate 20, height 5' 10" (1 778 m), weight 99 8 kg (220 lb 0 3 oz), SpO2 97 %  ,Body mass index is 31 57 kg/m²      Hemodynamic Monitoring: MAP: Arterial Line MAP (mmHg): 80 mmHg    General appearance: alert, appears stated age, cooperative and no distress  Head: Normocephalic, without obvious abnormality, atraumatic  Eyes: Conjugate gaze  Neck: supple, symmetrical, trachea midline  Back: Dressing over incision intact, DEAN drain to full suction with minimal bloody output  Lungs: non labored breathing  Heart: regular heart rate  Neurologic:   Mental status: Alert, oriented x3, follows commands, thought content appropriate  Cranial nerves: grossly intact (Cranial nerves II-XII)  Sensory: normal to light touch  Motor: moving all extremities without focal weakness 5 out of 5 throughout except for limited mobility in the right ankle secondary to prior ankle surgery  Coordination: finger to nose normal bilaterally, no drift bilaterally      Lab Results:  Results from last 7 days   Lab Units 02/11/23  0609 02/10/23  1605 02/09/23  0556 02/06/23  1252 02/05/23  0447   WBC Thousand/uL 12 80*  --  8 91 7 97 7 04   HEMOGLOBIN g/dL 11 1*  --  13 1 13 9 12 4   HEMATOCRIT % 35 0*  --  40 4 43 4 38 1 PLATELETS Thousands/uL 242 194 230 214 204   NEUTROS PCT %  --   --  71 72 69   MONOS PCT %  --   --  8 7 10     Results from last 7 days   Lab Units 02/11/23  0609 02/09/23  0556 02/06/23  1252   POTASSIUM mmol/L 4 2 4 8 4 1   CHLORIDE mmol/L 111* 103 102   CO2 mmol/L 22 27 29   BUN mg/dL 9 12 12   CREATININE mg/dL 0 60 0 70 0 70   CALCIUM mg/dL 8 3 8 9 9 1             Results from last 7 days   Lab Units 02/11/23  0609 02/10/23  0741   INR  1 13 1 10   PTT seconds 33 38*       Imaging Studies: I have personally reviewed pertinent reports  and I have personally reviewed pertinent films in PACS    XR Trauma chest portable    Result Date: 2/1/2023  Impression: No acute cardiopulmonary disease  Workstation performed: [id number]     XR spine thoracolumbar 2 vw    Result Date: 2/3/2023  Impression: Poorly visualized T12 fracture with intact posterior column  Mild to moderate loss of height suggested  Workstation performed: [id number]     TRAUMA - CT head wo contrast    Result Date: 2/1/2023  Impression: No acute intracranial abnormality  Workstation performed: [id number]     TRAUMA - CT spine cervical wo contrast    Result Date: 2/1/2023  Impression: No cervical spine fracture or traumatic malalignment  Workstation performed: [id number]     MRI thoracic spine wo contrast    Result Date: 2/6/2023  Impression: There is a subacute compression fracture of T12 with a horizontally oriented fracture cleft through the vertebral body now filled with fluid  Fracture does extend to the posterior elements in particular on the right where there is involvement of the pedicle and lamina  Stable approximately 10% loss of height of the vertebral body  There is a new mild compression fracture of the L1 superior endplate with moderate amount of edema present within the vertebral body   Advanced canal stenosis within the lower thoracic spine worst at the T11-12 level secondary to degenerative disc disease, bony retropulsion of the T12 vertebral body into the anterior epidural space and posterior element hypertrophic change with compression of the distal cord/conus  At T10-11 there is degenerative disc disease as well as a small right posterior lateral extradural mass/fluid collection which may represent hematoma, extending inferiorly to the T11-12 level  Moderate canal stenosis at T10-11 with bilateral foraminal narrowing  Severe right foraminal narrowing at the T12-L1 level  This examination was marked "immediate notification" in Epic in order to begin the standard process by which the radiology reading room liaison alerts the referring practitioner  Workstation performed: [id number]     MRI lumbar spine wo contrast    Result Date: 2/6/2023  Impression: Subacute T12 compression fracture with a new, acute compression fracture of the superior endplate of L1  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 of T11-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 best seen on series 3 image 9  Despite this  compression, the cord maintains normal signal   Severe right foraminal narrowing noted at the T12-L1 level  See separate MRI thoracic spine dictation also performed today for more detailed description of canal stenosis in the lower thoracic spine  Lumbar degenerative disc disease L2-3, L3-4 and L4-5 with multilevel canal stenosis and foraminal narrowing, most pronounced at the L4-5 level  Workstation performed: [id number]     XR pelvis ap only 1 or 2 vw    Result Date: 2/1/2023  Impression: No acute osseous abnormality  Workstation performed: [id number]     TRAUMA - CT abdomen pelvis w contrast    Result Date: 2/1/2023  Impression: Transversely oriented fracture through the T12 vertebral body with very slight extension into the pedicles  No significant displacement of fracture fragments or spinal malalignment  Very slight retropulsion of bony fragments    In retrospect, this fracture was probably acute (though very difficult to detect) on CT examination of December 30, 2022  Advanced lumbar and thoracic degenerative changes including multiple level degenerative fusion is in the lower thoracic and upper lumbar spine  Renal cortical thinning and chronic moderate right-sided hydronephrosis, persistent despite the presence of a right ureteral stent  Urothelial thickening involving right renal collecting system and proximal right ureter, similar when compared to January 12, 2023  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

## 2023-02-11 NOTE — PLAN OF CARE
Problem: MOBILITY - ADULT  Goal: Maintain or return to baseline ADL function  Description: INTERVENTIONS:  -  Assess patient's ability to carry out ADLs; assess patient's baseline for ADL function and identify physical deficits which impact ability to perform ADLs (bathing, care of mouth/teeth, toileting, grooming, dressing, etc )  - Assess/evaluate cause of self-care deficits   - Assess range of motion  - Assess patient's mobility; develop plan if impaired  - Assess patient's need for assistive devices and provide as appropriate  - Encourage maximum independence but intervene and supervise when necessary  - Involve family in performance of ADLs  - Assess for home care needs following discharge   - Consider OT consult to assist with ADL evaluation and planning for discharge  - Provide patient education as appropriate  Outcome: Progressing  Goal: Maintains/Returns to pre admission functional level  Description: INTERVENTIONS:  - Perform BMAT or MOVE assessment daily    - Set and communicate daily mobility goal to care team and patient/family/caregiver  - Collaborate with rehabilitation services on mobility goals if consulted  - Perform Range of Motion 3 times a day  - Reposition patient every 2 hours    -- Out of bed for toileting  - Record patient progress and toleration of activity level   Outcome: Progressing

## 2023-02-11 NOTE — PROGRESS NOTES
The patient's Vancomycin therapy has been completed / discontinued  Thank you for allowing us to take part in this patient's care  Pharmacy will sign-off now; please call or re-consult if there are any questions      Thanks  Bernardo Irene, Pharmacist

## 2023-02-11 NOTE — PROGRESS NOTES
Daily Progress Note - Critical Care   Nacho Schofield 81 y o  male MRN: [id number]  Unit/Bed#: ICU 09 Encounter: [id number]        ----------------------------------------------------------------------------------------  HPI/24hr events: Overnight patient required increasing doses of alin (up to 160, currently @140) to maintain MAP >85  Central line inserted  Patient feels well  Is awake, alert, oriented and remains motor and sensory intact post operatively  ---------------------------------------------------------------------------------------  SUBJECTIVE  Pain is tolerable w current regimen  No issues with PO diet, urination (wynn draining clear yellow urine), or bowel movements  Denies any HA, CP, SOB, N/V  Review of Systems  Review of systems was reviewed and negative unless stated above in HPI/24-hour events   ---------------------------------------------------------------------------------------  Assessment and Plan:    Neuro:   • Diagnosis: Acute pain due to trauma/surgery  ? Plan:   - Geriatric acute pain regimen  - Tylenol 975 mg q8 hr scheduled  - Oxycodone 2 5 mg q4 hr PRN moderate pain  - Oxycodone 5 mg q4 hr PRN severe pain  - Dilaudid 0 2 mg q4 PRN breakthrough pain        CV:   • Diagnosis: Hx of afib s/p pacemaker  ? Plan:   - Holding home eliquis post operatively  - Continuous cardiac monitoring  - Maintain MAPs > 65  • Diagnosis: Hx of HTN, CAD s/p CABG x3  ? Plan:   - Holding home lopressor and zestril (originally ordered in lieu of enalapril) in setting of requiring Alin for MAP > 85        Pulm:  • Diagnosis: No acute issues  ? Plan:   - Continuous pulse ox  - Maintain O2 saturation > 92%        GI:   • Diagnosis: Hx of HLD  ? Plan:   - Continue home statin  - Continue bowel regimen while requiring opioid medications        :   • Diagnosis: Urinary tract obstruction by kidney stone  ?  Plan:   - Renal stone 1/13 underwent lithotripsy and R renal stent placement 1/26 w a chronic wynn catheter  - Failed void trial 2/2  - Vancomycin 2/2 urine cx demonstrating resistance   - Continue to monitor renal function  - Strict I&Os - UOP 0 85 cc/kg/hr        F/E/N:   • Plan:   ? Isolyte 75 cc/hr  ? Monitor and replete lytes PRN  ? Cardiac diet        Heme/Onc:   • Diagnosis: No acute issues  ? Plan:   - Monitor hemoglobin  - Transfuse PRN hgb < 7        Endo:   • Diagnosis: No acute issues  ? Plan:   - POC glucose checks  - Maintain blood glucose 140-180  - SSI Algorithm 4        ID:   • Diagnosis: UTI  ? Plan:   - Urine culture w > 100,000 coag neg staph resistant to many abx, susceptible to vanco  - Pharmacy consult while requiring vanco, currently day 2 of therapy        MSK/Skin:   • Diagnosis: Closed T12 fracture  ? Plan:   - MRI 2/6/2023: Subacute T12 compression fracture with a new, acute compression fracture of the superior endplate of L1  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 of T11-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; cord maintains normal signal  Severe right foraminal narrowing at T12-L1 level  - Patient failed conservative management   - POD1 T9-L3 fusion w NSGY, MEP dependent on MAPs intra-operatively  - Posterior bulb drain in place w 150 cc serosang fluid overnight  - Multimodal pain regimen  - Maintain MAP > 85 - requiring jesus currently  - PT/OT as appropriate    Disposition: Continue Critical Care   Code Status: Level 1 - Full Code  ---------------------------------------------------------------------------------------  ICU CORE MEASURES    Prophylaxis   VTE Pharmacologic Prophylaxis: Heparin  VTE Mechanical Prophylaxis: sequential compression device  Stress Ulcer Prophylaxis: Prophylaxis Not Indicated     ABCDE Protocol (if indicated)  Plan to perform spontaneous awakening trial today? Not applicable  Plan to perform spontaneous breathing trial today? Not applicable  Obvious barriers to extubation?  Not applicable  CAM-ICU: Negative    Invasive Devices Review  Invasive Devices     Central Venous Catheter Line  Duration           CVC Central Lines 23 <1 day          Peripheral Intravenous Line  Duration           Peripheral IV 23 Distal;Right;Ventral (anterior) Forearm 1 day    Peripheral IV 02/10/23 Left;Dorsal (posterior) Hand <1 day    Peripheral IV 02/10/23 Left;Dorsal (posterior) Wrist <1 day          Arterial Line  Duration           Arterial Line 02/10/23 Right Radial <1 day          Drain  Duration           Ureteral Internal Stent Right ureter 6 Fr  28 days    Urethral Catheter Non-latex 18 Fr  6 days    Closed/Suction Drain Posterior; Left Back Bulb 7 Fr  <1 day              Can any invasive devices be discontinued today? No  ---------------------------------------------------------------------------------------  OBJECTIVE    Vitals   Vitals:    23 0430 23 0445 23 0515 23 0524   BP:       Pulse:    72   Resp: 12 12 12 12   Temp:       TempSrc:       SpO2:    100%   Weight:       Height:         Temp (24hrs), Av 8 °F (36 6 °C), Min:97 6 °F (36 4 °C), Max:98 2 °F (36 8 °C)  Current: Temperature: 97 8 °F (36 6 °C)      Respiratory:  SpO2: SpO2: 100 %  Nasal Cannula O2 Flow Rate (L/min): 2 L/min    Invasive/non-invasive ventilation settings   Respiratory    Lab Data (Last 4 hours)    None         O2/Vent Data (Last 4 hours)    None                Physical Exam  Vitals and nursing note reviewed  Constitutional:       General: He is not in acute distress  Appearance: He is well-developed  He is obese  HENT:      Head: Normocephalic and atraumatic  Right Ear: External ear normal       Left Ear: External ear normal       Nose: Nose normal  No congestion or rhinorrhea  Mouth/Throat:      Mouth: Mucous membranes are moist       Pharynx: Oropharynx is clear  No oropharyngeal exudate or posterior oropharyngeal erythema  Eyes:      General: No scleral icterus  Right eye: No discharge           Left eye: No discharge  Extraocular Movements: Extraocular movements intact  Conjunctiva/sclera: Conjunctivae normal       Pupils: Pupils are equal, round, and reactive to light  Cardiovascular:      Rate and Rhythm: Normal rate and regular rhythm  Heart sounds: No murmur heard  Pulmonary:      Effort: Pulmonary effort is normal  No respiratory distress  Breath sounds: Normal breath sounds  Abdominal:      General: Abdomen is flat  There is no distension  Palpations: Abdomen is soft  Tenderness: There is no abdominal tenderness  Musculoskeletal:         General: No swelling  Cervical back: Normal range of motion and neck supple  Right lower leg: No edema  Left lower leg: No edema  Skin:     General: Skin is warm and dry  Capillary Refill: Capillary refill takes less than 2 seconds  Neurological:      General: No focal deficit present  Mental Status: He is alert and oriented to person, place, and time  GCS: GCS eye subscore is 4  GCS verbal subscore is 5  GCS motor subscore is 6  Sensory: Sensation is intact  Motor: Weakness present        Comments: 4/5 plantar flexion and dorsiflexion R ankle at baseline 2/2 to known orthopedic hardware, unchanged from baseline   Psychiatric:         Mood and Affect: Mood normal          Laboratory and Diagnostics:  Results from last 7 days   Lab Units 02/10/23  1605 02/09/23  0556 02/06/23  1252 02/05/23  0447   WBC Thousand/uL  --  8 91 7 97 7 04   HEMOGLOBIN g/dL  --  13 1 13 9 12 4   HEMATOCRIT %  --  40 4 43 4 38 1   PLATELETS Thousands/uL 194 230 214 204   NEUTROS PCT %  --  71 72 69   MONOS PCT %  --  8 7 10     Results from last 7 days   Lab Units 02/09/23  0556 02/06/23  1252 02/05/23  0447   SODIUM mmol/L 135 137 136   POTASSIUM mmol/L 4 8 4 1 3 7   CHLORIDE mmol/L 103 102 105   CO2 mmol/L 27 29 25   ANION GAP mmol/L 5 6 6   BUN mg/dL 12 12 9   CREATININE mg/dL 0 70 0 70 0 59*   CALCIUM mg/dL 8 9 9 1 8 7 GLUCOSE RANDOM mg/dL 139 119 114          Results from last 7 days   Lab Units 02/10/23  0741   INR  1 10   PTT seconds 38*              ABG:    VBG:          Micro  Results from last 7 days   Lab Units 02/07/23  0954   URINE CULTURE  >100,000 cfu/ml Staphylococcus coagulase negative*       EKG: NSR on tele  Imaging:  I have personally reviewed pertinent reports  CT spine thoracic & lumbar wo contrast   Final Result by Abran Isaac MD (02/10 8896)      Acute on subacute T12 burst fracture with extension into bilateral posterior elements (bilateral pedicles and bilateral superior articular facets) with mild height loss and small retropulsed bone fragments into the spinal canal resulting in severe canal    stenosis  Subacute L1 superior endplate compression fracture with minimal height loss  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) with degenerative changes of thoracic and lumbar spine, detailed above  Additional chronic/incidental findings as detailed above  Workstation performed: [id number]         XR spine lumbar 2 or 3 views injury   Final Result by Nancy Phoenix MD (02/09 1048)      Again seen is T12 compression fracture, with interval increase in loss of height, previously 10%, now 30%  There is no loss of height related to the mild superior endplate compression fracture of L1 seen on recent MRI  Workstation performed: [id number]         XR spine thoracic 3 vw   Final Result by Nancy Phoenix MD (02/09 1049)      No thoracic spine fractures noted on these films  Please note that the T12 vertebral body fracture is better seen on the lumbar spine plain films and will be described on that report  Workstation performed: [id number]         XR femur 2 vw right   Final Result by Bryson Dorantes MD (02/08 0932)      No acute osseous abnormality              Workstation performed: [id number]         XR femur 2 vw left   Final Result by Gracie Tyler MD (02/08 1671)      No acute osseous abnormality  Workstation performed: [id number]         MRI lumbar spine wo contrast   Final Result by Gene Denney Seip, DO (02/06 1438)      Subacute T12 compression fracture with a new, acute compression fracture of the superior endplate of L1  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 of T11-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 best seen on series 3 image 9  Despite this    compression, the cord maintains normal signal   Severe right foraminal narrowing noted at the T12-L1 level  See separate MRI thoracic spine dictation also performed today for more detailed description of canal stenosis in the lower thoracic spine  Lumbar degenerative disc disease L2-3, L3-4 and L4-5 with multilevel canal stenosis and foraminal narrowing, most pronounced at the L4-5 level  Workstation performed: [id number]         MRI thoracic spine wo contrast   Final Result by Gene Denney Seip, DO (02/06 1411)      There is a subacute compression fracture of T12 with a horizontally oriented fracture cleft through the vertebral body now filled with fluid  Fracture does extend to the posterior elements in particular on the right where there is involvement of the    pedicle and lamina  Stable approximately 10% loss of height of the vertebral body  There is a new mild compression fracture of the L1 superior endplate with moderate amount of edema present within the vertebral body  Advanced canal stenosis within the lower thoracic spine worst at the T11-12 level secondary to degenerative disc disease, bony retropulsion of the T12 vertebral body into the anterior epidural space and posterior element hypertrophic change with    compression of the distal cord/conus        At T10-11 there is degenerative disc disease as well as a small right posterior lateral extradural mass/fluid collection which may represent hematoma, extending inferiorly to the T11-12 level  Moderate canal stenosis at T10-11 with bilateral foraminal    narrowing  Severe right foraminal narrowing at the T12-L1 level  This examination was marked "immediate notification" in Epic in order to begin the standard process by which the radiology reading room liaison alerts the referring practitioner  Workstation performed: [id number]         XR spine thoracolumbar 2 vw   Final Result by Claudia Cheek MD (02/03 1231)   Poorly visualized T12 fracture with intact posterior column  Mild to moderate loss of height suggested  Workstation performed: [id number]         XR spine thoracolumbar 2 vw    (Results Pending)   XR spine thoracolumbar 2 vw    (Results Pending)   XR chest portable    (Results Pending)       Intake and Output  I/O       02/09 0701  02/10 0700 02/10 0701 02/11 0700    P  O  120     I V  (mL/kg)  5272 7 (52 8)    IV Piggyback  800    Total Intake(mL/kg) 120 6072 7 (60 8)    Urine (mL/kg/hr) 1350 1575 (0 7)    Drains  150    Stool  0    Blood  100    Total Output 1350 1825    Net -1230 +4247 7          Unmeasured Stool Occurrence  1 x        UOP: 0 85 ml/kg/hr     Height and Weights   Height: 5' 10" (177 8 cm)  IBW (Ideal Body Weight): 73 kg  Body mass index is 31 57 kg/m²  Weight (last 2 days)     Date/Time Weight    02/10/23 1700 99 8 (220 02)            Nutrition       Diet Orders   (From admission, onward)             Start     Ordered    02/10/23 1852  Diet Regular; Regular House; Cardiac  Diet effective now        References:    Nutrtion Support Algorithm Enteral vs  Parenteral   Question Answer Comment   Diet Type Regular    Regular Regular House    Other Restriction(s): Cardiac    RD to adjust diet per protocol?  Yes        02/10/23 1851                  Active Medications  Scheduled Meds:  Current Facility-Administered Medications   Medication Dose Route Frequency Provider Last Rate   • acetaminophen  975 mg Oral Q8H [address] Divya Go PA-C     • amLODIPine  10 mg Oral Daily Divya Go PA-C     • atorvastatin  40 mg Oral Daily With Sun Nickie Go PA-C     • bisacodyl  10 mg Rectal Daily PRN Rufino Go PA-C     • cefazolin  2,000 mg Intravenous Q8H Divya Go PA-C Stopped (02/10/23 3845)   • cholecalciferol  1,000 Units Oral Daily Divya Go PA-C     • docusate sodium  100 mg Oral BID Divya Go PA-C     • folic acid  147 mcg Oral Daily Divya Go PA-C     • gabapentin  100 mg Oral TID Divya Go PA-C     • heparin (porcine)  5,000 Units Subcutaneous Q8H [address] Divya Go PA-C     • HYDROmorphone  0 2 mg Intravenous Q4H PRN Divya Go PA-C     • insulin lispro  2-12 Units Subcutaneous TID AC Joycelyn Rosales MD     • lidocaine  1 patch Topical Daily Divya Go PA-C     • lisinopril  10 mg Oral Daily Divya Go PA-C     • melatonin  6 mg Oral HS Divya Go PA-C     • methocarbamol  500 mg Oral Q6H [address] Divya Go PA-C     • metoprolol tartrate  50 mg Oral Daily Divya Go PA-C     • naloxone  0 04 mg Intravenous Q1MIN PRN Divya Go PA-C     • ondansetron  4 mg Intravenous Q6H PRN Divya Go PA-C     • oxyCODONE  2 5 mg Oral Q4H PRN Divya Go PA-C     • oxyCODONE  5 mg Oral Q4H PRN Rufino Go PA-C     • phenylephine   mcg/min Intravenous Titrated Walt Mckeon  mcg/min (02/11/23 8550)   • polyethylene glycol  17 g Oral Daily Divya Go PA-C     • senna  2 tablet Oral HS Divya Go PA-C     • sodium chloride  75 mL/hr Intravenous Continuous Divya Go PA-C 75 mL/hr (02/10/23 1537)   • tamsulosin  0 4 mg Oral Daily With Dinner Divya Go PA-C     • vancomycin  1,750 mg Intravenous Q24H Divya Go PA-C       Continuous Infusions:  phenylephine,  mcg/min, Last Rate: 140 mcg/min (02/11/23 0505)  sodium chloride, 75 mL/hr, Last Rate: 75 mL/hr (02/10/23 1537)      PRN Meds:   bisacodyl, 10 mg, Daily PRN  HYDROmorphone, 0 2 mg, Q4H PRN  naloxone, 0 04 mg, Q1MIN PRN  ondansetron, 4 mg, Q6H PRN  oxyCODONE, 2 5 mg, Q4H PRN  oxyCODONE, 5 mg, Q4H PRN        Allergies   Allergies   Allergen Reactions   • Ezetimibe-Simvastatin Dizziness and Lightheadedness     Other reaction(s): MAKES HIM DIZZY     • Simvastatin Lightheadedness     Other reaction(s): Dizziness     ---------------------------------------------------------------------------------------  Advance Directive and Living Will:      Power of :    POLST:    ---------------------------------------------------------------------------------------  Care Time Delivered:   No Critical Care time spent     Jeff Zavaleta MD      Portions of the record may have been created with voice recognition software  Occasional wrong word or "sound a like" substitutions may have occurred due to the inherent limitations of voice recognition software    Read the chart carefully and recognize, using context, where substitutions have occurred

## 2023-02-11 NOTE — PLAN OF CARE
Problem: MOBILITY - ADULT  Goal: Maintain or return to baseline ADL function  Description: INTERVENTIONS:  -  Assess patient's ability to carry out ADLs; assess patient's baseline for ADL function and identify physical deficits which impact ability to perform ADLs (bathing, care of mouth/teeth, toileting, grooming, dressing, etc )  - Assess/evaluate cause of self-care deficits   - Assess range of motion  - Assess patient's mobility; develop plan if impaired  - Assess patient's need for assistive devices and provide as appropriate  - Encourage maximum independence but intervene and supervise when necessary  - Involve family in performance of ADLs  - Assess for home care needs following discharge   - Consider OT consult to assist with ADL evaluation and planning for discharge  - Provide patient education as appropriate  Outcome: Progressing  Goal: Maintains/Returns to pre admission functional level  Description: INTERVENTIONS:  - Perform BMAT or MOVE assessment daily    - Set and communicate daily mobility goal to care team and patient/family/caregiver  - Collaborate with rehabilitation services on mobility goals if consulted  - Perform Range of Motion 3 times a day  - Reposition patient every 2 hours    - Dangle patient 2 times a day  - Stand patient 2 times a day  - Ambulate patient 2 times a day  - Out of bed to chair 2 times a day   - Out of bed for meals 2 times a day  - Out of bed for toileting  - Record patient progress and toleration of activity level   Outcome: Progressing     Problem: Prexisting or High Potential for Compromised Skin Integrity  Goal: Skin integrity is maintained or improved  Description: INTERVENTIONS:  - Identify patients at risk for skin breakdown  - Assess and monitor skin integrity  - Assess and monitor nutrition and hydration status  - Monitor labs   - Assess for incontinence   - Turn and reposition patient  - Assist with mobility/ambulation  - Relieve pressure over bony prominences  - Avoid friction and shearing  - Provide appropriate hygiene as needed including keeping skin clean and dry  - Evaluate need for skin moisturizer/barrier cream  - Collaborate with interdisciplinary team   - Patient/family teaching  - Consider wound care consult   Outcome: Progressing     Problem: NEUROSENSORY - ADULT  Goal: Achieves stable or improved neurological status  Description: INTERVENTIONS  - Monitor and report changes in neurological status  - Monitor vital signs such as temperature, blood pressure, glucose, and any other labs ordered   - Initiate measures to prevent increased intracranial pressure  - Monitor for seizure activity and implement precautions if appropriate      Outcome: Progressing  Goal: Achieves maximal functionality and self care  Description: INTERVENTIONS  - Monitor swallowing and airway patency with patient fatigue and changes in neurological status  - Encourage and assist patient to increase activity and self care     - Encourage visually impaired, hearing impaired and aphasic patients to use assistive/communication devices  Outcome: Progressing     Problem: PAIN - ADULT  Goal: Verbalizes/displays adequate comfort level or baseline comfort level  Description: Interventions:  - Encourage patient to monitor pain and request assistance  - Assess pain using appropriate pain scale  - Administer analgesics based on type and severity of pain and evaluate response  - Implement non-pharmacological measures as appropriate and evaluate response  - Consider cultural and social influences on pain and pain management  - Notify physician/advanced practitioner if interventions unsuccessful or patient reports new pain  Outcome: Progressing     Problem: INFECTION - ADULT  Goal: Absence or prevention of progression during hospitalization  Description: INTERVENTIONS:  - Assess and monitor for signs and symptoms of infection  - Monitor lab/diagnostic results  - Monitor all insertion sites, i e  indwelling lines, tubes, and drains  - Monitor endotracheal if appropriate and nasal secretions for changes in amount and color  - [place] appropriate cooling/warming therapies per order  - Administer medications as ordered  - Instruct and encourage patient and family to use good hand hygiene technique  - Identify and instruct in appropriate isolation precautions for identified infection/condition  Outcome: Progressing  Goal: Absence of fever/infection during neutropenic period  Description: INTERVENTIONS:  - Monitor WBC    Outcome: Progressing     Problem: SAFETY ADULT  Goal: Maintain or return to baseline ADL function  Description: INTERVENTIONS:  -  Assess patient's ability to carry out ADLs; assess patient's baseline for ADL function and identify physical deficits which impact ability to perform ADLs (bathing, care of mouth/teeth, toileting, grooming, dressing, etc )  - Assess/evaluate cause of self-care deficits   - Assess range of motion  - Assess patient's mobility; develop plan if impaired  - Assess patient's need for assistive devices and provide as appropriate  - Encourage maximum independence but intervene and supervise when necessary  - Involve family in performance of ADLs  - Assess for home care needs following discharge   - Consider OT consult to assist with ADL evaluation and planning for discharge  - Provide patient education as appropriate  Outcome: Progressing  Goal: Maintains/Returns to pre admission functional level  Description: INTERVENTIONS:  - Perform BMAT or MOVE assessment daily    - Set and communicate daily mobility goal to care team and patient/family/caregiver  - Collaborate with rehabilitation services on mobility goals if consulted  - Perform Range of Motion 3 times a day  - Reposition patient every 2 hours    - Dangle patient 2 times a day  - Stand patient 2 times a day  - Ambulate patient 2 times a day  - Out of bed to chair 2 times a day   - Out of bed for meals 2 times a day  - Out of bed for toileting  - Record patient progress and toleration of activity level   Outcome: Progressing  Goal: Patient will remain free of falls  Description: INTERVENTIONS:  - Educate patient/family on patient safety including physical limitations  - Instruct patient to call for assistance with activity   - Consult OT/PT to assist with strengthening/mobility   - Keep Call bell within reach  - Keep bed low and locked with side rails adjusted as appropriate  - Keep care items and personal belongings within reach  - Initiate and maintain comfort rounds  - Make Fall Risk Sign visible to staff  - Offer Toileting every 2 Hours, in advance of need  - Initiate/Maintain bed alarm  - Obtain necessary fall risk management equipment  - Apply yellow socks and bracelet for high fall risk patients  - Consider moving patient to room near nurses station  Outcome: Progressing     Problem: DISCHARGE PLANNING  Goal: Discharge to home or other facility with appropriate resources  Description: INTERVENTIONS:  - Identify barriers to discharge w/patient and caregiver  - Arrange for needed discharge resources and transportation as appropriate  - Identify discharge learning needs (meds, wound care, etc )  - Arrange for interpretive services to assist at discharge as needed  - Refer to Case Management Department for coordinating discharge planning if the patient needs post-hospital services based on physician/advanced practitioner order or complex needs related to functional status, cognitive ability, or social support system  Outcome: Progressing     Problem: Knowledge Deficit  Goal: Patient/family/caregiver demonstrates understanding of disease process, treatment plan, medications, and discharge instructions  Description: Complete learning assessment and assess knowledge base    Interventions:  - Provide teaching at level of understanding  - Provide teaching via preferred learning methods  Outcome: Progressing

## 2023-02-11 NOTE — ASSESSMENT & PLAN NOTE
POD#1 T9-L3 FUSION LUMBAR/THORACIC POSTERIOR MINIMALLY INVASIVE W ROBOT T10-T12 decompressive laminectomy (Dr Soria Forward 2/10/2023)  · T12 fracture with slight extension into pedicles  · S/p mechanical fall Sunday, 1/29  · Additional fall 12/25/2022 - fracture evident retrospectively although not read at the time of prior imaging  Imaging:  · Uprights pending post op    Plan:  · Continue to monitor neuro exam closely  Reporting much better pain control in his back without radicular symptoms  · No longer needs a brace  · DEAN drain to full suction, 180 cc since surgery, remains in  · Map goal above 85 for 48 hours postop, requiring pressor support  · Medical management and pain control per primary team  · DVT prophylaxis: SCDs, heparin subcu    Neurosurgery will continue to follow, please call with questions or concerns

## 2023-02-11 NOTE — CONSULTS
The patient's Vancomycin therapy has been completed / discontinued  Thank you for allowing us to take part in this patient's care  Pharmacy will sign-off now; please call or re-consult if there are any questions      Thanks  Praveen Mcguire, Pharmacist

## 2023-02-11 NOTE — PROCEDURES
Central Line Insertion    Date/Time: 2/11/2023 2:44 AM  Performed by: Rafat Montesinos MD  Authorized by: Rafat Montesinos MD     Patient location:  ICU  Consent:     Consent obtained:  Written    Consent given by:  Patient    Risks discussed:  Arterial puncture, bleeding, infection, incorrect placement and pneumothorax    Alternatives discussed:  No treatment  Universal protocol:     Procedure explained and questions answered to patient or proxy's satisfaction: yes      Relevant documents present and verified: yes      Test results available and properly labeled: yes      Radiology Images displayed and confirmed  If images not available, report reviewed: yes      Required blood products, implants, devices, and special equipment available: yes      Site/side marked: yes      Immediately prior to procedure, a time out was called: yes      Patient identity confirmed:  Verbally with patient and arm band  Pre-procedure details:     Hand hygiene: Hand hygiene performed prior to insertion      Sterile barrier technique: All elements of maximal sterile technique followed      Skin preparation:  2% chlorhexidine    Skin preparation agent: Skin preparation agent completely dried prior to procedure    Indications:     Central line indications: medications requiring central line    Anesthesia (see MAR for exact dosages):      Anesthesia method:  Local infiltration    Local anesthetic:  Lidocaine 1% w/o epi  Procedure details:     Location:  Right internal jugular    Vessel type: vein      Laterality:  Right    Approach: percutaneous technique used      Patient position:  Trendelenburg    Catheter type:  Triple lumen    Catheter size:  7 Fr    Landmarks identified: yes      Ultrasound guidance: yes      Ultrasound image availability:  Not saved    Sterile ultrasound techniques: Sterile gel and sterile probe covers were used      Number of attempts:  1    Successful placement: yes    Post-procedure details:     Post-procedure: Dressing applied and line sutured    Assessment:  Blood return through all ports and free fluid flow    Patient tolerance of procedure:   Tolerated well, no immediate complications  Comments:      CXR ordered

## 2023-02-12 ENCOUNTER — APPOINTMENT (INPATIENT)
Dept: RADIOLOGY | Facility: HOSPITAL | Age: 88
End: 2023-02-12

## 2023-02-12 LAB
ANION GAP SERPL CALCULATED.3IONS-SCNC: 4 MMOL/L (ref 4–13)
BASOPHILS # BLD AUTO: 0.02 THOUSANDS/ÂΜL (ref 0–0.1)
BASOPHILS NFR BLD AUTO: 0 % (ref 0–1)
BUN SERPL-MCNC: 10 MG/DL (ref 5–25)
CALCIUM SERPL-MCNC: 8.6 MG/DL (ref 8.3–10.1)
CHLORIDE SERPL-SCNC: 106 MMOL/L (ref 96–108)
CO2 SERPL-SCNC: 27 MMOL/L (ref 21–32)
CREAT SERPL-MCNC: 0.49 MG/DL (ref 0.6–1.3)
EOSINOPHIL # BLD AUTO: 0.03 THOUSAND/ÂΜL (ref 0–0.61)
EOSINOPHIL NFR BLD AUTO: 0 % (ref 0–6)
ERYTHROCYTE [DISTWIDTH] IN BLOOD BY AUTOMATED COUNT: 15.2 % (ref 11.6–15.1)
GFR SERPL CREATININE-BSD FRML MDRD: 98 ML/MIN/1.73SQ M
GLUCOSE SERPL-MCNC: 124 MG/DL (ref 65–140)
GLUCOSE SERPL-MCNC: 129 MG/DL (ref 65–140)
GLUCOSE SERPL-MCNC: 136 MG/DL (ref 65–140)
GLUCOSE SERPL-MCNC: 155 MG/DL (ref 65–140)
GLUCOSE SERPL-MCNC: 168 MG/DL (ref 65–140)
HCT VFR BLD AUTO: 32 % (ref 36.5–49.3)
HGB BLD-MCNC: 10.5 G/DL (ref 12–17)
IMM GRANULOCYTES # BLD AUTO: 0.04 THOUSAND/UL (ref 0–0.2)
IMM GRANULOCYTES NFR BLD AUTO: 0 % (ref 0–2)
LYMPHOCYTES # BLD AUTO: 1.58 THOUSANDS/ÂΜL (ref 0.6–4.47)
LYMPHOCYTES NFR BLD AUTO: 14 % (ref 14–44)
MCH RBC QN AUTO: 29.3 PG (ref 26.8–34.3)
MCHC RBC AUTO-ENTMCNC: 32.8 G/DL (ref 31.4–37.4)
MCV RBC AUTO: 89 FL (ref 82–98)
MONOCYTES # BLD AUTO: 1.19 THOUSAND/ÂΜL (ref 0.17–1.22)
MONOCYTES NFR BLD AUTO: 10 % (ref 4–12)
NEUTROPHILS # BLD AUTO: 8.8 THOUSANDS/ÂΜL (ref 1.85–7.62)
NEUTS SEG NFR BLD AUTO: 76 % (ref 43–75)
NRBC BLD AUTO-RTO: 0 /100 WBCS
PLATELET # BLD AUTO: 216 THOUSANDS/UL (ref 149–390)
PMV BLD AUTO: 9.9 FL (ref 8.9–12.7)
POTASSIUM SERPL-SCNC: 4 MMOL/L (ref 3.5–5.3)
RBC # BLD AUTO: 3.58 MILLION/UL (ref 3.88–5.62)
SODIUM SERPL-SCNC: 137 MMOL/L (ref 135–147)
WBC # BLD AUTO: 11.66 THOUSAND/UL (ref 4.31–10.16)

## 2023-02-12 RX ORDER — AMOXICILLIN 250 MG
2 CAPSULE ORAL 2 TIMES DAILY
Status: DISCONTINUED | OUTPATIENT
Start: 2023-02-12 | End: 2023-02-15 | Stop reason: HOSPADM

## 2023-02-12 RX ADMIN — INSULIN LISPRO 2 UNITS: 100 INJECTION, SOLUTION INTRAVENOUS; SUBCUTANEOUS at 11:42

## 2023-02-12 RX ADMIN — ACETAMINOPHEN 975 MG: 325 TABLET, FILM COATED ORAL at 15:30

## 2023-02-12 RX ADMIN — HEPARIN SODIUM 5000 UNITS: 5000 INJECTION INTRAVENOUS; SUBCUTANEOUS at 05:10

## 2023-02-12 RX ADMIN — ATORVASTATIN CALCIUM 40 MG: 40 TABLET, FILM COATED ORAL at 15:30

## 2023-02-12 RX ADMIN — TAMSULOSIN HYDROCHLORIDE 0.4 MG: 0.4 CAPSULE ORAL at 15:30

## 2023-02-12 RX ADMIN — ACETAMINOPHEN 975 MG: 325 TABLET, FILM COATED ORAL at 21:32

## 2023-02-12 RX ADMIN — METHOCARBAMOL TABLETS 500 MG: 500 TABLET, COATED ORAL at 11:41

## 2023-02-12 RX ADMIN — FOLIC ACID TAB 400 MCG 400 MCG: 400 TAB at 08:23

## 2023-02-12 RX ADMIN — METHOCARBAMOL TABLETS 500 MG: 500 TABLET, COATED ORAL at 05:10

## 2023-02-12 RX ADMIN — Medication 1000 UNITS: at 08:23

## 2023-02-12 RX ADMIN — OXYCODONE HYDROCHLORIDE 2.5 MG: 5 TABLET ORAL at 01:57

## 2023-02-12 RX ADMIN — PHENYLEPHRINE HYDROCHLORIDE 130 MCG/MIN: 10 INJECTION INTRAVENOUS at 02:07

## 2023-02-12 RX ADMIN — SENNOSIDES AND DOCUSATE SODIUM 2 TABLET: 8.6; 5 TABLET ORAL at 18:10

## 2023-02-12 RX ADMIN — SENNOSIDES AND DOCUSATE SODIUM 2 TABLET: 8.6; 5 TABLET ORAL at 10:25

## 2023-02-12 RX ADMIN — DOCUSATE SODIUM 100 MG: 100 CAPSULE, LIQUID FILLED ORAL at 08:23

## 2023-02-12 RX ADMIN — GABAPENTIN 100 MG: 100 CAPSULE ORAL at 15:30

## 2023-02-12 RX ADMIN — GABAPENTIN 100 MG: 100 CAPSULE ORAL at 08:23

## 2023-02-12 RX ADMIN — Medication 10 MG: at 13:01

## 2023-02-12 RX ADMIN — ACETAMINOPHEN 975 MG: 325 TABLET, FILM COATED ORAL at 05:10

## 2023-02-12 RX ADMIN — MELATONIN 6 MG: at 21:32

## 2023-02-12 RX ADMIN — POLYETHYLENE GLYCOL 3350 17 G: 17 POWDER, FOR SOLUTION ORAL at 08:23

## 2023-02-12 RX ADMIN — HEPARIN SODIUM 5000 UNITS: 5000 INJECTION INTRAVENOUS; SUBCUTANEOUS at 21:32

## 2023-02-12 RX ADMIN — OXYCODONE HYDROCHLORIDE 5 MG: 5 TABLET ORAL at 20:24

## 2023-02-12 RX ADMIN — METHOCARBAMOL TABLETS 500 MG: 500 TABLET, COATED ORAL at 00:03

## 2023-02-12 RX ADMIN — METHOCARBAMOL TABLETS 500 MG: 500 TABLET, COATED ORAL at 18:10

## 2023-02-12 RX ADMIN — GABAPENTIN 100 MG: 100 CAPSULE ORAL at 20:24

## 2023-02-12 RX ADMIN — INSULIN LISPRO 2 UNITS: 100 INJECTION, SOLUTION INTRAVENOUS; SUBCUTANEOUS at 08:30

## 2023-02-12 RX ADMIN — OXYCODONE HYDROCHLORIDE 2.5 MG: 5 TABLET ORAL at 16:14

## 2023-02-12 RX ADMIN — HEPARIN SODIUM 5000 UNITS: 5000 INJECTION INTRAVENOUS; SUBCUTANEOUS at 15:30

## 2023-02-12 RX ADMIN — METHOCARBAMOL TABLETS 500 MG: 500 TABLET, COATED ORAL at 23:24

## 2023-02-12 RX ADMIN — PHENYLEPHRINE HYDROCHLORIDE 140 MCG/MIN: 10 INJECTION INTRAVENOUS at 06:48

## 2023-02-12 NOTE — PLAN OF CARE
Problem: MOBILITY - ADULT  Goal: Maintain or return to baseline ADL function  Description: INTERVENTIONS:  -  Assess patient's ability to carry out ADLs; assess patient's baseline for ADL function and identify physical deficits which impact ability to perform ADLs (bathing, care of mouth/teeth, toileting, grooming, dressing, etc )  - Assess/evaluate cause of self-care deficits   - Assess range of motion  - Assess patient's mobility; develop plan if impaired  - Assess patient's need for assistive devices and provide as appropriate  - Encourage maximum independence but intervene and supervise when necessary  - Involve family in performance of ADLs  - Assess for home care needs following discharge   - Consider OT consult to assist with ADL evaluation and planning for discharge  - Provide patient education as appropriate  Outcome: Progressing  Goal: Maintains/Returns to pre admission functional level  Description: INTERVENTIONS:  - Perform BMAT or MOVE assessment daily    - Set and communicate daily mobility goal to care team and patient/family/caregiver  - Collaborate with rehabilitation services on mobility goals if consulted  - Perform Range of Motion 3 times a day  - Reposition patient every 2 hours    - Out of bed for toileting  - Record patient progress and toleration of activity level   Outcome: Progressing

## 2023-02-12 NOTE — PROGRESS NOTES
Daily Progress Note - Critical Care   Jillian Sibley 81 y o  male MRN: [id number]  Unit/Bed#: ICU 09 Encounter: [id number]        ----------------------------------------------------------------------------------------  HPI/24hr events: 52-year-old male who was s/p T9-L3 fusion admitted to the ICU for map pushes  Patient remains on phenylephrine maintain MAP 85  The patient had no overnight events  ---------------------------------------------------------------------------------------  SUBJECTIVE  Patient is alert and oriented  No acute complaints  Review of Systems   All other systems reviewed and are negative  Review of systems was reviewed and negative unless stated above in HPI/24-hour events   ---------------------------------------------------------------------------------------  Assessment and Plan:    Neuro:   • Diagnosis: Acute postoperative pain  o Plan: Tylenol 975 mg every 8 hours  o Oxycodone 2 5 mg every 4 hours as needed moderate pain  o Oxycodone 5 mg every 4 hours as needed severe pain  o Dilaudid 0 2 mg every 4 hours as needed breakthrough pain      CV:   • Diagnosis: History of A-fib s/p pacemaker  o Plan: Holding home Eliquis  • Diagnosis: CAD s/p CABG x3  o Plan: Atorvastatin 40 mg daily  • Diagnosis: Hypertension  o Plan: Holding home antihypertensives to achieve MAP goal >85      Pulm:  • Diagnosis: No active issues  o Plan: Continuous pulse ox  Maintain O2 saturation > 92%      GI:   • Diagnosis: No active issues  o Plan: Continue bowel regimen      :   • Diagnosis: Right obstructing ureteral stone  o Plan: Underwent lithotripsy with right ureteral stent placement on 1/13  o Chronic Dickens catheter in place  o Monitor creatinine and UOP  F/E/N:   • F: No active fluids  • E: Monitor and replete electrolytes as needed  • N: Cardiac diet      Heme/Onc:   • Diagnosis: No active issues  o Plan: Monitor hemoglobin  • DVT prophylaxis with SQH  SCDs        Endo:   • Diagnosis: No active issues  o Plan: SSI  Follow -180      ID:   • Diagnosis: Coag-negative staph in urine chronic indwelling Dickens  o Plan: Unlikely to represent true infection  Vancomycin discontinued  o Monitor WBC count and fever curve  MSK/Skin:   • Diagnosis: T12 fracture  o Plan: MRI 2/6/2023: Subacute T12 compression fracture a new acute compression fracture of the superior endplate of L1  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 of T11-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; cord maintains normal signal   Severe right foraminal narrowing at T12-L1 level   o POD 2 T9-L3 fusion  o MAP goal >85; continue phenylephrine  o Multimodal pain regimen as above  o PT/OT when able  Patient appropriate for transfer out of the ICU today?: Patient does not meet criteria for ICU Follow-up Clinic; referral has not been made  Disposition: Transfer to Stepdown Level 2  Code Status: Level 1 - Full Code  ---------------------------------------------------------------------------------------  ICU CORE MEASURES    Prophylaxis   VTE Pharmacologic Prophylaxis: Heparin  VTE Mechanical Prophylaxis: sequential compression device  Stress Ulcer Prophylaxis: Prophylaxis Not Indicated     ABCDE Protocol (if indicated)  Plan to perform spontaneous awakening trial today? Not applicable  Plan to perform spontaneous breathing trial today? Not applicable  Obvious barriers to extubation?  Not applicable  CAM-ICU: Negative    Invasive Devices Review  Invasive Devices     Central Venous Catheter Line  Duration           CVC Central Lines 02/11/23 1 day          Peripheral Intravenous Line  Duration           Peripheral IV 02/09/23 Distal;Right;Ventral (anterior) Forearm 3 days    Peripheral IV 02/10/23 Left;Dorsal (posterior) Hand 1 day    Peripheral IV 02/10/23 Left;Dorsal (posterior) Wrist 1 day          Arterial Line  Duration           Arterial Line 02/10/23 Right Radial 1 day          Drain  Duration           Ureteral Internal Stent Right ureter 6 Fr  29 days    Urethral Catheter Non-latex 18 Fr  7 days    Closed/Suction Drain Posterior; Left Back Bulb 7 Fr  1 day              Can any invasive devices be discontinued today? No  ---------------------------------------------------------------------------------------  OBJECTIVE    Vitals   Vitals:    23 0200 23 0300 23 0400 23 0515   BP:       Pulse: 86 76 76 78   Resp: (!) 28 13 13 22   Temp: 98 2 °F (36 8 °C)   98 °F (36 7 °C)   TempSrc: Oral   Oral   SpO2: 99% 98% 99% 99%   Weight:       Height:         Temp (24hrs), Av 2 °F (36 8 °C), Min:98 °F (36 7 °C), Max:98 5 °F (36 9 °C)  Current: Temperature: 98 °F (36 7 °C)    Respiratory:  SpO2: SpO2: 99 %  Nasal Cannula O2 Flow Rate (L/min): 2 L/min    Invasive/non-invasive ventilation settings   Respiratory    Lab Data (Last 4 hours)    None         O2/Vent Data (Last 4 hours)    None                Physical Exam  Constitutional:       General: He is not in acute distress  Appearance: He is not toxic-appearing  HENT:      Head: Normocephalic and atraumatic  Nose: Nose normal       Mouth/Throat:      Mouth: Mucous membranes are moist       Pharynx: Oropharynx is clear  Eyes:      Conjunctiva/sclera: Conjunctivae normal       Pupils: Pupils are equal, round, and reactive to light  Cardiovascular:      Rate and Rhythm: Normal rate and regular rhythm  Pulses: Normal pulses  Heart sounds: Normal heart sounds  No murmur heard  No friction rub  No gallop  Pulmonary:      Effort: Pulmonary effort is normal       Breath sounds: Normal breath sounds  Abdominal:      General: Abdomen is flat  Palpations: Abdomen is soft  Tenderness: There is no abdominal tenderness  There is no guarding or rebound  Musculoskeletal:         General: Normal range of motion  Cervical back: Normal range of motion and neck supple  Right lower leg: No edema  Left lower leg: No edema  Skin:     General: Skin is warm and dry  Capillary Refill: Capillary refill takes less than 2 seconds  Neurological:      Mental Status: He is alert and oriented to person, place, and time  Sensory: No sensory deficit  Motor: No weakness  Laboratory and Diagnostics:  Results from last 7 days   Lab Units 02/11/23  0609 02/10/23  1605 02/09/23  0556 02/06/23  1252   WBC Thousand/uL 12 80*  --  8 91 7 97   HEMOGLOBIN g/dL 11 1*  --  13 1 13 9   HEMATOCRIT % 35 0*  --  40 4 43 4   PLATELETS Thousands/uL 242 194 230 214   NEUTROS PCT %  --   --  71 72   MONOS PCT %  --   --  8 7     Results from last 7 days   Lab Units 02/11/23  0609 02/09/23  0556 02/06/23  1252   SODIUM mmol/L 138 135 137   POTASSIUM mmol/L 4 2 4 8 4 1   CHLORIDE mmol/L 111* 103 102   CO2 mmol/L 22 27 29   ANION GAP mmol/L 5 5 6   BUN mg/dL 9 12 12   CREATININE mg/dL 0 60 0 70 0 70   CALCIUM mg/dL 8 3 8 9 9 1   GLUCOSE RANDOM mg/dL 176* 139 119          Results from last 7 days   Lab Units 02/11/23  0609 02/10/23  0741   INR  1 13 1 10   PTT seconds 33 38*              ABG:    VBG:          Micro  Results from last 7 days   Lab Units 02/07/23  0954   URINE CULTURE  >100,000 cfu/ml Staphylococcus coagulase negative*       EKG: NSR on tele  Imaging: I have personally reviewed pertinent reports  Intake and Output  I/O       02/10 0701  02/11 0700 02/11 0701 02/12 0700    P  O   500    I V  (mL/kg) 5572 7 (55 8) 1219 7 (12 2)    IV Piggyback 800 50    Total Intake(mL/kg) 6372 7 (63 9) 1769 7 (17 7)    Urine (mL/kg/hr) 1825 (0 8) 3875 (1 6)    Drains 180 28    Stool 0     Blood 100     Total Output 2105 3903    Net +4267 7 -2133 3          Unmeasured Stool Occurrence 1 x           Height and Weights   Height: 5' 10" (177 8 cm)  IBW (Ideal Body Weight): 73 kg  Body mass index is 31 57 kg/m²    Weight (last 2 days)     Date/Time Weight    02/10/23 1700 99 8 (220 02)            Nutrition       Diet Orders   (From admission, onward)             Start     Ordered    02/10/23 1852  Diet Regular; Regular House; Cardiac  Diet effective now        References:    Nutrtion Support Algorithm Enteral vs  Parenteral   Question Answer Comment   Diet Type Regular    Regular Regular House    Other Restriction(s): Cardiac    RD to adjust diet per protocol?  Yes        02/10/23 1851                  Active Medications  Scheduled Meds:  Current Facility-Administered Medications   Medication Dose Route Frequency Provider Last Rate   • acetaminophen  975 mg Oral Q8H [address] Divya Go PA-C     • atorvastatin  40 mg Oral Daily With Sun Nickie Go PA-C     • bisacodyl  10 mg Rectal Daily PRN Bibiana Go PA-C     • cholecalciferol  1,000 Units Oral Daily Divya Go PA-C     • docusate sodium  100 mg Oral BID Divya Go PA-C     • folic acid  590 mcg Oral Daily Divya Go PA-C     • gabapentin  100 mg Oral TID Divya Go PA-C     • heparin (porcine)  5,000 Units Subcutaneous Q8H [address] Divya Go PA-C     • HYDROmorphone  0 2 mg Intravenous Q4H PRN Divya Go PA-C     • insulin lispro  2-12 Units Subcutaneous TID AC Tiffany Jacinto MD     • lidocaine  1 patch Topical Daily Divya Go PA-C     • melatonin  6 mg Oral HS Divya Go PA-C     • methocarbamol  500 mg Oral Q6H [address] Divya Go PA-C     • naloxone  0 04 mg Intravenous Q1MIN PRN Divya Go PA-C     • ondansetron  4 mg Intravenous Q6H PRN Divya Go PA-C     • oxyCODONE  2 5 mg Oral Q4H PRN Divya Go PA-C     • oxyCODONE  5 mg Oral Q4H PRN Divya Go PA-C     • phenylephine   mcg/min Intravenous Titrated Risa Hendrix  mcg/min (02/12/23 0207)   • polyethylene glycol  17 g Oral Daily Divya Go PA-C     • senna  2 tablet Oral HS Divya Go PA-C     • tamsulosin  0 4 mg Oral Daily With [employer] KILEY Go       Continuous Infusions:  phenylephine,  mcg/min, Last Rate: 130 mcg/min (02/12/23 0207)      PRN Meds:   bisacodyl, 10 mg, Daily PRN  HYDROmorphone, 0 2 mg, Q4H PRN  naloxone, 0 04 mg, Q1MIN PRN  ondansetron, 4 mg, Q6H PRN  oxyCODONE, 2 5 mg, Q4H PRN  oxyCODONE, 5 mg, Q4H PRN        Allergies   Allergies   Allergen Reactions   • Ezetimibe-Simvastatin Dizziness and Lightheadedness     Other reaction(s): MAKES HIM DIZZY     • Simvastatin Lightheadedness     Other reaction(s): Dizziness     ---------------------------------------------------------------------------------------  Advance Directive and Living Will:      Power of :    POLST:    ---------------------------------------------------------------------------------------      Luther Copeland DO      Portions of the record may have been created with voice recognition software  Occasional wrong word or "sound a like" substitutions may have occurred due to the inherent limitations of voice recognition software    Read the chart carefully and recognize, using context, where substitutions have occurred

## 2023-02-12 NOTE — PROGRESS NOTES
[facility] [facility]  Progress Note Nohelia Figueredo 1935, 81 y o  male MRN: [id number]  Unit/Bed#: ICU 09 Encounter: [id number]  Primary Care Provider: Katherin Meyers DO   Date and time admitted to hospital: 2/1/2023  1:44 PM    Closed T12 fracture [facility])  Assessment & Plan  POD#2 T9-L3 FUSION LUMBAR/THORACIC POSTERIOR MINIMALLY INVASIVE W ROBOT T10-T12 decompressive laminectomy (Dr Vini Fagan 2/10/2023)  · T12 fracture with slight extension into pedicles  · S/p mechanical fall Sunday, 1/29  · Additional fall 12/25/2022 - fracture evident retrospectively although not read at the time of prior imaging  Imaging:  · X-ray thoracolumbar 2/11/2023: Official read pending however per my interpretation hardware appears intact with stable alignment    Plan:  · Continue to monitor neuro exam closely  Reporting much better pain control in his back without radicular symptoms  · No longer needs a brace  · DEAN drain to full suction, 58cc / 24 hours, likely removed tomorrow  · Map goal above 85 x 48 hours post op, currently requiring some jesus for pressure support  Can likely wean pressors later this evening or tomorrow when MAP goal is completed  · Medical management and pain control per primary team  · DVT prophylaxis: SCDs, heparin subcu    Neurosurgery will continue to follow, please call with questions or concerns  S/P AVR  Assessment & Plan  · On Eliquis as an outpatient  · Continue to hold for at least 2 weeks      Subjective/Objective   Chief Complaint: " I am doing well"    Subjective: Patient states he is doing well  Feels he has to have a bowel movement  Back pain is minimal and he does not have any radicular symptoms  He is feeling very happy with his surgical results  Objective: Laying in bed, NAD    I/O       02/10 0701 02/11 0700 02/11 0701 02/12 0700 02/12 0701 02/13 0700    P  O   500 300    I V  (mL/kg) 5572 7 (55 8) 1376 1 (13 8) 153 6 (1 5)    IV Piggyback 800 50     Total Intake(mL/kg) 6372 7 (63 9) 1926 1 (19 3) 453 6 (4 5)    Urine (mL/kg/hr) 1825 (0 8) 4675 (2) 325 (0 5)    Drains 180 58     Stool 0      Blood 100      Total Output 2105 4733 325    Net +4267 7 -2807 +128 6           Unmeasured Stool Occurrence 1 x            Invasive Devices     Central Venous Catheter Line  Duration           CVC Central Lines 02/11/23 1 day          Peripheral Intravenous Line  Duration           Peripheral IV 02/09/23 Distal;Right;Ventral (anterior) Forearm 3 days    Peripheral IV 02/10/23 Left;Dorsal (posterior) Hand 2 days    Peripheral IV 02/10/23 Left;Dorsal (posterior) Wrist 2 days          Arterial Line  Duration           Arterial Line 02/10/23 Right Radial 2 days          Drain  Duration           Ureteral Internal Stent Right ureter 6 Fr  30 days    Urethral Catheter Non-latex 18 Fr  7 days    Closed/Suction Drain Posterior; Left Back Bulb 7 Fr  1 day                Physical Exam:  Vitals: Blood pressure 115/70, pulse 90, temperature 98 °F (36 7 °C), temperature source Oral, resp  rate 19, height 5' 10" (1 778 m), weight 99 8 kg (220 lb 0 3 oz), SpO2 98 %  ,Body mass index is 31 57 kg/m²      Hemodynamic Monitoring: MAP: Arterial Line MAP (mmHg): 84 mmHg    General appearance: alert, appears stated age, cooperative and no distress  Head: Normocephalic, without obvious abnormality, atraumatic  Eyes: Conjugate gaze  Neck: supple, symmetrical, trachea midline  Back: Dressing over incision intact, DEAN drain with minimal bloody output  Lungs: non labored breathing  Heart: regular heart rate  Neurologic:   Mental status: Alert, oriented x3, follows commands, thought content appropriate  Cranial nerves: grossly intact (Cranial nerves II-XII)  Sensory: normal to light touch  Motor: moving all extremities without focal weakness   Coordination: finger to nose normal bilaterally, no drift bilaterally      Lab Results:  Results from last 7 days   Lab Units 02/12/23  0617 02/11/23  3183 02/10/23  1605 02/09/23  0556 02/06/23  1252   WBC Thousand/uL 11 66* 12 80*  --  8 91 7 97   HEMOGLOBIN g/dL 10 5* 11 1*  --  13 1 13 9   HEMATOCRIT % 32 0* 35 0*  --  40 4 43 4   PLATELETS Thousands/uL 216 242 194 230 214   NEUTROS PCT % 76*  --   --  71 72   MONOS PCT % 10  --   --  8 7     Results from last 7 days   Lab Units 02/12/23  0617 02/11/23  0609 02/09/23  0556   POTASSIUM mmol/L 4 0 4 2 4 8   CHLORIDE mmol/L 106 111* 103   CO2 mmol/L 27 22 27   BUN mg/dL 10 9 12   CREATININE mg/dL 0 49* 0 60 0 70   CALCIUM mg/dL 8 6 8 3 8 9             Results from last 7 days   Lab Units 02/11/23  0609 02/10/23  0741   INR  1 13 1 10   PTT seconds 33 38*       Imaging Studies: I have personally reviewed pertinent reports  and I have personally reviewed pertinent films in PACS    XR Trauma chest portable    Result Date: 2/1/2023  Impression: No acute cardiopulmonary disease  Workstation performed: [id number]     XR spine thoracolumbar 2 vw    Result Date: 2/3/2023  Impression: Poorly visualized T12 fracture with intact posterior column  Mild to moderate loss of height suggested  Workstation performed: [id number]     TRAUMA - CT head wo contrast    Result Date: 2/1/2023  Impression: No acute intracranial abnormality  Workstation performed: [id number]     TRAUMA - CT spine cervical wo contrast    Result Date: 2/1/2023  Impression: No cervical spine fracture or traumatic malalignment  Workstation performed: [id number]     MRI thoracic spine wo contrast    Result Date: 2/6/2023  Impression: There is a subacute compression fracture of T12 with a horizontally oriented fracture cleft through the vertebral body now filled with fluid  Fracture does extend to the posterior elements in particular on the right where there is involvement of the pedicle and lamina  Stable approximately 10% loss of height of the vertebral body    There is a new mild compression fracture of the L1 superior endplate with moderate amount of edema present within the vertebral body  Advanced canal stenosis within the lower thoracic spine worst at the T11-12 level secondary to degenerative disc disease, bony retropulsion of the T12 vertebral body into the anterior epidural space and posterior element hypertrophic change with compression of the distal cord/conus  At T10-11 there is degenerative disc disease as well as a small right posterior lateral extradural mass/fluid collection which may represent hematoma, extending inferiorly to the T11-12 level  Moderate canal stenosis at T10-11 with bilateral foraminal narrowing  Severe right foraminal narrowing at the T12-L1 level  This examination was marked "immediate notification" in Epic in order to begin the standard process by which the radiology reading room liaison alerts the referring practitioner  Workstation performed: [id number]     MRI lumbar spine wo contrast    Result Date: 2/6/2023  Impression: Subacute T12 compression fracture with a new, acute compression fracture of the superior endplate of L1  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 of T11-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 best seen on series 3 image 9  Despite this  compression, the cord maintains normal signal   Severe right foraminal narrowing noted at the T12-L1 level  See separate MRI thoracic spine dictation also performed today for more detailed description of canal stenosis in the lower thoracic spine  Lumbar degenerative disc disease L2-3, L3-4 and L4-5 with multilevel canal stenosis and foraminal narrowing, most pronounced at the L4-5 level  Workstation performed: [id number]     XR pelvis ap only 1 or 2 vw    Result Date: 2/1/2023  Impression: No acute osseous abnormality  Workstation performed: [id number]     TRAUMA - CT abdomen pelvis w contrast    Result Date: 2/1/2023  Impression: Transversely oriented fracture through the T12 vertebral body with very slight extension into the pedicles    No significant displacement of fracture fragments or spinal malalignment  Very slight retropulsion of bony fragments  In retrospect, this fracture was probably acute (though very difficult to detect) on CT examination of December 30, 2022  Advanced lumbar and thoracic degenerative changes including multiple level degenerative fusion is in the lower thoracic and upper lumbar spine  Renal cortical thinning and chronic moderate right-sided hydronephrosis, persistent despite the presence of a right ureteral stent  Urothelial thickening involving right renal collecting system and proximal right ureter, similar when compared to January 12, 2023  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

## 2023-02-12 NOTE — PLAN OF CARE
Problem: MOBILITY - ADULT  Goal: Maintain or return to baseline ADL function  Description: INTERVENTIONS:  -  Assess patient's ability to carry out ADLs; assess patient's baseline for ADL function and identify physical deficits which impact ability to perform ADLs (bathing, care of mouth/teeth, toileting, grooming, dressing, etc )  - Assess/evaluate cause of self-care deficits   - Assess range of motion  - Assess patient's mobility; develop plan if impaired  - Assess patient's need for assistive devices and provide as appropriate  - Encourage maximum independence but intervene and supervise when necessary  - Involve family in performance of ADLs  - Assess for home care needs following discharge   - Consider OT consult to assist with ADL evaluation and planning for discharge  - Provide patient education as appropriate  Outcome: Progressing  Goal: Maintains/Returns to pre admission functional level  Description: INTERVENTIONS:  - Perform BMAT or MOVE assessment daily    - Set and communicate daily mobility goal to care team and patient/family/caregiver  - Collaborate with rehabilitation services on mobility goals if consulted  - Perform Range of Motion 3 times a day  - Reposition patient every 2 hours    - Dangle patient 2 times a day  - Stand patient 2 times a day  - Ambulate patient 2 times a day  - Out of bed to chair 2 times a day   - Out of bed for meals 2 times a day  - Out of bed for toileting  - Record patient progress and toleration of activity level   Outcome: Progressing     Problem: Prexisting or High Potential for Compromised Skin Integrity  Goal: Skin integrity is maintained or improved  Description: INTERVENTIONS:  - Identify patients at risk for skin breakdown  - Assess and monitor skin integrity  - Assess and monitor nutrition and hydration status  - Monitor labs   - Assess for incontinence   - Turn and reposition patient  - Assist with mobility/ambulation  - Relieve pressure over bony prominences  - Avoid friction and shearing  - Provide appropriate hygiene as needed including keeping skin clean and dry  - Evaluate need for skin moisturizer/barrier cream  - Collaborate with interdisciplinary team   - Patient/family teaching  - Consider wound care consult   Outcome: Progressing     Problem: NEUROSENSORY - ADULT  Goal: Achieves stable or improved neurological status  Description: INTERVENTIONS  - Monitor and report changes in neurological status  - Monitor vital signs such as temperature, blood pressure, glucose, and any other labs ordered   - Initiate measures to prevent increased intracranial pressure  - Monitor for seizure activity and implement precautions if appropriate      Outcome: Progressing  Goal: Achieves maximal functionality and self care  Description: INTERVENTIONS  - Monitor swallowing and airway patency with patient fatigue and changes in neurological status  - Encourage and assist patient to increase activity and self care     - Encourage visually impaired, hearing impaired and aphasic patients to use assistive/communication devices  Outcome: Progressing     Problem: PAIN - ADULT  Goal: Verbalizes/displays adequate comfort level or baseline comfort level  Description: Interventions:  - Encourage patient to monitor pain and request assistance  - Assess pain using appropriate pain scale  - Administer analgesics based on type and severity of pain and evaluate response  - Implement non-pharmacological measures as appropriate and evaluate response  - Consider cultural and social influences on pain and pain management  - Notify physician/advanced practitioner if interventions unsuccessful or patient reports new pain  Outcome: Progressing     Problem: INFECTION - ADULT  Goal: Absence or prevention of progression during hospitalization  Description: INTERVENTIONS:  - Assess and monitor for signs and symptoms of infection  - Monitor lab/diagnostic results  - Monitor all insertion sites, i e  indwelling lines, tubes, and drains  - Monitor endotracheal if appropriate and nasal secretions for changes in amount and color  - [place] appropriate cooling/warming therapies per order  - Administer medications as ordered  - Instruct and encourage patient and family to use good hand hygiene technique  - Identify and instruct in appropriate isolation precautions for identified infection/condition  Outcome: Progressing  Goal: Absence of fever/infection during neutropenic period  Description: INTERVENTIONS:  - Monitor WBC    Outcome: Progressing     Problem: SAFETY ADULT  Goal: Maintain or return to baseline ADL function  Description: INTERVENTIONS:  -  Assess patient's ability to carry out ADLs; assess patient's baseline for ADL function and identify physical deficits which impact ability to perform ADLs (bathing, care of mouth/teeth, toileting, grooming, dressing, etc )  - Assess/evaluate cause of self-care deficits   - Assess range of motion  - Assess patient's mobility; develop plan if impaired  - Assess patient's need for assistive devices and provide as appropriate  - Encourage maximum independence but intervene and supervise when necessary  - Involve family in performance of ADLs  - Assess for home care needs following discharge   - Consider OT consult to assist with ADL evaluation and planning for discharge  - Provide patient education as appropriate  Outcome: Progressing  Goal: Maintains/Returns to pre admission functional level  Description: INTERVENTIONS:  - Perform BMAT or MOVE assessment daily    - Set and communicate daily mobility goal to care team and patient/family/caregiver  - Collaborate with rehabilitation services on mobility goals if consulted  - Perform Range of Motion 3 times a day  - Reposition patient every 2 hours    - Dangle patient 2 times a day  - Stand patient 2 times a day  - Ambulate patient 2 times a day  - Out of bed to chair 2 times a day   - Out of bed for meals 2 times a day  - Out of bed for toileting  - Record patient progress and toleration of activity level   Outcome: Progressing  Goal: Patient will remain free of falls  Description: INTERVENTIONS:  - Educate patient/family on patient safety including physical limitations  - Instruct patient to call for assistance with activity   - Consult OT/PT to assist with strengthening/mobility   - Keep Call bell within reach  - Keep bed low and locked with side rails adjusted as appropriate  - Keep care items and personal belongings within reach  - Initiate and maintain comfort rounds  - Make Fall Risk Sign visible to staff  - Offer Toileting every 2 Hours, in advance of need  - Initiate/Maintain bed alarm  - Obtain necessary fall risk management equipment  - Apply yellow socks and bracelet for high fall risk patients  - Consider moving patient to room near nurses station  Outcome: Progressing     Problem: DISCHARGE PLANNING  Goal: Discharge to home or other facility with appropriate resources  Description: INTERVENTIONS:  - Identify barriers to discharge w/patient and caregiver  - Arrange for needed discharge resources and transportation as appropriate  - Identify discharge learning needs (meds, wound care, etc )  - Arrange for interpretive services to assist at discharge as needed  - Refer to Case Management Department for coordinating discharge planning if the patient needs post-hospital services based on physician/advanced practitioner order or complex needs related to functional status, cognitive ability, or social support system  Outcome: Progressing     Problem: Knowledge Deficit  Goal: Patient/family/caregiver demonstrates understanding of disease process, treatment plan, medications, and discharge instructions  Description: Complete learning assessment and assess knowledge base    Interventions:  - Provide teaching at level of understanding  - Provide teaching via preferred learning methods  Outcome: Progressing

## 2023-02-12 NOTE — PROGRESS NOTES
Progress Note - Trauma ICU Transfer   and [facility]   Magy Gerber 81 y o  male [id number]   Unit/Bed#: [facility] 622-01 Encounter: [id number]     Assessment & Plan   Summary of Diagnosed Injuries: Closed T12, L1 fracture w severe canal stenosis w/o marrow edema  PLAN:  - MAP goals completed  - Encourage I S  and pulm toilet  - SQH for DVT ppx  - PO diet  - PT/OT    VTE Prophylaxis:Heparin     Disposition: transfer to trauma SD    Code status:  Level 1 - Full Code    Consultants: IP CONSULT TO NEUROSURGERY  IP CONSULT TO CASE MANAGEMENT  IP CONSULT TO GERONTOLOGY  IP CONSULT TO ACUTE PAIN SERVICE  IP CONSULT TO CARDIOLOGY  IP CONSULT TO PHARMACY  IP CONSULT TO CASE MANAGEMENT     Subjective   Mechanism of Injury:Other: outpatient work up requiring NSGY intervention     HPI/Last 24 hour events: MAP goals completed, no longer requiring pressors  Appropriate for transfer to trauma  Reason for ICU admission: Close neurologic monitoring post operatively w pressor requirements for MAP goals of > 85 for a total of 48 hours  Summary of ICU clinical course: Required pressors for MAP goals  MAP goals completed  No longer on pressors  Patient with chronic wynn, coag neg staph on culture w resistance to everything but vanco  Vanco stopped on treatment day 2 because felt to be an abnormal urinary tract bug, likely contaminant  Tolerating PO diet well  No issues w wynn or bowel movements  Recent or scheduled procedures: POD2 T9-L3 postior spinal fusion w decompressive laminectomy    Outstanding/pending diagnostics: N/A       Objective   Vitals:   Temp:  [97 9 °F (36 6 °C)-98 5 °F (36 9 °C)] 98 2 °F (36 8 °C)  HR:  [74-98] 93  Resp:  [12-28] 21  BP: (140)/(70) 140/70  Arterial Line BP: (104-172)/(46-66) 104/46    I/O       02/10 0701 02/11 0700 02/11 0701 02/12 0700 02/12 0701 02/13 0700    P  O   500 600    I V  (mL/kg) 5572 7 (55 8) 1376 1 (13 8) 225 2 (2 3)    IV Piggyback 800 50     Total Intake(mL/kg) 6372 7 (63 9) 1926 1 (19 3) 825 2 (8 3)    Urine (mL/kg/hr) 1825 (0 8) 4675 (2) 1700 (1 5)    Drains 180 58 25    Stool 0      Blood 100      Total Output 2105 4733 1725    Net +4496 6 -5428 -028 8           Unmeasured Stool Occurrence 1 x             Physical Exam:   General: NAD, resting comfortably in bed  HEENT: MMM, EOM intact, ROSANGELA  Cardio: warm, well perfused  Pulm: normal WOB, no resp distress  Abd: soft, nontender  : chronic wynn, draining clear yellow urine  MSK: 5/5 strength globally w exception of 4/5 strength in R ankle plantar/dorsi flexion (baseline), SILT, 2+ DP b/l      Invasive Devices     Peripheral Intravenous Line  Duration           Peripheral IV 02/09/23 Distal;Right;Ventral (anterior) Forearm 3 days    Peripheral IV 02/10/23 Left;Dorsal (posterior) Hand 2 days    Peripheral IV 02/10/23 Left;Dorsal (posterior) Wrist 2 days          Drain  Duration           Ureteral Internal Stent Right ureter 6 Fr  30 days    Urethral Catheter Non-latex 18 Fr  8 days    Closed/Suction Drain Posterior; Left Back Bulb 7 Fr  2 days               Rationale for remaining devices: drain per NSGY, chronic wynn, peripheral access    1  Before the illness or injury that brought you to the Emergency, did you need someone to help you on a regular basis? 0=No   2  Since the illness or injury that brought you to the Emergency, have you needed more help than usual to take care of yourself? 1=Yes   3  Have you been hospitalized for one or more nights during the past 6 months (excluding a stay in the Emergency Department)? 1=Yes   4  In general, do you see well? 0=Yes   5  In general, do you have serious problems with your memory? 1=Yes   6  Do you take more than three different medications everyday?  1=Yes   TOTAL   4     Did you order a geriatric consult if the score was 2 or greater?: yes       Lab Results:   Results: I have personally reviewed all pertinent laboratory/tests results, BMP/CMP:   Lab Results   Component Value Date    SODIUM 137 02/12/2023    K 4 0 02/12/2023     02/12/2023    CO2 27 02/12/2023    BUN 10 02/12/2023    CREATININE 0 49 (L) 02/12/2023    CALCIUM 8 6 02/12/2023    EGFR 98 02/12/2023    and CBC:   Lab Results   Component Value Date    WBC 11 66 (H) 02/12/2023    HGB 10 5 (L) 02/12/2023    HCT 32 0 (L) 02/12/2023    MCV 89 02/12/2023     02/12/2023    MCH 29 3 02/12/2023    MCHC 32 8 02/12/2023    RDW 15 2 (H) 02/12/2023    MPV 9 9 02/12/2023    NRBC 0 02/12/2023       Imaging Results: I have personally reviewed pertinent films in PACS  Chest Xray(s): N/A   FAST exam(s): N/A   CT Scan(s): N/A   Additional Xray(s): N/A     Other Studies: N/A    Code Status: Level 1 - Full Code       Patient seen and evaluated by Critical Care today and deemed to be appropriate for transfer to [facility]  Spoke to Nuvola from Trauma service regarding transfer  Critical Care can be contacted via [employer] with any questions or concerns

## 2023-02-12 NOTE — ASSESSMENT & PLAN NOTE
POD#2 T9-L3 FUSION LUMBAR/THORACIC POSTERIOR MINIMALLY INVASIVE W ROBOT T10-T12 decompressive laminectomy (Dr Moraima Flores 2/10/2023)  · T12 fracture with slight extension into pedicles  · S/p mechanical fall Sunday, 1/29  · Additional fall 12/25/2022 - fracture evident retrospectively although not read at the time of prior imaging  Imaging:  · X-ray thoracolumbar 2/11/2023: Official read pending however per my interpretation hardware appears intact with stable alignment    Plan:  · Continue to monitor neuro exam closely  Reporting much better pain control in his back without radicular symptoms  · No longer needs a brace  · DEAN drain to full suction, 58cc / 24 hours, likely removed tomorrow  · Map goal above 85 x 48 hours post op, currently requiring some jesus for pressure support  Can likely wean pressors later this evening or tomorrow when MAP goal is completed  · Medical management and pain control per primary team  · DVT prophylaxis: SCDs, heparin subcu    Neurosurgery will continue to follow, please call with questions or concerns

## 2023-02-13 ENCOUNTER — TELEPHONE (OUTPATIENT)
Dept: NEUROSURGERY | Facility: CLINIC | Age: 88
End: 2023-02-13

## 2023-02-13 LAB
ANION GAP SERPL CALCULATED.3IONS-SCNC: 5 MMOL/L (ref 4–13)
BUN SERPL-MCNC: 10 MG/DL (ref 5–25)
CALCIUM SERPL-MCNC: 8.6 MG/DL (ref 8.3–10.1)
CHLORIDE SERPL-SCNC: 103 MMOL/L (ref 96–108)
CO2 SERPL-SCNC: 27 MMOL/L (ref 21–32)
CREAT SERPL-MCNC: 0.58 MG/DL (ref 0.6–1.3)
ERYTHROCYTE [DISTWIDTH] IN BLOOD BY AUTOMATED COUNT: 14.8 % (ref 11.6–15.1)
GFR SERPL CREATININE-BSD FRML MDRD: 92 ML/MIN/1.73SQ M
GLUCOSE SERPL-MCNC: 123 MG/DL (ref 65–140)
GLUCOSE SERPL-MCNC: 125 MG/DL (ref 65–140)
GLUCOSE SERPL-MCNC: 125 MG/DL (ref 65–140)
GLUCOSE SERPL-MCNC: 136 MG/DL (ref 65–140)
GLUCOSE SERPL-MCNC: 165 MG/DL (ref 65–140)
HCT VFR BLD AUTO: 31.2 % (ref 36.5–49.3)
HGB BLD-MCNC: 10.2 G/DL (ref 12–17)
MCH RBC QN AUTO: 29.5 PG (ref 26.8–34.3)
MCHC RBC AUTO-ENTMCNC: 32.7 G/DL (ref 31.4–37.4)
MCV RBC AUTO: 90 FL (ref 82–98)
PLATELET # BLD AUTO: 156 THOUSANDS/UL (ref 149–390)
PMV BLD AUTO: 10.3 FL (ref 8.9–12.7)
POTASSIUM SERPL-SCNC: 4 MMOL/L (ref 3.5–5.3)
RBC # BLD AUTO: 3.46 MILLION/UL (ref 3.88–5.62)
SODIUM SERPL-SCNC: 135 MMOL/L (ref 135–147)
WBC # BLD AUTO: 6.34 THOUSAND/UL (ref 4.31–10.16)

## 2023-02-13 RX ORDER — BISACODYL 10 MG
10 SUPPOSITORY, RECTAL RECTAL ONCE
Status: COMPLETED | OUTPATIENT
Start: 2023-02-13 | End: 2023-02-13

## 2023-02-13 RX ADMIN — ACETAMINOPHEN 975 MG: 325 TABLET, FILM COATED ORAL at 21:42

## 2023-02-13 RX ADMIN — SENNOSIDES AND DOCUSATE SODIUM 2 TABLET: 8.6; 5 TABLET ORAL at 09:16

## 2023-02-13 RX ADMIN — Medication 1000 UNITS: at 09:16

## 2023-02-13 RX ADMIN — OXYCODONE HYDROCHLORIDE 5 MG: 5 TABLET ORAL at 22:10

## 2023-02-13 RX ADMIN — LIDOCAINE 5% 1 PATCH: 700 PATCH TOPICAL at 18:35

## 2023-02-13 RX ADMIN — HEPARIN SODIUM 5000 UNITS: 5000 INJECTION INTRAVENOUS; SUBCUTANEOUS at 21:44

## 2023-02-13 RX ADMIN — METHOCARBAMOL TABLETS 500 MG: 500 TABLET, COATED ORAL at 17:15

## 2023-02-13 RX ADMIN — OXYCODONE HYDROCHLORIDE 5 MG: 5 TABLET ORAL at 13:53

## 2023-02-13 RX ADMIN — GABAPENTIN 100 MG: 100 CAPSULE ORAL at 21:43

## 2023-02-13 RX ADMIN — GABAPENTIN 100 MG: 100 CAPSULE ORAL at 09:16

## 2023-02-13 RX ADMIN — SENNOSIDES AND DOCUSATE SODIUM 2 TABLET: 8.6; 5 TABLET ORAL at 17:15

## 2023-02-13 RX ADMIN — GABAPENTIN 100 MG: 100 CAPSULE ORAL at 17:16

## 2023-02-13 RX ADMIN — OXYCODONE HYDROCHLORIDE 5 MG: 5 TABLET ORAL at 09:54

## 2023-02-13 RX ADMIN — HEPARIN SODIUM 5000 UNITS: 5000 INJECTION INTRAVENOUS; SUBCUTANEOUS at 05:42

## 2023-02-13 RX ADMIN — FOLIC ACID TAB 400 MCG 400 MCG: 400 TAB at 09:16

## 2023-02-13 RX ADMIN — METHOCARBAMOL TABLETS 500 MG: 500 TABLET, COATED ORAL at 11:52

## 2023-02-13 RX ADMIN — POLYETHYLENE GLYCOL 3350 17 G: 17 POWDER, FOR SOLUTION ORAL at 09:18

## 2023-02-13 RX ADMIN — ACETAMINOPHEN 975 MG: 325 TABLET, FILM COATED ORAL at 05:42

## 2023-02-13 RX ADMIN — ACETAMINOPHEN 975 MG: 325 TABLET, FILM COATED ORAL at 13:51

## 2023-02-13 RX ADMIN — Medication 10 MG: at 18:29

## 2023-02-13 RX ADMIN — TAMSULOSIN HYDROCHLORIDE 0.4 MG: 0.4 CAPSULE ORAL at 17:15

## 2023-02-13 RX ADMIN — ATORVASTATIN CALCIUM 40 MG: 40 TABLET, FILM COATED ORAL at 17:15

## 2023-02-13 RX ADMIN — OXYCODONE HYDROCHLORIDE 5 MG: 5 TABLET ORAL at 05:41

## 2023-02-13 RX ADMIN — MELATONIN 6 MG: at 21:46

## 2023-02-13 RX ADMIN — METHOCARBAMOL TABLETS 500 MG: 500 TABLET, COATED ORAL at 05:42

## 2023-02-13 NOTE — OCCUPATIONAL THERAPY NOTE
Occupational Therapy Evaluation     Patient Name: Juan Payne  MAKCQ'K Date: 2/13/2023  Problem List  Principal Problem:    Fall  Active Problems:    Hypertension    Hyperlipemia    Aortic stenosis    S/P AVR    Carotid artery disease (HCC)    Pacemaker    Urinary tract obstruction by kidney stone    Closed T12 fracture (HCC)    Right hip pain    Acute pain due to trauma    Past Medical History  Past Medical History:   Diagnosis Date    Coronary artery disease     H/O atrial flutter     History of aortic valve replacement with bioprosthetic valve     Hyperlipemia     Hypertension     Pacemaker     Skin cancer, basal cell     Walker as ambulation aid     Wears dentures      Past Surgical History  Past Surgical History:   Procedure Laterality Date    A-V CARDIAC PACEMAKER INSERTION      AORTIC VALVE REPLACEMENT      CARDIAC PACEMAKER PLACEMENT Left     2021    CATARACT EXTRACTION      CORONARY ANGIOPLASTY WITH STENT PLACEMENT      stents times 3    LUMBAR FUSION N/A 2/10/2023    Procedure: T9-L3 FUSION LUMBAR/THORACIC POSTERIOR MINIMALLY INVASIVE W ROBOT T10-T12 decompressive laminectomy;  Surgeon: Tomy Valverde MD;  [facility];  Service: Neurosurgery    VA CYSTO BLADDER W/URETERAL CATHETERIZATION Right 01/13/2023    Procedure: CYSTOSCOPY RETROGRADE PYELOGRAM WITH INSERTION STENT URETERAL;  Surgeon: Tobie Gottron, MD;  Location: [facility];  Service: Urology    VA CYSTO BLADDER W/URETERAL CATHETERIZATION Right 1/26/2023    Procedure: CYSTOSCOPY WITH RETROGRADE PYELOGRAM;  Surgeon: Tobie Gottron, MD;  Location[facility];  Service: Urology    VA CYSTO/URETERO W/LITHOTRIPSY &INDWELL STENT INSRT Right 1/26/2023    Procedure: CYSTOSCOPY URETEROSCOPY WITH LITHOTRIPSY HOLMIUM LASER, RETROGRADE PYELOGRAM AND INSERTION STENT URETERAL;  Surgeon: Tobie Gottron, MD;  Location[facility];  Service: Urology         02/13/23 1141   OT Last Visit   OT Visit Date 02/13/23   Note Type   Note type Re-Evaluation   Pain Assessment   Pain Assessment Tool 0-10   Pain Score 7   Pain Location/Orientation Location: Back   Patient's Stated Pain Goal No pain   Hospital Pain Intervention(s) Ambulation/increased activity   Restrictions/Precautions   Weight Bearing Precautions Per Order No   Braces or Orthoses   (does not need back brace anymore per neurosx)   Other Precautions Chair Alarm; Bed Alarm; Fall Risk;Pain;Spinal precautions   Home Living   Additional Comments see IE   Prior Function   Comments see IE   Lifestyle   Autonomy I adls (reports 1* spongebathing) and HH mobility with RW- reports limited mobility since first fall in December with transition to [address] with hospital bed - family assists with iadls   Reciprocal Relationships supportive family - lives with dtr and son in law - reports vijay is retired and home during the day   Service to Others retired   Intrinsic Gratification sedentary  - reports he used to go to [employer] a few x's/wk and go to grocery store for dtr prior to fall in December   Subjective   Subjective "I hope I do ok"   ADL   Where Assessed Edge of bed   Eating Assistance 6  Modified independent   Grooming Assistance 6  Modified Independent   UB Bathing Assistance 4  Minimal Assistance   LB Bathing Assistance 3  Moderate Assistance   UB Dressing Assistance 4  [address] 3  Moderate Assistance   Toileting Assistance  3  Moderate Assistance   Functional Assistance 3  Moderate Assistance   Bed Mobility   Rolling L 3  Moderate assistance   Additional items Assist x 1; Increased time required;Verbal cues;LE management   Transfers   Sit to Stand 3  Moderate assistance   Additional items Assist x 2; Increased time required;Verbal cues   Stand to Sit 3  Moderate assistance   Additional items Assist x 2; Increased time required;Verbal cues   Additional Comments c rw, vc for hand placement and inc ovreall safety awareness     Functional Mobility   Functional Mobility 3 Moderate assistance   Additional Comments ax2, EOB>chair   Additional items Rolling walker   Balance   Static Sitting Fair +   Dynamic Sitting Fair   Static Standing Fair -   Dynamic Standing Poor +   Ambulatory Poor   Activity Tolerance   Activity Tolerance Patient limited by fatigue   Medical Staff Made Aware DPT Isaiah Peeling 2' pts med complexity, comorbidities and regression from baseline   Nurse Made Aware ok per RN   RUE Assessment   RUE Assessment WFL   LUE Assessment   LUE Assessment WFL   Hand Function   Gross Motor Coordination Functional   Fine Motor Coordination Functional   Psychosocial   Psychosocial (WDL) WDL   Cognition   Overall Cognitive Status WFL   Arousal/Participation Alert; Responsive; Cooperative   Attention Within functional limits   Orientation Level Oriented X4   Memory Decreased recall of precautions   Following Commands Follows one step commands with increased time or repetition   Comments pleasant and cooperative, overall fair safety awareness and insight to condition  does require vc for reminder of spinal precautions   Assessment   Limitation Decreased ADL status; Decreased endurance;Decreased Safe judgement during ADL;Decreased high-level ADLs; Decreased self-care trans   Prognosis Good   Assessment Pt seen this date for OT re-eval 2' undergoing T9-L3 fusion lumbar/thoracic posterio minimally invasive w robot T10-T12 decompressive laminectomy 2/10/23  Pt is POD #3 w active OT eval and treat orders  Please see IE for PLOF and home set-up  Currently, pt is min A for UB ADL, mod A for LB ADL and completed transfers/FM w mod Ax2 c RW for support  Pt is limited at this time 2* decreased endurance/activtiy tolerance, decreased ADL/High-level ADL status, decreased self-care trans, decreased safety awareness,  and is a fall risk   This impacts pt's ability to complete UB and LB dressing and bathing, toileting, transfers, functional mobility, community mobility, home and health maintenance, and safe engagement in typical daily routine  The patient's raw score on the AM-PAC Daily Activity inpatient short form is 16, standardized score is 35 96, less than 39 4  Patients at this level are likely to benefit from discharge to post-acute rehabilitation services  Please refer to the recommendation of the Occupational Therapist for safe discharge planning  From OT standpoint, pt should D/C to STR when medically stable  Pt will benefit from continued acute OT services 2-3 x/wk for 10-14 days to meet goals  Goals   Patient Goals get better   LTG Time Frame 10-14   Long Term Goal #1 see below   Plan   Treatment Interventions ADL retraining;Functional transfer training; Endurance training;Patient/family training;Equipment evaluation/education; Compensatory technique education; Energy conservation; Activityengagement   Goal Expiration Date 02/27/23   OT Frequency 2-3x/wk   Recommendation   OT Discharge Recommendation Post acute rehabilitation services   [facility] Daily Activity Inpatient   Lower Body Dressing 2   Bathing 2   Toileting 2   Upper Body Dressing 3   Grooming 3   Eating 4   Daily Activity Raw Score 16   Daily Activity Standardized Score (Calc for Raw Score >=11) 35 96   -[facility] Applied Cognition Inpatient   Following a Speech/Presentation 4   Understanding Ordinary Conversation 4   Taking Medications 4   Remembering Where Things Are Placed or Put Away 4   Remembering List of 4-5 Errands 4   Taking Care of Complicated Tasks 3   Applied Cognition Raw Score 23   Applied Cognition Standardized Score 53 08   Modified Anderson Scale   Modified Anderson Scale 4   End of Consult   Education Provided Yes   Patient Position at End of Consult Bedside chair;Bed/Chair alarm activated; All needs within reach   Nurse Communication Nurse aware of consult     Pt will complete functional mobility with Mod I using appropriate DME as needed  Pt will complete UB dressing and bathing with Mod I using appropriate DME as needed       Pt will complete LB dressing and bathing with Mod I using appropriate DME as needed  Pt will complete transfers with Mod I using appropriate DME as needed  Pt will complete toileting with Mod I using appropriate DME as needed  Pt will complete home maintenance task with Mod I using appropriate DME as needed  Pt will utilize energy conservation techniques throughout functional activity/ADL s/p skilled education  Pt will demonstrate increased safety awareness during functional tasks/ADL's s/p skilled education  Pt will increase activity tolerance to 30 minutes in order to complete ADL's/ functional tasks, using appropriate DME as needed  Pt will participate in cardiac education w/ G carryover during functional activities/ADL's  Pt will adhere to spinal precautions 100% t/o fxnl daily routine       Tenisha Calvo, MOT, OTR/L

## 2023-02-13 NOTE — PROGRESS NOTES
Progress Note - Acute Pain Service    Teresa Alonzo 81 y o  male MRN: [id number]  Unit/Bed#: [facility] 622-01 Encounter: [id number]      Assessment:   Principal Problem:    Fall  Active Problems:    Hypertension    Hyperlipemia    Aortic stenosis    S/P AVR    Carotid artery disease (HCC)    Pacemaker    Urinary tract obstruction by kidney stone    Closed T12 fracture (HCC)    Right hip pain    Acute pain due to trauma    Teresa Alonzo is a 81 y o  male with a history of CAD, atrial flutter, AVR, CABG on Eliquis, right ureteral stone/hydrostatus post cystoscopy and stent, and pacemaker who presented status post fall  Imaging revealed T12 fracture  Neurosurgical team following, planning for conservative management at this time, MRI of thoracic and lumbar spine completed and patient is now status post T9-L3 fusion lumbar/thoracic posterior minimally invasive with robotic T10-T12 decompressive laminectomy with neurosurgery on 2/10/2023  Plan:   Multimodal analgesia:  · Tylenol 975 mg every 8 hours scheduled  · Gabapentin 100 mg 3 times a day  · Estimated Creatinine Clearance: 109 9 mL/min (A) (by C-G formula based on SCr of 0 58 mg/dL (L))  · Lidocaine patch daily, on for 12 hours off for 12 hours  · Robaxin 500 mg every 6 hours scheduled  · Oxycodone 2 5 mg every 4 hours as needed for moderate pain  · Oxycodone 5 mg every 4 hours as needed for severe pain    Bowel Regimen:  · Senokot S 2 tablets twice a day  · MiraLAX daily  · Bisacodyl suppository daily as needed  · Consider Fleet enema, patient ports no bowel movement for greater than 3 days    Pain well controlled on current medication regimen  Okay for discharge to rehab from a pain management standpoint  Would continue Tylenol, gabapentin, lidocaine patch and short course of low-dose oxycodone at time of discharge    Treatment recommendations and plan of care discussed with bedside nursing staff and primary care service  APS will sign off at this time  Thank you for the consult  All opioids and other analgesics to be written at discretion of primary team  Please contact Acute Pain Service - [facility] via [employer] from [phone] with additional questions or concerns  See Ellen or Flor for additional contacts and after hours information  Pain History  Current pain location(s): b/l Leg  Pain Scale:   4-10  Quality: Aching  24 hour history: Patient resting in bed, appears comfortable  Reports pain that radiates to his anterior thighs that is described as aching, mild increase in pain with ambulation but overall feels comfortable and his pain is controlled with oxycodone as needed  Tolerating current analgesic regimen, no nausea, vomiting  He did report mild dizziness when getting out of bed which resolved  Reports a bowel movement greater than 3 days ago, passing flatus      Opioid requirement previous 24 hours: Oxycodone 17 5 mg    Meds/Allergies   all current active meds have been reviewed and current meds:   Current Facility-Administered Medications   Medication Dose Route Frequency   • acetaminophen (TYLENOL) tablet 975 mg  975 mg Oral Q8H [address]   • atorvastatin (LIPITOR) tablet 40 mg  40 mg Oral Daily With Dinner   • bisacodyl (DULCOLAX) rectal suppository 10 mg  10 mg Rectal Daily PRN   • cholecalciferol (VITAMIN D3) tablet 1,000 Units  1,000 Units Oral Daily   • folic acid (FOLVITE) tablet 400 mcg  400 mcg Oral Daily   • gabapentin (NEURONTIN) capsule 100 mg  100 mg Oral TID   • heparin (porcine) subcutaneous injection 5,000 Units  5,000 Units Subcutaneous Q8H [address]   • insulin lispro (HumaLOG) 100 units/mL subcutaneous injection 2-12 Units  2-12 Units Subcutaneous TID AC   • lidocaine (LIDODERM) 5 % patch 1 patch  1 patch Topical Daily   • melatonin tablet 6 mg  6 mg Oral HS   • methocarbamol (ROBAXIN) tablet 500 mg  500 mg Oral Q6H [address]   • naloxone (NARCAN) 0 04 mg/mL syringe 0 04 mg  0 04 mg Intravenous Q1MIN PRN   • ondansetron (ZOFRAN) injection 4 mg  4 mg Intravenous Q6H PRN   • oxyCODONE (ROXICODONE) IR tablet 2 5 mg  2 5 mg Oral Q4H PRN   • oxyCODONE (ROXICODONE) IR tablet 5 mg  5 mg Oral Q4H PRN   • polyethylene glycol (MIRALAX) packet 17 g  17 g Oral Daily   • senna-docusate sodium (SENOKOT S) 8 6-50 mg per tablet 2 tablet  2 tablet Oral BID   • tamsulosin (FLOMAX) capsule 0 4 mg  0 4 mg Oral Daily With Dinner       Allergies   Allergen Reactions   • Ezetimibe-Simvastatin Dizziness and Lightheadedness     Other reaction(s): MAKES HIM DIZZY     • Simvastatin Lightheadedness     Other reaction(s): Dizziness       Objective     Temp:  [97 4 °F (36 3 °C)-98 2 °F (36 8 °C)] 98 2 °F (36 8 °C)  HR:  [] 101  Resp:  [15-22] 17  BP: (136-147)/(68-74) 136/71  Arterial Line BP: (104-144)/(46-50) 104/46    Physical Exam  Vitals reviewed  Constitutional:       General: He is awake  He is not in acute distress  Appearance: Normal appearance  He is not ill-appearing, toxic-appearing or diaphoretic  HENT:      Head: Normocephalic and atraumatic  Nose: Nose normal  No congestion or rhinorrhea  Mouth/Throat:      Mouth: Mucous membranes are moist    Eyes:      Extraocular Movements: Extraocular movements intact  Cardiovascular:      Rate and Rhythm: Normal rate  Pulmonary:      Effort: Pulmonary effort is normal  No tachypnea, bradypnea or respiratory distress  Musculoskeletal:      Right lower leg: No edema  Left lower leg: No edema  Skin:     General: Skin is warm and dry  Coloration: Skin is not pale  Findings: No rash  Neurological:      Mental Status: He is alert and oriented to person, place, and time  Mental status is at baseline  Psychiatric:         Attention and Perception: Attention normal          Mood and Affect: Mood normal  Mood is not anxious  Speech: Speech normal          Behavior: Behavior normal  Behavior is cooperative           Cognition and Memory: Cognition and memory normal          Lab Results: Results from last 7 days   Lab Units 02/13/23  0536   WBC Thousand/uL 6 34   HEMOGLOBIN g/dL 10 2*   HEMATOCRIT % 31 2*   PLATELETS Thousands/uL 156      Results from last 7 days   Lab Units 02/13/23  0536   POTASSIUM mmol/L 4 0   CHLORIDE mmol/L 103   CO2 mmol/L 27   BUN mg/dL 10   CREATININE mg/dL 0 58*   CALCIUM mg/dL 8 6         Please note that the APS provides consultative services regarding pain management only  With the exception of ketamine and epidural infusions and except when indicated, final decisions regarding starting or changing doses of analgesic medications are at the discretion of the consulting service  Off hours consultation and/or medication management is generally not available      MICHAEL Borden  Acute Pain Service

## 2023-02-13 NOTE — ASSESSMENT & PLAN NOTE
S/p right lithotripsy and insertion of ureteral stent with Dr Carline Kuhn on 1/26/2023    Was due for f/u on 2/1/2023 for wynn removal   Wynn remains in place management per primary team

## 2023-02-13 NOTE — PHYSICAL THERAPY NOTE
PHYSICAL THERAPY EVALUATION  NAME:  Jaleesa Velasquez  DATE: 02/13/23    AGE:   81 y o  Mrn:   [id number]  ADMIT DX:  Injury Arneta Loma  Fall, initial encounter [W19  XXXA]  Traumatic fractures of T12 and L1 vertebrae ([address] ) [S22 089A, T24 151V]    Past Medical History:   Diagnosis Date    Coronary artery disease     H/O atrial flutter     History of aortic valve replacement with bioprosthetic valve     Hyperlipemia     Hypertension     Pacemaker     Skin cancer, basal cell     Walker as ambulation aid     Wears dentures        Past Surgical History:   Procedure Laterality Date    A-V CARDIAC PACEMAKER INSERTION      AORTIC VALVE REPLACEMENT      CARDIAC PACEMAKER PLACEMENT Left     2021    CATARACT EXTRACTION      CORONARY ANGIOPLASTY WITH STENT PLACEMENT      stents times 3    LUMBAR FUSION N/A 2/10/2023    Procedure: T9-L3 FUSION LUMBAR/THORACIC POSTERIOR MINIMALLY INVASIVE W ROBOT T10-T12 decompressive laminectomy;  Surgeon: Klaus Garcia MD;  [facility];  Service: Neurosurgery    IL CYSTO BLADDER W/URETERAL CATHETERIZATION Right 01/13/2023    Procedure: CYSTOSCOPY RETROGRADE PYELOGRAM WITH INSERTION STENT URETERAL;  Surgeon: Jarrell Gallardo MD;  Location[facility];  Service: Urology    IL CYSTO BLADDER W/URETERAL CATHETERIZATION Right 1/26/2023    Procedure: CYSTOSCOPY WITH RETROGRADE PYELOGRAM;  Surgeon: Jarrell Gallardo MD;  [facility];  Service: Urology    IL CYSTO/URETERO W/LITHOTRIPSY &INDWELL STENT INSRT Right 1/26/2023    Procedure: CYSTOSCOPY URETEROSCOPY WITH LITHOTRIPSY HOLMIUM LASER, RETROGRADE PYELOGRAM AND INSERTION STENT URETERAL;  Surgeon: Jarrell Gallardo MD;  Location[facility];  Service: Urology       Length Of Stay: 12    PHYSICAL THERAPY EVALUATION:        02/13/23 1140   Note Type   Note type Re-Evaluation   Pain Assessment   Pain Assessment Tool 0-10   Pain Score 7   Pain Location/Orientation Location: Back;Orientation: Bilateral;Location: Leg   Pain Onset/Description Onset: Ongoing;Frequency: Constant/Continuous; Descriptor: Aching   Effect of Pain on Daily Activities increased pain with activity   Patient's Stated Pain Goal No pain   Hospital Pain Intervention(s) Ambulation/increased activity;Repositioned   Restrictions/Precautions   Weight Bearing Precautions Per Order No   Braces or Orthoses   (pt no longer requires back brace per neurosx)   Other Precautions Chair Alarm; Bed Alarm;Multiple lines; Fall Risk;Pain;Spinal precautions   Home Living   Type of [address] Two level; Able to live on main level with bedroom/bathroom;Stairs to enter with rails   Home Equipment Walker;Cane   Additional Comments Patient reports living with daughter and son-in-law who are able to assist as needed  Patient states his son-in-law is retired and able to help him   Prior Function   Level of [place] Independent with functional mobility   Lives With [employer] [facility] in the last 6 months 1 to 4   Comments Patient reports use of rolling walker for ambulation prior to admission   General   Family/Caregiver Present No   Cognition   Overall Cognitive Status WFL   Arousal/Participation Alert   Attention Within functional limits   Orientation Level Oriented X4   Memory Decreased recall of precautions   Following Commands Follows one step commands without difficulty   RUE Assessment   RUE Assessment WFL   LUE Assessment   LUE Assessment WFL   RLE Assessment   RLE Assessment WFL   Strength RLE   RLE Overall Strength 4-/5   LLE Assessment   LLE Assessment WFL   Strength LLE   LLE Overall Strength 4-/5   Bed Mobility   Rolling L 3  Moderate assistance   Additional items Assist x 1; Increased time required;Verbal cues   Supine to Sit 3  Moderate assistance   Additional items Assist x 1; Increased time required;Verbal cues   Transfers   Sit to Stand 3  Moderate assistance   Additional items Assist x 2; Increased time required;Verbal cues   Stand to Sit 3  Moderate assistance   Additional items Assist x 2; Increased time required;Verbal cues   Additional Comments cues needed for hand placement during transfers   Ambulation/Elevation   Gait pattern Short stride; Foward flexed; Inconsistent nataly; Excessively slow   Gait Assistance 3  Moderate assist   Additional items Assist x 2   Assistive Device Rolling walker   Distance 5ft   Balance   Static Sitting Fair -   Static Standing Poor +   Ambulatory Poor   Endurance Deficit   Endurance Deficit Yes   Endurance Deficit Description fatigue, pain   Activity Tolerance   Activity Tolerance Patient limited by fatigue;Patient limited by pain   Medical Staff Made Aware Leland Obrien, OT; Marshal Cage, SPT; OT present for co evaluation due to pts current medical presentation   Nurse Made Aware Pt appropriate to be seen and mobilize per nsg   Assessment   Prognosis Good   Problem List Decreased strength;Decreased range of motion;Decreased endurance; Impaired balance;Decreased mobility;Pain   Assessment Patient originally presented to [facility] on [facility]/2023 and was admitted with a primary diagnosis of Closed T12 fracture, and other active problems including right hip pain, urinary tract obstruction by kidney stone, pacemaker in place, CAD, history of AVR, aortic stenosis, HLD, HTN  Neurosurgery originally recommending TLSO brace when out of bed  patient was evaluated by PT on 2/2/2023 and is being seen for PT reevaluation due to patient now undergoing T9-L3 FUSION LUMBAR/THORACIC POSTERIOR MINIMALLY INVASIVE W ROBOT T10-T12 decompressive laminectomy which was performed on 2/10/23  Patient now no longer requires back brace per neurosurgery  PT reevaluation was performed to assess patient's current functional mobility status and reestablish PT goals  Patient was able perform all bed mobility with mod a x1 was approximately same level assistance required compared to previous session    Patient performed all transfers with mod a x2 which is increased assistance required  Patient able to tolerate limited ambulation with mod a x2 with use of rolling walker which is also increased assistance required compared to previous sessions  Patient continues to present with decreased bilateral lower extremity strength, pain, decreased endurance, gait deviations, decreased static/dynamic balance, decreased activity tolerance, will continue to benefit from skilled PT services during hospital stay  At conclusion of PT reevaluation patient was assisted back into chair with all needs within reach  D/C recommendation when medically cleared is rehab   Goals   Patient Goals " to have less pain"   [facility] Expiration Date 02/27/23   Short Term Goal #1 In 10 days pt will complete: 1) Bed mobility skills with min Ax1 to increase safety and independence as well as decrease caregiver burden  2) Functional transfers with min Ax1 to promote increased independence, safety, and QOL  3) Ambulate 150' using least restrictive AD with min Ax1 without LOB and stable vitals so that pt can negotiate previous living environment safely and promote independence with functional mobility and return to PLOF  4) Improve balance grades by 1/2 grade to increase safety with all mobility and decrease fall risk  5) Improve BLE strength by 1/2 grade to help increase overall functional mobility and decrease fall risk  PT Treatment Day 0   Plan   Treatment/Interventions Functional transfer training;LE strengthening/ROM; Therapeutic exercise; Endurance training;Patient/family training;Equipment eval/education; Bed mobility;Gait training;Spoke to nursing;OT   PT Frequency 3-5x/wk   Recommendation   PT Discharge Recommendation Post acute rehabilitation services   Equipment Recommended [facility] Recommended Wheeled walker   AM-PAC Basic Mobility Inpatient   Turning in Flat Bed Without Bedrails 2   Lying on Back to Sitting on Edge of Flat Bed Without Bedrails 1   Moving Bed to Chair 1   Standing Up From Chair Using Arms 1   Walk in Room 1   Climb 3-5 Stairs With Railing 1   Basic Mobility Inpatient Raw Score 7   Turning Head Towards Sound 4   Follow Simple Instructions 4   Low Function Basic Mobility Raw Score 15   Low Function Basic Mobility Standardized Score 23 9   Highest Level Of Mobility   -[facility] Goal 2: Bed activities/Dependent transfer   -HL Achieved 4: Move to chair/commode   Modified Anderson Scale   Modified [place] Scale 4   Barthel Index   Feeding 10   Bathing 0   Grooming Score 0   Dressing Score 0   Bladder Score 10   Bowels Score 10   Toilet Use Score 5   Transfers (Bed/Chair) Score 5   Mobility (Level Surface) Score 0   Stairs Score 0   Barthel Index Score 40   Portions of the documentation may have been created using voice recognition software  Occasional wrong word or sound alike substitutions may have occurred due to the inherent limitations of the voice recognition software  Read the chart carefully and recognize, using context, where substitutions have occurred      Jose Noble, PT, DPT

## 2023-02-13 NOTE — ASSESSMENT & PLAN NOTE
- bioprosthetic AVR  - follows outpatient with  Cardiology at [facility], [facility]

## 2023-02-13 NOTE — ASSESSMENT & PLAN NOTE
- Patient noted to have a renal stone on 1/13 and underwent lithotripsy and right renal stent placement on 1/26  Has chronic wynn catheter  It appears he was to have followed up for a voiding trial with urology on 1/31  Previously on 5 day course of Ceftin  - Per patient, he cannot void lying down   Catheter remains in place   - Not currently on abx, WBC downtrending overnight, afebrile, patient denies flank or bladder pain at this time  - 810cc urine output overnight  -1 7L/24hrs    - Monitor kidney function with AM labs  - Monitor UOP   - Maintain wynn catheter

## 2023-02-13 NOTE — PLAN OF CARE
Problem: PHYSICAL THERAPY ADULT  Goal: Performs mobility at highest level of function for planned discharge setting  See evaluation for individualized goals  Description: Treatment/Interventions: Functional transfer training, LE strengthening/ROM, Elevations, Therapeutic exercise, Endurance training, Patient/family training, Equipment eval/education, Bed mobility, Gait training, Spoke to nursing, OT  Equipment Recommended: Jaquita Riedel       See flowsheet documentation for full assessment, interventions and recommendations  Note: Prognosis: Good  Problem List: Decreased strength, Decreased range of motion, Decreased endurance, Impaired balance, Decreased mobility, Pain  Assessment: Patient originally presented to \[facility]\"" on 2/1/2023 and was admitted with a primary diagnosis of Closed T12 fracture, and other active problems including right hip pain, urinary tract obstruction by kidney stone, pacemaker in place, CAD, history of AVR, aortic stenosis, HLD, HTN  Neurosurgery originally recommending TLSO brace when out of bed  patient was evaluated by PT on 2/2/2023 and is being seen for PT reevaluation due to patient now undergoing T9-L3 FUSION LUMBAR/THORACIC POSTERIOR MINIMALLY INVASIVE W ROBOT T10-T12 decompressive laminectomy which was performed on 2/10/23  Patient now no longer requires back brace per neurosurgery  PT reevaluation was performed to assess patient's current functional mobility status and reestablish PT goals  Patient was able perform all bed mobility with mod a x1 was approximately same level assistance required compared to previous session  Patient performed all transfers with mod a x2 which is increased assistance required  Patient able to tolerate limited ambulation with mod a x2 with use of rolling walker which is also increased assistance required compared to previous sessions    Patient continues to present with decreased bilateral lower extremity strength, pain, decreased endurance, gait deviations, decreased static/dynamic balance, decreased activity tolerance, will continue to benefit from skilled PT services during hospital stay  At conclusion of PT reevaluation patient was assisted back into chair with all needs within reach  D/C recommendation when medically cleared is rehab  Barriers to Discharge: Other (Comment) (Pending improvement in pain )  Barriers to Discharge Comments: Pt denies and mobility or safety concerns about returning home at time of d/c  PT Discharge Recommendation: Post acute rehabilitation services    See flowsheet documentation for full assessment

## 2023-02-13 NOTE — PROGRESS NOTES
[facility]  Progress Note Geoffrey Penny 1935, 81 y o  male MRN: [id number]  [facility] Encounter: [id number]  Primary Care Provider: Jannet Vilchis DO   Date and time admitted to hospital: 2/1/2023  1:44 PM    * Fall  Assessment & Plan  - Mechanical fall, sustained below stated injury  - PT/OT evaluations - recommending post acute rehabilitation services  - Geriatrics consulted - recs appreciated  - CM for disposition planning    Closed T12 fracture ([address] )  Assessment & Plan  - Neuro intact - has a h/o R ankle surgery with weakness with dorsi- /plantar flexion which is chronic  - - RLE with weakness due to pain in the R hip noted with hip flexion and extension    - MRI: MRI 2/6/2023: Subacute T12 compression fracture with a new, acute compression fracture of the superior endplate of L1   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 of T11-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 best seen on series 3 image Despite this compression, the cord maintains normal signal   Severe right foraminal narrowing noted at the T12-L1 level  See separate MRI thoracic spine dictation also performed today for more detailed description of canal stenosis in the lower thoracic spine  Lumbar degenerative disc disease L2-3, L3-4 and L4-5 with multilevel canal stenosis and foraminal narrowing, most pronounced at the L4-5 level  2/10: S/p T9-L3 lumbar/thoracic minimally invasive robot   T10-T12 decompressive laminectomy    Plan:  - Will need 2-week follow up for incision check and 6-week postop visit with repeat xrays  - No longer need brace per neurosurgery  - DVT ppx  - MAP goals completed  - PT/OT - recs appreciated  - Multimodal pain regimen  - DEAN drain removed today       Acute pain due to trauma  Assessment & Plan  Patient states pain is improving  - Acute pain secondary to traumatic injuries  - Continue multi modal analgesic regimen  - Bowel regimen as long as using opioids  - Continue to monitor pain and adjust regimen as indicated      Right hip pain  Assessment & Plan  - CT A/P negative for injury  - Likely contusion and muscle strain  - PT/OT and pain control  - WBAT on RLE  - Monitor for spasms  - Receiving multimodal pain regimen for T-12 fracture     Urinary tract obstruction by kidney stone  Assessment & Plan  - Patient noted to have a renal stone on 1/13 and underwent lithotripsy and right renal stent placement on 1/26  Has chronic wynn catheter  It appears he was to have followed up for a voiding trial with urology on 1/31  Previously on 5 day course of Ceftin  - Per patient, he cannot void lying down  Catheter remains in place   - Not currently on abx, WBC downtrending overnight, afebrile, patient denies flank or bladder pain at this time  - 810cc urine output overnight  -1 7L/24hrs    - Monitor kidney function with AM labs  - Monitor UOP   - Maintain wynn catheter    Pacemaker  Assessment & Plan  - h/o A  Fib s/p PPM   - f/u outpatient with cardiology at [facility]      Carotid artery disease [facility])  Assessment & Plan  - S/p CABG x3  - Continue home statin  - Holding metoprolol due to recent MAP goals; consider restarting  - Holding home Eliquis for 2 weeks per neurosurgery    S/P AVR  Assessment & Plan  - bioprosthetic AVR  - follows outpatient with  Cardiology at [facility]    Aortic stenosis  Assessment & Plan  - h/o bioprosthetic AVR    Hyperlipemia  Assessment & Plan  - continue home statin    Hypertension  Assessment & Plan  - Holding home lisinopril and amlodipine  - Recently completed MAP pushes   - BP adequately controlled   - Re-evaluate need for home antihypertensives      Bowel Regimen: miralax/senna  VTE Prophylaxis:Enoxaparin (Lovenox)     Disposition: Possible discharge to post acute rehab facility  Subjective   Chief Complaint: "I fell"    Subjective: No overnight events  Patient remained HD stable on RA   Patient states "my arms are sore" and reports 5/10 pain  Objective   Vitals:   Temp:  [97 4 °F (36 3 °C)-98 2 °F (36 8 °C)] 98 2 °F (36 8 °C)  HR:  [] 101  Resp:  [16-22] 17  BP: (136-147)/(68-74) 136/71  Arterial Line BP: (104-144)/(46-48) 104/46    I/O       02/11 0701  02/12 0700 02/12 0701  02/13 0700 02/13 0701  02/14 0700    P  O  500 600     I V  (mL/kg) 1376 1 (13 8) 225 2 (2 2)     IV Piggyback 50      Total Intake(mL/kg) 1926 1 (19 3) 825 2 (8)     Urine (mL/kg/hr) 4675 (2) 2500 (1)     Drains 58 35     Stool       Blood       Total Output 4733 2535     Net -0128 -9331 8                Physical Exam:   General: No acute distress, awake, appropriately interactive  Neuro: A&Ox3; did not answer month correctly  Following commands  Moving all extremities  No lateralizing signs  No numbness or tingling  HEENT: Moist mucus membranes, NC/AT  CV: RRR, pulses +2/+2  Pulm: No respiratory distress, room air, clear lungs throughout  No wheezing, rhonchi, or crackles  GI: No abdominal tenderness, BS active x4 quadrants  No rebound or guarding  : Chronic wynn in place  Yellow urine noted  MSK: ROM intact  No crepitus or swelling noted  Skin: Warm and dry intact throughout  Single stitch in posterior neck  Invasive Devices     Peripheral Intravenous Line  Duration           Peripheral IV 02/10/23 Left;Dorsal (posterior) Hand 3 days    Peripheral IV 02/10/23 Left;Dorsal (posterior) Wrist 3 days          Drain  Duration           Ureteral Internal Stent Right ureter 6 Fr  31 days    Urethral Catheter Non-latex 18 Fr  8 days    Closed/Suction Drain Posterior; Left Back Bulb 7 Fr  3 days                      Lab Results:   BMP/CMP:   Lab Results   Component Value Date    SODIUM 135 02/13/2023    K 4 0 02/13/2023     02/13/2023    CO2 27 02/13/2023    BUN 10 02/13/2023    CREATININE 0 58 (L) 02/13/2023    CALCIUM 8 6 02/13/2023    EGFR 92 02/13/2023   , CBC:   Lab Results   Component Value Date    WBC 6 34 02/13/2023 HGB 10 2 (L) 02/13/2023    HCT 31 2 (L) 02/13/2023    MCV 90 02/13/2023     02/13/2023    MCH 29 5 02/13/2023    MCHC 32 7 02/13/2023    RDW 14 8 02/13/2023    MPV 10 3 02/13/2023   , Lactate: No results found for: LACTATE, Urinalysis: No results found for: Carmenza Cordia, SPECGRAV, PHUR, LEUKOCYTESUR, NITRITE, PROTEINUA, GLUCOSEU, KETONESU, BILIRUBINUR, BLOODU, CK: No results found for: CKTOTAL and Troponin: No results found for: TROPONINI  Imaging: I have personally reviewed pertinent reports  and I have personally reviewed pertinent films in PACS   Other Studies: If you have questions or concerns please contact our team  Thank you       Boston Moreno DNP, [facility]  Critical Care AP Fellow

## 2023-02-13 NOTE — CASE MANAGEMENT
Case Management Discharge Planning Note    Patient name Ross Shelton  Location [address] [place] [address] [address] [id number]  : 1935 Date 2023       Current Admission Date: 2023  Current Admission Diagnosis:Fall   Patient Active Problem List    Diagnosis Date Noted   • Acute pain due to trauma 2023   • Fall 2023   • Closed T12 fracture ([address] ) 2023   • Right hip pain 2023   • Class 1 obesity in adult 2023   • H/O heart artery stent    • MS (mitral stenosis)    • Right ureteral stone 2023   • Acute cystitis with hematuria 2023   • Urinary tract obstruction by kidney stone 2023   • Atrial flutter ([address] ) 2023   • Pacemaker 2022   • Hypertension    • Hyperlipemia    • Aortic stenosis    • S/P AVR    • Carotid artery disease ([facility] [address] [facility] )    • Coronary artery disease       LOS (days): 12  Geometric Mean LOS (GMLOS) (days): 2 80  Days to GMLOS:-9 2     OBJECTIVE:  Risk of Unplanned Readmission Score: 17 19         Current admission status: Inpatient   Preferred Pharmacy:   [address] [place], [place] [id number]  Phone: [phone] Fax: [phone]    [id number] JACLYN Marley  Devin Falk [address] [place] [address]  Phone: [phone] Fax: [phone]    Primary Care Provider: Kymberly Felix DO    Primary Insurance: Brandt Hernandez [facility]  Secondary Insurance:     DISCHARGE DETAILS:    Pt accepted to [facility]  CM submitted for auth   Pt can d/c once obtained

## 2023-02-13 NOTE — PLAN OF CARE
Problem: MOBILITY - ADULT  Goal: Maintain or return to baseline ADL function  Description: INTERVENTIONS:  -  Assess patient's ability to carry out ADLs; assess patient's baseline for ADL function and identify physical deficits which impact ability to perform ADLs (bathing, care of mouth/teeth, toileting, grooming, dressing, etc )  - Assess/evaluate cause of self-care deficits   - Assess range of motion  - Assess patient's mobility; develop plan if impaired  - Assess patient's need for assistive devices and provide as appropriate  - Encourage maximum independence but intervene and supervise when necessary  - Involve family in performance of ADLs  - Assess for home care needs following discharge   - Consider OT consult to assist with ADL evaluation and planning for discharge  - Provide patient education as appropriate  Outcome: Progressing     Problem: Prexisting or High Potential for Compromised Skin Integrity  Goal: Skin integrity is maintained or improved  Description: INTERVENTIONS:  - Identify patients at risk for skin breakdown  - Assess and monitor skin integrity  - Assess and monitor nutrition and hydration status  - Monitor labs   - Assess for incontinence   - Turn and reposition patient  - Assist with mobility/ambulation  - Relieve pressure over bony prominences  - Avoid friction and shearing  - Provide appropriate hygiene as needed including keeping skin clean and dry  - Evaluate need for skin moisturizer/barrier cream  - Collaborate with interdisciplinary team   - Patient/family teaching  - Consider wound care consult   Outcome: Progressing     Problem: NEUROSENSORY - ADULT  Goal: Achieves stable or improved neurological status  Description: INTERVENTIONS  - Monitor and report changes in neurological status  - Monitor vital signs such as temperature, blood pressure, glucose, and any other labs ordered   - Initiate measures to prevent increased intracranial pressure  - Monitor for seizure activity and implement precautions if appropriate      Outcome: Progressing     Problem: SAFETY ADULT  Goal: Maintain or return to baseline ADL function  Description: INTERVENTIONS:  -  Assess patient's ability to carry out ADLs; assess patient's baseline for ADL function and identify physical deficits which impact ability to perform ADLs (bathing, care of mouth/teeth, toileting, grooming, dressing, etc )  - Assess/evaluate cause of self-care deficits   - Assess range of motion  - Assess patient's mobility; develop plan if impaired  - Assess patient's need for assistive devices and provide as appropriate  - Encourage maximum independence but intervene and supervise when necessary  - Involve family in performance of ADLs  - Assess for home care needs following discharge   - Consider OT consult to assist with ADL evaluation and planning for discharge  - Provide patient education as appropriate  Outcome: Progressing

## 2023-02-13 NOTE — DISCHARGE INSTR - AVS FIRST PAGE
Discharge Instructions  Posterior Thoracic/Lumbar Fusion/Fixation      Surgical incisional care:  Maintain dressing in place for 3 days  On postoperative day 3, dressing may be removed and incision may be left open to air  Keep incision clean and dry  Avoid applying creams, lotion or antiseptic to incision area  Check the wound daily  If the incision becomes red, swollen, tender, warm, or has increased drainage please notify physician immediately  If steri-strips are in place, allow them to fall off  If still in place at two week postoperative visit, we will remove them  May shower 3 days after surgery, but do not soak in a tub and no swimming until cleared  Incision may be cleaned with water and a mild antimicrobial soap using a clean washcloth  Incision is to be gently patted dry with a clean towel  Continue to change bed linens and pajamas more frequently  Wear clean clothes daily  Activity Restrictions:  No heavy lifting greater than 10lbs and no strenuous activities until cleared  No bending or twisting  No BLTs (bending, lifting, twisting)  Avoid pushing/pulling movements  May walk as tolerated  Encourage at least 4 short walks per day  No prolonged sitting  No driving for at least 2 weeks or until cleared by physician  If you were provided a brace, it is to be worn whenever you are out of bed until directed otherwise  It may be put on while sitting at bedside  Postoperative medication:  Take pain medications to relieve incision pain, and muscle relaxants to prevent spasms as directed  Please see after visit summary (AVS) for details  Do not operate heavy machinery or vehicles while taking sedating medications  Use a bowel regimen while on opioids as they induce constipation  Ie  Senokot-S, Miralax, Colace, etc  Increase fiber and water intake  Do not take ibuprofen, Naproxen/Aleve or any non steroidal anti-inflammatory medications, NSAIDs, until cleared by surgeon   May take Tylenol instead  If taking Coumadin, Aspirin, or Plavix, you may resume these medications when cleared by Neurosurgery  Follow-up as scheduled for a 2 week incision check  Follow-up as scheduled for 6 week postoperative visit with repeat Xrays  **Please notify MD if you experience a fever 101F, chills or have increased pain, numbness, tingling, or weakness in your legs, increased walking difficulties and/or bowel/bladder dysfunction/incontinence**                           - Recommend 2 week follow-up with established cardiologist at [address] [place] [address] 2 week follow-up with [facility] urology team for assessment of chronic wynn and post-lithotripsy care

## 2023-02-13 NOTE — ASSESSMENT & PLAN NOTE
- Mechanical fall, sustained below stated injury  - PT/OT evaluations - recommending post acute rehabilitation services  - Geriatrics consulted - recs appreciated  - CM for disposition planning

## 2023-02-13 NOTE — ASSESSMENT & PLAN NOTE
- h/o A   Fib s/p PPM   - f/u outpatient with cardiology at [facility], [facility]

## 2023-02-13 NOTE — PLAN OF CARE
Problem: OCCUPATIONAL THERAPY ADULT  Goal: Performs self-care activities at highest level of function for planned discharge setting  See evaluation for individualized goals  Description: Treatment Interventions: ADL retraining, Functional transfer training, Endurance training, Patient/family training, Equipment evaluation/education, Compensatory technique education, Activityengagement          See flowsheet documentation for full assessment, interventions and recommendations  Note: Limitation: Decreased ADL status, Decreased endurance, Decreased Safe judgement during ADL, Decreased high-level ADLs, Decreased self-care trans  Prognosis: Good  Assessment: Pt seen this date for OT re-eval 2' undergoing T9-L3 fusion lumbar/thoracic posterio minimally invasive w robot T10-T12 decompressive laminectomy 2/10/23  Pt is POD #3 w active OT eval and treat orders  Please see IE for PLOF and home set-up  Currently, pt is min A for UB ADL, mod A for LB ADL and completed transfers/FM w mod Ax2 c RW for support  Pt is limited at this time 2* decreased endurance/activtiy tolerance, decreased ADL/High-level ADL status, decreased self-care trans, decreased safety awareness,  and is a fall risk  This impacts pt's ability to complete UB and LB dressing and bathing, toileting, transfers, functional mobility, community mobility, home and health maintenance, and safe engagement in typical daily routine  The patient's raw score on the AM-PAC Daily Activity inpatient short form is 16, standardized score is 35 96, less than 39 4  Patients at this level are likely to benefit from discharge to post-acute rehabilitation services  Please refer to the recommendation of the Occupational Therapist for safe discharge planning  From OT standpoint, pt should D/C to STR when medically stable  Pt will benefit from continued acute OT services 2-3 x/wk for 10-14 days to meet goals       OT Discharge Recommendation: Post acute rehabilitation services

## 2023-02-13 NOTE — ASSESSMENT & PLAN NOTE
- Neuro intact - has a h/o R ankle surgery with weakness with dorsi- /plantar flexion which is chronic  - - RLE with weakness due to pain in the R hip noted with hip flexion and extension    - MRI: MRI 2/6/2023: Subacute T12 compression fracture with a new, acute compression fracture of the superior endplate of L1   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 of T11-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 best seen on series 3 image Despite this compression, the cord maintains normal signal   Severe right foraminal narrowing noted at the T12-L1 level  See separate MRI thoracic spine dictation also performed today for more detailed description of canal stenosis in the lower thoracic spine  Lumbar degenerative disc disease L2-3, L3-4 and L4-5 with multilevel canal stenosis and foraminal narrowing, most pronounced at the L4-5 level  2/10: S/p T9-L3 lumbar/thoracic minimally invasive robot   T10-T12 decompressive laminectomy    Plan:  - Will need 2-week follow up for incision check and 6-week postop visit with repeat xrays  - No longer need brace per neurosurgery  - DVT ppx  - MAP goals completed  - PT/OT - recs appreciated  - Multimodal pain regimen  - DEAN drain removed today

## 2023-02-13 NOTE — ASSESSMENT & PLAN NOTE
- Holding home lisinopril and amlodipine  - Recently completed MAP pushes   - BP adequately controlled   - Re-evaluate need for home antihypertensives

## 2023-02-13 NOTE — TELEPHONE ENCOUNTER
02/16/2023-PT DISCHARGED TO [facility]    02/15/2023-PT STILL IN HOSPITAL    02/14/2023-PT STILL IN HOSPITAL    02/13/2023-PT STILL IN HOSPITAL  02/24/2023-2 WK POV W/NURSE  03/28/2023-6 WK POV W/DKO Kate Landeros SPINE MICHAEL Vargas   Patient needs 2-week postop visit with nurse and 6-week POV with RAJANI and repeat thoracolumbar spine x-ray   Surgery was done on 2/10/2023

## 2023-02-13 NOTE — CASE MANAGEMENT
Autumn Oseguera 51 received request for authorization from Care Manager    Authorization request for: SNF  Facility Name: [facility]  NPI: [id number]  Facility MD:   Dr Bell Flock: [id number]  Authorization initiated by contacting insurance: Minor [employer]  Via: Availity  Pending Reference #: [id number]   Clinicals submitted via: Nahum Landaverde

## 2023-02-13 NOTE — PROGRESS NOTES
Progress Note - Geriatric Medicine   Carmen Renville 81 y o  male MRN: [id number]  Unit/Bed#: [facility] 622-01 Encounter: [id number]      Assessment/Plan:     Ambulatory dysfunction with fall   -multiple mechanical falls since December, no hx recurrent falls prior   -using walker since initial fall, no assist device required prior  -injuries as outlined below   -remains high future falls, continue fall precautions, assist w transfers and maintain env free of fall hazards  -PT/OT following      Fracture of T12 vertebrae  -Noted on CT abdomen and pelvis 2/1/23  -Nsx following, failed conservative management with persistent pain and progressive LE weakness  -underwent post decompressive laminectomy with NSx 2/10/23  -continue thoracolumbar spine precautions  -acute pain control   -PT/OT following      Acute pain due to trauma  -Continue acute multimodal pain control  -APS on consult, currently on Geriatric pain protocol      Right-sided hydronephrosis  -Due to right ureteral stone  -S/p cystoscopy with lithotripsy and ureteral stent insertion 1/26/22  -Maintained on Flomax  -Persistent chronic moderate right-sided hydronephrosis, right ureteral stent in place  -wynn removed earlier in admission and since replaced due to failed retention protocol   -o/p Urology f/u     Atrial flutter  -Home regimen includes rate control with metoprolol and anticoagulation with Eliquis  -Eliquis held for recent surgical procedure, hold for two weeks per Nsx      Sick sinus syndrome  -S/p pacemaker placement  -Continue close outpatient follow-up with Cardiology     CAD  -S/p PCI  -Maintained on chronic systemic anticoagulation with Eliquis (for A  Fib/flutter) - currently on hold   -Continue secondary risk factor modifications and encourage healthy lifestyle modifications       Impaired mastication  -Requires use of dentures -continue use all appropriate times  -ensure meal consistency appropriate for abilities  -continue aspiration precautions      Delirium precautions  -high risk due to age, pain, prolonged hospitalization   -maintain normal sleep/wake cycle  -continue pain controlled  -Monitor for fecal and urinary retention, now with replacement of wynn for failure retention protocol     Care coordination: rounded with Huy Mendoza (RN)    Subjective:     [place] is seen and examined at bedside where he is sitting resting with his son at his side, he is in good spirits and looking forward to recovering from his procedure  His appetite is good and he has been sleeping well, pain is well controlled, anticipating rehab on hosp d/c  Review of Systems   Constitutional: Negative  Negative for appetite change, chills and fever  HENT: Negative  Eyes: Negative  Respiratory: Negative  Cardiovascular: Negative  Gastrointestinal: Negative  Genitourinary: Negative  Musculoskeletal: Positive for gait problem  Back and anterior thighs remain sore, R thigh worse than L   Skin: Negative  Neurological: Positive for dizziness (with standing) and light-headedness (with standing )  Negative for headaches  Hematological: Negative  Psychiatric/Behavioral: Negative  Negative for sleep disturbance  All other systems reviewed and are negative  Objective:     Vitals: Blood pressure 136/71, pulse 101, temperature 98 2 °F (36 8 °C), resp  rate 17, height 5' 10" (1 778 m), weight 103 kg (227 lb 9 6 oz), SpO2 99 %  ,Body mass index is 32 66 kg/m²  Intake/Output Summary (Last 24 hours) at 2/13/2023 1009  Last data filed at 2/13/2023 0535  Gross per 24 hour   Intake 371 6 ml   Output 2210 ml   Net -1838 4 ml     Current Medications: Reviewed    Physical Exam:   Physical Exam  Vitals and nursing note reviewed  Constitutional:       General: He is not in acute distress  Appearance: Normal appearance  He is obese  HENT:      Head: Normocephalic and atraumatic        Nose: Nose normal       Mouth/Throat:      Mouth: Mucous membranes are moist    Eyes:      General: No scleral icterus  Right eye: No discharge  Left eye: No discharge  Conjunctiva/sclera: Conjunctivae normal       Comments: Pupils equal and round    Neck:      Comments: Phonation norm  Cardiovascular:      Rate and Rhythm: Normal rate  Pulses: Normal pulses  Pulmonary:      Effort: Pulmonary effort is normal  No respiratory distress  Breath sounds: No wheezing  Abdominal:      General: There is no distension  Palpations: Abdomen is soft  Musculoskeletal:      Cervical back: Neck supple  Right lower leg: No edema  Left lower leg: No edema  Comments: Reduced overall muscle mass    Skin:     General: Skin is warm and dry  Neurological:      Mental Status: He is alert  Comments: Awake and alert, oriented and ans ques appropriately, moving all 4 ext spontaneously    Psychiatric:         Mood and Affect: Mood normal          Behavior: Behavior normal         Invasive Devices     Peripheral Intravenous Line  Duration           Peripheral IV 02/10/23 Left;Dorsal (posterior) Hand 2 days    Peripheral IV 02/10/23 Left;Dorsal (posterior) Wrist 2 days          Drain  Duration           Ureteral Internal Stent Right ureter 6 Fr  31 days    Urethral Catheter Non-latex 18 Fr  8 days    Closed/Suction Drain Posterior; Left Back Bulb 7 Fr  2 days              Lab, Imaging and other studies: I have personally reviewed pertinent reports

## 2023-02-13 NOTE — ASSESSMENT & PLAN NOTE
- S/p CABG x3  - Continue home statin  - Holding metoprolol due to recent MAP goals; consider restarting  - Holding home Eliquis for 2 weeks per neurosurgery

## 2023-02-13 NOTE — PROGRESS NOTES
[facility]  Progress Note Srinivas Fall 1935, 81 y o  male MRN: [id number]  Unit/Bed#: [facility] 622-01 Encounter: [id number]  Primary Care Provider: Vickie Ojeda DO   Date and time admitted to hospital: 2/1/2023  1:44 PM    Closed T12 fracture [facility])  Assessment & Plan  POD#3 T9-L3 FUSION LUMBAR/THORACIC POSTERIOR MINIMALLY INVASIVE W ROBOT T10-T12 decompressive laminectomy (Dr Snehal Euceda 2/10/2023)  · T12 fracture with slight extension into pedicles  · S/p mechanical fall Sunday, 1/29  · Additional fall 12/25/2022 - fracture evident retrospectively although not read at the time of prior imaging  Imaging:  · X-ray thoracolumbar 2/12/2023: Expected postoperative appearance status post T9-L3 posterior fixation from T12 and L1 burst fractures with sparing of pedicular screws at T12  Plan:  · Continue to monitor neuro exam closely  Reporting much better pain control in his back without radicular symptoms  · No longer needs a brace  · DEAN drain to full suction, 35cc / 24 hours, removed drain this morning without difficulty, patient tolerated well  1 suture tied down in place this remains clean, dry, intact  Dressing removed and incision open to air  · Map goal above 85 x 48 hours post op, MAP goals completed  · Medical management and pain control per primary team  · DVT prophylaxis: SCDs, heparin subcu    Neurosurgery will sign off, patient will follow-up in 2 weeks for incision check in 6 weeks for postoperative visit with repeat x-rays, call with any further questions or concerns  Right hip pain  Assessment & Plan  See above  Urinary tract obstruction by kidney stone  Assessment & Plan  S/p right lithotripsy and insertion of ureteral stent with Dr Huseyin Terrazas on 1/26/2023    Was due for f/u on 2/1/2023 for wynn removal   Wynn remains in place management per primary team    S/P AVR  Assessment & Plan  · On Eliquis as an outpatient  · Continue to hold for at least 2 weeks      Subjective/Objective     Chief Complaint: "Hi"    Subjective: Patient currently complaining of 4/10 LBP without radiation  He also endorses B/L anterior/lateral/medial thigh pain nothing like it was presurgery  Patient states his current pain regimen helps with his pain  He denies having a bowel movement but is passing gas  He continues to endorse some leg weakness  He denies any headaches, dizziness, blurry vision, chest pain, shortness of breath, abdominal pain, nausea, vomiting, diarrhea, no new weakness or numbness/tingling  Patient has Dickens catheter in place  Objective: Patient comfortably lying in bed, NAD  I/O       02/11 0701  02/12 0700 02/12 0701 02/13 0700 02/13 0701 02/14 0700    P  O  500 600     I V  (mL/kg) 1376 1 (13 8) 225 2 (2 2)     IV Piggyback 50      Total Intake(mL/kg) 1926 1 (19 3) 825 2 (8)     Urine (mL/kg/hr) 4675 (2) 2500 (1)     Drains 58 35     Stool       Blood       Total Output 4733 2535     Net -9178 -4779 8                  Invasive Devices     Peripheral Intravenous Line  Duration           Peripheral IV 02/10/23 Left;Dorsal (posterior) Hand 3 days    Peripheral IV 02/10/23 Left;Dorsal (posterior) Wrist 3 days          Drain  Duration           Ureteral Internal Stent Right ureter 6 Fr  31 days    Urethral Catheter Non-latex 18 Fr  8 days    Closed/Suction Drain Posterior; Left Back Bulb 7 Fr  2 days                Physical Exam:  Vitals: Blood pressure 136/71, pulse 101, temperature 98 2 °F (36 8 °C), resp  rate 17, height 5' 10" (1 778 m), weight 103 kg (227 lb 9 6 oz), SpO2 99 %  ,Body mass index is 32 66 kg/m²  General appearance: alert, appears stated age, cooperative and no distress  Head: Normocephalic, without obvious abnormality, atraumatic  Eyes: EOMI, PERRL, conjugate gaze  Neck: supple, symmetrical, trachea midline   Back: Dressing removed, incision well approximated with staples clean, dry, intact    DEAN drain removed without difficulty, 1 suture tied down in place this remains clean, dry, intact  Lungs: non labored breathing  Heart: regular heart rate  Neurologic:   Mental status: Alert, oriented x3, thought content appropriate, speech is clear, following commands  Cranial nerves: grossly intact (Cranial nerves II-XII)  Sensory: normal to LT in all extremities x4, JPS and DST intact  Motor: moving all extremities without focal weakness   Reflexes: 2+ and symmetric, no Linares's or clonus appreciated  Coordination: finger to nose normal bilaterally, no drift bilaterally      Lab Results:  Results from last 7 days   Lab Units 02/13/23  0536 02/12/23  0617 02/11/23  0609 02/10/23  1605 02/09/23  0556 02/06/23  1252   WBC Thousand/uL 6 34 11 66* 12 80*  --  8 91 7 97   HEMOGLOBIN g/dL 10 2* 10 5* 11 1*  --  13 1 13 9   HEMATOCRIT % 31 2* 32 0* 35 0*  --  40 4 43 4   PLATELETS Thousands/uL 156 216 242   < > 230 214   NEUTROS PCT %  --  76*  --   --  71 72   MONOS PCT %  --  10  --   --  8 7    < > = values in this interval not displayed  Results from last 7 days   Lab Units 02/13/23  0536 02/12/23  0617 02/11/23  0609   POTASSIUM mmol/L 4 0 4 0 4 2   CHLORIDE mmol/L 103 106 111*   CO2 mmol/L 27 27 22   BUN mg/dL 10 10 9   CREATININE mg/dL 0 58* 0 49* 0 60   CALCIUM mg/dL 8 6 8 6 8 3             Results from last 7 days   Lab Units 02/11/23  0609 02/10/23  0741   INR  1 13 1 10   PTT seconds 33 38*     No results found for: TROPONINT  ABG:No results found for: PHART, QMW2MEF, PO2ART, GAH1KLU, Z9VTRFJV, BEART, SOURCE    Imaging Studies: I have personally reviewed pertinent reports  and I have personally reviewed pertinent films in PACS    XR Trauma chest portable    Result Date: 2/1/2023  Impression: No acute cardiopulmonary disease  Workstation performed: [id number]     XR spine thoracolumbar 2 vw    Result Date: 2/3/2023  Impression: Poorly visualized T12 fracture with intact posterior column  Mild to moderate loss of height suggested  Workstation performed: [id number]     TRAUMA - CT head wo contrast    Result Date: 2/1/2023  Impression: No acute intracranial abnormality  Workstation performed: [id number]     TRAUMA - CT spine cervical wo contrast    Result Date: 2/1/2023  Impression: No cervical spine fracture or traumatic malalignment  Workstation performed: [id number]     MRI thoracic spine wo contrast    Result Date: 2/6/2023  Impression: There is a subacute compression fracture of T12 with a horizontally oriented fracture cleft through the vertebral body now filled with fluid  Fracture does extend to the posterior elements in particular on the right where there is involvement of the pedicle and lamina  Stable approximately 10% loss of height of the vertebral body  There is a new mild compression fracture of the L1 superior endplate with moderate amount of edema present within the vertebral body  Advanced canal stenosis within the lower thoracic spine worst at the T11-12 level secondary to degenerative disc disease, bony retropulsion of the T12 vertebral body into the anterior epidural space and posterior element hypertrophic change with compression of the distal cord/conus  At T10-11 there is degenerative disc disease as well as a small right posterior lateral extradural mass/fluid collection which may represent hematoma, extending inferiorly to the T11-12 level  Moderate canal stenosis at T10-11 with bilateral foraminal narrowing  Severe right foraminal narrowing at the T12-L1 level  This examination was marked "immediate notification" in Epic in order to begin the standard process by which the radiology reading room liaison alerts the referring practitioner  Workstation performed: [id number]     MRI lumbar spine wo contrast    Result Date: 2/6/2023  Impression: Subacute T12 compression fracture with a new, acute compression fracture of the superior endplate of L1    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 of T11-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 best seen on series 3 image 9  Despite this  compression, the cord maintains normal signal   Severe right foraminal narrowing noted at the T12-L1 level  See separate MRI thoracic spine dictation also performed today for more detailed description of canal stenosis in the lower thoracic spine  Lumbar degenerative disc disease L2-3, L3-4 and L4-5 with multilevel canal stenosis and foraminal narrowing, most pronounced at the L4-5 level  Workstation performed: [id number]     XR pelvis ap only 1 or 2 vw    Result Date: 2/1/2023  Impression: No acute osseous abnormality  Workstation performed: [id number]     TRAUMA - CT abdomen pelvis w contrast    Result Date: 2/1/2023  Impression: Transversely oriented fracture through the T12 vertebral body with very slight extension into the pedicles  No significant displacement of fracture fragments or spinal malalignment  Very slight retropulsion of bony fragments  In retrospect, this fracture was probably acute (though very difficult to detect) on CT examination of December 30, 2022  Advanced lumbar and thoracic degenerative changes including multiple level degenerative fusion is in the lower thoracic and upper lumbar spine  Renal cortical thinning and chronic moderate right-sided hydronephrosis, persistent despite the presence of a right ureteral stent  Urothelial thickening involving right renal collecting system and proximal right ureter, similar when compared to January 12, 2023  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

## 2023-02-13 NOTE — ASSESSMENT & PLAN NOTE
POD#3 T9-L3 FUSION LUMBAR/THORACIC POSTERIOR MINIMALLY INVASIVE W ROBOT T10-T12 decompressive laminectomy (Dr Moraima Flores 2/10/2023)  · T12 fracture with slight extension into pedicles  · S/p mechanical fall Sunday, 1/29  · Additional fall 12/25/2022 - fracture evident retrospectively although not read at the time of prior imaging  Imaging:  · X-ray thoracolumbar 2/12/2023: Expected postoperative appearance status post T9-L3 posterior fixation from T12 and L1 burst fractures with sparing of pedicular screws at T12  Plan:  · Continue to monitor neuro exam closely  Reporting much better pain control in his back without radicular symptoms  · No longer needs a brace  · DEAN drain to full suction, 35cc / 24 hours, removed drain this morning without difficulty, patient tolerated well  1 suture tied down in place this remains clean, dry, intact  Dressing removed and incision open to air  · Map goal above 85 x 48 hours post op, MAP goals completed  · Medical management and pain control per primary team  · DVT prophylaxis: SCDs, heparin subcu    Neurosurgery will sign off, patient will follow-up in 2 weeks for incision check in 6 weeks for postoperative visit with repeat x-rays, call with any further questions or concerns

## 2023-02-13 NOTE — ASSESSMENT & PLAN NOTE
Patient states pain is improving  - Acute pain secondary to traumatic injuries  - Continue multi modal analgesic regimen  - Bowel regimen as long as using opioids  - Continue to monitor pain and adjust regimen as indicated

## 2023-02-13 NOTE — ASSESSMENT & PLAN NOTE
- CT A/P negative for injury  - Likely contusion and muscle strain  - PT/OT and pain control  - WBAT on RLE  - Monitor for spasms  - Receiving multimodal pain regimen for T-12 fracture

## 2023-02-14 LAB
FLUAV RNA RESP QL NAA+PROBE: NEGATIVE
FLUBV RNA RESP QL NAA+PROBE: NEGATIVE
GLUCOSE SERPL-MCNC: 118 MG/DL (ref 65–140)
GLUCOSE SERPL-MCNC: 121 MG/DL (ref 65–140)
GLUCOSE SERPL-MCNC: 127 MG/DL (ref 65–140)
GLUCOSE SERPL-MCNC: 132 MG/DL (ref 65–140)
RSV RNA RESP QL NAA+PROBE: NEGATIVE
SARS-COV-2 RNA RESP QL NAA+PROBE: NEGATIVE

## 2023-02-14 RX ORDER — GABAPENTIN 100 MG/1
100 CAPSULE ORAL 3 TIMES DAILY
Qty: 30 CAPSULE | Refills: 0 | Status: SHIPPED | OUTPATIENT
Start: 2023-02-14 | End: 2023-02-21

## 2023-02-14 RX ORDER — HEPARIN SODIUM 5000 [USP'U]/ML
5000 INJECTION, SOLUTION INTRAVENOUS; SUBCUTANEOUS EVERY 8 HOURS SCHEDULED
Qty: 1 ML | Refills: 0 | Status: SHIPPED | OUTPATIENT
Start: 2023-02-14

## 2023-02-14 RX ORDER — METHOCARBAMOL 500 MG/1
500 TABLET, FILM COATED ORAL EVERY 6 HOURS SCHEDULED
Refills: 0
Start: 2023-02-14

## 2023-02-14 RX ORDER — ACETAMINOPHEN 325 MG/1
975 TABLET ORAL EVERY 8 HOURS SCHEDULED
Refills: 0
Start: 2023-02-14

## 2023-02-14 RX ORDER — METOPROLOL TARTRATE 50 MG/1
50 TABLET, FILM COATED ORAL DAILY
Status: DISCONTINUED | OUTPATIENT
Start: 2023-02-14 | End: 2023-02-15 | Stop reason: HOSPADM

## 2023-02-14 RX ORDER — AMOXICILLIN 250 MG
2 CAPSULE ORAL 2 TIMES DAILY
Refills: 0
Start: 2023-02-14

## 2023-02-14 RX ORDER — LIDOCAINE 50 MG/G
1 PATCH TOPICAL DAILY
Refills: 0
Start: 2023-02-14

## 2023-02-14 RX ORDER — POLYETHYLENE GLYCOL 3350 17 G/17G
17 POWDER, FOR SOLUTION ORAL DAILY
Refills: 0
Start: 2023-02-15

## 2023-02-14 RX ORDER — BISACODYL 10 MG
10 SUPPOSITORY, RECTAL RECTAL DAILY PRN
Qty: 12 SUPPOSITORY | Refills: 0 | Status: SHIPPED | OUTPATIENT
Start: 2023-02-14

## 2023-02-14 RX ORDER — OXYCODONE HYDROCHLORIDE 5 MG/1
TABLET ORAL
Qty: 20 TABLET | Refills: 0 | Status: SHIPPED | OUTPATIENT
Start: 2023-02-14

## 2023-02-14 RX ADMIN — METOPROLOL TARTRATE 50 MG: 50 TABLET, FILM COATED ORAL at 12:57

## 2023-02-14 RX ADMIN — OXYCODONE HYDROCHLORIDE 5 MG: 5 TABLET ORAL at 09:52

## 2023-02-14 RX ADMIN — SENNOSIDES AND DOCUSATE SODIUM 2 TABLET: 8.6; 5 TABLET ORAL at 09:48

## 2023-02-14 RX ADMIN — Medication 1000 UNITS: at 09:48

## 2023-02-14 RX ADMIN — OXYCODONE HYDROCHLORIDE 5 MG: 5 TABLET ORAL at 23:09

## 2023-02-14 RX ADMIN — MELATONIN 6 MG: at 21:18

## 2023-02-14 RX ADMIN — GABAPENTIN 100 MG: 100 CAPSULE ORAL at 19:12

## 2023-02-14 RX ADMIN — METHOCARBAMOL TABLETS 500 MG: 500 TABLET, COATED ORAL at 05:48

## 2023-02-14 RX ADMIN — SENNOSIDES AND DOCUSATE SODIUM 2 TABLET: 8.6; 5 TABLET ORAL at 19:12

## 2023-02-14 RX ADMIN — METHOCARBAMOL TABLETS 500 MG: 500 TABLET, COATED ORAL at 19:12

## 2023-02-14 RX ADMIN — METHOCARBAMOL TABLETS 500 MG: 500 TABLET, COATED ORAL at 00:44

## 2023-02-14 RX ADMIN — ACETAMINOPHEN 975 MG: 325 TABLET, FILM COATED ORAL at 13:02

## 2023-02-14 RX ADMIN — HEPARIN SODIUM 5000 UNITS: 5000 INJECTION INTRAVENOUS; SUBCUTANEOUS at 21:18

## 2023-02-14 RX ADMIN — POLYETHYLENE GLYCOL 3350 17 G: 17 POWDER, FOR SOLUTION ORAL at 09:48

## 2023-02-14 RX ADMIN — OXYCODONE HYDROCHLORIDE 5 MG: 5 TABLET ORAL at 19:12

## 2023-02-14 RX ADMIN — GABAPENTIN 100 MG: 100 CAPSULE ORAL at 23:09

## 2023-02-14 RX ADMIN — METHOCARBAMOL TABLETS 500 MG: 500 TABLET, COATED ORAL at 11:40

## 2023-02-14 RX ADMIN — GABAPENTIN 100 MG: 100 CAPSULE ORAL at 09:48

## 2023-02-14 RX ADMIN — METHOCARBAMOL TABLETS 500 MG: 500 TABLET, COATED ORAL at 23:09

## 2023-02-14 RX ADMIN — LIDOCAINE 5% 1 PATCH: 700 PATCH TOPICAL at 19:12

## 2023-02-14 RX ADMIN — FOLIC ACID TAB 400 MCG 400 MCG: 400 TAB at 09:48

## 2023-02-14 RX ADMIN — HEPARIN SODIUM 5000 UNITS: 5000 INJECTION INTRAVENOUS; SUBCUTANEOUS at 13:02

## 2023-02-14 RX ADMIN — TAMSULOSIN HYDROCHLORIDE 0.4 MG: 0.4 CAPSULE ORAL at 19:12

## 2023-02-14 RX ADMIN — ACETAMINOPHEN 975 MG: 325 TABLET, FILM COATED ORAL at 21:18

## 2023-02-14 RX ADMIN — ATORVASTATIN CALCIUM 40 MG: 40 TABLET, FILM COATED ORAL at 19:17

## 2023-02-14 RX ADMIN — HEPARIN SODIUM 5000 UNITS: 5000 INJECTION INTRAVENOUS; SUBCUTANEOUS at 05:48

## 2023-02-14 RX ADMIN — ACETAMINOPHEN 975 MG: 325 TABLET, FILM COATED ORAL at 05:47

## 2023-02-14 NOTE — DISCHARGE SUMMARY
[address] [facility]  Discharge- Amada Hollis 1935, 81 y o  male MRN: [id number]  [facility] Encounter: [id number]  Primary Care Provider: Dede Abdul DO   Date and time admitted to hospital: 2/1/2023  1:44 PM    * Fall  Assessment & Plan  - Mechanical fall, sustained below stated injury  - PT/OT evaluations - recommending post acute rehabilitation services  - Geriatrics consulted - recs appreciated  - Case management secured transfer to [facility] today for rehabilitation    Closed T12 fracture [facility])  Assessment & Plan  - Neuro intact - has a h/o R ankle surgery with weakness with dorsi- /plantar flexion which is chronic  - - RLE with weakness due to pain in the R hip noted with hip flexion and extension    - MRI: MRI 2/6/2023: Subacute T12 compression fracture with a new, acute compression fracture of the superior endplate of L1   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 of T11-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 best seen on series 3 image Despite this compression, the cord maintains normal signal   Severe right foraminal narrowing noted at the T12-L1 level  See separate MRI thoracic spine dictation also performed today for more detailed description of canal stenosis in the lower thoracic spine  Lumbar degenerative disc disease L2-3, L3-4 and L4-5 with multilevel canal stenosis and foraminal narrowing, most pronounced at the L4-5 level  2/10: S/p T9-L3 lumbar/thoracic minimally invasive robot   T10-T12 decompressive laminectomy  2/13: DEAN drain removed by neurosurgery with single stitch; left ROSLYN    Plan:  - Will need 2-week follow up for incision check and 6-week postop visit with repeat xrays  - Hold home Eliquis for 2 weeks post-op per neurosurgery recommendation  - No longer need brace per neurosurgery  - Continue DVT ppx on SQH - discontinue when patient becomes more active throughout rehab  - PT/OT recommendation for acute rehab  - Multimodal pain regimen  - MAP goals completed      Acute pain due to trauma  Assessment & Plan  Patient states pain is improving  Acute pain secondary to traumatic injuries  - Continue multi modal analgesic regimen  - Bowel regimen as long as using opioids  - Continue to monitor pain and adjust regimen as indicated      Right hip pain  Assessment & Plan  - CT A/P negative for injury  - Likely contusion and muscle strain; T12 vertebral body fracture  - PT/OT at [facility]  - Multimodal pain regimen  - WBAT on RLE      Urinary tract obstruction by kidney stone  Assessment & Plan  - Patient noted to have a renal stone on 1/13 and underwent lithotripsy and right renal stent placement on 1/26  Has chronic wynn catheter  It appears he was to have followed up for a voiding trial with urology on 1/31  Previously on 5 day course of Ceftin  - Per patient, he cannot void lying down   Catheter remains in place   - Not currently on abx, WBC downtrending overnight, afebrile, patient denies flank or bladder pain at this time    - Monitor kidney function with AM labs  - Monitor UOP   - Maintain wynn catheter    Pacemaker  Assessment & Plan  - Continue home metoprolol 50mg PO once daily  - H/o AFib s/p PPM   - Recommend 2 week follow-up with established cardiologist at [facility]       Carotid artery disease [facility])  Assessment & Plan  - S/p CABG x3  - Continue home statin  - Holding metoprolol due to recent MAP goals; consider restarting  - Holding home Eliquis for 2 weeks per neurosurgery    S/P AVR  Assessment & Plan  - bioprosthetic AVR  - follows outpatient with  Cardiology at [facility] Eliquis for 2 weeks per neurosurgery    Aortic stenosis  Assessment & Plan  - H/o bioprosthetic AVR    Hyperlipemia  Assessment & Plan  - Continue home statin    Hypertension  Assessment & Plan  - Restart home antihypertensives (lisinopril, amlodipine)   - Previously held in the setting of MAP push goals  - BP adequately controlled         Medical Problems     Resolved Problems  Date Reviewed: 2/14/2023   None         Admission Date:   Admission Orders (From admission, onward)     Ordered        02/01/23 1414  Inpatient Admission  Once                        Admitting Diagnosis: Injury [T14 90XA]  Unspecified multiple injuries, initial encounter [T07  XXXA]  Traumatic fractures of T12 and L1 vertebrae ([facility] [address] ) [S22 089A, R02 840Y]    HPI: Per Aisha Odom MD report: Juanita Joya is a 81 y o  male with pmhx of CAD, aflutter, bioprosthetic AVR, 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, R ureteral stones/hydro s/p R cysto, renal stent 1/13/23, HLD, pacemaker, skin cancer, who presented on 2/10 as a transfer from [facility] after a fall with no headstrike or LOC  Patient had a recent cysto with stent placement for right renal stone and c/o post-procedure radiating back and leg pain that worsened after his fall on 2/09  Patient on Eliquis at home  Prior to his fall in December, pt daughter states that pt walked with out a cane or walker and independently performed ADL's and IADL's  On admission, CT imaging showed T12 vertebral body fracture  Remaining workup was negative  NSGY initially trialed conservative management but failed  On 2/10, NSGY took the patient to the OR for T9-L3 fusion with T10-T12 decompressive laminectomies  Patient completed 2 days of MAP goals >85mmHg  PT/OT evaluation of patient recommended acute rehabilitation center  Patient appropriate for discharge to [facility] today  Procedures Performed:   Orders Placed This Encounter   Procedures   • Central Line       Summary of Hospital Course:  2/10/2023: T9-L3 FUSION LUMBAR/THORACIC POSTERIOR MINIMALLY INVASIVE W ROBOT T10-T12 decompressive laminectomy (Dr Rhett Espino 2/10/2023)    Subjective:   "Feeling good"  Denies chest pain, abdominal pain, bladder pain  Denies difficulty breathing or SOA  Denies numbness and tingling in extremities  Denies changes in vision, hearing, or sensation   Reports controlled pain  Objective:  - vital signs reviewed and stable today  Physical Exam:  General: No acute distress, appropriately interactive  Neuro: A&Ox3  Following commands; Moving all extremities  Denies numbness and tingling  Denies headache, lightheadedness, dizziness  HEENT: Moist mucus membranes  CV: RRR, +2/+2 pulses  No edema noted  Pulm: No respiratory distress, lungs clear upon ausculation  Josiah SOA  Room air  No rhonchi, wheezes, or rales noted  GI: No abdominal tenderness, bowel sounds active x4, NT, non-distended  No rebound or guarding  : Chronic wynn in place with yellow urine noted  MSK: ROM intact  No swelling or crepitus noted  Skin: Warm and dry throughout  Single suture in posterior neck  ROSLYN  No signs of infection  Significant Findings, Care, Treatment and Services Provided:  X-ray thoracolumbar 2/12/2023: Expected postoperative appearance status post T9-L3 posterior fixation from T12 and L1 burst fractures with sparing of pedicular screws at T12  Neurosurgery with interventions as stated above  Post procedure MAP goals >85mmHg for 48 hours  Post-op re-assessment  PT/OT therapy, assessment, with recommends for acute rehab therapy  Complications: No immediate complications noted  Post-op pain  Condition at Discharge: fair       Discharge instructions/Information to patient and family:   See after visit summary for information provided to patient and family  Provisions for Follow-Up Care:  See after visit summary for information related to follow-up care and any pertinent home health orders  PCP: Bird Camarena DO    Disposition: Short-term rehab at [facility] Readmission: No    Discharge Statement   I spent 30 minutes discharging the patient  This time was spent on the day of discharge  I had direct contact with the patient on the day of discharge  Additional documentation is required if more than 30 minutes were spent on discharge       Discharge Medications:  See after visit summary for reconciled discharge medications provided to patient and family

## 2023-02-14 NOTE — ASSESSMENT & PLAN NOTE
- bioprosthetic AVR  - follows outpatient with [facility] at [facility] Eliquis for 2 weeks per neurosurgery

## 2023-02-14 NOTE — PROGRESS NOTES
Progress Note - Geriatric Medicine   Fransisca Ao 81 y o  male MRN: [id number]  Unit/Bed#: [facility] 622-01 Encounter: [id number]      Assessment/Plan:    Ambulatory dysfunction with fall   -multiple mechanical falls since December, no hx recurrent falls prior to dec    -rapid decline in mobility since sudden onset in Dec  -injuries as outlined below   -high future falls, continue fall precautions, assist w transfers and maintain env free of fall hazards  -PT/OT following, recommended for rehab      Fracture of T12 vertebrae  -Noted on CT abdomen and pelvis 2/1/23  -failed conservative management with persistent pain and progressive LE weakness  -underwent post decompressive laminectomy with NSx 2/10/23  -acute pain well controlled on current regimen  -continue thoracolumbar spine precautions    -neurovascular checks per protocol      Acute pain due to trauma  -Continue acute multimodal pain control  -APS on consult appreciate recs, currently on Geriatric pain protocol      Right-sided hydronephrosis  -Due to right ureteral stone  -S/p cystoscopy with lithotripsy and ureteral stent insertion 1/26/22  -Maintained on Flomax  -Persistent chronic moderate right-sided hydronephrosis, right ureteral stent in place  -wynn removed earlier in admission and since replaced due to failed retention protocol   -o/p Urology f/u     Atrial flutter  -Home regimen includes rate control with metoprolol and anticoagulation with Eliquis  -Eliquis held for recent surgical procedure, to remain on hold for two weeks per Nsx      Sick sinus syndrome  -PPP in place   -Continue close outpatient follow-up with Cardiology    Impaired mastication  -Requires use of dentures -continue use all appropriate times  -ensure meal consistency appropriate for abilities  -continue aspiration precautions      Delirium precautions  -high risk due to age, pain, prolonged hospitalization   -maintain normal sleep/wake cycle  -reorient frequently     Care coordination: rounded with Katie Linn (RN)    Subjective:     Ayana Shrestha is seen and examined at bedside where he is lying resting, he slept well overnight, pain is well controlled and appetite is good, he offers no acute complaints, nursing reports no acute events overnight  Review of Systems   Constitutional: Negative  Negative for chills and fever  HENT: Negative  Eyes: Negative  Respiratory: Negative  Cardiovascular: Negative  Gastrointestinal: Negative  Genitourinary: Negative  Musculoskeletal: Positive for gait problem  Bilateral thigh soreness with sitting upright   Skin: Negative  Neurological: Negative for dizziness, light-headedness, numbness and headaches  Hematological: Negative  Psychiatric/Behavioral: Negative  Negative for sleep disturbance  All other systems reviewed and are negative  Objective:     Vitals: Blood pressure 125/70, pulse 93, temperature 98 3 °F (36 8 °C), resp  rate 16, height 5' 10" (1 778 m), weight 103 kg (227 lb 6 4 oz), SpO2 95 %  ,Body mass index is 32 63 kg/m²  Intake/Output Summary (Last 24 hours) at 2/14/2023 0805  Last data filed at 2/14/2023 0547  Gross per 24 hour   Intake --   Output 2025 ml   Net -2025 ml     Current Medications: Reviewed    Physical Exam:   Physical Exam  Vitals and nursing note reviewed  Constitutional:       General: He is not in acute distress  Appearance: Normal appearance  He is not toxic-appearing  HENT:      Head: Normocephalic and atraumatic  Nose: Nose normal       Mouth/Throat:      Mouth: Mucous membranes are moist    Eyes:      General:         Right eye: No discharge  Left eye: No discharge  Conjunctiva/sclera: Conjunctivae normal    Neck:      Comments: Phonation normal  Cardiovascular:      Rate and Rhythm: Normal rate  Pulmonary:      Effort: Pulmonary effort is normal  No respiratory distress  Breath sounds: No wheezing        Comments: Saturating well on room air  Abdominal:      General: Bowel sounds are normal  There is no distension  Palpations: Abdomen is soft  Tenderness: There is no abdominal tenderness  Musculoskeletal:      Cervical back: Neck supple  Right lower leg: No edema  Left lower leg: No edema  Comments: Mildly reduced overall muscle mass   Skin:     General: Skin is warm and dry  Neurological:      Mental Status: He is alert  Comments: Awake and alert, oriented, answers freshened appropriately   Psychiatric:         Mood and Affect: Mood normal          Behavior: Behavior normal       Comments: Pleasant and cooperative        Invasive Devices     Peripheral Intravenous Line  Duration           Peripheral IV 02/10/23 Left;Dorsal (posterior) Hand 3 days    Peripheral IV 02/10/23 Left;Dorsal (posterior) Wrist 3 days          Drain  Duration           Ureteral Internal Stent Right ureter 6 Fr  31 days    Urethral Catheter Non-latex 18 Fr  9 days              Lab, Imaging and other studies: I have personally reviewed pertinent reports

## 2023-02-14 NOTE — ASSESSMENT & PLAN NOTE
- Continue home metoprolol 50mg PO once daily  - H/o AFib s/p PPM   - Recommend 2 week follow-up with established cardiologist at [facility]

## 2023-02-14 NOTE — CASE MANAGEMENT
Case Management Discharge Planning Note    Patient name Carina Cruz  Location [address] [place] [address] [facility] 526-14 MRN [id number]  : 1935 Date 2023       Current Admission Date: 2023  Current Admission Diagnosis:Fall   Patient Active Problem List    Diagnosis Date Noted   • Acute pain due to trauma 2023   • Fall 2023   • Closed T12 fracture ([facility] [address] ) 2023   • Right hip pain 2023   • Class 1 obesity in adult 2023   • H/O heart artery stent    • MS (mitral stenosis)    • Right ureteral stone 2023   • Acute cystitis with hematuria 2023   • Urinary tract obstruction by kidney stone 2023   • Atrial flutter ([address] ) 2023   • Pacemaker 2022   • Hypertension    • Hyperlipemia    • Aortic stenosis    • S/P AVR    • Carotid artery disease ([facility] [address] )    • Coronary artery disease       LOS (days): 13  Geometric Mean LOS (GMLOS) (days): 2 80  Days to GMLOS:-10 2     OBJECTIVE:  Risk of Unplanned Readmission Score: 19 12         Current admission status: Inpatient   Preferred Pharmacy:   [address] [place] [id number]  Phone: [phone] Fax: [phone]    [id number] [facility] Devin Falk [address] [place] [address]  Phone: [phone] Fax: [phone]    Primary Care Provider: Desiree Pierre DO    Primary Insurance: Severino Lyles [facility] REP  Secondary Insurance:     DISCHARGE DETAILS:    Transport was unable to secure BLS today for the pt's d/c to [facility] lucía can do BLS tomorrow @ 0830  CM informed pt's nurse and facility  Rx Authorization:    Requested Medication/ Dose:1mg    Date last refill ordered: 10/3/19    Quantity ordered: 60tabs    # refills: 3    Date of last clinic visit with ordering provider: 9/27/19    Date of next clinic visit with ordering provider: f/u 6 months    All pertinent protocol data (lab date/result):     Include pertinent information from patients message:

## 2023-02-14 NOTE — ASSESSMENT & PLAN NOTE
Patient states pain is improving  Acute pain secondary to traumatic injuries  - Continue multi modal analgesic regimen  - Bowel regimen as long as using opioids  - Continue to monitor pain and adjust regimen as indicated

## 2023-02-14 NOTE — PLAN OF CARE
Problem: PAIN - ADULT  Goal: Verbalizes/displays adequate comfort level or baseline comfort level  Description: Interventions:  - Encourage patient to monitor pain and request assistance  - Assess pain using appropriate pain scale  - Administer analgesics based on type and severity of pain and evaluate response  - Implement non-pharmacological measures as appropriate and evaluate response  - Consider cultural and social influences on pain and pain management  - Notify physician/advanced practitioner if interventions unsuccessful or patient reports new pain  Outcome: Progressing     Problem: MOBILITY - ADULT  Goal: Maintain or return to baseline ADL function  Description: INTERVENTIONS:  -  Assess patient's ability to carry out ADLs; assess patient's baseline for ADL function and identify physical deficits which impact ability to perform ADLs (bathing, care of mouth/teeth, toileting, grooming, dressing, etc )  - Assess/evaluate cause of self-care deficits   - Assess range of motion  - Assess patient's mobility; develop plan if impaired  - Assess patient's need for assistive devices and provide as appropriate  - Encourage maximum independence but intervene and supervise when necessary  - Involve family in performance of ADLs  - Assess for home care needs following discharge   - Consider OT consult to assist with ADL evaluation and planning for discharge  - Provide patient education as appropriate  Outcome: Progressing     Problem: INFECTION - ADULT  Goal: Absence or prevention of progression during hospitalization  Description: INTERVENTIONS:  - Assess and monitor for signs and symptoms of infection  - Monitor lab/diagnostic results  - Monitor all insertion sites, i e  indwelling lines, tubes, and drains  - Monitor endotracheal if appropriate and nasal secretions for changes in amount and color  - [place] appropriate cooling/warming therapies per order  - Administer medications as ordered  - Instruct and encourage patient and family to use good hand hygiene technique  - Identify and instruct in appropriate isolation precautions for identified infection/condition  Outcome: Progressing

## 2023-02-14 NOTE — ASSESSMENT & PLAN NOTE
- CT A/P negative for injury  - Likely contusion and muscle strain; T12 vertebral body fracture  - PT/OT at [facility]  - Multimodal pain regimen  - WBAT on RLE

## 2023-02-14 NOTE — ASSESSMENT & PLAN NOTE
- Neuro intact - has a h/o R ankle surgery with weakness with dorsi- /plantar flexion which is chronic  - - RLE with weakness due to pain in the R hip noted with hip flexion and extension    - MRI: MRI 2/6/2023: Subacute T12 compression fracture with a new, acute compression fracture of the superior endplate of L1   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 of T11-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 best seen on series 3 image Despite this compression, the cord maintains normal signal   Severe right foraminal narrowing noted at the T12-L1 level  See separate MRI thoracic spine dictation also performed today for more detailed description of canal stenosis in the lower thoracic spine  Lumbar degenerative disc disease L2-3, L3-4 and L4-5 with multilevel canal stenosis and foraminal narrowing, most pronounced at the L4-5 level  2/10: S/p T9-L3 lumbar/thoracic minimally invasive robot   T10-T12 decompressive laminectomy  2/13: DEAN drain removed by neurosurgery with single stitch; left ROSLYN    Plan:  - Will need 2-week follow up for incision check and 6-week postop visit with repeat xrays  - Hold home Eliquis for 2 weeks post-op per neurosurgery recommendation  - No longer need brace per neurosurgery  - Continue DVT ppx on SQH - discontinue when patient becomes more active throughout rehab  - PT/OT recommendation for acute rehab  - Multimodal pain regimen  - MAP goals completed

## 2023-02-14 NOTE — ASSESSMENT & PLAN NOTE
- Mechanical fall, sustained below stated injury  - PT/OT evaluations - recommending post acute rehabilitation services  - Geriatrics consulted - recs appreciated  - Case management secured transfer to [facility] today for rehabilitation

## 2023-02-14 NOTE — ASSESSMENT & PLAN NOTE
- bioprosthetic AVR  - follows outpatient with [facility] at [facility] Eliquis for 2 weeks per neurosurgery

## 2023-02-14 NOTE — INCIDENTAL FINDINGS
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

## 2023-02-14 NOTE — ASSESSMENT & PLAN NOTE
- Continue home metoprolol 50mg PO once daily  - H/o AFib s/p PPM   - Recommend 2 week follow-up with established cardiologist at [facility]

## 2023-02-14 NOTE — ASSESSMENT & PLAN NOTE
- Patient noted to have a renal stone on 1/13 and underwent lithotripsy and right renal stent placement on 1/26  Has chronic wynn catheter  It appears he was to have followed up for a voiding trial with urology on 1/31  Previously on 5 day course of Ceftin  - Per patient, he cannot void lying down   Catheter remains in place   - Not currently on abx, WBC downtrending overnight, afebrile, patient denies flank or bladder pain at this time    - Monitor kidney function with AM labs  - Monitor UOP   - Maintain wynn catheter

## 2023-02-14 NOTE — ASSESSMENT & PLAN NOTE
- Mechanical fall, sustained below stated injury  - PT/OT evaluations - recommending post acute rehabilitation services  - Geriatrics consulted - recs appreciated  - Case management secured transfer to [facility] today for rehabilitation

## 2023-02-14 NOTE — ASSESSMENT & PLAN NOTE
- Restart home antihypertensives (lisinopril, amlodipine)   - Previously held in the setting of MAP push goals  - BP adequately controlled

## 2023-02-14 NOTE — CASE MANAGEMENT
Autumn Oseguera 51 has received approved authorization from insurance:   Jessica Slade in by Dandy HOLLY#  [phone]  Authorization received for: SNF  Facility: [address] [place] [address],  [address] #: [id number]   Start of Care: 2/14  Next Review Date: 2/16  Submit next review to: [address] [place] [address] notified: Polina Perez

## 2023-02-14 NOTE — CASE MANAGEMENT
Case Management Discharge Planning Note    Patient name Nacho Schofield  Location [facility] [address]/PPHP 781-87 MRN [id number]  : 1935 Date 2023       Current Admission Date: 2023  Current Admission Diagnosis:Fall   Patient Active Problem List    Diagnosis Date Noted   • Acute pain due to trauma 2023   • Fall 2023   • Closed T12 fracture ([address] ) 2023   • Right hip pain 2023   • Class 1 obesity in adult 2023   • H/O heart artery stent    • MS (mitral stenosis)    • Right ureteral stone 2023   • Acute cystitis with hematuria 2023   • Urinary tract obstruction by kidney stone 2023   • Atrial flutter ([address] ) 2023   • Pacemaker 2022   • Hypertension    • Hyperlipemia    • Aortic stenosis    • S/P AVR    • Carotid artery disease ([address] )    • Coronary artery disease       LOS (days): 13  Geometric Mean LOS (GMLOS) (days): 2 80  Days to GMLOS:-10     OBJECTIVE:  Risk of Unplanned Readmission Score: 17 4         Current admission status: Inpatient   Preferred Pharmacy:   [address] [place] [address], [address] [place] [id number]  Phone: [phone] Fax: [phone]    [id number] [place] [place] [facility]  [facility]  [address] [facility] [address]  Phone: [phone] Fax: [phone]    Primary Care Provider: Rocio Bullock DO    Primary Insurance: [facility]  Secondary Insurance:     [address] [place] [address] Number: [id number]

## 2023-02-14 NOTE — ASSESSMENT & PLAN NOTE
- CT A/P negative for injury  - Likely contusion and muscle strain; T12 vertebral body fracture  - PT/OT at [facility]  - Multimodal pain regimen  - WBAT on RLE

## 2023-02-14 NOTE — PROGRESS NOTES
[facility]  Progress Note Alena Number 1935, 81 y o  male MRN: [id number]  [facility] Encounter: [id number]  Primary Care Provider: Joe Bonilla DO   Date and time admitted to hospital: 2/1/2023  1:44 PM    * Fall  Assessment & Plan  - Mechanical fall, sustained below stated injury  - PT/OT evaluations - recommending post acute rehabilitation services  - Geriatrics consulted - recs appreciated  - Case management secured transfer to [facility] today for rehabilitation    Closed T12 fracture [facility])  Assessment & Plan  - Neuro intact - has a h/o R ankle surgery with weakness with dorsi- /plantar flexion which is chronic  - - RLE with weakness due to pain in the R hip noted with hip flexion and extension    - MRI: MRI 2/6/2023: Subacute T12 compression fracture with a new, acute compression fracture of the superior endplate of L1   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 of T11-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 best seen on series 3 image Despite this compression, the cord maintains normal signal   Severe right foraminal narrowing noted at the T12-L1 level  See separate MRI thoracic spine dictation also performed today for more detailed description of canal stenosis in the lower thoracic spine  Lumbar degenerative disc disease L2-3, L3-4 and L4-5 with multilevel canal stenosis and foraminal narrowing, most pronounced at the L4-5 level  2/10: S/p T9-L3 lumbar/thoracic minimally invasive robot   T10-T12 decompressive laminectomy  2/13: DEAN drain removed by neurosurgery with single stitch; left ROSLYN    Plan:  - Will need 2-week follow up for incision check and 6-week postop visit with repeat xrays  - Hold home Eliquis for 2 weeks post-op per neurosurgery recommendation  - No longer need brace per neurosurgery  - Continue DVT ppx on SQH - discontinue when patient becomes more active throughout rehab  - PT/OT recommendation for acute rehab  - Multimodal pain regimen  - MAP goals completed      Acute pain due to trauma  Assessment & Plan  Patient states pain is improving  Acute pain secondary to traumatic injuries  - Continue multi modal analgesic regimen  - Bowel regimen as long as using opioids  - Continue to monitor pain and adjust regimen as indicated      Right hip pain  Assessment & Plan  - CT A/P negative for injury  - Likely contusion and muscle strain; T12 vertebral body fracture  - PT/OT at [facility]  - Multimodal pain regimen  - WBAT on RLE      Urinary tract obstruction by kidney stone  Assessment & Plan  - Patient noted to have a renal stone on 1/13 and underwent lithotripsy and right renal stent placement on 1/26  Has chronic wynn catheter  It appears he was to have followed up for a voiding trial with urology on 1/31  Previously on 5 day course of Ceftin  - Per patient, he cannot void lying down   Catheter remains in place   - Not currently on abx, WBC downtrending overnight, afebrile, patient denies flank or bladder pain at this time    - Monitor kidney function with AM labs  - Monitor UOP   - Maintain wynn catheter    Pacemaker  Assessment & Plan  - Continue home metoprolol 50mg PO once daily  - H/o AFib s/p PPM   - Recommend 2 week follow-up with established cardiologist at [facility]       Carotid artery disease [facility])  Assessment & Plan  - S/p CABG x3  - Continue home statin  - Holding metoprolol due to recent MAP goals; consider restarting  - Holding home Eliquis for 2 weeks per neurosurgery    S/P AVR  Assessment & Plan  - bioprosthetic AVR  - follows outpatient with  Cardiology at [facility] Eliquis for 2 weeks per neurosurgery    Aortic stenosis  Assessment & Plan  - H/o bioprosthetic AVR    Hyperlipemia  Assessment & Plan  - Continue home statin    Hypertension  Assessment & Plan  - Restart home antihypertensives (lisinopril, amlodipine)   - Previously held in the setting of MAP push goals  - BP adequately controlled         Bowel Regimen: jordy/senna  VTE Prophylaxis:Heparin     Disposition: Discharge tomorrow (2/15) to [facility] @9855    Subjective   Chief Complaint: "fell at home"    Subjective: No overnight events  Patient remained HD stable  Reports improved pain  Denies numbness and tingling of the extremities  Denies vision or hearing changes  Denies headache, lightheadedness, or dizziness  Denies chest pain, SOA, and abdominal pain  Objective   Vitals:   Temp:  [97 5 °F (36 4 °C)-98 3 °F (36 8 °C)] 97 9 °F (36 6 °C)  HR:  [86-95] 86  Resp:  [16] 16  BP: (125-130)/(67-73) 130/73    I/O       02/12 0701  02/13 0700 02/13 0701  02/14 0700 02/14 0701  02/15 0700    P  O  600  240    I V  (mL/kg) 225 2 (2 2)      IV Piggyback       Total Intake(mL/kg) 825 2 (8)  240 (2 3)    Urine (mL/kg/hr) 2500 (1) 2025 (0 8) 500 (0 6)    Drains 35      Total Output 2535 2025 500    Net -1709 8 -2025 -260                  Physical Exam:   General: No acute distress  Appropriately interactive  Neuro: A&Ox2  Confused to month  Following commands; Moving all extremities  No localizing signs  HEENT: Moist mucus membranes  PERRLA 2mm  CV: RRRR, +2/+2 pulses  No edema appreciated  Pulm: No respiratory distress, lungs clear to auscultation  No rhonchi, wheezes, or rales  On room air  GI: No abdominal tenderness, guarding, or rebound  Bowel sounds active x4 quadrants  : Chronic wynn in place  Yellow urine noted in bag  MSK: ROM intact  No crepitus or swelling appreciated  Skin: Warm and dry throughout  Single suture in posterior neck where previous DEAN drain was      Invasive Devices     Peripheral Intravenous Line  Duration           Peripheral IV 02/10/23 Left;Dorsal (posterior) Hand 4 days    Peripheral IV 02/10/23 Left;Dorsal (posterior) Wrist 4 days          Drain  Duration           Ureteral Internal Stent Right ureter 6 Fr  32 days    Urethral Catheter Non-latex 18 Fr  10 days                      Lab Results: Results: I have personally reviewed all pertinent laboratory/tests results, BMP/CMP: No results found for: SODIUM, K, CL, CO2, ANIONGAP, BUN, CREATININE, GLUCOSE, CALCIUM, AST, ALT, ALKPHOS, PROT, BILITOT, EGFR, CBC: No results found for: WBC, HGB, HCT, MCV, PLT, ADJUSTEDWBC, MCH, MCHC, RDW, MPV, NRBC, Coagulation: No results found for: PT, INR, APTT, Lactate: No results found for: LACTATE, AST: No results found for: AST, ALT: No results found for: ALT, CK: No results found for: CKTOTAL, Troponin: No results found for: TROPONINI, ABG: No results found for: PHART, IOW7LAB, PO2ART, KWM9QPA, H6FSKFIX, BEART, SOURCE and ISTAT: No components found for: VBG  Imaging: I have personally reviewed pertinent reports     and I have personally reviewed pertinent films in PACS

## 2023-02-15 VITALS
RESPIRATION RATE: 18 BRPM | DIASTOLIC BLOOD PRESSURE: 71 MMHG | TEMPERATURE: 98 F | WEIGHT: 227.4 LBS | HEIGHT: 70 IN | BODY MASS INDEX: 32.56 KG/M2 | HEART RATE: 89 BPM | SYSTOLIC BLOOD PRESSURE: 139 MMHG | OXYGEN SATURATION: 95 %

## 2023-02-15 LAB — GLUCOSE SERPL-MCNC: 101 MG/DL (ref 65–140)

## 2023-02-15 RX ADMIN — HEPARIN SODIUM 5000 UNITS: 5000 INJECTION INTRAVENOUS; SUBCUTANEOUS at 05:17

## 2023-02-15 RX ADMIN — METHOCARBAMOL TABLETS 500 MG: 500 TABLET, COATED ORAL at 05:16

## 2023-02-15 RX ADMIN — SENNOSIDES AND DOCUSATE SODIUM 2 TABLET: 8.6; 5 TABLET ORAL at 08:01

## 2023-02-15 RX ADMIN — Medication 1000 UNITS: at 08:01

## 2023-02-15 RX ADMIN — GABAPENTIN 100 MG: 100 CAPSULE ORAL at 08:01

## 2023-02-15 RX ADMIN — ACETAMINOPHEN 975 MG: 325 TABLET, FILM COATED ORAL at 05:16

## 2023-02-15 RX ADMIN — METOPROLOL TARTRATE 50 MG: 50 TABLET, FILM COATED ORAL at 08:01

## 2023-02-15 RX ADMIN — POLYETHYLENE GLYCOL 3350 17 G: 17 POWDER, FOR SOLUTION ORAL at 08:02

## 2023-02-15 RX ADMIN — OXYCODONE HYDROCHLORIDE 5 MG: 5 TABLET ORAL at 08:01

## 2023-02-15 RX ADMIN — FOLIC ACID TAB 400 MCG 400 MCG: 400 TAB at 08:01

## 2023-02-16 NOTE — UTILIZATION REVIEW
NOTIFICATION OF ADMISSION DISCHARGE   This is a Notification of Discharge from [address]  Please be advised that this patient has been discharge from our facility  Below you will find the admission and discharge date and time including the patient’s disposition  UTILIZATION REVIEW CONTACT:  Litzy Wick  Utilization   Network Utilization Review Department  Phone: [phone] x carefully listen to the prompts  All voicemails are confidential   Email: [email or web address]     ADMISSION INFORMATION  PRESENTATION DATE: 2/1/2023  1:44 PM  OBERVATION ADMISSION DATE:   INPATIENT ADMISSION DATE: 2/1/23  2:14 PM   DISCHARGE DATE: 2/15/2023  8:56 AM   DISPOSITION:Non [facility] SNF/TCU/SNU    IMPORTANT INFORMATION:  Send all requests for admission clinical reviews, approved or denied determinations and any other requests to dedicated fax number below belonging to the campus where the patient is receiving treatment   List of dedicated fax numbers:  [address] DENIALS (Administrative/Medical Necessity) [phone]   [address] (Maternity/NICU/Pediatrics) [phone]   [facility] [phone]   [facility] [phone]   [address] [phone]   [facility] [phone]   90 [facility] [phone]   [address] [place], [place] [address] [phone]   [facility]  [phone]   405 [facility] [phone]   412 [facility] [address] [facility] [phone]

## 2023-02-16 NOTE — TELEPHONE ENCOUNTER
Called [facility] and spoke with Aneta Balderas to discuss patient status and advise of post operative visits  She states he is settling in well and is participating in PT  She requested faxed post op care instructions  This will be sent to [phone] as requested  Also confirmed 2 and 6 week post op appt date/time/location  She was appreciative

## 2023-02-20 NOTE — TELEPHONE ENCOUNTER
Received call from Carlee Mcallister at [facility] confirming appts  Requested we make 2 wk a virtual  I explained it has to be video in order for the nurses to check incision site and approve removal of staples/stitches

## 2023-02-24 ENCOUNTER — PROCEDURE VISIT (OUTPATIENT)
Dept: UROLOGY | Facility: CLINIC | Age: 88
End: 2023-02-24

## 2023-02-24 ENCOUNTER — TELEMEDICINE (OUTPATIENT)
Dept: NEUROSURGERY | Facility: CLINIC | Age: 88
End: 2023-02-24

## 2023-02-24 VITALS
SYSTOLIC BLOOD PRESSURE: 110 MMHG | HEIGHT: 70 IN | BODY MASS INDEX: 32.63 KG/M2 | TEMPERATURE: 97.7 F | DIASTOLIC BLOOD PRESSURE: 60 MMHG | HEART RATE: 78 BPM

## 2023-02-24 DIAGNOSIS — N40.1 BENIGN PROSTATIC HYPERPLASIA WITH URINARY RETENTION: ICD-10-CM

## 2023-02-24 DIAGNOSIS — Z98.890 POST-OPERATIVE STATE: Primary | ICD-10-CM

## 2023-02-24 DIAGNOSIS — R33.8 BENIGN PROSTATIC HYPERPLASIA WITH URINARY RETENTION: ICD-10-CM

## 2023-02-24 DIAGNOSIS — N20.0 CALCULUS OF KIDNEY: Primary | ICD-10-CM

## 2023-02-24 NOTE — PROGRESS NOTES
Virtual Regular Visit    Verification of patient location:    Patient is located in the following state in which I hold an active license [place]      Assessment/Plan:    Reason for visit is   Chief Complaint   Patient presents with   • Virtual Regular Visit        Encounter provider Neurosurgery Nurse [facility]    Provider located at [address] [place]  [address] [place] [address]  [phone]      Recent Visits  No visits were found meeting these conditions  Showing recent visits within past 7 days and meeting all other requirements  Future Appointments  No visits were found meeting these conditions  Showing future appointments within next 150 days and meeting all other requirements       The patient was identified by name and date of birth  Wesley Hutchison was informed that this is a telemedicine visit and that the visit is being conducted through Telephone  My office door was closed  No one else was in the room  He acknowledged consent and understanding of privacy and security of the video platform  The patient has agreed to participate and understands they can discontinue the visit at any time          Past Medical History:   Diagnosis Date   • Coronary artery disease    • H/O atrial flutter    • History of aortic valve replacement with bioprosthetic valve    • Hyperlipemia    • Hypertension    • Pacemaker    • Skin cancer, basal cell    • Walker as ambulation aid    • Wears dentures        Past Surgical History:   Procedure Laterality Date   • A-V CARDIAC PACEMAKER INSERTION     • AORTIC VALVE REPLACEMENT     • CARDIAC PACEMAKER PLACEMENT Left     2021   • CATARACT EXTRACTION     • CORONARY ANGIOPLASTY WITH STENT PLACEMENT      stents times 3   • LUMBAR FUSION N/A 2/10/2023    Procedure: T9-L3 FUSION LUMBAR/THORACIC POSTERIOR MINIMALLY INVASIVE W ROBOT T10-T12 decompressive laminectomy;  Surgeon: Ivan Bradshaw MD;  [facility]; Service: Neurosurgery   • IA CYSTO BLADDER W/URETERAL CATHETERIZATION Right 01/13/2023    Procedure: CYSTOSCOPY RETROGRADE PYELOGRAM WITH INSERTION STENT URETERAL;  Surgeon: Josh Sims MD;  [facility];  Service: Urology   • IA CYSTO BLADDER W/URETERAL CATHETERIZATION Right 1/26/2023    Procedure: CYSTOSCOPY WITH RETROGRADE PYELOGRAM;  Surgeon: Josh Sims MD;  [facility];  Service: Urology   • IA CYSTO/URETERO W/LITHOTRIPSY &INDWELL STENT INSRT Right 1/26/2023    Procedure: CYSTOSCOPY URETEROSCOPY WITH LITHOTRIPSY HOLMIUM LASER, RETROGRADE PYELOGRAM AND INSERTION STENT URETERAL;  Surgeon: Josh Sims MD;  [facility];  Service: Urology       Current Outpatient Medications   Medication Sig Dispense Refill   • acetaminophen (TYLENOL) 325 mg tablet Take 3 tablets (975 mg total) by mouth every 8 (eight) hours  0   • amLODIPine (NORVASC) 10 mg tablet Take 10 mg by mouth daily     • atorvastatin (LIPITOR) 40 mg tablet Take 40 mg by mouth daily     • bisacodyl (DULCOLAX) 10 mg suppository Insert 1 suppository (10 mg total) into the rectum daily as needed for constipation (if no bowel movement in past 24 hours) 12 suppository 0   • Cholecalciferol 25 MCG (1000 UT) tablet Take by mouth     • enalapril (VASOTEC) 2 5 mg tablet Take 2 5 mg by mouth 2 (two) times a day     • folic acid (FOLVITE) 184 mcg tablet Take 400 mcg by mouth daily     • gabapentin (NEURONTIN) 100 mg capsule Take 1 capsule (100 mg total) by mouth 3 (three) times a day for 7 days 30 capsule 0   • Heparin Sodium, Porcine, (heparin, porcine,) 5,000 units/mL Inject 1 mL (5,000 Units total) under the skin every 8 (eight) hours 1 mL 0   • lidocaine (LIDODERM) 5 % Apply 1 patch topically over 12 hours daily Remove & Discard patch within 12 hours or as directed by MD  0   • methocarbamol (ROBAXIN) 500 mg tablet Take 1 tablet (500 mg total) by mouth every 6 (six) hours  0   • metoprolol tartrate (LOPRESSOR) 50 mg tablet Take 50 mg by mouth daily     • oxyCODONE (ROXICODONE) 5 immediate release tablet 2 5 mg to 5 mg oral Q4 PRN for moderate to severe pain  Ongoing therapy  20 tablet 0   • polyethylene glycol (MIRALAX) 17 g packet Take 17 g by mouth daily Do not start before February 15, 2023   0   • senna-docusate sodium (SENOKOT S) 8 6-50 mg per tablet Take 2 tablets by mouth 2 (two) times a day  0   • tamsulosin (FLOMAX) 0 4 mg Take 1 capsule (0 4 mg total) by mouth daily with dinner (Patient not taking: Reported on 2/24/2023) 30 capsule 0     No current facility-administered medications for this visit  Allergies   Allergen Reactions   • Ezetimibe-Simvastatin Dizziness and Lightheadedness     Other reaction(s): MAKES HIM DIZZY     • Simvastatin Lightheadedness     Other reaction(s): Dizziness                                                                                    Post-Op Visit- Neurosurgery    Chief Complaint:  Patient presents post: T9-L3 FUSION LUMBAR/THORACIC POSTERIOR MINIMALLY INVASIVE W ROBOT T10-T12 decompressive laminectomy    History of Present Illness:  [facility]  She reports patient is doing well overall and denies any incisional issues or fevers  he denies any new weakness, numbness or tingling since the surgery  Patient denies surgical pain at this time and rates their pain as a  0/10  Assessment:   There were no vitals filed for this visit  Wound Exam: Incision well approximated  No erythema, edema or drainage present  Location: thoracic area  Jarret is to remove all sutures and staples and send an updated photo of incision site  Discussion/Summary:  Doing well postoperatively  Reviewed incision care with Jarret including daily observation for s/s infection including: increased erythema, edema, drainage, dehiscence of incision or fever >101    Should these be observed, she understands that she is to call and/or return immediately for reassessment  Advised Renata to continue cleansing area with mild soap and water and pat dry  Not to apply any lotions, creams, or ointments, & not to submerge in any water for 4 more weeks  He is to maintain activity restrictions until cleared by the surgeon  Activity levels were also reviewed with Renata in detail, he is to lift no greater than 10 pounds and ambulation is encouraged as tolerated  Verified date/time/location of upcoming POV and informed Sissy Carrion patient will need to complete x-rays prior to patient's next appointment  She is aware to contact the office for the xray script should patient still be at their facility  Sissy Carrion is to call the office with any further questions or concerns, or if any incisional issues or fevers would arise

## 2023-02-24 NOTE — PROGRESS NOTES
Cystoscopy     Date/Time 2/24/2023 9:59 AM     Performed by  Ciera Bradshaw MD     Authorized by Ciera Bradshaw MD      Universal Protocol:  Consent: Written consent obtained  Procedure Details:  Procedure type: simple removal of a foreign body, stone, or stent    Patient tolerance: Patient tolerated the procedure well with no immediate complications    Patient had his Dickens removed  Cystoscopy was carried out  He was prepped  He was given lidocaine jelly  Flexible digital cystoscopy revealed a normal urethra  Enlarged prostate gland with no median lobe  Trabeculated bladder with mild catheter cystitis  Stent was noted from the right orifice it was removed with grasping forceps intact  The patient tolerated this well  His bladder was filled  He was unable to void  His Dickens therefore was replaced

## 2023-02-24 NOTE — PROGRESS NOTES
UROLOGY PROGRESS NOTE         NAME: Padma Awad  AGE: 81 y o  SEX: male  : 1935   MRN: [id number]    DATE: 2023  TIME: 10:14 AM    Assessment and Plan      Impression:   1  Calculus of kidney  -     Cystoscopy    2  Benign prostatic hyperplasia with urinary retention  -     Cystoscopy  Patient's stent was successfully removed today  He is status post ureteroscopy with laser lithotripsy  He had an impacted stone  This was done via cystoscopy today  Patient had a back injury and has since had back surgery  He was recently in the hospital   He is currently managed with a [facility]  He has significant ambulatory dysfunction  He failed a voiding trial today  [facility] replaced     Plan: He will return for a voiding trial in 1 month  He was back to the rehab facility  We discussed the importance of getting his strength back  Chief Complaint     Chief Complaint   Patient presents with   • stent     Stent removal     History of Present Illness     HPI: Padma Awad is a 81y o  year old male who presents with cystoscopy with stent removal   He is status post ureteroscopy laser lithotripsy and stone extraction a few weeks ago  He unfortunately had a fall  Back fracture  This has been stabilized and he is now in a nursing home getting some rehab  He had postop urinary retention  Managed with a [facility]  CT scan done in the interim reveals no significant stone fragments remaining                The following portions of the patient's history were reviewed and updated as appropriate: allergies, current medications, past family history, past medical history, past social history, past surgical history and problem list   Past Medical History:   Diagnosis Date   • Coronary artery disease    • H/O atrial flutter    • History of aortic valve replacement with bioprosthetic valve    • Hyperlipemia    • Hypertension    • Pacemaker    • Skin cancer, basal cell    • Walker as ambulation aid    • Wears dentures      Past Surgical History:   Procedure Laterality Date   • A-V CARDIAC PACEMAKER INSERTION     • AORTIC VALVE REPLACEMENT     • CARDIAC PACEMAKER PLACEMENT Left     2021   • CATARACT EXTRACTION     • CORONARY ANGIOPLASTY WITH STENT PLACEMENT      stents times 3   • LUMBAR FUSION N/A 2/10/2023    Procedure: T9-L3 FUSION LUMBAR/THORACIC POSTERIOR MINIMALLY INVASIVE W ROBOT T10-T12 decompressive laminectomy;  Surgeon: Ton Cutler MD;  [facility];  Service: Neurosurgery   • MA CYSTO BLADDER W/URETERAL CATHETERIZATION Right 01/13/2023    Procedure: CYSTOSCOPY RETROGRADE PYELOGRAM WITH INSERTION STENT URETERAL;  Surgeon: Dedra Reyes MD;  [facility];  Service: Urology   • MA CYSTO BLADDER W/URETERAL CATHETERIZATION Right 1/26/2023    Procedure: CYSTOSCOPY WITH RETROGRADE PYELOGRAM;  Surgeon: Dedra Reyes MD;  [facility];  Service: Urology   • MA CYSTO/URETERO W/LITHOTRIPSY &INDWELL STENT INSRT Right 1/26/2023    Procedure: CYSTOSCOPY URETEROSCOPY WITH LITHOTRIPSY HOLMIUM LASER, RETROGRADE PYELOGRAM AND INSERTION STENT URETERAL;  Surgeon: Dedra Reyes MD;  [facility];  Service: Urology     shoulder  Review of Systems     Const: Denies chills, fever and weight loss  CV: Denies chest pain  Resp: Denies SOB  GI: Denies abdominal pain, nausea and vomiting  : Denies symptoms other than stated above  Musculo: Denies back pain  Objective   /60 (BP Location: Left arm, Patient Position: Sitting, Cuff Size: Adult)   Pulse 78   Temp 97 7 °F (36 5 °C) (Temporal)   Ht 5' 10" (1 778 m)   BMI 32 63 kg/m²     Physical Exam  Const: Appears healthy and well developed  No signs of acute distress present  Resp: Respirations are regular and unlabored  CV: Rate is regular  Rhythm is regular  Abdomen: Abdomen is soft, nontender, and nondistended  Kidneys are not palpable  : Normal penis  Psych: Patient's attitude is cooperative   Mood is normal  Affect is normal     Current Medications     Current Outpatient Medications:   •  acetaminophen (TYLENOL) 325 mg tablet, Take 3 tablets (975 mg total) by mouth every 8 (eight) hours, Disp: , Rfl: 0  •  amLODIPine (NORVASC) 10 mg tablet, Take 10 mg by mouth daily, Disp: , Rfl:   •  atorvastatin (LIPITOR) 40 mg tablet, Take 40 mg by mouth daily, Disp: , Rfl:   •  bisacodyl (DULCOLAX) 10 mg suppository, Insert 1 suppository (10 mg total) into the rectum daily as needed for constipation (if no bowel movement in past 24 hours), Disp: 12 suppository, Rfl: 0  •  Cholecalciferol 25 MCG (1000 UT) tablet, Take by mouth, Disp: , Rfl:   •  enalapril (VASOTEC) 2 5 mg tablet, Take 2 5 mg by mouth 2 (two) times a day, Disp: , Rfl:   •  folic acid (FOLVITE) 006 mcg tablet, Take 400 mcg by mouth daily, Disp: , Rfl:   •  Heparin Sodium, Porcine, (heparin, porcine,) 5,000 units/mL, Inject 1 mL (5,000 Units total) under the skin every 8 (eight) hours, Disp: 1 mL, Rfl: 0  •  lidocaine (LIDODERM) 5 %, Apply 1 patch topically over 12 hours daily Remove & Discard patch within 12 hours or as directed by MD, Disp: , Rfl: 0  •  methocarbamol (ROBAXIN) 500 mg tablet, Take 1 tablet (500 mg total) by mouth every 6 (six) hours, Disp: , Rfl: 0  •  metoprolol tartrate (LOPRESSOR) 50 mg tablet, Take 50 mg by mouth daily, Disp: , Rfl:   •  oxyCODONE (ROXICODONE) 5 immediate release tablet, 2 5 mg to 5 mg oral Q4 PRN for moderate to severe pain  Ongoing therapy  , Disp: 20 tablet, Rfl: 0  •  polyethylene glycol (MIRALAX) 17 g packet, Take 17 g by mouth daily Do not start before February 15, 2023 , Disp: , Rfl: 0  •  senna-docusate sodium (SENOKOT S) 8 6-50 mg per tablet, Take 2 tablets by mouth 2 (two) times a day, Disp: , Rfl: 0  •  gabapentin (NEURONTIN) 100 mg capsule, Take 1 capsule (100 mg total) by mouth 3 (three) times a day for 7 days, Disp: 30 capsule, Rfl: 0  •  tamsulosin (FLOMAX) 0 4 mg, Take 1 capsule (0 4 mg total) by mouth daily with dinner (Patient not taking: Reported on 2/24/2023), Disp: 30 capsule, Rfl: 0        Thomas Rodriguez MD

## 2023-03-02 ENCOUNTER — TELEPHONE (OUTPATIENT)
Dept: NEUROSURGERY | Facility: CLINIC | Age: 88
End: 2023-03-02

## 2023-03-02 NOTE — TELEPHONE ENCOUNTER
Received a call from Gail Arrington from [facility] asking when patient may stop his heparin  Review of chart shows that he was prescribed sub q heparin for DVT prophylaxis  Advised they may discontinue this at their discretion  Advised however that he was previously on Eliquis for AVR this was held prior to surgery and requested hold for 2 weeks  Per med list was on Eliquis 5mg BID  She will discuss with the provider there and was appreciative

## 2023-03-10 ENCOUNTER — OFFICE VISIT (OUTPATIENT)
Dept: CARDIOLOGY CLINIC | Facility: CLINIC | Age: 88
End: 2023-03-10

## 2023-03-10 VITALS
BODY MASS INDEX: 29.15 KG/M2 | DIASTOLIC BLOOD PRESSURE: 58 MMHG | HEIGHT: 70 IN | WEIGHT: 203.6 LBS | SYSTOLIC BLOOD PRESSURE: 100 MMHG | OXYGEN SATURATION: 96 % | HEART RATE: 79 BPM

## 2023-03-10 DIAGNOSIS — I65.23 BILATERAL CAROTID ARTERY STENOSIS: ICD-10-CM

## 2023-03-10 DIAGNOSIS — I35.0 NONRHEUMATIC AORTIC VALVE STENOSIS: ICD-10-CM

## 2023-03-10 DIAGNOSIS — Z95.0 PACEMAKER: ICD-10-CM

## 2023-03-10 DIAGNOSIS — I25.10 CORONARY ARTERY DISEASE INVOLVING NATIVE CORONARY ARTERY OF NATIVE HEART WITHOUT ANGINA PECTORIS: Primary | ICD-10-CM

## 2023-03-10 DIAGNOSIS — I34.2 NONRHEUMATIC MITRAL VALVE STENOSIS: ICD-10-CM

## 2023-03-10 DIAGNOSIS — I48.91 ATRIAL FIBRILLATION AND FLUTTER (HCC): ICD-10-CM

## 2023-03-10 DIAGNOSIS — Z95.2 S/P AVR: ICD-10-CM

## 2023-03-10 DIAGNOSIS — E78.2 MIXED HYPERLIPIDEMIA: ICD-10-CM

## 2023-03-10 DIAGNOSIS — I10 PRIMARY HYPERTENSION: ICD-10-CM

## 2023-03-10 DIAGNOSIS — I48.92 ATRIAL FIBRILLATION AND FLUTTER (HCC): ICD-10-CM

## 2023-03-10 RX ORDER — AMLODIPINE BESYLATE 5 MG/1
5 TABLET ORAL DAILY
Qty: 30 TABLET | Refills: 11 | Status: SHIPPED | OUTPATIENT
Start: 2023-03-10 | End: 2023-03-17 | Stop reason: SINTOL

## 2023-03-10 NOTE — PATIENT INSTRUCTIONS
Due to low blood pressure reduce amlodipine to 5 mg daily  Have the home health check it when they are there and call next week with an update on BP readings as this can be reduced further if needed  Report lightheaded episodes  Your echocardiogram showed normal aortic valve replacement function with mild mild stenosis  We will recheck in 1 year  Leopold Hemming will be in touch to arrange for a device check

## 2023-03-12 DIAGNOSIS — I10 PRIMARY HYPERTENSION: ICD-10-CM

## 2023-03-12 DIAGNOSIS — I25.10 CORONARY ARTERY DISEASE INVOLVING NATIVE CORONARY ARTERY OF NATIVE HEART WITHOUT ANGINA PECTORIS: Primary | ICD-10-CM

## 2023-03-12 PROBLEM — N30.01 ACUTE CYSTITIS WITH HEMATURIA: Status: RESOLVED | Noted: 2023-01-13 | Resolved: 2023-03-12

## 2023-03-12 PROBLEM — I48.91 ATRIAL FIBRILLATION AND FLUTTER (HCC): Status: ACTIVE | Noted: 2023-01-12

## 2023-03-12 PROBLEM — E66.9 CLASS 1 OBESITY IN ADULT: Status: RESOLVED | Noted: 2023-01-26 | Resolved: 2023-03-12

## 2023-03-12 PROBLEM — M25.551 RIGHT HIP PAIN: Status: RESOLVED | Noted: 2023-02-01 | Resolved: 2023-03-12

## 2023-03-12 PROBLEM — G89.11 ACUTE PAIN DUE TO TRAUMA: Status: RESOLVED | Noted: 2023-02-02 | Resolved: 2023-03-12

## 2023-03-12 PROBLEM — N20.1 RIGHT URETERAL STONE: Status: RESOLVED | Noted: 2023-01-18 | Resolved: 2023-03-12

## 2023-03-12 NOTE — ASSESSMENT & PLAN NOTE
History of atrial flutter  ECG 12/2/2022 with v-pacing and likely underlying a fib  Remains on Eliquis 5 mg BID (based on weight and renal function)  Metoprolol tartrate 50 mg BID  Will monitor rates on device interrogation

## 2023-03-12 NOTE — ASSESSMENT & PLAN NOTE
Goal LDL < 70  Maintained on atorvastatin 40 mg daily  I have requested lab results from PCP office

## 2023-03-12 NOTE — ASSESSMENT & PLAN NOTE
History of bilateral ICA stenosis  Carotid duplex 12/9/2022 with < 50% bilateral ICA and no significant subclavian disease  Continue statin    Not currently on aspirin with recent surgeries, but this should be resumed as neurosurgery and urology allow

## 2023-03-12 NOTE — ASSESSMENT & PLAN NOTE
History of sick sinus syndrome S/P single chamber PPM   I have requested transition of device management to [facility] and will monitor upcoming interrogation

## 2023-03-12 NOTE — ASSESSMENT & PLAN NOTE
History of hypertension  BP is borderline hypotensive today with c/o lightheadedness  Reduce amlodipine from 10 to 5 mg daily  Continue enalapril 2 5 mg, metoprolol tartrate 50 mg BID  Recent lab work reviewed  Patient is also on Flomax which may be contributing to hypotension  Will monitor closely  Home health will monitor BP at home and report readings in 1 week

## 2023-03-12 NOTE — TELEPHONE ENCOUNTER
Can you please verify enalapril and metoprolol tartrate dosing with patient's daughter? I have enalapril 2 5 mg twice daily and metoprolol tartrate 50 mg once daily  I would rather he be on the enalapril 2 5 mg once daily and metoprolol tartrate twice daily  Please let me know so I can update his list   Thank you!

## 2023-03-12 NOTE — ASSESSMENT & PLAN NOTE
History of mitral stenosis  Echo 12/10/2022 with mild MS with MG 6 mmHg  Will continue to monitor with echo in 12/2023

## 2023-03-12 NOTE — ASSESSMENT & PLAN NOTE
History of CAD with remote stenting x 2  Currently without anginal symptoms  Preserved EF on echo  Maintained on Eliquis in liue of aspirin, although aspirin should be resumed if able  Continue atorvastatin 40 mg daily for goal LDL < 70  Continue beta blocker

## 2023-03-12 NOTE — PROGRESS NOTES
Cardiology Follow Up    Cass Hernandes  1935  [id number]  [facility]  [address]  [address] [place] [id number] [phone]    Ayana Shrestha presents for routine follow up of CAD, aortic stenosis s/p AVR, mitral stenosis, persistent atrial fibrillation, SSS s/p PPM, carotid disease, HTN, HLD  1  Coronary artery disease involving native coronary artery of native heart without angina pectoris  Assessment & Plan:  History of CAD with remote stenting x 2  Currently without anginal symptoms  Preserved EF on echo  Maintained on Eliquis in liue of aspirin, although aspirin should be resumed if able  Continue atorvastatin 40 mg daily for goal LDL < 70  Continue beta blocker  2  Atrial fibrillation and flutter ([address] )  Assessment & Plan:  History of atrial flutter  ECG 12/2/2022 with v-pacing and likely underlying a fib  Remains on Eliquis 5 mg BID (based on weight and renal function)  Metoprolol tartrate 50 mg BID  Will monitor rates on device interrogation  3  Pacemaker  Assessment & Plan:  History of sick sinus syndrome S/P single chamber PPM   I have requested transition of device management to [facility] and will monitor upcoming interrogation  4  Nonrheumatic aortic valve stenosis  Assessment & Plan:  History of aortic stenosis s/p AVR at [facility] in 2007  Echocardiogram 12/10/2022 shows normal bioprosthetic valve function with transaortic velocity 2 4 m/s, MG 12 mmHg  Will reassess with echo in 12/2023       5  S/P AVR  Assessment & Plan:  History of bioprosthetic AVR at [facility] in 2007  See discussion under aortic stenosis  6  Nonrheumatic mitral valve stenosis  Assessment & Plan:  History of mitral stenosis  Echo 12/10/2022 with mild MS with MG 6 mmHg  Will continue to monitor with echo in 12/2023       7  Bilateral carotid artery stenosis  Assessment & Plan:  History of bilateral ICA stenosis    Carotid duplex 12/9/2022 with < 50% bilateral ICA and no significant subclavian disease  Continue statin  Not currently on aspirin with recent surgeries, but this should be resumed as neurosurgery and urology allow      8  Primary hypertension  Assessment & Plan:  History of hypertension  BP is borderline hypotensive today with c/o lightheadedness  Reduce amlodipine from 10 to 5 mg daily  Continue enalapril 2 5 mg, metoprolol tartrate 50 mg BID  Recent lab work reviewed  Patient is also on Flomax which may be contributing to hypotension  Will monitor closely  Home health will monitor BP at home and report readings in 1 week  Orders:  -     amLODIPine (NORVASC) 5 mg tablet; Take 1 tablet (5 mg total) by mouth daily    9  Mixed hyperlipidemia  Assessment & Plan:  Goal LDL < 70  Maintained on atorvastatin 40 mg daily  I have requested lab results from PCP office  HPI  Solis Salazar has a past medical history of CAD with stent x 2, aortic stenosis s/p AVR in 2007 at [facility], mitral stenosis, persistent atrial fibrillation on Eliquis, SSS s/p PPM, carotid stenosis, HTN, HLD, BPH, kidney stones requiring surgery  He was previously following with [facility], Dr Nick Galloway  Last OV August 2022  He established with our practice when he met with Dr Stephane Linares on 12/2/2022  ECG at that visit showed V-paced rhythm with likely underlying atrial fibrillation  They reviewed indication for Eiquis use  An updated echocardiogram was ordered along with carotid duplex  Carotid duplex 12/9/22 showed < 50% bilateral ICA stenosis with no significant subclavian disease  Echocardiogram 12/10/2022 showed EF 65% with moderate LVH  Enlarged RV with normal function  Bi-atrial enlargement  Bioprosthetic AVR with normal function  Mild MS  Mild TR with PASP 59 mmHg  3/10/2023: Solis Salazar presents today for routine follow up accompanied by his daughter  We reviewed the results of his echocardiogram in detail  He states he is feeling well overall   He was hospitalized at [facility] in January for UTI with obstructive renal calculus  Eliquis was placed on hold temporarily  He missed his in-person device check at our office as he was hospitalization  Unfortunately he was hospitalized at [facility] in Feb 2023 for treatment of a vertebral fracture after he had a mechanical fall  He underwent T9-L3 fusion with T10-T12 decompressive laminectomies  He was admitted to [facility] for inpatient rehab and returned back home this week  He did contract Covid infection in February while residing at [facility]  He states he recovered without residual symptoms  He is ambulating with a walker today  He is overdue for a pacemaker interrogation which I will arrange  He denies chest pain, shortness of breath, edema  He denies palpitations/heart racing  He has stable dyspnea on exertion which he attributes to being deconditioned  He reports positional lightheadedness  No syncope or near syncope  BP is borderline hypotensive today  He has not been monitoring BP at home  He is back on Eliquis and denies bleeding  Medical Problems     Problem List     Hypertension    Hyperlipemia    Aortic stenosis    S/P AVR    Carotid artery disease ([facility])    Coronary artery disease    Pacemaker    Calculus of kidney    Atrial flutter ([facility])    Acute cystitis with hematuria    Right ureteral stone    Class 1 obesity in adult    H/O heart artery stent    MS (mitral stenosis)    Fall    Closed T12 fracture ([facility])    Right hip pain    Acute pain due to trauma    Benign prostatic hyperplasia with urinary retention        Past Medical History:   Diagnosis Date   • Coronary artery disease    • H/O atrial flutter    • History of aortic valve replacement with bioprosthetic valve    • Hyperlipemia    • Hypertension    • Pacemaker    • Skin cancer, basal cell    • Walker as ambulation aid    • Wears dentures      Social History     Socioeconomic History   • Marital status:       Spouse name: Not on file   • Number of children: Not on file   • Years of education: Not on file   • Highest education level: Not on file   Occupational History   • Not on file   Tobacco Use   • Smoking status: Former   • Smokeless tobacco: Never   Vaping Use   • Vaping Use: Never used   Substance and Sexual Activity   • Alcohol use: Not Currently     Comment: social   • Drug use: Never   • Sexual activity: Never   Other Topics Concern   • Not on file   Social History Narrative   • Not on file     Social Determinants of Health     Financial Resource Strain: Not on file   Food Insecurity: No Food Insecurity   • Worried About Running Out of Food in the Last Year: Never true   • Ran Out of Food in the Last Year: Never true   Transportation Needs: No Transportation Needs   • Lack of Transportation (Medical): No   • Lack of Transportation (Non-Medical):  No   Physical Activity: Not on file   Stress: Not on file   Social Connections: Not on file   Intimate Partner Violence: Not on file   Housing Stability: Low Risk    • Unable to Pay for Housing in the Last Year: No   • Number of Places Lived in the Last Year: 1   • Unstable Housing in the Last Year: No      Family History   Problem Relation Age of Onset   • No Known Problems Father    • No Known Problems Mother      Past Surgical History:   Procedure Laterality Date   • A-V CARDIAC PACEMAKER INSERTION     • AORTIC VALVE REPLACEMENT     • BACK SURGERY     • CARDIAC PACEMAKER PLACEMENT Left     2021   • CATARACT EXTRACTION     • CORONARY ANGIOPLASTY WITH STENT PLACEMENT      stents times 3   • LUMBAR FUSION N/A 02/10/2023    Procedure: T9-L3 FUSION LUMBAR/THORACIC POSTERIOR MINIMALLY INVASIVE W ROBOT T10-T12 decompressive laminectomy;  Surgeon: Francine Sanchez MD;  Location:  MAIN OR;  Service: Neurosurgery   • [place] CYSTO BLADDER W/URETERAL CATHETERIZATION Right 01/13/2023    Procedure: CYSTOSCOPY RETROGRADE PYELOGRAM WITH INSERTION STENT URETERAL;  Surgeon: Herman Velázquez MD;  Location:  MAIN OR; Service: Urology   • DE CYSTO BLADDER W/URETERAL CATHETERIZATION Right 01/26/2023    Procedure: CYSTOSCOPY WITH RETROGRADE PYELOGRAM;  Surgeon: Jarrell Gallrado MD;  [facility];  Service: Urology   • DE CYSTO/URETERO W/LITHOTRIPSY &INDWELL STENT INSRT Right 01/26/2023    Procedure: CYSTOSCOPY URETEROSCOPY WITH LITHOTRIPSY HOLMIUM LASER, RETROGRADE PYELOGRAM AND INSERTION STENT URETERAL;  Surgeon: Jarrell Gallardo MD;  [facility];  Service: Urology       Current Outpatient Medications:   •  acetaminophen (TYLENOL) 325 mg tablet, Take 3 tablets (975 mg total) by mouth every 8 (eight) hours, Disp: , Rfl: 0  •  amLODIPine (NORVASC) 5 mg tablet, Take 1 tablet (5 mg total) by mouth daily, Disp: 30 tablet, Rfl: 11  •  apixaban (Eliquis) 5 mg, Take 5 mg by mouth 2 (two) times a day, Disp: , Rfl:   •  atorvastatin (LIPITOR) 40 mg tablet, Take 40 mg by mouth daily, Disp: , Rfl:   •  Cholecalciferol 25 MCG (1000 UT) tablet, Take by mouth, Disp: , Rfl:   •  enalapril (VASOTEC) 2 5 mg tablet, Take 2 5 mg by mouth 2 (two) times a day, Disp: , Rfl:   •  folic acid (FOLVITE) 998 mcg tablet, Take 400 mcg by mouth daily, Disp: , Rfl:   •  methocarbamol (ROBAXIN) 500 mg tablet, Take 1 tablet (500 mg total) by mouth every 6 (six) hours, Disp: , Rfl: 0  •  metoprolol tartrate (LOPRESSOR) 50 mg tablet, Take 50 mg by mouth daily, Disp: , Rfl:   •  Misc   Devices (Roller Walker) MISC, Use, Disp: , Rfl:   •  tamsulosin (FLOMAX) 0 4 mg, Take 1 capsule (0 4 mg total) by mouth daily with dinner, Disp: 30 capsule, Rfl: 0  Allergies   Allergen Reactions   • Ezetimibe-Simvastatin Dizziness and Lightheadedness     Other reaction(s): MAKES HIM DIZZY     • Simvastatin Lightheadedness     Other reaction(s): Dizziness       Labs:     Chemistry        Component Value Date/Time    K 4 0 02/13/2023 0536     02/13/2023 0536    CO2 27 02/13/2023 0536    BUN 10 02/13/2023 0536    CREATININE 0 58 (L) 02/13/2023 0536        Component Value Date/Time    CALCIUM 8 6 02/13/2023 0536    ALKPHOS 141 (H) 02/01/2023 0933    AST 11 (L) 02/01/2023 0933    ALT 10 02/01/2023 0933        Cardiac testing:  Echocardiogram 12/10/2022:  EF 65%  Moderate LVH  Enlarged RV with intact function  Bi-Atrial dilation  AVR with transaortic velocity 2 4 m/s, MG 12 mmHg  MAC with mild MS, MG 6mmHg  Mild TR with PASP 59 mmHg  Trace MT  Carotid duplex 12/9/2022:  < 50% bilateral ICA stenosis  Plaque in heterogenous and irregular  Vertebral flow is antegrade  No significant subclavian disease  ECG 12/2/2022: Atrial fibrillation with occasional PVC's  RBBB  Rate 70 bpm     Review of Systems   Constitutional: Negative  HENT: Negative  Cardiovascular: Positive for dyspnea on exertion  Negative for chest pain, irregular heartbeat, leg swelling, near-syncope, orthopnea, palpitations and syncope  Respiratory: Negative for cough and snoring  Endocrine: Negative  Skin: Negative  Musculoskeletal: Positive for arthritis  Gastrointestinal: Negative  Genitourinary: Negative  Neurological: Positive for light-headedness  Psychiatric/Behavioral: Negative  Vitals:    03/10/23 1118   BP: 100/58   Pulse:    SpO2:      Vitals:    03/10/23 1103   Weight: 92 4 kg (203 lb 9 6 oz)     Height: 5' 10" (177 8 cm)   Body mass index is 29 21 kg/m²  Physical Exam  Vitals and nursing note reviewed  Constitutional:       General: He is not in acute distress  Appearance: He is well-developed  He is not diaphoretic  HENT:      Head: Normocephalic and atraumatic  Neck:      Vascular: No carotid bruit or JVD  Cardiovascular:      Rate and Rhythm: Normal rate  Rhythm irregular  Pulses: Intact distal pulses  Heart sounds: S1 normal and S2 normal  Murmur heard  Harsh midsystolic murmur is present at the upper right sternal border radiating to the neck  No friction rub  No gallop        Comments: No edema  Pulmonary:      Effort: Pulmonary effort is normal  No respiratory distress  Breath sounds: Normal breath sounds  Abdominal:      General: There is no distension  Palpations: Abdomen is soft  Tenderness: There is no abdominal tenderness  Musculoskeletal:      Comments: Walking with assistance of a walker   Skin:     General: Skin is warm and dry  Findings: No rash  Neurological:      Mental Status: He is alert and oriented to person, place, and time  Psychiatric:         Mood and Affect: Mood and affect normal          Speech: Speech normal          Behavior: Behavior normal  Behavior is cooperative           Cognition and Memory: Cognition normal

## 2023-03-12 NOTE — ASSESSMENT & PLAN NOTE
History of aortic stenosis s/p AVR at [facility] in 2007  Echocardiogram 12/10/2022 shows normal bioprosthetic valve function with transaortic velocity 2 4 m/s, MG 12 mmHg  Will reassess with echo in 12/2023

## 2023-03-13 ENCOUNTER — TELEPHONE (OUTPATIENT)
Dept: NEUROSURGERY | Facility: CLINIC | Age: 88
End: 2023-03-13

## 2023-03-13 DIAGNOSIS — N20.1 URETERAL STONE: ICD-10-CM

## 2023-03-13 RX ORDER — TAMSULOSIN HYDROCHLORIDE 0.4 MG/1
0.4 CAPSULE ORAL
Qty: 30 CAPSULE | Refills: 0 | Status: SHIPPED | OUTPATIENT
Start: 2023-03-13 | End: 2023-04-12

## 2023-03-13 RX ORDER — ENALAPRIL MALEATE 2.5 MG/1
2.5 TABLET ORAL DAILY
Qty: 90 TABLET | Refills: 3 | Status: SHIPPED | OUTPATIENT
Start: 2023-03-13

## 2023-03-13 RX ORDER — METOPROLOL TARTRATE 50 MG/1
50 TABLET, FILM COATED ORAL 2 TIMES DAILY
Qty: 180 TABLET | Refills: 3 | Status: SHIPPED | OUTPATIENT
Start: 2023-03-13

## 2023-03-13 NOTE — TELEPHONE ENCOUNTER
Daughter states that he was taking it enalapril 2 5 mg BID and Metoprolol tartrate 50 mg daily  She is changing it to what you wanted  Orders pended

## 2023-03-13 NOTE — TELEPHONE ENCOUNTER
Received a call from patient's daughter questioning if patient needs to continue taking the robaxin  Returned call to Jaylene Almonte who stated patient was discharged from the facility taking the robaxin Q6 and she will be running out in a few days  Jaylene Almonte stated patient is doing very well, denies both pain and muscle spasms  Advised at this time since patient is doing well he does not need to take the medication unless he is having muscle spasms  She stated an understanding and was appreciative of the call back

## 2023-03-13 NOTE — TELEPHONE ENCOUNTER
Pt wife calling about Flomax Rx  Pt will run out tomorrow, please advise if pt is to continue, if so send script to [facility]

## 2023-03-17 ENCOUNTER — TELEPHONE (OUTPATIENT)
Dept: CARDIOLOGY CLINIC | Facility: CLINIC | Age: 88
End: 2023-03-17

## 2023-03-17 NOTE — TELEPHONE ENCOUNTER
Please have her stop the amlodipine and call us next week with blood pressures    Thank you!! Tequila Flores 40 [facility] [address] [facility] [place] [address], [address] [facility] [phone]  www. Leonila Clarke 

## 2023-03-17 NOTE — TELEPHONE ENCOUNTER
BP readings mid morning    3/13  114/62    3/14   114/60    3/15   102/62    3/16   104/64    Please contact pt's daughter [phone]

## 2023-03-24 ENCOUNTER — HOSPITAL ENCOUNTER (OUTPATIENT)
Dept: RADIOLOGY | Facility: HOSPITAL | Age: 88
End: 2023-03-24

## 2023-03-24 ENCOUNTER — OFFICE VISIT (OUTPATIENT)
Dept: UROLOGY | Facility: CLINIC | Age: 88
End: 2023-03-24

## 2023-03-24 VITALS
TEMPERATURE: 97.6 F | WEIGHT: 201.4 LBS | OXYGEN SATURATION: 97 % | SYSTOLIC BLOOD PRESSURE: 110 MMHG | HEART RATE: 78 BPM | DIASTOLIC BLOOD PRESSURE: 58 MMHG | BODY MASS INDEX: 28.83 KG/M2 | HEIGHT: 70 IN

## 2023-03-24 DIAGNOSIS — N40.1 BENIGN PROSTATIC HYPERPLASIA WITH URINARY RETENTION: ICD-10-CM

## 2023-03-24 DIAGNOSIS — R33.8 BENIGN PROSTATIC HYPERPLASIA WITH URINARY RETENTION: ICD-10-CM

## 2023-03-24 DIAGNOSIS — Z98.890 POST-OPERATIVE STATE: ICD-10-CM

## 2023-03-24 DIAGNOSIS — N20.1 URETERAL STONE: Primary | ICD-10-CM

## 2023-03-24 NOTE — PROGRESS NOTES
UROLOGY PROGRESS NOTE         NAME: Geoff Rivera  AGE: 81 y o  SEX: male  : 1935   MRN: [id number]    DATE: 3/24/2023  TIME: 12:24 PM    Assessment and Plan      Impression:   1  Ureteral stone    2  Benign prostatic hyperplasia with urinary retention  -     Irrigation of bladder    Patient failed his voiding trial   He does not want to have the Dickens back in  He will go home to try and void  He will return for catheter placement if need be  He will start antibiotics    Regarding his previous right ureteral stone and ureteroscopy and possible distal tumor  He will need a repeat CAT scan in the near future  We will see him back in 2 weeks     Plan: Return if need be for catheter placement  Start Ceftin immediately  Follow-up in 2 weeks for repeat CAT scan order at that time  Chief Complaint     Chief Complaint   Patient presents with   • Nephrolithiasis     T-12 repair 2023  Lost 30 lbs since Dec  2022  Dickens cath     History of Present Illness     HPI: Geoff Rivera is a 81y o  year old male who presents with for voiding trial   Patient is status post ureteroscopy with a 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  Laser lithotripsy and stone extraction completed  Stents been removed  Patient without any flank pain fever chills nausea or vomiting  He has been tolerating his catheter okay  He is status post recent fall with back fracture  He is currently recovering  He is back home walking with a walker  States he is getting his strength back  He is lost 30 pounds  Finding of 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 was discussed again  He knows he will need to have follow-up                The following portions of the patient's history were reviewed and updated as appropriate: allergies, current medications, past family history, past medical history, past social history, past surgical history and problem list   Past Medical History:   Diagnosis Date   • Coronary artery disease    • H/O atrial flutter    • History of aortic valve replacement with bioprosthetic valve    • Hyperlipemia    • Hypertension    • Pacemaker    • Skin cancer, basal cell    • Walker as ambulation aid    • Wears dentures      Past Surgical History:   Procedure Laterality Date   • A-V CARDIAC PACEMAKER INSERTION     • AORTIC VALVE REPLACEMENT     • BACK SURGERY     • CARDIAC PACEMAKER PLACEMENT Left     2021   • CATARACT EXTRACTION     • CORONARY ANGIOPLASTY WITH STENT PLACEMENT      stents times 3   • LUMBAR FUSION N/A 02/10/2023    Procedure: T9-L3 FUSION LUMBAR/THORACIC POSTERIOR MINIMALLY INVASIVE W ROBOT T10-T12 decompressive laminectomy;  Surgeon: Namrata Prater MD;  [facility];  Service: Neurosurgery   • [place] CYSTO BLADDER W/URETERAL CATHETERIZATION Right 01/13/2023    Procedure: CYSTOSCOPY RETROGRADE PYELOGRAM WITH INSERTION STENT URETERAL;  Surgeon: Ana Velez MD;  [facility];  Service: Urology   • [place] CYSTO BLADDER W/URETERAL CATHETERIZATION Right 01/26/2023    Procedure: CYSTOSCOPY WITH RETROGRADE PYELOGRAM;  Surgeon: Ana Velez MD;  [facility];  Service: Urology   • [place] CYSTO/URETERO W/LITHOTRIPSY &INDWELL STENT INSRT Right 01/26/2023    Procedure: CYSTOSCOPY URETEROSCOPY WITH LITHOTRIPSY HOLMIUM LASER, RETROGRADE PYELOGRAM AND INSERTION STENT URETERAL;  Surgeon: Ana Velez MD;  [facility];  Service: Urology     shoulder  Review of Systems     Const: Denies chills, fever and weight loss  CV: Denies chest pain  Resp: Denies SOB  GI: Denies abdominal pain, nausea and vomiting  : Denies symptoms other than stated above  Musculo: Denies back pain  Objective   /58 (BP Location: Right arm, Patient Position: Sitting, Cuff Size: Adult)   Pulse 78   Temp 97 6 °F (36 4 °C) (Temporal)   Ht 5' 10" (1 778 m)   Wt 91 4 kg (201 lb 6 4 oz)   SpO2 97%   BMI 28 90 kg/m²     Physical Exam  Const: Appears healthy and well developed   No signs of acute distress present  Resp: Respirations are regular and unlabored  CV: Rate is regular  Rhythm is regular  Abdomen: Abdomen is soft, nontender, and nondistended  Kidneys are not palpable  : Voiding trial completed  Psych: Patient's attitude is cooperative  Mood is normal  Affect is normal     Current Medications     Current Outpatient Medications:   •  apixaban (ELIQUIS) 5 mg, Take 5 mg by mouth 2 (two) times a day, Disp: , Rfl:   •  atorvastatin (LIPITOR) 40 mg tablet, Take 40 mg by mouth daily, Disp: , Rfl:   •  Cholecalciferol 25 MCG (1000 UT) tablet, Take by mouth, Disp: , Rfl:   •  enalapril (VASOTEC) 2 5 mg tablet, Take 1 tablet (2 5 mg total) by mouth in the morning, Disp: 90 tablet, Rfl: 3  •  folic acid (FOLVITE) 908 mcg tablet, Take 400 mcg by mouth daily, Disp: , Rfl:   •  metoprolol tartrate (LOPRESSOR) 50 mg tablet, Take 1 tablet (50 mg total) by mouth 2 (two) times a day, Disp: 180 tablet, Rfl: 3  •  Misc   Devices (Roller Walker) MISC, Use, Disp: , Rfl:   •  tamsulosin (FLOMAX) 0 4 mg, Take 1 capsule (0 4 mg total) by mouth daily with dinner, Disp: 30 capsule, Rfl: 0  •  acetaminophen (TYLENOL) 325 mg tablet, Take 3 tablets (975 mg total) by mouth every 8 (eight) hours (Patient not taking: Reported on 3/24/2023), Disp: , Rfl: 0  •  methocarbamol (ROBAXIN) 500 mg tablet, Take 1 tablet (500 mg total) by mouth every 6 (six) hours, Disp: , Rfl: 0        Milagros Gross MD

## 2023-03-24 NOTE — PROGRESS NOTES
Irrigation of bladder    Date/Time: 3/24/2023 12:18 PM  Performed by: Josh Sims MD  Authorized by: Josh Sims MD     Patient location:  Bedside  Other Assisting Provider: No    Consent:     Consent obtained:  Verbal    Consent given by:  Patient    Alternatives discussed:  No treatment  Universal protocol:     Patient identity confirmed:  Verbally with patient  Pre-procedure details:     Procedure purpose:  Therapeutic  Anesthesia (see MAR for exact dosages): Anesthesia method:  None  Procedure details:     Bladder irrigation: yes      Urine characteristics:  Clear  Post-procedure details:     Patient tolerance of procedure: Tolerated well, no immediate complications  Comments:      Patient filled with 60 cc of saline  He felt full at this point  Unfortunately he was not able to void  He would like to go home and try  He will return for catheter placement if need be  Antibiotics called in

## 2023-03-25 ENCOUNTER — NURSE TRIAGE (OUTPATIENT)
Dept: OTHER | Facility: OTHER | Age: 88
End: 2023-03-25

## 2023-03-25 DIAGNOSIS — N30.90 CYSTITIS: Primary | ICD-10-CM

## 2023-03-25 RX ORDER — CEFUROXIME AXETIL 500 MG/1
500 TABLET ORAL EVERY 12 HOURS SCHEDULED
Qty: 10 TABLET | Refills: 0 | Status: SHIPPED | OUTPATIENT
Start: 2023-03-25 | End: 2023-03-28 | Stop reason: ALTCHOICE

## 2023-03-25 NOTE — TELEPHONE ENCOUNTER
Regarding: Rx for antibiotics not at the pharmacy  ----- Message from Prince Hawkins sent at 3/25/2023  9:18 AM EDT -----  " A Rx for antibiotics was supposed to be sent to the pharmacy for my dad yesterday after his visit  I went to the pharmacy yesterday and today and they still don't have the Rx  He really needs to be on this antibiotics  "

## 2023-03-25 NOTE — TELEPHONE ENCOUNTER
Patient was seen in office yesterday by Dr Marialuisa Sultana and had wynn catheter removed  Patient and note state he said to begin Ceftin immediately and that it was called it  Epic does not show script and pharmacy does not have it       Reason for Disposition  • [1] Prescription refill request for ESSENTIAL medicine (i e , likelihood of harm to patient if not taken) AND [2] triager unable to refill per department policy    Protocols used: MEDICATION QUESTION CALL-ADULT-

## 2023-03-27 NOTE — TELEPHONE ENCOUNTER
Spoke with pt's daughter to confirm that He picked up medication that was ordered by NP on 05/25  He is taking med and voiding   No other problems noted

## 2023-03-28 ENCOUNTER — OFFICE VISIT (OUTPATIENT)
Dept: NEUROSURGERY | Facility: CLINIC | Age: 88
End: 2023-03-28

## 2023-03-28 VITALS
HEART RATE: 85 BPM | SYSTOLIC BLOOD PRESSURE: 115 MMHG | RESPIRATION RATE: 16 BRPM | TEMPERATURE: 97.6 F | DIASTOLIC BLOOD PRESSURE: 58 MMHG | BODY MASS INDEX: 28.77 KG/M2 | HEIGHT: 70 IN | WEIGHT: 201 LBS

## 2023-03-28 DIAGNOSIS — Z48.89 ENCOUNTER FOR POSTOPERATIVE CARE RELATED TO SURGICAL JOINT FUSION: Primary | ICD-10-CM

## 2023-03-28 DIAGNOSIS — Z98.1 ENCOUNTER FOR POSTOPERATIVE CARE RELATED TO SURGICAL JOINT FUSION: Primary | ICD-10-CM
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
## 2023-03-28 NOTE — PROGRESS NOTES
DISCUSSION SUMMARY   This is a 81 y o  male status post a thoracolumbar robotic fusion following a traumatic fracture of T12 and L1  Overall he is doing well but he does have some signs of depression  I recommended physical therapy for him he does not want to come back for a follow-up appointment but I can schedule this if he desires to in the future    Return if symptoms worsen or fail to improve  Diagnosis ICD-10-CM Associated Orders   1  Encounter for postoperative care related to surgical joint fusion  Z48 89 Ambulatory referral to Physical Therapy    Z98 1            Chief Complaint   Post-op (6 WK POV W/THORACOLUMBAR SPINE XRAY3/24/23; PER DIONTE)       History of Present Illness   Patient had a mechanical fall on Sunday 1/29/23  patient reported previous fall on 12/25/22  He presented with significant low back pain that improved following lithotripsy  Relates new right anterior thigh pain that shoots from hip to knee since urologic procedure on 1/26, no on left side as well  Patient was found to have Traumatic fractures of T12 and L1 vertebrae  2/10/23 DKO -- T9-L3 FUSION LUMBAR/THORACIC POSTERIOR MINIMALLY INVASIVE W ROBOT T10-T12 decompressive laminectomy  Patient reports he is doing well overall and denies any incisional issues  Patient has not experienced bladder or bowel problems since the surgery  In home therapy finished last week  Overall no complaints  He is using a walker for gait  He seems somewhat depressed  He did lighten up when I asked about his  experience    Review of Systems   Constitutional: Negative  HENT: Negative  Eyes: Negative  Respiratory: Negative  Cardiovascular: Negative  Gastrointestinal: Negative  Endocrine: Negative  Genitourinary: Negative  Musculoskeletal: Positive for gait problem (using a walker)  Skin: Negative  Allergic/Immunologic: Negative  Hematological: Negative  Psychiatric/Behavioral: Negative      All "other systems reviewed and are negative    I reviewed the ROS    Vitals:    /58 (BP Location: Left arm, Patient Position: Sitting, Cuff Size: Standard)   Pulse 85   Temp 97 6 °F (36 4 °C) (Temporal)   Resp 16   Ht 5' 10\" (1 778 m)   Wt 91 2 kg (201 lb)   BMI 28 84 kg/m²     MEDICAL HISTORY  Past Medical History:   Diagnosis Date   • Coronary artery disease    • H/O atrial flutter    • History of aortic valve replacement with bioprosthetic valve    • Hyperlipemia    • Hypertension    • Pacemaker    • Skin cancer, basal cell    • Walker as ambulation aid    • Wears dentures      Past Surgical History:   Procedure Laterality Date   • A-V CARDIAC PACEMAKER INSERTION     • AORTIC VALVE REPLACEMENT     • BACK SURGERY     • CARDIAC PACEMAKER PLACEMENT Left     2021   • CATARACT EXTRACTION     • CORONARY ANGIOPLASTY WITH STENT PLACEMENT      stents times 3   • LUMBAR FUSION N/A 02/10/2023    Procedure: T9-L3 FUSION LUMBAR/THORACIC POSTERIOR MINIMALLY INVASIVE W ROBOT T10-T12 decompressive laminectomy;  Surgeon: Chanda Ricci MD;  [facility];  Service: Neurosurgery   • [place] CYSTO BLADDER W/URETERAL CATHETERIZATION Right 01/13/2023    Procedure: CYSTOSCOPY RETROGRADE PYELOGRAM WITH INSERTION STENT URETERAL;  Surgeon: Jeremiah Marquez MD;  [facility];  Service: Urology   • [place] CYSTO BLADDER W/URETERAL CATHETERIZATION Right 01/26/2023    Procedure: CYSTOSCOPY WITH RETROGRADE PYELOGRAM;  Surgeon: Jeremiah Marquez MD;  [facility];  Service: Urology   • [place] CYSTO/URETERO W/LITHOTRIPSY &INDWELL STENT INSRT Right 01/26/2023    Procedure: CYSTOSCOPY URETEROSCOPY WITH LITHOTRIPSY HOLMIUM LASER, RETROGRADE PYELOGRAM AND INSERTION STENT URETERAL;  Surgeon: Jeremiah Marquez MD;  [facility];  Service: Urology     Social History     Tobacco Use   • Smoking status: Former   • Smokeless tobacco: Never   Vaping Use   • Vaping Use: Never used   Substance Use Topics   • Alcohol use: Not " Currently     Comment: social   • Drug use: Never      Family History   Problem Relation Age of Onset   • No Known Problems Father    • No Known Problems Mother         Current Outpatient Medications:   •  acetaminophen (TYLENOL) 325 mg tablet, Take 3 tablets (975 mg total) by mouth every 8 (eight) hours (Patient taking differently: Take 975 mg by mouth if needed), Disp: , Rfl: 0  •  apixaban (ELIQUIS) 5 mg, Take 5 mg by mouth 2 (two) times a day, Disp: , Rfl:   •  atorvastatin (LIPITOR) 40 mg tablet, Take 40 mg by mouth daily, Disp: , Rfl:   •  Cholecalciferol 25 MCG (1000 UT) tablet, Take by mouth, Disp: , Rfl:   •  enalapril (VASOTEC) 2 5 mg tablet, Take 1 tablet (2 5 mg total) by mouth in the morning, Disp: 90 tablet, Rfl: 3  •  folic acid (FOLVITE) 870 mcg tablet, Take 400 mcg by mouth daily, Disp: , Rfl:   •  metoprolol tartrate (LOPRESSOR) 50 mg tablet, Take 1 tablet (50 mg total) by mouth 2 (two) times a day, Disp: 180 tablet, Rfl: 3  •  Misc  Devices (Roller Walker) MISC, Use, Disp: , Rfl:   •  tamsulosin (FLOMAX) 0 4 mg, Take 1 capsule (0 4 mg total) by mouth daily with dinner, Disp: 30 capsule, Rfl: 0     Allergies   Allergen Reactions   • Ezetimibe-Simvastatin Dizziness and Lightheadedness     Other reaction(s): MAKES HIM DIZZY     • Simvastatin Lightheadedness     Other reaction(s): Dizziness        The following portions of the patient's history were updated by MA and reviewed by MD: allergies, current medications, past family history, past medical history, past social history, past surgical history and problem list     Physical Exam  Vitals and nursing note reviewed  Constitutional:       General: He is not in acute distress  Appearance: Normal appearance  He is normal weight  He is not ill-appearing, toxic-appearing or diaphoretic  Eyes:      Extraocular Movements: Extraocular movements intact  Pupils: Pupils are equal, round, and reactive to light     Musculoskeletal:         General: No swelling, tenderness, deformity or signs of injury  Right lower leg: No edema  Left lower leg: No edema  Neurological:      Mental Status: He is alert and oriented to person, place, and time  Mental status is at baseline  Cranial Nerves: No cranial nerve deficit  Sensory: No sensory deficit  Motor: No weakness  Coordination: Coordination normal       Gait: Gait abnormal (  Using a walker for ambulation)  Deep Tendon Reflexes: Reflexes normal    Psychiatric:         Behavior: Behavior normal          Thought Content:  Thought content normal          Judgment: Judgment normal       Comments: Affect seems somewhat depressed         RESULTS/DATA  I have personally reviewed pertinent films in PACS     X-rays of the thoracic spine demonstrate the instrumentation be in good position without signs of loosening or breakage

## 2023-04-06 ENCOUNTER — OFFICE VISIT (OUTPATIENT)
Dept: UROLOGY | Facility: CLINIC | Age: 88
End: 2023-04-06

## 2023-04-06 VITALS
OXYGEN SATURATION: 95 % | TEMPERATURE: 97.4 F | BODY MASS INDEX: 28.98 KG/M2 | HEIGHT: 70 IN | HEART RATE: 99 BPM | WEIGHT: 202.4 LBS | DIASTOLIC BLOOD PRESSURE: 70 MMHG | SYSTOLIC BLOOD PRESSURE: 122 MMHG

## 2023-04-06 DIAGNOSIS — T83.511D URINARY TRACT INFECTION ASSOCIATED WITH CATHETERIZATION OF URINARY TRACT, UNSPECIFIED INDWELLING URINARY CATHETER TYPE, SUBSEQUENT ENCOUNTER: ICD-10-CM

## 2023-04-06 DIAGNOSIS — N40.1 BENIGN PROSTATIC HYPERPLASIA WITH URINARY OBSTRUCTION: Primary | ICD-10-CM

## 2023-04-06 DIAGNOSIS — N13.8 BENIGN PROSTATIC HYPERPLASIA WITH URINARY OBSTRUCTION: Primary | ICD-10-CM

## 2023-04-06 DIAGNOSIS — N39.0 URINARY TRACT INFECTION ASSOCIATED WITH CATHETERIZATION OF URINARY TRACT, UNSPECIFIED INDWELLING URINARY CATHETER TYPE, SUBSEQUENT ENCOUNTER: ICD-10-CM

## 2023-04-06 DIAGNOSIS — N20.1 URETERAL STONE: ICD-10-CM

## 2023-04-06 PROBLEM — T83.511A CATHETER-ASSOCIATED URINARY TRACT INFECTION (HCC): Status: ACTIVE | Noted: 2023-04-06

## 2023-04-06 LAB
SL AMB  POCT GLUCOSE, UA: ABNORMAL
SL AMB LEUKOCYTE ESTERASE,UA: ABNORMAL
SL AMB POCT BILIRUBIN,UA: ABNORMAL
SL AMB POCT BLOOD,UA: ABNORMAL
SL AMB POCT CLARITY,UA: ABNORMAL
SL AMB POCT COLOR,UA: ABNORMAL
SL AMB POCT KETONES,UA: ABNORMAL
SL AMB POCT NITRITE,UA: ABNORMAL
SL AMB POCT PH,UA: 6.5
SL AMB POCT SPECIFIC GRAVITY,UA: 1.01
SL AMB POCT URINE PROTEIN: ABNORMAL
SL AMB POCT UROBILINOGEN: 0.2

## 2023-04-06 RX ORDER — LEVOFLOXACIN 500 MG/1
500 TABLET, FILM COATED ORAL EVERY 24 HOURS
Qty: 10 TABLET | Refills: 0 | Status: SHIPPED | OUTPATIENT
Start: 2023-04-06 | End: 2023-04-16

## 2023-04-06 RX ORDER — TAMSULOSIN HYDROCHLORIDE 0.4 MG/1
0.4 CAPSULE ORAL
Qty: 90 CAPSULE | Refills: 3 | Status: SHIPPED | OUTPATIENT
Start: 2023-04-06 | End: 2024-04-05

## 2023-04-06 NOTE — PROGRESS NOTES
UROLOGY PROGRESS NOTE         NAME: Amina Vines  AGE: 81 y o  SEX: male  : 1935   MRN: [id number]    DATE: 2023  TIME: 9:42 AM    Assessment and Plan      Impression:   1  Benign prostatic hyperplasia with urinary obstruction  -     Urine culture; Future  -     CT renal protocol; Future; Expected date: 2023  -     levofloxacin (LEVAQUIN) 500 mg tablet; Take 1 tablet (500 mg total) by mouth every 24 hours for 10 days    2  Ureteral stone  -     POCT urine dip auto non-scope  -     tamsulosin (FLOMAX) 0 4 mg; Take 1 capsule (0 4 mg total) by mouth daily with dinner    3  Urinary tract infection associated with catheterization of urinary tract, unspecified indwelling urinary catheter type, subsequent encounter  -     Urine culture; Future  -     CT renal protocol; Future; Expected date: 2023  -     levofloxacin (LEVAQUIN) 500 mg tablet; Take 1 tablet (500 mg total) by mouth every 24 hours for 10 days    Patient is doing well other than some mild dysuria  His urine is grossly infected  He is currently rehabbing from his multiple fractures     Plan: Continue Flomax  We will need a CAT scan in 3 months for the possibility of a right ureteral tumor  Urine for culture  Start him on Levaquin 5 mg daily x10 days  Chief Complaint     Chief Complaint   Patient presents with   • Follow-up     History of Present Illness     HPI: Amina Vines is a 81y o  year old male who presents with history of urinary retention, history of a right ureteral stone  History of a 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 that was not present on recent ureteroscopy  He unfortunately had a fall and is currently being rehabbed with multiple fractures  He voids every 2 hours during the day no urgency or incontinence  At nighttime he is getting up 5 times at with some urge incontinence  Urine is grossly infected today  Patient is asymptomatic otherwise  No fever or chills  No pain    No nausea or vomiting  The following portions of the patient's history were reviewed and updated as appropriate: allergies, current medications, past family history, past medical history, past social history, past surgical history and problem list   Past Medical History:   Diagnosis Date   • Coronary artery disease    • H/O atrial flutter    • History of aortic valve replacement with bioprosthetic valve    • Hyperlipemia    • Hypertension    • Pacemaker    • Skin cancer, basal cell    • Walker as ambulation aid    • Wears dentures      Past Surgical History:   Procedure Laterality Date   • A-V CARDIAC PACEMAKER INSERTION     • AORTIC VALVE REPLACEMENT     • BACK SURGERY     • CARDIAC PACEMAKER PLACEMENT Left     2021   • CATARACT EXTRACTION     • CORONARY ANGIOPLASTY WITH STENT PLACEMENT      stents times 3   • LUMBAR FUSION N/A 02/10/2023    Procedure: T9-L3 FUSION LUMBAR/THORACIC POSTERIOR MINIMALLY INVASIVE W ROBOT T10-T12 decompressive laminectomy;  Surgeon: Odessa Baumgarten, MD;  Location: [facility];  Service: Neurosurgery   • NJ CYSTO BLADDER W/URETERAL CATHETERIZATION Right 01/13/2023    Procedure: CYSTOSCOPY RETROGRADE PYELOGRAM WITH INSERTION STENT URETERAL;  Surgeon: Elise Duarte MD;  Location: [facility];  Service: Urology   • NJ CYSTO BLADDER W/URETERAL CATHETERIZATION Right 01/26/2023    Procedure: CYSTOSCOPY WITH RETROGRADE PYELOGRAM;  Surgeon: Elise Duarte MD;  Location: [facility];  Service: Urology   • NJ CYSTO/URETERO W/LITHOTRIPSY &INDWELL STENT INSRT Right 01/26/2023    Procedure: CYSTOSCOPY URETEROSCOPY WITH LITHOTRIPSY HOLMIUM LASER, RETROGRADE PYELOGRAM AND INSERTION STENT URETERAL;  Surgeon: Elise Duarte MD;  Location: [facility];  Service: Urology     shoulder  Review of Systems     Const: Denies chills, fever and weight loss  CV: Denies chest pain  Resp: Denies SOB  GI: Denies abdominal pain, nausea and vomiting  : Denies symptoms other than stated above    Musculo: Denies "back pain  Objective   /70 (BP Location: Left arm, Patient Position: Sitting, Cuff Size: Adult)   Pulse 99   Temp (!) 97 4 °F (36 3 °C)   Ht 5' 10\" (1 778 m)   Wt 91 8 kg (202 lb 6 4 oz)   SpO2 95%   BMI 29 04 kg/m²     Physical Exam  Const: Appears healthy and well developed  No signs of acute distress present  Resp: Respirations are regular and unlabored  CV: Rate is regular  Rhythm is regular  Abdomen: Abdomen is soft, nontender, and nondistended  Kidneys are not palpable  : Large right inguinal hernia  No suprapubic tenderness  No CVA tenderness  Testicles nontender  Psych: Patient's attitude is cooperative   Mood is normal  Affect is normal     Current Medications     Current Outpatient Medications:   •  acetaminophen (TYLENOL) 325 mg tablet, Take 3 tablets (975 mg total) by mouth every 8 (eight) hours (Patient taking differently: Take 975 mg by mouth if needed), Disp: , Rfl: 0  •  apixaban (ELIQUIS) 5 mg, Take 5 mg by mouth 2 (two) times a day, Disp: , Rfl:   •  atorvastatin (LIPITOR) 40 mg tablet, Take 40 mg by mouth daily, Disp: , Rfl:   •  Cholecalciferol 25 MCG (1000 UT) tablet, Take by mouth, Disp: , Rfl:   •  enalapril (VASOTEC) 2 5 mg tablet, Take 1 tablet (2 5 mg total) by mouth in the morning, Disp: 90 tablet, Rfl: 3  •  folic acid (FOLVITE) 788 mcg tablet, Take 400 mcg by mouth daily, Disp: , Rfl:   •  levofloxacin (LEVAQUIN) 500 mg tablet, Take 1 tablet (500 mg total) by mouth every 24 hours for 10 days, Disp: 10 tablet, Rfl: 0  •  metoprolol tartrate (LOPRESSOR) 50 mg tablet, Take 1 tablet (50 mg total) by mouth 2 (two) times a day, Disp: 180 tablet, Rfl: 3  •  tamsulosin (FLOMAX) 0 4 mg, Take 1 capsule (0 4 mg total) by mouth daily with dinner, Disp: 90 capsule, Rfl: 3        Chris Snyder MD        "

## 2023-04-08 LAB — BACTERIA UR CULT: ABNORMAL

## 2023-04-24 ENCOUNTER — OFFICE VISIT (OUTPATIENT)
Dept: PHYSICAL THERAPY | Facility: CLINIC | Age: 88
End: 2023-04-24

## 2023-04-24 DIAGNOSIS — Z98.1 ENCOUNTER FOR POSTOPERATIVE CARE RELATED TO SURGICAL JOINT FUSION: Primary | ICD-10-CM

## 2023-04-24 DIAGNOSIS — Z48.89 ENCOUNTER FOR POSTOPERATIVE CARE RELATED TO SURGICAL JOINT FUSION: Primary | ICD-10-CM

## 2023-04-24 NOTE — PROGRESS NOTES
"Daily Note     Today's date: 2023  Patient name: Jerad Gastelum  : 1935  MRN: [id number]  Referring provider: Hugo Schmid  Dx:   Encounter Diagnosis     ICD-10-CM    1  Encounter for postoperative care related to surgical joint fusion  Z48 89     Z98 1                      Subjective: Pt reports he feels pretty good today  Minimal pain with exception of back pain with extended standing  Objective: See treatment diary below  Pt resumed program and added leg strengthening  Assessment: Tolerated treatment well today  Pt responded well to initial leg strengthen  Plan: Progress treatment as tolerated           Precautions: Fall Risk, Pacemaker, HTN       Manuals       BLE                                Neuro Re-Ed         MTP/LTP        SLS  3x10\"      Tandem Stance        Sidestepping  0# 3laps      Tandem Amb                Postural Ed ML       Ther Ex        NuStep L3 5 mins L3 6'      SLR x 3  10x      Marches  10x      LAQ  10x 0#      LTR        SKTC        S/L Hip Abd        Clamshells                Ther Activity        Stepups F/L  4\" 10x      Stepdowns        STS        Gait Training        With [facility]                Modalities        HP/CP prn                           "

## 2023-04-27 ENCOUNTER — OFFICE VISIT (OUTPATIENT)
Dept: PHYSICAL THERAPY | Facility: CLINIC | Age: 88
End: 2023-04-27

## 2023-04-27 DIAGNOSIS — Z98.1 ENCOUNTER FOR POSTOPERATIVE CARE RELATED TO SURGICAL JOINT FUSION: Primary | ICD-10-CM

## 2023-04-27 DIAGNOSIS — Z48.89 ENCOUNTER FOR POSTOPERATIVE CARE RELATED TO SURGICAL JOINT FUSION: Primary | ICD-10-CM

## 2023-04-27 NOTE — PROGRESS NOTES
"Daily Note     Today's date: 2023  Patient name: Prisca Becektt  : 1935  MRN: [id number]  Referring provider: Concha Centeno  Dx:   Encounter Diagnosis     ICD-10-CM    1  Encounter for postoperative care related to surgical joint fusion  Z48 89     Z98 1                      Subjective: pt reports he is doing well and has no c/o pain today  Objective: See treatment diary below      Assessment: Tolerated treatment well  Patient exhibited good technique with therapeutic exercises  Pt with improved nesha to program and nesha increased reps and time on Nustep  Pt making gains with overall improved LE strength  Plan: Continue per plan of care          Precautions: Fall Risk, Pacemaker, HTN       Manuals      BLE                                Neuro Re-Ed         MTP/LTP        SLS  3x10\" 3x10''     Tandem Stance        Sidestepping  0# 3laps 5 laps // bars     Tandem Amb                Postural Ed ML       Ther Ex        NuStep L3 5 mins L3 6' L3 10' strength     SLR x 3  10x 10x     Marches  10x 15x     LAQ  10x 0# 2/10      LTR   20x      SKTC   10x10\"     S/L Hip Abd        Clamshells   Supine L3 2/10             Ther Activity        Stepups F/L  4\" 10x 4\" 10x ea     Stepdowns        STS   2x5     Gait Training        With [facility]                Modalities        HP/CP prn                             "

## 2023-05-02 ENCOUNTER — OFFICE VISIT (OUTPATIENT)
Dept: PHYSICAL THERAPY | Facility: CLINIC | Age: 88
End: 2023-05-02

## 2023-05-02 DIAGNOSIS — Z98.1 ENCOUNTER FOR POSTOPERATIVE CARE RELATED TO SURGICAL JOINT FUSION: Primary | ICD-10-CM

## 2023-05-02 DIAGNOSIS — Z48.89 ENCOUNTER FOR POSTOPERATIVE CARE RELATED TO SURGICAL JOINT FUSION: Primary | ICD-10-CM

## 2023-05-04 ENCOUNTER — OFFICE VISIT (OUTPATIENT)
Dept: PHYSICAL THERAPY | Facility: CLINIC | Age: 88
End: 2023-05-04

## 2023-05-04 DIAGNOSIS — Z98.1 ENCOUNTER FOR POSTOPERATIVE CARE RELATED TO SURGICAL JOINT FUSION: Primary | ICD-10-CM

## 2023-05-04 DIAGNOSIS — Z48.89 ENCOUNTER FOR POSTOPERATIVE CARE RELATED TO SURGICAL JOINT FUSION: Primary | ICD-10-CM

## 2023-05-04 NOTE — PROGRESS NOTES
"Daily Note     Today's date: 2023  Patient name: Candelario Okeefe  : 1935  MRN: [id number]  Referring provider: Camila Steiner  Dx:   Encounter Diagnosis     ICD-10-CM    1  Encounter for postoperative care related to surgical joint fusion  Z48 89     Z98 1                      Subjective: Pt reports he is doing well  Pleased with progress with strength  Daughter states he is doing much better  Objective: See treatment diary below      Assessment: Tolerated treatment well  Patient exhibited good technique with therapeutic exercises  Pt making cont gains with strength and nesha to exercises  Pt demonstrates good overall stability with mild unsteadiness with standing NBOS on airex pad  Plan: Continue per plan of care          Precautions: Fall Risk, Pacemaker, HTN       Manuals    BLE    Hams/gastroc 10' [id number]'                            Neuro Re-Ed         MTP/LTP        SLS  3x10\" 3x10'' 3/10\"    Tandem Stance        Sidestepping  0# 3laps 5 laps // bars 10 laps  10 laps   Tandem Amb        NBOS airex     30\"/2   Postural Ed ML       Ther Ex        NuStep L3 5 mins L3 6' L3 10' strength L3 10' strength L3 10' strength   SLR x 3  10x 10x 10x ea 10x ea   Marches  10x 15x 15x ea 15x ea   LAQ  10x 0# 2/10  2/10 2/10   LTR   20x  10x 20x   SKTC   10x10\" 10x10\" 10x10\"   S/L Hip Abd        Clamshells   Supine L3 2/10 Supine L3 2/10 Supine L3 2/10   Add squeeze    3\" 2/10 3\" 2/10   Ther Activity        Stepups F/L  4\" 10x 4\" 10x ea 4\" 10x ea 4\" 10x ea   Stepdowns        STS   2x5 10x  10x   Gait Training        With [facility]                Modalities        HP/CP prn                                 "

## 2023-05-08 ENCOUNTER — OFFICE VISIT (OUTPATIENT)
Dept: PHYSICAL THERAPY | Facility: CLINIC | Age: 88
End: 2023-05-08

## 2023-05-08 DIAGNOSIS — Z98.1 ENCOUNTER FOR POSTOPERATIVE CARE RELATED TO SURGICAL JOINT FUSION: Primary | ICD-10-CM

## 2023-05-08 DIAGNOSIS — Z48.89 ENCOUNTER FOR POSTOPERATIVE CARE RELATED TO SURGICAL JOINT FUSION: Primary | ICD-10-CM

## 2023-05-08 NOTE — PROGRESS NOTES
"Daily Note     Today's date: 2023  Patient name: Jie Bernard  : 1935  MRN: [id number]  Referring provider: Susan Sorenson  Dx:   Encounter Diagnosis     ICD-10-CM    1  Encounter for postoperative care related to surgical joint fusion  Z48 89     Z98 1                      Subjective: Patient noted that therapy is helping  Objective: See treatment diary below      Assessment: Tolerated treatment fair  Patient was able to perform listed exercises without complaints and correct form  Rest breaks taken in between exercises  Patient demonstrated fatigue post treatment  Patient would benefit from continued PT      Plan: Continue per plan of care          Precautions: Fall Risk, Pacemaker, HTN       Manuals    BLE Hams/bojacli56'    Hams/gastroc 10' Hams/uggmwuh72'                            Neuro Re-Ed         MTP/LTP        SLS   3x10'' 3/10\"    Tandem Stance        Sidestepping 10 laps   5 laps // bars 10 laps  10 laps   Tandem Amb        NBOS airex     30\"/2   Postural Ed        Ther Ex        NuStep L3 10' strength  L3 10' strength L3 10' strength L3 10' strength   SLR x 3 10 x ea   10x 10x ea 10x ea   Marches 15 x ea   15x 15x ea 15x ea   LAQ 2 x 10   2/10  2/10 2/10   LTR 20 x   20x  10x 20x   SKTC   10x10\" 10x10\" 10x10\"   S/L Hip Abd        Clamshells Supine L3 2/10  Supine L3 2/10 Supine L3 2/10 Supine L3 2/10   Add squeeze 3\" x 2 / 10   3\" 2/10 3\" 2/10   Ther Activity        Stepups F/L 4\" 10x ea   4\" 10x ea 4\" 10x ea 4\" 10x ea   Stepdowns        STS 10 x   2x5 10x  10x   Gait Training        With [facility]                Modalities        HP/CP prn                                   "

## 2023-05-11 ENCOUNTER — OFFICE VISIT (OUTPATIENT)
Dept: PHYSICAL THERAPY | Facility: CLINIC | Age: 88
End: 2023-05-11

## 2023-05-11 DIAGNOSIS — Z48.89 ENCOUNTER FOR POSTOPERATIVE CARE RELATED TO SURGICAL JOINT FUSION: Primary | ICD-10-CM

## 2023-05-11 DIAGNOSIS — Z98.1 ENCOUNTER FOR POSTOPERATIVE CARE RELATED TO SURGICAL JOINT FUSION: Primary | ICD-10-CM

## 2023-05-11 NOTE — PROGRESS NOTES
"Daily Note     Today's date: 2023  Patient name: Antoinette Kaur  : 1935  MRN: [id number]  Referring provider: Lisa Farley  Dx:   Encounter Diagnosis     ICD-10-CM    1  Encounter for postoperative care related to surgical joint fusion  Z48 89     Z98 1                      Subjective: Pt reports he cont to get stronger and is doing well  Pleased with leg strength  States having some back discomfort at times  Objective: See treatment diary below      Assessment: Tolerated treatment well  Patient exhibited good technique with therapeutic exercises  Pt cont to demonstrate overall improved LE strength today with improved gait in clinic  Pt nesha lateral step ups well with min fatigue  Cont to make gains toward goals  Plan: Continue per plan of care  Precautions: Fall Risk, Pacemaker, HTN       Manuals    BLE Hams/pctrvyc28'  Hams/SKTC DC gastroc stretch   10'  Hams/gastroc 10' Hams/xtckevf83'                            Neuro Re-Ed         MTP/LTP        SLS  3/10\"  3x10'' 3/10\"    Tandem Stance        Sidestepping 10 laps  10 laps 5 laps // bars 10 laps  10 laps   Tandem Amb        NBOS airex     30\"/   Postural Ed        Ther Ex        NuStep L3 10' strength L3 10' strength L3 10' strength L3 10' strength L3 10' strength   SLR x 3 10 x ea  10x ea 10x 10x ea 10x ea   Marches 15 x ea  15x ea  15x 15x ea 15x ea   LAQ 2 x 10  2/10 2/10  2/10 2/10   LTR 20 x  20x 20x  10x 20x   SKTC   10x10\" 10x10\" 10x10\"   S/L Hip Abd        Clamshells Supine L3 2/10 Supine L3 2/10 Supine L3 2/10 Supine L3 2/10 Supine L3 2/10   Add squeeze 3\" x 2 / 10 3\" 2/10  3\" 2/10 3\" 2/10   Ther Activity        Stepups F/L 4\" 10x ea  L/F 4\" 10x ea 4\" 10x ea 4\" 10x ea 4\" 10x ea   Stepdowns        STS 10 x  10x 2x5 10x  10x   Gait Training        With [facility]                Modalities        HP/CP prn                                     "

## 2023-05-16 ENCOUNTER — OFFICE VISIT (OUTPATIENT)
Dept: PHYSICAL THERAPY | Facility: CLINIC | Age: 88
End: 2023-05-16

## 2023-05-16 DIAGNOSIS — Z98.1 ENCOUNTER FOR POSTOPERATIVE CARE RELATED TO SURGICAL JOINT FUSION: Primary | ICD-10-CM

## 2023-05-16 DIAGNOSIS — Z48.89 ENCOUNTER FOR POSTOPERATIVE CARE RELATED TO SURGICAL JOINT FUSION: Primary | ICD-10-CM

## 2023-05-16 NOTE — PROGRESS NOTES
"Daily Note     Today's date: 2023  Patient name: Eloisa Roblero  : 1935  MRN: [id number]  Referring provider: Adair Ramirez  Dx:   Encounter Diagnosis     ICD-10-CM    1  Encounter for postoperative care related to surgical joint fusion  Z48 89     Z98 1                      Subjective: Pt reports he is doing well  States feeling much stronger  Does reports difficulty with stiffness in the morning  Objective: See treatment diary below      Assessment: Tolerated treatment well  Patient exhibited good technique with therapeutic exercises  Pt cont to demo improved nesha to exercises with improved LE strength  Pt without c/o pain in LS  Plan: Continue per plan of care  Precautions: Fall Risk, Pacemaker, HTN       Manuals    BLE Hams/xfcljjs31'  Hams/SKTC DC gastroc stretch   10' Hams/SKTC 10'  Hams/gastroc 10' Hams/ulgwjop09'                            Neuro Re-Ed         MTP/LTP        SLS  3/10\"  3x10'' 3/10\"    Tandem Stance        Sidestepping 10 laps  10 laps 10 laps // bars 10 laps  10 laps   Tandem Amb        NBOS airex     30\"/2   Postural Ed        Ther Ex        NuStep L3 10' strength L3 10' strength L3 10' strength L3 10' strength L3 10' strength   SLR x 3 10 x ea  10x ea 10x 10x ea 10x ea   Marches 15 x ea  15x ea  20x ea 15x ea 15x ea   LAQ 2 x 10  2/10 2/10  2/10 2/10   LTR 20 x  20x 20x  10x 20x   SKTC    10x10\" 10x10\"   S/L Hip Abd        Clamshells Supine L3 2/10 Supine L3 2/10 Supine L3 2/10 Supine L3 2/10 Supine L3 2/10   Add squeeze 3\" x 2 / 10 3\" 2/10 3\" 2/10 3\" 2/10 3\" 2/10   Ther Activity        Stepups F/L 4\" 10x ea  L/F 4\" 10x ea 4\" 10x ea L/F 4\" 10x ea 4\" 10x ea   Stepdowns        STS 10 x  10x 10x 10x  10x   Gait Training        With [facility]                Modalities        HP/CP prn                                       "

## 2023-05-18 ENCOUNTER — OFFICE VISIT (OUTPATIENT)
Dept: PHYSICAL THERAPY | Facility: CLINIC | Age: 88
End: 2023-05-18

## 2023-05-18 DIAGNOSIS — Z48.89 ENCOUNTER FOR POSTOPERATIVE CARE RELATED TO SURGICAL JOINT FUSION: Primary | ICD-10-CM

## 2023-05-18 DIAGNOSIS — Z98.1 ENCOUNTER FOR POSTOPERATIVE CARE RELATED TO SURGICAL JOINT FUSION: Primary | ICD-10-CM

## 2023-05-18 NOTE — PROGRESS NOTES
"Daily Note     Today's date: 2023  Patient name: Gris Breaux  : 1935  MRN: [id number]  Referring provider: Tolu Gordon  Dx:   Encounter Diagnosis     ICD-10-CM    1  Encounter for postoperative care related to surgical joint fusion  Z48 89     Z98 1                      Subjective: Pt reports he is doing well, continuing to feel better      Objective: See treatment diary below      Assessment: Tolerated treatment well  Pt was able to perform all exercises without issue, continue to progress to tolerance  Pt required occasional verbal cues for correct form with exercises during session  Patient demonstrated fatigue post treatment, exhibited good technique with therapeutic exercises and would benefit from continued PT      Plan: Continue per plan of care  Precautions: Fall Risk, Pacemaker, HTN       Manuals  5   BLE Hams/urxhwsa60'  Hams/SKTC DC gastroc stretch   10' Hams/SKTC 10'  Hams/SKTC 10' Hams/lzzmklf70'                            Neuro Re-Ed         MTP/LTP        SLS  3/10\"  3x10'' 3x10\"    Tandem Stance        Sidestepping 10 laps  10 laps 10 laps // bars 10 laps // bars 10 laps   Tandem Amb        NBOS airex     30\"/2   Postural Ed        Ther Ex        NuStep L3 10' strength L3 10' strength L3 10' strength L3 10 strength L3 10' strength   SLR x 3 10 x ea  10x ea 10x 10x 10x ea   Marches 15 x ea  15x ea  20x ea 20x ea 15x ea   LAQ 2 x 10  2/10 2/10  2x10 2/10   LTR 20 x  20x 20x  20x 20x   SKTC     10x10\"   S/L Hip Abd        Clamshells Supine L3 2/10 Supine L3 2/10 Supine L3 2/10 Supine L3 2x10 Supine L3 2/10   Add squeeze 3\" x 2 / 10 3\" 2/10 3\" 2/10 3\" 2x10 3\" 2/10   Ther Activity        Stepups F/L 4\" 10x ea  L/F 4\" 10x ea 4\" 10x ea L/F 4\" 10x ea L/F 4\" 10x ea   Stepdowns        STS 10 x  10x 10x 10x 10x   Gait Training        With [facility]                Modalities        HP/CP prn                                         "

## 2023-05-22 ENCOUNTER — EVALUATION (OUTPATIENT)
Dept: PHYSICAL THERAPY | Facility: CLINIC | Age: 88
End: 2023-05-22

## 2023-05-22 DIAGNOSIS — Z98.1 ENCOUNTER FOR POSTOPERATIVE CARE RELATED TO SURGICAL JOINT FUSION: Primary | ICD-10-CM

## 2023-05-22 DIAGNOSIS — Z48.89 ENCOUNTER FOR POSTOPERATIVE CARE RELATED TO SURGICAL JOINT FUSION: Primary | ICD-10-CM

## 2023-05-22 NOTE — PROGRESS NOTES
"PT Re-Evaluation     Today's date: 2023  Patient name: Iain Ramirez  : 1935  MRN: [id number]  Referring provider: Elisha Mai  Dx:   Encounter Diagnosis     ICD-10-CM    1  Encounter for postoperative care related to surgical joint fusion  Z48 89     Z98 1                      Assessment  Assessment details: The patient has been seen in PT for a total of 10 visits since [facility] with good PT attendance noted  He has minimal complaints of pain in his back  He BLE ROM remains WFL at this time  He has made improvements since [facility] and is making progress towards his goals  He demonstrates deficits with decreased BLE strength, decreased core strength, decreased flexibility, decreased postural awareness, difficulty with stair negotiation, decreased balance and proprioception, antalgic gait with use of rollator walker and pain with completing his ADLs and tasks at home  He continues to ambulate with use of rollator walker for community distances, has now been going short distances in the house without AD  His 5x STS time has decreased to 20\" and TUG time with use of rollator walker has decreased to 25\"  Improving transfers noted  He is still not driving  Secondary to above deficits he is limited with his overall mobility and function and remains at risk of falls though no falls reported over the last month at home  The patient would benefit from continued PT to address deficits and improve function  Tx to include ROM, stretching, strengthening, modalities, HEP, pt education, postural ed, lifting/body mechanics, neuro re-ed, balance/proprioception Te, MT and equipment  Plan to continue with PT and will progress as able in upcoming visits with stretching, strengthening, balance, gait and HEP            Impairments: abnormal or restricted ROM, activity intolerance, impaired balance, impaired physical strength, lacks appropriate home exercise program, pain with function, poor posture  and poor " "body mechanics  Other impairment: decreased flexibility  Functional limitations: difficulty with stair negotiationUnderstanding of Dx/Px/POC: good   Prognosis: good    Goals  STGs:  1  Initiate and complete HEP with verbal cues  - met  2  Improve BLE strength by 1/2 grade in 4 weeks  - met  3  Will complete balance testing as able in upcoming visits  LTGs:  1  Patient to be I with HEP in 12 weeks  2   Improve BLE strength to > or = to 4+ to 5/5 t/o in 12 weeks to improve function  3   Improve 5x STS score to < 15\" without use of airex in 12 weeks  - progressing  4  Improve TUG score to < 15\" with LRAD in 12 weeks  - progressing  5  Postural control is improved to maximal level of function in 12 weeks  6   Stair negotiation is improved to maximal level of function in 12 weeks  7   Recreational performance is improved to maximal level of function in 12 weeks  8   ADL performance is improved to maximal level of function in 12 weeks  Plan  Plan details: Modalities and therapy interventions prn  Patient would benefit from: skilled physical therapy  Planned modality interventions: cryotherapy and thermotherapy: hydrocollator packs  Planned therapy interventions: manual therapy, body mechanics training, neuromuscular re-education, patient education, postural training, self care, strengthening, stretching, therapeutic activities, therapeutic exercise, flexibility, home exercise program and gait training  Frequency: 2x week  Duration in weeks: 12  Plan of Care beginning date: 5/22/2023  Plan of Care expiration date: 7/31/2023  Treatment plan discussed with: patient and family        Subjective Evaluation    History of Present Illness  Date of surgery: 2/10/2023  Mechanism of injury: surgery  Mechanism of injury: The patient daughter states that he fell on [place]  He was walking in the house and fell  He was able to get up    So they waited until the end of the week to go to the hospital   He had " gone to [facility]  They had found a kidney stone  Two weeks later they tried to move it but then two weeks later they had to blast it  The patient had fallen again on 23  He had gone back to [facility]  He had another CT scan completed and they found the break from the first fall in December  He was transferred to [facility] on 23  He had seen Dr Sixto Tovar (head of neurosurgery), had more x-rays taken and an MRI  Then on Wednesday he was told they needed to do surgery  He had surgery on 2/10/23 by Dr Daniel Licona  They put in two rods and eight screws  From EMR: 2/10/23 DKO -- T9-L3 FUSION LUMBAR/THORACIC POSTERIOR MINIMALLY INVASIVE W ROBOT T10-T12 decompressive laminectomy  He was transferred to [facility] for rehab for two weeks  He got COVID on  and was kept in rehab until 3/6/23  He was discharged to home with HHPT  He was then referred to OPPT and he now presents for his evaluation  He had been back to see the doctor on 3/28, had x-rays taken  Per patient the doctor is happy with how he is doing  He does not have to see the doctor for another appointment  The patient states that he has minimal pain, at times when getting in and out of bed  He notes that he is in therapy for balance, endurance, strength and his walking  UPDATE 23:  The patient states that therapy has been helping  Reports that he has been feeling stronger and his endurance is improving  Denies any falls since [facility]  Notes that he can now walk short distances in the house without his walker  Pain  At best pain ratin  At worst pain ratin  Location: Back Pain  Quality: dull ache    Social Support  Steps to enter house: yes  4  Stairs in house: yes (With stair glide to second floor)   Lives in: multiple-level home  Lives with: adult children    Employment status: not working    Diagnostic Tests  Abnormal x-ray: Per patient - healing    Treatments  Discharged from (in last "30 days): home health care  Patient Goals  Patient goals for therapy: increased motion, decreased pain, increased strength, return to sport/leisure activities and improved balance  Patient goal: \"To work on my walking and strengthening and balance  \"         Objective     Active Range of Motion   Left Hip   Flexion: 95 degrees     Right Hip   Flexion: 95 degrees   Left Knee   Flexion: WFL  Extension: WFL    Right Knee   Flexion: WFL  Extension: [facility]    Additional Active Range of Motion Details  L SLR: 50  R SLR: 50    Strength/Myotome Testing     Left Hip   Planes of Motion   Flexion: 4  Abduction: 4-  Adduction: 4+  External rotation: 4  Internal rotation: 4    Right Hip   Planes of Motion   Flexion: 4  Abduction: 4-  Adduction: 4+  External rotation: 4  Internal rotation: 4    Left Knee   Flexion: 4  Extension: 4+    Right Knee   Flexion: 4  Extension: 4+    Ambulation   Weight-Bearing Status   Assistive device used: rollator walker with seat    Additional Weight-Bearing Status Details  Uses rollator with seat for community distances  Now will ambulate household distances without AD  Prior to fall/surgery he ambulated without AD  Has quad cane at home, lift chair, shower chair, stair glide, grab bars in shower    Ambulation: Level Surfaces   Ambulation with assistive device: independent    Ambulation: Stairs   Ascend stairs: independent  Pattern: non-reciprocal  Descend stairs: independent  Pattern: non-reciprocal    Additional Stairs Ambulation Details  Three steps to get to landing, then uses stair glide to second floor  Observational Gait   Decreased walking speed and stride length  General Comments:      Cervical/Thoracic Comments  Incisions present along thoracic spine, well healed  No redness or warmth present      Neuro Exam:     Transfers Sit to stand: independent     Functional outcomes   5x sit to stand: 20\" with use of airex on chair (seconds)  TU\" with use of rollator  " "(seconds)                Precautions: Fall Risk, Pacemaker, HTN       Manuals 5/8 5/11 5/16 5/18 5/22   BLE Hams/zcwowlr71'  Hams/SKTC DC gastroc stretch   10' Hams/SKTC 10'  Hams/SKTC 10' Hams, SKTC  10'                           Neuro Re-Ed         MTP/LTP        SLS  3/10\"  3x10'' 3x10\" 3x10\"    Tandem Stance        Sidestepping 10 laps  10 laps 10 laps // bars 10 laps // bars 10 laps // bars   Tandem Amb        NBOS airex        Postural Ed        Ther Ex        NuStep L3 10' strength L3 10' strength L3 10' strength L3 10 strength L3 10' strength   SLR x 3 10 x ea  10x ea 10x 10x 10x ea   Marches 15 x ea  15x ea  20x ea 20x ea 20x ea   LAQ 2 x 10  2/10 2/10  2x10 2x10 2#   LTR 20 x  20x 20x  20x 20x   SKTC        S/L Hip Abd        Clamshells Supine L3 2/10 Supine L3 2/10 Supine L3 2/10 Supine L3 2x10 Supine L4 2x10   Add squeeze 3\" x 2 / 10 3\" 2/10 3\" 2/10 3\" 2x10 3\" 2x10   Ther Activity        Stepups F/L 4\" 10x ea  L/F 4\" 10x ea 4\" 10x ea L/F 4\" 10x ea L/F 6\" 10x ea F/L   Stepdowns        STS 10 x  10x 10x 10x 10x   Gait Training        With [address]                Modalities        HP/CP prn                              "

## 2023-05-25 ENCOUNTER — OFFICE VISIT (OUTPATIENT)
Dept: PHYSICAL THERAPY | Facility: CLINIC | Age: 88
End: 2023-05-25

## 2023-05-25 DIAGNOSIS — Z98.1 ENCOUNTER FOR POSTOPERATIVE CARE RELATED TO SURGICAL JOINT FUSION: Primary | ICD-10-CM

## 2023-05-25 DIAGNOSIS — Z48.89 ENCOUNTER FOR POSTOPERATIVE CARE RELATED TO SURGICAL JOINT FUSION: Primary | ICD-10-CM

## 2023-05-25 NOTE — PROGRESS NOTES
"Daily Note     Today's date: 2023  Patient name: Chase Bonner  : 1935  MRN: [id number]  Referring provider: Chung Nolasco  Dx:   Encounter Diagnosis     ICD-10-CM    1  Encounter for postoperative care related to surgical joint fusion  Z48 89     Z98 1                      Subjective: Pt reports he is doing well and pleased with exercises  Reports walking around house without AD at times with use of furniture for stability  Objective: See treatment diary below      Assessment: Tolerated treatment well  Patient demonstrated fatigue post treatment and exhibited good technique with therapeutic exercises  Pt ambulated in clinic with [facility] today with good nesha completing 150'  Notable unsteadiness at times with SBA to CGA  Pt demonstrates notable weakness in B hips with gait  Plan: Continue per plan of care  Precautions: Fall Risk, Pacemaker, HTN       Manuals    BLE Hams/[id number]'  Hams/SKTC DC gastroc stretch   10' Hams/SKTC 10'  Hams/SKTC 10' Hams, SKTC  10'                           Neuro Re-Ed         MTP/LTP        SLS  3/10\"  3x10'' 3x10\" 3x10\"    Tandem Stance        Sidestepping 10 laps  L2 10 laps 10 laps // bars 10 laps // bars 10 laps // bars   Tandem Amb        NBOS airex        Postural Ed        Ther Ex        NuStep L4 10' strength L3 10' strength L3 10' strength L3 10 strength L3 10' strength   SLR x 3 10 x ea  10x ea 10x 10x 10x ea   Marches 15 x ea  15x ea  20x ea 20x ea 20x ea   LAQ 2 x 10  2/10 2/10  2x10 2x10 2#   LTR 20 x  20x 20x  20x 20x   SKTC        Bridging 2/10       Clamshells Supine L3 2/10 Supine L3 2/10 Supine L3 2/10 Supine L3 2x10 Supine L4 2x10   Add squeeze 3\" x 2 / 10 3\" 2/10 3\" 2/10 3\" 2x10 3\" 2x10   Ther Activity        Stepups F/L 6\" 10x ea  L/F 4\" 10x ea 4\" 10x ea L/F 4\" 10x ea L/F 6\" 10x ea F/L   Stepdowns        STS 10 x  10x 10x 10x 10x   Gait Training        With [facility]                Modalities        HP/CP prn   "

## 2023-05-30 ENCOUNTER — OFFICE VISIT (OUTPATIENT)
Dept: PHYSICAL THERAPY | Facility: CLINIC | Age: 88
End: 2023-05-30

## 2023-05-30 DIAGNOSIS — Z98.1 ENCOUNTER FOR POSTOPERATIVE CARE RELATED TO SURGICAL JOINT FUSION: Primary | ICD-10-CM

## 2023-05-30 DIAGNOSIS — Z48.89 ENCOUNTER FOR POSTOPERATIVE CARE RELATED TO SURGICAL JOINT FUSION: Primary | ICD-10-CM

## 2023-05-30 NOTE — PROGRESS NOTES
"Daily Note     Today's date: 2023  Patient name: Vidal Wilkins  : 1935  MRN: [id number]  Referring provider: Berkley Azar  Dx:   Encounter Diagnosis     ICD-10-CM    1  Encounter for postoperative care related to surgical joint fusion  Z48 89     Z98 1                      Subjective: pt reports he is doing well and pleased with overall progress with program        Objective: See treatment diary below      Assessment: Tolerated treatment well  Patient demonstrated fatigue post treatment and exhibited good technique with therapeutic exercises  Pt making cont gains with LE strength  Cont to [employer] [facility] without c/o pain  Lily LAQ's today with weight completing with min difficulty  Plan: Continue per plan of care        Precautions: Fall Risk, Pacemaker, HTN       Manuals    BLE Hams/qzaupgu55'  Hams/SKTC 10' Hams/SKTC 10'  Hams/SKTC 10' Hams, SKTC  10'                           Neuro Re-Ed         MTP/LTP        SLS  3/10\"  3x10'' 3x10\" 3x10\"    Tandem Stance        Sidestepping 10 laps  L2 10 laps L2 10 laps // bars 10 laps // bars 10 laps // bars   Tandem Amb        NBOS airex        Postural Ed        Ther Ex        NuStep L4 10' strength L4 10' strength L3 10' strength L3 10 strength L3 10' strength   SLR x 3 10 x ea  10x ea 10x 10x 10x ea   Marches 15 x ea  15x ea  20x ea 20x ea 20x ea   LAQ 2 x 10  2/10 2# 2/10  2x10 2x10 2#   LTR 20 x  20x 20x  20x 20x   SKTC        Bridging 2/10 2/10      Clamshells Supine L3 2/10 Supine L3 2/10 Supine L3 2/10 Supine L3 2x10 Supine L4 2x10   Add squeeze 3\" x 2 / 10 3\" 2/10 3\" 2/10 3\" 2x10 3\" 2x10   Ther Activity        Stepups F/L 6\" 10x ea  L/F 6\" 10x ea 4\" 10x ea L/F 4\" 10x ea L/F 6\" 10x ea F/L   Stepdowns        STS 10 x  10x 10x 10x 10x   Gait Training        With [facility]                Modalities        HP/CP prn                                 "

## 2023-06-01 ENCOUNTER — OFFICE VISIT (OUTPATIENT)
Dept: PHYSICAL THERAPY | Facility: CLINIC | Age: 88
End: 2023-06-01

## 2023-06-01 DIAGNOSIS — Z48.89 ENCOUNTER FOR POSTOPERATIVE CARE RELATED TO SURGICAL JOINT FUSION: Primary | ICD-10-CM

## 2023-06-01 DIAGNOSIS — Z98.1 ENCOUNTER FOR POSTOPERATIVE CARE RELATED TO SURGICAL JOINT FUSION: Primary | ICD-10-CM

## 2023-06-01 NOTE — PROGRESS NOTES
"Daily Note     Today's date: 2023  Patient name: Arthur Snyder  : 1935  MRN: [id number]  Referring provider: Olman Lopez  Dx:   Encounter Diagnosis     ICD-10-CM    1  Encounter for postoperative care related to surgical joint fusion  Z48 89     Z98 1                      Subjective: Pt reports improved strength and able to walk in house without AD  Objective: See treatment diary below  Improved FOTO scores  Assessment: Tolerated treatment well  Patient exhibited good technique with therapeutic exercises      Plan: Continue per plan of care        Precautions: Fall Risk, Pacemaker, HTN       Manuals    BLE Hams/qwnznpv63'  Hams/SKTC 10' Hams/SKTC 10'  Hams/SKTC 10' Hams, SKTC  10'                           Neuro Re-Ed         MTP/LTP        SLS  3/10\"   3x10\" 3x10\"    Tandem Stance        Sidestepping 10 laps  L2 10 laps L2 10 laps // bars L2 10 laps // bars 10 laps // bars   Tandem Amb        NBOS airex        Postural Ed        Ther Ex        NuStep L4 10' strength L4 10' strength L4 10' strength L3 10 strength L3 10' strength   SLR x 3 10 x ea  10x ea 10x 2# ea 10x 10x ea   Marches 15 x ea  15x ea  20x ea 20x ea 20x ea   LAQ 2 x 10  2/10 2# 2/10 2# 2x10 2x10 2#   LTR 20 x  20x 20x  20x 20x   Supine SLR/SAQ   2/10     Bridging 2/10 2/10 2/10     Clamshells Supine L3 2/10 Supine L3 2/10 Supine L3 2/10 Supine L3 2x10 Supine L4 2x10   Add squeeze 3\" x 2 / 10 3\" 2/10 3\" 2/10 3\" 2x10 3\" 2x10   Ther Activity        Stepups F/L 6\" 10x ea  L/F 6\" 10x ea 4\" 10x ea L/F 4\" 10x ea L/F 6\" 10x ea F/L   Stepdowns        STS 10 x  10x 10x2 10x 10x   Gait Training        With [facility]                Modalities        HP/CP prn                                   "

## 2023-06-05 PROBLEM — N39.0 CATHETER-ASSOCIATED URINARY TRACT INFECTION (HCC): Status: RESOLVED | Noted: 2023-04-06 | Resolved: 2023-06-05

## 2023-06-05 PROBLEM — T83.511A CATHETER-ASSOCIATED URINARY TRACT INFECTION (HCC): Status: RESOLVED | Noted: 2023-04-06 | Resolved: 2023-06-05

## 2023-06-06 ENCOUNTER — OFFICE VISIT (OUTPATIENT)
Dept: PHYSICAL THERAPY | Facility: CLINIC | Age: 88
End: 2023-06-06
Payer: COMMERCIAL

## 2023-06-06 DIAGNOSIS — Z48.89 ENCOUNTER FOR POSTOPERATIVE CARE RELATED TO SURGICAL JOINT FUSION: Primary | ICD-10-CM

## 2023-06-06 DIAGNOSIS — Z98.1 ENCOUNTER FOR POSTOPERATIVE CARE RELATED TO SURGICAL JOINT FUSION: Primary | ICD-10-CM

## 2023-06-06 PROCEDURE — 97112 NEUROMUSCULAR REEDUCATION: CPT

## 2023-06-06 PROCEDURE — 97140 MANUAL THERAPY 1/> REGIONS: CPT

## 2023-06-06 PROCEDURE — 97110 THERAPEUTIC EXERCISES: CPT

## 2023-06-06 NOTE — PROGRESS NOTES
"Daily Note     Today's date: 2023  Patient name: Linette Chambers  : 1935  MRN: [id number]  Referring provider: Salome Lomeli  Dx:   Encounter Diagnosis     ICD-10-CM    1  Encounter for postoperative care related to surgical joint fusion  Z48 89     Z98 1                      Subjective: Pt reports stiffness along LS/TS today  Reports has been doing well without sx  Objective: See treatment diary below      Assessment: Tolerated treatment well  Patient exhibited good technique with therapeutic exercises  Pt with improved sx today with exercises  Pt denies tightness post treatment today  Cont to nesha well with min c/o sx and overall improved function  Plan: Continue per plan of care        Precautions: Fall Risk, Pacemaker, HTN       Manuals    BLE Hams/qpotojb03'  Hams/SKTC 10' Hams/SKTC 10'  Hams/SKTC 10' Hams, SKTC  10'                           Neuro Re-Ed         MTP/LTP        SLS  3/10\"    3x10\"    Tandem Stance        Sidestepping 10 laps  L2 10 laps L2 10 laps // bars L2 10 laps // bars L2 10 laps // bars   Tandem Amb        NBOS airex        Postural Ed        Ther Ex        NuStep L4 10' strength L4 10' strength L4 10' strength L4 10 strength L3 10' strength   SLR x 3 10 x ea  10x ea 10x 2# ea 10x 2# ea 10x ea   Marches 15 x ea  15x ea  20x ea 20x ea 20x ea   LAQ 2 x 10  2/10 2# 2/10 2# 2x10 2# ea 2x10 2#   LTR 20 x  20x 20x  20x 20x   Supine SLR/SAQ   2/10 2/10    Bridging 2/10 2/10 2/10 2/10    Clamshells Supine L3 2/10 Supine L3 2/10 Supine L3 2/10 Supine L4 2x10 Supine L4 2x10   Add squeeze 3\" x 2 / 10 3\" 2/10 3\" 2/10 3\" 2x10 3\" 2x10   Ther Activity        Stepups F/L 6\" 10x ea  L/F 6\" 10x ea 4\" 10x ea L/F 6\" 10x2 ea F 6\" 10x ea F/L   Stepdowns        STS 10 x  10x 10x2 10x2 10x   Gait Training        With [facility]                Modalities        HP/CP prn                                     "

## 2023-06-08 ENCOUNTER — OFFICE VISIT (OUTPATIENT)
Dept: PHYSICAL THERAPY | Facility: CLINIC | Age: 88
End: 2023-06-08
Payer: COMMERCIAL

## 2023-06-08 DIAGNOSIS — Z98.1 ENCOUNTER FOR POSTOPERATIVE CARE RELATED TO SURGICAL JOINT FUSION: Primary | ICD-10-CM

## 2023-06-08 DIAGNOSIS — Z48.89 ENCOUNTER FOR POSTOPERATIVE CARE RELATED TO SURGICAL JOINT FUSION: Primary | ICD-10-CM

## 2023-06-08 PROCEDURE — 97140 MANUAL THERAPY 1/> REGIONS: CPT

## 2023-06-08 PROCEDURE — 97110 THERAPEUTIC EXERCISES: CPT

## 2023-06-08 PROCEDURE — 97112 NEUROMUSCULAR REEDUCATION: CPT

## 2023-06-08 NOTE — PROGRESS NOTES
"Daily Note     Today's date: 2023  Patient name: Hemant Georges  : 1935  MRN: [id number]  Referring provider: John Wright  Dx:   Encounter Diagnosis     ICD-10-CM    1  Encounter for postoperative care related to surgical joint fusion  Z48 89     Z98 1                      Subjective: Pt cont to be pleased with progress  Reports improved walking at home without use of AD  Objective: See treatment diary below      Assessment: Tolerated treatment well  Patient exhibited good technique with therapeutic exercises  Pt making cont gains with strength  Lily added weight with SAQ's and marching today  Plan: Continue per plan of care        Precautions: Fall Risk, Pacemaker, HTN       Manuals    BLE Hams/[id number]'  Hams/SKTC 10' Hams/SKTC 10'  Hams/SKTC 10' Hams, SKTC  10'                           Neuro Re-Ed         MTP/LTP        SLS  3/10\"       Tandem Stance        Sidestepping 10 laps  L2 10 laps L2 10 laps // bars L2 10 laps // bars L2 10 laps // bars L2   Tandem Amb        NBOS airex        Postural Ed        Ther Ex        NuStep L4 10' strength L4 10' strength L4 10' strength L4 10 strength L4 10' strength   SLR x 3 10 x ea  10x ea 10x 2# ea 10x 2# ea 10x ea 2#   Marches 15 x ea  15x ea  20x ea 20x ea 20x ea 2#   LAQ 2 x 10  2/10 2# 2/10 2# 2x10 2# ea 2x10 2#   LTR 20 x  20x 20x  20x 20x   Supine SLR/SAQ   2/10 2/10 SAQ 2/10 2#   Bridging 2/10 2/10 2/10 2/10 2/10   Clamshells Supine L3 2/10 Supine L3 2/10 Supine L3 2/10 Supine L4 2x10 Supine L4 2x10   Add squeeze 3\" x 2 / 10 3\" 2/10 3\" 2/10 3\" 2x10 3\" 2x10   Ther Activity        Stepups F/L 6\" 10x ea  L/F 6\" 10x ea 4\" 10x ea L/F 6\" 10x2 ea F 6\" 10x2 ea F/L   Stepdowns        STS 10 x  10x 10x2 10x2 10x2   Gait Training        With [facility]                Modalities        HP/CP prn                                       "

## 2023-06-13 ENCOUNTER — OFFICE VISIT (OUTPATIENT)
Dept: PHYSICAL THERAPY | Facility: CLINIC | Age: 88
End: 2023-06-13
Payer: COMMERCIAL

## 2023-06-13 DIAGNOSIS — Z98.1 ENCOUNTER FOR POSTOPERATIVE CARE RELATED TO SURGICAL JOINT FUSION: Primary | ICD-10-CM

## 2023-06-13 DIAGNOSIS — Z48.89 ENCOUNTER FOR POSTOPERATIVE CARE RELATED TO SURGICAL JOINT FUSION: Primary | ICD-10-CM

## 2023-06-13 PROCEDURE — 97140 MANUAL THERAPY 1/> REGIONS: CPT

## 2023-06-13 PROCEDURE — 97110 THERAPEUTIC EXERCISES: CPT

## 2023-06-13 NOTE — PROGRESS NOTES
"Daily Note     Today's date: 2023  Patient name: Kallie Tyler  : 1935  MRN: [id number]  Referring provider: Elias Shankar  Dx:   Encounter Diagnosis     ICD-10-CM    1  Encounter for postoperative care related to surgical joint fusion  Z48 89     Z98 1                      Subjective: Pt reports able to walk to car and drive around for a little  Reports doing very well  Pleased with progress  Objective: See treatment diary below      Assessment: Tolerated treatment well  Patient exhibited good technique with therapeutic exercises  Pt cont to make steady gains with program  Cont to nesha well without pain  Improved overall strength and function  Plan: Continue per plan of care        Precautions: Fall Risk, Pacemaker, HTN       Manuals    BLE Hams/[id number]'  Hams/SKTC 10' Hams/SKTC 10'  Hams/SKTC 10' Hams, SKTC  10'                           Neuro Re-Ed         MTP/LTP        SLS  3/10\"       Tandem Stance        Sidestepping 10 laps  L2 10 laps L2 10 laps // bars L2 10 laps // bars L2 10 laps // bars L2   Tandem Amb        NBOS airex        Postural Ed        Ther Ex        NuStep L4 10' strength L4 10' strength L4 10' strength L4 10 strength L4 10' strength   SLR x 3 10 x ea 2# 10x ea 10x 2# ea 10x 2# ea 10x ea 2#   Marches 20 x ea 2# 15x ea  20x ea 20x ea 20x ea 2#   LAQ 2 x 10 2# 2/10 2# 2/10 2# 2x10 2# ea 2x10 2#   LTR 20 x  20x 20x  20x 20x   Supine SLR/SAQ 2# SAQ 2/10  2/10 2/10 SAQ 2/10 2#   Bridging 2/10 2/10 2/10 2/10 2/10   Clamshells Supine L4 2/10 Supine L3 2/10 Supine L3 2/10 Supine L4 2x10 Supine L4 2x10   Add squeeze 3\" x 2 / 10 3\" 2/10 3\" 2/10 3\" 2x10 3\" 2x10   Ther Activity        Stepups F/L 6\" 10x2 ea  L/F 6\" 10x ea 4\" 10x ea L/F 6\" 10x2 ea F 6\" 10x2 ea F/L   Stepdowns        STS 10 x 2  10x 10x2 10x2 10x2   Gait Training        With [facility]                Modalities        HP/CP prn                                         "

## 2023-06-15 ENCOUNTER — OFFICE VISIT (OUTPATIENT)
Dept: PHYSICAL THERAPY | Facility: CLINIC | Age: 88
End: 2023-06-15
Payer: COMMERCIAL

## 2023-06-15 DIAGNOSIS — Z98.1 ENCOUNTER FOR POSTOPERATIVE CARE RELATED TO SURGICAL JOINT FUSION: Primary | ICD-10-CM

## 2023-06-15 DIAGNOSIS — Z48.89 ENCOUNTER FOR POSTOPERATIVE CARE RELATED TO SURGICAL JOINT FUSION: Primary | ICD-10-CM

## 2023-06-15 PROCEDURE — 97110 THERAPEUTIC EXERCISES: CPT

## 2023-06-15 PROCEDURE — 97140 MANUAL THERAPY 1/> REGIONS: CPT

## 2023-06-15 PROCEDURE — 97112 NEUROMUSCULAR REEDUCATION: CPT

## 2023-06-15 NOTE — PROGRESS NOTES
"Daily Note     Today's date: 6/15/2023  Patient name: Savannah Jorgensen  : 1935  MRN: [id number]  Referring provider: Allan Murphy  Dx:   Encounter Diagnosis     ICD-10-CM    1  Encounter for postoperative care related to surgical joint fusion  Z48 89     Z98 1                      Subjective: Pt reports he cont to be pleased with progress  Eager to cont to make gains with strength  Objective: See treatment diary below      Assessment: Tolerated treatment well  Patient exhibited good technique with therapeutic exercises  Pt cont to demonstrate overall improved nesha to step ups and sit to stands with improved strength and nesha  Pt making cont gains toward goals  Plan: Continue per plan of care        Precautions: Fall Risk, Pacemaker, HTN       Manuals 6/13 6/15 6/1 6/6 6/8   BLE Hams/[id number]'  Hams/SKTC 10' Hams/SKTC 10'  Hams/SKTC 10' Hams, SKTC  10'                           Neuro Re-Ed         MTP/LTP        SLS        Tandem Stance        Sidestepping 10 laps  L2 10 laps L2 10 laps // bars L2 10 laps // bars L2 10 laps // bars L2   Tandem Amb        NBOS airex        Postural Ed        Ther Ex        NuStep L4 10' strength L4 10' strength L4 10' strength L4 10 strength L4 10' strength   SLR x 3 10 x ea 2# 10x ea 2# 10x 2# ea 10x 2# ea 10x ea 2#   Marches 20 x ea 2# 20x 2# 20x ea 20x ea 20x ea 2#   LAQ 2 x 10 2# 2/10 2# 2/10 2# 2x10 2# ea 2x10 2#   LTR 20 x  20x 20x  20x 20x   Supine SLR/SAQ 2# SAQ 2/10 2# SAQ 2/10 2/10 2/10 SAQ 2/10 2#   Bridging 2/10 2/10 2/10 2/10 2/10   Clamshells Supine L4 2/10 Supine L4 2/10 Supine L3 2/10 Supine L4 2x10 Supine L4 2x10   Add squeeze 3\" x 2 / 10 3\" 2/10 3\" 2/10 3\" 2x10 3\" 2x10   Ther Activity        Stepups F/L 6\" 10x2 ea  L/F 6\" 10x2 ea 4\" 10x ea L/F 6\" 10x2 ea F 6\" 10x2 ea F/L   Stepdowns        STS 10 x 2  10x2 10x2 10x2 10x2   Gait Training        With [facility]                Modalities        HP/CP prn                                           "

## 2023-06-20 ENCOUNTER — OFFICE VISIT (OUTPATIENT)
Dept: PHYSICAL THERAPY | Facility: CLINIC | Age: 88
End: 2023-06-20
Payer: COMMERCIAL

## 2023-06-20 DIAGNOSIS — Z98.1 ENCOUNTER FOR POSTOPERATIVE CARE RELATED TO SURGICAL JOINT FUSION: Primary | ICD-10-CM

## 2023-06-20 DIAGNOSIS — Z48.89 ENCOUNTER FOR POSTOPERATIVE CARE RELATED TO SURGICAL JOINT FUSION: Primary | ICD-10-CM

## 2023-06-20 PROCEDURE — 97140 MANUAL THERAPY 1/> REGIONS: CPT

## 2023-06-20 PROCEDURE — 97112 NEUROMUSCULAR REEDUCATION: CPT

## 2023-06-20 PROCEDURE — 97110 THERAPEUTIC EXERCISES: CPT

## 2023-06-20 NOTE — PROGRESS NOTES
"Daily Note     Today's date: 2023  Patient name: Power Walker  : 1935  MRN: [id number]  Referring provider: Betty Palomo  Dx:   Encounter Diagnosis     ICD-10-CM    1  Encounter for postoperative care related to surgical joint fusion  Z48 89     Z98 1                      Subjective: Pt reports he is practicing driving and able to place walker into trunk  Reports cont to be pleased with his strength and progress toward goals  Objective: See treatment diary below      Assessment: Tolerated treatment well  Patient exhibited good technique with therapeutic exercises  Pt making cont gains with program  Cont to nesha well with with overall improved strength and nesha to program        Plan: Continue per plan of care        Precautions: Fall Risk, Pacemaker, HTN       Manuals 6/13 6/15 6/20 6/6 6/8   BLE Hams/[id number]'  Hams/SKTC 10' Hams/SKTC 10'  Hams/SKTC 10' Hams, SKTC  10'                           Neuro Re-Ed         MTP/LTP        SLS        Tandem Stance        Sidestepping 10 laps  L2 10 laps L2 10 laps // bars L2 10 laps // bars L2 10 laps // bars L2   Tandem Amb        NBOS airex        Postural Ed        Ther Ex        NuStep L4 10' strength L4 10' strength L4 10' strength L4 10 strength L4 10' strength   SLR x 3 10 x ea 2# 10x ea 2# 10x 2# ea 10x 2# ea 10x ea 2#   Marches 20 x ea 2# 20x 2# 20x ea 23 20x ea 20x ea 2#   LAQ 2 x 10 2# 2/10 2# 2/10 2# 2x10 2# ea 2x10 2#   LTR 20 x  20x 20x  20x 20x   Supine SLR/SAQ 2# SAQ 2/10 2# SAQ 2/10 2/10 2# SAQ 2/10 SAQ 2/10 2#   Bridging 2/10 2/10 2/10 2/10 2/10   Clamshells Supine L4 2/10 Supine L4 2/10 Supine L4 2/10 Supine L4 2x10 Supine L4 2x10   Add squeeze 3\" x 2 / 10 3\" 2/10 3\" 2/10 3\" 2x10 3\" 2x10   Ther Activity        Stepups F/L 6\" 10x2 ea  L/F 6\" 10x2 ea 6\" 10x2 ea L/F 6\" 10x2 ea F 6\" 10x2 ea F/L   Stepdowns        STS 10 x 2  10x2 10x2 10x2 10x2   Gait Training        With [facility]                Modalities        HP/CP prn              "

## 2023-06-22 ENCOUNTER — OFFICE VISIT (OUTPATIENT)
Dept: PHYSICAL THERAPY | Facility: CLINIC | Age: 88
End: 2023-06-22
Payer: COMMERCIAL

## 2023-06-22 DIAGNOSIS — Z98.1 ENCOUNTER FOR POSTOPERATIVE CARE RELATED TO SURGICAL JOINT FUSION: Primary | ICD-10-CM

## 2023-06-22 DIAGNOSIS — Z48.89 ENCOUNTER FOR POSTOPERATIVE CARE RELATED TO SURGICAL JOINT FUSION: Primary | ICD-10-CM

## 2023-06-22 PROCEDURE — 97530 THERAPEUTIC ACTIVITIES: CPT

## 2023-06-22 PROCEDURE — 97140 MANUAL THERAPY 1/> REGIONS: CPT

## 2023-06-22 PROCEDURE — 97112 NEUROMUSCULAR REEDUCATION: CPT

## 2023-06-22 PROCEDURE — 97110 THERAPEUTIC EXERCISES: CPT

## 2023-06-22 NOTE — PROGRESS NOTES
"Daily Note     Today's date: 2023  Patient name: Jojo Haywood  : 1935  MRN: [id number]  Referring provider: Fernanda Rebollar  Dx:   Encounter Diagnosis     ICD-10-CM    1  Encounter for postoperative care related to surgical joint fusion  Z48 89     Z98 1                      Subjective: Pt reports he Is feeling tired from not sleeping well today  Reports he cont to feel therapy is helping with overall strength  Objective: See treatment diary below  Passed FOTO  Assessment: Tolerated treatment well  Patient exhibited good technique with therapeutic exercises  Pt with notable fatigue today however cont to complete program with overall improved nesha and strength  Cont to make gains with overall function  Plan: Continue per plan of care        Precautions: Fall Risk, Pacemaker, HTN       Manuals 6/13 6/15 6/20 6/22 6/8   BLE Hams/[id number]'  Hams/SKTC 10' Hams/SKTC 10'  Hams/SKTC 10' Hams, SKTC  10'                           Neuro Re-Ed         MTP/LTP        SLS        Tandem Stance        Sidestepping 10 laps  L2 10 laps L2 10 laps // bars L2 10 laps // bars L2 10 laps // bars L2   Tandem Amb        NBOS airex        Postural Ed        Ther Ex        NuStep L4 10' strength L4 10' strength L4 10' strength L4 10 strength L4 10' strength   SLR x 3 10 x ea 2# 10x ea 2# 10x 2# ea 10x 2# ea 10x ea 2#   Marches 20 x ea 2# 20x 2# 20x ea 23 20x ea 20x ea 2#   LAQ 2 x 10 2# 2/10 2# 2/10 2# 2x10 2# ea 2x10 2#   LTR 20 x  20x 20x  20x 20x   Supine SLR/SAQ 2# SAQ 2/10 2# SAQ 2/10 2/10 2# SAQ 2/10 2# SAQ SAQ 2/10 2#   Bridging 2/10 2/10 2/10 2/10 2/10   Clamshells Supine L4 2/10 Supine L4 2/10 Supine L4 2/10 Supine L4 2x10 Supine L4 2x10   Add squeeze 3\" x 2 / 10 3\" 2/10 3\" 2/10 3\" 2x10 3\" 2x10   Ther Activity        Stepups F/L 6\" 10x2 ea  L/F 6\" 10x2 ea 6\" 10x2 ea L/F 6\" 10x2 ea F 6\" 10x2 ea F/L   Stepdowns        STS 10 x 2  10x2 10x2 10x2 10x2   Gait Training        With [facility]              " Modalities        HP/CP prn

## 2023-06-27 ENCOUNTER — OFFICE VISIT (OUTPATIENT)
Dept: PHYSICAL THERAPY | Facility: CLINIC | Age: 88
End: 2023-06-27
Payer: COMMERCIAL

## 2023-06-27 DIAGNOSIS — Z48.89 ENCOUNTER FOR POSTOPERATIVE CARE RELATED TO SURGICAL JOINT FUSION: Primary | ICD-10-CM

## 2023-06-27 DIAGNOSIS — Z98.1 ENCOUNTER FOR POSTOPERATIVE CARE RELATED TO SURGICAL JOINT FUSION: Primary | ICD-10-CM

## 2023-06-27 PROCEDURE — 97140 MANUAL THERAPY 1/> REGIONS: CPT

## 2023-06-27 PROCEDURE — 97110 THERAPEUTIC EXERCISES: CPT

## 2023-06-27 PROCEDURE — 97112 NEUROMUSCULAR REEDUCATION: CPT

## 2023-06-27 PROCEDURE — 97530 THERAPEUTIC ACTIVITIES: CPT

## 2023-06-27 NOTE — PROGRESS NOTES
"Daily Note     Today's date: 2023  Patient name: Kenya Dennis  : 1935  MRN: [id number]  Referring provider: Pamela Velasquez  Dx:   Encounter Diagnosis     ICD-10-CM    1  Encounter for postoperative care related to surgical joint fusion  Z48 89     Z98 1                      Subjective: Pt reports trying to clean and was sitting on walker reports slipping off walker onto floor  Pt denies aches or pains or bruising from fall  Reports he was in pool and doing exercises recently  Objective: See treatment diary below      Assessment: Tolerated treatment well  Patient exhibited good technique with therapeutic exercises  Pt c/o fatigue today with step ups nesha 10 reps ea  Pt making cont good overall progress toward goals with improved strength and function  Plan: Continue per plan of care  RE next visit        Precautions: Fall Risk, Pacemaker, HTN       Manuals 6/13 6/15 6/20 6/22 6/27   BLE Hams/[id number]'  Hams/SKTC 10' Hams/SKTC 10'  Hams/SKTC 10' Hams, SKTC  10'                           Neuro Re-Ed         MTP/LTP        SLS        Tandem Stance        Sidestepping 10 laps  L2 10 laps L2 10 laps // bars L2 10 laps // bars L2 10 laps // bars L2   Tandem Amb        NBOS airex        Postural Ed        Ther Ex        NuStep L4 10' strength L4 10' strength L4 10' strength L4 10 strength L4 10' strength   SLR x 3 10 x ea 2# 10x ea 2# 10x 2# ea 10x 2# ea 10x ea 2#   Marches 20 x ea 2# 20x 2# 20x ea 23 20x ea 20x ea 2#   LAQ 2 x 10 2# 2/10 2# 2/10 2# 2x10 2# ea 2x10 2#   LTR 20 x  20x 20x  20x 20x   Supine SLR/SAQ 2# SAQ 2/10 2# SAQ 2/10 2/10 2# SAQ 2/10 2# SAQ SAQ 2/10 2#   Bridging 2/10 2/10 2/10 2/10 2/10   Clamshells Supine L4 2/10 Supine L4 2/10 Supine L4 2/10 Supine L4 2x10 Supine L4 2x10   Add squeeze 3\" x 2 / 10 3\" 2/10 3\" 2/10 3\" 2x10 3\" 2x10   Ther Activity        Stepups F/L 6\" 10x2 ea  L/F 6\" 10x2 ea 6\" 10x2 ea L/F 6\" 10x2 ea F 6\" 10xea F   Stepdowns        STS 10 x 2  10x2 " 10x2 10x2 10x2   Gait Training        With [facility]                Modalities        HP/CP prn

## 2023-06-29 ENCOUNTER — OFFICE VISIT (OUTPATIENT)
Dept: PHYSICAL THERAPY | Facility: CLINIC | Age: 88
End: 2023-06-29
Payer: COMMERCIAL

## 2023-06-29 DIAGNOSIS — Z48.89 ENCOUNTER FOR POSTOPERATIVE CARE RELATED TO SURGICAL JOINT FUSION: Primary | ICD-10-CM

## 2023-06-29 DIAGNOSIS — Z98.1 ENCOUNTER FOR POSTOPERATIVE CARE RELATED TO SURGICAL JOINT FUSION: Primary | ICD-10-CM

## 2023-06-29 PROCEDURE — 97530 THERAPEUTIC ACTIVITIES: CPT

## 2023-06-29 PROCEDURE — 97110 THERAPEUTIC EXERCISES: CPT

## 2023-06-29 PROCEDURE — 97140 MANUAL THERAPY 1/> REGIONS: CPT

## 2023-06-29 PROCEDURE — 97112 NEUROMUSCULAR REEDUCATION: CPT

## 2023-06-29 NOTE — PROGRESS NOTES
"Daily Note     Today's date: 2023  Patient name: Kenji Saenz  : 1935  MRN: [id number]  Referring provider: Neal Jimenez  Dx:   Encounter Diagnosis     ICD-10-CM    1  Encounter for postoperative care related to surgical joint fusion  Z48 89     Z98 1                      Subjective: Pt reports he cont to gain strength and has improved function  Objective: See treatment diary below      Assessment: Tolerated treatment well  Patient exhibited good technique with therapeutic exercises  Pt making cont gains with program  Cont to nesha functional exercises well with some mild fatigue  Eager to cont to make progress  Plan: Continue per plan of care  RE next visit       Precautions: Fall Risk, Pacemaker, HTN       Manuals 6/29 6/15 6/20 6/22 6/27   BLE Hams/[id number]'  Hams/SKTC 10' Hams/SKTC 10'  Hams/SKTC 10' Hams, SKTC  10'                           Neuro Re-Ed         MTP/LTP        SLS        Tandem Stance        Sidestepping 10 laps  L3 10 laps L2 10 laps // bars L2 10 laps // bars L2 10 laps // bars L2   Tandem Amb        NBOS airex        Postural Ed        Ther Ex        NuStep L4 10' strength L4 10' strength L4 10' strength L4 10 strength L4 10' strength   SLR x 3 10 x ea 2# 10x ea 2# 10x 2# ea 10x 2# ea 10x ea 2#   Marches 20 x ea 2# 20x 2# 20x ea 23 20x ea 20x ea 2#   LAQ 2 x 10 2# 2/10 2# 2/10 2# 2x10 2# ea 2x10 2#   LTR 20 x  20x 20x  20x 20x   Supine SLR/SAQ 2# SAQ 2/10 2# SAQ 2/10 2/10 2# SAQ 2/10 2# SAQ SAQ 2/10 2#   Bridging 2/10 2/10 2/10 2/10 2/10   Clamshells Supine L4 2/10 Supine L4 2/10 Supine L4 2/10 Supine L4 2x10 Supine L4 2x10   Add squeeze 3\" x 2 / 10 3\" 2/10 3\" 2/10 3\" 2x10 3\" 2x10   Ther Activity        Stepups F/L 6\" 10x2 ea  L/F 6\" 10x2 ea 6\" 10x2 ea L/F 6\" 10x2 ea F 6\" 10xea F   Stepdowns        STS 10 x 2  10x2 10x2 10x2 10x2   Gait Training        With [facility]                Modalities        HP/CP prn                                                   "

## 2023-07-06 ENCOUNTER — TELEPHONE (OUTPATIENT)
Dept: UROLOGY | Facility: MEDICAL CENTER | Age: 88
End: 2023-07-06

## 2023-07-06 ENCOUNTER — APPOINTMENT (OUTPATIENT)
Dept: PHYSICAL THERAPY | Facility: CLINIC | Age: 88
End: 2023-07-06
Payer: COMMERCIAL

## 2023-07-06 DIAGNOSIS — N20.1 URETERAL STONE: Primary | ICD-10-CM

## 2023-07-06 NOTE — TELEPHONE ENCOUNTER
Orders in pt unable to complete prior. Number provided for central scheduling to reschedule. Pt will have his daughter call.

## 2023-07-06 NOTE — TELEPHONE ENCOUNTER
Cris from Ct scan department called requested a creatine and BUN for CT scan tomorrow. Pt call pt to inform.

## 2023-07-07 ENCOUNTER — APPOINTMENT (OUTPATIENT)
Dept: LAB | Facility: HOSPITAL | Age: 88
End: 2023-07-07
Payer: COMMERCIAL

## 2023-07-07 ENCOUNTER — HOSPITAL ENCOUNTER (OUTPATIENT)
Dept: CT IMAGING | Facility: HOSPITAL | Age: 88
Discharge: HOME | End: 2023-07-07
Attending: UROLOGY

## 2023-07-07 DIAGNOSIS — N20.1 URETERAL STONE: ICD-10-CM

## 2023-07-07 DIAGNOSIS — N40.1 BENIGN PROSTATIC HYPERPLASIA WITH URINARY OBSTRUCTION: ICD-10-CM

## 2023-07-07 DIAGNOSIS — N13.8 BENIGN PROSTATIC HYPERPLASIA WITH URINARY OBSTRUCTION: ICD-10-CM

## 2023-07-07 DIAGNOSIS — N39.0 URINARY TRACT INFECTION ASSOCIATED WITH CATHETERIZATION OF URINARY TRACT, UNSPECIFIED INDWELLING URINARY CATHETER TYPE, SUBSEQUENT ENCOUNTER: ICD-10-CM

## 2023-07-07 DIAGNOSIS — T83.511D URINARY TRACT INFECTION ASSOCIATED WITH CATHETERIZATION OF URINARY TRACT, UNSPECIFIED INDWELLING URINARY CATHETER TYPE, SUBSEQUENT ENCOUNTER: ICD-10-CM

## 2023-07-07 LAB
BUN SERPL-MCNC: 10 MG/DL (ref 5–25)
CREAT SERPL-MCNC: 0.95 MG/DL (ref 0.6–1.3)
GFR SERPL CREATININE-BSD FRML MDRD: 71 ML/MIN/1.73SQ M

## 2023-07-07 PROCEDURE — 36415 COLL VENOUS BLD VENIPUNCTURE: CPT

## 2023-07-07 PROCEDURE — 84520 ASSAY OF UREA NITROGEN: CPT

## 2023-07-07 PROCEDURE — 82565 ASSAY OF CREATININE: CPT

## 2023-07-11 ENCOUNTER — OFFICE VISIT (OUTPATIENT)
Dept: PHYSICAL THERAPY | Facility: CLINIC | Age: 88
End: 2023-07-11
Payer: COMMERCIAL

## 2023-07-11 DIAGNOSIS — Z98.1 ENCOUNTER FOR POSTOPERATIVE CARE RELATED TO SURGICAL JOINT FUSION: Primary | ICD-10-CM

## 2023-07-11 DIAGNOSIS — Z48.89 ENCOUNTER FOR POSTOPERATIVE CARE RELATED TO SURGICAL JOINT FUSION: Primary | ICD-10-CM

## 2023-07-11 PROCEDURE — 97110 THERAPEUTIC EXERCISES: CPT

## 2023-07-11 PROCEDURE — 97112 NEUROMUSCULAR REEDUCATION: CPT

## 2023-07-11 PROCEDURE — 97140 MANUAL THERAPY 1/> REGIONS: CPT

## 2023-07-11 NOTE — PROGRESS NOTES
Daily Note     Today's date: 2023  Patient name: Ridge Jalloh  : 1935  MRN: [id number]  Referring provider: Brandon Mcmillan  Dx:   Encounter Diagnosis     ICD-10-CM    1. Encounter for postoperative care related to surgical joint fusion  Z48.89     Z98.1                      Subjective: Pt reports he is walking in house with cane. Doing well and very pleased with strength. Objective: See treatment diary below      Assessment: Tolerated treatment well. Patient exhibited good technique with therapeutic exercises. Pt has made cont gains with overall balance and LE strength. Pt nesha well without pain. Plan: Progress note during next visit.       Precautions: Fall Risk, Pacemaker, HTN       Manuals    BLE Hams/[id number]'  Hams/SKTC 10' Hams/SKTC 10'  Hams/SKTC 10' Hams, SKTC  10'                           Neuro Re-Ed         MTP/LTP        SLS        Tandem Stance        Sidestepping 10 laps  L3 10 laps L3 10 laps // bars L2 10 laps // bars L2 10 laps // bars L2   Tandem Amb        NBOS airex        Postural Ed        Ther Ex        NuStep L4 10' strength L4 10' strength L4 10' strength L4 10 strength L4 10' strength   SLR x 3 10 x ea 2# 10x ea 2# 10x 2# ea 10x 2# ea 10x ea 2#   Marches 20 x ea 2# 20x 2# 20x ea 23 20x ea 20x ea 2#   LAQ 2 x 10 2# 2/10 2# 2/10 2# 2x10 2# ea 2x10 2#   LTR 20 x  20x 20x  20x 20x   Supine SLR/SAQ 2# SAQ 2/10 2# SAQ 2/10 2/10 2# SAQ 2/10 2# SAQ SAQ 2/10 2#   Bridging 2/10 2/10 2/10 2/10 2/10   Clamshells Supine L4 2/10 Supine L4 2/10 Supine L4 2/10 Supine L4 2x10 Supine L4 2x10   Add squeeze 3" x 2 / 10 3" 2/10 3" 2/10 3" 2x10 3" 2x10   Ther Activity        Stepups F/L 6" 10x2 ea  L/F 6" 10x2 ea 6" 10x2 ea L/F 6" 10x2 ea F 6" 10xea F   Stepdowns        STS 10 x 2  10x2 10x2 10x2 10x2   Gait Training        With [facility]                Modalities        HP/CP prn

## 2023-07-13 ENCOUNTER — EVALUATION (OUTPATIENT)
Dept: PHYSICAL THERAPY | Facility: CLINIC | Age: 88
End: 2023-07-13
Payer: COMMERCIAL

## 2023-07-13 DIAGNOSIS — Z48.89 ENCOUNTER FOR POSTOPERATIVE CARE RELATED TO SURGICAL JOINT FUSION: Primary | ICD-10-CM

## 2023-07-13 DIAGNOSIS — Z98.1 ENCOUNTER FOR POSTOPERATIVE CARE RELATED TO SURGICAL JOINT FUSION: Primary | ICD-10-CM

## 2023-07-13 PROCEDURE — 97530 THERAPEUTIC ACTIVITIES: CPT

## 2023-07-13 PROCEDURE — 97110 THERAPEUTIC EXERCISES: CPT

## 2023-07-13 PROCEDURE — 97112 NEUROMUSCULAR REEDUCATION: CPT

## 2023-07-13 PROCEDURE — 97140 MANUAL THERAPY 1/> REGIONS: CPT

## 2023-07-13 NOTE — PROGRESS NOTES
Daily Note     Today's date: 2023  Patient name: Miriam Crowder  : 1935  MRN: [id number]  Referring provider: Christiano Blue  Dx:   Encounter Diagnosis     ICD-10-CM    1. Encounter for postoperative care related to surgical joint fusion  Z48.89     Z98.1                      Subjective: Pt reports cont to be pleased with progress. Cont to states improved strength with program.       Objective: See treatment diary below. TUG 15.2" Sit to Stand 32.2 10 reps. Assessment: Tolerated treatment well. Patient exhibited good technique with therapeutic exercises. Pt making cont gains with LE strength and walking. Cont to use RWW in community without difficulty. Pt progressing toward goals. Plan: Continue per plan of care. RE today.       Precautions: Fall Risk, Pacemaker, HTN       Manuals    BLE Hams/[id number]'  Hams/SKTC 10' Hams/SKTC 10'  Hams/SKTC 10' Hams, SKTC  10'                           Neuro Re-Ed         MTP/LTP        SLS        Tandem Stance        Sidestepping 10 laps  L3 10 laps L3 10 laps // bars L3 10 laps // bars L2 10 laps // bars L2   Tandem Amb        NBOS airex        Postural Ed        Ther Ex        NuStep L4 10' strength L4 10' strength L4 10' strength L4 10 strength L4 10' strength   SLR x 3 10 x ea 2# 10x ea 2# 10x 2# ea 10x 2# ea 10x ea 2#   Marches 20 x ea 2# 20x 2# 20x ea 23 20x ea 20x ea 2#   LAQ 2 x 10 2# 2/10 2# 2/10 2# 2x10 2# ea 2x10 2#   LTR 20 x  20x 20x  20x 20x   Supine SLR/SAQ 2# SAQ 2/10 2# SAQ 2/10 2/10 2# SAQ 2/10 2# SAQ SAQ 2/10 2#   Bridging 2/10 2/10 2/10 2/10 2/10   Clamshells Supine L4 2/10 Supine L4 2/10 Supine L5 2/10 Supine L4 2x10 Supine L4 2x10   Add squeeze 3" x 2 / 10 3" 2/10 3" 2/10 3" 2x10 3" 2x10   Ther Activity        Stepups F/L 6" 10x2 ea  L/F 6" 10x2 ea 8" 10x2 ea L/F 6" 10x2 ea F 6" 10xea F   Stepdowns        STS 10 x 2  10x2 10x2 10x2 10x2   Gait Training        With [facility]                Modalities HP/CP prn

## 2023-07-13 NOTE — PROGRESS NOTES
PT Re-Evaluation     Today's date: 2023  Patient name: Lyle Razo  : 1935  MRN: [id number]  Referring provider: Tyson Concepcion  Dx:   Encounter Diagnosis     ICD-10-CM    1. Encounter for postoperative care related to surgical joint fusion  Z48.89     Z98.1           Start Time: 1040  Stop Time: 1140  Total time in clinic (min): 60 minutes    Assessment  Assessment details: The patient has been seen in PT for a total of 23 visits since [facility] with good PT attendance noted. He has minimal complaints of pain in his back. He BLE ROM remains WFL at this time. He has made improvements since [facility] and is making progress towards his goals. He demonstrates deficits with decreased BLE strength, decreased core strength, decreased flexibility, decreased postural awareness, difficulty with stair negotiation, decreased balance and proprioception, antalgic gait with use of rollator walker and pain with completing his ADLs and tasks at home. He continues to ambulate with use of rollator walker for community distances, has now been going short distances in the house without AD. His 10x STS time has decreased to 33" and TUG time with use of rollator walker has decreased to 15''. Improving transfers noted. He is still not driving. Secondary to above deficits he is limited with his overall mobility and function and remains at risk of falls though no falls reported over the last month at home. The patient would benefit from continued PT to address deficits and improve function. Tx to include ROM, stretching, strengthening, modalities, HEP, pt education, postural ed, lifting/body mechanics, neuro re-ed, balance/proprioception Te, MT and equipment. Plan to continue with PT and will progress as able in upcoming visits with stretching, strengthening, balance, gait and HEP.           Impairments: abnormal or restricted ROM, activity intolerance, impaired balance, impaired physical strength, lacks appropriate home exercise program, pain with function, poor posture  and poor body mechanics  Other impairment: decreased flexibility  Functional limitations: difficulty with stair negotiationUnderstanding of Dx/Px/POC: good   Prognosis: good    Goals  STGs to be complete within 2-4 weeks: Partially Met  1.  10 Sit to stands < 30''  2. TUG < 13'' with rolator  3. Will complete balance testing as able in upcoming visits. LTGs to be complete within 6 weeks: Partially Met  1. Patient to be I with HEP  2. Improve BLE strength to > or = to 4+ to 5/5 t/o   3. Improve 5x STS score to < 15" without use of airex - progressing  4. Improve TUG score to < 15" with LRAD - progressing  5. Postural control is improved to maximal level of function  6. Stair negotiation is improved to maximal level of function  7. Recreational performance is improved to maximal level of function  8. ADL performance is improved to maximal level of function      Plan  Plan details: Modalities and therapy interventions prn. Patient would benefit from: skilled physical therapy  Planned modality interventions: cryotherapy and thermotherapy: hydrocollator packs  Planned therapy interventions: manual therapy, body mechanics training, neuromuscular re-education, patient education, postural training, self care, strengthening, stretching, therapeutic activities, therapeutic exercise, flexibility, home exercise program and gait training  Frequency: 2x week  Duration in weeks: 6  Treatment plan discussed with: patient and family        Subjective Evaluation    History of Present Illness  Date of surgery: 2/10/2023  Mechanism of injury: surgery  Mechanism of injury: The patient daughter states that he fell on [place]. He was walking in the house and fell. He was able to get up. So they waited until the end of the week to go to the hospital.  He had gone to [facility]. They had found a kidney stone.   Two weeks later they tried to move it but then two weeks later they had to blast it. The patient had fallen again on 1/29/23. He had gone back to [facility]. He had another CT scan completed and they found the break from the first fall in December. He was transferred to [facility] ER on 2/1/23. He had seen Dr. Hilary Gómez (head of neurosurgery), had more x-rays taken and an MRI. Then on Wednesday he was told they needed to do surgery. He had surgery on 2/10/23 by Dr. Shorty Copeland. They put in two rods and eight screws. From EMR: 2/10/23 DKO -- T9-L3 FUSION LUMBAR/THORACIC POSTERIOR MINIMALLY INVASIVE W ROBOT T10-T12 decompressive laminectomy. He was transferred to [facility] for rehab for two weeks. He got COVID on 2/24 and was kept in rehab until 3/6/23. He was discharged to home with PT. He was then referred to OPPT and he now presents for his evaluation. He had been back to see the doctor on 3/28, had x-rays taken. Per patient the doctor is happy with how he is doing. He does not have to see the doctor for another appointment. The patient states that he has minimal pain, at times when getting in and out of bed. He notes that he is in therapy for balance, endurance, strength and his walking. UPDATE 5/22/23:  The patient states that therapy has been helping. Reports that he has been feeling stronger and his endurance is improving. Denies any falls since [facility]. Notes that he can now walk short distances in the house without his walker. UPDATE 7/13/23: The patient states that therapy has been helping. Reports that he has been feeling stronger and his endurance is improving. Denies any falls since [facility]. Notes he only uses Rolator walker outside at this time.     Patient Goals  Patient goals for therapy: increased motion, decreased pain, increased strength, return to sport/leisure activities and improved balance  Patient goal: "To work on my walking and strengthening and balance."   Pain  At best pain rating: 0  At worst pain ratin  Location: Back Pain  Quality: dull ache    Social Support  Steps to enter house: yes  4  Stairs in house: yes (With stair glide to second floor)   Lives in: multiple-level home  Lives with: adult children    Employment status: not working    Diagnostic Tests  Abnormal x-ray: Per patient - healing. Treatments  Discharged from (in last 30 days): home health care        Objective     Active Range of Motion   Left Hip   Flexion: 95 degrees     Right Hip   Flexion: 95 degrees   Left Knee   Flexion: WFL  Extension: WFL    Right Knee   Flexion: WFL  Extension: [facility]    Additional Active Range of Motion Details  L SLR: 50  R SLR: 50    Strength/Myotome Testing     Left Hip   Planes of Motion   Flexion: 4  Abduction: 4  Adduction: 4+  External rotation: 4  Internal rotation: 4    Right Hip   Planes of Motion   Flexion: 4  Abduction: 4  Adduction: 4+  External rotation: 4  Internal rotation: 4    Left Knee   Flexion: 4  Extension: 4+    Right Knee   Flexion: 4  Extension: 4+    Ambulation   Weight-Bearing Status   Assistive device used: rollator walker with seat    Additional Weight-Bearing Status Details  Uses rollator with seat for community distances. Now will ambulate household distances without AD. Prior to fall/surgery he ambulated without AD. Has quad cane at home, lift chair, shower chair, stair glide, grab bars in shower    Ambulation: Level Surfaces   Ambulation with assistive device: independent    Ambulation: Stairs   Ascend stairs: independent  Pattern: non-reciprocal  Descend stairs: independent  Pattern: non-reciprocal    Additional Stairs Ambulation Details  Three steps to get to landing, then uses stair glide to second floor. Observational Gait   Decreased walking speed and stride length. General Comments:      Cervical/Thoracic Comments  Incisions present along thoracic spine, well healed. No redness or warmth present.     Neuro Exam:     Transfers Sit to stand: independent     Functional outcomes   5x sit to stand: 20" with use of airex on chair (seconds)  TU" with use of rollator  (seconds)                Precautions: Fall Risk, Pacemaker, HTN       Manuals    BLE Hams/[id number]'  Hams/SKTC DC gastroc stretch.  10' Hams/SKTC 10'  Hams/SKTC 10' Hams, SKTC  10'                           Neuro Re-Ed         MTP/LTP        SLS  3/10"  3x10'' 3x10" 3x10"    Tandem Stance        Sidestepping 10 laps  10 laps 10 laps // bars 10 laps // bars 10 laps // bars   Tandem Amb        NBOS airex        Postural Ed        Ther Ex        NuStep L3 10' strength L3 10' strength L3 10' strength L3 10 strength L3 10' strength   SLR x 3 10 x ea  10x ea 10x 10x 10x ea   Marches 15 x ea  15x ea  20x ea 20x ea 20x ea   LAQ 2 x 10  2/10 2/10  2x10 2x10 2#   LTR 20 x  20x 20x  20x 20x   SKTC        S/L Hip Abd        Clamshells Supine L3 2/10 Supine L3 2/10 Supine L3 2/10 Supine L3 2x10 Supine L4 2x10   Add squeeze 3" x 2 / 10 3" 2/10 3" 2/10 3" 2x10 3" 2x10   Ther Activity        Stepups F/L 4" 10x ea  L/F 4" 10x ea 4" 10x ea L/F 4" 10x ea L/F 6" 10x ea F/L   Stepdowns        STS 10 x  10x 10x 10x 10x   Gait Training        With [facility]                Modalities        HP/CP prn

## 2023-07-18 ENCOUNTER — OFFICE VISIT (OUTPATIENT)
Dept: PHYSICAL THERAPY | Facility: CLINIC | Age: 88
End: 2023-07-18
Payer: COMMERCIAL

## 2023-07-18 DIAGNOSIS — Z98.1 ENCOUNTER FOR POSTOPERATIVE CARE RELATED TO SURGICAL JOINT FUSION: Primary | ICD-10-CM

## 2023-07-18 DIAGNOSIS — Z48.89 ENCOUNTER FOR POSTOPERATIVE CARE RELATED TO SURGICAL JOINT FUSION: Primary | ICD-10-CM

## 2023-07-18 PROCEDURE — 97110 THERAPEUTIC EXERCISES: CPT

## 2023-07-18 PROCEDURE — 97112 NEUROMUSCULAR REEDUCATION: CPT

## 2023-07-18 PROCEDURE — 97140 MANUAL THERAPY 1/> REGIONS: CPT

## 2023-07-18 NOTE — PROGRESS NOTES
Daily Note     Today's date: 2023  Patient name: Alivia Sousa  : 1935  MRN: [id number]  Referring provider: Issac Park  Dx:   Encounter Diagnosis     ICD-10-CM    1. Encounter for postoperative care related to surgical joint fusion  Z48.89     Z98.1                      Subjective: Pt reports cont to make progress with overall strength. Reports some L sided low back pain today. Objective: See treatment diary below      Assessment: Tolerated treatment well. Patient exhibited good technique with therapeutic exercises. Pt making cont gains with program. Cont to nesha well with improved strength and function. Plan: Continue per plan of care.         Precautions: Fall Risk, Pacemaker, HTN     Manuals    BLE Hams/[id number]'  Hams/SKTC 10' Hams/SKTC 10'  Hams/SKTC 10' Hams, SKTC  10'                                             Neuro Re-Ed              MTP/LTP             SLS             Tandem Stance             Sidestepping 10 laps  L3 10 laps L3 10 laps // bars L3 10 laps // bars L2 10 laps // bars L2   Tandem Amb             NBOS airex             Postural Ed             Ther Ex             NuStep L4 10' strength L4 10' strength L4 10' strength L4 10 strength L4 10' strength   SLR x 3 10 x ea 2# 10x ea 2# 10x 2# ea 10x 2# ea 10x ea 2#   Marches 20 x ea 2# 20x 2# 20x ea 2# 20x ea 20x ea 2#   LAQ 2 x 10 2# 2/10 2# 2/10 2# 2x10 2# ea 2x10 2#   LTR 20 x  20x 20x  20x 20x   Supine SLR/SAQ 2# SAQ 2/10 2# SAQ 2/10 2/10 2# SAQ 2/10 2# SAQ SAQ 2/10 2#   Bridging 2/10 2/10 2/10 2/10 2/10   Clamshells Supine L4 2/10 Supine L4 2/10 Supine L4 2/10 Supine L4 2x10 Supine L4 2x10   Add squeeze 3" x 2 / 10 3" 2/10 3" 2/10 3" 2x10 3" 2x10   Ther Activity             Stepups F/L 6" 10x2 ea  L/F 6" 10x2 ea 6" 10x2 ea F 6" 10x2 ea F 6" 10xea F   Stepdowns             STS 10 x 2  10x2 10x2 10x2 10x2   Gait Training             With [facility]                           Modalities             HP/CP prn

## 2023-07-20 ENCOUNTER — OFFICE VISIT (OUTPATIENT)
Dept: PHYSICAL THERAPY | Facility: CLINIC | Age: 88
End: 2023-07-20
Payer: COMMERCIAL

## 2023-07-20 DIAGNOSIS — Z48.89 ENCOUNTER FOR POSTOPERATIVE CARE RELATED TO SURGICAL JOINT FUSION: Primary | ICD-10-CM

## 2023-07-20 DIAGNOSIS — Z98.1 ENCOUNTER FOR POSTOPERATIVE CARE RELATED TO SURGICAL JOINT FUSION: Primary | ICD-10-CM

## 2023-07-20 PROCEDURE — 97140 MANUAL THERAPY 1/> REGIONS: CPT

## 2023-07-20 PROCEDURE — 97112 NEUROMUSCULAR REEDUCATION: CPT

## 2023-07-20 PROCEDURE — 97110 THERAPEUTIC EXERCISES: CPT

## 2023-07-20 NOTE — PROGRESS NOTES
Daily Note     Today's date: 2023  Patient name: Graeme Duggan  : 1935  MRN: [id number]  Referring provider: Shelli Cottrell  Dx:   Encounter Diagnosis     ICD-10-CM    1. Encounter for postoperative care related to surgical joint fusion  Z48.89     Z98.1           Start Time: 1100  Stop Time: 1210  Total time in clinic (min): 70 minutes    Subjective: Patient reports 0/10 pain. Patient reports feeling better. Objective: See treatment diary below      Assessment: Tolerated treatment well. Patient participated in skilled PT session focused on strengthening, stretching, and ROM. Patient able to complete exercise program with no sx. Patient needed some verbal and visual cues for proper technique with exercises. Patient with  SOB, needing rest breaks between sets. Patient would continue to benefit from skilled PT interventions to address strengthening, stretching, and ROM. Patient demonstrated fatigue post treatment      Plan: Continue per plan of care.       Precautions: Fall Risk, Pacemaker, HTN     Manuals    BLE Hams/hhhjcha77'  Hams/SKTC 10' Hams/SKTC 10'  HS/SKTC 10' Hams, SKTC  10'                                          Neuro Re-Ed             MTP/LTP            SLS            Tandem Stance            Sidestepping 10 laps  L3 10 laps L3 10 laps // bars L3 //bars L3 x 10 laps 10 laps // bars L2   Tandem Amb            NBOS airex            Postural Ed            Ther Ex            NuStep L4 10' strength L4 10' strength L4 10' strength L4 x 10'  strengthening L4 10' strength   SLR x 3 10 x ea 2# 10x ea 2# 10x 2# ea 2# 10x ea 10x ea 2#   Marches 20 x ea 2# 20x 2# 20x ea 2# 2# 20x  ea 20x ea 2#   LAQ 2 x 10 2# 2/10 2# 2/10 2# 2# 2x10 ea 2x10 2#   LTR 20 x  20x 20x   20x   Supine SLR/SAQ 2# SAQ 2/10 2# SAQ 2/10 2/10 2# SAQ SAQ 2# B/L  2x10 SAQ 2/10 2#   Bridging 2/10 2/10 2/10 2x10 2/10   Clamshells Supine L4 2/10 Supine L4 2/10 Supine L4 2/10 Supine L4 2x10 Supine L4 2x10   Add squeeze 3" x 2 / 10 3" 2/10 3" 2/10 3" 2x10 3" 2x10   Ther Activity            Stepups F/L 6" 10x2 ea  L/F 6" 10x2 ea 6" 10x2 ea F 6" step 2x10 ea 6" 10xea F   Stepdowns            STS 10 x 2  10x2 10x2 2x10 10x2   Gait Training            With [facility]                         Modalities            HP/CP prn

## 2023-07-25 ENCOUNTER — REMOTE DEVICE CLINIC VISIT (OUTPATIENT)
Dept: CARDIOLOGY CLINIC | Facility: CLINIC | Age: 88
End: 2023-07-25
Payer: COMMERCIAL

## 2023-07-25 ENCOUNTER — OFFICE VISIT (OUTPATIENT)
Dept: PHYSICAL THERAPY | Facility: CLINIC | Age: 88
End: 2023-07-25
Payer: COMMERCIAL

## 2023-07-25 DIAGNOSIS — Z95.0 PRESENCE OF PERMANENT CARDIAC PACEMAKER: Primary | ICD-10-CM

## 2023-07-25 DIAGNOSIS — Z48.89 ENCOUNTER FOR POSTOPERATIVE CARE RELATED TO SURGICAL JOINT FUSION: Primary | ICD-10-CM

## 2023-07-25 DIAGNOSIS — Z98.1 ENCOUNTER FOR POSTOPERATIVE CARE RELATED TO SURGICAL JOINT FUSION: Primary | ICD-10-CM

## 2023-07-25 PROCEDURE — 97140 MANUAL THERAPY 1/> REGIONS: CPT

## 2023-07-25 PROCEDURE — 97112 NEUROMUSCULAR REEDUCATION: CPT

## 2023-07-25 PROCEDURE — 93294 REM INTERROG EVL PM/LDLS PM: CPT | Performed by: INTERNAL MEDICINE

## 2023-07-25 PROCEDURE — 97110 THERAPEUTIC EXERCISES: CPT

## 2023-07-25 PROCEDURE — 93296 REM INTERROG EVL PM/IDS: CPT | Performed by: INTERNAL MEDICINE

## 2023-07-25 NOTE — PROGRESS NOTES
Daily Note     Today's date: 2023  Patient name: Ludy Jeffrey  : 1935  MRN: [id number]  Referring provider: Tonny Arceo  Dx:   Encounter Diagnosis     ICD-10-CM    1. Encounter for postoperative care related to surgical joint fusion  Z48.89     Z98.1                      Subjective: Pt reports being tired today as he did not get much sleep. Objective: See treatment diary below      Assessment: Tolerated treatment well. Patient demonstrated fatigue post treatment and exhibited good technique with therapeutic exercises. Pt unable to complete all sets today with exercises as he was experiencing fatigue today. Pt is cont to make gains with program with overall strength. Plan: Continue per plan of care.       Precautions: Fall Risk, Pacemaker, HTN     Manuals    BLE Hams/[id number]'  Hams/SKTC 10' Hams/SKTC 10'  HS/SKTC 10' Hams, SKTC  10'                                          Neuro Re-Ed             MTP/LTP            SLS            Tandem Stance            Sidestepping 10 laps  L3 10 laps L3 10 laps // bars L3 //bars L3 x 10 laps 10 laps // bars L4   Tandem Amb            NBOS airex            Postural Ed            Ther Ex            NuStep L4 10' strength L4 10' strength L4 10' strength L4 x 10'  strengthening L4 10' strength   SLR x 3 10 x ea 2# 10x ea 2# 10x 2# ea 2# 10x ea 10x ea 2#   Marches 20 x ea 2# 20x 2# 20x ea 2# 2# 20x  ea 20x ea 2#   LAQ 2 x 10 2# 2/10 2# 2/10 2# 2# 2x10 ea 2x10 2#   LTR 20 x  20x 20x      Supine SLR/SAQ 2# SAQ 2/10 2# SAQ 2/10 2/10 2# SAQ SAQ 2# B/L  2x10 SAQ 2/10 2#   Bridging 2/10 2/10 2/10 2x10 2/10   Clamshells Supine L4 2/10 Supine L4 2/10 Supine L4 2/10 Supine L4 2x10 Supine L4 2x10   Add squeeze 3" x 2 / 10 3" 2/10 3" 2/10 3" 2x10 3" 2x10   Ther Activity            Stepups F/L 6" 10x2 ea  L/F 6" 10x2 ea 6" 10x2 ea F 6" step 2x10 ea 6" 10xea F   Stepdowns            STS 10 x 2  10x2 10x2 2x10 10x2   Gait Training            With [facility]                         Modalities            HP/CP prn

## 2023-07-25 NOTE — PROGRESS NOTES
CARELINK TRANSMISSION:   BATTERY VOLTAGE ADEQUATE. (13.7 YRS)  49%. ALL AVAILABLE LEAD PARAMETERS WITHIN NORMAL LIMITS. NO SIGNIFICANT HIGH RATE EPISODES. NORMAL DEVICE FUNCTION. ---SANDOVAL

## 2023-07-27 ENCOUNTER — OFFICE VISIT (OUTPATIENT)
Dept: PHYSICAL THERAPY | Facility: CLINIC | Age: 88
End: 2023-07-27
Payer: COMMERCIAL

## 2023-07-27 DIAGNOSIS — Z98.1 ENCOUNTER FOR POSTOPERATIVE CARE RELATED TO SURGICAL JOINT FUSION: Primary | ICD-10-CM

## 2023-07-27 DIAGNOSIS — Z48.89 ENCOUNTER FOR POSTOPERATIVE CARE RELATED TO SURGICAL JOINT FUSION: Primary | ICD-10-CM

## 2023-07-27 PROCEDURE — 97110 THERAPEUTIC EXERCISES: CPT

## 2023-07-27 PROCEDURE — 97140 MANUAL THERAPY 1/> REGIONS: CPT

## 2023-07-27 NOTE — PROGRESS NOTES
Daily Note     Today's date: 2023  Patient name: Fabio Sterling  : 1935  MRN: [id number]  Referring provider: Fariba Mendez  Dx:   Encounter Diagnosis     ICD-10-CM    1. Encounter for postoperative care related to surgical joint fusion  Z48.89     Z98.1                      Subjective: pt reports he is doing well and cont to be pleased with progress. Objective: See treatment diary below      Assessment: Tolerated treatment well. Patient exhibited good technique with therapeutic exercises. Pt making cont slow steady gains with overall strength and improved gait. Pt making cont progress. Plan: Continue per plan of care.       Precautions: Fall Risk, Pacemaker, HTN     Manuals    BLE Hams/[id number]'  Hams/SKTC 10' Hams/SKTC 10'  HS/SKTC 10' Hams, SKTC  10'                                          Neuro Re-Ed             MTP/LTP            SLS            Tandem Stance            Sidestepping 10 laps  L4 10 laps L3 10 laps // bars L3 //bars L3 x 10 laps 10 laps // bars L4   Tandem Amb            NBOS airex            Postural Ed            Ther Ex            NuStep L4 10' strength L4 10' strength L4 10' strength L4 x 10'  strengthening L4 10' strength   SLR x 3 10 x ea 2# 10x ea 2# 10x 2# ea 2# 10x ea 10x ea 2#   Marches 20 x ea 2# 20x 2# 20x ea 2# 2# 20x  ea 20x ea 2#   LAQ 2 x 10 2# 2/10 2# 2/10 2# 2# 2x10 ea 2x10 2#   LTR  20x 20x      Supine SLR/SAQ 2# SAQ 2/10 2# SAQ 2/10 2/10 2# SAQ SAQ 2# B/L  2x10 SAQ 2/10 2#   Bridging 2/10 2/10 2/10 2x10 2/10   Clamshells Supine L4 2/10 Supine L4 2/10 Supine L4 2/10 Supine L4 2x10 Supine L4 2x10   Add squeeze 3" x 2 / 10 3" 2/10 3" 2/10 3" 2x10 3" 2x10   Ther Activity            Stepups F/L 6" 10x2 ea  L/F 6" 10x2 ea 6" 10x2 ea F 6" step 2x10 ea 6" 10xea F   Stepdowns            STS 10 x 2  10x2 10x2 2x10 10x2   Gait Training            With [facility]                         Modalities            HP/CP prn

## 2023-08-01 ENCOUNTER — APPOINTMENT (OUTPATIENT)
Dept: PHYSICAL THERAPY | Facility: CLINIC | Age: 88
End: 2023-08-01
Payer: COMMERCIAL

## 2023-08-03 ENCOUNTER — OFFICE VISIT (OUTPATIENT)
Dept: PHYSICAL THERAPY | Facility: CLINIC | Age: 88
End: 2023-08-03
Payer: COMMERCIAL

## 2023-08-03 ENCOUNTER — APPOINTMENT (OUTPATIENT)
Dept: PHYSICAL THERAPY | Facility: CLINIC | Age: 88
End: 2023-08-03
Payer: COMMERCIAL

## 2023-08-03 DIAGNOSIS — Z48.89 ENCOUNTER FOR POSTOPERATIVE CARE RELATED TO SURGICAL JOINT FUSION: Primary | ICD-10-CM

## 2023-08-03 DIAGNOSIS — Z98.1 ENCOUNTER FOR POSTOPERATIVE CARE RELATED TO SURGICAL JOINT FUSION: Primary | ICD-10-CM

## 2023-08-03 PROCEDURE — 97112 NEUROMUSCULAR REEDUCATION: CPT

## 2023-08-03 PROCEDURE — 97110 THERAPEUTIC EXERCISES: CPT

## 2023-08-03 PROCEDURE — 97140 MANUAL THERAPY 1/> REGIONS: CPT

## 2023-08-03 NOTE — PROGRESS NOTES
Daily Note     Today's date: 8/3/2023  Patient name: Alyssa Ling  : 1935  MRN: [id number]  Referring provider: Carmen Tyler  Dx:   Encounter Diagnosis     ICD-10-CM    1. Encounter for postoperative care related to surgical joint fusion  Z48.89     Z98.1                      Subjective: Pt reports he is doing well and cont to improve with therapy. Objective: See treatment diary below      Assessment: Tolerated treatment well. Patient exhibited good technique with therapeutic exercises. Pt cont to nesha program with improved overall strength and nesha to activity. Pt with mild soreness along LS today. Plan: Continue per plan of care.       Precautions: Fall Risk, Pacemaker, HTN     Manuals 7/27 8/3 7/18 7/20 7/25   BLE Hams/[id number]'  Hams/SKTC 10' Hams/SKTC 10'  HS/SKTC 10' Hams, SKTC  10'                                          Neuro Re-Ed             MTP/LTP            SLS            Tandem Stance            Sidestepping 10 laps  L4 10 laps L4 10 laps // bars L3 //bars L3 x 10 laps 10 laps // bars L4   Tandem Amb            NBOS airex            Postural Ed            Ther Ex            NuStep L4 10' strength L4 10' strength L4 10' strength L4 x 10'  strengthening L4 10' strength   SLR x 3 10 x ea 2# 10x ea 2# 10x 2# ea 2# 10x ea 10x ea 2#   Marches 20 x ea 2# 20x 2# 20x ea 2# 2# 20x  ea 20x ea 2#   LAQ 2 x 10 2# 2/10 2# 2/10 2# 2# 2x10 ea 2x10 2#   LTR   20x      Supine SLR/SAQ 2# SAQ 2/10 2# SAQ 2/10 2/10 2# SAQ SAQ 2# B/L  2x10 SAQ 2/10 2#   Bridging 2/10 2/10 2/10 2x10 2/10   Clamshells Supine L4 2/10 Supine L4 2/10 Supine L4 2/10 Supine L4 2x10 Supine L4 2x10   Add squeeze 3" x 2 / 10 3" 2/10 3" 2/10 3" 2x10 3" 2x10   Ther Activity            Stepups F/L 6" 10x2 ea  L/F 6" 10x2 ea 6" 10x2 ea F 6" step 2x10 ea 6" 10xea F   Stepdowns            STS 10 x 2  10x2 10x2 2x10 10x2   Gait Training            With [facility]                         Modalities            HP/CP prn

## 2023-08-08 ENCOUNTER — OFFICE VISIT (OUTPATIENT)
Dept: PHYSICAL THERAPY | Facility: CLINIC | Age: 88
End: 2023-08-08
Payer: COMMERCIAL

## 2023-08-08 DIAGNOSIS — Z48.89 ENCOUNTER FOR POSTOPERATIVE CARE RELATED TO SURGICAL JOINT FUSION: Primary | ICD-10-CM

## 2023-08-08 DIAGNOSIS — Z98.1 ENCOUNTER FOR POSTOPERATIVE CARE RELATED TO SURGICAL JOINT FUSION: Primary | ICD-10-CM

## 2023-08-08 PROCEDURE — 97112 NEUROMUSCULAR REEDUCATION: CPT

## 2023-08-08 PROCEDURE — 97110 THERAPEUTIC EXERCISES: CPT

## 2023-08-08 PROCEDURE — 97140 MANUAL THERAPY 1/> REGIONS: CPT

## 2023-08-08 NOTE — PROGRESS NOTES
Daily Note     Today's date: 2023  Patient name: Sue Tong  : 1935  MRN: [id number]  Referring provider: Haylie Black  Dx:   Encounter Diagnosis     ICD-10-CM    1. Encounter for postoperative care related to surgical joint fusion  Z48.89     Z98.1                      Subjective: pt reports he is doing well and would like to cont with PT as he cont to feel improved strength and stability. Objective: See treatment diary below      Assessment: Tolerated treatment well. Patient exhibited good technique with therapeutic exercises. Pt making cont gains with program. Cont to nesha well with improved overall sx. Cont to demonstrate improved LE strength with program.       Plan: Continue per plan of care. RE next visit.       Precautions: Fall Risk, Pacemaker, HTN     Manuals 7/27 8/3 8/8 7/20 7/25   BLE Hams/[id number]'  Hams/SKTC 10' Hams/SKTC 10'  HS/SKTC 10' Hams, SKTC  10'                                          Neuro Re-Ed             MTP/LTP            SLS            Tandem Stance            Sidestepping 10 laps  L4 10 laps L4 10 laps // bars L4 //bars L3 x 10 laps 10 laps // bars L4   Tandem Amb            NBOS airex            Postural Ed            Ther Ex            NuStep L4 10' strength L4 10' strength L4 10' strength L4 x 10'  strengthening L4 10' strength   SLR x 3 10 x ea 2# 10x ea 2# 10x 2# ea 2# 10x ea 10x ea 2#   Marches 20 x ea 2# 20x 2# 20x ea 2# 2# 20x  ea 20x ea 2#   LAQ 2 x 10 2# 2/10 2# 2/10 2# 2# 2x10 ea 2x10 2#   LTR        Supine SLR/SAQ 2# SAQ 2/10 2# SAQ 2/10 2/10 2# SAQ SAQ 2# B/L  2x10 SAQ 2/10 2#   Bridging 2/10 2/10 2/10 2x10 2/10   Clamshells Supine L4 2/10 Supine L4 2/10 Supine L4 2/10 Supine L4 2x10 Supine L4 2x10   Add squeeze 3" x 2 / 10 3" 2/10 3" 2/10 3" 2x10 3" 2x10   Ther Activity            Stepups F/L 6" 10x2 ea  L/F 6" 10x2 ea 6" 10x2 ea F 6" step 2x10 ea 6" 10xea F   Stepdowns            STS 10 x 2  10x2 10x2 2x10 10x2   Gait Training          With [facility]                         Modalities            HP/CP prn

## 2023-08-09 ENCOUNTER — TELEPHONE (OUTPATIENT)
Age: 88
End: 2023-08-09

## 2023-08-09 DIAGNOSIS — S22.089A CLOSED T12 FRACTURE (HCC): Primary | ICD-10-CM

## 2023-08-09 NOTE — TELEPHONE ENCOUNTER
Pt daughter Severiano Host (emergency contact on file) contacted the med refill department pt access for a new referral for her father's  PT [address] phone number     Pt daughter can be reached at [phone]

## 2023-08-10 ENCOUNTER — APPOINTMENT (OUTPATIENT)
Dept: PHYSICAL THERAPY | Facility: CLINIC | Age: 88
End: 2023-08-10
Payer: COMMERCIAL

## 2023-08-15 ENCOUNTER — OFFICE VISIT (OUTPATIENT)
Dept: PHYSICAL THERAPY | Facility: CLINIC | Age: 88
End: 2023-08-15
Payer: COMMERCIAL

## 2023-08-15 DIAGNOSIS — Z48.89 ENCOUNTER FOR POSTOPERATIVE CARE RELATED TO SURGICAL JOINT FUSION: Primary | ICD-10-CM

## 2023-08-15 DIAGNOSIS — Z98.1 ENCOUNTER FOR POSTOPERATIVE CARE RELATED TO SURGICAL JOINT FUSION: Primary | ICD-10-CM

## 2023-08-15 PROCEDURE — 97140 MANUAL THERAPY 1/> REGIONS: CPT

## 2023-08-15 PROCEDURE — 97110 THERAPEUTIC EXERCISES: CPT

## 2023-08-15 PROCEDURE — 97530 THERAPEUTIC ACTIVITIES: CPT

## 2023-08-15 PROCEDURE — 97112 NEUROMUSCULAR REEDUCATION: CPT

## 2023-08-15 NOTE — PROGRESS NOTES
Daily Note     Today's date: 8/15/2023  Patient name: Yisel Sutherland  : 1935  MRN: [id number]  Referring provider: Janett Alberto  Dx:   Encounter Diagnosis     ICD-10-CM    1. Encounter for postoperative care related to surgical joint fusion  Z48.89     Z98.1                      Subjective: Pt reports having some fatigue today. Feels more tired from not having PT both days last week. Objective: See treatment diary below      Assessment: Tolerated treatment well. Patient demonstrated fatigue post treatment and exhibited good technique with therapeutic exercises. Pt cont to nesha program with overall cont improved strength and function. Pt with some mild fatigue today with sit to stands and step ups. Plan: Continue per plan of care. RE next visit.       Precautions: Fall Risk, Pacemaker, HTN     Manuals 7/27 8/3 8/8 8/15  7/25   BLE Hams/[id number]'  Hams/SKTC 10' Hams/SKTC 10'  HS/SKTC 10' Hams, SKTC  10'                                          Neuro Re-Ed             MTP/LTP            SLS            Tandem Stance            Sidestepping 10 laps  L4 10 laps L4 10 laps // bars L4 //bars L4 x 10 laps 10 laps // bars L4   Tandem Amb            NBOS airex            Postural Ed            Ther Ex            NuStep L4 10' strength L4 10' strength L4 10' strength L4 x 10'  strengthening L4 10' strength   SLR x 3 10 x ea 2# 10x ea 2# 10x 2# ea 2# 10x ea 10x ea 2#   Marches 20 x ea 2# 20x 2# 20x ea 2# 2# 20x  ea 20x ea 2#   LAQ 2 x 10 2# 2/10 2# 2/10 2# 2# 2x10 ea 2x10 2#   LTR        Supine SLR/SAQ 2# SAQ 2/10 2# SAQ 2/10 2/10 2# SAQ SAQ 2# B/L  2x10 SAQ 2/10 2#   Bridging 2/10 2/10 2/10 2x10 2/10   Clamshells Supine L4 2/10 Supine L4 2/10 Supine L4 2/10 Supine L4 2x10 Supine L4 2x10   Add squeeze 3" x 2 / 10 3" 2/10 3" 2/10 3" 2x10 3" 2x10   Ther Activity            Stepups F/L 6" 10x2 ea  L/F 6" 10x2 ea 6" 10x2 ea F 6" step 2x10 ea 6" 10xea F   Stepdowns            STS 10 x 2  10x2 10x2 2x10 10x2   Gait Training            With [facility]                         Modalities            HP/CP prn

## 2023-08-17 ENCOUNTER — APPOINTMENT (OUTPATIENT)
Dept: PHYSICAL THERAPY | Facility: CLINIC | Age: 88
End: 2023-08-17
Payer: COMMERCIAL

## 2023-08-22 ENCOUNTER — OFFICE VISIT (OUTPATIENT)
Dept: PHYSICAL THERAPY | Facility: CLINIC | Age: 88
End: 2023-08-22
Payer: COMMERCIAL

## 2023-08-22 DIAGNOSIS — Z48.89 ENCOUNTER FOR POSTOPERATIVE CARE RELATED TO SURGICAL JOINT FUSION: Primary | ICD-10-CM

## 2023-08-22 DIAGNOSIS — Z98.1 ENCOUNTER FOR POSTOPERATIVE CARE RELATED TO SURGICAL JOINT FUSION: Primary | ICD-10-CM

## 2023-08-22 PROCEDURE — 97140 MANUAL THERAPY 1/> REGIONS: CPT

## 2023-08-22 PROCEDURE — 97110 THERAPEUTIC EXERCISES: CPT

## 2023-08-22 PROCEDURE — 97530 THERAPEUTIC ACTIVITIES: CPT

## 2023-08-22 PROCEDURE — 97112 NEUROMUSCULAR REEDUCATION: CPT

## 2023-08-22 NOTE — PROGRESS NOTES
Daily Note     Today's date: 2023  Patient name: Jasmyn Russ  : 1935  MRN: [id number]  Referring provider: Mayo Badillo  Dx:   Encounter Diagnosis     ICD-10-CM    1. Encounter for postoperative care related to surgical joint fusion  Z48.89     Z98.1                      Subjective: Pt reports PT is cont to be helpful with overall strength. Objective: See treatment diary below      Assessment: Tolerated treatment well. Patient exhibited good technique with therapeutic exercises. Pt making cont slow steady progress with improved strength and improved gait. Pt demonstrates improved stability with program.       Plan: Continue per plan of care.       Precautions: Fall Risk, Pacemaker, HTN     Manuals 7/27 8/3 8/8 8/15  8/22   BLE Hams/[id number]'  Hams/SKTC 10' Hams/SKTC 10'  HS/SKTC 10' Hams, SKTC  10'                                          Neuro Re-Ed             MTP/LTP            SLS            Tandem Stance            Sidestepping 10 laps  L4 10 laps L4 10 laps // bars L4 //bars L4 x 10 laps 10 laps // bars L4   Tandem Amb            NBOS airex            Postural Ed            Ther Ex            NuStep L4 10' strength L4 10' strength L4 10' strength L4 x 10'  strengthening L4 10' strength   SLR x 3 10 x ea 2# 10x ea 2# 10x 2# ea 2# 10x ea 10x ea 2#   Marches 20 x ea 2# 20x 2# 20x ea 2# 2# 20x  ea 20x ea 2#   LAQ 2 x 10 2# 2/10 2# 2/10 2# 2# 2x10 ea 2x10 2#   LTR        Supine SLR/SAQ 2# SAQ 2/10 2# SAQ 2/10 2/10 2# SAQ SAQ 2# B/L  2x10 SAQ 2/10 2#   Bridging 2/10 2/10 2/10 2x10 2/10   Clamshells Supine L4 2/10 Supine L4 2/10 Supine L4 2/10 Supine L4 2x10 Supine L4 2x10   Add squeeze 3" x 2 / 10 3" 2/10 3" 2/10 3" 2x10 3" 2x10   Ther Activity            Stepups F/L 6" 10x2 ea  L/F 6" 10x2 ea 6" 10x2 ea F 6" step 2x10 ea 6" 10x2 ea F   Stepdowns            STS 10 x 2  10x2 10x2 2x10 10x2   Gait Training            With [address]                         Modalities            HP/CP prn

## 2023-08-24 ENCOUNTER — EVALUATION (OUTPATIENT)
Dept: PHYSICAL THERAPY | Facility: CLINIC | Age: 88
End: 2023-08-24
Payer: COMMERCIAL

## 2023-08-24 DIAGNOSIS — Z98.1 ENCOUNTER FOR POSTOPERATIVE CARE RELATED TO SURGICAL JOINT FUSION: Primary | ICD-10-CM

## 2023-08-24 DIAGNOSIS — Z48.89 ENCOUNTER FOR POSTOPERATIVE CARE RELATED TO SURGICAL JOINT FUSION: Primary | ICD-10-CM

## 2023-08-24 PROCEDURE — 97110 THERAPEUTIC EXERCISES: CPT

## 2023-08-24 PROCEDURE — 97530 THERAPEUTIC ACTIVITIES: CPT

## 2023-08-24 PROCEDURE — 97112 NEUROMUSCULAR REEDUCATION: CPT

## 2023-08-24 PROCEDURE — 97140 MANUAL THERAPY 1/> REGIONS: CPT

## 2023-08-24 NOTE — PROGRESS NOTES
Daily Note     Today's date: 2023  Patient name: Kim Lewis  : 1935  MRN: [id number]  Referring provider: Iron Lee  Dx:   Encounter Diagnosis     ICD-10-CM    1. Encounter for postoperative care related to surgical joint fusion  Z48.89     Z98.1                      Subjective: Pt reports he is pleased with progress however would like to be able to stand longer and walk more. Objective: See treatment diary below      Assessment: Tolerated treatment well. Patient exhibited good technique with therapeutic exercises      Plan: DC to [facility].      Precautions: Fall Risk, Pacemaker, HTN     Manuals 8/24 8/3 8/8 8/15  8/22   BLE Hams/[id number]'  Hams/SKTC 10' Hams/SKTC 10'  HS/SKTC 10' Hams, SKTC  10'                                          Neuro Re-Ed             MTP/LTP            SLS            Tandem Stance            Sidestepping 10 laps  L4 10 laps L4 10 laps // bars L4 //bars L4 x 10 laps 10 laps // bars L4   Tandem Amb            NBOS airex            Postural Ed            Ther Ex            NuStep L4 10' strength L4 10' strength L4 10' strength L4 x 10'  strengthening L4 10' strength   SLR x 3 10 x ea 2# 10x ea 2# 10x 2# ea 2# 10x ea 10x ea 2#   Marches 20 x ea 2# 20x 2# 20x ea 2# 2# 20x  ea 20x ea 2#   LAQ 2 x 10 2# 2/10 2# 2/10 2# 2# 2x10 ea 2x10 2#   LTR        Supine SLR/SAQ 2# SAQ 2/10 2# SAQ 2/10 2/10 2# SAQ SAQ 2# B/L  2x10 SAQ 2/10 2#   Bridging 2/10 2/10 2/10 2x10 2/10   Clamshells Supine L4 2/10 Supine L4 2/10 Supine L4 2/10 Supine L4 2x10 Supine L4 2x10   Add squeeze 3" x 2 / 10 3" 2/10 3" 2/10 3" 2x10 3" 2x10   Ther Activity            Stepups F/L 6" 10x2 ea  L/F 6" 10x2 ea 6" 10x2 ea F 6" step 2x10 ea 6" 10x2 ea F   Stepdowns            STS 10 x 2  10x2 10x2 2x10 10x2   Gait Training            With [facility]                         Modalities            HP/CP prn

## 2023-08-24 NOTE — PROGRESS NOTES
PT Discharge     Today's date: 2023  Patient name: Queen Shemar  : 1935  MRN: [id number]  Referring provider: Eloy Rossi  Dx:   Encounter Diagnosis     ICD-10-CM    1. Encounter for postoperative care related to surgical joint fusion  Z48.89     Z98.1           Start Time: 3962  Stop Time: 1135  Total time in clinic (min): 60 minutes    Subjective: Pt reports he feels ready to safely D/C to [facility] after today's session, as he has achieved all personal and functional goals. Objective: Goals met. Assessment: Pt will be D/C to [facility] after today's session, as he has achieved all personal and functional goals. Pt instructed to reach out with any questions/concerns/setbacks.     Plan: D/C to [facility]

## 2023-08-25 ENCOUNTER — OFFICE VISIT (OUTPATIENT)
Dept: CARDIOLOGY CLINIC | Facility: CLINIC | Age: 88
End: 2023-08-25
Payer: COMMERCIAL

## 2023-08-25 VITALS
WEIGHT: 203.8 LBS | BODY MASS INDEX: 29.18 KG/M2 | SYSTOLIC BLOOD PRESSURE: 128 MMHG | OXYGEN SATURATION: 91 % | DIASTOLIC BLOOD PRESSURE: 70 MMHG | HEART RATE: 81 BPM | HEIGHT: 70 IN

## 2023-08-25 DIAGNOSIS — I48.92 ATRIAL FIBRILLATION AND FLUTTER (HCC): ICD-10-CM

## 2023-08-25 DIAGNOSIS — I65.23 BILATERAL CAROTID ARTERY STENOSIS: ICD-10-CM

## 2023-08-25 DIAGNOSIS — I48.91 ATRIAL FIBRILLATION AND FLUTTER (HCC): ICD-10-CM

## 2023-08-25 DIAGNOSIS — Z95.2 S/P AVR: Primary | ICD-10-CM

## 2023-08-25 DIAGNOSIS — I25.10 CORONARY ARTERY DISEASE INVOLVING NATIVE CORONARY ARTERY OF NATIVE HEART WITHOUT ANGINA PECTORIS: ICD-10-CM

## 2023-08-25 DIAGNOSIS — Z95.5 H/O HEART ARTERY STENT: ICD-10-CM

## 2023-08-25 PROCEDURE — 99215 OFFICE O/P EST HI 40 MIN: CPT | Performed by: INTERNAL MEDICINE

## 2023-08-25 NOTE — PATIENT INSTRUCTIONS
CARDIOLOGY ASSESSMENT & PLAN     1. S/P AVR        2. Coronary artery disease involving native coronary artery of native heart without angina pectoris        3. Bilateral carotid artery stenosis        4. Atrial fibrillation and flutter ([address])        5. H/O heart artery stent          Coronary artery disease  Mr. Johanny Abreu is overall doing well from cardiac perspective with no recent symptoms that suggest angina or decompensated heart failure. His blood pressure is reasonably well controlled. He is on good medical regimen. Physical examination does not suggest pulmonary or peripheral vascular congestion. His last renal function was noted to be normal.  He has normal functioning single-chamber pacemaker device. No recent arrhythmias were noted. -- At this time I am advising him to continue current medications. -- He will continue to be followed in the device clinic as scheduled. We will plan for a follow-up visit in 6 months time. -- Dietary and medical compliance are reinforced. -- He is advised  to report any concerning symptoms such as chest pain, shortness of breath, decline in exercise tolerance or presyncope/syncope.

## 2023-08-25 NOTE — PROGRESS NOTES
[facility]  [address], [facility] [address]  Phone#  [phone]. Fax#  [phone]  *-*-*-*-*-*-*-*-*-*-*-*-*-*-*-*-*-*-*-*-*-*-*-*-*-*-*-*-*-*-*-*-*-*-*-*-*-*-*-*-*-*-*-*-*-*-*-*-*-*-*-*-*-*  ENCOUNTER DATE: 08/25/23 10:46 AM  PATIENT NAME: Jefferson Barrera   1935    [id number]  AGE:88 y.o. SEX: male  [address] [place] [address], MD     PRIMARY CARE PHYSICIAN: Elva Hughes DO    DIAGNOSES:  1. Coronary artery disease, remote history of stents x3  at [facility]  2. Aortic valve stenosis status post aortic valve replacement at [facility] in 2007  3. Mitral valve stenosis  4. Carotid artery disease with bilateral ICA stenosis  5. Primary hypertension  6. Dyslipidemia  7. Now permanent atrial flutter and fibrillation,  sick sinus syndrome status post single-chamber pacemaker implantation October 2021  8. Degenerative joint disease with closed T2 fracture, status post multilevel spinal fusion surgery. 9.  BPH  10. History of right ureteral stone cystitis and hematuria  11. Degenerative joint disease  12. History of skin cancer    ECHOCARDIOGRAM 12 10/20/2022: Moderate concentric left ventricle hypertrophy, normal left ventricle systolic function, EF 70%, indeterminate diastolic function, dilated right ventricle with normal right ventricular function  Biatrial enlargement  Normal functioning bioprosthetic aortic valve with peak trans prosthetic valve velocity 2.4 m/s mean gradient 12 mmHg, no aortic valve regurgitation  Mild tricuspid valve regurgitation  Moderate pulmonary hypertension with estimated RVSP/PASP 59 mmHg  Trace pulmonic valve regurgitation. No pericardial effusion. CURRENT ECG   No results found for this visit on 08/25/23. Pacemaker remote transmission 7/36/5780:  DiastolicCARELINK TRANSMISSION: BATTERY VOLTAGE ADEQUATE. (13.7 YRS)  49%. ALL AVAILABLE LEAD PARAMETERS WITHIN NORMAL LIMITS. NO SIGNIFICANT HIGH RATE EPISODES. NORMAL DEVICE FUNCTION. ---[facility] ASSESSMENT & PLAN     1. S/P AVR        2. Coronary artery disease involving native coronary artery of native heart without angina pectoris        3. Bilateral carotid artery stenosis        4. Atrial fibrillation and flutter ([address])        5. H/O heart artery stent          Coronary artery disease  Mr. Jefferson Barrera is overall doing well from cardiac perspective with no recent symptoms that suggest angina or decompensated heart failure. His blood pressure is reasonably well controlled. He is on good medical regimen. Physical examination does not suggest pulmonary or peripheral vascular congestion. His last renal function was noted to be normal.  He has normal functioning single-chamber pacemaker device. No recent arrhythmias were noted. -- At this time I am advising him to continue current medications. -- He will continue to be followed in the device clinic as scheduled. We will plan for a follow-up visit in 6 months time. -- Dietary and medical compliance are reinforced. -- He is advised  to report any concerning symptoms such as chest pain, shortness of breath, decline in exercise tolerance or presyncope/syncope. INTERVAL HISTORY & HISTORY OF PRESENT ILLNESS     Jefferson Barrera is here for follow-up regarding his cardiac comorbidities which include: History of coronary artery disease with multiple stents, aortic valve replacement, sick sinus syndrome, atrial fibrillation and other comorbidities. He is here for follow-up visit accompanied with daughter and great granddaughter. He was last seen by Ms. Antonio Munguia in March 2023. He mentions that since last visit he has had no new diagnosis or hospitalizations or significant illnesses. He has been going for physical therapy relating to his back surgery and reports good recovery. From a symptom perspective he denies any chest pain or shortness of breath or palpitations or dizziness or passing out or near passing out episodes.   He does have significant discomfort in his back when he is laying down to sleep. He uses a walker for help with ambulation. Functional capacity status: Moderately decreased due to comorbidities   (Excellent- >10 METs; Good: (7-10 METs); Moderate (4-7 METs); Poor (<= 4 METs)    Any chronic stressors: None   (feeling of poor health, financial problems, and social isolation etc). Tobacco or alcohol dependence: He quit smoking over approximately 45 years back. He reports quitting alcohol. Current cardiac medications: Apixaban 5 mg twice daily atorvastatin 40 mg daily metoprolol tartrate 50 mg twice daily enalapril 2.5 mg daily. Last routine chemistry is from February 2023: Sodium 136 potassium 4.1 chloride 100 bicarb 26 BUN 11 creatinine 0.66 GFR 91 total cholesterol 135 HDL 40 triglyceride 143      REVIEW OF SYSTEMS   Positive for: As noted above in HPI  Negative for: All remaining as reviewed below and in HPI. SYSTEM SYMPTOMS REVIEWED:  General--weight change, fever, night sweats  Respiratory--cough, wheezing, shortness of breath, sputum production  Cardiovascular--chest pain, syncope, dyspnea on exertion, edema, decline in exercise tolerance, claudication   Gastrointestinal--persistent vomiting, diarrhea, abdominal distention, blood in stool   Muscular or skeletal--joint pain or swelling   Neurologic--headaches, syncope, abnormal movement  Hematologic--history of easy bruising and bleeding   Endocrine--thyroid enlargement, heat or cold intolerance, polyuria   Psychiatric--anxiety, depression     *-*-*-*-*-*-*-*-*-*-*-*-*-*-*-*-*-*-*-*-*-*-*-*-*-*-*-*-*-*-*-*-*-*-*-*-*-*-*-*-*-*-*-*-*-*-*-*-*-*-*-*-*-*-  VITAL SIGNS     CURRENT VITAL SIGNS:   Vitals:    08/25/23 1015   BP: 128/70   Pulse: 81   SpO2: 91%   Weight: 92.4 kg (203 lb 12.8 oz)   Height: 5' 10" (1.778 m)       BMI: Body mass index is 29.24 kg/m².     WEIGHTS:   Wt Readings from Last 25 Encounters:   08/25/23 92.4 kg (203 lb 12.8 oz)   04/06/23 91.8 kg (202 lb 6.4 oz)   03/28/23 91.2 kg (201 lb)   03/24/23 91.4 kg (201 lb 6.4 oz)   03/10/23 92.4 kg (203 lb 9.6 oz)   02/14/23 103 kg (227 lb 6.4 oz)   01/26/23 101 kg (222 lb)   01/18/23 101 kg (222 lb 12.8 oz)   01/13/23 103 kg (227 lb)   12/30/22 108 kg (238 lb)   12/10/22 107 kg (236 lb)   12/02/22 107 kg (236 lb 9.6 oz)   08/19/22 103 kg (227 lb)        *-*-*-*-*-*-*-*-*-*-*-*-*-*-*-*-*-*-*-*-*-*-*-*-*-*-*-*-*-*-*-*-*-*-*-*-*-*-*-*-*-*-*-*-*-*-*-*-*-*-*-*-*-*-  PHYSICAL EXAM     General Appearance:    Alert, cooperative, no distress, appears stated age   Head, Eyes, ENT:    No obvious abnormality, moist mucous mebranes. Neck:   Supple, no carotid bruit or JVD   Back:     Symmetric, no curvature. Lungs:     Respirations unlabored. Clear to auscultation bilaterally,    Chest wall:    No tenderness or deformity   Heart:    Regular rate and rhythm, S1 and S2 normal, no murmur, rub  or gallop. Abdomen:     Soft, non-tender, No obvious masses, or organomegaly   Extremities:   Extremities warm, no cyanosis or edema    Skin:   No venostatic changes in lower extremities. Normal skin color, texture, and turgor.  No rashes or lesions     *-*-*-*-*-*-*-*-*-*-*-*-*-*-*-*-*-*-*-*-*-*-*-*-*-*-*-*-*-*-*-*-*-*-*-*-*-*-*-*-*-*-*-*-*-*-*-*-*-*-*-*-*-*-  CURRENT MEDICATIONS LIST     Current Outpatient Medications:   •  acetaminophen (TYLENOL) 325 mg tablet, Take 3 tablets (975 mg total) by mouth every 8 (eight) hours (Patient taking differently: Take 975 mg by mouth if needed), Disp: , Rfl: 0  •  apixaban (ELIQUIS) 5 mg, Take 5 mg by mouth 2 (two) times a day, Disp: , Rfl:   •  atorvastatin (LIPITOR) 40 mg tablet, Take 40 mg by mouth daily, Disp: , Rfl:   •  Cholecalciferol 25 MCG (1000 UT) tablet, Take by mouth, Disp: , Rfl:   •  enalapril (VASOTEC) 2.5 mg tablet, Take 1 tablet (2.5 mg total) by mouth in the morning, Disp: 90 tablet, Rfl: 3  •  folic acid (FOLVITE) 092 mcg tablet, Take 400 mcg by mouth daily, Disp: , Rfl:   •  metoprolol tartrate (LOPRESSOR) 50 mg tablet, Take 1 tablet (50 mg total) by mouth 2 (two) times a day, Disp: 180 tablet, Rfl: 3  •  tamsulosin (FLOMAX) 0.4 mg, Take 1 capsule (0.4 mg total) by mouth daily with dinner, Disp: 90 capsule, Rfl: 3       ALLERGIES     Allergies   Allergen Reactions   • Ezetimibe-Simvastatin Dizziness and Lightheadedness     Other reaction(s): MAKES HIM DIZZY     • Simvastatin Lightheadedness     Other reaction(s): Dizziness       *-*-*-*-*-*-*-*-*-*-*-*-*-*-*-*-*-*-*-*-*-*-*-*-*-*-*-*-*-*-*-*-*-*-*-*-*-*-*-*-*-*-*-*-*-*-*-*-*-*-*-*-*-*-  LABORATORY DATA   No results found for: "NA"  Potassium   Date Value Ref Range Status   02/13/2023 4.0 3.5 - 5.3 mmol/L Final   02/12/2023 4.0 3.5 - 5.3 mmol/L Final   02/11/2023 4.2 3.5 - 5.3 mmol/L Final     Chloride   Date Value Ref Range Status   02/13/2023 103 96 - 108 mmol/L Final   02/12/2023 106 96 - 108 mmol/L Final   02/11/2023 111 (H) 96 - 108 mmol/L Final     CO2   Date Value Ref Range Status   02/13/2023 27 21 - 32 mmol/L Final   02/12/2023 27 21 - 32 mmol/L Final   02/11/2023 22 21 - 32 mmol/L Final     BUN   Date Value Ref Range Status   07/07/2023 10 5 - 25 mg/dL Final   02/13/2023 10 5 - 25 mg/dL Final   02/12/2023 10 5 - 25 mg/dL Final     Creatinine   Date Value Ref Range Status   07/07/2023 0.95 0.60 - 1.30 mg/dL Final     Comment:     Standardized to IDMS reference method   02/13/2023 0.58 (L) 0.60 - 1.30 mg/dL Final     Comment:     Standardized to IDMS reference method   02/12/2023 0.49 (L) 0.60 - 1.30 mg/dL Final     Comment:     Standardized to IDMS reference method     eGFR   Date Value Ref Range Status   07/07/2023 71 ml/min/1.73sq m Final   02/13/2023 92 ml/min/1.73sq m Final   02/12/2023 98 ml/min/1.73sq m Final     Calcium   Date Value Ref Range Status   02/13/2023 8.6 8.3 - 10.1 mg/dL Final   02/12/2023 8.6 8.3 - 10.1 mg/dL Final   02/11/2023 8.3 8.3 - 10.1 mg/dL Final     AST   Date Value Ref Range Status 02/01/2023 11 (L) 13 - 39 U/L Final     Comment:     Specimen collection should occur prior to Sulfasalazine administration due to the potential for falsely depressed results. 01/12/2023 23 5 - 45 U/L Final     Comment:     Specimen collection should occur prior to Sulfasalazine administration due to the potential for falsely depressed results. 12/30/2022 20 5 - 45 U/L Final     Comment:     Specimen collection should occur prior to Sulfasalazine administration due to the potential for falsely depressed results. ALT   Date Value Ref Range Status   02/01/2023 10 7 - 52 U/L Final     Comment:     Specimen collection should occur prior to Sulfasalazine administration due to the potential for falsely depressed results. 01/12/2023 18 12 - 78 U/L Final     Comment:     Specimen collection should occur prior to Sulfasalazine administration due to the potential for falsely depressed results. 12/30/2022 21 12 - 78 U/L Final     Comment:     Specimen collection should occur prior to Sulfasalazine administration due to the potential for falsely depressed results.       Alkaline Phosphatase   Date Value Ref Range Status   02/01/2023 141 (H) 34 - 104 U/L Final   01/12/2023 151 (H) 46 - 116 U/L Final   12/30/2022 102 46 - 116 U/L Final     Magnesium   Date Value Ref Range Status   02/02/2023 2.1 1.6 - 2.6 mg/dL Final   12/09/2022 2.0 1.6 - 2.6 mg/dL Final     WBC   Date Value Ref Range Status   02/13/2023 6.34 4.31 - 10.16 Thousand/uL Final   02/12/2023 11.66 (H) 4.31 - 10.16 Thousand/uL Final   02/11/2023 12.80 (H) 4.31 - 10.16 Thousand/uL Final     Hemoglobin   Date Value Ref Range Status   02/13/2023 10.2 (L) 12.0 - 17.0 g/dL Final   02/12/2023 10.5 (L) 12.0 - 17.0 g/dL Final   02/11/2023 11.1 (L) 12.0 - 17.0 g/dL Final     Platelets   Date Value Ref Range Status   02/13/2023 156 149 - 390 Thousands/uL Final   02/12/2023 216 149 - 390 Thousands/uL Final   02/11/2023 242 149 - 390 Thousands/uL Final     PTT Date Value Ref Range Status   02/11/2023 33 23 - 37 seconds Final     Comment:     Therapeutic Heparin Range =  60-90 seconds   02/10/2023 38 (H) 23 - 37 seconds Final     Comment:     Therapeutic Heparin Range =  60-90 seconds     INR   Date Value Ref Range Status   02/11/2023 1.13 0.84 - 1.19 Final   02/10/2023 1.10 0.84 - 1.19 Final     No results found for: "CKMB", "DIGOXIN"  No results found for: "TSH"  No results found for: "CHOL"   No results found for: "HGBA1C"  Blood Culture   Date Value Ref Range Status   01/13/2023 No Growth After 5 Days. Final   01/13/2023 No Growth After 5 Days. Final     Urine Culture   Date Value Ref Range Status   04/06/2023 >100,000 cfu/ml Klebsiella pneumoniae (A)  Final     Comment: This organism has been edited. The previous result was Gram Negative Mark Enteric Like on 4/7/2023 at 1556 EDT.   02/07/2023 >100,000 cfu/ml Staphylococcus coagulase negative (A)  Final   01/12/2023 50,000-59,000 cfu/ml Enterococcus faecalis (A)  Final   12/30/2022 >100,000 cfu/ml Enterococcus faecalis (A)  Final       *-*-*-*-*-*-*-*-*-*-*-*-*-*-*-*-*-*-*-*-*-*-*-*-*-*-*-*-*-*-*-*-*-*-*-*-*-*-*-*-*-*-*-*-*-*-*-*-*-*-*-*-*-*-  PREVIOUS CARDIOLOGY & RADIOLOGY TEST RESULTS   I have personally reviewed pertinent results of cardiovascular tests noted below. No results found for this or any previous visit. No results found for this or any previous visit. No results found for this or any previous visit. No results found for this or any previous visit. Cardiac EP device report  CARELINK TRANSMISSION: BATTERY VOLTAGE ADEQUATE. (13.7 YRS)  49%. ALL AVAILABLE LEAD PARAMETERS WITHIN NORMAL LIMITS. NO SIGNIFICANT HIGH RATE EPISODES. NORMAL DEVICE FUNCTION. ---[facility]        *-*-*-*-*-*-*-*-*-*-*-*-*-*-*-*-*-*-*-*-*-*-*-*-*-*-*-*-*-*-*-*-*-*-*-*-*-*-*-*-*-*-*-*-*-*-*-*-*-*-*-*-*-*-  SIGNATURES:   @GXZ@   Rose Marie Singh MD; MHA    *-*-*-*-*-*-*-*-*-*-*-*-*-*-*-*-*-*-*-*-*-*-*-*-*-*-*-*-*-*-*-*-*-*-*-*-*-*-*-*-*-*-*-*-*-*-*-*-*-*-*-*-*-*-  PAST MEDICAL HISTORY:  Past Medical History:   Diagnosis Date   • Coronary artery disease    • H/O atrial flutter    • History of aortic valve replacement with bioprosthetic valve    • Hyperlipemia    • Hypertension    • Pacemaker    • Skin cancer, basal cell    • Walker as ambulation aid    • Wears dentures     PAST SURGICAL HISTORY:  Past Surgical History:   Procedure Laterality Date   • A-V CARDIAC PACEMAKER INSERTION     • AORTIC VALVE REPLACEMENT     • BACK SURGERY     • CARDIAC PACEMAKER PLACEMENT Left     2021   • CATARACT EXTRACTION     • CORONARY ANGIOPLASTY WITH STENT PLACEMENT      stents times 3   • LUMBAR FUSION N/A 02/10/2023    Procedure: T9-L3 FUSION LUMBAR/THORACIC POSTERIOR MINIMALLY INVASIVE W ROBOT T10-T12 decompressive laminectomy;  Surgeon: Rema Smith MD;  Location:  MAIN OR;  Service: Neurosurgery   • OK CYSTO BLADDER W/URETERAL CATHETERIZATION Right 01/13/2023    Procedure: CYSTOSCOPY RETROGRADE PYELOGRAM WITH INSERTION STENT URETERAL;  Surgeon: Juan Rob MD;  Location: [facility];  Service: Urology   • OK CYSTO BLADDER W/URETERAL CATHETERIZATION Right 01/26/2023    Procedure: CYSTOSCOPY WITH RETROGRADE PYELOGRAM;  Surgeon: Juan Rob MD;  Location: [facility];  Service: Urology   • OK CYSTO/URETERO W/LITHOTRIPSY &INDWELL STENT INSRT Right 01/26/2023    Procedure: CYSTOSCOPY URETEROSCOPY WITH LITHOTRIPSY HOLMIUM LASER, RETROGRADE PYELOGRAM AND INSERTION STENT URETERAL;  Surgeon: Juan Rob MD;  Location: [facility];  Service: Urology         FAMILY HISTORY:  Family History   Problem Relation Age of Onset   • No Known Problems Father    • No Known Problems Mother     SOCIAL HISTORY:  Social History     Tobacco Use   Smoking Status Former   Smokeless Tobacco Never      Social History     Substance and Sexual Activity   Alcohol Use Not Currently Comment: social     Social History     Substance and Sexual Activity   Drug Use Never    [unfilled]     *-*-*-*-*-*-*-*-*-*-*-*-*-*-*-*-*-*-*-*-*-*-*-*-*-*-*-*-*-*-*-*-*-*-*-*-*-*-*-*-*-*-*-*-*-*-*-*-*-*-*-*-*-*  ALLERGIES:  Allergies   Allergen Reactions   • Ezetimibe-Simvastatin Dizziness and Lightheadedness     Other reaction(s): MAKES HIM DIZZY     • Simvastatin Lightheadedness     Other reaction(s): Dizziness    CURRENT SCHEDULED MEDICATIONS:    Current Outpatient Medications:   •  acetaminophen (TYLENOL) 325 mg tablet, Take 3 tablets (975 mg total) by mouth every 8 (eight) hours (Patient taking differently: Take 975 mg by mouth if needed), Disp: , Rfl: 0  •  apixaban (ELIQUIS) 5 mg, Take 5 mg by mouth 2 (two) times a day, Disp: , Rfl:   •  atorvastatin (LIPITOR) 40 mg tablet, Take 40 mg by mouth daily, Disp: , Rfl:   •  Cholecalciferol 25 MCG (1000 UT) tablet, Take by mouth, Disp: , Rfl:   •  enalapril (VASOTEC) 2.5 mg tablet, Take 1 tablet (2.5 mg total) by mouth in the morning, Disp: 90 tablet, Rfl: 3  •  folic acid (FOLVITE) 417 mcg tablet, Take 400 mcg by mouth daily, Disp: , Rfl:   •  metoprolol tartrate (LOPRESSOR) 50 mg tablet, Take 1 tablet (50 mg total) by mouth 2 (two) times a day, Disp: 180 tablet, Rfl: 3  •  tamsulosin (FLOMAX) 0.4 mg, Take 1 capsule (0.4 mg total) by mouth daily with dinner, Disp: 90 capsule, Rfl: 3     *-*-*-*-*-*-*-*-*-*-*-*-*-*-*-*-*-*-*-*-*-*-*-*-*-*-*-*-*-*-*-*-*-*-*-*-*-*-*-*-*-*-*-*-*-*-*-*-*-*-*-*-*-*

## 2023-08-25 NOTE — ASSESSMENT & PLAN NOTE
Mr. Alec Trammell is overall doing well from cardiac perspective with no recent symptoms that suggest angina or decompensated heart failure. His blood pressure is reasonably well controlled. He is on good medical regimen. Physical examination does not suggest pulmonary or peripheral vascular congestion. His last renal function was noted to be normal.  He has normal functioning single-chamber pacemaker device. No recent arrhythmias were noted. -- At this time I am advising him to continue current medications. -- He will continue to be followed in the device clinic as scheduled. We will plan for a follow-up visit in 6 months time. -- Dietary and medical compliance are reinforced. -- He is advised  to report any concerning symptoms such as chest pain, shortness of breath, decline in exercise tolerance or presyncope/syncope.

## 2023-08-29 ENCOUNTER — APPOINTMENT (OUTPATIENT)
Dept: PHYSICAL THERAPY | Facility: CLINIC | Age: 88
End: 2023-08-29
Payer: COMMERCIAL

## 2023-08-31 ENCOUNTER — APPOINTMENT (OUTPATIENT)
Dept: PHYSICAL THERAPY | Facility: CLINIC | Age: 88
End: 2023-08-31
Payer: COMMERCIAL

## 2023-10-23 ENCOUNTER — REMOTE DEVICE CLINIC VISIT (OUTPATIENT)
Dept: CARDIOLOGY CLINIC | Facility: CLINIC | Age: 88
End: 2023-10-23
Payer: COMMERCIAL

## 2023-10-23 DIAGNOSIS — Z95.0 CARDIAC PACEMAKER IN SITU: Primary | ICD-10-CM

## 2023-10-23 PROCEDURE — 93294 REM INTERROG EVL PM/LDLS PM: CPT | Performed by: INTERNAL MEDICINE

## 2023-10-23 PROCEDURE — 93296 REM INTERROG EVL PM/IDS: CPT | Performed by: INTERNAL MEDICINE

## 2023-10-24 NOTE — PROGRESS NOTES
Results for orders placed or performed in visit on 10/23/23   Cardiac EP device report    Narrative    CARELINK TRANSMISSION: BATTERY STATUS "13 YRS"  33%. ALL AVAILABLE LEAD PARAMETERS WITHIN NORMAL LIMITS. NO SIGNIFICANT HIGH RATE EPISODES. NORMAL DEVICE FUNCTION.  NC

## 2023-11-22 NOTE — PLAN OF CARE
Problem: MOBILITY - ADULT  Goal: Maintain or return to baseline ADL function  Description: INTERVENTIONS:  -  Assess patient's ability to carry out ADLs; assess patient's baseline for ADL function and identify physical deficits which impact ability to perform ADLs (bathing, care of mouth/teeth, toileting, grooming, dressing, etc )  - Assess/evaluate cause of self-care deficits   - Assess range of motion  - Assess patient's mobility; develop plan if impaired  - Assess patient's need for assistive devices and provide as appropriate  - Encourage maximum independence but intervene and supervise when necessary  - Involve family in performance of ADLs  - Assess for home care needs following discharge   - Consider OT consult to assist with ADL evaluation and planning for discharge  - Provide patient education as appropriate  Outcome: Progressing  Goal: Maintains/Returns to pre admission functional level  Description: INTERVENTIONS:  - Perform BMAT or MOVE assessment daily    - Set and communicate daily mobility goal to care team and patient/family/caregiver  - Collaborate with rehabilitation services on mobility goals if consulted  - Perform Range of Motion 3 times a day  - Reposition patient every 2 hours    - Dangle patient 3 times a day  - Stand patient 3 times a day  - Ambulate patient 3 times a day  - Out of bed to chair 3 times a day   - Out of bed for meals 3 times a day  - Out of bed for toileting  - Record patient progress and toleration of activity level   Outcome: Progressing     Problem: Prexisting or High Potential for Compromised Skin Integrity  Goal: Skin integrity is maintained or improved  Description: INTERVENTIONS:  - Identify patients at risk for skin breakdown  - Assess and monitor skin integrity  - Assess and monitor nutrition and hydration status  - Monitor labs   - Assess for incontinence   - Turn and reposition patient  - Assist with mobility/ambulation  - Relieve pressure over bony prominences  - Avoid friction and shearing  - Provide appropriate hygiene as needed including keeping skin clean and dry  - Evaluate need for skin moisturizer/barrier cream  - Collaborate with interdisciplinary team   - Patient/family teaching  - Consider wound care consult   Outcome: Progressing     Problem: NEUROSENSORY - ADULT  Goal: Achieves stable or improved neurological status  Description: INTERVENTIONS  - Monitor and report changes in neurological status  - Monitor vital signs such as temperature, blood pressure, glucose, and any other labs ordered   - Initiate measures to prevent increased intracranial pressure  - Monitor for seizure activity and implement precautions if appropriate      Outcome: Progressing  Goal: Achieves maximal functionality and self care  Description: INTERVENTIONS  - Monitor swallowing and airway patency with patient fatigue and changes in neurological status  - Encourage and assist patient to increase activity and self care     - Encourage visually impaired, hearing impaired and aphasic patients to use assistive/communication devices  Outcome: Progressing     Problem: PAIN - ADULT  Goal: Verbalizes/displays adequate comfort level or baseline comfort level  Description: Interventions:  - Encourage patient to monitor pain and request assistance  - Assess pain using appropriate pain scale  - Administer analgesics based on type and severity of pain and evaluate response  - Implement non-pharmacological measures as appropriate and evaluate response  - Consider cultural and social influences on pain and pain management  - Notify physician/advanced practitioner if interventions unsuccessful or patient reports new pain  Outcome: Progressing     Problem: INFECTION - ADULT  Goal: Absence or prevention of progression during hospitalization  Description: INTERVENTIONS:  - Assess and monitor for signs and symptoms of infection  - Monitor lab/diagnostic results  - Monitor all insertion sites, i e  indwelling lines, tubes, and drains  - Monitor endotracheal if appropriate and nasal secretions for changes in amount and color  - [place] appropriate cooling/warming therapies per order  - Administer medications as ordered  - Instruct and encourage patient and family to use good hand hygiene technique  - Identify and instruct in appropriate isolation precautions for identified infection/condition  Outcome: Progressing  Goal: Absence of fever/infection during neutropenic period  Description: INTERVENTIONS:  - Monitor WBC    Outcome: Progressing     Problem: SAFETY ADULT  Goal: Maintain or return to baseline ADL function  Description: INTERVENTIONS:  -  Assess patient's ability to carry out ADLs; assess patient's baseline for ADL function and identify physical deficits which impact ability to perform ADLs (bathing, care of mouth/teeth, toileting, grooming, dressing, etc )  - Assess/evaluate cause of self-care deficits   - Assess range of motion  - Assess patient's mobility; develop plan if impaired  - Assess patient's need for assistive devices and provide as appropriate  - Encourage maximum independence but intervene and supervise when necessary  - Involve family in performance of ADLs  - Assess for home care needs following discharge   - Consider OT consult to assist with ADL evaluation and planning for discharge  - Provide patient education as appropriate  Outcome: Progressing  Goal: Maintains/Returns to pre admission functional level  Description: INTERVENTIONS:  - Perform BMAT or MOVE assessment daily    - Set and communicate daily mobility goal to care team and patient/family/caregiver  - Collaborate with rehabilitation services on mobility goals if consulted  - Perform Range of Motion 3 times a day  - Reposition patient every 2 hours    - Dangle patient 3 times a day  - Stand patient 3 times a day  - Ambulate patient 3 times a day  - Out of bed to chair 3 times a day   - Out of bed for meals 3 times a day  - Out of bed for toileting  - Record patient progress and toleration of activity level   Outcome: Progressing  Goal: Patient will remain free of falls  Description: INTERVENTIONS:  - Educate patient/family on patient safety including physical limitations  - Instruct patient to call for assistance with activity   - Consult OT/PT to assist with strengthening/mobility   - Keep Call bell within reach  - Keep bed low and locked with side rails adjusted as appropriate  - Keep care items and personal belongings within reach  - Initiate and maintain comfort rounds  - Make Fall Risk Sign visible to staff  - Apply yellow socks and bracelet for high fall risk patients  - Consider moving patient to room near nurses station  Outcome: Progressing     Problem: DISCHARGE PLANNING  Goal: Discharge to home or other facility with appropriate resources  Description: INTERVENTIONS:  - Identify barriers to discharge w/patient and caregiver  - Arrange for needed discharge resources and transportation as appropriate  - Identify discharge learning needs (meds, wound care, etc )  - Arrange for interpretive services to assist at discharge as needed  - Refer to Case Management Department for coordinating discharge planning if the patient needs post-hospital services based on physician/advanced practitioner order or complex needs related to functional status, cognitive ability, or social support system  Outcome: Progressing     Problem: Knowledge Deficit  Goal: Patient/family/caregiver demonstrates understanding of disease process, treatment plan, medications, and discharge instructions  Description: Complete learning assessment and assess knowledge base    Interventions:  - Provide teaching at level of understanding  - Provide teaching via preferred learning methods  Outcome: Progressing room air

## 2024-01-22 ENCOUNTER — REMOTE DEVICE CLINIC VISIT (OUTPATIENT)
Dept: CARDIOLOGY CLINIC | Facility: CLINIC | Age: 89
End: 2024-01-22
Payer: COMMERCIAL

## 2024-01-22 DIAGNOSIS — Z95.0 PRESENCE OF PERMANENT CARDIAC PACEMAKER: Primary | ICD-10-CM

## 2024-01-22 PROCEDURE — 93294 REM INTERROG EVL PM/LDLS PM: CPT | Performed by: INTERNAL MEDICINE

## 2024-01-22 PROCEDURE — 93296 REM INTERROG EVL PM/IDS: CPT | Performed by: INTERNAL MEDICINE

## 2024-01-22 NOTE — PROGRESS NOTES
MDT SC PM / ACTIVE SYSTEM IS MRI CONDITIONAL   CARELINK TRANSMISSION:  BATTERY VOLTAGE ADEQUATE (13.3 YR.).   34.2%.  ALL LEAD PARAMETERS WITHIN NORMAL LIMITS.  2 DEVICE CLASSIFIED VT EPISODES WITH EGMS SHOWINH RVR UP  BPM.  PATIENT TAKES METOPROLOL AND ELIQUIS.  NORMAL DEVICE FUNCTION.  RG

## 2024-01-29 ENCOUNTER — OFFICE VISIT (OUTPATIENT)
Dept: URGENT CARE | Facility: MEDICAL CENTER | Age: 89
End: 2024-01-29
Payer: COMMERCIAL

## 2024-01-29 VITALS
RESPIRATION RATE: 18 BRPM | TEMPERATURE: 98 F | SYSTOLIC BLOOD PRESSURE: 128 MMHG | HEIGHT: 70 IN | BODY MASS INDEX: 30.03 KG/M2 | DIASTOLIC BLOOD PRESSURE: 74 MMHG | OXYGEN SATURATION: 99 % | HEART RATE: 62 BPM | WEIGHT: 209.8 LBS

## 2024-01-29 DIAGNOSIS — B02.9 HERPES ZOSTER WITHOUT COMPLICATION: Primary | ICD-10-CM

## 2024-01-29 PROBLEM — I49.5 SICK SINUS SYNDROME (HCC): Status: ACTIVE | Noted: 2021-09-17

## 2024-01-29 PROBLEM — Z96.0 PRESENCE OF UROGENITAL IMPLANTS: Status: ACTIVE | Noted: 2023-02-15

## 2024-01-29 PROBLEM — G89.11 ACUTE PAIN DUE TO TRAUMA: Status: ACTIVE | Noted: 2023-02-15

## 2024-01-29 PROBLEM — U07.1 COVID-19: Status: ACTIVE | Noted: 2023-02-24

## 2024-01-29 PROBLEM — R29.6 REPEATED FALLS: Status: ACTIVE | Noted: 2023-02-15

## 2024-01-29 PROBLEM — R33.9 RETENTION OF URINE, UNSPECIFIED: Status: ACTIVE | Noted: 2023-02-15

## 2024-01-29 PROBLEM — I65.21 STENOSIS OF RIGHT CAROTID ARTERY: Status: ACTIVE | Noted: 2021-08-06

## 2024-01-29 PROBLEM — Z47.89 ENCOUNTER FOR OTHER ORTHOPEDIC AFTERCARE: Status: ACTIVE | Noted: 2023-02-15

## 2024-01-29 PROBLEM — I65.23 CAROTID STENOSIS, ASYMPTOMATIC, BILATERAL: Status: ACTIVE | Noted: 2019-06-29

## 2024-01-29 PROBLEM — Z95.5 CORONARY STENT PATENT: Status: ACTIVE | Noted: 2019-06-29

## 2024-01-29 PROBLEM — S32.018D: Status: ACTIVE | Noted: 2023-02-15

## 2024-01-29 PROCEDURE — 99212 OFFICE O/P EST SF 10 MIN: CPT

## 2024-01-29 PROCEDURE — S9083 URGENT CARE CENTER GLOBAL: HCPCS

## 2024-01-29 NOTE — PROGRESS NOTES
[facility]        NAME: William Martel is a 88 y.o. male  : 1935    MRN: [id number]  DATE: 2024  TIME: 4:17 PM    Assessment and Plan   Herpes zoster without complication [B02.9]  1. Herpes zoster without complication              Patient Instructions   Please see the patient's After Visit Summary for patient provided instructions.   Other verbal instructions given as noted within.    Follow up with PCP in 3-5 days.  Proceed to  ER if symptoms worsen.    Chief Complaint     Chief Complaint   Patient presents with   • Rash     Pt noticed a rash on his R chest x 1 week ago. The rash is red, itchy and does have a blister on one area. The rash is on the R chest going over the R shoulder and up the R side of the neck. Cortisone cream used for itching and also gold bond lotion with aloe with some relief.          History of Present Illness       Patient here with his daughter who helps with providing history. Per the patient he started about 1 week ago + with a rash on his right chest. Yesterday was more sever with pain. He has had the PCV 13 vaccine in 7/15/2016.       Recommended reaching out to PCP should he have any continued pain or post-herpetic neuralgia for further treatment options. Daughter and patient verbalized understanding.        Review of Systems   Review of Systems   Constitutional:  Negative for appetite change, chills, fatigue and fever.   HENT:  Positive for congestion and rhinorrhea. Negative for ear pain, sinus pressure, sneezing, sore throat and trouble swallowing.    Respiratory:  Negative for cough and shortness of breath.    Cardiovascular:  Negative for chest pain and palpitations.   Gastrointestinal:  Negative for abdominal pain, constipation, diarrhea, nausea and vomiting.   Musculoskeletal:  Negative for arthralgias and back pain.   Skin:  Positive for rash. Negative for color change.   Neurological:  Negative for dizziness, light-headedness and headaches.    All other systems reviewed and are negative.        Current Medications       Current Outpatient Medications:   •  acetaminophen (TYLENOL) 325 mg tablet, Take 3 tablets (975 mg total) by mouth every 8 (eight) hours (Patient taking differently: Take 975 mg by mouth if needed), Disp: , Rfl: 0  •  apixaban (ELIQUIS) 5 mg, Take 5 mg by mouth 2 (two) times a day, Disp: , Rfl:   •  atorvastatin (LIPITOR) 40 mg tablet, Take 40 mg by mouth daily, Disp: , Rfl:   •  enalapril (VASOTEC) 2.5 mg tablet, Take 1 tablet (2.5 mg total) by mouth in the morning, Disp: 90 tablet, Rfl: 3  •  folic acid (FOLVITE) 400 mcg tablet, Take 400 mcg by mouth daily, Disp: , Rfl:   •  metoprolol tartrate (LOPRESSOR) 50 mg tablet, Take 1 tablet (50 mg total) by mouth 2 (two) times a day, Disp: 180 tablet, Rfl: 3  •  tamsulosin (FLOMAX) 0.4 mg, Take 1 capsule (0.4 mg total) by mouth daily with dinner, Disp: 90 capsule, Rfl: 3  •  Cholecalciferol 25 MCG (1000 UT) tablet, Take by mouth (Patient not taking: Reported on 1/29/2024), Disp: , Rfl:     Current Allergies     Allergies as of 01/29/2024 - Reviewed 01/29/2024   Allergen Reaction Noted   • Ezetimibe-simvastatin Dizziness and Lightheadedness 01/02/2020   • Simvastatin Lightheadedness 07/02/2019            The following portions of the patient's history were reviewed and updated as appropriate: allergies, current medications, past family history, past medical history, past social history, past surgical history and problem list.     Past Medical History:   Diagnosis Date   • Coronary artery disease    • H/O atrial flutter    • History of aortic valve replacement with bioprosthetic valve    • Hyperlipemia    • Hypertension    • Pacemaker    • Skin cancer, basal cell    • Walker as ambulation aid    • Wears dentures        Past Surgical History:   Procedure Laterality Date   • A-V CARDIAC PACEMAKER INSERTION     • AORTIC VALVE REPLACEMENT     • BACK SURGERY     • CARDIAC PACEMAKER PLACEMENT Left   "   2021   • CATARACT EXTRACTION     • CORONARY ANGIOPLASTY WITH STENT PLACEMENT      stents times 3   • LUMBAR FUSION N/A 02/10/2023    Procedure: T9-L3 FUSION LUMBAR/THORACIC POSTERIOR MINIMALLY INVASIVE W ROBOT T10-T12 decompressive laminectomy;  Surgeon: Min Dillon MD;  [facility]: [facility];  Service: Neurosurgery   • IL CYSTO BLADDER W/URETERAL CATHETERIZATION Right 01/13/2023    Procedure: CYSTOSCOPY RETROGRADE PYELOGRAM WITH INSERTION STENT URETERAL;  Surgeon: Frank D'Amico, MD;  Location: [facility];  Service: Urology   • IL CYSTO BLADDER W/URETERAL CATHETERIZATION Right 01/26/2023    Procedure: CYSTOSCOPY WITH RETROGRADE PYELOGRAM;  Surgeon: Frank D'Amico, MD;  Location[facility];  Service: Urology   • IL CYSTO/URETERO W/LITHOTRIPSY &INDWELL STENT INSRT Right 01/26/2023    Procedure: CYSTOSCOPY URETEROSCOPY WITH LITHOTRIPSY HOLMIUM LASER, RETROGRADE PYELOGRAM AND INSERTION STENT URETERAL;  Surgeon: Frank D'Amico, MD;  Location: [facility];  Service: Urology       Family History   Problem Relation Age of Onset   • No Known Problems Father    • No Known Problems Mother          Medications have been verified.        Objective   /74   Pulse 62   Temp 98 °F (36.7 °C)   Resp 18   Ht 5' 10\" (1.778 m)   Wt 95.2 kg (209 lb 12.8 oz)   SpO2 99%   BMI 30.10 kg/m²        Physical Exam     Physical Exam  Vitals and nursing note reviewed.   Constitutional:       General: He is not in acute distress.     Appearance: Normal appearance. He is normal weight. He is not ill-appearing.   HENT:      Head: Normocephalic and atraumatic.   Cardiovascular:      Rate and Rhythm: Normal rate and regular rhythm.      Pulses: Normal pulses.      Heart sounds: Normal heart sounds.   Pulmonary:      Effort: Pulmonary effort is normal.      Breath sounds: Normal breath sounds.   Abdominal:      General: Abdomen is flat. Bowel sounds are normal.      Palpations: Abdomen is soft.   Musculoskeletal:         General: " Normal range of motion.      Cervical back: Normal range of motion and neck supple.   Skin:     General: Skin is warm.      Capillary Refill: Capillary refill takes less than 2 seconds.      Findings: Rash present. Rash is pustular and vesicular.             Comments: Two small blistering area (blister remains intact) on the right mid lateral chest.    Neurological:      General: No focal deficit present.      Mental Status: He is alert and oriented to person, place, and time.   Psychiatric:         Mood and Affect: Mood normal.         Behavior: Behavior normal.

## 2024-02-19 NOTE — PROGRESS NOTES
"Daily Note     Today's date: 2023  Patient name: Elvin Ulloa  : 1935  MRN: [id number]  Referring provider: Aleksandar Dunne  Dx:   Encounter Diagnosis     ICD-10-CM    1  Encounter for postoperative care related to surgical joint fusion  Z48 89     Z98 1                      Subjective: Pt reports he is doing well  States having some mild soreness in LS from driving in car today  Objective: See treatment diary below      Assessment: Tolerated treatment well  Patient exhibited good technique with therapeutic exercises  Pt cont to make gains with nesha to added exercises and reps  Pt nesha well with some mild fatigue  Cont to nesha well with min pain  Plan: Continue per plan of care          Precautions: Fall Risk, Pacemaker, HTN       Manuals     BLE    Hams/gastroc 10'                            Neuro Re-Ed         MTP/LTP        SLS  3x10\" 3x10'' 3/10\"    Tandem Stance        Sidestepping  0# 3laps 5 laps // bars 10 laps     Tandem Amb                Postural Ed ML       Ther Ex        NuStep L3 5 mins L3 6' L3 10' strength L3 10' strength    SLR x 3  10x 10x 10x ea    Marches  10x 15x 15x ea    LAQ  10x 0# 2/10  2/10    LTR   20x  10x    SKTC   10x10\" 10x10\"    S/L Hip Abd        Clamshells   Supine L3 2/10 Supine L3 2/10    Add squeeze    3\" 2/10    Ther Activity        Stepups F/L  4\" 10x 4\" 10x ea 4\" 10x ea    Stepdowns        STS   2x5 10x     Gait Training        With [facility]                Modalities        HP/CP prn                               " PAST SURGICAL HISTORY:  No significant past surgical history

## 2024-02-20 ENCOUNTER — HOSPITAL ENCOUNTER (EMERGENCY)
Facility: HOSPITAL | Age: 89
Discharge: HOME | DRG: 853 | End: 2024-02-21
Attending: STUDENT IN AN ORGANIZED HEALTH CARE EDUCATION/TRAINING PROGRAM
Payer: COMMERCIAL

## 2024-02-20 DIAGNOSIS — R33.8 ACUTE URINARY RETENTION: Primary | ICD-10-CM

## 2024-02-20 DIAGNOSIS — R33.8 BENIGN PROSTATIC HYPERPLASIA WITH URINARY RETENTION: ICD-10-CM

## 2024-02-20 DIAGNOSIS — N40.1 BENIGN PROSTATIC HYPERPLASIA WITH URINARY RETENTION: ICD-10-CM

## 2024-02-20 PROCEDURE — 99283 EMERGENCY DEPT VISIT LOW MDM: CPT

## 2024-02-20 RX ORDER — LIDOCAINE HYDROCHLORIDE 20 MG/ML
1 JELLY TOPICAL ONCE
Status: COMPLETED | OUTPATIENT
Start: 2024-02-20 | End: 2024-02-20

## 2024-02-20 RX ADMIN — LIDOCAINE HYDROCHLORIDE 1 APPLICATION: 20 JELLY TOPICAL at 23:36

## 2024-02-21 ENCOUNTER — ANESTHESIA (INPATIENT)
Dept: PERIOP | Facility: HOSPITAL | Age: 89
DRG: 853 | End: 2024-02-21
Payer: COMMERCIAL

## 2024-02-21 ENCOUNTER — ANESTHESIA EVENT (INPATIENT)
Dept: PERIOP | Facility: HOSPITAL | Age: 89
DRG: 853 | End: 2024-02-21
Payer: COMMERCIAL

## 2024-02-21 ENCOUNTER — HOSPITAL ENCOUNTER (INPATIENT)
Facility: HOSPITAL | Age: 89
LOS: 9 days | Discharge: HOME HEALTH | DRG: 853 | End: 2024-03-01
Attending: EMERGENCY MEDICINE | Admitting: SPECIALIST
Payer: COMMERCIAL

## 2024-02-21 ENCOUNTER — APPOINTMENT (EMERGENCY)
Dept: CT IMAGING | Facility: HOSPITAL | Age: 89
DRG: 853 | End: 2024-02-21
Payer: COMMERCIAL

## 2024-02-21 VITALS
TEMPERATURE: 97.3 F | HEIGHT: 71 IN | OXYGEN SATURATION: 98 % | SYSTOLIC BLOOD PRESSURE: 163 MMHG | DIASTOLIC BLOOD PRESSURE: 85 MMHG | WEIGHT: 203 LBS | BODY MASS INDEX: 28.42 KG/M2 | HEART RATE: 85 BPM | RESPIRATION RATE: 18 BRPM

## 2024-02-21 DIAGNOSIS — I48.92 ATRIAL FIBRILLATION AND FLUTTER (HCC): ICD-10-CM

## 2024-02-21 DIAGNOSIS — S22.089A TRAUMATIC FRACTURES OF T12 AND L1 VERTEBRAE (HCC): ICD-10-CM

## 2024-02-21 DIAGNOSIS — I48.91 ATRIAL FIBRILLATION AND FLUTTER (HCC): ICD-10-CM

## 2024-02-21 DIAGNOSIS — R10.9 ABDOMINAL PAIN: Primary | ICD-10-CM

## 2024-02-21 DIAGNOSIS — R29.6 REPEATED FALLS: ICD-10-CM

## 2024-02-21 DIAGNOSIS — K92.2 GIB (GASTROINTESTINAL BLEEDING): ICD-10-CM

## 2024-02-21 DIAGNOSIS — K40.90 RIGHT INGUINAL HERNIA: ICD-10-CM

## 2024-02-21 DIAGNOSIS — N39.0 URINARY TRACT INFECTION: ICD-10-CM

## 2024-02-21 DIAGNOSIS — J90 PLEURAL EFFUSION: ICD-10-CM

## 2024-02-21 DIAGNOSIS — K40.30 INCARCERATED INGUINAL HERNIA: ICD-10-CM

## 2024-02-21 DIAGNOSIS — S32.019A TRAUMATIC FRACTURES OF T12 AND L1 VERTEBRAE (HCC): ICD-10-CM

## 2024-02-21 DIAGNOSIS — K56.609 SMALL BOWEL OBSTRUCTION (HCC): ICD-10-CM

## 2024-02-21 PROBLEM — N30.01 ACUTE CYSTITIS WITH HEMATURIA: Status: RESOLVED | Noted: 2023-01-13 | Resolved: 2024-02-21

## 2024-02-21 PROBLEM — I27.20 PULMONARY HYPERTENSION (HCC): Status: ACTIVE | Noted: 2024-02-21

## 2024-02-21 PROBLEM — A41.9 SEPSIS (HCC): Status: RESOLVED | Noted: 2024-02-21 | Resolved: 2024-02-21

## 2024-02-21 PROBLEM — B02.9 HERPES ZOSTER WITHOUT COMPLICATION: Status: ACTIVE | Noted: 2024-02-21

## 2024-02-21 PROBLEM — A41.9 SEPSIS (HCC): Status: ACTIVE | Noted: 2024-02-21

## 2024-02-21 LAB
ABO GROUP BLD: NORMAL
ALBUMIN SERPL BCP-MCNC: 4.2 G/DL (ref 3.5–5)
ALP SERPL-CCNC: 110 U/L (ref 34–104)
ALT SERPL W P-5'-P-CCNC: 11 U/L (ref 7–52)
ANION GAP SERPL CALCULATED.3IONS-SCNC: 12 MMOL/L
APTT PPP: 37 SECONDS (ref 23–37)
AST SERPL W P-5'-P-CCNC: 11 U/L (ref 13–39)
BACTERIA UR QL AUTO: ABNORMAL /HPF
BACTERIA UR QL AUTO: ABNORMAL /HPF
BASOPHILS # BLD AUTO: 0.04 THOUSANDS/ÂΜL (ref 0–0.1)
BASOPHILS NFR BLD AUTO: 0 % (ref 0–1)
BILIRUB SERPL-MCNC: 0.79 MG/DL (ref 0.2–1)
BILIRUB UR QL STRIP: ABNORMAL
BILIRUB UR QL STRIP: ABNORMAL
BLD GP AB SCN SERPL QL: NEGATIVE
BUN SERPL-MCNC: 19 MG/DL (ref 5–25)
CALCIUM SERPL-MCNC: 10.1 MG/DL (ref 8.4–10.2)
CHLORIDE SERPL-SCNC: 98 MMOL/L (ref 96–108)
CLARITY UR: ABNORMAL
CLARITY UR: CLEAR
CO2 SERPL-SCNC: 28 MMOL/L (ref 21–32)
COLOR UR: ABNORMAL
COLOR UR: YELLOW
CREAT SERPL-MCNC: 1.15 MG/DL (ref 0.6–1.3)
EOSINOPHIL # BLD AUTO: 0 THOUSAND/ÂΜL (ref 0–0.61)
EOSINOPHIL NFR BLD AUTO: 0 % (ref 0–6)
ERYTHROCYTE [DISTWIDTH] IN BLOOD BY AUTOMATED COUNT: 15.3 % (ref 11.6–15.1)
GFR SERPL CREATININE-BSD FRML MDRD: 56 ML/MIN/1.73SQ M
GLUCOSE SERPL-MCNC: 163 MG/DL (ref 65–140)
GLUCOSE SERPL-MCNC: 258 MG/DL (ref 65–140)
GLUCOSE UR STRIP-MCNC: ABNORMAL MG/DL
GLUCOSE UR STRIP-MCNC: NEGATIVE MG/DL
HCT VFR BLD AUTO: 50.5 % (ref 36.5–49.3)
HGB BLD-MCNC: 16.3 G/DL (ref 12–17)
HGB UR QL STRIP.AUTO: ABNORMAL
HGB UR QL STRIP.AUTO: NEGATIVE
IMM GRANULOCYTES # BLD AUTO: 0.07 THOUSAND/UL (ref 0–0.2)
IMM GRANULOCYTES NFR BLD AUTO: 0 % (ref 0–2)
INR PPP: 1.17 (ref 0.84–1.19)
KETONES UR STRIP-MCNC: ABNORMAL MG/DL
KETONES UR STRIP-MCNC: NEGATIVE MG/DL
LACTATE SERPL-SCNC: 2 MMOL/L (ref 0.5–2)
LACTATE SERPL-SCNC: 2.8 MMOL/L (ref 0.5–2)
LEUKOCYTE ESTERASE UR QL STRIP: ABNORMAL
LEUKOCYTE ESTERASE UR QL STRIP: ABNORMAL
LIPASE SERPL-CCNC: 19 U/L (ref 11–82)
LYMPHOCYTES # BLD AUTO: 0.97 THOUSANDS/ÂΜL (ref 0.6–4.47)
LYMPHOCYTES NFR BLD AUTO: 6 % (ref 14–44)
MCH RBC QN AUTO: 27.4 PG (ref 26.8–34.3)
MCHC RBC AUTO-ENTMCNC: 32.3 G/DL (ref 31.4–37.4)
MCV RBC AUTO: 85 FL (ref 82–98)
MONOCYTES # BLD AUTO: 0.8 THOUSAND/ÂΜL (ref 0.17–1.22)
MONOCYTES NFR BLD AUTO: 5 % (ref 4–12)
NEUTROPHILS # BLD AUTO: 13.75 THOUSANDS/ÂΜL (ref 1.85–7.62)
NEUTS SEG NFR BLD AUTO: 89 % (ref 43–75)
NITRITE UR QL STRIP: NEGATIVE
NITRITE UR QL STRIP: NEGATIVE
NON-SQ EPI CELLS URNS QL MICRO: ABNORMAL /HPF
NON-SQ EPI CELLS URNS QL MICRO: ABNORMAL /HPF
NRBC BLD AUTO-RTO: 0 /100 WBCS
OTHER STN SPEC: ABNORMAL
OTHER STN SPEC: ABNORMAL
PH UR STRIP.AUTO: 6 [PH]
PH UR STRIP.AUTO: 7 [PH]
PLATELET # BLD AUTO: 256 THOUSANDS/UL (ref 149–390)
PMV BLD AUTO: 10.7 FL (ref 8.9–12.7)
POTASSIUM SERPL-SCNC: 4.3 MMOL/L (ref 3.5–5.3)
PROCALCITONIN SERPL-MCNC: 0.1 NG/ML
PROT SERPL-MCNC: 8.1 G/DL (ref 6.4–8.4)
PROT UR STRIP-MCNC: ABNORMAL MG/DL
PROT UR STRIP-MCNC: ABNORMAL MG/DL
PROTHROMBIN TIME: 15.3 SECONDS (ref 11.6–14.5)
RBC # BLD AUTO: 5.95 MILLION/UL (ref 3.88–5.62)
RBC #/AREA URNS AUTO: ABNORMAL /HPF
RBC #/AREA URNS AUTO: ABNORMAL /HPF
RH BLD: POSITIVE
SODIUM SERPL-SCNC: 138 MMOL/L (ref 135–147)
SP GR UR STRIP.AUTO: 1.02 (ref 1–1.03)
SP GR UR STRIP.AUTO: 1.02 (ref 1–1.03)
SPECIMEN EXPIRATION DATE: NORMAL
UROBILINOGEN UR QL STRIP.AUTO: 0.2 E.U./DL
UROBILINOGEN UR QL STRIP.AUTO: 1 E.U./DL
WBC # BLD AUTO: 15.63 THOUSAND/UL (ref 4.31–10.16)
WBC #/AREA URNS AUTO: ABNORMAL /HPF
WBC #/AREA URNS AUTO: ABNORMAL /HPF

## 2024-02-21 PROCEDURE — 86850 RBC ANTIBODY SCREEN: CPT | Performed by: ANESTHESIOLOGY

## 2024-02-21 PROCEDURE — 0W9930Z DRAINAGE OF RIGHT PLEURAL CAVITY WITH DRAINAGE DEVICE, PERCUTANEOUS APPROACH: ICD-10-PCS | Performed by: RADIOLOGY

## 2024-02-21 PROCEDURE — 86900 BLOOD TYPING SEROLOGIC ABO: CPT | Performed by: ANESTHESIOLOGY

## 2024-02-21 PROCEDURE — 80053 COMPREHEN METABOLIC PANEL: CPT | Performed by: EMERGENCY MEDICINE

## 2024-02-21 PROCEDURE — 71260 CT THORAX DX C+: CPT

## 2024-02-21 PROCEDURE — 96365 THER/PROPH/DIAG IV INF INIT: CPT

## 2024-02-21 PROCEDURE — 81001 URINALYSIS AUTO W/SCOPE: CPT | Performed by: EMERGENCY MEDICINE

## 2024-02-21 PROCEDURE — 82948 REAGENT STRIP/BLOOD GLUCOSE: CPT

## 2024-02-21 PROCEDURE — 83690 ASSAY OF LIPASE: CPT | Performed by: EMERGENCY MEDICINE

## 2024-02-21 PROCEDURE — 99285 EMERGENCY DEPT VISIT HI MDM: CPT | Performed by: EMERGENCY MEDICINE

## 2024-02-21 PROCEDURE — 81001 URINALYSIS AUTO W/SCOPE: CPT | Performed by: STUDENT IN AN ORGANIZED HEALTH CARE EDUCATION/TRAINING PROGRAM

## 2024-02-21 PROCEDURE — 99284 EMERGENCY DEPT VISIT MOD MDM: CPT | Performed by: STUDENT IN AN ORGANIZED HEALTH CARE EDUCATION/TRAINING PROGRAM

## 2024-02-21 PROCEDURE — 74177 CT ABD & PELVIS W/CONTRAST: CPT

## 2024-02-21 PROCEDURE — 87086 URINE CULTURE/COLONY COUNT: CPT | Performed by: EMERGENCY MEDICINE

## 2024-02-21 PROCEDURE — 85730 THROMBOPLASTIN TIME PARTIAL: CPT | Performed by: ANESTHESIOLOGY

## 2024-02-21 PROCEDURE — 96376 TX/PRO/DX INJ SAME DRUG ADON: CPT

## 2024-02-21 PROCEDURE — C1781 MESH (IMPLANTABLE): HCPCS | Performed by: SPECIALIST

## 2024-02-21 PROCEDURE — 99222 1ST HOSP IP/OBS MODERATE 55: CPT | Performed by: FAMILY MEDICINE

## 2024-02-21 PROCEDURE — 99285 EMERGENCY DEPT VISIT HI MDM: CPT

## 2024-02-21 PROCEDURE — 96375 TX/PRO/DX INJ NEW DRUG ADDON: CPT

## 2024-02-21 PROCEDURE — 84145 PROCALCITONIN (PCT): CPT

## 2024-02-21 PROCEDURE — 87040 BLOOD CULTURE FOR BACTERIA: CPT | Performed by: EMERGENCY MEDICINE

## 2024-02-21 PROCEDURE — G1004 CDSM NDSC: HCPCS

## 2024-02-21 PROCEDURE — 49507 PRP I/HERN INIT BLOCK >5 YR: CPT

## 2024-02-21 PROCEDURE — 86901 BLOOD TYPING SEROLOGIC RH(D): CPT | Performed by: ANESTHESIOLOGY

## 2024-02-21 PROCEDURE — 0T9B70Z DRAINAGE OF BLADDER WITH DRAINAGE DEVICE, VIA NATURAL OR ARTIFICIAL OPENING: ICD-10-PCS | Performed by: STUDENT IN AN ORGANIZED HEALTH CARE EDUCATION/TRAINING PROGRAM

## 2024-02-21 PROCEDURE — 85610 PROTHROMBIN TIME: CPT | Performed by: ANESTHESIOLOGY

## 2024-02-21 PROCEDURE — 88302 TISSUE EXAM BY PATHOLOGIST: CPT | Performed by: PATHOLOGY

## 2024-02-21 PROCEDURE — 83605 ASSAY OF LACTIC ACID: CPT | Performed by: EMERGENCY MEDICINE

## 2024-02-21 PROCEDURE — 85025 COMPLETE CBC W/AUTO DIFF WBC: CPT | Performed by: EMERGENCY MEDICINE

## 2024-02-21 PROCEDURE — 87077 CULTURE AEROBIC IDENTIFY: CPT | Performed by: EMERGENCY MEDICINE

## 2024-02-21 PROCEDURE — 87086 URINE CULTURE/COLONY COUNT: CPT | Performed by: STUDENT IN AN ORGANIZED HEALTH CARE EDUCATION/TRAINING PROGRAM

## 2024-02-21 PROCEDURE — 87106 FUNGI IDENTIFICATION YEAST: CPT | Performed by: STUDENT IN AN ORGANIZED HEALTH CARE EDUCATION/TRAINING PROGRAM

## 2024-02-21 PROCEDURE — 0YU50JZ SUPPLEMENT RIGHT INGUINAL REGION WITH SYNTHETIC SUBSTITUTE, OPEN APPROACH: ICD-10-PCS | Performed by: HOSPITALIST

## 2024-02-21 PROCEDURE — 36415 COLL VENOUS BLD VENIPUNCTURE: CPT | Performed by: EMERGENCY MEDICINE

## 2024-02-21 DEVICE — BARD SOFT MESH
Type: IMPLANTABLE DEVICE | Site: INGUINAL | Status: FUNCTIONAL
Brand: BARD SOFT MESH

## 2024-02-21 RX ORDER — MAGNESIUM HYDROXIDE 1200 MG/15ML
LIQUID ORAL AS NEEDED
Status: DISCONTINUED | OUTPATIENT
Start: 2024-02-21 | End: 2024-02-21 | Stop reason: HOSPADM

## 2024-02-21 RX ORDER — SODIUM CHLORIDE, SODIUM LACTATE, POTASSIUM CHLORIDE, CALCIUM CHLORIDE 600; 310; 30; 20 MG/100ML; MG/100ML; MG/100ML; MG/100ML
50 INJECTION, SOLUTION INTRAVENOUS CONTINUOUS
Status: DISCONTINUED | OUTPATIENT
Start: 2024-02-21 | End: 2024-02-22

## 2024-02-21 RX ORDER — CEFTRIAXONE 2 G/50ML
2000 INJECTION, SOLUTION INTRAVENOUS ONCE
Status: COMPLETED | OUTPATIENT
Start: 2024-02-21 | End: 2024-02-21

## 2024-02-21 RX ORDER — FENTANYL CITRATE 50 UG/ML
INJECTION, SOLUTION INTRAMUSCULAR; INTRAVENOUS AS NEEDED
Status: DISCONTINUED | OUTPATIENT
Start: 2024-02-21 | End: 2024-02-21

## 2024-02-21 RX ORDER — ROCURONIUM BROMIDE 10 MG/ML
INJECTION, SOLUTION INTRAVENOUS AS NEEDED
Status: DISCONTINUED | OUTPATIENT
Start: 2024-02-21 | End: 2024-02-21

## 2024-02-21 RX ORDER — MORPHINE SULFATE 10 MG/ML
2 INJECTION, SOLUTION INTRAMUSCULAR; INTRAVENOUS
Status: DISCONTINUED | OUTPATIENT
Start: 2024-02-21 | End: 2024-02-21

## 2024-02-21 RX ORDER — OXYCODONE HYDROCHLORIDE AND ACETAMINOPHEN 5; 325 MG/1; MG/1
1 TABLET ORAL EVERY 4 HOURS PRN
Status: DISCONTINUED | OUTPATIENT
Start: 2024-02-21 | End: 2024-03-01 | Stop reason: HOSPADM

## 2024-02-21 RX ORDER — ONDANSETRON 2 MG/ML
4 INJECTION INTRAMUSCULAR; INTRAVENOUS ONCE
Status: COMPLETED | OUTPATIENT
Start: 2024-02-21 | End: 2024-02-21

## 2024-02-21 RX ORDER — SODIUM CHLORIDE, SODIUM LACTATE, POTASSIUM CHLORIDE, CALCIUM CHLORIDE 600; 310; 30; 20 MG/100ML; MG/100ML; MG/100ML; MG/100ML
INJECTION, SOLUTION INTRAVENOUS CONTINUOUS PRN
Status: DISCONTINUED | OUTPATIENT
Start: 2024-02-21 | End: 2024-02-21

## 2024-02-21 RX ORDER — METOPROLOL TARTRATE 1 MG/ML
5 INJECTION, SOLUTION INTRAVENOUS ONCE
Status: COMPLETED | OUTPATIENT
Start: 2024-02-21 | End: 2024-02-21

## 2024-02-21 RX ORDER — NEOSTIGMINE METHYLSULFATE 1 MG/ML
INJECTION INTRAVENOUS AS NEEDED
Status: DISCONTINUED | OUTPATIENT
Start: 2024-02-21 | End: 2024-02-21

## 2024-02-21 RX ORDER — ONDANSETRON 2 MG/ML
4 INJECTION INTRAMUSCULAR; INTRAVENOUS ONCE AS NEEDED
Status: DISCONTINUED | OUTPATIENT
Start: 2024-02-21 | End: 2024-02-21 | Stop reason: HOSPADM

## 2024-02-21 RX ORDER — CALCIUM CHLORIDE 100 MG/ML
INJECTION INTRAVENOUS; INTRAVENTRICULAR AS NEEDED
Status: DISCONTINUED | OUTPATIENT
Start: 2024-02-21 | End: 2024-02-21

## 2024-02-21 RX ORDER — HYDROMORPHONE HCL/PF 1 MG/ML
0.5 SYRINGE (ML) INJECTION
Status: DISCONTINUED | OUTPATIENT
Start: 2024-02-21 | End: 2024-02-21 | Stop reason: HOSPADM

## 2024-02-21 RX ORDER — METOPROLOL TARTRATE 50 MG/1
50 TABLET, FILM COATED ORAL ONCE
Status: COMPLETED | OUTPATIENT
Start: 2024-02-21 | End: 2024-02-21

## 2024-02-21 RX ORDER — HYDROXYZINE HYDROCHLORIDE 25 MG/1
25 TABLET, FILM COATED ORAL EVERY 6 HOURS PRN
Status: DISCONTINUED | OUTPATIENT
Start: 2024-02-21 | End: 2024-03-01 | Stop reason: HOSPADM

## 2024-02-21 RX ORDER — CEFAZOLIN SODIUM 2 G/50ML
2000 SOLUTION INTRAVENOUS ONCE
Status: COMPLETED | OUTPATIENT
Start: 2024-02-21 | End: 2024-02-21

## 2024-02-21 RX ORDER — GLYCOPYRROLATE 0.2 MG/ML
INJECTION INTRAMUSCULAR; INTRAVENOUS AS NEEDED
Status: DISCONTINUED | OUTPATIENT
Start: 2024-02-21 | End: 2024-02-21

## 2024-02-21 RX ORDER — LANOLIN ALCOHOL/MO/W.PET/CERES
3 CREAM (GRAM) TOPICAL
Status: DISCONTINUED | OUTPATIENT
Start: 2024-02-21 | End: 2024-03-01 | Stop reason: HOSPADM

## 2024-02-21 RX ORDER — ESMOLOL HYDROCHLORIDE 10 MG/ML
INJECTION INTRAVENOUS AS NEEDED
Status: DISCONTINUED | OUTPATIENT
Start: 2024-02-21 | End: 2024-02-21

## 2024-02-21 RX ORDER — HYDRALAZINE HYDROCHLORIDE 20 MG/ML
5 INJECTION INTRAMUSCULAR; INTRAVENOUS EVERY 6 HOURS PRN
Status: DISCONTINUED | OUTPATIENT
Start: 2024-02-21 | End: 2024-03-01 | Stop reason: HOSPADM

## 2024-02-21 RX ORDER — ONDANSETRON 2 MG/ML
INJECTION INTRAMUSCULAR; INTRAVENOUS AS NEEDED
Status: DISCONTINUED | OUTPATIENT
Start: 2024-02-21 | End: 2024-02-21

## 2024-02-21 RX ORDER — LIDOCAINE HYDROCHLORIDE 10 MG/ML
INJECTION, SOLUTION EPIDURAL; INFILTRATION; INTRACAUDAL; PERINEURAL AS NEEDED
Status: DISCONTINUED | OUTPATIENT
Start: 2024-02-21 | End: 2024-02-21

## 2024-02-21 RX ORDER — FENTANYL CITRATE/PF 50 MCG/ML
50 SYRINGE (ML) INJECTION
Status: DISCONTINUED | OUTPATIENT
Start: 2024-02-21 | End: 2024-02-21 | Stop reason: HOSPADM

## 2024-02-21 RX ORDER — METOPROLOL TARTRATE 1 MG/ML
5 INJECTION, SOLUTION INTRAVENOUS EVERY 6 HOURS
Status: DISCONTINUED | OUTPATIENT
Start: 2024-02-21 | End: 2024-02-22

## 2024-02-21 RX ORDER — VASOPRESSIN 20 U/ML
INJECTION PARENTERAL AS NEEDED
Status: DISCONTINUED | OUTPATIENT
Start: 2024-02-21 | End: 2024-02-21

## 2024-02-21 RX ORDER — SODIUM CHLORIDE 9 MG/ML
INJECTION, SOLUTION INTRAVENOUS CONTINUOUS PRN
Status: DISCONTINUED | OUTPATIENT
Start: 2024-02-21 | End: 2024-02-21

## 2024-02-21 RX ORDER — SENNOSIDES 8.6 MG
1 TABLET ORAL
Status: DISCONTINUED | OUTPATIENT
Start: 2024-02-22 | End: 2024-03-01 | Stop reason: HOSPADM

## 2024-02-21 RX ORDER — LIDOCAINE HYDROCHLORIDE AND EPINEPHRINE 10; 10 MG/ML; UG/ML
INJECTION, SOLUTION INFILTRATION; PERINEURAL AS NEEDED
Status: DISCONTINUED | OUTPATIENT
Start: 2024-02-21 | End: 2024-02-21 | Stop reason: HOSPADM

## 2024-02-21 RX ORDER — PROPOFOL 10 MG/ML
INJECTION, EMULSION INTRAVENOUS AS NEEDED
Status: DISCONTINUED | OUTPATIENT
Start: 2024-02-21 | End: 2024-02-21

## 2024-02-21 RX ORDER — DEXTROSE MONOHYDRATE, SODIUM CHLORIDE, AND POTASSIUM CHLORIDE 50; 1.49; 4.5 G/1000ML; G/1000ML; G/1000ML
100 INJECTION, SOLUTION INTRAVENOUS CONTINUOUS
Status: DISCONTINUED | OUTPATIENT
Start: 2024-02-21 | End: 2024-02-21

## 2024-02-21 RX ORDER — DEXAMETHASONE SODIUM PHOSPHATE 10 MG/ML
INJECTION, SOLUTION INTRAMUSCULAR; INTRAVENOUS AS NEEDED
Status: DISCONTINUED | OUTPATIENT
Start: 2024-02-21 | End: 2024-02-21

## 2024-02-21 RX ADMIN — DEXTROSE, SODIUM CHLORIDE, AND POTASSIUM CHLORIDE 100 ML/HR: 5; .45; .15 INJECTION INTRAVENOUS at 15:00

## 2024-02-21 RX ADMIN — NEOSTIGMINE METHYLSULFATE 3 MG: 1 INJECTION INTRAVENOUS at 19:34

## 2024-02-21 RX ADMIN — SODIUM CHLORIDE: 0.9 INJECTION, SOLUTION INTRAVENOUS at 17:40

## 2024-02-21 RX ADMIN — PHENYLEPHRINE HYDROCHLORIDE 50 MCG/MIN: 10 INJECTION INTRAVENOUS at 17:32

## 2024-02-21 RX ADMIN — METOROPROLOL TARTRATE 5 MG: 5 INJECTION, SOLUTION INTRAVENOUS at 20:00

## 2024-02-21 RX ADMIN — CALCIUM CHLORIDE 0.5 G: 100 INJECTION INTRAVENOUS; INTRAVENTRICULAR at 19:04

## 2024-02-21 RX ADMIN — HYDROXYZINE HYDROCHLORIDE 25 MG: 25 TABLET ORAL at 22:28

## 2024-02-21 RX ADMIN — FENTANYL CITRATE 50 MCG: 50 INJECTION INTRAMUSCULAR; INTRAVENOUS at 17:24

## 2024-02-21 RX ADMIN — Medication 3 MG: at 22:28

## 2024-02-21 RX ADMIN — SODIUM CHLORIDE, SODIUM LACTATE, POTASSIUM CHLORIDE, AND CALCIUM CHLORIDE 50 ML/HR: .6; .31; .03; .02 INJECTION, SOLUTION INTRAVENOUS at 21:31

## 2024-02-21 RX ADMIN — ONDANSETRON 4 MG: 2 INJECTION INTRAMUSCULAR; INTRAVENOUS at 14:35

## 2024-02-21 RX ADMIN — SODIUM CHLORIDE, SODIUM LACTATE, POTASSIUM CHLORIDE, AND CALCIUM CHLORIDE: .6; .31; .03; .02 INJECTION, SOLUTION INTRAVENOUS at 17:36

## 2024-02-21 RX ADMIN — VASOPRESSIN 1 UNITS: 20 INJECTION INTRAVENOUS at 17:37

## 2024-02-21 RX ADMIN — MORPHINE SULFATE 2 MG: 2 INJECTION, SOLUTION INTRAMUSCULAR; INTRAVENOUS at 10:16

## 2024-02-21 RX ADMIN — VASOPRESSIN 1 UNITS: 20 INJECTION INTRAVENOUS at 18:23

## 2024-02-21 RX ADMIN — PROPOFOL 140 MG: 10 INJECTION, EMULSION INTRAVENOUS at 17:28

## 2024-02-21 RX ADMIN — DEXAMETHASONE SODIUM PHOSPHATE 5 MG: 10 INJECTION, SOLUTION INTRAMUSCULAR; INTRAVENOUS at 17:50

## 2024-02-21 RX ADMIN — SODIUM CHLORIDE, SODIUM LACTATE, POTASSIUM CHLORIDE, AND CALCIUM CHLORIDE: .6; .31; .03; .02 INJECTION, SOLUTION INTRAVENOUS at 16:50

## 2024-02-21 RX ADMIN — ONDANSETRON 4 MG: 2 INJECTION INTRAMUSCULAR; INTRAVENOUS at 10:16

## 2024-02-21 RX ADMIN — METOPROLOL TARTRATE 50 MG: 50 TABLET, FILM COATED ORAL at 20:28

## 2024-02-21 RX ADMIN — ONDANSETRON 4 MG: 2 INJECTION INTRAMUSCULAR; INTRAVENOUS at 19:26

## 2024-02-21 RX ADMIN — ESMOLOL HYDROCHLORIDE 20 MG: 100 INJECTION, SOLUTION INTRAVENOUS at 19:33

## 2024-02-21 RX ADMIN — LIDOCAINE HYDROCHLORIDE 50 MG: 10 INJECTION, SOLUTION EPIDURAL; INFILTRATION; INTRACAUDAL; PERINEURAL at 17:28

## 2024-02-21 RX ADMIN — PIPERACILLIN AND TAZOBACTAM 3.38 G: 36; 4.5 INJECTION, POWDER, FOR SOLUTION INTRAVENOUS at 21:31

## 2024-02-21 RX ADMIN — ESMOLOL HYDROCHLORIDE 30 MG: 100 INJECTION, SOLUTION INTRAVENOUS at 19:39

## 2024-02-21 RX ADMIN — MORPHINE SULFATE 2 MG: 2 INJECTION, SOLUTION INTRAMUSCULAR; INTRAVENOUS at 12:58

## 2024-02-21 RX ADMIN — CEFAZOLIN SODIUM 2000 MG: 2 SOLUTION INTRAVENOUS at 17:58

## 2024-02-21 RX ADMIN — ROCURONIUM BROMIDE 80 MG: 10 INJECTION, SOLUTION INTRAVENOUS at 17:28

## 2024-02-21 RX ADMIN — IOHEXOL 100 ML: 350 INJECTION, SOLUTION INTRAVENOUS at 11:44

## 2024-02-21 RX ADMIN — GLYCOPYRROLATE 0.4 MG: 0.2 INJECTION, SOLUTION INTRAMUSCULAR; INTRAVENOUS at 19:34

## 2024-02-21 RX ADMIN — FENTANYL CITRATE 50 MCG: 50 INJECTION INTRAMUSCULAR; INTRAVENOUS at 17:28

## 2024-02-21 RX ADMIN — CEFTRIAXONE 2000 MG: 2 INJECTION, SOLUTION INTRAVENOUS at 11:07

## 2024-02-21 RX ADMIN — NOREPINEPHRINE BITARTRATE 16 MCG: 1 INJECTION INTRAVENOUS at 17:36

## 2024-02-21 NOTE — ASSESSMENT & PLAN NOTE
History of a fib/flutter and sick sinus syndrome status post single-chamber pacemaker implantation October 2021   Patient currently anticoagulated with eliquis due to his complicated coronary artery disease, however, he is in need of urgent surgery as the hernia is at risk of strangulation.   Hold AC at this time, anticipate resuming in 48 hours

## 2024-02-21 NOTE — DISCHARGE INSTRUCTIONS
Follow-up with the urologist tomorrow.    Return to the emergency department for any concerning signs or symptoms.

## 2024-02-21 NOTE — ASSESSMENT & PLAN NOTE
Patient with shingles present on the right shoulder and onto the right back for a little over a month. Has not taken anything for this. Recently evaluated at urgent care and no med recs due to not appearing to have active lesions.   Does have some vesicles present upon examination.   Will start on iv acyclovir.   Isolation precautions

## 2024-02-21 NOTE — H&P
H&P Exam - General Surgery   William Martel 88 y.o. male MRN: [id number]  Unit/Bed#: Z1 H3 Encounter: [id number]    Assessment/Plan     Assessment:  The patient is an 88-year-old white male presenting to [facility] ER with complaints of abdominal pain, recently treated for urinary retention with a indwelling Dickens catheter.  On examination he was found to have an incarcerated right inguinal hernia, which is quite tender and irreducible.  CT scan reveals small bowel obstruction with incarcerated loop of small bowel.  Plan:  The patient is currently anticoagulated with Eliquis due to his complicated coronary artery disease, however, he is in need of urgent surgery as the hernia is at risk of strangulation.    History of Present Illness   HPI:  William Martel is a 88 y.o. male who presents with abdominal pain and an incarcerated right inguinal hernia.  The hernia is tender and reducible, and the contents at risk of ischemia.  The patient has had a longstanding right inguinal hernia which he is able to reduce without difficulty.  Patient recently developed urinary retention, requiring a urinary catheter, subsequent to this the hernia became irreducible, and now quite tender.    Review of Systems   Constitutional:  Negative for chills and fever.   HENT:  Negative for trouble swallowing.    Respiratory:  Positive for shortness of breath.    Cardiovascular:  Negative for chest pain.   Gastrointestinal:  Positive for abdominal distention, abdominal pain and nausea. Negative for vomiting.   Genitourinary:  Positive for difficulty urinating.   Skin:  Negative for color change.   Neurological: Negative.    Hematological: Negative.    Psychiatric/Behavioral: Negative.         Historical Information   Past Medical History:   Diagnosis Date    Acute urinary retention     Coronary artery disease     H/O atrial flutter     Herpes zoster     History of aortic valve replacement with bioprosthetic valve     Hyperlipemia     Hypertension      Pacemaker     Skin cancer, basal cell     Walker as ambulation aid     Wears dentures      Past Surgical History:   Procedure Laterality Date    A-V CARDIAC PACEMAKER INSERTION      AORTIC VALVE REPLACEMENT      BACK SURGERY      CARDIAC PACEMAKER PLACEMENT Left     2021    CATARACT EXTRACTION      CORONARY ANGIOPLASTY WITH STENT PLACEMENT      stents times 3    LUMBAR FUSION N/A 02/10/2023    Procedure: T9-L3 FUSION LUMBAR/THORACIC POSTERIOR MINIMALLY INVASIVE W ROBOT T10-T12 decompressive laminectomy;  Surgeon: Min Dillon MD;  [facility];  Service: Neurosurgery    AZ CYSTO BLADDER W/URETERAL CATHETERIZATION Right 01/13/2023    Procedure: CYSTOSCOPY RETROGRADE PYELOGRAM WITH INSERTION STENT URETERAL;  Surgeon: Frank D'Amico, MD;  Location[facility];  Service: Urology    AZ CYSTO BLADDER W/URETERAL CATHETERIZATION Right 01/26/2023    Procedure: CYSTOSCOPY WITH RETROGRADE PYELOGRAM;  Surgeon: Frank D'Amico, MD;  Location[facility];  Service: Urology    AZ CYSTO/URETERO W/LITHOTRIPSY &INDWELL STENT INSRT Right 01/26/2023    Procedure: CYSTOSCOPY URETEROSCOPY WITH LITHOTRIPSY HOLMIUM LASER, RETROGRADE PYELOGRAM AND INSERTION STENT URETERAL;  Surgeon: Frank D'Amico, MD;  [facility];  Service: Urology     Social History   Social History     Substance and Sexual Activity   Alcohol Use Not Currently    Comment: social     Social History     Substance and Sexual Activity   Drug Use Never     Social History     Tobacco Use   Smoking Status Former   Smokeless Tobacco Never     E-Cigarette/Vaping    E-Cigarette Use Never User      E-Cigarette/Vaping Substances    Nicotine No     THC No     CBD No     Flavoring No      Family History:   Family History   Problem Relation Age of Onset    No Known Problems Father     No Known Problems Mother        Meds/Allergies   PTA meds:   Prior to Admission Medications   Prescriptions Last Dose Informant Patient Reported? Taking?   Cholecalciferol 25 MCG  "(1000 UT) tablet  Self, Child Yes No   Sig: Take by mouth   Patient not taking: Reported on 1/29/2024   acetaminophen (TYLENOL) 325 mg tablet  Self, Child No No   Sig: Take 3 tablets (975 mg total) by mouth every 8 (eight) hours   Patient taking differently: Take 975 mg by mouth if needed   apixaban (ELIQUIS) 5 mg  Self, Child Yes No   Sig: Take 5 mg by mouth 2 (two) times a day   atorvastatin (LIPITOR) 40 mg tablet  Self, Child Yes No   Sig: Take 40 mg by mouth daily   enalapril (VASOTEC) 2.5 mg tablet  Self, Child No No   Sig: Take 1 tablet (2.5 mg total) by mouth in the morning   folic acid (FOLVITE) 400 mcg tablet  Self, Child Yes No   Sig: Take 400 mcg by mouth daily   metoprolol tartrate (LOPRESSOR) 50 mg tablet  Self, Child No No   Sig: Take 1 tablet (50 mg total) by mouth 2 (two) times a day   tamsulosin (FLOMAX) 0.4 mg   No No   Sig: Take 1 capsule (0.4 mg total) by mouth daily with dinner      Facility-Administered Medications: None     Allergies   Allergen Reactions    Ezetimibe-Simvastatin Dizziness and Lightheadedness     Other reaction(s): MAKES HIM DIZZY      Simvastatin Lightheadedness     Other reaction(s): Dizziness       Objective   First Vitals:   Blood Pressure: 158/89 (02/21/24 0951)  Pulse: (!) 118 (02/21/24 0951)  Temperature: (!) 97.3 °F (36.3 °C) (02/21/24 0951)  Respirations: 18 (02/21/24 0951)  Height: 5' 11\" (180.3 cm) (02/21/24 0951)  Weight - Scale: 92 kg (202 lb 13.2 oz) (02/21/24 0951)  SpO2: 96 % (02/21/24 0951)    Current Vitals:   Blood Pressure: (!) 178/91 (02/21/24 1257)  Pulse: 88 (02/21/24 1257)  Temperature: (!) 97.3 °F (36.3 °C) (02/21/24 0951)  Respirations: 18 (02/21/24 1257)  Height: 5' 11\" (180.3 cm) (02/21/24 0951)  Weight - Scale: 92 kg (202 lb 13.2 oz) (02/21/24 0951)  SpO2: 95 % (02/21/24 1257)      Intake/Output Summary (Last 24 hours) at 2/21/2024 1451  Last data filed at 2/21/2024 1137  Gross per 24 hour   Intake 50 ml   Output --   Net 50 ml       Invasive " Devices       Peripheral Intravenous Line  Duration             Peripheral IV 02/21/24 Left Antecubital <1 day              Drain  Duration             Ureteral Internal Stent Right ureter 6 Fr. 404 days    Urethral Catheter Non-latex 18 Fr. 381 days    Urethral Catheter Straight-tip 16 Fr. <1 day                    Physical Exam  Constitutional:       Appearance: He is ill-appearing. He is not toxic-appearing.   HENT:      Head: Normocephalic.   Eyes:      Comments: Chronic Lt eye inflammation     Cardiovascular:      Rate and Rhythm: Tachycardia present.      Heart sounds: Normal heart sounds.   Pulmonary:      Breath sounds: No wheezing, rhonchi or rales.   Abdominal:      General: There is distension.      Tenderness: There is no abdominal tenderness.   Genitourinary:      Musculoskeletal:         General: No swelling or deformity.      Cervical back: Neck supple.   Skin:     General: Skin is warm and dry.   Neurological:      General: No focal deficit present.      Mental Status: He is alert and oriented to person, place, and time.   Psychiatric:         Mood and Affect: Mood normal.         Lab Results: I have personally reviewed pertinent lab results.  , CBC:   Lab Results   Component Value Date    WBC 15.63 (H) 02/21/2024    HGB 16.3 02/21/2024    HCT 50.5 (H) 02/21/2024    MCV 85 02/21/2024     02/21/2024    RBC 5.95 (H) 02/21/2024    MCH 27.4 02/21/2024    MCHC 32.3 02/21/2024    RDW 15.3 (H) 02/21/2024    MPV 10.7 02/21/2024    NRBC 0 02/21/2024   , CMP:   Lab Results   Component Value Date    SODIUM 138 02/21/2024    K 4.3 02/21/2024    CL 98 02/21/2024    CO2 28 02/21/2024    BUN 19 02/21/2024    CREATININE 1.15 02/21/2024    CALCIUM 10.1 02/21/2024    AST 11 (L) 02/21/2024    ALT 11 02/21/2024    ALKPHOS 110 (H) 02/21/2024    EGFR 56 02/21/2024   , Urinalysis:   Lab Results   Component Value Date    COLORU Carlyn 02/21/2024    CLARITYU Cloudy 02/21/2024    SPECGRAV 1.020 02/21/2024    PHUR 6.0  02/21/2024    LEUKOCYTESUR Small (A) 02/21/2024    NITRITE Negative 02/21/2024    GLUCOSEU 500 (1/2%) (A) 02/21/2024    KETONESU Trace (A) 02/21/2024    BILIRUBINUR Small (A) 02/21/2024    BLOODU Large (A) 02/21/2024     Imaging: I have personally reviewed pertinent reports.   and I have personally reviewed pertinent films in PACS  EKG, Pathology, and Other Studies: I have personally reviewed pertinent reports.      Code Status: Level 1 - Full Code  Advance Directive and Living Will:      Power of :    POLST:      Counseling / Coordination of Care  Total floor / unit time spent today 30 minutes.  Greater than 50% of total time was spent with the patient and / or family counseling and / or coordination of care.  A description of the counseling / coordination of care: Discussion with ER, and with OR Staff.

## 2024-02-21 NOTE — ASSESSMENT & PLAN NOTE
POA with complaints of abdominal pain started around 1 am this morning.   CT CAP: Right inguinal hernia with neck of 4.2 cm, containing a loop of small bowel. Resultant small bowel obstruction.   Patient admitted to surgical services, planning to go to OR on 2/21  Further management per surgery

## 2024-02-21 NOTE — ANESTHESIA PREPROCEDURE EVALUATION
Procedure:  REPAIR Incarcerated HERNIA INGUINAL (Right: Groin)    Relevant Problems   CARDIO   (+) Aortic stenosis (S/p bioAVR)   (+) Atrial fibrillation and flutter (HCC)   (+) Carotid stenosis, asymptomatic, bilateral   (+) Coronary artery disease (Remote hx of 3 stents)   (+) Hyperlipemia   (+) Moderate mitral stenosis   (+) Pacemaker   (+) Primary hypertension   (+) Pulmonary hypertension (HCC)   (+) Sick sinus syndrome (HCC) (S/p PPM)      /RENAL   (+) Benign prostatic hyperplasia with urinary obstruction   (+) Calculus of kidney      PULMONARY   (+) Pleural effusion      Other   (+) Sepsis (HCC)      TTE 12/2022    Left Ventricle: Moderate concentric left ventricular hypertrophy with normal LV cavity size and preserved function.  Estimated ejection fraction 65%.  No obvious segmental wall motion normality identified.  There is variability in contractility with rhythm.  Diastolic characterization was not assessed    Right Ventricle: The right ventricle appears enlarged with intact RV function.  Lead artifact seen within the right ventricle.    Left Atrium: The atrium is dilated.    Right Atrium: The atrium is dilated.    Aortic Valve: Patient is status post bioprosthetic aortic valve replacement.  Size of the prosthesis is not known at this time.  Measured transaortic velocity of 2.4 m/s corresponds to peak and mean gradients of 23 and 12 mmHg respectively.  Findings suggest appropriate leaflet excursion.  There is no clear insufficiency.    Mitral Valve: Heavy mitral annular calcification.  Measured mean transmitral inflow gradient of 6 mmHg suggest moderate mitral stenosis.  There is trace mitral vegetation.    Tricuspid Valve: Mild thickening of the tricuspid leaflets with mild insufficiency.  Measure tricuspid regurgitant velocity of 3.5 m/s corresponds to a gradient of 49 mmHg.  With addition of 10 mmHg for estimated right atrial pressure, estimated pulmonary pressure is increased to 59 mmHg.    Pulmonic  Valve: Grossly normal-appearing pulmonic leaflets with trace insufficiency.    Physical Exam    Airway    Mallampati score: II  TM Distance: >3 FB  Neck ROM: full     Dental    upper dentures and lower dentures    Cardiovascular      Pulmonary      Other Findings        Anesthesia Plan  ASA Score- 3 Emergent    Anesthesia Type- general with ASA Monitors.         Additional Monitors: arterial line.    Airway Plan: ETT.           Plan Factors-Exercise tolerance (METS): <4 METS.    Chart reviewed. EKG reviewed.  Existing labs reviewed. Patient summary reviewed.    Patient is not a current smoker.              Induction- rapid sequence induction.    Postoperative Plan- Plan for postoperative opioid use. Planned trial extubation    Informed Consent- Anesthetic plan and risks discussed with patient.  I personally reviewed this patient with the CRNA. Discussed and agreed on the Anesthesia Plan with the CRNA..

## 2024-02-21 NOTE — ASSESSMENT & PLAN NOTE
History of CAD with remote history of stents x3 at [facility]   Follows with cardiology outpatient  Continue on lopressor iv, change to po when tolerating oral intake.

## 2024-02-21 NOTE — ASSESSMENT & PLAN NOTE
Currently taking enalapril and lopressor outpatient  Hold BP medications at this time, resume when tolerating oral meds  Iv lopressor scheduled, hydralazine prn

## 2024-02-21 NOTE — ASSESSMENT & PLAN NOTE
History of AS status post aortic valve replacement at [facility] in 2007   Continue outpatient cardiology follow up

## 2024-02-21 NOTE — ED PROVIDER NOTES
History  Chief Complaint   Patient presents with    Urinary Retention     Pt reports being unable to urinate since this morning. Pt c/o bladder pressure.       History provided by:  Patient and relative    88-year-old male.  History of BPH.  Presents with suprapubic abdominal pressure/decreased UOP.  States that he has not voided since this morning. Denies fever/chills/back pain.  Has required a Dickens catheter in the past.    Prior to Admission Medications   Prescriptions Last Dose Informant Patient Reported? Taking?   Cholecalciferol 25 MCG (1000 UT) tablet  Self, Child Yes No   Sig: Take by mouth   Patient not taking: Reported on 1/29/2024   acetaminophen (TYLENOL) 325 mg tablet  Self, Child No No   Sig: Take 3 tablets (975 mg total) by mouth every 8 (eight) hours   Patient taking differently: Take 975 mg by mouth if needed   apixaban (ELIQUIS) 5 mg  Self, Child Yes No   Sig: Take 5 mg by mouth 2 (two) times a day   atorvastatin (LIPITOR) 40 mg tablet  Self, Child Yes No   Sig: Take 40 mg by mouth daily   enalapril (VASOTEC) 2.5 mg tablet  Self, Child No No   Sig: Take 1 tablet (2.5 mg total) by mouth in the morning   folic acid (FOLVITE) 400 mcg tablet  Self, Child Yes No   Sig: Take 400 mcg by mouth daily   metoprolol tartrate (LOPRESSOR) 50 mg tablet  Self, Child No No   Sig: Take 1 tablet (50 mg total) by mouth 2 (two) times a day   tamsulosin (FLOMAX) 0.4 mg   No No   Sig: Take 1 capsule (0.4 mg total) by mouth daily with dinner      Facility-Administered Medications: None       Past Medical History:   Diagnosis Date    Acute urinary retention     Coronary artery disease     H/O atrial flutter     Herpes zoster     History of aortic valve replacement with bioprosthetic valve     Hyperlipemia     Hypertension     Pacemaker     Skin cancer, basal cell     Walker as ambulation aid     Wears dentures        Past Surgical History:   Procedure Laterality Date    A-V CARDIAC PACEMAKER INSERTION      AORTIC VALVE  REPLACEMENT      BACK SURGERY      CARDIAC PACEMAKER PLACEMENT Left     2021    CATARACT EXTRACTION      CORONARY ANGIOPLASTY WITH STENT PLACEMENT      stents times 3    LUMBAR FUSION N/A 02/10/2023    Procedure: T9-L3 FUSION LUMBAR/THORACIC POSTERIOR MINIMALLY INVASIVE W ROBOT T10-T12 decompressive laminectomy;  Surgeon: Min Dillon MD;  [facility];  Service: Neurosurgery    IL CYSTO BLADDER W/URETERAL CATHETERIZATION Right 01/13/2023    Procedure: CYSTOSCOPY RETROGRADE PYELOGRAM WITH INSERTION STENT URETERAL;  Surgeon: Frank D'Amico, MD;  [facility];  Service: Urology    IL CYSTO BLADDER W/URETERAL CATHETERIZATION Right 01/26/2023    Procedure: CYSTOSCOPY WITH RETROGRADE PYELOGRAM;  Surgeon: Frank D'Amico, MD;  [facility];  Service: Urology    IL CYSTO/URETERO W/LITHOTRIPSY &INDWELL STENT INSRT Right 01/26/2023    Procedure: CYSTOSCOPY URETEROSCOPY WITH LITHOTRIPSY HOLMIUM LASER, RETROGRADE PYELOGRAM AND INSERTION STENT URETERAL;  Surgeon: Frank D'Amico, MD;  [facility];  Service: Urology       Family History   Problem Relation Age of Onset    No Known Problems Father     No Known Problems Mother      I have reviewed and agree with the history as documented.    E-Cigarette/Vaping    E-Cigarette Use Never User      E-Cigarette/Vaping Substances    Nicotine No     THC No     CBD No     Flavoring No      Social History     Tobacco Use    Smoking status: Former    Smokeless tobacco: Never   Vaping Use    Vaping status: Never Used   Substance Use Topics    Alcohol use: Not Currently     Comment: social    Drug use: Never       Review of Systems   Constitutional:  Negative for activity change, appetite change, chills, fatigue and fever.   Genitourinary:  Positive for decreased urine volume and difficulty urinating. Negative for dysuria, flank pain, frequency, hematuria, penile discharge, penile pain, penile swelling, scrotal swelling, testicular pain and urgency.    Musculoskeletal:  Negative for arthralgias and back pain.   Skin:  Negative for color change, pallor, rash and wound.   All other systems reviewed and are negative.    Physical Exam  Physical Exam  Vitals and nursing note reviewed. Exam conducted with a chaperone present.   Constitutional:       General: He is in acute distress.      Appearance: He is not ill-appearing or toxic-appearing.   HENT:      Head: Normocephalic and atraumatic.      Right Ear: External ear normal.      Left Ear: External ear normal.   Cardiovascular:      Rate and Rhythm: Normal rate and regular rhythm.      Pulses: Normal pulses.      Heart sounds: Normal heart sounds. No murmur heard.  Pulmonary:      Effort: Pulmonary effort is normal. No respiratory distress.      Breath sounds: Normal breath sounds. No stridor. No wheezing, rhonchi or rales.   Chest:      Chest wall: No tenderness.   Abdominal:      General: Bowel sounds are normal. There is distension.      Palpations: Abdomen is soft.      Tenderness: There is abdominal tenderness. There is no right CVA tenderness, left CVA tenderness, guarding or rebound.      Comments: Large right inguinal hernia.  The suprapubic abdomen is mildly distended and tender when palpated.   Skin:     General: Skin is warm and dry.      Coloration: Skin is not jaundiced or pale.      Findings: Rash present. No bruising, erythema or lesion.      Comments: Healing herpes zoster along the right anterior chest/neck   Neurological:      General: No focal deficit present.      Mental Status: He is alert and oriented to person, place, and time.   Psychiatric:         Mood and Affect: Mood normal.         Behavior: Behavior normal.         Vital Signs  ED Triage Vitals [02/20/24 2312]   Temperature Pulse Respirations Blood Pressure SpO2   (!) 97.3 °F (36.3 °C) 72 18 163/76 97 %      Temp Source Heart Rate Source Patient Position - Orthostatic VS BP Location FiO2 (%)   Temporal Monitor Sitting Left arm --       Pain Score       No Pain           Vitals:    02/20/24 2330 02/21/24 0000 02/21/24 0015 02/21/24 0030   BP: 150/67 149/82 165/81 163/85   Pulse: 81 85 87 85   Patient Position - Orthostatic VS: Lying Lying Lying Lying         Visual Acuity      ED Medications  Medications   lidocaine (URO-JET) 2 % urethral/mucosal gel 1 Application (1 Application Urethral Given 2/20/24 2336)       Diagnostic Studies  Results Reviewed       Procedure Component Value Units Date/Time    Urine Microscopic [[id number]]  (Abnormal) Collected: 02/21/24 0012    Lab Status: Final result Specimen: Urine, Indwelling Dickens Catheter Updated: 02/21/24 0038     RBC, UA 4-10 /hpf      WBC, UA 10-20 /hpf      Epithelial Cells Occasional /hpf      Bacteria, UA Occasional /hpf      OTHER OBSERVATIONS Yeast Cells Present    Urine culture [[id number]] Collected: 02/21/24 0012    Lab Status: In process Specimen: Urine, Indwelling Dickens Catheter Updated: 02/21/24 0038    UA w Reflex to Microscopic w Reflex to Culture [[id number]]  (Abnormal) Collected: 02/21/24 0012    Lab Status: Final result Specimen: Urine, Indwelling Dickens Catheter Updated: 02/21/24 0020     Color, UA Yellow     Clarity, UA Clear     Specific Gravity, UA 1.020     pH, UA 7.0     Leukocytes, UA Small     Nitrite, UA Negative     Protein, UA Trace mg/dl      Glucose, UA Negative mg/dl      Ketones, UA Negative mg/dl      Urobilinogen, UA 0.2 E.U./dl      Bilirubin, UA Small     Occult Blood, UA Negative                   No orders to display              Procedures  Procedures         ED Course  ED Course as of 02/21/24 1048   Tue Feb 20, 2024   2318 Bladder scan 190 mL   2348 Vital signs reviewed.  History of BPH.  Has required Dickens catheter placement in the past.  Has not voided since this morning.  Having worsening suprapubic pressure.  Although bladder scan is 190 mL, will attempt Dickens placement given high suspicion for high volume urinary retention.   Wed Feb 21, 2024   0006 Dickens  catheter inserted.  350 mL of clear/yellow urine drained.   0015 Upon reevaluation, the patient's lower abdominal comfort is improved status post Dickens catheter placement.  Follows with [facility] urology.   0021 Urinalysis noninfectious appearing. The patient was instructed to follow up with his Urologist tomorrow to schedule an appointment/voiding trial.  Return precautions discussed.  Stable for discharge.                               SBIRT 20yo+      Flowsheet Row Most Recent Value   Initial Alcohol Screen: US AUDIT-C     1. How often do you have a drink containing alcohol? 0 Filed at: 02/20/2024 2314   2. How many drinks containing alcohol do you have on a typical day you are drinking?  0 Filed at: 02/20/2024 2314   3b. FEMALE Any Age, or MALE 65+: How often do you have 4 or more drinks on one occassion? 0 Filed at: 02/20/2024 2314   Audit-C Score 0 Filed at: 02/20/2024 2314   ALISSON: How many times in the past year have you...    Used an illegal drug or used a prescription medication for non-medical reasons? Never Filed at: 02/20/2024 2314                      Medical Decision Making  This patient presents with urinary retention.   Diagnostic considerations include BPH, cystitis, pyelonephritis, bladder malignancy, ureteral stone. See ED Course.         Problems Addressed:  Acute urinary retention: acute illness or injury  Benign prostatic hyperplasia with urinary retention: chronic illness or injury with exacerbation, progression, or side effects of treatment    Amount and/or Complexity of Data Reviewed  Labs: ordered. Decision-making details documented in ED Course.    Risk  Prescription drug management.             Disposition  Final diagnoses:   Acute urinary retention   Benign prostatic hyperplasia with urinary retention     Time reflects when diagnosis was documented in both MDM as applicable and the Disposition within this note       Time User Action Codes Description Comment    2/21/2024 12:22 AM Gianfranco  Kai Kennedy [R33.8] Acute urinary retention     2/21/2024 10:48 AM Kai Medel [N40.1,  R33.8] Benign prostatic hyperplasia with urinary retention           ED Disposition       ED Disposition   Discharge    Condition   Stable    Date/Time   Wed Feb 21, 2024 0022    Comment   William Martel discharge to home/self care.                   Follow-up Information    None         Discharge Medication List as of 2/21/2024 12:22 AM        CONTINUE these medications which have NOT CHANGED    Details   acetaminophen (TYLENOL) 325 mg tablet Take 3 tablets (975 mg total) by mouth every 8 (eight) hours, Starting Tue 2/14/2023, No Print      apixaban (ELIQUIS) 5 mg Take 5 mg by mouth 2 (two) times a day, Historical Med      atorvastatin (LIPITOR) 40 mg tablet Take 40 mg by mouth daily, Starting Sat 7/30/2022, Historical Med      Cholecalciferol 25 MCG (1000 UT) tablet Take by mouth, Historical Med      enalapril (VASOTEC) 2.5 mg tablet Take 1 tablet (2.5 mg total) by mouth in the morning, Starting Mon 3/13/2023, Normal      folic acid (FOLVITE) 400 mcg tablet Take 400 mcg by mouth daily, Historical Med      metoprolol tartrate (LOPRESSOR) 50 mg tablet Take 1 tablet (50 mg total) by mouth 2 (two) times a day, Starting Mon 3/13/2023, Normal      tamsulosin (FLOMAX) 0.4 mg Take 1 capsule (0.4 mg total) by mouth daily with dinner, Starting Thu 4/6/2023, Until Fri 4/5/2024, Normal             No discharge procedures on file.    PDMP Review         Value Time User    PDMP Reviewed  Yes 2/14/2023 11:38 AM MICHAEL Good            ED Provider  Electronically Signed by             Kai Medel DO  02/21/24 8528

## 2024-02-21 NOTE — ED PROVIDER NOTES
History  Chief Complaint   Patient presents with    Abdominal Pain     Pt c/o upper abdominal pain w/nausea starting at 0100 after leaving this ED per urinary retention and catheter placement.     88-year-old male with chief complaint of abdominal pain.  Patient reports that he developed abdominal pain after returning home last evening.  He was seen in our emergency department for urinary retention.  Had Dickens catheter placed.    Patient has history of coronary artery disease and atrial flutter.  He is status post valve replacement.  He also has a history of hypertension, hyperlipidemia and a pacemaker placement.    Patient reports the pain as sharp and crampy.  It is caused him to have nausea and vomiting.  He had no diarrhea.      History provided by:  Patient and spouse  Abdominal Pain  Pain location:  Generalized  Pain quality: aching, cramping and pressure    Pain radiates to:  Does not radiate  Pain severity:  Moderate  Onset quality:  Gradual  Duration: Several hours.  Timing:  Constant  Progression:  Unchanged  Context: awakening from sleep    Relieved by:  None tried  Associated symptoms: anorexia, nausea and vomiting    Associated symptoms: no chest pain, no dysuria, no fever, no flatus, no hematemesis, no hematochezia and no shortness of breath        Prior to Admission Medications   Prescriptions Last Dose Informant Patient Reported? Taking?   Cholecalciferol 25 MCG (1000 UT) tablet  Self, Child Yes No   Sig: Take by mouth   Patient not taking: Reported on 1/29/2024   acetaminophen (TYLENOL) 325 mg tablet  Self, Child No No   Sig: Take 3 tablets (975 mg total) by mouth every 8 (eight) hours   Patient taking differently: Take 975 mg by mouth if needed   apixaban (ELIQUIS) 5 mg  Self, Child Yes No   Sig: Take 5 mg by mouth 2 (two) times a day   atorvastatin (LIPITOR) 40 mg tablet  Self, Child Yes No   Sig: Take 40 mg by mouth daily   enalapril (VASOTEC) 2.5 mg tablet  Self, Child No No   Sig: Take 1 tablet  (2.5 mg total) by mouth in the morning   folic acid (FOLVITE) 400 mcg tablet  Self, Child Yes No   Sig: Take 400 mcg by mouth daily   metoprolol tartrate (LOPRESSOR) 50 mg tablet  Self, Child No No   Sig: Take 1 tablet (50 mg total) by mouth 2 (two) times a day   tamsulosin (FLOMAX) 0.4 mg   No No   Sig: Take 1 capsule (0.4 mg total) by mouth daily with dinner      Facility-Administered Medications: None       Past Medical History:   Diagnosis Date    Acute urinary retention     Coronary artery disease     H/O atrial flutter     Herpes zoster     History of aortic valve replacement with bioprosthetic valve     Hyperlipemia     Hypertension     Pacemaker     Skin cancer, basal cell     Walker as ambulation aid     Wears dentures        Past Surgical History:   Procedure Laterality Date    A-V CARDIAC PACEMAKER INSERTION      AORTIC VALVE REPLACEMENT      BACK SURGERY      CARDIAC PACEMAKER PLACEMENT Left     2021    CATARACT EXTRACTION      CORONARY ANGIOPLASTY WITH STENT PLACEMENT      stents times 3    LUMBAR FUSION N/A 02/10/2023    Procedure: T9-L3 FUSION LUMBAR/THORACIC POSTERIOR MINIMALLY INVASIVE W ROBOT T10-T12 decompressive laminectomy;  Surgeon: Min Dillon MD;  Location: [facility];  Service: Neurosurgery    ND CYSTO BLADDER W/URETERAL CATHETERIZATION Right 01/13/2023    Procedure: CYSTOSCOPY RETROGRADE PYELOGRAM WITH INSERTION STENT URETERAL;  Surgeon: Frank D'Amico, MD;  Location: [facility];  Service: Urology    ND CYSTO BLADDER W/URETERAL CATHETERIZATION Right 01/26/2023    Procedure: CYSTOSCOPY WITH RETROGRADE PYELOGRAM;  Surgeon: Frank D'Amico, MD;  Location: [facility];  Service: Urology    ND CYSTO/URETERO W/LITHOTRIPSY &INDWELL STENT INSRT Right 01/26/2023    Procedure: CYSTOSCOPY URETEROSCOPY WITH LITHOTRIPSY HOLMIUM LASER, RETROGRADE PYELOGRAM AND INSERTION STENT URETERAL;  Surgeon: Frank D'Amico, MD;  Location: [facility];  Service: Urology       Family History   Problem Relation Age  of Onset    No Known Problems Father     No Known Problems Mother      I have reviewed and agree with the history as documented.    E-Cigarette/Vaping    E-Cigarette Use Never User      E-Cigarette/Vaping Substances    Nicotine No     THC No     CBD No     Flavoring No      Social History     Tobacco Use    Smoking status: Former    Smokeless tobacco: Never   Vaping Use    Vaping status: Never Used   Substance Use Topics    Alcohol use: Not Currently     Comment: social    Drug use: Never       Review of Systems   Constitutional:  Negative for fever.   Respiratory:  Negative for shortness of breath.    Cardiovascular:  Negative for chest pain and palpitations.   Gastrointestinal:  Positive for abdominal pain, anorexia, nausea and vomiting. Negative for flatus, hematemesis and hematochezia.   Genitourinary:  Negative for dysuria and frequency.   All other systems reviewed and are negative.      Physical Exam  Physical Exam  Vitals and nursing note reviewed.   Constitutional:       General: He is in acute distress.      Appearance: Normal appearance. He is well-developed. He is not ill-appearing or toxic-appearing.   HENT:      Head: Normocephalic and atraumatic. Hair is normal.      Jaw: No pain on movement.      Right Ear: External ear normal.      Left Ear: External ear normal.      Nose: Nose normal. No congestion.      Mouth/Throat:      Mouth: Mucous membranes are moist.   Eyes:      General: Lids are normal. No scleral icterus.     Extraocular Movements: Extraocular movements intact.      Conjunctiva/sclera:      Left eye: Left conjunctiva is injected.      Pupils: Pupils are equal, round, and reactive to light.   Cardiovascular:      Rate and Rhythm: Normal rate and regular rhythm.      Heart sounds: Murmur heard.   Pulmonary:      Effort: Pulmonary effort is normal. No respiratory distress.      Breath sounds: Normal breath sounds. No decreased breath sounds, wheezing, rhonchi or rales.   Chest:        Abdominal:      General: Abdomen is flat. There is no distension.      Palpations: Abdomen is soft. Abdomen is not rigid.      Tenderness: There is generalized abdominal tenderness. There is no right CVA tenderness, left CVA tenderness, guarding or rebound.   Genitourinary:     Comments: Indwelling Dickens catheter  Musculoskeletal:         General: No swelling, tenderness, deformity or signs of injury. Normal range of motion.      Cervical back: Normal range of motion and neck supple.   Skin:     General: Skin is warm and dry.      Coloration: Skin is not pale.      Findings: No rash.   Neurological:      General: No focal deficit present.      Mental Status: He is alert and oriented to person, place, and time. Mental status is at baseline.   Psychiatric:         Attention and Perception: Attention normal.         Mood and Affect: Mood normal.         Speech: Speech normal.         Behavior: Behavior normal.         Vital Signs  ED Triage Vitals [02/21/24 0951]   Temperature Pulse Respirations Blood Pressure SpO2   (!) 97.3 °F (36.3 °C) (!) 118 18 158/89 96 %      Temp src Heart Rate Source Patient Position - Orthostatic VS BP Location FiO2 (%)   -- Monitor Sitting Left arm --      Pain Score       6           Vitals:    02/21/24 0951 02/21/24 1257   BP: 158/89 (!) 178/91   Pulse: (!) 118 88   Patient Position - Orthostatic VS: Sitting Lying         Visual Acuity      ED Medications  Medications   ondansetron (ZOFRAN) injection 4 mg (4 mg Intravenous Given 2/21/24 1016)   morphine injection 2 mg (2 mg Intravenous Given 2/21/24 1016)   cefTRIAXone (ROCEPHIN) IVPB (premix in dextrose) 2,000 mg 50 mL (0 mg Intravenous Stopped 2/21/24 1137)   iohexol (OMNIPAQUE) 350 MG/ML injection (MULTI-DOSE) 100 mL (100 mL Intravenous Given 2/21/24 1144)   morphine injection 2 mg (2 mg Intravenous Given 2/21/24 1258)       Diagnostic Studies  Results Reviewed       Procedure Component Value Units Date/Time    Lactic acid 2 Hours  [[id number]]  (Normal) Collected: 02/21/24 1339    Lab Status: Final result Specimen: Blood from Arm, Left Updated: 02/21/24 1357     LACTIC ACID 2.0 mmol/L     Narrative:      Result may be elevated if tourniquet was used during collection.    Urine Microscopic [[id number]]  (Abnormal) Collected: 02/21/24 1107    Lab Status: Final result Specimen: Urine, Indwelling Dickens Catheter Updated: 02/21/24 1139     RBC, UA Innumerable /hpf      WBC, UA 20-30 /hpf      Epithelial Cells Occasional /hpf      Bacteria, UA Occasional /hpf      OTHER OBSERVATIONS Yeast Cells Present    Urine culture [[id number]] Collected: 02/21/24 1107    Lab Status: In process Specimen: Urine, Indwelling Dickens Catheter Updated: 02/21/24 1138    UA w Reflex to Microscopic w Reflex to Culture [[id number]]  (Abnormal) Collected: 02/21/24 1107    Lab Status: Final result Specimen: Urine, Indwelling Dickens Catheter Updated: 02/21/24 1121     Color, UA Carlyn     Clarity, UA Cloudy     Specific Gravity, UA 1.020     pH, UA 6.0     Leukocytes, UA Small     Nitrite, UA Negative     Protein,  (2+) mg/dl      Glucose,  (1/2%) mg/dl      Ketones, UA Trace mg/dl      Urobilinogen, UA 1.0 E.U./dl      Bilirubin, UA Small     Occult Blood, UA Large    Lactic acid, plasma (w/reflex if result > 2.0) [[id number]]  (Abnormal) Collected: 02/21/24 1046    Lab Status: Final result Specimen: Blood from Arm, Left Updated: 02/21/24 1120     LACTIC ACID 2.8 mmol/L     Narrative:      Result may be elevated if tourniquet was used during collection.    Blood culture #1 [[id number]] Collected: 02/21/24 1107    Lab Status: In process Specimen: Blood from Arm, Left Updated: 02/21/24 1115    Blood culture #2 [[id number]] Collected: 02/21/24 1107    Lab Status: In process Specimen: Blood from Hand, Right Updated: 02/21/24 1115    Comprehensive metabolic panel [[id number]]  (Abnormal) Collected: 02/21/24 1016    Lab Status: Final result Specimen: Blood from Arm, Left  Updated: 02/21/24 1045     Sodium 138 mmol/L      Potassium 4.3 mmol/L      Chloride 98 mmol/L      CO2 28 mmol/L      ANION GAP 12 mmol/L      BUN 19 mg/dL      Creatinine 1.15 mg/dL      Glucose 258 mg/dL      Calcium 10.1 mg/dL      AST 11 U/L      ALT 11 U/L      Alkaline Phosphatase 110 U/L      Total Protein 8.1 g/dL      Albumin 4.2 g/dL      Total Bilirubin 0.79 mg/dL      eGFR 56 ml/min/1.73sq m     Narrative:      [employer] guidelines for Chronic Kidney Disease (CKD):     Stage 1 with normal or high GFR (GFR > 90 mL/min/1.73 square meters)    Stage 2 Mild CKD (GFR = 60-89 mL/min/1.73 square meters)    Stage 3A Moderate CKD (GFR = 45-59 mL/min/1.73 square meters)    Stage 3B Moderate CKD (GFR = 30-44 mL/min/1.73 square meters)    Stage 4 Severe CKD (GFR = 15-29 mL/min/1.73 square meters)    Stage 5 End Stage CKD (GFR <15 mL/min/1.73 square meters)  Note: GFR calculation is accurate only with a steady state creatinine    Lipase [[id number]]  (Normal) Collected: 02/21/24 1016    Lab Status: Final result Specimen: Blood from Arm, Left Updated: 02/21/24 1045     Lipase 19 u/L     CBC and differential [[id number]]  (Abnormal) Collected: 02/21/24 1016    Lab Status: Final result Specimen: Blood from Arm, Left Updated: 02/21/24 1023     WBC 15.63 Thousand/uL      RBC 5.95 Million/uL      Hemoglobin 16.3 g/dL      Hematocrit 50.5 %      MCV 85 fL      MCH 27.4 pg      MCHC 32.3 g/dL      RDW 15.3 %      MPV 10.7 fL      Platelets 256 Thousands/uL      nRBC 0 /100 WBCs      Neutrophils Relative 89 %      Immat GRANS % 0 %      Lymphocytes Relative 6 %      Monocytes Relative 5 %      Eosinophils Relative 0 %      Basophils Relative 0 %      Neutrophils Absolute 13.75 Thousands/µL      Immature Grans Absolute 0.07 Thousand/uL      Lymphocytes Absolute 0.97 Thousands/µL      Monocytes Absolute 0.80 Thousand/µL      Eosinophils Absolute 0.00 Thousand/µL      Basophils Absolute 0.04 Thousands/µL                     CT chest abdomen pelvis w contrast   Final Result by Anat Lockett MD (02/21 7557)   Loculated right pleural effusion with mild pleural enhancement. Given pleural plaques and possible history of asbestos exposure, recommend diagnostic/therapeutic thoracentesis.   Right inguinal hernia with neck of 4.2 cm, containing a loop of small bowel. Resultant small bowel obstruction.   Mild wall thickening in the distal esophagus. Consider correlation with nonemergent EGD.   Less than 2 mm proximal right ureteral calculus with mild hydronephrosis.   Bilateral nonobstructive renal calculi. Small renal cysts. Moderate right renal atrophy.   Chronic findings, as per the body of the report.                     Workstation performed: [id number]                    Procedures  Procedures         ED Course  ED Course as of 02/21/24 1422   Wed Feb 21, 2024   1051 WBC(!): 15.63   1419 Patient with small bowel obstruction secondary to hernia.  Also has a loculated pleural effusion which will require thoracentesis for further evaluation.  Will discuss with surgery for admission.                               SBIRT 20yo+      Flowsheet Row Most Recent Value   Initial Alcohol Screen: US AUDIT-C     1. How often do you have a drink containing alcohol? 0 Filed at: 02/21/2024 1116   2. How many drinks containing alcohol do you have on a typical day you are drinking?  0 Filed at: 02/21/2024 1116   3b. FEMALE Any Age, or MALE 65+: How often do you have 4 or more drinks on one occassion? 0 Filed at: 02/21/2024 1116   Audit-C Score 0 Filed at: 02/21/2024 1116   ALISSON: How many times in the past year have you...    Used an illegal drug or used a prescription medication for non-medical reasons? Never Filed at: 02/21/2024 1116                      Medical Decision Making  Patient presented to the emergency department and a MSE was performed. The patient was evaluated for complaint related to acute abdominal pain in a male patient.   Patient is potentially at risk for, but not limited to, acute gastritis, pancreatitis, biliary colic, cholecystitis, urinary infection, kidney stone, appendicitis, diverticulitis, diverticulosis, ulcerative colitis, Crohn's disease, enteritis, viral gastroenteritis, constipation, or other disease process unrelated to the abdomen which may cause this symptomatology is also considered. Several of these diagnoses have been evaluated and ruled out by history and physical.  As needed, patient will be further evaluated with laboratory and imaging studies.  Higher level diagnostics, such as CT imaging or ultrasound, may also be required.  Please see work-up portion of the note for further evaluation of patient's risk.  Socioeconomic factors were also considered as part of the decision-making process.  Unless otherwise stated in the chart or patient is admitted as elsewhere documented, any previously prescribed medications will be maintained.      Problems Addressed:  Abdominal pain: acute illness or injury  Pleural effusion: chronic illness or injury with exacerbation, progression, or side effects of treatment  Right inguinal hernia: chronic illness or injury with exacerbation, progression, or side effects of treatment  Small bowel obstruction (HCC): complicated acute illness or injury with systemic symptoms that poses a threat to life or bodily functions  Urinary tract infection: acute illness or injury    Amount and/or Complexity of Data Reviewed  Labs: ordered. Decision-making details documented in ED Course.  Radiology: ordered.    Risk  Prescription drug management.  Decision regarding hospitalization.  Risk Details: Patient presented to the emergency department and a MSE was performed. The patient was evaluated and diagnosed with acute abdominal pain with small bowel obstruction related to a right inguinal hernia.  Also with urinary tract infection.  Also with incidental finding of loculated right-sided pleural  effusion. This is a new issue that will require additional planned work-up and treatment in a hospitalized setting. As may have been required as part of this evaluation, clinical laboratory test, radiology imaging and medical testing (I.e. EKG) were ordered as necessitated by the patient's presentation. I independently reviewed these studies, imaging and testing. This patient's case is considered to be a considerable risk secondary to the above listed disease process and poses a threat to the patient's well-being and baseline function. Further in-patient diagnostic testing and management, which may include the administration of parenteral medications, is required.                 Disposition  Final diagnoses:   Abdominal pain   Small bowel obstruction (HCC)   Urinary tract infection   Right inguinal hernia   Pleural effusion     Time reflects when diagnosis was documented in both MDM as applicable and the Disposition within this note       Time User Action Codes Description Comment    2/21/2024  2:20 PM Demetris Rivera [R10.9] Abdominal pain     2/21/2024  2:21 PM Demetris Rivera [K56.609] Small bowel obstruction (HCC)     2/21/2024  2:21 PM Demetris Rivera [N39.0] Urinary tract infection     2/21/2024  2:21 PM Demetris Rivera [K40.90] Right inguinal hernia     2/21/2024  2:21 PM Demetris Rivera [J90] Pleural effusion           ED Disposition       ED Disposition   Admit    Condition   Stable    Date/Time   Wed Feb 21, 2024 1420    Comment                  Follow-up Information    None         Patient's Medications   Discharge Prescriptions    No medications on file       No discharge procedures on file.    PDMP Review         Value Time User    PDMP Reviewed  Yes 2/14/2023 11:38 AM MICHAEL Good            ED Provider  Electronically Signed by             Demetris Rivera DO  02/21/24 [id number]

## 2024-02-21 NOTE — CONSULTS
[facility]  Consult  Name: William Martel 88 y.o. male I MRN: [id number]  Unit/Bed#: OR POOL I Date of Admission: 2/21/2024   Date of Service: 2/21/2024 I Hospital Day: 0    Inpatient consult to Internal Medicine  Consult performed by: Josselin Boucher PA-C  Consult ordered by: Christopher Hendricks MD          Assessment/Plan   * Abdominal pain  Assessment & Plan  POA with complaints of abdominal pain started around 1 am this morning.   CT CAP: Right inguinal hernia with neck of 4.2 cm, containing a loop of small bowel. Resultant small bowel obstruction.   Patient admitted to surgical services, planning to go to OR on 2/21  Further management per surgery    Sepsis (HCC)  Assessment & Plan  Presents to ED with abdominal pain, nausea, and vomiting that started around 1 am today. Was in the ED yesterday evening due to urinary retention and had catheter placed. Returned to ED for new symptoms of N/V.   SIRS met: tachycardia and leukocytosis   Source of infection: UTI  CT CAP: Loculated right pleural effusion with mild pleural enhancement. Given pleural plaques and possible history of asbestos exposure, recommend diagnostic/therapeutic thoracentesis. Right inguinal hernia with neck of 4.2 cm, containing a loop of small bowel. Resultant small bowel obstruction. Mild wall thickening in the distal esophagus. Consider correlation with nonemergent EGD. Less than 2 mm proximal right ureteral calculus with mild hydronephrosis. Bilateral nonobstructive renal calculi. Small renal cysts. Moderate right renal atrophy.  Started on IVF rehydration, will continue, avoid large fluid boluses due to AS history  Cultures   Blood cultures pending  UA with reflex to culture  Sputum culture  Due to loculated pleural effusion, would recommend pulm and IR consult for thoracentesis  IV antibiotics; rocephin initially given in the ED; continue zosyn, follow up with cultures and deescalate as appropriate    Lab  Results   Component Value Date    WBC 15.63 (H) 02/21/2024    LACTICACID 2.0 02/21/2024       Pleural effusion  Assessment & Plan  Patient with loculated pleural effusion on CT CAP  Will consult IR for diagnostic/therapeutic thoracentesis  Will consult pulm for recommendations   Currently saturating well on room air    Acute cystitis with hematuria  Assessment & Plan  Presents to ED with abdominal pain, nausea, and vomiting that started around 1 am this morning. Was seen in the ED yesterday night for urinary retention and wynn catheter placed.   Reports symptoms of difficulty with urinating yesterday prior to wynn placement and bladder distension  Start zosyn   Follow up cultures       Herpes zoster without complication  Assessment & Plan  Patient with shingles present on the right shoulder and onto the right back for a little over a month. Has not taken anything for this. Recently evaluated at urgent care and no med recs due to not appearing to have active lesions.   Does have some vesicles present upon examination.   Will start on iv acyclovir.   Isolation precautions    Atrial fibrillation and flutter (HCC)  Assessment & Plan  History of a fib/flutter and sick sinus syndrome status post single-chamber pacemaker implantation October 2021   Patient currently anticoagulated with eliquis due to his complicated coronary artery disease, however, he is in need of urgent surgery as the hernia is at risk of strangulation.   Hold AC at this time, anticipate resuming in 48 hours    Coronary artery disease  Assessment & Plan  History of CAD with remote history of stents x3 at [facility]   Follows with cardiology outpatient  Continue on lopressor iv, change to po when tolerating oral intake.     Aortic stenosis  Assessment & Plan  History of AS status post aortic valve replacement at [facility] in 2007   Continue outpatient cardiology follow up    Primary hypertension  Assessment & Plan  Currently taking enalapril and lopressor  outpatient  Hold BP medications at this time, resume when tolerating oral meds  Iv lopressor scheduled, hydralazine prn           VTE Prophylaxis: VTE Score: 8 High Risk (Score >/= 5) - Pharmacological DVT Prophylaxis Contraindicated. Sequential Compression Devices Ordered. Anticipate resuming 48 hours post op    Mobility:      Assess post op    Recommendations for Discharge:  Continue with home medications, await cultures, further recommendations to be determined based on surgical course, pulm and IR recs.    Total Time Spent on Date of Encounter in care of patient: 65 mins. This time was spent on one or more of the following: performing physical exam; counseling and coordination of care; obtaining or reviewing history; documenting in the medical record; reviewing/ordering tests, medications or procedures; communicating with other healthcare professionals and discussing with patient's family/caregivers.    Collaboration of Care: Were Recommendations Directly Discussed with Primary Treatment Team? Yes    History of Present Illness:  William Martel is a 88 y.o. male who is originally admitted to the surgical service due to incarcerated right inguinal hernia requiring urgent surgery. We are consulted for a fib, HTN, pleural effusion. Patient recently in the ED due to difficulty with urination, had wynn catheter placed in the ED last evening. After discharge, around 1 am patient started having nausea, vomiting, and abdominal pain. Presented back to the ED for evaluation of this. CT scan showing incarcerated right inguinal hernia requiring urgent surgical intervention. Patient without complaints of SOB or chest pain. Does have a rash that he has been itching on the right shoulder/neck area, states he has shingles for a little over a month.     Review of Systems:  Review of Systems   Constitutional:  Positive for appetite change. Negative for activity change, fatigue and fever.   HENT: Negative.     Eyes: Negative.     Respiratory:  Negative for cough, chest tightness, shortness of breath and wheezing.    Cardiovascular:  Negative for chest pain, palpitations and leg swelling.   Gastrointestinal:  Positive for abdominal distention, abdominal pain, nausea and vomiting. Negative for constipation and diarrhea.   Endocrine: Negative.    Genitourinary:  Positive for difficulty urinating. Negative for dysuria, frequency, hematuria and urgency.        Dickens placed last evening due to difficulty with urination   Musculoskeletal: Negative.    Skin:  Positive for rash.   Allergic/Immunologic: Negative.    Neurological: Negative.    Hematological: Negative.    Psychiatric/Behavioral: Negative.         Past Medical and Surgical History:   Past Medical History:   Diagnosis Date    Acute urinary retention     Coronary artery disease     H/O atrial flutter     Herpes zoster     History of aortic valve replacement with bioprosthetic valve     Hyperlipemia     Hypertension     Pacemaker     Skin cancer, basal cell     Walker as ambulation aid     Wears dentures        Past Surgical History:   Procedure Laterality Date    A-V CARDIAC PACEMAKER INSERTION      AORTIC VALVE REPLACEMENT      BACK SURGERY      CARDIAC PACEMAKER PLACEMENT Left     2021    CATARACT EXTRACTION      CORONARY ANGIOPLASTY WITH STENT PLACEMENT      stents times 3    LUMBAR FUSION N/A 02/10/2023    Procedure: T9-L3 FUSION LUMBAR/THORACIC POSTERIOR MINIMALLY INVASIVE W ROBOT T10-T12 decompressive laminectomy;  Surgeon: Min Dillon MD;  Location: [facility];  Service: Neurosurgery    TX CYSTO BLADDER W/URETERAL CATHETERIZATION Right 01/13/2023    Procedure: CYSTOSCOPY RETROGRADE PYELOGRAM WITH INSERTION STENT URETERAL;  Surgeon: Frank D'Amico, MD;  Location: [facility];  Service: Urology    TX CYSTO BLADDER W/URETERAL CATHETERIZATION Right 01/26/2023    Procedure: CYSTOSCOPY WITH RETROGRADE PYELOGRAM;  Surgeon: Frank D'Amico, MD;  Location: [facility];  Service:  "Urology    VA CYSTO/URETERO W/LITHOTRIPSY &INDWELL STENT INSRT Right 01/26/2023    Procedure: CYSTOSCOPY URETEROSCOPY WITH LITHOTRIPSY HOLMIUM LASER, RETROGRADE PYELOGRAM AND INSERTION STENT URETERAL;  Surgeon: Frank D'Amico, MD;  Location:  MAIN OR;  Service: Urology       Meds/Allergies:  all medications and allergies reviewed    Allergies:   Allergies   Allergen Reactions    Ezetimibe-Simvastatin Dizziness and Lightheadedness     Other reaction(s): MAKES HIM DIZZY      Simvastatin Lightheadedness     Other reaction(s): Dizziness       Social History:  Marital Status:   Substance Use History:   Social History     Substance and Sexual Activity   Alcohol Use Not Currently    Comment: social     Social History     Tobacco Use   Smoking Status Former   Smokeless Tobacco Never     Social History     Substance and Sexual Activity   Drug Use Never       Family History:  Family History   Problem Relation Age of Onset    No Known Problems Father     No Known Problems Mother        Physical Exam:   Vitals:   Blood Pressure: 151/80 (02/21/24 1544)  Pulse: (!) 113 (02/21/24 1544)  Temperature: 97.5 °F (36.4 °C) (02/21/24 1544)  Temp Source: Oral (02/21/24 1544)  Respirations: 18 (02/21/24 1544)  Height: 5' 11\" (180.3 cm) (02/21/24 1544)  Weight - Scale: 91.6 kg (202 lb) (02/21/24 1544)  SpO2: 95 % (02/21/24 1544)    Physical Exam  Vitals reviewed.   Constitutional:       General: He is not in acute distress.     Appearance: He is ill-appearing. He is not toxic-appearing.   HENT:      Head: Normocephalic and atraumatic.      Mouth/Throat:      Mouth: Mucous membranes are moist.   Eyes:      Comments: Chronic Lt eye inflammation   Cardiovascular:      Rate and Rhythm: Regular rhythm. Tachycardia present.      Heart sounds: Murmur heard.   Pulmonary:      Effort: No respiratory distress.      Breath sounds: No stridor. No wheezing.      Comments: Decreased breath sounds with crackles appreciated right lung, saturating " "well on room air  Abdominal:      General: There is distension.      Palpations: There is no mass.      Tenderness: There is abdominal tenderness.   Musculoskeletal:      Right lower leg: No edema.      Left lower leg: No edema.   Skin:     General: Skin is warm and dry.      Findings: Rash (vesicular rash present on the right shoulder and neck) present.   Neurological:      Mental Status: He is alert and oriented to person, place, and time.   Psychiatric:         Mood and Affect: Mood normal.         Behavior: Behavior normal.          Additional Data:   Lab Results:    Results from last 7 days   Lab Units 02/21/24  1016   WBC Thousand/uL 15.63*   HEMOGLOBIN g/dL 16.3   HEMATOCRIT % 50.5*   PLATELETS Thousands/uL 256   NEUTROS PCT % 89*   LYMPHS PCT % 6*   MONOS PCT % 5   EOS PCT % 0     Results from last 7 days   Lab Units 02/21/24  1016   SODIUM mmol/L 138   POTASSIUM mmol/L 4.3   CHLORIDE mmol/L 98   CO2 mmol/L 28   BUN mg/dL 19   CREATININE mg/dL 1.15   ANION GAP mmol/L 12   CALCIUM mg/dL 10.1   ALBUMIN g/dL 4.2   TOTAL BILIRUBIN mg/dL 0.79   ALK PHOS U/L 110*   ALT U/L 11   AST U/L 11*   GLUCOSE RANDOM mg/dL 258*     Results from last 7 days   Lab Units 02/21/24  1558   INR  1.17         No results found for: \"HGBA1C\"      Results from last 7 days   Lab Units 02/21/24  1339 02/21/24  1046 02/21/24  1016   LACTIC ACID mmol/L 2.0 2.8*  --    PROCALCITONIN ng/ml  --   --  0.10       Imaging: Reviewed radiology reports from this admission including: chest CT scan and abdominal/pelvic CT  CT chest abdomen pelvis w contrast   Final Result by Anat Lockett MD (02/21 1347)   Loculated right pleural effusion with mild pleural enhancement. Given pleural plaques and possible history of asbestos exposure, recommend diagnostic/therapeutic thoracentesis.   Right inguinal hernia with neck of 4.2 cm, containing a loop of small bowel. Resultant small bowel obstruction.   Mild wall thickening in the distal esophagus. Consider " correlation with nonemergent EGD.   Less than 2 mm proximal right ureteral calculus with mild hydronephrosis.   Bilateral nonobstructive renal calculi. Small renal cysts. Moderate right renal atrophy.   Chronic findings, as per the body of the report.                     Workstation performed: [id number]         IR IN-Patient Thoracentesis    (Results Pending)       EKG, Pathology, and Other Studies Reviewed on Admission:   EKG: No EKG obtained.    ** Please Note: This note may have been constructed using a voice recognition system. **

## 2024-02-21 NOTE — ASSESSMENT & PLAN NOTE
Presents to ED with abdominal pain, nausea, and vomiting that started around 1 am this morning. Was seen in the ED yesterday night for urinary retention and wynn catheter placed.   Reports symptoms of difficulty with urinating yesterday prior to wynn placement and bladder distension  Start zosyn   Follow up cultures

## 2024-02-21 NOTE — ASSESSMENT & PLAN NOTE
Patient with loculated pleural effusion on CT CAP  Will consult IR for diagnostic/therapeutic thoracentesis  Will consult pulm for recommendations   Currently saturating well on room air

## 2024-02-21 NOTE — ASSESSMENT & PLAN NOTE
Presents to ED with abdominal pain, nausea, and vomiting that started around 1 am today. Was in the ED yesterday evening due to urinary retention and had catheter placed. Returned to ED for new symptoms of N/V.   SIRS met: tachycardia and leukocytosis   Source of infection: UTI  CT CAP: Loculated right pleural effusion with mild pleural enhancement. Given pleural plaques and possible history of asbestos exposure, recommend diagnostic/therapeutic thoracentesis. Right inguinal hernia with neck of 4.2 cm, containing a loop of small bowel. Resultant small bowel obstruction. Mild wall thickening in the distal esophagus. Consider correlation with nonemergent EGD. Less than 2 mm proximal right ureteral calculus with mild hydronephrosis. Bilateral nonobstructive renal calculi. Small renal cysts. Moderate right renal atrophy.  Started on IVF rehydration, will continue, avoid large fluid boluses due to AS history  Cultures   Blood cultures pending  UA with reflex to culture  Sputum culture  Due to loculated pleural effusion, would recommend pulm and IR consult for thoracentesis  IV antibiotics; rocephin initially given in the ED; continue zosyn, follow up with cultures and deescalate as appropriate    Lab Results   Component Value Date    WBC 15.63 (H) 02/21/2024    LACTICACID 2.0 02/21/2024

## 2024-02-22 ENCOUNTER — APPOINTMENT (INPATIENT)
Dept: INTERVENTIONAL RADIOLOGY/VASCULAR | Facility: HOSPITAL | Age: 89
DRG: 853 | End: 2024-02-22
Payer: COMMERCIAL

## 2024-02-22 PROBLEM — K40.30 INCARCERATED INGUINAL HERNIA: Status: ACTIVE | Noted: 2024-02-21

## 2024-02-22 LAB
ALBUMIN SERPL BCP-MCNC: 3.5 G/DL (ref 3.5–5)
ALP SERPL-CCNC: 82 U/L (ref 34–104)
ALT SERPL W P-5'-P-CCNC: 7 U/L (ref 7–52)
ANION GAP SERPL CALCULATED.3IONS-SCNC: 6 MMOL/L
APPEARANCE FLD: ABNORMAL
AST SERPL W P-5'-P-CCNC: 9 U/L (ref 13–39)
BILIRUB SERPL-MCNC: 0.66 MG/DL (ref 0.2–1)
BUN SERPL-MCNC: 20 MG/DL (ref 5–25)
CA-I BLD-SCNC: 1.14 MMOL/L (ref 1.12–1.32)
CALCIUM SERPL-MCNC: 8.9 MG/DL (ref 8.4–10.2)
CHLORIDE SERPL-SCNC: 103 MMOL/L (ref 96–108)
CO2 SERPL-SCNC: 32 MMOL/L (ref 21–32)
COLOR FLD: ABNORMAL
CREAT SERPL-MCNC: 1.24 MG/DL (ref 0.6–1.3)
ERYTHROCYTE [DISTWIDTH] IN BLOOD BY AUTOMATED COUNT: 15.6 % (ref 11.6–15.1)
GFR SERPL CREATININE-BSD FRML MDRD: 51 ML/MIN/1.73SQ M
GLUCOSE FLD-MCNC: 128 MG/DL
GLUCOSE SERPL-MCNC: 176 MG/DL (ref 65–140)
HCT VFR BLD AUTO: 45.3 % (ref 36.5–49.3)
HGB BLD-MCNC: 14 G/DL (ref 12–17)
LDH FLD L TO P-CCNC: 5928 U/L
LYMPHOCYTES NFR BLD AUTO: 36 %
MAGNESIUM SERPL-MCNC: 1.9 MG/DL (ref 1.9–2.7)
MCH RBC QN AUTO: 27.3 PG (ref 26.8–34.3)
MCHC RBC AUTO-ENTMCNC: 30.9 G/DL (ref 31.4–37.4)
MCV RBC AUTO: 89 FL (ref 82–98)
MONO+MESO NFR FLD MANUAL: 4 %
NEUTS SEG NFR BLD AUTO: 60 %
PH BODY FLUID: 7.5
PHOSPHATE SERPL-MCNC: 4.2 MG/DL (ref 2.3–4.1)
PLATELET # BLD AUTO: 210 THOUSANDS/UL (ref 149–390)
PMV BLD AUTO: 10.4 FL (ref 8.9–12.7)
POTASSIUM SERPL-SCNC: 4.7 MMOL/L (ref 3.5–5.3)
PROCALCITONIN SERPL-MCNC: 0.16 NG/ML
PROT FLD-MCNC: <3 G/DL
PROT SERPL-MCNC: 6.4 G/DL (ref 6.4–8.4)
RBC # BLD AUTO: 5.12 MILLION/UL (ref 3.88–5.62)
SITE: ABNORMAL
SODIUM SERPL-SCNC: 141 MMOL/L (ref 135–147)
TOTAL CELLS COUNTED SPEC: 100
TOTAL PROTEIN FLUID: >2.9 G/DL
WBC # BLD AUTO: 13.33 THOUSAND/UL (ref 4.31–10.16)
WBC # FLD MANUAL: 201 /UL

## 2024-02-22 PROCEDURE — A7041 WATER SEAL DRAIN CONTAINER: HCPCS

## 2024-02-22 PROCEDURE — C1769 GUIDE WIRE: HCPCS

## 2024-02-22 PROCEDURE — 88305 TISSUE EXAM BY PATHOLOGIST: CPT | Performed by: STUDENT IN AN ORGANIZED HEALTH CARE EDUCATION/TRAINING PROGRAM

## 2024-02-22 PROCEDURE — 32557 INSERT CATH PLEURA W/ IMAGE: CPT

## 2024-02-22 PROCEDURE — 84157 ASSAY OF PROTEIN OTHER: CPT

## 2024-02-22 PROCEDURE — 88112 CYTOPATH CELL ENHANCE TECH: CPT | Performed by: STUDENT IN AN ORGANIZED HEALTH CARE EDUCATION/TRAINING PROGRAM

## 2024-02-22 PROCEDURE — 82330 ASSAY OF CALCIUM: CPT | Performed by: ANESTHESIOLOGY

## 2024-02-22 PROCEDURE — 83615 LACTATE (LD) (LDH) ENZYME: CPT

## 2024-02-22 PROCEDURE — 87205 SMEAR GRAM STAIN: CPT

## 2024-02-22 PROCEDURE — 83735 ASSAY OF MAGNESIUM: CPT

## 2024-02-22 PROCEDURE — 83986 ASSAY PH BODY FLUID NOS: CPT

## 2024-02-22 PROCEDURE — 80053 COMPREHEN METABOLIC PANEL: CPT

## 2024-02-22 PROCEDURE — 89051 BODY FLUID CELL COUNT: CPT

## 2024-02-22 PROCEDURE — 82945 GLUCOSE OTHER FLUID: CPT

## 2024-02-22 PROCEDURE — 84100 ASSAY OF PHOSPHORUS: CPT | Performed by: ANESTHESIOLOGY

## 2024-02-22 PROCEDURE — 85027 COMPLETE CBC AUTOMATED: CPT

## 2024-02-22 PROCEDURE — 87070 CULTURE OTHR SPECIMN AEROBIC: CPT

## 2024-02-22 PROCEDURE — 99232 SBSQ HOSP IP/OBS MODERATE 35: CPT

## 2024-02-22 PROCEDURE — 84145 PROCALCITONIN (PCT): CPT

## 2024-02-22 RX ORDER — LISINOPRIL 5 MG/1
5 TABLET ORAL DAILY
Status: DISCONTINUED | OUTPATIENT
Start: 2024-02-22 | End: 2024-02-26

## 2024-02-22 RX ORDER — LANOLIN ALCOHOL/MO/W.PET/CERES
400 CREAM (GRAM) TOPICAL DAILY
Status: DISCONTINUED | OUTPATIENT
Start: 2024-02-22 | End: 2024-03-01 | Stop reason: HOSPADM

## 2024-02-22 RX ORDER — LIDOCAINE WITH 8.4% SOD BICARB 0.9%(10ML)
SYRINGE (ML) INJECTION AS NEEDED
Status: COMPLETED | OUTPATIENT
Start: 2024-02-22 | End: 2024-02-22

## 2024-02-22 RX ORDER — METOPROLOL TARTRATE 50 MG/1
50 TABLET, FILM COATED ORAL 2 TIMES DAILY
Status: DISCONTINUED | OUTPATIENT
Start: 2024-02-22 | End: 2024-03-01 | Stop reason: HOSPADM

## 2024-02-22 RX ORDER — TAMSULOSIN HYDROCHLORIDE 0.4 MG/1
0.4 CAPSULE ORAL
Status: DISCONTINUED | OUTPATIENT
Start: 2024-02-22 | End: 2024-03-01 | Stop reason: HOSPADM

## 2024-02-22 RX ORDER — ATORVASTATIN CALCIUM 40 MG/1
40 TABLET, FILM COATED ORAL DAILY
Status: DISCONTINUED | OUTPATIENT
Start: 2024-02-22 | End: 2024-03-01 | Stop reason: HOSPADM

## 2024-02-22 RX ADMIN — TAMSULOSIN HYDROCHLORIDE 0.4 MG: 0.4 CAPSULE ORAL at 17:21

## 2024-02-22 RX ADMIN — Medication 3 MG: at 22:17

## 2024-02-22 RX ADMIN — HYDROXYZINE HYDROCHLORIDE 25 MG: 25 TABLET ORAL at 09:21

## 2024-02-22 RX ADMIN — Medication 10 ML: at 13:49

## 2024-02-22 RX ADMIN — PIPERACILLIN AND TAZOBACTAM 3.38 G: 36; 4.5 INJECTION, POWDER, FOR SOLUTION INTRAVENOUS at 16:30

## 2024-02-22 RX ADMIN — STANDARDIZED SENNA CONCENTRATE 8.6 MG: 8.6 TABLET ORAL at 22:08

## 2024-02-22 RX ADMIN — PIPERACILLIN AND TAZOBACTAM 3.38 G: 36; 4.5 INJECTION, POWDER, FOR SOLUTION INTRAVENOUS at 09:29

## 2024-02-22 RX ADMIN — METOPROLOL TARTRATE 50 MG: 50 TABLET, FILM COATED ORAL at 17:21

## 2024-02-22 RX ADMIN — LISINOPRIL 5 MG: 5 TABLET ORAL at 09:22

## 2024-02-22 RX ADMIN — METOPROLOL TARTRATE 50 MG: 50 TABLET, FILM COATED ORAL at 09:22

## 2024-02-22 RX ADMIN — PIPERACILLIN AND TAZOBACTAM 3.38 G: 36; 4.5 INJECTION, POWDER, FOR SOLUTION INTRAVENOUS at 22:08

## 2024-02-22 RX ADMIN — METOROPROLOL TARTRATE 5 MG: 5 INJECTION, SOLUTION INTRAVENOUS at 03:32

## 2024-02-22 RX ADMIN — ATORVASTATIN CALCIUM 40 MG: 40 TABLET, FILM COATED ORAL at 09:22

## 2024-02-22 RX ADMIN — FOLIC ACID TAB 400 MCG 400 MCG: 400 TAB at 09:22

## 2024-02-22 RX ADMIN — ACYCLOVIR SODIUM 750 MG: 50 INJECTION, SOLUTION INTRAVENOUS at 22:44

## 2024-02-22 RX ADMIN — PIPERACILLIN AND TAZOBACTAM 3.38 G: 36; 4.5 INJECTION, POWDER, FOR SOLUTION INTRAVENOUS at 03:32

## 2024-02-22 NOTE — ANESTHESIA POSTPROCEDURE EVALUATION
Post-Op Assessment Note    CV Status:  Stable  Pain Score: 0    Pain management: adequate       Mental Status:  Alert and awake   Hydration Status:  Euvolemic   PONV Controlled:  Controlled   Airway Patency:  Patent     Post Op Vitals Reviewed: Yes    No anethesia notable event occurred.    Staff: Anesthesiologist, CRNA   Comments: Pt will recieve Beta Blocker in PACU; denies pain or discomfort            /82 (02/21/24 1950)    Temp 98.3 °F (36.8 °C) (02/21/24 1950)    Pulse (!) 131 (02/21/24 1950)   Resp 22 (02/21/24 1950)    SpO2 98 % (02/21/24 1950)

## 2024-02-22 NOTE — ASSESSMENT & PLAN NOTE
Patient with loculated pleural effusion on CT CAP  Will consult IR for diagnostic/therapeutic thoracentesis/need for chest tube due to loculated effusion  Will consult pulm for recommendations   Currently saturating well on room air

## 2024-02-22 NOTE — OP NOTE
OPERATIVE REPORT  PATIENT NAME: William Martel    :  1935  MRN: [id number]  Pt Location: [facility] OR ROOM 02    SURGERY DATE: 2024    Surgeons and Role:     * Christopher Hendricks MD - Primary     * Say Yang PA-C - Assisting     * Bety Rincon PA-C - Assisting    Preop Diagnosis:  Small bowel obstruction (HCC) [K56.609]  Right inguinal hernia [K40.90]    Post-Op Diagnosis Codes:     * Small bowel obstruction (HCC) [K56.609]     * Right inguinal hernia [K40.90]  P[place]aloon hernia with communicating hydrocele/scrotal hernia  Procedure(s):  Right - REPAIR Incarcerated HERNIA INGUINAL WITH MESH    Specimen(s):  ID Type Source Tests Collected by Time Destination   1 : hernia sac Tissue Soft Tissue, Other TISSUE EXAM Christopher Hendricks MD 2024  6:41 PM        Estimated Blood Loss:   Minimal    Drains:  Urethral Catheter Non-latex 18 Fr. (Active)   Number of days: 382       Urethral Catheter Straight-tip 16 Fr. (Active)   Amt returned on insertion(mL) 350 mL 24 0000   Site Assessment Clean;Skin intact 24 0000   Securement Method Securing device (Describe) 24 0000   CAUTI Time-out performed before Urine Culture Yes 24 0000   Number of days: 0       Ureteral Internal Stent Right ureter 6 Fr. (Active)   Number of days: 404       Anesthesia Type:   General    Operative Indications:  Small bowel obstruction (HCC) [K56.609]  Right inguinal hernia [K40.90]      Operative Findings:  Huge indirect hernia with disruption of the floor of the inguinal canal, extending into the scrotum.  Viable loop of small bowel in the hernia.  Distal hernia imbricated around the cord and left widely open.  Hernia repaired with High Ligation, Ring Tightening and Bard Soft mesh onlay.     Complications:   None    Procedure and Technique:  The patient was identified by myself taken to the OR and placed in a supine position on the operating table.  After induction of satisfactory general endotracheal  anesthesia an orogastric tube was placed, the patient has an indwelling Dickens catheter which was placed to gravity.  The scrotum was prepped with Betadine solution, and the abdomen prepped and draped in usual sterile fashion using ChloraPrep solution.  A timeout was called, the patient identified as William Martel, IV antibiotics were confirmed is given, sequential compression devices were on and functioning, all parties agreed this was the appropriate patient for the proposed procedure.  Local anesthetic consisting of 1% lidocaine with epinephrine was infiltrated following which an incision was placed over the inguinal canal, and the incarcerated hernia with a #15 scalpel blade.  Generous amounts of local anesthetic were used as necessary for postoperative analgesia.  The incision was deepened through the subcutaneous tissue, bleeders were divided between hemostats and ligated with 3-0 Vicryl ligature.  The hernia sac was dissected free from the surrounding subcutaneous tissue, the hernia however remained irreducible.  The incision was deepened to the level of the external oblique aponeurosis which was divided in the direction of its fibers to the external ring and out onto the densely adherent hernia sac.  The distorted spermatic cord was dissected from the external ring and surrounded with 1/4 inch Penrose drain for traction.  At this point the hernia contents were be able to be reduced.  The patient was appreciated to have a scrotal hernia, and essentially complete disruption of the floor the inguinal canal.  The hernia sac was tented between hemostats and entered sharply.  Fluid in the distal hernia sac was evacuated, there was no odor of dead bowel no discoloration of the contents of the communicating hydrocele.  The hernia sac was dissected from the cord structures and then divided transversely at this point.  The proximal hernia sac was then able to be  from the herniated preperitoneal fat and cord  structures, the hernia contents were examined, and consisted of a large loop of small bowel which was able to be reduced back into the abdominal cavity and appeared completely viable.  The hernia sac was dissected from the surrounding preperitoneal tissue, a suture ligature was placed at the level of the internal ring of 2-0 Vicryl, the rest of the hernia sac was resected, and the stump allowed to retract back through the internal ring.  The distal hernia sac was then imbricated around the cord structures and maintained using a continuous running locked 3-0 chromic suture line.  The ilioinguinal nerve was not identified after some searching this appeared to be densely adherent to the preperitoneal tissue, but did not appear to be injured.  The floor of the inguinal canal was was completely disrupted by the presence of this huge indirect hernia.  A 2-0 PDS suture line was begun at the medial edge of what was the distorted internal ring, and this was carried laterally snagging up the reconstructed internal ring, and then carried back upon itself medially and tied to itself accomplishing a ring tightening procedure.  A 6 x 6 inch piece of Bard mesh was then brought onto the field.  This was to 3 inches x 6 inches, and then further shaped into a mesh onlay prosthesis.  This was then fixed by tacking it into position with interrupted 2-0 PDS suture, at the pubic tubercle medially, along Cojoined Tendon superiorly, and to the shelving edge of Poupart's ligament inferiorly.  Laterally the middle mesh was slit, and the tails were wrapped around the cord, tacked to themselves and tucked underneath the external oblique aponeurosis.  The field was irrigated with saline, the Penrose drain was removed and the cord structures allowed to return to normal position.  After inspection for hemostasis the wound was then closed in layers.  The external oblique aponeurosis was closed using a continuous 2-0 Vicryl suture line, Ezra's  fascia was closed using interrupted 3-0 Vicryl suture, and the skin closed using a subcuticular 4-0 Vicryl suture.  Wound was cleansed with saline and cyanoacrylate glue applied.  The orogastric tube was removed prior to the patient making an uneventful recovery from his anesthetic, he was extubated in the operating room, moved to his waiting stretcher and taken to the recovery room in good condition having tolerated the procedure well.   I was present for the entire procedure., A qualified resident physician was not available., and A physician assistant was required during the procedure for retraction, tissue handling, dissection and suturing.    Patient Disposition:  PACU  and extubated and stable        SIGNATURE: Christopher Hendricks MD  DATE: February 21, 2024  TIME: 7:36 PM

## 2024-02-22 NOTE — PLAN OF CARE

## 2024-02-22 NOTE — PROGRESS NOTES
[facility]  Progress Note  Name: William Martel I  MRN: [id number]  Unit/Bed#: -01 I Date of Admission: 2/21/2024   Date of Service: 2/22/2024 I Hospital Day: 1    Assessment/Plan   * Incarcerated inguinal hernia with SBO  Assessment & Plan  POA with complaints of abdominal pain started around 1 am this morning.   CT CAP: Right inguinal hernia with neck of 4.2 cm, containing a loop of small bowel. Resultant small bowel obstruction.   Patient admitted to surgical services, s/p Right - repair Incarcerated hernia inguinal with mesh on 2/21  Further management and diet advancement per surgery    Sepsis (HCC)  Assessment & Plan  Presents to ED with abdominal pain, nausea, and vomiting that started around 1 am today. Was in the ED yesterday evening due to urinary retention and had catheter placed. Returned to ED for new symptoms of N/V.   SIRS met: tachycardia and leukocytosis   Source of infection: UTI  CT CAP: Loculated right pleural effusion with mild pleural enhancement. Given pleural plaques and possible history of asbestos exposure, recommend diagnostic/therapeutic thoracentesis. Right inguinal hernia with neck of 4.2 cm, containing a loop of small bowel. Resultant small bowel obstruction. Mild wall thickening in the distal esophagus. Consider correlation with nonemergent EGD. Less than 2 mm proximal right ureteral calculus with mild hydronephrosis. Bilateral nonobstructive renal calculi. Small renal cysts. Moderate right renal atrophy.  Started on IVF rehydration, will continue, avoid large fluid boluses due to AS history  Cultures   Blood cultures pending  UA with reflex to culture  Sputum culture  Due to loculated pleural effusion, would recommend pulm and IR consult for thoracentesis  IV antibiotics; rocephin initially given in the ED; continue zosyn, follow up with cultures and deescalate as appropriate    Lab Results   Component Value Date    WBC 13.33 (H) 02/22/2024     PROCALCITONI 0.16 02/22/2024    LACTICACID 2.0 02/21/2024       Pleural effusion  Assessment & Plan  Patient with loculated pleural effusion on CT CAP  Will consult IR for diagnostic/therapeutic thoracentesis/need for chest tube due to loculated effusion  Will consult pulm for recommendations   Currently saturating well on room air    Acute cystitis with hematuria  Assessment & Plan  Presents to ED with abdominal pain, nausea, and vomiting that started around 1 am this morning. Was seen in the ED yesterday night for urinary retention and wynn catheter placed.   Reports symptoms of difficulty with urinating yesterday prior to wynn placement and bladder distension  Start zosyn   Follow up cultures       Herpes zoster without complication  Assessment & Plan  Patient with shingles present on the right shoulder and onto the right back for a little over a month. Has not taken anything for this. Recently evaluated at urgent care and no med recs due to not appearing to have active lesions.   Does have some vesicles present upon examination.   Will start on iv acyclovir.   Isolation precautions    Urinary retention  Assessment & Plan  Seen in the ED on 2/20 for difficulty with urination. Found to be retaining urine and wynn cathter was placed  Further follow up outpatient with urology    Atrial fibrillation and flutter (HCC)  Assessment & Plan  History of a fib/flutter and sick sinus syndrome status post single-chamber pacemaker implantation October 2021   Patient currently anticoagulated with eliquis due to his complicated coronary artery disease, however, he is in need of urgent surgery as the hernia is at risk of strangulation.   Hold AC at this time, resume when clear from surgery    Coronary artery disease  Assessment & Plan  History of CAD with remote history of stents x3 at [facility]   Follows with cardiology outpatient  Resume home lopressor.     Aortic stenosis  Assessment & Plan  History of AS status post aortic valve  replacement at [facility] in 2007   Continue outpatient cardiology follow up    Primary hypertension  Assessment & Plan  Currently taking enalapril and lopressor outpatient  Patient tolerating diet, resume home enalapril and lopressor  Avoid hypotension         VTE Pharmacologic Prophylaxis: VTE Score: 8 High Risk (Score >/= 5) - Pharmacological DVT Prophylaxis Contraindicated. Sequential Compression Devices Ordered.    Mobility:   Basic Mobility Inpatient Raw Score: 18  JH-HLM Goal: 6: Walk 10 steps or more  JH-HLM Achieved: 6: Walk 10 steps or more  HLM Goal achieved. Continue to encourage appropriate mobility.    Patient Centered Rounds: I performed bedside rounds with nursing staff today.   Discussions with Specialists or Other Care Team Provider: nursing, case management, surgery, IR note    Education and Discussions with Family / Patient: Updated  (daughter) at bedside.    Total Time Spent on Date of Encounter in care of patient: 40 mins. This time was spent on one or more of the following: performing physical exam; counseling and coordination of care; obtaining or reviewing history; documenting in the medical record; reviewing/ordering tests, medications or procedures; communicating with other healthcare professionals and discussing with patient's family/caregivers.    Current Length of Stay: 1 day(s)  Current Patient Status: Inpatient   Certification Statement: The patient will continue to require additional inpatient hospital stay due to iv abx, sepsis, pulm consult, chest tube placement  Discharge Plan: [facility] is following this patient on consult. They are not yet medically stable for discharge secondary to sepsis, uti, chest tube placement, pt/ot evals, pulm consult.    Code Status: Level 1 - Full Code    Subjective:   Patient seen and examined today. He is feeling better today. He is feeling good today, minimal abdominal pain. No nausea, vomiting, chest pain, SOB, fever, chills, nausea, vomiting.  Tolerating diet well.     Objective:     Vitals:   Temp (24hrs), Av.9 °F (36.6 °C), Min:97.5 °F (36.4 °C), Max:98.3 °F (36.8 °C)    Temp:  [97.5 °F (36.4 °C)-98.3 °F (36.8 °C)] 98.2 °F (36.8 °C)  HR:  [] 78  Resp:  [16-24] 20  BP: (135-163)/() 152/82  SpO2:  [92 %-98 %] 93 %  Body mass index is 28.17 kg/m².     Input and Output Summary (last 24 hours):     Intake/Output Summary (Last 24 hours) at 2024 1441  Last data filed at 2024 1023  Gross per 24 hour   Intake 4323.33 ml   Output 1900 ml   Net 2423.33 ml       Physical Exam:   Physical Exam  Vitals reviewed.   Constitutional:       General: He is not in acute distress.     Appearance: He is ill-appearing. He is not toxic-appearing.   HENT:      Head: Normocephalic and atraumatic.      Mouth/Throat:      Mouth: Mucous membranes are moist.   Cardiovascular:      Rate and Rhythm: Normal rate. Rhythm irregular.      Heart sounds: No murmur heard.  Pulmonary:      Effort: No respiratory distress.      Breath sounds: No stridor. Rales (right sided) present. No wheezing or rhonchi.      Comments: Saturating well on room air  Abdominal:      General: Bowel sounds are normal. There is no distension.      Palpations: Abdomen is soft. There is no mass.      Tenderness: There is no abdominal tenderness.   Musculoskeletal:      Right lower leg: No edema.      Left lower leg: No edema.   Skin:     General: Skin is warm and dry.      Findings: Rash (rash present on the right shoulder/neck, some crusted over lesions, some vesicular lesions present as well) present.   Neurological:      General: No focal deficit present.      Mental Status: He is alert and oriented to person, place, and time.   Psychiatric:         Mood and Affect: Mood normal.         Behavior: Behavior normal.          Additional Data:     Labs:  Results from last 7 days   Lab Units 24  0452 24  1016   WBC Thousand/uL 13.33* 15.63*   HEMOGLOBIN g/dL 14.0 16.3   HEMATOCRIT %  45.3 50.5*   PLATELETS Thousands/uL 210 256   NEUTROS PCT %  --  89*   LYMPHS PCT %  --  6*   MONOS PCT %  --  5   EOS PCT %  --  0     Results from last 7 days   Lab Units 02/22/24  0452   SODIUM mmol/L 141   POTASSIUM mmol/L 4.7   CHLORIDE mmol/L 103   CO2 mmol/L 32   BUN mg/dL 20   CREATININE mg/dL 1.24   ANION GAP mmol/L 6   CALCIUM mg/dL 8.9   ALBUMIN g/dL 3.5   TOTAL BILIRUBIN mg/dL 0.66   ALK PHOS U/L 82   ALT U/L 7   AST U/L 9*   GLUCOSE RANDOM mg/dL 176*     Results from last 7 days   Lab Units 02/21/24  1558   INR  1.17     Results from last 7 days   Lab Units 02/21/24  2026   POC GLUCOSE mg/dl 163*         Results from last 7 days   Lab Units 02/22/24  0452 02/21/24  1339 02/21/24  1046 02/21/24  1016   LACTIC ACID mmol/L  --  2.0 2.8*  --    PROCALCITONIN ng/ml 0.16  --   --  0.10       Lines/Drains:  Invasive Devices       Peripheral Intravenous Line  Duration             Peripheral IV 02/21/24 Left Antecubital 1 day    Peripheral IV 02/21/24 Left Forearm <1 day              Drain  Duration             Urethral Catheter Straight-tip 16 Fr. 1 day    Chest Tube 1 Right 10 Fr. <1 day                  Urinary Catheter:  Goal for removal: N/A- Discharging with [place]               Imaging: Reviewed radiology reports from this admission including: chest CT scan and abdominal/pelvic CT    Recent Cultures (last 7 days):   Results from last 7 days   Lab Units 02/21/24  1107 02/21/24  0012   BLOOD CULTURE  Received in Microbiology Lab. Culture in Progress.  Received in Microbiology Lab. Culture in Progress.  --    URINE CULTURE   --  >100,000 cfu/ml Candida albicans*       Last 24 Hours Medication List:   Current Facility-Administered Medications   Medication Dose Route Frequency Provider Last Rate    acyclovir  10 mg/kg (Ideal) Intravenous Q8H Josselin Boucher PA-C      atorvastatin  40 mg Oral Daily Josselin Boucher PA-C      folic acid  400 mcg Oral Daily Josselin Boucher PA-C      hydrALAZINE  5 mg  Intravenous Q6H PRN Josselin Boucher PA-C      hydrOXYzine HCL  25 mg Oral Q6H PRN Mel S Neri, CRNP      lisinopril  5 mg Oral Daily Josselin Boucher PA-C      melatonin  3 mg Oral HS PRN Mel S Neri, CRNP      metoprolol tartrate  50 mg Oral BID Josselin Boucher PA-C      naloxone  0.04 mg Intravenous Q1MIN PRN Josselin Boucher PA-C      oxyCODONE-acetaminophen  1 tablet Oral Q4H PRN Christopher Hendricks MD      piperacillin-tazobactam  3.375 g Intravenous Q6H Celine Vega MD 3.375 g (02/22/24 5339)    senna  1 tablet Oral HS Juliana Goodman MD      tamsulosin  0.4 mg Oral Daily With Dinner Josselin Boucher PA-C          Today, Patient Was Seen By: Josselin Boucher PA-C    **Please Note: This note may have been constructed using a voice recognition system.**

## 2024-02-22 NOTE — ASSESSMENT & PLAN NOTE
POA with complaints of abdominal pain started around 1 am this morning.   CT CAP: Right inguinal hernia with neck of 4.2 cm, containing a loop of small bowel. Resultant small bowel obstruction.   Patient admitted to surgical services, s/p Right - repair Incarcerated hernia inguinal with mesh on 2/21  Further management and diet advancement per surgery   R heel

## 2024-02-22 NOTE — ASSESSMENT & PLAN NOTE
Currently taking enalapril and lopressor outpatient  Patient tolerating diet, resume home enalapril and lopressor  Avoid hypotension

## 2024-02-22 NOTE — ASSESSMENT & PLAN NOTE
Seen in the ED on 2/20 for difficulty with urination. Found to be retaining urine and wynn cathter was placed  Further follow up outpatient with urology

## 2024-02-22 NOTE — BRIEF OP NOTE (RAD/CATH)
INTERVENTIONAL RADIOLOGY PROCEDURE NOTE    Date: 2/22/2024    Procedure: Right chest tube  Procedure Summary       Date:  Room / Location:     Anesthesia Start:  Anesthesia Stop:     Procedure:  Diagnosis:     Scheduled Providers:  Responsible Provider:     Anesthesia Type: Not recorded ASA Status: Not recorded            Preoperative diagnosis:   1. Abdominal pain    2. Small bowel obstruction (HCC)    3. Urinary tract infection    4. Right inguinal hernia    5. Pleural effusion         Postoperative diagnosis: Same.    Surgeon: Yusef Cabrera MD     Assistant: None. No qualified resident was available.    Blood loss: Minimal    Specimens: Pleural fluid     Findings: US showed loculated pocket right anterior pleural space as seen on CT. Bedside US guided 10.2 Fr APD placed and 40 ml of maroon colored fluid aspirated and tube connected to pleurovac    Complications: None immediate.    Anesthesia: local

## 2024-02-22 NOTE — UTILIZATION REVIEW
Initial Clinical Review    Admission: Date/Time/Statement:   Admission Orders (From admission, onward)       Ordered        02/21/24 1435  INPATIENT ADMISSION  Once                          Orders Placed This Encounter   Procedures    INPATIENT ADMISSION     Standing Status:   Standing     Number of Occurrences:   1     Order Specific Question:   Level of Care     Answer:   Med Surg [16]     Order Specific Question:   Estimated length of stay     Answer:   More than 2 Midnights     Order Specific Question:   Certification     Answer:   I certify that inpatient services are medically necessary for this patient for a duration of greater than two midnights. See H&P and MD Progress Notes for additional information about the patient's course of treatment.     ED Arrival Information       Expected   -    Arrival   2/21/2024 09:42    Acuity   Urgent              Means of arrival   Wheelchair    Escorted by   Family Member    Service   Surgery-General    Admission type   Emergency              Arrival complaint   Abdominal pain             Chief Complaint   Patient presents with    Abdominal Pain     Pt c/o upper abdominal pain w/nausea starting at 0100 after leaving this ED per urinary retention and catheter placement.       Initial Presentation: 88 y.o. male to ED via w[facility] from Home   Present to ED with abdominal pain and an incarcerated right inguinal hernia.  Patient recently developed urinary retention, requiring a urinary catheter, subsequent to this the hernia became irreducible, and now quite tender.   PMHX: CAD; Herpes Zoster; HLD; HTN; Pacemaker  Admitted to [facility] with DX: Incarcerated inguinal hernia with SBO   on exam: hypertensive; tachy; tachypnea; abdominal distension; pain 6/10;   CT CAP: Loculated right pleural effusion with mild pleural enhancement. Given pleural plaques and possible history of asbestos exposure, recommend diagnostic/therapeutic thoracentesis. Right inguinal hernia with neck of 4.2 cm,  containing a loop of small bowel. Resultant small bowel obstruction.   PLAN: he is in need of urgent surgery as the hernia is at risk of strangulation. Cont ivf; cont iv abx; pain / nausea control (see below); NPO; f/u blood cx; f/u sputum cx; IR consult - thoracentesis       OP REPORT  SURGERY DATE: 2/21/2024   Procedure(s): Right - REPAIR Incarcerated HERNIA INGUINAL WITH MESH  Anesthesia Type:  General  Operative Findings: Huge indirect hernia with disruption of the floor of the inguinal canal, extending into the scrotum.  Viable loop of small bowel in the hernia.  Distal hernia imbricated around the cord and left widely open.  Hernia repaired with High Ligation, Ring Tightening and Bard Soft mesh onlay.       Date: 2/22/24     Day 2  POD 1 repair of incarcerated right inguinal hernia    He feels well. No flatus or BM since surgery. He does feel bloated. Pulmonary right-sided Rales - Loculated right pleural effusion. A-fib, on Eliquis, LD 2/20/2024.  Shingles rash right chest/shoulder x 1 month, started on acyclovir.    Plan: Cont ivf; cont iv abx; pain / nausea control (see below); advance diet; f/u urine cx; f/u blood cx; f/u sputum cx; Imonitor labs; maintain wynn; cont to hold eliquis; scheduled for thoracentesis today      Date: 2/23/24      Day 3: Has surpassed a 2nd midnight with active treatments and services, which include cont iv abx.      ED Triage Vitals   Temperature Pulse Respirations Blood Pressure SpO2   02/21/24 0951 02/21/24 0951 02/21/24 0951 02/21/24 0951 02/21/24 0951   (!) 97.3 °F (36.3 °C) (!) 118 18 158/89 96 %      Temp Source Heart Rate Source Patient Position - Orthostatic VS BP Location FiO2 (%)   02/21/24 1544 02/21/24 0951 02/21/24 0951 02/21/24 0951 --   Oral Monitor Sitting Left arm       Pain Score       02/21/24 0951       6          Wt Readings from Last 1 Encounters:   02/21/24 91.6 kg (202 lb)     Additional Vital Signs:   Date/Time Temp Pulse Resp BP MAP (mmHg) SpO2 O2 Flow  Rate (L/min) O2 Device Cardiac (WDL) Patient Position - Orthostatic VS   02/23/24 07:27:03 98.2 °F (36.8 °C) 90 16 104/57 73 93 % -- -- -- --   02/23/24 0555 -- -- -- -- -- -- -- None (Room air) -- --   02/22/24 22:24:41 98.1 °F (36.7 °C) 78 17 114/71 85 91 % -- None (Room air) -- Lying   02/22/24 2031 -- -- -- -- -- -- -- None (Room air) -- --   02/22/24 1900 -- -- -- -- -- -- -- None (Room air) -- --   02/22/24 15:45:38 -- 83 -- 142/83 103 93 % -- -- -- --   02/22/24 14:58:23 97.9 °F (36.6 °C) -- -- 129/80 96 -- -- -- -- --   02/22/24 13:57:54 -- 78 20 152/82 -- 93 % -- -- -- --   02/22/24 13:42:07 -- 84 20 162/86 -- 95 % -- None (Room air) -- Lying     Date/Time Temp Pulse Resp BP MAP (mmHg) SpO2 O2 Flow Rate (L/min) O2 Device Cardiac (WDL) Patient Position - Orthostatic VS   02/22/24 0815 -- -- -- -- -- -- -- None (Room air) -- --   02/22/24 07:31:19 98.2 °F (36.8 °C) 86 18 158/93 115 94 % -- -- -- --   02/21/24 21:30:40 97.5 °F (36.4 °C) 114 Abnormal  -- 135/74 94 93 % -- -- -- --   02/21/24 2035 97.8 °F (36.6 °C) 100 16 142/74 104 96 % -- None (Room air) X --   02/21/24 2020 97.8 °F (36.6 °C) 96 24 Abnormal  157/78 108 94 % -- None (Room air) X --   02/21/24 2005 -- 89 24 Abnormal  158/70 100 92 % -- None (Room air) X --   02/21/24 1950 98.3 °F (36.8 °C) 131 Abnormal  22 157/82 105 98 % 3 L/min Simple mask X --   02/21/24 1544 97.5 °F (36.4 °C) 113 Abnormal  18 151/80 -- 95 % -- None (Room air) -- --   02/21/24 1501 -- 100 18 163/100 -- 95 % -- None (Room air) -- Lying   02/21/24 1257 -- 88 18 178/91 Abnormal  -- 95 % -- None (Room air) -- Lying   02/21/24 1021 -- -- -- -- -- -- -- None (Room air) -- --   02/21/24 0951 97.3 °F (36.3 °C) Abnormal  118 Abnormal  18 158/89 -- 96 % -- None (Room air) -- Sitting       EKG: none obtained      Pertinent Labs/Diagnostic Test Results:   IR chest tube placement   Final Result by Yusef Cabrera MD (02/22 7461)   Impression:   Successful placement of a 10.2  Macedonian catheter into the right anterior loculated pleural pocket under ultrasound guidance. The catheter was connected to a Pleur-Evac on wall suction.            Workstation performed: [id number]         CT chest abdomen pelvis w contrast   Final Result by Anat Lockett MD (02/21 1347)   Loculated right pleural effusion with mild pleural enhancement. Given pleural plaques and possible history of asbestos exposure, recommend diagnostic/therapeutic thoracentesis.   Right inguinal hernia with neck of 4.2 cm, containing a loop of small bowel. Resultant small bowel obstruction.   Mild wall thickening in the distal esophagus. Consider correlation with nonemergent EGD.   Less than 2 mm proximal right ureteral calculus with mild hydronephrosis.   Bilateral nonobstructive renal calculi. Small renal cysts. Moderate right renal atrophy.   Chronic findings, as per the body of the report.                     Workstation performed: [id number]              Results from last 7 days   Lab Units 02/23/24 0439 02/22/24 0452 02/21/24  1016   WBC Thousand/uL 15.82* 13.33* 15.63*   HEMOGLOBIN g/dL 13.8 14.0 16.3   HEMATOCRIT % 43.4 45.3 50.5*   PLATELETS Thousands/uL 230 210 256   NEUTROS ABS Thousands/µL  --   --  13.75*        Results from last 7 days   Lab Units 02/23/24 0439 02/22/24  0452 02/21/24  1016   SODIUM mmol/L 137 141 138   POTASSIUM mmol/L 4.1 4.7 4.3   CHLORIDE mmol/L 101 103 98   CO2 mmol/L 29 32 28   ANION GAP mmol/L 7 6 12   BUN mg/dL 24 20 19   CREATININE mg/dL 1.28 1.24 1.15   EGFR ml/min/1.73sq m 49 51 56   CALCIUM mg/dL 8.8 8.9 10.1   CALCIUM, IONIZED mmol/L  --  1.14  --    MAGNESIUM mg/dL 2.0 1.9  --    PHOSPHORUS mg/dL  --  4.2*  --      Results from last 7 days   Lab Units 02/23/24 0439 02/22/24 0452 02/21/24  1016   AST U/L 11* 9* 11*   ALT U/L 6* 7 11   ALK PHOS U/L 75 82 110*   TOTAL PROTEIN g/dL 6.4 6.4 8.1   ALBUMIN g/dL 3.5 3.5 4.2   TOTAL BILIRUBIN mg/dL 0.98 0.66 0.79     Results from last 7  days   Lab Units 02/21/24 2026   POC GLUCOSE mg/dl 163*     Results from last 7 days   Lab Units 02/23/24  0439 02/22/24  0452 02/21/24  1016   GLUCOSE RANDOM mg/dL 163* 176* 258*        Results from last 7 days   Lab Units 02/21/24  1558   PROTIME seconds 15.3*   INR  1.17   PTT seconds 37        Results from last 7 days   Lab Units 02/23/24  0439 02/22/24  0452 02/21/24  1016   PROCALCITONIN ng/ml 0.14 0.16 0.10     Results from last 7 days   Lab Units 02/21/24  1339 02/21/24  1046   LACTIC ACID mmol/L 2.0 2.8*        Results from last 7 days   Lab Units 02/21/24  1016   LIPASE u/L 19      Results from last 7 days   Lab Units 02/21/24  1107 02/21/24  0012   CLARITY UA  Cloudy Clear   COLOR UA  Carlyn Yellow   SPEC GRAV UA  1.020 1.020   PH UA  6.0 7.0   GLUCOSE UA mg/dl 500 (1/2%)* Negative   KETONES UA mg/dl Trace* Negative   BLOOD UA  Large* Negative   PROTEIN UA mg/dl 100 (2+)* Trace*   NITRITE UA  Negative Negative   BILIRUBIN UA  Small* Small*   UROBILINOGEN UA E.U./dl 1.0 0.2   LEUKOCYTES UA  Small* Small*   WBC UA /hpf 20-30* 10-20*   RBC UA /hpf Innumerable* 4-10*   BACTERIA UA /hpf Occasional Occasional   EPITHELIAL CELLS WET PREP /hpf Occasional Occasional      Results from last 7 days   Lab Units 02/22/24  1405 02/21/24  1107 02/21/24  0012   BLOOD CULTURE   --  No Growth at 24 hrs.  No Growth at 24 hrs.  --    GRAM STAIN RESULT  No Polys or Bacteria seen  --   --    URINE CULTURE   --   --  >100,000 cfu/ml Candida albicans*        ED Treatment:   Medication Administration from 02/21/2024 0941 to 02/21/2024 1535         Date/Time Order Dose Route Action     02/21/2024 1016 EST ondansetron (ZOFRAN) injection 4 mg 4 mg Intravenous Given     02/21/2024 1016 EST morphine injection 2 mg 2 mg Intravenous Given     02/21/2024 1107 EST cefTRIAXone (ROCEPHIN) IVPB (premix in dextrose) 2,000 mg 50 mL 2,000 mg Intravenous New Bag     02/21/2024 1144 EST iohexol (OMNIPAQUE) 350 MG/ML injection (MULTI-DOSE) 100 mL  100 mL Intravenous Given     02/21/2024 1258 EST morphine injection 2 mg 2 mg Intravenous Given     02/21/2024 1435 EST ondansetron (ZOFRAN) injection 4 mg 4 mg Intravenous Given     02/21/2024 1500 EST dextrose 5 % and sodium chloride 0.45 % with KCl 20 mEq/L infusion 100 mL/hr Intravenous New Bag            Present on Admission:   Primary hypertension   Aortic stenosis   Coronary artery disease   Atrial fibrillation and flutter (HCC)   Acute cystitis with hematuria   Urinary retention      Admitting Diagnosis: Small bowel obstruction (HCC) [K56.609]  Urinary tract infection [N39.0]  Abdominal pain [R10.9]  Pleural effusion [J90]  Right inguinal hernia [K40.90]    Age/Sex: 88 y.o. male    Admission Orders: SCDs; I/O; NPO    Scheduled Medications:  acyclovir, 10 mg/kg (Ideal), Intravenous, Q8H  atorvastatin, 40 mg, Oral, Daily  folic acid, 400 mcg, Oral, Daily  lisinopril, 5 mg, Oral, Daily  metoprolol tartrate, 50 mg, Oral, BID  piperacillin-tazobactam, 3.375 g, Intravenous, Q6H  senna, 1 tablet, Oral, HS  tamsulosin, 0.4 mg, Oral, Daily With Dinner      Continuous IV Infusions: dextrose 5 % and sodium chloride 0.45 % with KCl 20 mEq/L infusion Rate: 100 mL/hr         PRN Meds:  hydrALAZINE, 5 mg, Intravenous, Q6H PRN  hydrOXYzine HCL, 25 mg, Oral, Q6H PRN  (2/21 rec'd x1)  (2/22 rec'd x1)   melatonin, 3 mg, Oral, HS PRN  naloxone, 0.04 mg, Intravenous, Q1MIN PRN  oxyCODONE-acetaminophen, 1 tablet, Oral, Q4H PRN        IP CONSULT TO INTERNAL MEDICINE  IP CONSULT TO PULMONOLOGY    Network Utilization Review Department  ATTENTION: Please call with any questions or concerns to [phone] and carefully listen to the prompts so that you are directed to the right person. All voicemails are confidential.   For Discharge needs, contact Care Management DC Support Team at [phone] opt. [phone]  Send all requests for admission clinical reviews, approved or denied determinations and any other requests to dedicated fax number  below belonging to the campus where the patient is receiving treatment. List of dedicated fax numbers for the Facilities:  FACILITY NAME UR FAX NUMBER   ADMISSION DENIALS (Administrative/Medical Necessity) [phone]   DISCHARGE SUPPORT TEAM (NETWORK) [phone]   PARENT CHILD HEALTH (Maternity/NICU/Pediatrics) [phone]   [facility] [phone]   [facility] [phone]   Critical access hospital [phone]   [facility] [phone]   [facility] [phone]   [facility] [phone]   [facility] [phone]   [facility] [phone]   [facility] [phone]   [facility] [phone]   [facility] [phone]   [facility] [phone]

## 2024-02-22 NOTE — ASSESSMENT & PLAN NOTE
History of CAD with remote history of stents x3 at [facility]   Follows with cardiology outpatient  Resume home lopressor.

## 2024-02-22 NOTE — ASSESSMENT & PLAN NOTE
Presents to ED with abdominal pain, nausea, and vomiting that started around 1 am today. Was in the ED yesterday evening due to urinary retention and had catheter placed. Returned to ED for new symptoms of N/V.   SIRS met: tachycardia and leukocytosis   Source of infection: UTI  CT CAP: Loculated right pleural effusion with mild pleural enhancement. Given pleural plaques and possible history of asbestos exposure, recommend diagnostic/therapeutic thoracentesis. Right inguinal hernia with neck of 4.2 cm, containing a loop of small bowel. Resultant small bowel obstruction. Mild wall thickening in the distal esophagus. Consider correlation with nonemergent EGD. Less than 2 mm proximal right ureteral calculus with mild hydronephrosis. Bilateral nonobstructive renal calculi. Small renal cysts. Moderate right renal atrophy.  Started on IVF rehydration, will continue, avoid large fluid boluses due to AS history  Cultures   Blood cultures pending  UA with reflex to culture  Sputum culture  Due to loculated pleural effusion, would recommend pulm and IR consult for thoracentesis  IV antibiotics; rocephin initially given in the ED; continue zosyn, follow up with cultures and deescalate as appropriate    Lab Results   Component Value Date    WBC 13.33 (H) 02/22/2024    PROCALCITONI 0.16 02/22/2024    LACTICACID 2.0 02/21/2024

## 2024-02-22 NOTE — ASSESSMENT & PLAN NOTE
REASON FOR CONSULTATION:    Provide an opinion on any further workup or treatment                             REQUESTING PHYSICIAN:  Valerie Stockton MD    RECORDS OBTAINED:  Records of the patients history including those obtained from the referring provider were reviewed and summarized in detail.    HISTORY OF PRESENT ILLNESS:  The patient is a 65 y.o. year old male who is here for an opinion about the above issue.    History of Present Illness    The patient is a 65-year-old male with a history that includes assessment for iron deficiency anemia beginning September 2022.  At this point he carried additional history of coronary disease with stent placement, Plavix use, prior placement of bioprosthetic arctic valve, diabetes mellitus type 2, TJ on auto CPAP.  He had been maintained on long-term antiplatelet therapy with Plavix.    As part of his work-up he underwent EGD and colonoscopy by GI-Dr. Chowdhury-10/1/2022 was found to have an esophageal mass as well as a large cecal polyp.  He underwent endoscopic mucosal resection of the esophageal and cecal masses and was confirmed to have an esophageal cancer and a precancer polyp in the cecum.  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  He was referred to thoracic surgery seen 11/29/2022 after hospitalization for severe lower GI bleed.  A colonoscopy 11/26 revealed a colonic ulcer with a large clot attached.    Upon this consultation it was felt that he would need to proceed with surgical resection without neoadjuvant therapy.    The patient required readmission 12/1/12/2 for additional GI bleeding requiring transfusion and then transferred to [facility]  History of AS status post aortic valve replacement at [facility] in 2007   Continue outpatient cardiology follow up   Hospital.    Patient admitted 12/14-15/2022 with fatigue and chest pain.  He was transfused, felt to have stable angina and acid reflux, treated Imdur, amlodipine and PPI therapy.    Patient subsequent admitted 2/3- 10/2023.  An esophagectomy had been recommended and this proceeded via scheduled right VAT with total esophagectomy and jejunostomy feeding tube placement.  Perioperatively he had issues with RAMIREZ with oliguria, hypertension, TJ, nutrition.    Pathology revealed evidence that the tumors midpoint 2 cm or less in the proximal stomach or cardia and tumor involving the esophagogastric junction, adenocarcinoma, well-differentiated, invading submucosa, negative margins, all regional lymph nodes negative.  Final pathologic staging was [id number] stage IB    Laboratory testing 4/13/2023 with H&H of 10.9 and 33.7, white count of 5110, platelet count of 207,000.    The patient was seen 4/29/2023 losing weight from dropping from 294 to 283 pounds pounds, difficulty eating, periodic shortness of breath.  He was assessed via EGD and dilated 5/1/2023.    The patient's case was discussed with thoracic surgery 5/7/2023 and is next seen in office 5/8/2023.  We reviewed his history from his initial GI bleeding episode.    At present he is slowly improving though unable to eat easily without vomiting if he does not pace himself correctly and he has lost considerable weight.  He is thought to have ongoing anginal symptoms managed by cardiology and bilateral knee pain followed and treated by orthopedics.        Past Medical History:   Diagnosis Date   • Abnormal nuclear stress test    • Anxiety    • Anxiety and depression    • Aortic valve insufficiency     nonrheumtic    • Arthritis     knees   • Ascending aortic aneurysm    • CAD (coronary artery disease)     stent   • Chest pain    • Diabetes mellitus    • Diarrhea    • Dizzy     CAREFUL WHEN GETTING UP   • Dyspnea    • Esophageal cancer 11/2022    no chemo or  "radiation   • Essential hypertension    • Fatigue    • G tube feedings     per jejunostomy tube nightly with permitin  3 cans nightly   • GERD (gastroesophageal reflux disease)    • GI bleed    • Heart murmur    • History of pneumonia    • History of recent blood transfusion     hemorrhage     no reaction   • Hyperglycemia    • Hyperlipidemia    • Hypersomnia with sleep apnea    • Jejunostomy tube present    • Lightheadedness    • Malaise and fatigue    • Migraines     \"OCCULAR MIGRAINES\"   • Morbid obesity    • Myocardial ischemia    • Nausea    • Nocturia    • TJ on auto CPAP 10/31/2016    Overnight polysomnogram.  Weight 276 pounds.  Severe TJ with AHI 79 events per hour.  Auto CPAP recommended.   • Pneumonia     FEB 2016   • Scrotal bleeding    • SOB (shortness of breath)         Past Surgical History:   Procedure Laterality Date   • AORTIC VALVE REPAIR/REPLACEMENT  05/2016   • ASCENDING ARCH/HEMIARCH REPLACEMENT N/A 05/02/2016    Procedure: BHAVESH STERNOTOMY CORONARY ARTERY BYPASS GRAFT TIMES 3 USING LEFT INTERNAL MAMMARY ARTERY AND RIGHT GREATER SAPHENOUS VEIN GRAFT PER ENDOSCOPIC VEIN HARVESTING, AORTIC ANEURYSM REPAIR WITH ROOT REPAIR AND AORTIC VALVE REPLACEMENT;  Surgeon: Rosalio Cline MD;  Location: [facility] OR;  Service:    • CARDIAC CATHETERIZATION N/A 04/01/2016    Procedure: Left Heart Cath;  Surgeon: Erick Tam MD;  Location: [facility] CATH INVASIVE LOCATION;  Service:    • CARDIAC CATHETERIZATION N/A 04/01/2016    Procedure: Left ventriculography;  Surgeon: Erick Tam MD;  Location: [facility] CATH INVASIVE LOCATION;  Service:    • CARDIAC CATHETERIZATION N/A 04/01/2016    Procedure: Right Heart Cath;  Surgeon: Erick Tam MD;  Location: [facility] CATH INVASIVE LOCATION;  Service:    • CARDIAC CATHETERIZATION N/A 10/30/2017    Procedure: Coronary angiography;  Surgeon: Sorin Vasquez MD;  Location: [facility] CATH INVASIVE LOCATION;  Service:    • CARDIAC CATHETERIZATION  10/30/2017    Procedure: " Saphenous Vein Graft;  Surgeon: Sorin Vasquez MD;  Location: [facility] CATH INVASIVE LOCATION;  Service:    • CARDIAC CATHETERIZATION N/A 10/30/2017    Procedure: Native mammary injection;  Surgeon: Sorin Vasquez MD;  [facility]VASIVE LOCATION;  Service:    • CARDIAC CATHETERIZATION N/A 07/07/2020    Procedure: Coronary angiography;  Surgeon: Gregor Kim MD;  [facility] LOC[facility];  Service: Cardiovascular;  Laterality: N/A;   • CARDIAC CATHETERIZATION N/A 07/07/2020    Procedure: Left heart cath;  Surgeon: Gregor Kim MD;  [facility] CATH INVASIVE LOCATION;  Service: Cardiovascular;  Laterality: N/A;   • CARDIAC CATHETERIZATION N/A 07/07/2020    Procedure: Left ventriculography;  Surgeon: Gregor Kim MD;  [facility] C[facility] INVASIVE LOCATION;  Service: Cardiovascular;  Laterality: N/A;   • CARDIAC CATHETERIZATION N/A 07/07/2020    Procedure: Stent ESMER bypass graft;  Surgeon: Gregor Kim MD;  Location: [facility] CATH INVASIVE LOCATION;  Service: Cardiovascular;  Laterality: N/A;   • CARDIAC CATHETERIZATION N/A 06/17/2021    Procedure: SAPHENOUS VEIN GRAFT;  Surgeon: Marques Ndiaye MD;  Location: [facility] CATH INVASIVE LOCATION;  Service: Cardiovascular;  Laterality: N/A;   • CARDIAC CATHETERIZATION N/A 06/17/2021    Procedure: Left Heart Cath;  Surgeon: Marques Ndiaye MD;  Location: [facility] CATH INVASIVE LOCATION;  Service: Cardiovascular;  Laterality: N/A;   • CARDIAC CATHETERIZATION N/A 06/17/2021    Procedure: Coronary angiography;  Surgeon: Marques Ndiaye MD;  Location: [facility] CATH INVASIVE LOCATION;  Service: Cardiovascular;  Laterality: N/A;   • CARDIAC CATHETERIZATION N/A 07/14/2022    Procedure: Left Heart Cath;  Surgeon: Charlie Aj MD;  Location: [facility] CATH INVASIVE LOCATION;  Service: Cardiovascular;  Laterality: N/A;   • CARDIAC CATHETERIZATION N/A 07/14/2022    Procedure: Coronary angiography;  Surgeon: Madai  Charlie MILLER MD;  Location: [facility] CATH INVASIVE LOCATION;  Service: Cardiovascular;  Laterality: N/A;   • CARDIAC CATHETERIZATION  07/14/2022    Procedure: Saphenous Vein Graft;  Surgeon: Charlie Aj MD;  Location: [facility] CATH INVASIVE LOCATION;  Service: Cardiovascular;;   • CARDIAC CATHETERIZATION N/A 07/14/2022    Procedure: Native mammary injection;  Surgeon: Charlie Aj MD;  Location: [facility] CATH INVASIVE LOCATION;  Service: Cardiovascular;  Laterality: N/A;   • CATARACT EXTRACTION EXTRACAPSULAR W/ INTRAOCULAR LENS IMPLANTATION Left    • COLONOSCOPY N/A 11/26/2022    Procedure: COLONOSCOPY AT BEDSIDE;  Surgeon: Tomy Lopez MD;  Location: [facility] MAIN OR;  Service: Gastroenterology;  Laterality: N/A;   • COLONOSCOPY W/ POLYPECTOMY N/A 10/04/2022    Procedure: COLONOSCOPY to cecum with cold forceps and cold snare polypectomies;  Surgeon: Gregor Carlson MD;  Location: [facility] ENDOSCOPY;  Service: Gastroenterology;  Laterality: N/A;  PRE- hx of polyps  POST- diverticulosis, polyps   • CORONARY ARTERY BYPASS GRAFT  05/2016    LIMA TO LAD, SVG TO PDA, SVG TO OM2   • ENDOSCOPY N/A 07/13/2022    Procedure: ESOPHAGOGASTRODUODENOSCOPY;  Surgeon: Marcia Hollis MD;  Location: [facility] ENDOSCOPY;  Service: Gastroenterology;  Laterality: N/A;  PRE- ANEMIA, MELENA  POST- MILD EROSIVE GASTRITIS, GE JUNCTION ULCER   • ENDOSCOPY N/A 10/04/2022    Procedure: ESOPHAGOGASTRODUODENOSCOPY with biopsies;  Surgeon: Gregor Carlson MD;  Location: [facility] ENDOSCOPY;  Service: Gastroenterology;  Laterality: N/A;  PRE- hx of esophageal ulcer  POST- gastric cardia mass   • ENDOSCOPY N/A 5/1/2023    Procedure: ESOPHAGOGASTRODUODENOSCOPY WITH  [id number] balloon DILATATION of pylorus and anastomosis;  Surgeon: Valerie Stockton MD;  Location: [facility] ENDOSCOPY;  Service: Gastroenterology;  Laterality: N/A;  PRE: dysphagia  POST: no abnormalities seen   • ESOPHAGOGASTRECTOMY Right 02/03/2023     Procedure: BRONCHOSCOPY, RIGHT ROBOTIC VIDEO ASSISTED THORACOSCOPY WITH TOTAL ESOPHAGECTOMY, INTERCOSTAL NERVE BLOCK, FEEDING JEJUNOSTOMY PLACEMENT;  Surgeon: Valerie Stockton MD;  Location: [facility];  Service: [facility] - [facility];  Laterality: Right;   • INNER EAR SURGERY     • TONSILLECTOMY          Current Outpatient Medications on File Prior to Visit   Medication Sig Dispense Refill   • amLODIPine (NORVASC) 10 MG tablet Take 1 tablet by mouth Daily. (Patient taking differently: Take 1 tablet by mouth Every Night.) 30 tablet 11   • atorvastatin (LIPITOR) 40 MG tablet Administer 1 tablet per J tube Every Night. (Patient taking differently: Take 1 tablet by mouth Every Night. Indications: High Amount of Fats in the Blood) 30 tablet 0   • Chlorhexidine Gluconate Cloth 2 % pads Apply 1 application topically. USE AS DIRECTED PREOP     • dapagliflozin Propanediol (Farxiga) 10 MG tablet Administer 10 mg per J tube Daily. (Patient taking differently: Take 10 mg by mouth Daily. Indications: Type 2 Diabetes) 30 tablet 0   • lisinopril (PRINIVIL,ZESTRIL) 10 MG tablet Administer 1 tablet per J tube Daily for 30 days. (Patient taking differently: Take 1 tablet by mouth Every Night.) 30 tablet 1   • Loperamide HCl (IMODIUM PO) Take 1 tablet by mouth As Needed. LOOSE STOOLS     • metoprolol succinate XL (TOPROL-XL) 25 MG 24 hr tablet 1 tablet Every Night.     • mirtazapine (REMERON) 30 MG tablet Take 1 tablet by mouth Every Night. 30 tablet 2     No current facility-administered medications on file prior to visit.        ALLERGIES:  No Known Allergies     Social History     Socioeconomic History   • Marital status: Single   Tobacco Use   • Smoking status: Never     Passive exposure: Never   • Smokeless tobacco: Never   Vaping Use   • Vaping Use: Never used   Substance and Sexual Activity   • Alcohol use: Not Currently     Comment: usually 1-2 month but none since christmas 2022   • Drug use: Never   • Sexual activity:  "Defer        Family History   Problem Relation Age of Onset   • Hypertension Mother    • Diabetes Mother    • Heart disease Mother    • Hypertension Father    • Lung cancer Father    • Heart disease Sister    • Stroke Maternal Grandmother    • Heart disease Maternal Grandmother    • Hypertension Maternal Grandfather    • Hypertension Paternal Grandmother    • Hypertension Paternal Grandfather    • Other Other         The patient states that his sister has a problem that goes by the name of \"long QT\".  He says this is a familial trait but he also says that he is \"not interested in pursuing it for himself\".   • Coronary artery disease Other    • Malig Hyperthermia Neg Hx         Review of Systems   Constitutional: Positive for activity change, appetite change and fatigue.   HENT: Negative.    Eyes: Negative.    Respiratory: Positive for chest tightness and shortness of breath.    Cardiovascular: Positive for palpitations (See history of tachycardia).   Gastrointestinal:        See history of present illness, follow-up colonoscopy anticipated at [facility].   Endocrine: Negative.    Genitourinary: Negative.    Musculoskeletal: Positive for arthralgias (Particularly bilateral knees).   Skin: Negative.    Allergic/Immunologic: Negative.    Neurological: Negative.    Psychiatric/Behavioral: Positive for dysphoric mood. The patient is nervous/anxious.         Objective     There were no vitals filed for this visit.       View : No data to display.                Physical Exam  Constitutional:       Appearance: He is obese.   HENT:      Head: Normocephalic and atraumatic.      Nose: Nose normal.      Mouth/Throat:      Mouth: Mucous membranes are moist.      Pharynx: Oropharynx is clear.   Eyes:      Extraocular Movements: Extraocular movements intact.      Conjunctiva/sclera: Conjunctivae normal.      Pupils: Pupils are equal, round, and reactive to light.   Cardiovascular:      Rate and Rhythm: Normal rate and " regular rhythm.      Pulses: Normal pulses.      Heart sounds: Normal heart sounds.   Pulmonary:      Effort: Pulmonary effort is normal.      Breath sounds: Normal breath sounds.   Abdominal:      General: Bowel sounds are normal.      Palpations: Abdomen is soft.      Comments: J-tube remains in place   Musculoskeletal:         General: Normal range of motion.      Cervical back: Normal range of motion and neck supple.   Skin:     General: Skin is warm and dry.   Neurological:      General: No focal deficit present.      Mental Status: He is oriented to person, place, and time.   Psychiatric:         Behavior: Behavior normal.           RECENT LABS:  Hematology WBC   Date Value Ref Range Status   04/13/2023 5.11 3.40 - 10.80 10*3/mm3 Final   09/15/2022 7.97 3.40 - 10.80 10*3/mm3 Final     RBC   Date Value Ref Range Status   04/13/2023 3.92 (L) 4.14 - 5.80 10*6/mm3 Final   09/15/2022 4.32 4.14 - 5.80 10*6/mm3 Final     Hemoglobin   Date Value Ref Range Status   04/13/2023 10.9 (L) 13.0 - 17.7 g/dL Final     Hematocrit   Date Value Ref Range Status   04/13/2023 33.7 (L) 37.5 - 51.0 % Final     Platelets   Date Value Ref Range Status   04/13/2023 207 140 - 450 10*3/mm3 Final          Assessment & Plan         65-year-old male with a history of coronary artery disease, previous AV replacement, diabetes mellitus, morbid obesity, TJ and reflux.  He had presented with GI bleeding after anti-inflammatory use for bilateral knee pain.  Studies revealed an esophageal mass as well as a large cecal polyp and further endoscopic mucosal resection of the esophageal mass and cecal mass revealed an esophageal cancer and a premalignant polyp in the cecum.  After his additional procedures he had further lower GI bleeding requiring hospitalization and, upon slow recovery with additional hospitalizations for both bleeding and chest discomfort, he was felt a candidate for esophagectomy.    This was performed during admission 2/3 -  10/20/2023-right VAT with total esophagectomy and jejunostomy feeding tube placement.    Pathology revealed evidence that the tumors midpoint 2 cm or less in the proximal stomach or cardia and tumor involving the esophagogastric junction, adenocarcinoma, well-differentiated, invading submucosa, negative margins, all regional lymph nodes negative.  Final pathologic staging was [id number] stage IB.    Please note that HER2 overexpression by immunohistochemistry was 2+ or equivocal.  FISH analysis was also, evidently, noncontributory-indeterminate.    The patient seen in consultation 5/8/2023, and fortunately, is recovering well from surgery and hematologic losses with improvement in his general performance status, maintenance weight, and anemia.  He does continue to have generalized anxiety, and depression and is followed by supportive oncology.    As to adjuvant therapy concerns we have discussed that postoperative adjuvant therapy is primarily reserved for node positive for pathologic T3 or T4 disease.  Adjuvant therapy is also given for high risk-poorly differentiated histology, lymphovascular or perineural invasion in younger patients with resected disease.    It is not felt that adjuvant therapy is necessary in this patient considering his potential risk of recurrent disease.  The risk of this therapy exceeds the potential benefit at this stage.    After lengthy discussion the patient will follow-up with thoracic surgery and GI medicine at both [facility] and here at [facility].  Thank you very much for allowing us to see this patient consultation and please let me know if any questions concerning this case.

## 2024-02-22 NOTE — CASE MANAGEMENT
Case Management Assessment & Discharge Planning Note    [facility]  Location /[facility]01 MRN [id number]  : 1935 Date 2024       Current Admission Date: 2024  Current Admission Diagnosis:Incarcerated inguinal hernia with SBO   Patient Active Problem List    Diagnosis Date Noted    Incarcerated inguinal hernia with SBO 2024    Sepsis (HCC) 2024    Pleural effusion 2024    Pulmonary hypertension (HCC) 2024    Herpes zoster without complication 2024    Small bowel obstruction ([facility]) 2024    Right inguinal hernia 2024    Benign prostatic hyperplasia with urinary obstruction 2023    COVID-19 2023    Encounter for other orthopedic aftercare 02/15/2023    Other fracture of first lumbar vertebra, subsequent encounter for fracture with routine healing 02/15/2023    Presence of urogenital implants 02/15/2023    Repeated falls 02/15/2023    Urinary retention 02/15/2023    Acute pain due to trauma 02/15/2023    Fall 2023    Closed T12 fracture ([facility]) 2023    H/O heart artery stent     Moderate mitral stenosis     Ureteral stone 2023    Acute cystitis with hematuria 2023    Calculus of kidney 2023    Atrial fibrillation and flutter ([facility]) 2023    Pacemaker 2022    Primary hypertension     Hyperlipemia     Aortic stenosis     S/P AVR     Carotid artery disease ([facility])     Coronary artery disease     Sick sinus syndrome ([facility]) 2021    Stenosis of right carotid artery 2021    Carotid stenosis, asymptomatic, bilateral 2019    Coronary stent patent 2019    Carotid artery occlusion 07/15/2016    Vitamin D deficiency 07/15/2016    Aortic valve disorder 2016      LOS (days): 1  Geometric Mean LOS (GMLOS) (days): 5.2  Days to GMLOS:4.3     OBJECTIVE:    Risk of Unplanned Readmission Score: 9.12         Current admission status: Inpatient  Referral Reason: Other (dc  planning)    Preferred Pharmacy:   [id number] [place] [address]  [address] [facility] [address]  Phone: [phone] Fax: [phone]    [id number] [place] [facility] [address] [facility]  [address]  [facility] [address]  Phone: [phone] Fax: [phone]    [facility] [place] - [address]   [address] [facility] [address]  Phone: [phone] Fax: [phone]    Primary Care Provider: Migdalia Reyez DO    Primary Insurance: LUIZ SARAVIA  Secondary Insurance:     ASSESSMENT:   Cm spoke with daughter who said pt would be willing to do hhc or rehab if necessary. Pt just had surgery, Cm will follow up at a later date for dc needs.     Active Health Care Proxies    There are no active Health Care Proxies on file.       Advance Directives  Does patient have a Health Care POA?: No  Does patient have Advance Directives?: No  Primary Contact: NELLY HERRERA (Daughter)  [phone]         Readmission Root Cause  30 Day Readmission: No    Patient Information  Admitted from:: Home  Mental Status: Other (Comment) (spoke to daughter on the phone)  During Assessment patient was accompanied by: Not accompanied during assessment  Assessment information provided by:: Daughter  Primary Caregiver: Self  Support Systems: Self, Daughter, Family members  County of Residence: [place] [facility]  What city do you live in?: [place]  Home entry access options. Select all that apply.: Stairs  Number of steps to enter home.: 4  Do the steps have railings?: Yes  Type of Current Residence: [address] [place] home  Upon entering residence, is there a bedroom on the main floor (no further steps)?: Yes  Upon entering residence, is there a bathroom on the main floor (no further steps)?: Yes  Living Arrangements: Lives w/ Daughter  Is patient a ?: Yes  Is patient active with VA ([facility])?: No    Activities of Daily Living Prior to Admission  Functional Status:  Independent  Completes ADLs independently?: Yes  Ambulates independently?: Yes  Does patient use assisted devices?: Yes  Assisted Devices (DME) used: Stair Chair/Glide, Walker  Does patient currently own DME?: Yes  What DME does the patient currently own?: Stair Chair/[place], Walker  Does patient have a history of Outpatient Therapy (PT/OT)?: Yes  Does the patient have a history of Short-Term Rehab?: Yes (4 months at [facility])  Does patient have a history of HHC?: No  Does patient currently have HHC?: No         Patient Information Continued  Income Source: SSI/SSD  Does patient have prescription coverage?: Yes  Does patient receive dialysis treatments?: No  Does patient have a history of substance abuse?: No  Does patient have a history of Mental Health Diagnosis?: No         Means of Transportation  Means of Transport to [facility]:: Family transport      Social Determinants of Health (SDOH)      Flowsheet Row Most Recent Value   Housing Stability    In the last 12 months, was there a time when you were not able to pay the mortgage or rent on time? N   In the last 12 months, how many places have you lived? 1   In the last 12 months, was there a time when you did not have a steady place to sleep or slept in a shelter (including now)? N   Transportation Needs    In the past 12 months, has lack of transportation kept you from medical appointments or from getting medications? no   In the past 12 months, has lack of transportation kept you from meetings, work, or from getting things needed for daily living? No   Food Insecurity    Within the past 12 months, you worried that your food would run out before you got the money to buy more. Never true   Within the past 12 months, the food you bought just didn't last and you didn't have money to get more. Never true   Utilities    In the past 12 months has the electric, gas, oil, or water company threatened to shut off services in your home? No            DISCHARGE  DETAILS:    Discharge planning discussed with:: Daughter  Freedom of Choice: Yes  Comments - Freedom of Choice: Pts daughter said he would do hhc or rehab is needed, follow up if recs are made  CM contacted family/caregiver?: Yes  Were Treatment Team discharge recommendations reviewed with patient/caregiver?: Yes  Did patient/caregiver verbalize understanding of patient care needs?: Yes  Were patient/caregiver advised of the risks associated with not following Treatment Team discharge recommendations?: Yes    Contacts  Patient Contacts: Anel Mcdonald (Daughter) [phone] () [phone]  Relationship to Patient:: Family  Contact Method: Phone  Phone Number: [phone] (h) [phone]  Reason/Outcome: Continuity of Care, Emergency Contact

## 2024-02-22 NOTE — PLAN OF CARE

## 2024-02-22 NOTE — ASSESSMENT & PLAN NOTE
History of a fib/flutter and sick sinus syndrome status post single-chamber pacemaker implantation October 2021   Patient currently anticoagulated with eliquis due to his complicated coronary artery disease, however, he is in need of urgent surgery as the hernia is at risk of strangulation.   Hold AC at this time, resume when clear from surgery

## 2024-02-23 ENCOUNTER — APPOINTMENT (INPATIENT)
Dept: RADIOLOGY | Facility: HOSPITAL | Age: 89
DRG: 853 | End: 2024-02-23
Payer: COMMERCIAL

## 2024-02-23 PROBLEM — A41.9 SEPSIS (HCC): Status: RESOLVED | Noted: 2024-02-21 | Resolved: 2024-02-23

## 2024-02-23 PROBLEM — K40.90 RIGHT INGUINAL HERNIA: Status: RESOLVED | Noted: 2024-02-21 | Resolved: 2024-02-23

## 2024-02-23 PROBLEM — K56.609 SMALL BOWEL OBSTRUCTION (HCC): Status: RESOLVED | Noted: 2024-02-21 | Resolved: 2024-02-23

## 2024-02-23 LAB
ALBUMIN SERPL BCP-MCNC: 3.5 G/DL (ref 3.5–5)
ALP SERPL-CCNC: 75 U/L (ref 34–104)
ALT SERPL W P-5'-P-CCNC: 6 U/L (ref 7–52)
ANION GAP SERPL CALCULATED.3IONS-SCNC: 7 MMOL/L
AST SERPL W P-5'-P-CCNC: 11 U/L (ref 13–39)
BACTERIA UR CULT: ABNORMAL
BACTERIA UR CULT: ABNORMAL
BILIRUB SERPL-MCNC: 0.98 MG/DL (ref 0.2–1)
BUN SERPL-MCNC: 24 MG/DL (ref 5–25)
CALCIUM SERPL-MCNC: 8.8 MG/DL (ref 8.4–10.2)
CHLORIDE SERPL-SCNC: 101 MMOL/L (ref 96–108)
CO2 SERPL-SCNC: 29 MMOL/L (ref 21–32)
CREAT SERPL-MCNC: 1.28 MG/DL (ref 0.6–1.3)
ERYTHROCYTE [DISTWIDTH] IN BLOOD BY AUTOMATED COUNT: 15.9 % (ref 11.6–15.1)
GFR SERPL CREATININE-BSD FRML MDRD: 49 ML/MIN/1.73SQ M
GLUCOSE SERPL-MCNC: 163 MG/DL (ref 65–140)
HCT VFR BLD AUTO: 43.4 % (ref 36.5–49.3)
HGB BLD-MCNC: 13.8 G/DL (ref 12–17)
MAGNESIUM SERPL-MCNC: 2 MG/DL (ref 1.9–2.7)
MCH RBC QN AUTO: 27.5 PG (ref 26.8–34.3)
MCHC RBC AUTO-ENTMCNC: 31.8 G/DL (ref 31.4–37.4)
MCV RBC AUTO: 87 FL (ref 82–98)
PLATELET # BLD AUTO: 230 THOUSANDS/UL (ref 149–390)
PMV BLD AUTO: 11.1 FL (ref 8.9–12.7)
POTASSIUM SERPL-SCNC: 4.1 MMOL/L (ref 3.5–5.3)
PROCALCITONIN SERPL-MCNC: 0.14 NG/ML
PROT SERPL-MCNC: 6.4 G/DL (ref 6.4–8.4)
RBC # BLD AUTO: 5.02 MILLION/UL (ref 3.88–5.62)
SODIUM SERPL-SCNC: 137 MMOL/L (ref 135–147)
WBC # BLD AUTO: 15.82 THOUSAND/UL (ref 4.31–10.16)

## 2024-02-23 PROCEDURE — 84145 PROCALCITONIN (PCT): CPT

## 2024-02-23 PROCEDURE — 71046 X-RAY EXAM CHEST 2 VIEWS: CPT

## 2024-02-23 PROCEDURE — 99232 SBSQ HOSP IP/OBS MODERATE 35: CPT | Performed by: INTERNAL MEDICINE

## 2024-02-23 PROCEDURE — 99223 1ST HOSP IP/OBS HIGH 75: CPT | Performed by: INTERNAL MEDICINE

## 2024-02-23 PROCEDURE — 85027 COMPLETE CBC AUTOMATED: CPT

## 2024-02-23 PROCEDURE — 80053 COMPREHEN METABOLIC PANEL: CPT

## 2024-02-23 PROCEDURE — 83735 ASSAY OF MAGNESIUM: CPT

## 2024-02-23 RX ADMIN — METOPROLOL TARTRATE 50 MG: 50 TABLET, FILM COATED ORAL at 09:36

## 2024-02-23 RX ADMIN — ACYCLOVIR SODIUM 750 MG: 50 INJECTION, SOLUTION INTRAVENOUS at 05:55

## 2024-02-23 RX ADMIN — ACYCLOVIR SODIUM 750 MG: 50 INJECTION, SOLUTION INTRAVENOUS at 14:41

## 2024-02-23 RX ADMIN — ATORVASTATIN CALCIUM 40 MG: 40 TABLET, FILM COATED ORAL at 09:28

## 2024-02-23 RX ADMIN — STANDARDIZED SENNA CONCENTRATE 8.6 MG: 8.6 TABLET ORAL at 22:16

## 2024-02-23 RX ADMIN — PIPERACILLIN AND TAZOBACTAM 3.38 G: 36; 4.5 INJECTION, POWDER, FOR SOLUTION INTRAVENOUS at 22:16

## 2024-02-23 RX ADMIN — METOPROLOL TARTRATE 50 MG: 50 TABLET, FILM COATED ORAL at 17:01

## 2024-02-23 RX ADMIN — PIPERACILLIN AND TAZOBACTAM 3.38 G: 36; 4.5 INJECTION, POWDER, FOR SOLUTION INTRAVENOUS at 09:28

## 2024-02-23 RX ADMIN — FOLIC ACID TAB 400 MCG 400 MCG: 400 TAB at 09:28

## 2024-02-23 RX ADMIN — TAMSULOSIN HYDROCHLORIDE 0.4 MG: 0.4 CAPSULE ORAL at 15:33

## 2024-02-23 RX ADMIN — ACYCLOVIR SODIUM 750 MG: 50 INJECTION, SOLUTION INTRAVENOUS at 23:33

## 2024-02-23 RX ADMIN — LISINOPRIL 5 MG: 5 TABLET ORAL at 09:36

## 2024-02-23 RX ADMIN — PIPERACILLIN AND TAZOBACTAM 3.38 G: 36; 4.5 INJECTION, POWDER, FOR SOLUTION INTRAVENOUS at 04:06

## 2024-02-23 RX ADMIN — PIPERACILLIN AND TAZOBACTAM 3.38 G: 36; 4.5 INJECTION, POWDER, FOR SOLUTION INTRAVENOUS at 17:02

## 2024-02-23 RX ADMIN — APIXABAN 5 MG: 5 TABLET, FILM COATED ORAL at 17:01

## 2024-02-23 NOTE — ASSESSMENT & PLAN NOTE
Urine cultures are not consistent with bacterial infection.      Zosyn is not indicated for UTI, further antibiotics per primary surgical team if needed for intra-abdominal process.    Will discuss with pulmonary need for antibiotics for CT chest abnormality.

## 2024-02-23 NOTE — QUICK NOTE
Dr. Olivas and Katrina Billig made aware patients chest tube found in bed when patient rolled. Patient not in respiratory distress. Dressing placed over site and taped on 3 sides.

## 2024-02-23 NOTE — OCCUPATIONAL THERAPY NOTE
Occupational Therapy Cancel Note     Patient Name: William Martel  Today's Date: 2/23/2024  Problem List  Principal Problem:    Incarcerated inguinal hernia with SBO  Active Problems:    Primary hypertension    Aortic stenosis    Coronary artery disease    Atrial fibrillation and flutter (HCC)    Acute cystitis with hematuria    Moderate mitral stenosis    Urinary retention    Sepsis (HCC)    Pleural effusion    Pulmonary hypertension (HCC)    Herpes zoster without complication    Small bowel obstruction (HCC)    Right inguinal hernia        02/23/24 0957   Note Type   Note type Cancelled Session   Cancel Reasons Medical status     Chart review completed. Pt admitted to [facility] 2/1/24 for incarcerated inguinal hernia with SBO s/p repair. Pt with chest tube secondary to R pleural effusion. Upon therapist arrival, RN present in room. Pt's chest tube dislodged and RN requesting delay in OT evaluation. Will continue to follow and attempt at later date.     Yuridia Murillo, OTR/L

## 2024-02-23 NOTE — PROGRESS NOTES
"Progress Note - General Surgery   William Martel 88 y.o. male MRN: [id number]  Unit/Bed#: -01 Encounter: [id number]    Assessment: 88-year-old male POD 2 repair of incarcerated right inguinal/scrotal hernia    -VSS and WNL  -WBC 15 (13, 15)  -Loculated right anterior pleural effusion s/p 10Fr chest tube yesterday, 40ml initial output, 40 additional overnight, tube was found to be dislodged this morning. Stat CXR obtained with no sign of pneumothorax, decreased size of pleural effusion. Pt denies CP, SOB. Lung sounds equal sue  -A-fib, on Eliquis, LD 2/20/2024  -Shingles rash right chest/shoulder x 1 month, on acyclovir since 2/22  -urinary retension at admission, wynn removed 4pm yesterday, voiding well, no discomfort, PVRs 188, 232 ,UOP 1.05L    PMHx: CAD s/p coronary stentsx3, pacemaker, bovine aortic valve replacement, Afib with flutter RVR, T9-L3 lumbar fusion    Plan:  IV Abx, zosyn  Continue regular diet. Pt counseled to eat what he can but stop if nausea develops  Will resume eliquis  analgesics/antiemetics prn  PT/OT    Subjective/Objective     Chief Complaint: decreased appetite    Subjective: He feels well. He denies pain, nausea, fever, SOB, chest pain. Urinating without difficulty. Passing flatus. No BM since surgery. He c/o a decreased appetite stating eating more than a few bites causes bloating.    Objective:   Physical Exam:  General: VSS, NAD, alert, pleasant  Head normocephalic, atraumatic  Neck: supple, NT, no masses or JVD   Lungs nl resp effort  Heart RRR   Abd soft, no distension, NABSx4, NT, no masses or guarding. RLQ incision with glue intact, no erythema, edema, drainage. No swelling or redness of scrotum  Extremities: FAROM with good strength equal bilaterally, no edema  Neuro: A&Ox3, affect appropriate, distal sensation and muscular strength intact        Blood pressure 104/57, pulse 90, temperature 98.2 °F (36.8 °C), resp. rate 16, height 5' 11\" (1.803 m), weight 91.6 kg (202 " "lb), SpO2 93%.,Body mass index is 28.17 kg/m².      Intake/Output Summary (Last 24 hours) at 2/23/2024 0813  Last data filed at 2/23/2024 0630  Gross per 24 hour   Intake 807.5 ml   Output 1090 ml   Net -282.5 ml       Invasive Devices       Peripheral Intravenous Line  Duration             Peripheral IV 02/21/24 Left Antecubital 1 day    Peripheral IV 02/21/24 Left Forearm 1 day              Drain  Duration             Chest Tube 1 Right 10 Fr. <1 day                    Lab, Imaging and other studies:CBC:   Lab Results   Component Value Date    WBC 15.82 (H) 02/23/2024    HGB 13.8 02/23/2024    HCT 43.4 02/23/2024    MCV 87 02/23/2024     02/23/2024    RBC 5.02 02/23/2024    MCH 27.5 02/23/2024    MCHC 31.8 02/23/2024    RDW 15.9 (H) 02/23/2024    MPV 11.1 02/23/2024   , CMP:   Lab Results   Component Value Date    SODIUM 137 02/23/2024    K 4.1 02/23/2024     02/23/2024    CO2 29 02/23/2024    BUN 24 02/23/2024    CREATININE 1.28 02/23/2024    CALCIUM 8.8 02/23/2024    AST 11 (L) 02/23/2024    ALT 6 (L) 02/23/2024    ALKPHOS 75 02/23/2024    EGFR 49 02/23/2024   , Coagulation:   No results found for: \"PT\", \"INR\", \"APTT\"  , Urinalysis:   No results found for: \"COLORU\", \"CLARITYU\", \"SPECGRAV\", \"PHUR\", \"LEUKOCYTESUR\", \"NITRITE\", \"PROTEINUA\", \"GLUCOSEU\", \"KETONESU\", \"BILIRUBINUR\", \"BLOODU\"  , Amylase: No results found for: \"AMYLASE\", Lipase:   No results found for: \"LIPASE\"    VTE Pharmacologic Prophylaxis: Reason for no pharmacologic prophylaxis Eliquis  VTE Mechanical Prophylaxis: sequential compression device      "

## 2024-02-23 NOTE — ASSESSMENT & PLAN NOTE
Patient with shingles present on the right shoulder and onto the right back for a little over a month. Has not taken anything for this. Recently evaluated at urgent care and no med recs due to not appearing to have active lesions.   Does have some vesicles present upon examination.   Can continue acyclovir  Isolation precautions

## 2024-02-23 NOTE — ASSESSMENT & PLAN NOTE
History of CAD with remote history of stents x3 at [facility]   Follows with cardiology outpatient  Continue metoprolol to tartrate

## 2024-02-23 NOTE — PROGRESS NOTES
[facility]  Progress Note  Name: William Martel I  MRN: [id number]  Unit/Bed#: -01 I Date of Admission: 2/21/2024   Date of Service: 2/23/2024 I Hospital Day: 2    Assessment/Plan   * Incarcerated inguinal hernia with SBO  Assessment & Plan  Patient admitted to surgical services, s/p Right - repair Incarcerated hernia inguinal with mesh on 2/21  Further management and diet advancement per surgery    Urinary retention  Assessment & Plan  Seen in the ED on 2/20 for difficulty with urination. Found to be retaining urine and wynn cathter was placed  Further follow up outpatient with urology    Herpes zoster without complication  Assessment & Plan  Patient with shingles present on the right shoulder and onto the right back for a little over a month. Has not taken anything for this. Recently evaluated at urgent care and no med recs due to not appearing to have active lesions.   Does have some vesicles present upon examination.   Can continue acyclovir  Isolation precautions    Acute cystitis with hematuria  Assessment & Plan  Urine cultures are not consistent with bacterial infection.      Zosyn is not indicated for UTI, further antibiotics per primary surgical team if needed for intra-abdominal process.    Will discuss with pulmonary need for antibiotics for CT chest abnormality.      Pleural effusion  Assessment & Plan  Further care and plan per pulmonary, follow-up cultures.    Primary hypertension  Assessment & Plan  Currently taking enalapril and lopressor outpatient  Patient tolerating diet, resume home enalapril and lopressor  Avoid hypotension    Atrial fibrillation and flutter (HCC)  Assessment & Plan  History of a fib/flutter and sick sinus syndrome status post single-chamber pacemaker implantation October 2021   Patient currently anticoagulated with eliquis due to his complicated coronary artery disease, however, he is in need of urgent surgery as the hernia is at risk of  strangulation.   Anticoagulation restarted by surgical team    Coronary artery disease  Assessment & Plan  History of CAD with remote history of stents x3 at [facility]   Follows with cardiology outpatient  Continue metoprolol to tartrate    Aortic stenosis  Assessment & Plan  History of AS status post aortic valve replacement at [facility] in    Continue outpatient cardiology follow up             VTE Pharmacologic Prophylaxis: VTE Score: 8 High Risk (Score >/= 5) - Pharmacological DVT Prophylaxis Ordered: apixaban (Eliquis). Sequential Compression Devices Ordered.    Mobility:   Basic Mobility Inpatient Raw Score: 18  JH-HLM Goal: 6: Walk 10 steps or more  JH-HLM Achieved: 6: Walk 10 steps or more  HLM Goal achieved. Continue to encourage appropriate mobility.    Patient Centered Rounds: I performed bedside rounds with nursing staff today.   Discussions with Specialists or Other Care Team Provider: Pulmonary, case discussed briefly with acute care surgical team    Code Status: Level 1 - Full Code    Subjective:   No pain, no shortness of breath, tolerating p.o. intake    Objective:     Vitals:   Temp (24hrs), Av.1 °F (36.7 °C), Min:97.9 °F (36.6 °C), Max:98.2 °F (36.8 °C)    Temp:  [97.9 °F (36.6 °C)-98.2 °F (36.8 °C)] 97.9 °F (36.6 °C)  HR:  [] 89  Resp:  [16-22] 22  BP: (104-130)/(57-71) 124/71  SpO2:  [91 %-94 %] 91 %  Body mass index is 28.17 kg/m².     Input and Output Summary (last 24 hours):     Intake/Output Summary (Last 24 hours) at 2024 1603  Last data filed at 2024 0936  Gross per 24 hour   Intake 360 ml   Output 490 ml   Net -130 ml       Physical Exam:   Physical Exam  Vitals and nursing note reviewed.   HENT:      Head: Normocephalic.   Cardiovascular:      Rate and Rhythm: Normal rate.      Pulses: Normal pulses.   Pulmonary:      Effort: Pulmonary effort is normal.      Breath sounds: Normal breath sounds.   Neurological:      Mental Status: He is alert and oriented to person, place, and  time. Mental status is at baseline.      Cranial Nerves: No cranial nerve deficit.      Motor: No weakness.          Additional Data:     Labs:  Results from last 7 days   Lab Units 02/23/24  0439 02/22/24  0452 02/21/24  1016   WBC Thousand/uL 15.82*   < > 15.63*   HEMOGLOBIN g/dL 13.8   < > 16.3   HEMATOCRIT % 43.4   < > 50.5*   PLATELETS Thousands/uL 230   < > 256   NEUTROS PCT %  --   --  89*   LYMPHS PCT %  --   --  6*   MONOS PCT %  --   --  5   EOS PCT %  --   --  0    < > = values in this interval not displayed.     Results from last 7 days   Lab Units 02/23/24  0439   SODIUM mmol/L 137   POTASSIUM mmol/L 4.1   CHLORIDE mmol/L 101   CO2 mmol/L 29   BUN mg/dL 24   CREATININE mg/dL 1.28   ANION GAP mmol/L 7   CALCIUM mg/dL 8.8   ALBUMIN g/dL 3.5   TOTAL BILIRUBIN mg/dL 0.98   ALK PHOS U/L 75   ALT U/L 6*   AST U/L 11*   GLUCOSE RANDOM mg/dL 163*     Results from last 7 days   Lab Units 02/21/24  1558   INR  1.17     Results from last 7 days   Lab Units 02/21/24  2026   POC GLUCOSE mg/dl 163*         Results from last 7 days   Lab Units 02/23/24  0439 02/22/24  0452 02/21/24  1339 02/21/24  1046 02/21/24  1016   LACTIC ACID mmol/L  --   --  2.0 2.8*  --    PROCALCITONIN ng/ml 0.14 0.16  --   --  0.10       Lines/Drains:  Invasive Devices       Peripheral Intravenous Line  Duration             Peripheral IV 02/21/24 Left Antecubital 2 days    Peripheral IV 02/21/24 Left Forearm 1 day                          Imaging: Reviewed radiology reports from this admission including: chest CT scan    Recent Cultures (last 7 days):   Results from last 7 days   Lab Units 02/22/24  1405 02/21/24  1107 02/21/24  0012   BLOOD CULTURE   --  No Growth at 24 hrs.  No Growth at 24 hrs.  --    GRAM STAIN RESULT  No Polys or Bacteria seen  --   --    URINE CULTURE   --  60,000-69,000 cfu/ml Candida albicans* >100,000 cfu/ml Candida albicans*   BODY FLUID CULTURE, STERILE  No growth  --   --        Last 24 Hours Medication List:    Current Facility-Administered Medications   Medication Dose Route Frequency Provider Last Rate    acyclovir  10 mg/kg (Ideal) Intravenous Q8H Josselin Boucher PA-C 750 mg (02/23/24 1441)    apixaban  5 mg Oral BID Bety Rincon PA-C      atorvastatin  40 mg Oral Daily Josselin Boucher PA-C      folic acid  400 mcg Oral Daily Josselin Boucher PA-C      hydrALAZINE  5 mg Intravenous Q6H PRN Josselin Boucher PA-C      hydrOXYzine HCL  25 mg Oral Q6H PRN Mel S Neri, CRNP      lisinopril  5 mg Oral Daily Josselin Boucher PA-C      melatonin  3 mg Oral HS PRN Mel S Neri, CRNP      metoprolol tartrate  50 mg Oral BID Josselin Boucher PA-C      naloxone  0.04 mg Intravenous Q1MIN PRN Josselin Boucher PA-C      oxyCODONE-acetaminophen  1 tablet Oral Q4H PRN Christopher Hendricks MD      piperacillin-tazobactam  3.375 g Intravenous Q6H Celine Vega MD 3.375 g (02/23/24 0928)    senna  1 tablet Oral HS Juliana Goodman MD      tamsulosin  0.4 mg Oral Daily With Dinner Josselin Boucher PA-C          Today, Patient Was Seen By: Rd Olivas DO    **Please Note: This note may have been constructed using a voice recognition system.**

## 2024-02-23 NOTE — ASSESSMENT & PLAN NOTE
History of AS status post aortic valve replacement at [facility] in 2007   Continue outpatient cardiology follow up

## 2024-02-23 NOTE — ASSESSMENT & PLAN NOTE
Patient admitted to surgical services, s/p Right - repair Incarcerated hernia inguinal with mesh on 2/21  Further management and diet advancement per surgery

## 2024-02-23 NOTE — PLAN OF CARE
Problem: PAIN - ADULT  Goal: Verbalizes/displays adequate comfort level or baseline comfort level  Description: Interventions:  - Encourage patient to monitor pain and request assistance  - Assess pain using appropriate pain scale  - Administer analgesics based on type and severity of pain and evaluate response  - Implement non-pharmacological measures as appropriate and evaluate response  - Consider cultural and social influences on pain and pain management  - Notify physician/advanced practitioner if interventions unsuccessful or patient reports new pain  Outcome: Progressing     Problem: INFECTION - ADULT  Goal: Absence or prevention of progression during hospitalization  Description: INTERVENTIONS:  - Assess and monitor for signs and symptoms of infection  - Monitor lab/diagnostic results  - Monitor all insertion sites, i.e. indwelling lines, tubes, and drains  - Monitor endotracheal if appropriate and nasal secretions for changes in amount and color  - [place] appropriate cooling/warming therapies per order  - Administer medications as ordered  - Instruct and encourage patient and family to use good hand hygiene technique  - Identify and instruct in appropriate isolation precautions for identified infection/condition  Outcome: Progressing

## 2024-02-23 NOTE — CONSULTS
Consultation - Pulmonary Medicine   William Martel 88 y.o. male MRN: [id number]  Unit/Bed#: -01 Encounter: [id number]      Assessment/Plan:    Incarcerated right inguinal hernia with small bowel obstruction  Loculated right pleural effusion  Asbestos related lung disease  Nicotine dependence in remission    Pulmonary recommendations:     Incidentally found to have moderate loculated right pleural effusion and bilateral pleural calcifications on presentation.   S/p IR chest tube placement, removed total 240 cc reddish turbid fluid. Fluid analysis consistent with exudate with negative culture, cytology pending.   Unfortunately his chest tube was accidentally displaced. Repeat CXR reveals improved but still present loculated right pleural effusion.  Suspicion is for possible simple parapneumonia effusion.  Recommend replacing chest tube for complete drainage but patient wishes to defer replace tube at this time.   As he is asymptomatic from a respiratory standpoint, this is reasonable as long as we continue ABX therapy for at least 2 weeks.   Currently on Zosyn. TT sent to primary surgical team with instructions to complete course with Augmentin at discharge.   Follow cytology.   Repeat CT chest in 4 weeks to ensure resolution. If still persistent will have to consider further intervention.   Patient knows that if he at any point experience worsening respiratory symptoms, fever, chills, weight loss, etc he should report back to the ED.       History of Present Illness   Physician Requesting Consult: Christopher Hendricks MD  Reason for Consult / Principal Problem: pleural effusion  Hx and PE limited by: n/a  Chief Complaint: abdominal pain   HPI: William Martel is a 88 y.o.  male who presented to [facility] with complaints of abdominal pain.  Patient's past medical history positive for CAD, hyperlipidemia, hypertension, atrial flutter, aortic stenosis status post bioprosthetic valve placement.  Found  to have right inguinal incarcerated hernia.  He is admitted under surgery service.  Hospitalist team was consulted for comanagement.  Patient was incidentally found to have a loculated right pleural effusion during workup prompting pulmonary consult to further investigate this.  Patient underwent hernia repair 2/21.  He then had chest tube placed yesterday.  Is a total of 240 cc removed from his pleural space.  Unfortunately at some point today patient chest tube was displaced.      He denies respiratory issues before hospitalization or since he has been here.  Denies any shortness of breath.  Denies cough, sputum production, hemoptysis.  No fevers or chills.  Denies hemoptysis.  Denies abnormal weight loss.    Pulmonary standpoint, patient denies chronic pulmonary disease.  Does not use inhalers or nebulizers at discharge.  Does not use oxygen or PAP therapy.  He is a former smoker with a quit date 35 years ago.  Prior to that had approximately 35-pack-year smoking history.  Patient is a retired .  He reports exposures to asbestos in the past.  Denies sick contacts.  Denies recent travel.    Inpatient consult to Pulmonology  Consult performed by: Juan Diego Zuñiga PA-C  Consult ordered by: Josselin Boucher PA-C          Review of Systems   All other systems reviewed and are negative.      Historical Information   Past Medical History:   Diagnosis Date    Acute urinary retention     Coronary artery disease     H/O atrial flutter     Herpes zoster     History of aortic valve replacement with bioprosthetic valve     Hyperlipemia     Hypertension     Pacemaker     Skin cancer, basal cell     Walker as ambulation aid     Wears dentures      Past Surgical History:   Procedure Laterality Date    A-V CARDIAC PACEMAKER INSERTION      AORTIC VALVE REPLACEMENT      BACK SURGERY      CARDIAC PACEMAKER PLACEMENT Left     2021    CATARACT EXTRACTION      CORONARY ANGIOPLASTY WITH STENT PLACEMENT      stents  times 3    HERNIA REPAIR Right 2/21/2024    Procedure: REPAIR Incarcerated HERNIA INGUINAL WITH MESH;  Surgeon: Christopher Hendricks MD;  Location: [facility];  Service: General    IR CHEST TUBE PLACEMENT  2/22/2024    LUMBAR FUSION N/A 02/10/2023    Procedure: T9-L3 FUSION LUMBAR/THORACIC POSTERIOR MINIMALLY INVASIVE W ROBOT T10-T12 decompressive laminectomy;  Surgeon: Min Dillon MD;  Location: [facility];  Service: Neurosurgery    DE CYSTO BLADDER W/URETERAL CATHETERIZATION Right 01/13/2023    Procedure: CYSTOSCOPY RETROGRADE PYELOGRAM WITH INSERTION STENT URETERAL;  Surgeon: Frank D'Amico, MD;  Location: [facility];  Service: Urology    DE CYSTO BLADDER W/URETERAL CATHETERIZATION Right 01/26/2023    Procedure: CYSTOSCOPY WITH RETROGRADE PYELOGRAM;  Surgeon: Frank D'Amico, MD;  Location: [facility];  Service: Urology    DE CYSTO/URETERO W/LITHOTRIPSY &INDWELL STENT INSRT Right 01/26/2023    Procedure: CYSTOSCOPY URETEROSCOPY WITH LITHOTRIPSY HOLMIUM LASER, RETROGRADE PYELOGRAM AND INSERTION STENT URETERAL;  Surgeon: Frank D'Amico, MD;  Location: [facility];  Service: Urology     Social History   Social History     Substance and Sexual Activity   Alcohol Use Not Currently    Comment: social     Social History     Substance and Sexual Activity   Drug Use Never     Social History     Tobacco Use   Smoking Status Former   Smokeless Tobacco Never     E-Cigarette/Vaping    E-Cigarette Use Never User      E-Cigarette/Vaping Substances    Nicotine No     THC No     CBD No     Flavoring No      Occupational History: see HPI    Family History:   Family History   Problem Relation Age of Onset    No Known Problems Father     No Known Problems Mother        Meds/Allergies   all current active meds have been reviewed, pertinent pulmonary meds have been reviewed, and current meds:   Current Facility-Administered Medications   Medication Dose Route Frequency    acyclovir (ZOVIRAX) 750 mg in sodium chloride 0.9 % 250  "mL IVPB  10 mg/kg (Ideal) Intravenous Q8H    apixaban (ELIQUIS) tablet 5 mg  5 mg Oral BID    atorvastatin (LIPITOR) tablet 40 mg  40 mg Oral Daily    folic acid (FOLVITE) tablet 400 mcg  400 mcg Oral Daily    hydrALAZINE (APRESOLINE) injection 5 mg  5 mg Intravenous Q6H PRN    hydrOXYzine HCL (ATARAX) tablet 25 mg  25 mg Oral Q6H PRN    lisinopril (ZESTRIL) tablet 5 mg  5 mg Oral Daily    melatonin tablet 3 mg  3 mg Oral HS PRN    metoprolol tartrate (LOPRESSOR) tablet 50 mg  50 mg Oral BID    naloxone (NARCAN) 0.04 mg/mL syringe 0.04 mg  0.04 mg Intravenous Q1MIN PRN    oxyCODONE-acetaminophen (PERCOCET) 5-325 mg per tablet 1 tablet  1 tablet Oral Q4H PRN    piperacillin-tazobactam (ZOSYN) 3.375 g in sodium chloride 0.9 % 100 mL IVPB  3.375 g Intravenous Q6H    senna (SENOKOT) tablet 8.6 mg  1 tablet Oral HS    tamsulosin (FLOMAX) capsule 0.4 mg  0.4 mg Oral Daily With Dinner       Allergies   Allergen Reactions    Ezetimibe-Simvastatin Dizziness and Lightheadedness     Other reaction(s): MAKES HIM DIZZY      Simvastatin Lightheadedness     Other reaction(s): Dizziness       Objective   Vitals: Blood pressure 124/71, pulse 89, temperature 97.9 °F (36.6 °C), resp. rate 22, height 5' 11\" (1.803 m), weight 91.6 kg (202 lb), SpO2 91%.room air,Body mass index is 28.17 kg/m².    Intake/Output Summary (Last 24 hours) at 2/23/2024 1624  Last data filed at 2/23/2024 0936  Gross per 24 hour   Intake 360 ml   Output 490 ml   Net -130 ml     Invasive Devices       Peripheral Intravenous Line  Duration             Peripheral IV 02/21/24 Left Antecubital 2 days    Peripheral IV 02/21/24 Left Forearm 1 day                    Physical Exam  Vitals and nursing note reviewed.   Constitutional:       General: He is not in acute distress.     Appearance: Normal appearance.   HENT:      Head: Normocephalic and atraumatic.      Mouth/Throat:      Mouth: Mucous membranes are moist.      Pharynx: Oropharynx is clear.   Cardiovascular:    " "  Rate and Rhythm: Normal rate and regular rhythm.      Heart sounds: No murmur heard.  Pulmonary:      Breath sounds: No wheezing.      Comments: Decreased breath sounds in right anterior chest  Musculoskeletal:      Cervical back: Normal range of motion.   Skin:     General: Skin is warm and dry.   Neurological:      General: No focal deficit present.      Mental Status: He is alert. Mental status is at baseline.   Psychiatric:         Mood and Affect: Mood normal.         Behavior: Behavior normal.         Lab Results: I have personally reviewed pertinent lab results., ABG: No results found for: \"PHART\", \"HRD0QTL\", \"PO2ART\", \"KHS0NKA\", \"D3SFCKSK\", \"BEART\", \"SOURCE\", BNP: No results found for: \"BNP\", CBC:   Lab Results   Component Value Date    WBC 15.82 (H) 02/23/2024    HGB 13.8 02/23/2024    HCT 43.4 02/23/2024    MCV 87 02/23/2024     02/23/2024    RBC 5.02 02/23/2024    MCH 27.5 02/23/2024    MCHC 31.8 02/23/2024    RDW 15.9 (H) 02/23/2024    MPV 11.1 02/23/2024   , CMP:   Lab Results   Component Value Date    SODIUM 137 02/23/2024    K 4.1 02/23/2024     02/23/2024    CO2 29 02/23/2024    BUN 24 02/23/2024    CREATININE 1.28 02/23/2024    CALCIUM 8.8 02/23/2024    AST 11 (L) 02/23/2024    ALT 6 (L) 02/23/2024    ALKPHOS 75 02/23/2024    EGFR 49 02/23/2024   , PT/INR: No results found for: \"PT\", \"INR\", Troponin: No results found for: \"TROPONINI\"      Imaging Studies: I have personally reviewed pertinent reports.   and I have personally reviewed pertinent films in PACS       CT chest abdomen pelvis with contrast 2/21/2024  FINDINGS:     CHEST     LUNGS: Lungs are clear. No tracheal or endobronchial lesion.     PLEURA: Loculated pleural fluid with pleural thickening and mild enhancement in the right lateral hemithorax, 12.1 x 4.9 x 15.3 cm. No gas within the collection.  Left calcified plaques.     HEART/GREAT VESSELS: Normal size. Mitral and aortic valvuloplasty. Left-sided pacemaker with " electrodes in the right cardiac chambers. Coronary artery calcifications. No thoracic aortic aneurysm. Ectasia of the right and left main pulmonary arteries   suggestive of portal hypertension.     MEDIASTINUM AND TAYLOR: Unremarkable.     CHEST WALL AND LOWER NECK: Mild left gynecomastia.     ABDOMEN     LIVER/BILIARY TREE: Unremarkable.     GALLBLADDER: Cholelithiasis without findings of acute cholecystitis.     SPLEEN: Unremarkable.     PANCREAS: Unremarkable.     ADRENAL GLANDS: Unremarkable.     KIDNEYS/URETERS: Moderate right renal cortical atrophy. Bilateral punctate nonobstructive calculi. Small bilateral renal cysts. Punctate, less than 2 mm calculus in the right proximal ureter just past ureterovesicular junction. Mild right hydronephrosis.     STOMACH AND BOWEL: Right inguinal hernia containing small bowel loop with transition of caliber indicating small bowel obstruction. Mild wall thickening of the distal esophagus. Diverticulosis in the left colon. No other significant findings.     APPENDIX: Normal.     ABDOMINOPELVIC CAVITY: Mild fat stranding in the right inguinal hernia sac.     VESSELS: Unremarkable for patient's age.     PELVIS     REPRODUCTIVE ORGANS: Unremarkable for patient's age. Right hydrocele.     URINARY BLADDER: Decompressed around Dickens catheter.     ABDOMINAL WALL/INGUINAL REGIONS: Right inguinal hernia, as above. Neck of 4.2 cm.     BONES: No acute fracture or suspicious osseous lesion. T10-12 laminectomies. Posterior spinal fixation in the lower thoracic and lumbar spine. Multilevel degenerative changes in the spine.     IMPRESSION:  Loculated right pleural effusion with mild pleural enhancement. Given pleural plaques and possible history of asbestos exposure, recommend diagnostic/therapeutic thoracentesis.  Right inguinal hernia with neck of 4.2 cm, containing a loop of small bowel. Resultant small bowel obstruction.  Mild wall thickening in the distal esophagus. Consider correlation  "with nonemergent EGD.  Less than 2 mm proximal right ureteral calculus with mild hydronephrosis.  Bilateral nonobstructive renal calculi. Small renal cysts. Moderate right renal atrophy.  Chronic findings, as per the body of the report.    EKG, Pathology, and Other Studies: I have personally reviewed pertinent reports.       Pulmonary Results (PFTs, PSG): none to review      Code Status: Level 1 - Full Code    Portions of the record may have been created with voice recognition software.  Occasional wrong word or \"sound a like\" substitutions may have occurred due to the inherent limitations of voice recognition software.  Read the chart carefully and recognize, using context, where substitutions have occurred.    "

## 2024-02-23 NOTE — ASSESSMENT & PLAN NOTE
History of a fib/flutter and sick sinus syndrome status post single-chamber pacemaker implantation October 2021   Patient currently anticoagulated with eliquis due to his complicated coronary artery disease, however, he is in need of urgent surgery as the hernia is at risk of strangulation.   Anticoagulation restarted by surgical team

## 2024-02-24 LAB
ERYTHROCYTE [DISTWIDTH] IN BLOOD BY AUTOMATED COUNT: 15.7 % (ref 11.6–15.1)
HCT VFR BLD AUTO: 38.6 % (ref 36.5–49.3)
HGB BLD-MCNC: 12.3 G/DL (ref 12–17)
MCH RBC QN AUTO: 27.8 PG (ref 26.8–34.3)
MCHC RBC AUTO-ENTMCNC: 31.9 G/DL (ref 31.4–37.4)
MCV RBC AUTO: 87 FL (ref 82–98)
PLATELET # BLD AUTO: 172 THOUSANDS/UL (ref 149–390)
PMV BLD AUTO: 10.6 FL (ref 8.9–12.7)
RBC # BLD AUTO: 4.43 MILLION/UL (ref 3.88–5.62)
WBC # BLD AUTO: 11.25 THOUSAND/UL (ref 4.31–10.16)

## 2024-02-24 PROCEDURE — 99024 POSTOP FOLLOW-UP VISIT: CPT

## 2024-02-24 PROCEDURE — 97116 GAIT TRAINING THERAPY: CPT

## 2024-02-24 PROCEDURE — 97163 PT EVAL HIGH COMPLEX 45 MIN: CPT

## 2024-02-24 PROCEDURE — 97535 SELF CARE MNGMENT TRAINING: CPT

## 2024-02-24 PROCEDURE — 99232 SBSQ HOSP IP/OBS MODERATE 35: CPT | Performed by: INTERNAL MEDICINE

## 2024-02-24 PROCEDURE — 97167 OT EVAL HIGH COMPLEX 60 MIN: CPT

## 2024-02-24 PROCEDURE — 85027 COMPLETE CBC AUTOMATED: CPT

## 2024-02-24 RX ADMIN — APIXABAN 5 MG: 5 TABLET, FILM COATED ORAL at 08:22

## 2024-02-24 RX ADMIN — APIXABAN 5 MG: 5 TABLET, FILM COATED ORAL at 17:20

## 2024-02-24 RX ADMIN — ACYCLOVIR SODIUM 750 MG: 50 INJECTION, SOLUTION INTRAVENOUS at 07:35

## 2024-02-24 RX ADMIN — PIPERACILLIN AND TAZOBACTAM 3.38 G: 36; 4.5 INJECTION, POWDER, FOR SOLUTION INTRAVENOUS at 10:43

## 2024-02-24 RX ADMIN — PIPERACILLIN AND TAZOBACTAM 3.38 G: 36; 4.5 INJECTION, POWDER, FOR SOLUTION INTRAVENOUS at 03:20

## 2024-02-24 RX ADMIN — PIPERACILLIN AND TAZOBACTAM 3.38 G: 36; 4.5 INJECTION, POWDER, FOR SOLUTION INTRAVENOUS at 15:41

## 2024-02-24 RX ADMIN — LISINOPRIL 5 MG: 5 TABLET ORAL at 08:22

## 2024-02-24 RX ADMIN — ATORVASTATIN CALCIUM 40 MG: 40 TABLET, FILM COATED ORAL at 08:22

## 2024-02-24 RX ADMIN — STANDARDIZED SENNA CONCENTRATE 8.6 MG: 8.6 TABLET ORAL at 21:06

## 2024-02-24 RX ADMIN — TAMSULOSIN HYDROCHLORIDE 0.4 MG: 0.4 CAPSULE ORAL at 17:20

## 2024-02-24 RX ADMIN — FOLIC ACID TAB 400 MCG 400 MCG: 400 TAB at 08:22

## 2024-02-24 RX ADMIN — METOPROLOL TARTRATE 50 MG: 50 TABLET, FILM COATED ORAL at 08:22

## 2024-02-24 RX ADMIN — Medication 3 MG: at 21:06

## 2024-02-24 RX ADMIN — PIPERACILLIN AND TAZOBACTAM 3.38 G: 36; 4.5 INJECTION, POWDER, FOR SOLUTION INTRAVENOUS at 21:06

## 2024-02-24 RX ADMIN — METOPROLOL TARTRATE 50 MG: 50 TABLET, FILM COATED ORAL at 17:20

## 2024-02-24 NOTE — PLAN OF CARE

## 2024-02-24 NOTE — ASSESSMENT & PLAN NOTE
Currently taking enalapril and lopressor outpatient  Patient tolerating diet, resume home ACE-I and lopressor  Avoid hypotension

## 2024-02-24 NOTE — PLAN OF CARE
Problem: PHYSICAL THERAPY ADULT  Goal: Performs mobility at highest level of function for planned discharge setting.  See evaluation for individualized goals.  Description: Treatment/Interventions: ADL retraining, Functional transfer training, LE strengthening/ROM, Elevations, Therapeutic exercise, Endurance training, Patient/family training, Equipment eval/education, Bed mobility, Gait training, Compensatory technique education, Spoke to nursing, Spoke to case management, OT  Equipment Recommended:  (walker-pt has)       See flowsheet documentation for full assessment, interventions and recommendations.  2/24/2024 1408 by Tatianna Fermin PT  Note: Prognosis: Good  Problem List: Decreased strength, Decreased endurance, Impaired balance, Decreased mobility, Decreased safety awareness  Pt tolerated session fairly. He was able to ambulate increased distance. He is limtied by decreased strenght, balance and endurance. He will continue to beenfit from PT services to maximize LOF.  Barriers to Discharge: None  Barriers to Discharge Comments: pt has support from daughter prn.  Rehab Resource Intensity Level, PT: III (Minimum Resource Intensity)    See flowsheet documentation for full assessment.

## 2024-02-24 NOTE — PROGRESS NOTES
Progress Note - General Surgery   William Martel 88 y.o. male MRN: [id number]  Unit/Bed#: -01 Encounter: [id number]    Assessment:  88 y.o. male POD3 s/p incarcerated right inguinal/scrotal hernia  Intra-op Findings:  - Huge indirect hernia with disruption of the floor of the inguinal canal, extending into the scrotum. Viable loop of small bowel in the hernia. Distal hernia imbricated around the cord and left widely open. Hernia repaired with High Ligation, Ring Tightening and Bard Soft mesh onlay.     Repeat Chest XR after chest tube accidental removal 2/23:   - Loculated right-sided pleural effusion. No chest tube identified.    - Afebrile, VSS on room air with episodes of tachycardia  - Leukocytosis improving - 11 (15, 13)  - Hgb 12 (13, 14)  - S/p IR chest tube placement, removed total 240 cc reddish turbid fluid - fluid analysis consistent with exudate negative culture, cytology pending     - Pulm recommended replacement of chest tube to resolve pleural effusion of right side but patient is not interested in doing so   - Contact precaution due to Herpes zoster, HTN, a fib on eliquis restarted 2/23, CAD, aortic stenosis    Plan:  - On IV abx - Zosyn, will need 2 weeks of abx - Augmentin on discharge with repeat CT chest in 4-6 weeks to check for progression per pulmonology  - Diet as tolerated  - Eliquis restarted 2/23  - Pain/nausea control PRN  - Await PT/OT input  - Appreciate SLIM assistance with co-morbid conditions  - Possible d/c later today if deemed stable from SLIM standpoint as well and pending PT/OT eval and input, will check on progress throughout the day    Subjective:   Patient states he has been doing well. He denies any complaints while resting in chair at this time. He has been having minimal intake but states he usually only eats two meals a day and is being provided a lot of food in here. Even so he comments on intake being less than baseline. Patient feels bloated but states he has  "been passing lots of flatus and had a small BM, denies any straining. He has SOB with being up and walking around although states this is his new normal, no acute worsening. PT/OT were in to work with patient and he states this went well.    Objective:   Blood pressure 125/68, pulse 104, temperature 98.4 °F (36.9 °C), resp. rate 18, height 5' 11\" (1.803 m), weight 91.6 kg (202 lb), SpO2 92%.,Body mass index is 28.17 kg/m².    Intake/Output Summary (Last 24 hours) at 2/24/2024 1135  Last data filed at 2/23/2024 2224  Gross per 24 hour   Intake 470 ml   Output 250 ml   Net 220 ml     Invasive Devices       Peripheral Intravenous Line  Duration             Peripheral IV 02/21/24 Left Antecubital 3 days    Peripheral IV 02/21/24 Left Forearm 2 days                  Physical Exam:   General: no acute distress, pt appears well and comfortable, out of bed in chair  Skin: warm and dry to touch  Pulmonary: normal effort  Abdominal: active bowel sounds, softly distended abdomen without any tenderness to palpation, no guarding or rebound appreciated, incision site from inguinal hernia repair on the right is clean/dry/intact with glue, minimal ecchymosis but no erythema or drainage appreciated  Neuro: alert and oriented     Lab, Imaging and other studies:I have personally reviewed pertinent lab results.  , CBC:   Lab Results   Component Value Date    WBC 11.25 (H) 02/24/2024    HGB 12.3 02/24/2024    HCT 38.6 02/24/2024    MCV 87 02/24/2024     02/24/2024    RBC 4.43 02/24/2024    MCH 27.8 02/24/2024    MCHC 31.9 02/24/2024    RDW 15.7 (H) 02/24/2024    MPV 10.6 02/24/2024   CMP: No results found for: \"SODIUM\", \"K\", \"CL\", \"CO2\", \"ANIONGAP\", \"BUN\", \"CREATININE\", \"GLUCOSE\", \"CALCIUM\", \"AST\", \"ALT\", \"ALKPHOS\", \"PROT\", \"BILITOT\", \"EGFR\"  VTE Pharmacologic Prophylaxis: Eliquis  VTE Mechanical Prophylaxis: sequential compression device    Katrina Elizabeth Billig, PA-C  2/24/2024  "

## 2024-02-24 NOTE — PHYSICAL THERAPY NOTE
PHYSICAL THERAPY EVALUATION  NAME:  William Martel  DATE: 02/24/24    AGE:   88 y.o.  Mrn:   [id number]  ADMIT DX:  Small bowel obstruction (HCC) [K56.609]  Urinary tract infection [N39.0]  Abdominal pain [R10.9]  Pleural effusion [J90]  Right inguinal hernia [K40.90]    Past Medical History:   Diagnosis Date    Acute urinary retention     Coronary artery disease     H/O atrial flutter     Herpes zoster     History of aortic valve replacement with bioprosthetic valve     Hyperlipemia     Hypertension     Pacemaker     Skin cancer, basal cell     Walker as ambulation aid     Wears dentures      Length Of Stay: 3  Performed at least 2 patient identifiers during session: Name and Birthday  PHYSICAL THERAPY EVALUATION :    02/24/24 0940   PT Last Visit   PT Visit Date 02/24/24   Note Type   Note type Evaluation   Pain Assessment   Pain Assessment Tool 0-10   Pain Score No Pain   Restrictions/Precautions   Other Precautions Contact/isolation;Chair Alarm;Bed Alarm;Fall Risk  (shingles (covered))   Home Living   Type of Home House  (4 ASH L HR)   Home Layout Two level;Bed/bath upstairs;1/2 bath on main level  (stair glide to 2nd floor for showers, otherwise sleeps on 1st floor)   Bathroom Shower/Tub Walk-in shower   Bathroom Toilet Raised   Bathroom Equipment Grab bars in shower;Shower chair;Grab bars around toilet   Home Equipment Hospital bed  (rollator)   Additional Comments Reports living in a 2SH with ASH and using rollator for mobility, but on good days ambulates short distances without AD. Reports staying on 1st floor but goes to 2nd floor to shower.   Prior Function   Level of [place] Independent with functional mobility;Independent with ADLs;Needs assistance with IADLS   Lives With Daughter   Receives Help From Family   IADLs Family/Friend/Other provides transportation;Independent with medication management;Family/Friend/Other provides meals   Falls in the last 6 months 1 to 4   General   Additional  "Pertinent History Pt with SBO s/p REPAIR Incarcerated HERNIA INGUINAL (Right: Groin) 2/21/24 and then s/p right chest tube placement in IR 2/22/24. Chest tube dislodged 2/23/24, pt not interested in having chest tube replaced.   Cognition   Orientation Level Oriented X4   Following Commands Follows one step commands without difficulty   Subjective   Subjective \"I use the rollator usually.\"   RLE Assessment   RLE Assessment WFL  (3+/5. right ankle DF to neutral, reports pin in right ankle wiht h/o fracture.)   LLE Assessment   LLE Assessment WFL  (3+/5)   Coordination   Rapid Alternating Movements Intact   Light Touch   RLE Light Touch Grossly intact   LLE Light Touch Grossly intact   Bed Mobility   Supine to Sit 5  Supervision   Additional items Increased time required;Verbal cues   Additional Comments HOB elevated ~ 15 degrees. inc time to complete with supervision for safety and cues for technique.   Transfers   Sit to Stand 5  Supervision   Additional items Increased time required;Verbal cues   Stand to Sit 5  Supervision   Additional items Increased time required;Verbal cues   Stand pivot   (SBA)   Additional items Increased time required;Verbal cues   Additional Comments use of RW. min cues for hand placement. supervision for sit<>stand. spt with SBA with inc time with min cues for turign completely and use of RW throughout.   Ambulation/Elevation   Gait pattern Decreased foot clearance;Short stride;Foward flexed;Excessively slow   Gait Assistance   (SBA)   Additional items Verbal cues   Assistive Device Rolling walker   Distance ambulated 20'x1 with RW with SBA with slow nataly with decreased step lenght and foot clearance. min CORNELL noted.   Balance   Static Sitting Good   Dynamic Sitting Fair +   Static Standing Fair   Dynamic Standing Fair -   Ambulatory Fair -   Endurance Deficit   Endurance Deficit Yes   Endurance Deficit Description min CORNELL. Spo2 >/= 91-92% throughout, HR 90s to low 100s and increased to " "120s with activity.   Activity Tolerance   Activity Tolerance Patient limited by fatigue   Medical Staff Made Aware Yuridia CARUSO   Nurse Made Aware RNMisti   Assessment   Prognosis Good   Problem List Decreased strength;Decreased endurance;Impaired balance;Decreased mobility;Decreased safety awareness   Barriers to Discharge None   Barriers to Discharge Comments pt has support from daughter prn.   Goals   Patient Goals \"Go home\"   STG Expiration Date 03/09/24   PT Treatment Day 1   Plan   Treatment/Interventions ADL retraining;Functional transfer training;LE strengthening/ROM;Elevations;Therapeutic exercise;Endurance training;Patient/family training;Equipment eval/education;Bed mobility;Gait training;Compensatory technique education;Spoke to nursing;Spoke to case management;OT   PT Frequency 3-5x/wk   Discharge Recommendation   Rehab Resource Intensity Level, PT III (Minimum Resource Intensity)   Equipment Recommended   (walker-pt has)   AM-PAC Basic Mobility Inpatient   Turning in Flat Bed Without Bedrails 3   Lying on Back to Sitting on Edge of Flat Bed Without Bedrails 3   Moving Bed to Chair 3   Standing Up From Chair Using Arms 3   Walk in Room 3   Climb 3-5 Stairs With Railing 3   Basic Mobility Inpatient Raw Score 18   Basic Mobility Standardized Score 41.05   Highest Level Of Mobility   JH-HLM Goal 6: Walk 10 steps or more   JH-HLM Achieved 7: Walk 25 feet or more   Additional Treatment Session   Start Time 0958   End Time 1008   Treatment Assessment Pt tolerated session fairly. He was able to ambulate increased distance. He is limtied by decreased strenght, balance and endurance. He will continue to beenfit from PT services to maximize LOF.   Equipment Use use of RW. min cues for hand placement. sit<>stand with Supervision. ambulated 80'x1 with RW with close supervision with slow nataly, decreased step length, min cues for posture and inc step length. min CORNELL. cues for pursed lip breathing. pt delcined stair " trial at this time.   End of Consult   Patient Position at End of Consult Bedside chair;Bed/Chair alarm activated;All needs within reach       Pt requires PT/OT co-eval due to medical complexity, safety concerns, fall risk, significant assistance with mobility and/or cognitive-behavioral impairments.    (Please find full objective findings from PT assessment regarding body systems outlined above).     Assessment: Pt is a 88 y.o. male seen for PT evaluation s/p admission to [facility] on 2/21/2024 with Incarcerated inguinal hernia.  Order placed for PT services.  Upon evaluation: Pt is presenting with impaired functional mobility due to decreased strength, decreased endurance, impaired balance, gait deviations, decreased safety awareness, fall risk, and impaired skin integrity requiring  supervision assistance for bed mobility, supervision to stand by assistance for transfers, and stand by assistance for ambulation with RW . Pt's clinical presentation is currently unpredictable given the functional mobility deficits above, especially weakness, decreased skin integrity, decreased endurance, impaired balance, gait deviations, decreased activity tolerance, decreased functional mobility tolerance, decreased safety awareness, and SOB upon exertion, coupled with fall risks as indicated by AM-PAC 6-Clicks: 18/24 as well as hx of falls, impaired balance, polypharmacy, and decreased safety awareness and combined with medical complications of abnormal WBCs, abnormal blood sugars, need for input for mobility technique/safety, and Loculated right-sided pleural effusion on chest xray, incarcerated inguinal hernia with SBO s/p repair, herpes zoster without complication, urinary retention, acute cystitis with hematuria, sepsis on admission .  Pt's PMHx and comorbidities that may affect physical performance and progress include: CAD, HTN, and Afib and A flutter s/p pacemaker 2021, aortic stenosis s/p bioAVR,  carotid stenosis, moderate mitral stenosis, pulmonary HTN, BPH . Personal factors affecting pt at time of IE include: step(s) to enter environment, multi-level environment, advanced age, inability to perform IADLs, inability to perform ADLs, inability to navigate level surfaces without external assistance, and recent fall(s)/fall history. Pt will benefit from continued skilled PT services to address deficits as defined above and to maximize level of functional mobility to facilitate return toward PLOF and improved QOL. From PT/mobility standpoint, recommendation at time of d/c would be Level III (Minimum Resource Intensity), with family and/or caregiver support, and with walker in order to reduce fall risk and maximize pt's functional independence and consistency with mobility. Recommend trial with walker next 1-2 sessions and ther ex next 1-2 sessions.       The patient's AM-PAC Basic Mobility Inpatient Short Form Raw Score is 18. A Raw score of greater than 16 suggests the patient may benefit from discharge to home. Please also refer to the recommendation of the Physical Therapist for safe discharge planning.       Goals: Pt will: Perform bed mobility tasks with modified Independent to reposition in bed and prepare for transfers. Pt will perform transfers with modified Independent to decrease burden of care, decrease risk for falls, and improve activity tolerance and prepare for ambulation. Pt will ambulate with LRAD for >/= 200' with  modified Independent  to decrease burden of care, decrease risk for falls, improve activity tolerance, and improve gait quality and to access home environment. Pt will complete 1 step with LRAD and >/= 8 steps with unilateral handrail with supervision to decrease burden of care, decrease risk for falls, improve activity tolerance, and improve gait quality. Pt will participate in objective balance assessment to determine baseline fall risk. Pt will participate in SSWS assessment to  determine level of mobility. Pt will increase B LE strength >/= 1/2 MMT grade to facilitate functional mobility.      Tatianna Fermin, PT,DPT

## 2024-02-24 NOTE — DISCHARGE INSTR - AVS FIRST PAGE
Medicine Discharge Instructions  -Follow-up with your primary care provider  -You will need to follow-up with pulmonology within a month, and will need repeat CT imaging within 4 to 6 weeks to evaluate pleural effusion  -You will need follow-up with gastroenterology 2-4 weeks  -You will need follow-up with general surgery in 1 week  -Restart your Eliquis in 10 days, they will be on 3/11/2024  -You have been started on the new medication of Protonix, twice a day, this to be taken indefinitely  -You have been started on Carafate, you will take this with meals and at night for 14 days  -Will need to complete antibiotic course, with Augmentin, he will need 5 further days of this medication  -Please refrain from drinking alcohol, or using NSAIDs (this includes medications like ibuprofen, naproxen)          Open Inguinal Hernia Repair   WHAT YOU NEED TO KNOW:   Call Dr. Del Rosario's office with any questions or concerns at [phone]    DISCHARGE INSTRUCTIONS:   Call 911 for any of the following:   You feel lightheaded, short of breath, and have chest pain.     You cough up blood.     You have trouble breathing.    Contact your healthcare provider if:   You have a fever above 101°F.     You develop a skin rash, hives, or itching.    Your incision is swollen, red, or draining pus or fluid.     You have nausea, or you are vomiting.    You cannot have a bowel movement.     You have trouble urinating.     Your pain does not get better after you take pain medicine.     You have questions or concerns about your condition or care.    Medicines:  You may need any of the following:  Prescription pain medicine  Take as directed. Prescription pain medicine may cause constipation.If needed take an over the counter stool softner or milk of magnesia    NSAIDs , such as ibuprofen, help decrease swelling, pain, and fever. This medicine is available with or without a doctor's order. NSAIDs can cause stomach bleeding or kidney problems in  certain people.  Always read the medicine label and follow directions.    Acetaminophen  decreases pain and fever. It is available without a doctor's order. Follow directions. Read the labels of all other medicines you are using to see if they also contain acetaminophen, or ask your doctor or pharmacist. Acetaminophen can cause liver damage if not taken correctly. Do not use more than 4 grams (4,000 milligrams) total of acetaminophen in one day.     Take your medicine as directed.  Contact your healthcare provider if you think your medicine is not helping or if you have side effects. Tell him or her if you are allergic to any medicine. Keep a list of the medicines, vitamins, and herbs you take. Include the amounts, and when and why you take them. Bring the list or the pill bottles to follow-up visits. Carry your medicine list with you in case of an emergency.    Care for your wound as directed:  You can shower. Carefully wash around your wound. It is okay to let soap and water run over your wound. Do not  scrub your wound. Gently pat your wound dry.  Do not get in a bathtub, swimming pool, or hot tub for 2 weeks  .   Self-care:   Eat a variety of healthy foods.  Healthy foods include fruits, vegetables, whole-grain breads, low-fat dairy products, beans, lean meats, and fish. Healthy foods may help you heal faster. Ask if you need to be on a special diet.     Drink liquids as much as possible.  Liquids may prevent constipation and straining during a bowel movement. This will help prevent pressure on your incision, and prevent another hernia.     Apply ice  on your incision for 15 to 20 minutes every hour or as directed. Use an ice pack, or put crushed ice in a plastic bag. Cover it with a towel. Ice helps prevent tissue damage and decreases swelling and pain.    Take deep breaths and cough  10 times each hour. This will decrease your risk for a lung infection. Take a deep breath and hold it for as long as you can.  Let the air out and then cough strongly. Deep breaths help open your airway. You may be given an incentive spirometer to help you take deep breaths. Put the plastic piece in your mouth and take a slow, deep breath. Then let the air out and cough. Repeat these steps 10 times every hour. Press a pillow lightly against your incision when you cough. This may decrease pain or discomfort.     Do not smoke.  Nicotine and other chemicals in cigarettes and cigars can prevent your wound from healing. It can also increase your risk for another inguinal hernia. Ask your healthcare provider for information if you currently smoke and need help to quit. E-cigarettes or smokeless tobacco still contain nicotine. Talk to your healthcare provider before you use these products.    Driving:  Do not drive for at least 1 week after surgery. Do not drive if you are taking prescription pain medication. Do not drive until it is comfortable to wear a seatbelt across your abdomen. Ask your healthcare provider when it is safe for you to drive.     Activity:  It is important to get out of bed and walk the day after your surgery. This will help prevent blood clots, move your bowels after surgery, and increase healing. Do not lift anything heavy over 10 lb for 6 weeks.  This may put too much pressure on your incision and cause it to come apart. It may also increase your risk for another hernia. Do not play sports for 6 weeks.     Follow up with your healthcare provider as directed:  Write down your questions so you remember to ask them during your visits.  © 2017 [employer] ([employer]) Information is for End User's use only and may not be sold, redistributed or otherwise used for commercial purposes. All illustrations and images included in CareNotes® are the copyrighted property of [employer] or [employer].  The above information is an  only. It is not intended as medical advice for individual conditions or  treatments. Talk to your doctor, nurse or pharmacist before following any medical regimen to see if it is safe and effective for you.

## 2024-02-24 NOTE — ASSESSMENT & PLAN NOTE
Urine culture showing Candida albicans.  Likely contaminant.  Patient denies any urinary symptoms.  Currently on antibiotic for incarcerated inguinal hernia on right side and loculated pleural effusion with pneumonia

## 2024-02-24 NOTE — ASSESSMENT & PLAN NOTE
Patient states pruritic rash with vesicles started 2 months ago on right shoulder onto right back.    Currently without any active lesions with prior crusted lesions. No neuralgia but continues to have pruritus.    Given there is no active lesion with vesicles and prior crusted lesion will not need any antiviral treatment.  Acyclovir was stopped

## 2024-02-24 NOTE — OCCUPATIONAL THERAPY NOTE
Occupational Therapy Evaluation     Patient Name: William Martel  Today's Date: 2/24/2024  Problem List  Principal Problem:    Incarcerated inguinal hernia with SBO  Active Problems:    Primary hypertension    Coronary artery disease    Atrial fibrillation and flutter (HCC)    Acute cystitis with hematuria    Urinary retention    Pleural effusion    Herpes zoster without complication    Past Medical History  Past Medical History:   Diagnosis Date    Acute urinary retention     Coronary artery disease     H/O atrial flutter     Herpes zoster     History of aortic valve replacement with bioprosthetic valve     Hyperlipemia     Hypertension     Pacemaker     Skin cancer, basal cell     Walker as ambulation aid     Wears dentures      Past Surgical History  Past Surgical History:   Procedure Laterality Date    A-V CARDIAC PACEMAKER INSERTION      AORTIC VALVE REPLACEMENT      BACK SURGERY      CARDIAC PACEMAKER PLACEMENT Left     2021    CATARACT EXTRACTION      CORONARY ANGIOPLASTY WITH STENT PLACEMENT      stents times 3    HERNIA REPAIR Right 2/21/2024    Procedure: REPAIR Incarcerated HERNIA INGUINAL WITH MESH;  Surgeon: Christopher Hendricks MD;  Location: [facility];  Service: General    IR CHEST TUBE PLACEMENT  2/22/2024    LUMBAR FUSION N/A 02/10/2023    Procedure: T9-L3 FUSION LUMBAR/THORACIC POSTERIOR MINIMALLY INVASIVE W ROBOT T10-T12 decompressive laminectomy;  Surgeon: Min Dillon MD;  Location: [facility];  Service: Neurosurgery    WV CYSTO BLADDER W/URETERAL CATHETERIZATION Right 01/13/2023    Procedure: CYSTOSCOPY RETROGRADE PYELOGRAM WITH INSERTION STENT URETERAL;  Surgeon: Frank D'Amico, MD;  Location: [facility];  Service: Urology    WV CYSTO BLADDER W/URETERAL CATHETERIZATION Right 01/26/2023    Procedure: CYSTOSCOPY WITH RETROGRADE PYELOGRAM;  Surgeon: Frank D'Amico, MD;  Location: [facility];  Service: Urology    WV CYSTO/URETERO W/LITHOTRIPSY &INDWELL STENT INSRT Right 01/26/2023     Procedure: CYSTOSCOPY URETEROSCOPY WITH LITHOTRIPSY HOLMIUM LASER, RETROGRADE PYELOGRAM AND INSERTION STENT URETERAL;  Surgeon: Frank D'Amico, MD;  Location:  MAIN OR;  Service: Urology         02/24/24 0941   Note Type   Note type Evaluation   Pain Assessment   Pain Assessment Tool 0-10   Pain Score No Pain   Restrictions/Precautions   Other Precautions Contact/isolation;Chair Alarm;Bed Alarm;Fall Risk   Home Living   Type of Home House  (4 ASH with L railing)   Home Layout Performs ADLs on one level;Able to live on main level with bedroom/bathroom;Two level;1/2 bath on main level;Bed/bath upstairs  (Stair lift to 2nd floor for shower)   Bathroom Shower/Tub Walk-in shower   Bathroom Toilet Raised   Bathroom Equipment Grab bars in shower;Shower chair;Grab bars around toilet   Home Equipment Hospital bed;Grab bars  (Rollator)   Additional Comments Pt reporting living in 2 story home with stair glide to 2nd floor of home. Pt has bedroom and 1/2 bath on 1st floor of home and only goes to 2nd floor of home for shower.   Prior Function   Level of [place] Independent with functional mobility;Independent with ADLs;Needs assistance with IADLS   Lives With Daughter   Receives Help From Family   IADLs Family/Friend/Other provides transportation;Independent with medication management;Family/Friend/Other provides meals   Falls in the last 6 months 1 to 4   Comments Pt reporting living with daughter. Pt previously IND with ADLs, IADLs, transfers and mobility. Pt reporting using rollator only for community mobility or if he feels fatigued in the home.   ADL   LB Dressing Assistance   (SBA)   LB Dressing Deficit Setup;Verbal cueing;Supervision/safety;Increased time to complete;Don/doff R sock;Don/doff L sock   Toileting Assistance    (SBA)   Toileting Deficit Setup;Verbal cueing;Supervison/safety;Increased time to complete;Clothing management up;Clothing management down   Additional Comments Pt able to don socks while  sitting EOB with SUP. Please see below treatment for toileting and LB dressing. Based on functional abilities assessed, pt demo ability to complete UB ADLs wtih setup/SUP and LB ADLs with SUP/SBA.   Bed Mobility   Supine to Sit 5  Supervision   Additional items Increased time required;Verbal cues   Additional Comments Pt completed bed mobility with HOB elevated and use of bed rails with SUP.   Transfers   Sit to Stand 5  Supervision   Additional items Increased time required;Verbal cues   Stand to Sit 5  Supervision   Additional items Increased time required;Verbal cues   Stand pivot   (SBA)   Additional items Increased time required;Verbal cues   Toilet transfer   (SBA)   Additional items Increased time required;Verbal cues;Standard toilet   Additional Comments Pt completed all functional transfers with RW. Pt completed STS with SUP and cuing for safe hand placement. SPT completed with SBA for safety. Please see below treatment section for toilet transfer.   Functional Mobility   Functional Mobility   (SBA)   Additional Comments Pt completed functional mobility to/from bathroom using RW with SBA. Cuing for safety.   Additional items Rolling walker   Balance   Static Sitting Good   Dynamic Sitting Fair +   Static Standing Fair   Dynamic Standing Fair   Activity Tolerance   Activity Tolerance Patient limited by fatigue   Medical Staff Made Aware PTTatianna   Nurse Made Aware RN   RUE Assessment   RUE Assessment WFL  (4/5 MMT)   LUE Assessment   LUE Assessment WFL  (4/5 MMT)   Hand Function   Gross Motor Coordination Functional   Fine Motor Coordination Functional   Sensation   Light Touch No apparent deficits   Cognition   Overall Cognitive Status WFL   Arousal/Participation Alert;Responsive;Cooperative   Attention Within functional limits   Orientation Level Oriented X4   Memory Within functional limits   Following Commands Follows one step commands without difficulty   Assessment   Limitation Decreased ADL  status;Decreased UE strength;Decreased Safe judgement during ADL;Decreased endurance;Decreased self-care trans;Decreased high-level ADLs   Prognosis Fair   Assessment Pt is a 88 y.o. male, admitted to [facility] 2/21/2024 d/t experiencing suprapubic abdominal pressure. Dx: incarcerated inguinal hernia with SBO. Pt with PMHx impacting their performance during ADL tasks, including: acute urinary retention, CAD, atrial flutter, herpes zoster, HLD, HTN, skin ca. Prior to admission to the hospital Pt was performing ADLs without physical assistance. IADLs without physical assistance. Functional transfers/ambulation without physical assistance. Cognitive status was PTA was WNL. OT order placed to assess Pt's ADLs, cognitive status, and performance during functional tasks in order to maximize safety and independence while making most appropriate d/c recommendations. Pt's clinical presentation is currently unstable/unpredictable given new onset deficits that effect Pt's occupational performance and ability to safely return to PLOF including decrease activity tolerance, decrease standing balance, decrease performance during ADL tasks, decrease generalized strength, decrease activity engagement, decrease performance during functional transfers, steps to enter home, and high fall risk combined with medical complications of hypertension , abnormal CBC, and abnormal blood sugars. Personal factors affecting Pt at time of initial evaluation include: step(s) to enter environment, multi-level environment, advanced age, inability to perform IADLs, inability to perform ADLs, inability to navigate community distances, and recent fall(s)/fall history. Pt will benefit from continued skilled OT services to address deficits as defined above and to maximize level independence/participation during ADLs and functional tasks to facilitate return toward PLOF and improved quality of life. From an occupational therapy standpoint, recommendation at time  of d/c would be Level III (Minimum Resource Intensity) therapy.   Plan   Treatment Interventions ADL retraining;Functional transfer training;UE strengthening/ROM;Endurance training;Patient/family training;Activityengagement;Energy conservation   Goal Expiration Date 03/09/24   OT Frequency 2-3x/wk   Discharge Recommendation   Rehab Resource Intensity Level, OT III (Minimum Resource Intensity)   AM-PAC Daily Activity Inpatient   Lower Body Dressing 3   Bathing 3   Toileting 3   Upper Body Dressing 3   Grooming 3   Eating 4   Daily Activity Raw Score 19   Daily Activity Standardized Score (Calc for Raw Score >=11) 40.22   AM-PAC Applied Cognition Inpatient   Following a Speech/Presentation 3   Understanding Ordinary Conversation 4   Taking Medications 3   Remembering Where Things Are Placed or Put Away 4   Remembering List of 4-5 Errands 3   Taking Care of Complicated Tasks 3   Applied Cognition Raw Score 20   Applied Cognition Standardized Score 41.76   Additional Treatment Session   Start Time 0956   End Time 1009   Treatment Assessment Pt completed toilet transfer with SBA and cuing for safe hand placement. Pt incontinent of urine. Pt requiring SBA to don new brief. SpO2 monitored throughout session. SpO2 >90% thoruhgout entire session.     The patient's raw score on the AM-PAC Daily Activity inpatient short form is 19, standardized score is 40.22, greater than 39.4. Patients at this level are likely to benefit from DC to home. Please refer to the recommendation of the Occupational Therapist for safe DC planning.    Pt goals to be met by 3/9/2024    Pt will demonstrate ability to complete grooming/hygiene tasks @ MI after set-up.  Pt will demonstrate ability to complete supine<>sit @ MI in order to increase safety and independence during ADL tasks.  Pt will demonstrate ability to complete UB ADLs including washing/dressing @ MI in order to increase performance and participation during meaningful tasks  Pt will  demonstrate ability to complete LB dressing @ MI in order to increase safety and independence during meaningful tasks.   Pt will demonstrate ability to delmer/doff socks/shoes while sitting EOB @ MI in order to increase safety and independence during meaningful tasks.   Pt will demonstrate ability to complete toileting tasks including CM and pericare @ MI in order to increase safety and independence during meaningful tasks.  Pt will demonstrate ability to complete EOB, chair, toilet/commode transfers @ MI in order to increase performance and participation during functional tasks.  Pt will demonstrate ability to stand for 8 minutes while maintaining G balance with use of RW for UB support PRN.  Pt will demonstrate ability to tolerate 30-35 minute OT session with no vc'ing for deep breathing or use of energy conservation techniques in order to increase activity tolerance during functional tasks.   Pt will demonstrate Good carryover of use of energy conservation/compensatory strategies during ADLs and functional tasks in order to increase safety and reduce risk for falls.   Pt will demonstrate Good attention and participation in continued evaluation of functional ambulation house hold distances in order to assist with safe d/c planning.  Pt will demonstrate 100% carryover of BUE HEP in order to increase BUE MS and increase performance during functional tasks upon d/c home.    Yuridia Murillo, OTR/L

## 2024-02-24 NOTE — PROGRESS NOTES
[facility]  Progress Note  Name: William Martel I  MRN: [id number]  Unit/Bed#: -01 I Date of Admission: 2/21/2024   Date of Service: 2/24/2024 I Hospital Day: 3    Assessment/Plan   * Incarcerated inguinal hernia with SBO  Assessment & Plan  Patient admitted to surgical services, s/p Right - repair Incarcerated hernia inguinal with mesh on 2/21  Further management and diet advancement per surgery    Sepsis (HCC)-resolved as of 2/23/2024  Assessment & Plan  Presented to ED with abdominal pain, nausea vomiting sepsis criteria with tachycardia, leukocytosis, rt sided pneumonia with loculated pleural effusion and incarcerated right inguinal hernia with SBO s/p release by general surgery.  Initially treated with IV fluid, pain management with narcotics.  Has been doing well after surgery.  Pulmonary was following s/p IR rt chest tube placement with accidental removal on 2/23/2024.  Patient refusing further chest tube placement after multiple education.  Still has persistent leukocytosis although improving.  Blood culture and body fluid culture without any growth.  Initial suspicion for UTI but urine analysis showing Stephanie albicans likely contaminant.    Currently on IV Zosyn by general surgery  Can switch to Augmentin as per pulmonary recommendation on discharge  Repeat CT chest in 4 to 6 weeks to monitor progression  Advance diet as tolerated  Eliquis restarted  PT OT consult  DVT prophylaxis as per primary team  Cleared from medical standpoint for discharge    Herpes zoster without complication  Assessment & Plan  Patient states pruritic rash with vesicles started 2 months ago on right shoulder onto right back.  Currently without any active lesions with prior crusted lesions.  No neuralgia but continues to have pruritus.  Given there is no active lesion with vesicles and prior crusted lesion will not need any antiviral treatment.    Discontinue acyclovir  Continue contact  precautions    Pleural effusion  Assessment & Plan  Further care and plan per pulmonary, follow-up cultures.    Urinary retention  Assessment & Plan  Seen in the ED on  for difficulty with urination. Found to be retaining urine and wynn cathter was placed  Can do voiding trial on Monday if inpatient otherwise can follow-up with urology outpatient    Acute cystitis with hematuria  Assessment & Plan  Urine culture showing Candida albicans.  Likely contaminant.  Patient denies any urinary symptoms.    Currently on antibiotic for incarcerated inguinal hernia on right side and loculated pleural effusion with pneumonia    Atrial fibrillation and flutter (HCC)  Assessment & Plan  S/p pacemaker placement in .  Continue Eliquis and metoprolol.    Coronary artery disease  Assessment & Plan  History of CAD with remote history of stents x3 at [facility]   Continue Eliquis, statin, lisinopril, metoprolol    Primary hypertension  Assessment & Plan  Currently taking enalapril and lopressor outpatient  Patient tolerating diet, resume home enalapril and lopressor  Avoid hypotension             VTE Pharmacologic Prophylaxis: VTE Score: 8 High Risk (Score >/= 5) - Pharmacological DVT Prophylaxis Ordered: apixaban (Eliquis). Sequential Compression Devices Ordered.    Mobility:   Basic Mobility Inpatient Raw Score: 18  JH-HLM Goal: 6: Walk 10 steps or more  JH-HLM Achieved: 4: Move to chair/commode  HLM Goal achieved. Continue to encourage appropriate mobility.    Patient Centered Rounds: I performed bedside rounds with nursing staff today.   Discussions with Specialists or Other Care Team Provider: Pulmonary, case discussed briefly with acute care surgical team    Code Status: Level 1 - Full Code    Subjective:   Patient denies any complaints.  Tolerating diet well.  Still has sporadic rash on right shoulder otherwise unremarkable.  No active lesions of vesicles present.    Objective:     Vitals:   Temp (24hrs), Av.2 °F (36.8  °C), Min:97.9 °F (36.6 °C), Max:98.4 °F (36.9 °C)    Temp:  [97.9 °F (36.6 °C)-98.4 °F (36.9 °C)] 98.4 °F (36.9 °C)  HR:  [] 104  Resp:  [18-22] 18  BP: (116-125)/(62-71) 125/68  SpO2:  [91 %-92 %] 92 %  Body mass index is 28.17 kg/m².     Input and Output Summary (last 24 hours):     Intake/Output Summary (Last 24 hours) at 2/24/2024 1256  Last data filed at 2/23/2024 2224  Gross per 24 hour   Intake 470 ml   Output 250 ml   Net 220 ml       Physical Exam:   Physical Exam  Vitals and nursing note reviewed.   Constitutional:       General: He is not in acute distress.     Appearance: Normal appearance.   HENT:      Head: Normocephalic and atraumatic.   Eyes:      General: No scleral icterus.        Right eye: No discharge.         Left eye: No discharge.   Cardiovascular:      Rate and Rhythm: Normal rate and regular rhythm.      Pulses: Normal pulses.      Heart sounds: Normal heart sounds.   Pulmonary:      Effort: Pulmonary effort is normal. No respiratory distress.      Breath sounds: Normal breath sounds. No wheezing or rales.   Chest:      Chest wall: No tenderness.   Abdominal:      General: Abdomen is flat. Bowel sounds are normal. There is no distension.      Palpations: Abdomen is soft.      Tenderness: There is no abdominal tenderness.   Genitourinary:     Comments: Rt inguinal surgical site clean dry and intact  Musculoskeletal:         General: No deformity. Normal range of motion.      Cervical back: Normal range of motion and neck supple.      Right lower leg: No edema.      Left lower leg: No edema.   Skin:     General: Skin is warm.      Coloration: Skin is not jaundiced or pale.      Findings: No rash.   Neurological:      General: No focal deficit present.      Mental Status: He is alert and oriented to person, place, and time. Mental status is at baseline.      Cranial Nerves: No cranial nerve deficit.      Motor: No weakness.   Psychiatric:         Mood and Affect: Mood normal.          Behavior: Behavior normal.          Additional Data:     Labs:  Results from last 7 days   Lab Units 02/24/24  0601 02/22/24  0452 02/21/24  1016   WBC Thousand/uL 11.25*   < > 15.63*   HEMOGLOBIN g/dL 12.3   < > 16.3   HEMATOCRIT % 38.6   < > 50.5*   PLATELETS Thousands/uL 172   < > 256   NEUTROS PCT %  --   --  89*   LYMPHS PCT %  --   --  6*   MONOS PCT %  --   --  5   EOS PCT %  --   --  0    < > = values in this interval not displayed.     Results from last 7 days   Lab Units 02/23/24  0439   SODIUM mmol/L 137   POTASSIUM mmol/L 4.1   CHLORIDE mmol/L 101   CO2 mmol/L 29   BUN mg/dL 24   CREATININE mg/dL 1.28   ANION GAP mmol/L 7   CALCIUM mg/dL 8.8   ALBUMIN g/dL 3.5   TOTAL BILIRUBIN mg/dL 0.98   ALK PHOS U/L 75   ALT U/L 6*   AST U/L 11*   GLUCOSE RANDOM mg/dL 163*     Results from last 7 days   Lab Units 02/21/24  1558   INR  1.17     Results from last 7 days   Lab Units 02/21/24  2026   POC GLUCOSE mg/dl 163*         Results from last 7 days   Lab Units 02/23/24  0439 02/22/24  0452 02/21/24  1339 02/21/24  1046 02/21/24  1016   LACTIC ACID mmol/L  --   --  2.0 2.8*  --    PROCALCITONIN ng/ml 0.14 0.16  --   --  0.10       Lines/Drains:  Invasive Devices       Peripheral Intravenous Line  Duration             Peripheral IV 02/21/24 Left Antecubital 3 days    Peripheral IV 02/21/24 Left Forearm 2 days                          Imaging: Reviewed radiology reports from this admission including: chest CT scan    Recent Cultures (last 7 days):   Results from last 7 days   Lab Units 02/22/24  1405 02/21/24  1107 02/21/24  0012   BLOOD CULTURE   --  No Growth at 48 hrs.  No Growth at 48 hrs.  --    GRAM STAIN RESULT  No Polys or Bacteria seen  --   --    URINE CULTURE   --  60,000-69,000 cfu/ml Candida albicans* >100,000 cfu/ml Candida albicans*   BODY FLUID CULTURE, STERILE  No growth  --   --        Last 24 Hours Medication List:   Current Facility-Administered Medications   Medication Dose Route Frequency  Provider Last Rate    apixaban  5 mg Oral BID Bety Rincon PA-C      atorvastatin  40 mg Oral Daily Josselin Boucher PA-C      folic acid  400 mcg Oral Daily Josselin Boucher PA-C      hydrALAZINE  5 mg Intravenous Q6H PRN Josselin Boucher PA-C      hydrOXYzine HCL  25 mg Oral Q6H PRN Mel S Neri, CRNP      lisinopril  5 mg Oral Daily Josselin Boucher PA-C      melatonin  3 mg Oral HS PRN Mel S Neri, CRNP      metoprolol tartrate  50 mg Oral BID Josselin Boucher PA-C      naloxone  0.04 mg Intravenous Q1MIN PRN Josselin Boucher PA-C      oxyCODONE-acetaminophen  1 tablet Oral Q4H PRN Christopher Hendricks MD      piperacillin-tazobactam  3.375 g Intravenous Q6H Celine Vega MD 3.375 g (02/24/24 1043)    senna  1 tablet Oral HS Juliana Goodman MD      tamsulosin  0.4 mg Oral Daily With Dinner Josselin Boucher PA-C          Today, Patient Was Seen By: Loretta Sandoval MD    **Please Note: This note may have been constructed using a voice recognition system.**

## 2024-02-24 NOTE — ASSESSMENT & PLAN NOTE
Presented to ED with abdominal pain, nausea vomiting sepsis criteria with tachycardia, leukocytosis, rt sided pneumonia with loculated pleural effusion and incarcerated right inguinal hernia with SBO s/p release by general surgery.  Initially treated with IV fluid, pain management with narcotics.  Has been doing well after surgery.  Pulmonary was following s/p IR rt chest tube placement with accidental removal on 2/23/2024.  Patient refusing further chest tube placement after multiple education.  Still has persistent leukocytosis although improving.  Blood culture and body fluid culture without any growth.  Initial suspicion for UTI but urine analysis showing Stephanie albicans likely contaminant.    Currently on IV Zosyn by general surgery  Can switch to Augmentin as per pulmonary recommendation on discharge  Repeat CT chest in 4 to 6 weeks to monitor progression  Advance diet as tolerated  Eliquis restarted  PT OT consult  DVT prophylaxis as per primary team  Cleared from medical standpoint for discharge

## 2024-02-24 NOTE — ASSESSMENT & PLAN NOTE
History of CAD with remote history of stents x3 at [facility]   Continue Eliquis, statin, lisinopril, metoprolol

## 2024-02-24 NOTE — PLAN OF CARE
Problem: OCCUPATIONAL THERAPY ADULT  Goal: Performs self-care activities at highest level of function for planned discharge setting.  See evaluation for individualized goals.  Description: Treatment Interventions: ADL retraining, Functional transfer training, UE strengthening/ROM, Endurance training, Patient/family training, Activityengagement, Energy conservation          See flowsheet documentation for full assessment, interventions and recommendations.   Note: Limitation: Decreased ADL status, Decreased UE strength, Decreased Safe judgement during ADL, Decreased endurance, Decreased self-care trans, Decreased high-level ADLs  Prognosis: Fair  Assessment: Pt is a 88 y.o. male, admitted to [facility] 2/21/2024 d/t experiencing suprapubic abdominal pressure. Dx: incarcerated inguinal hernia with SBO. Pt with PMHx impacting their performance during ADL tasks, including: acute urinary retention, CAD, atrial flutter, herpes zoster, HLD, HTN, skin ca. Prior to admission to the hospital Pt was performing ADLs without physical assistance. IADLs without physical assistance. Functional transfers/ambulation without physical assistance. Cognitive status was PTA was WNL. OT order placed to assess Pt's ADLs, cognitive status, and performance during functional tasks in order to maximize safety and independence while making most appropriate d/c recommendations. Pt's clinical presentation is currently unstable/unpredictable given new onset deficits that effect Pt's occupational performance and ability to safely return to OF including decrease activity tolerance, decrease standing balance, decrease performance during ADL tasks, decrease generalized strength, decrease activity engagement, decrease performance during functional transfers, steps to enter home, and high fall risk combined with medical complications of hypertension , abnormal CBC, and abnormal blood sugars. Personal factors affecting Pt at time of initial evaluation  include: step(s) to enter environment, multi-level environment, advanced age, inability to perform IADLs, inability to perform ADLs, inability to navigate community distances, and recent fall(s)/fall history. Pt will benefit from continued skilled OT services to address deficits as defined above and to maximize level independence/participation during ADLs and functional tasks to facilitate return toward PLOF and improved quality of life. From an occupational therapy standpoint, recommendation at time of d/c would be Level III (Minimum Resource Intensity) therapy.     Rehab Resource Intensity Level, OT: III (Minimum Resource Intensity)

## 2024-02-24 NOTE — ASSESSMENT & PLAN NOTE
Seen in the ED on 2/20 for difficulty with urination. Found to be retaining urine and wynn cathter was placed  Can do voiding trial on Monday if inpatient otherwise can follow-up with urology outpatient

## 2024-02-24 NOTE — PLAN OF CARE
Problem: PHYSICAL THERAPY ADULT  Goal: Performs mobility at highest level of function for planned discharge setting.  See evaluation for individualized goals.  Description: Treatment/Interventions: ADL retraining, Functional transfer training, LE strengthening/ROM, Elevations, Therapeutic exercise, Endurance training, Patient/family training, Equipment eval/education, Bed mobility, Gait training, Compensatory technique education, Spoke to nursing, Spoke to case management, OT  Equipment Recommended:  (walker-pt has)       See flowsheet documentation for full assessment, interventions and recommendations.  Note: Prognosis: Good  Problem List: Decreased strength, Decreased endurance, Impaired balance, Decreased mobility, Decreased safety awareness  Assessment: Pt is a 88 y.o. male seen for PT evaluation s/p admission to [facility] on 2/21/2024 with Incarcerated inguinal hernia.  Order placed for PT services.  Upon evaluation: Pt is presenting with impaired functional mobility due to decreased strength, decreased endurance, impaired balance, gait deviations, decreased safety awareness, fall risk, and impaired skin integrity requiring  supervision assistance for bed mobility, supervision to stand by assistance for transfers, and stand by assistance for ambulation with RW . Pt's clinical presentation is currently unpredictable given the functional mobility deficits above, especially weakness, decreased skin integrity, decreased endurance, impaired balance, gait deviations, decreased activity tolerance, decreased functional mobility tolerance, decreased safety awareness, and SOB upon exertion, coupled with fall risks as indicated by AM-PAC 6-Clicks: 18/24 as well as hx of falls, impaired balance, polypharmacy, and decreased safety awareness and combined with medical complications of abnormal WBCs, abnormal blood sugars, need for input for mobility technique/safety, and Loculated right-sided pleural  effusion on chest xray, incarcerated inguinal hernia with SBO s/p repair, herpes zoster without complication, urinary retention, acute cystitis with hematuria, sepsis on admission .  Pt's PMHx and comorbidities that may affect physical performance and progress include: CAD, HTN, and Afib and A flutter s/p pacemaker 2021, aortic stenosis s/p bioAVR, carotid stenosis, moderate mitral stenosis, pulmonary HTN, BPH . Personal factors affecting pt at time of IE include: step(s) to enter environment, multi-level environment, advanced age, inability to perform IADLs, inability to perform ADLs, inability to navigate level surfaces without external assistance, and recent fall(s)/fall history. Pt will benefit from continued skilled PT services to address deficits as defined above and to maximize level of functional mobility to facilitate return toward PLOF and improved QOL. From PT/mobility standpoint, recommendation at time of d/c would be Level III (Minimum Resource Intensity), with family and/or caregiver support, and with walker in order to reduce fall risk and maximize pt's functional independence and consistency with mobility. Recommend trial with walker next 1-2 sessions and ther ex next 1-2 sessions.  Barriers to Discharge: None  Barriers to Discharge Comments: pt has support from daughter prn.  Rehab Resource Intensity Level, PT: III (Minimum Resource Intensity)    See flowsheet documentation for full assessment.

## 2024-02-24 NOTE — PLAN OF CARE
Problem: Potential for Falls  Goal: Patient will remain free of falls  Description: INTERVENTIONS:  - Educate patient/family on patient safety including physical limitations  - Instruct patient to call for assistance with activity   - Consult OT/PT to assist with strengthening/mobility   - Keep Call bell within reach  - Keep bed low and locked with side rails adjusted as appropriate  - Keep care items and personal belongings within reach  - Initiate and maintain comfort rounds  - Make Fall Risk Sign visible to staff    - Apply yellow socks and bracelet for high fall risk patients  - Consider moving patient to room near nurses station  Outcome: Progressing     Problem: INFECTION - ADULT  Goal: Absence or prevention of progression during hospitalization  Description: INTERVENTIONS:  - Assess and monitor for signs and symptoms of infection  - Monitor lab/diagnostic results  - Monitor all insertion sites, i.e. indwelling lines, tubes, and drains  - Monitor endotracheal if appropriate and nasal secretions for changes in amount and color  - [place] appropriate cooling/warming therapies per order  - Administer medications as ordered  - Instruct and encourage patient and family to use good hand hygiene technique  - Identify and instruct in appropriate isolation precautions for identified infection/condition  Outcome: Progressing

## 2024-02-25 ENCOUNTER — APPOINTMENT (INPATIENT)
Dept: RADIOLOGY | Facility: HOSPITAL | Age: 89
DRG: 853 | End: 2024-02-25
Payer: COMMERCIAL

## 2024-02-25 LAB
BACTERIA SPEC BFLD CULT: NO GROWTH
GRAM STN SPEC: NORMAL
HEMOCCULT STL QL: POSITIVE

## 2024-02-25 PROCEDURE — 71046 X-RAY EXAM CHEST 2 VIEWS: CPT

## 2024-02-25 PROCEDURE — 92610 EVALUATE SWALLOWING FUNCTION: CPT

## 2024-02-25 PROCEDURE — 99024 POSTOP FOLLOW-UP VISIT: CPT

## 2024-02-25 PROCEDURE — 99232 SBSQ HOSP IP/OBS MODERATE 35: CPT | Performed by: HOSPITALIST

## 2024-02-25 PROCEDURE — 82272 OCCULT BLD FECES 1-3 TESTS: CPT | Performed by: FAMILY MEDICINE

## 2024-02-25 RX ADMIN — PIPERACILLIN AND TAZOBACTAM 3.38 G: 36; 4.5 INJECTION, POWDER, FOR SOLUTION INTRAVENOUS at 02:48

## 2024-02-25 RX ADMIN — METOPROLOL TARTRATE 50 MG: 50 TABLET, FILM COATED ORAL at 16:46

## 2024-02-25 RX ADMIN — FOLIC ACID TAB 400 MCG 400 MCG: 400 TAB at 09:15

## 2024-02-25 RX ADMIN — APIXABAN 5 MG: 5 TABLET, FILM COATED ORAL at 09:15

## 2024-02-25 RX ADMIN — Medication 3 MG: at 21:29

## 2024-02-25 RX ADMIN — PIPERACILLIN AND TAZOBACTAM 3.38 G: 36; 4.5 INJECTION, POWDER, FOR SOLUTION INTRAVENOUS at 21:32

## 2024-02-25 RX ADMIN — APIXABAN 5 MG: 5 TABLET, FILM COATED ORAL at 16:47

## 2024-02-25 RX ADMIN — PIPERACILLIN AND TAZOBACTAM 3.38 G: 36; 4.5 INJECTION, POWDER, FOR SOLUTION INTRAVENOUS at 09:16

## 2024-02-25 RX ADMIN — METOPROLOL TARTRATE 50 MG: 50 TABLET, FILM COATED ORAL at 09:14

## 2024-02-25 RX ADMIN — LISINOPRIL 5 MG: 5 TABLET ORAL at 09:15

## 2024-02-25 RX ADMIN — PIPERACILLIN AND TAZOBACTAM 3.38 G: 36; 4.5 INJECTION, POWDER, FOR SOLUTION INTRAVENOUS at 16:47

## 2024-02-25 RX ADMIN — ATORVASTATIN CALCIUM 40 MG: 40 TABLET, FILM COATED ORAL at 09:15

## 2024-02-25 RX ADMIN — TAMSULOSIN HYDROCHLORIDE 0.4 MG: 0.4 CAPSULE ORAL at 16:47

## 2024-02-25 NOTE — PROGRESS NOTES
[facility]  Progress Note  Name: William Martel I  MRN: [id number]  Unit/Bed#: -01 I Date of Admission: 2/21/2024   Date of Service: 2/25/2024 I Hospital Day: 4    Assessment/Plan   * Incarcerated inguinal hernia with SBO  Assessment & Plan  Patient admitted to surgical services, s/p Right - repair Incarcerated hernia inguinal with mesh on 2/21  Further management and diet advancement per surgery    Pleural effusion  Assessment & Plan  Further care and plan per pulmonary, follow-up cultures.    Herpes zoster without complication  Assessment & Plan  Patient states pruritic rash with vesicles started 2 months ago on right shoulder onto right back.    Currently without any active lesions with prior crusted lesions. No neuralgia but continues to have pruritus.    Given there is no active lesion with vesicles and prior crusted lesion will not need any antiviral treatment.  Acyclovir was stopped    Urinary retention  Assessment & Plan  Seen in the ED on 2/20 for difficulty with urination. Found to be retaining urine and wynn cathter was placed  Can do voiding trial on Monday if inpatient otherwise can follow-up with urology outpatient    Acute cystitis with hematuria  Assessment & Plan  Urine culture showing Candida albicans.  Likely contaminant.  Patient denies any urinary symptoms.  Currently on antibiotic for incarcerated inguinal hernia on right side and loculated pleural effusion with pneumonia    Atrial fibrillation and flutter (HCC)  Assessment & Plan  S/p pacemaker placement in 2021.  Continue Eliquis and metoprolol.    Coronary artery disease  Assessment & Plan  History of CAD with remote history of stents x3 at [facility]   Continue Eliquis, statin, lisinopril, metoprolol    Primary hypertension  Assessment & Plan  Currently taking enalapril and lopressor outpatient  Patient tolerating diet, resume home ACE-I and lopressor  Avoid hypotension    Sepsis (HCC)-resolved as of  2/23/2024  Assessment & Plan  Presented to ED with abdominal pain, nausea vomiting sepsis criteria with tachycardia, leukocytosis, rt sided pneumonia with loculated pleural effusion and incarcerated right inguinal hernia with SBO s/p release by general surgery.  Initially treated with IV fluid, pain management with narcotics.  Has been doing well after surgery.  Pulmonary was following s/p IR rt chest tube placement with accidental removal on 2/23/2024.  Patient refusing further chest tube placement after multiple education.  Still has persistent leukocytosis although improving.  Blood culture and body fluid culture without any growth.  Initial suspicion for UTI but urine analysis showing Stephanie albicans likely contaminant.    Currently on IV Zosyn by general surgery  Can switch to Augmentin as per pulmonary recommendation on discharge  Repeat CT chest in 4 to 6 weeks to monitor progression  Advance diet as tolerated  Eliquis restarted  PT OT consult  DVT prophylaxis as per primary team  Cleared from medical standpoint for discharge               VTE Pharmacologic Prophylaxis: VTE Score: 8 High Risk (Score >/= 5) - Pharmacological DVT Prophylaxis Ordered: apixaban (Eliquis). Sequential Compression Devices Ordered.    Mobility:   Basic Mobility Inpatient Raw Score: 18  JH-HLM Goal: 6: Walk 10 steps or more  JH-HLM Achieved: 2: Bed activities/Dependent transfer  HLM Goal NOT achieved. Continue with multidisciplinary rounding and encourage appropriate mobility to improve upon HLM goals.    Patient Centered Rounds: I performed bedside rounds with nursing staff today.   Discussions with Specialists or Other Care Team Provider: none      Total Time Spent on Date of Encounter in care of patient: 24 mins. This time was spent on one or more of the following: performing physical exam; counseling and coordination of care; obtaining or reviewing history; documenting in the medical record; reviewing/ordering tests, medications  or procedures; communicating with other healthcare professionals and discussing with patient's family/caregivers.    Current Length of Stay: 4 day(s)  Current Patient Status: Inpatient   Certification Statement: The patient will continue to require additional inpatient hospital stay due to sbo, pleural effusion management  Discharge Plan: [facility] is following this patient on consult. They are medically stable for discharge when deemed appropriate by primary service.    Code Status: Level 1 - Full Code    Subjective:   Patient c/o odynophagia with swallowing, food feels like its getting stuck but ultimately goes down, does not report coughing/choking on food. Abd pain improved from prior. Slightly sob    Objective:     Vitals:   Temp (24hrs), Av.3 °F (36.8 °C), Min:98.1 °F (36.7 °C), Max:98.4 °F (36.9 °C)    Temp:  [98.1 °F (36.7 °C)-98.4 °F (36.9 °C)] 98.1 °F (36.7 °C)  HR:  [74-99] 74  Resp:  [18] 18  BP: (112-135)/(59-67) 132/59  SpO2:  [93 %-94 %] 93 %  Body mass index is 29.43 kg/m².     Input and Output Summary (last 24 hours):     Intake/Output Summary (Last 24 hours) at 2024 1444  Last data filed at 2024 1416  Gross per 24 hour   Intake 2166 ml   Output 625 ml   Net 1541 ml       Physical Exam:   Physical Exam  Vitals and nursing note reviewed.   Constitutional:       General: He is not in acute distress.     Appearance: He is well-developed.   HENT:      Head: Normocephalic and atraumatic.   Eyes:      Conjunctiva/sclera: Conjunctivae normal.   Cardiovascular:      Rate and Rhythm: Normal rate.      Heart sounds: No murmur heard.  Pulmonary:      Effort: Pulmonary effort is normal. No respiratory distress.      Comments: Mildly reduced b/l worse in RLL  Abdominal:      General: There is no distension.      Palpations: Abdomen is soft.      Tenderness: There is no abdominal tenderness.   Musculoskeletal:         General: No swelling.      Cervical back: Neck supple.   Skin:     General: Skin is  warm and dry.   Neurological:      Mental Status: He is alert.   Psychiatric:         Mood and Affect: Mood normal.          Additional Data:     Labs:  Results from last 7 days   Lab Units 02/24/24  0601 02/22/24  0452 02/21/24  1016   WBC Thousand/uL 11.25*   < > 15.63*   HEMOGLOBIN g/dL 12.3   < > 16.3   HEMATOCRIT % 38.6   < > 50.5*   PLATELETS Thousands/uL 172   < > 256   NEUTROS PCT %  --   --  89*   LYMPHS PCT %  --   --  6*   MONOS PCT %  --   --  5   EOS PCT %  --   --  0    < > = values in this interval not displayed.     Results from last 7 days   Lab Units 02/23/24  0439   SODIUM mmol/L 137   POTASSIUM mmol/L 4.1   CHLORIDE mmol/L 101   CO2 mmol/L 29   BUN mg/dL 24   CREATININE mg/dL 1.28   ANION GAP mmol/L 7   CALCIUM mg/dL 8.8   ALBUMIN g/dL 3.5   TOTAL BILIRUBIN mg/dL 0.98   ALK PHOS U/L 75   ALT U/L 6*   AST U/L 11*   GLUCOSE RANDOM mg/dL 163*     Results from last 7 days   Lab Units 02/21/24  1558   INR  1.17     Results from last 7 days   Lab Units 02/21/24  2026   POC GLUCOSE mg/dl 163*         Results from last 7 days   Lab Units 02/23/24  0439 02/22/24  0452 02/21/24  1339 02/21/24  1046 02/21/24  1016   LACTIC ACID mmol/L  --   --  2.0 2.8*  --    PROCALCITONIN ng/ml 0.14 0.16  --   --  0.10       Lines/Drains:  Invasive Devices       Peripheral Intravenous Line  Duration             Peripheral IV 02/21/24 Left Antecubital 4 days    Peripheral IV 02/21/24 Left Forearm 3 days                          Imaging: No pertinent imaging reviewed.    Recent Cultures (last 7 days):   Results from last 7 days   Lab Units 02/22/24  1405 02/21/24  1107 02/21/24  0012   BLOOD CULTURE   --  No Growth at 72 hrs.  No Growth at 72 hrs.  --    GRAM STAIN RESULT  No Polys or Bacteria seen  --   --    URINE CULTURE   --  60,000-69,000 cfu/ml Candida albicans* >100,000 cfu/ml Candida albicans*   BODY FLUID CULTURE, STERILE  No growth  --   --        Last 24 Hours Medication List:   Current Facility-Administered  Medications   Medication Dose Route Frequency Provider Last Rate    apixaban  5 mg Oral BID Bety Rincon PA-C      atorvastatin  40 mg Oral Daily Josselin Boucher PA-C      folic acid  400 mcg Oral Daily Josselin Boucher PA-C      hydrALAZINE  5 mg Intravenous Q6H PRN Josselin Boucher PA-C      hydrOXYzine HCL  25 mg Oral Q6H PRN Mel S Neri, CRNP      lisinopril  5 mg Oral Daily Josselin Boucher PA-C      melatonin  3 mg Oral HS PRN Mel S Neri, CRNP      metoprolol tartrate  50 mg Oral BID Josselin Boucher PA-C      naloxone  0.04 mg Intravenous Q1MIN PRN Josselin Boucher PA-C      oxyCODONE-acetaminophen  1 tablet Oral Q4H PRN Christopher Hendricks MD      piperacillin-tazobactam  3.375 g Intravenous Q6H Celine Vega MD 3.375 g (02/25/24 0916)    senna  1 tablet Oral HS Juliana Goodman MD      tamsulosin  0.4 mg Oral Daily With Dinner Josselin Boucher PA-C          Today, Patient Was Seen By: Bertram Jensen DO    **Please Note: This note may have been constructed using a voice recognition system.**

## 2024-02-25 NOTE — PLAN OF CARE

## 2024-02-25 NOTE — SPEECH THERAPY NOTE
Speech Language/Pathology  Speech-Language Pathology Bedside Swallow Evaluation      Patient Name: William Martel    Today's Date: 2/25/2024    Summary   Consult received for bedside swallow assessment. Pt admitted w/ incarcerated inguinal hernia w/ SBO s/p repair, acute cystitis, and pleural effusion and sepsis. PMHx includes shingles, AFIB, CAD, HTN.  Currently on a regular texture diet w/ thin liquids. Decreased PO intake and pt is reporting some pain w/ swallowing and feeling like things don't go down easily. Pt was intubated for hernia repair surgery but not for an extended period of time. Pt's daughter present for assessment, reports hx of esophageal dilation approx 3 years ago. Pt reports he regurgitated a piece of melon x1, suspects he did not chew it well enough. Otherwise pt describes discomfort/awareness on right side of throat when he is swallowing. Denies globus sensation. Expectorating mucus intermittently which is baseline.  Seen w/ lunch meal of broth, roll, ice cream and thins via straw. Pt presents w/ mild oral dysphagia characterized by prolonged mastication, suspect related to ill fitting dentures. Offered pt adhesive for dentures, he declined. Suspect pharyngeal phase grossly WFL, throat clear noted w/ thins via cup. Suspect discomfort related to recent intubation and esophageal dysphagia. Pt and daughter agreeable to f/u w/ his GI doctor as an OP.  Offered softer foods, pt declined. He is ordering very little to begin with    Recommend to continue regular textures/thin liquids  Meds whole as tolerated  SLP will f/u x1 for diet tolerance.    Risk/s for Aspiration: Low     Recommended Diet: regular diet and thin liquids   Recommended Form of Meds: whole with liquid   Aspiration precautions and swallowing strategies: upright posture  Other Recommendations: Continue frequent oral care,        Current Medical Status   William Martel is a 88 y.o. male who presents with abdominal pain and an  incarcerated right inguinal hernia.  The hernia is tender and reducible, and the contents at risk of ischemia.  The patient has had a longstanding right inguinal hernia which he is able to reduce without difficulty.  Patient recently developed urinary retention, requiring a urinary catheter, subsequent to this the hernia became irreducible, and now quite tender.     Current Precautions:  Fall  Aspiration  Contact      Allergies:  No known food allergies    Past medical history:  Please see H&P for details    Special Studies:  CXR:  Loculated right-sided pleural effusion. No chest tube identified.    Social/Education/Vocational Hx:  Pt lives with family    Swallow Information   Current Risks for Dysphagia & Aspiration: known history of dysphagia  Current Symptoms/Concerns: coughing with po  Current Diet: regular diet and thin liquids   Baseline Diet: regular diet and thin liquids      Baseline Assessment   Behavior/Cognition: alert  Speech/Language Status: able to participate in basic conversation  Patient Positioning: upright in bed  Pain Status/Interventions/Response to Interventions:   No report of or nonverbal indications of pain.       Swallow Mechanism Exam  Facial: symmetrical  Labial: WFL  Lingual: WFL  Velum: symmetrical  Mandible: adequate ROM  Dentition: full dentures  Vocal quality:clear/adequate   Volitional Cough: strong/productive   Respiratory Status: on RA      Consistencies Assessed and Performance   Consistencies Administered: thin liquids, puree, and soft solids    Oral Stage: mild  mild oral dysphagia characterized by prolonged mastication, suspect related to ill fitting dentures. Offered pt adhesive for dentures, he declined.     Pharyngeal Stage:  suspect WFL  throat clear noted w/ thins via cup. Suspect discomfort related to recent intubation and esophageal dysphagia.     Esophageal Concerns: belching; hx stricture    Summary and Recommendations (see above)    Results Reviewed with: patient,  RN, and family     Treatment Recommended: Dysphagia therapy for diet tolerance     Frequency of treatment: f/u 1-2x/week    Dysphagia LTG  -Patient will demonstrate safe and effective oral intake (without overt s/s significant oral/pharyngeal dysphagia including s/s penetration or aspiration) for the highest appropriate diet level.     Short Term Goals:    -Pt will tolerate regular/ thin liquid with no significant s/s oral or pharyngeal dysphagia across 1-3 diagnostic session/s.    Re: Compensatory Strategies    -Patient will demonstrate independent use of recommended safe swallowing strategies during a clinician assessed meal across 1-3 diagnostic sessions.      Speech Therapy Prognosis   Prognosis: fair    Prognosis Considerations: age and medical status    Jaja Baker MS CCC-SLP  2/25/2024

## 2024-02-25 NOTE — PLAN OF CARE
Problem: Potential for Falls  Goal: Patient will remain free of falls  Description: INTERVENTIONS:  - Educate patient/family on patient safety including physical limitations  - Instruct patient to call for assistance with activity   - Consult OT/PT to assist with strengthening/mobility   - Keep Call bell within reach  - Keep bed low and locked with side rails adjusted as appropriate  - Keep care items and personal belongings within reach  - Initiate and maintain comfort rounds  - Make Fall Risk Sign visible to staff     - Apply yellow socks and bracelet for high fall risk patients  - Consider moving patient to room near nurses station  Outcome: Progressing     Problem: INFECTION - ADULT  Goal: Absence or prevention of progression during hospitalization  Description: INTERVENTIONS:  - Assess and monitor for signs and symptoms of infection  - Monitor lab/diagnostic results  - Monitor all insertion sites, i.e. indwelling lines, tubes, and drains  - Monitor endotracheal if appropriate and nasal secretions for changes in amount and color  - [place] appropriate cooling/warming therapies per order  - Administer medications as ordered  - Instruct and encourage patient and family to use good hand hygiene technique  - Identify and instruct in appropriate isolation precautions for identified infection/condition  Outcome: Progressing

## 2024-02-25 NOTE — PROGRESS NOTES
"Progress Note - General Surgery   William Martel 88 y.o. male MRN: [id number]  Unit/Bed#: -01 Encounter: [id number]    Assessment:  88 y.o. male POD4 s/p incarcerated right inguinal/scrotal hernia  Intra-op Findings:  - Huge indirect hernia with disruption of the floor of the inguinal canal, extending into the scrotum. Viable loop of small bowel in the hernia. Distal hernia imbricated around the cord and left widely open. Hernia repaired with High Ligation, Ring Tightening and Bard Soft mesh onlay.      Chest XR after chest tube accidental removal 2/23:   - Loculated right-sided pleural effusion. No chest tube identified.  Repeat obtained 2/25:  - Unchanged loculated right pleural effusion     - Afebrile, VSS on room air with episodes of tachycardia improved  - No new labs today, will repeat tomorrow  - Procal 2/23 0.14   - S/p IR chest tube placement, removed total 240 cc reddish turbid fluid - fluid analysis consistent with exudate negative culture, cytology pending   - Large BM yesterday, dark \"sludge\" since     - Later in the day yesterday patient began to complain of increasing shortness of breath, and dysphagia with regurgitation - pain on right side of throat - speech eval has been ordered      - Pulm recommended replacement of chest tube to resolve pleural effusion of right side but patient is not interested in doing so   - Contact precaution due to Herpes zoster, HTN, a fib on eliquis restarted 2/23, CAD, aortic stenosis     Plan:  - On IV abx - Zosyn, will need 2 weeks of abx - Augmentin on discharge with repeat CT chest in 4-6 weeks to check for progression per pulmonology  - Speech eval completed - continue regular diet with thin liquids - suspects discomfort related to recent intubation and esophageal dysphagia  - Eliquis restarted 2/23  - Pain/nausea control PRN  - Appreciate SLIM assistance with co-morbid conditions  - Will attempt to touch base with pulm again tomorrow - patient's daughter " "reported to nursing that patient will be getting chest tube replaced (?) if he is willing ideally this could assist his case  - PT/OT deem patient stable to return home  - Remains surgically stable but requires continued inpatient stay while resolving associated medical issues    Subjective:   Today patient is again denying shortness of breath.  He states he is feeling fine at rest.  He remains without eating a significant amount.  He states that he has discomfort in his throat when initially started to swallow but once he gets past a certain point it feels fine.  Speech did evaluate the patient.  Patient also states he has been having frequent \"sludgelike \"bowel movements but denies straight liquid.  He denies any abdominal pain, incisional pain, or other complaints at this time.    Objective:   Blood pressure 132/59, pulse 74, temperature 98.1 °F (36.7 °C), resp. rate 18, height 5' 11\" (1.803 m), weight 95.7 kg (210 lb 15.7 oz), SpO2 93%.,Body mass index is 29.43 kg/m².    Intake/Output Summary (Last 24 hours) at 2/25/2024 1240  Last data filed at 2/25/2024 0644  Gross per 24 hour   Intake 2166 ml   Output 1050 ml   Net 1116 ml     Invasive Devices       Peripheral Intravenous Line  Duration             Peripheral IV 02/21/24 Left Antecubital 4 days    Peripheral IV 02/21/24 Left Forearm 3 days                  Physical Exam:   General: no acute distress, pt appears well and comfortable, resting in bed  Skin: warm and dry to touch  Pulmonary: normal effort at rest, stating with movement he does get short of breath but this is normal  Abdominal: Soft, nondistended, nontender abdomen, no guarding or rebound, incisional site in right groin is clean dry intact with glue, some ecchymosis surrounding the site, no erythema or drainage  Neuro: alert and oriented     Lab, Imaging and other studies:I have personally reviewed pertinent lab results.  , CBC: No results found for: \"WBC\", \"HGB\", \"HCT\", \"MCV\", \"PLT\", " "\"ADJUSTEDWBC\", \"RBC\", \"MCH\", \"MCHC\", \"RDW\", \"MPV\", \"NRBC\", CMP: No results found for: \"SODIUM\", \"K\", \"CL\", \"CO2\", \"ANIONGAP\", \"BUN\", \"CREATININE\", \"GLUCOSE\", \"CALCIUM\", \"AST\", \"ALT\", \"ALKPHOS\", \"PROT\", \"BILITOT\", \"EGFR\"  VTE Pharmacologic Prophylaxis: Eliquis  VTE Mechanical Prophylaxis: sequential compression device    Katrina Elizabeth Billig, PA-C  2/25/2024  "

## 2024-02-26 ENCOUNTER — ANESTHESIA (INPATIENT)
Dept: GASTROENTEROLOGY | Facility: HOSPITAL | Age: 89
DRG: 853 | End: 2024-02-26
Payer: COMMERCIAL

## 2024-02-26 ENCOUNTER — APPOINTMENT (INPATIENT)
Dept: CT IMAGING | Facility: HOSPITAL | Age: 89
DRG: 853 | End: 2024-02-26
Payer: COMMERCIAL

## 2024-02-26 ENCOUNTER — ANESTHESIA EVENT (OUTPATIENT)
Dept: ANESTHESIOLOGY | Facility: HOSPITAL | Age: 89
End: 2024-02-26

## 2024-02-26 ENCOUNTER — ANESTHESIA EVENT (INPATIENT)
Dept: GASTROENTEROLOGY | Facility: HOSPITAL | Age: 89
DRG: 853 | End: 2024-02-26
Payer: COMMERCIAL

## 2024-02-26 ENCOUNTER — ANESTHESIA (OUTPATIENT)
Dept: ANESTHESIOLOGY | Facility: HOSPITAL | Age: 89
End: 2024-02-26

## 2024-02-26 ENCOUNTER — APPOINTMENT (INPATIENT)
Dept: GASTROENTEROLOGY | Facility: HOSPITAL | Age: 89
DRG: 853 | End: 2024-02-26
Attending: HOSPITALIST
Payer: COMMERCIAL

## 2024-02-26 PROBLEM — N30.01 ACUTE CYSTITIS WITH HEMATURIA: Status: RESOLVED | Noted: 2023-01-13 | Resolved: 2024-02-26

## 2024-02-26 PROBLEM — K92.2 GIB (GASTROINTESTINAL BLEEDING): Status: ACTIVE | Noted: 2024-02-26

## 2024-02-26 PROBLEM — B02.9 HERPES ZOSTER WITHOUT COMPLICATION: Status: RESOLVED | Noted: 2024-02-21 | Resolved: 2024-02-26

## 2024-02-26 LAB
ABO GROUP BLD: NORMAL
ANION GAP SERPL CALCULATED.3IONS-SCNC: 4 MMOL/L
BACTERIA BLD CULT: NORMAL
BACTERIA BLD CULT: NORMAL
BASOPHILS # BLD AUTO: 0.05 THOUSANDS/ÂΜL (ref 0–0.1)
BASOPHILS NFR BLD AUTO: 0 % (ref 0–1)
BLD GP AB SCN SERPL QL: NEGATIVE
BUN SERPL-MCNC: 49 MG/DL (ref 5–25)
CALCIUM SERPL-MCNC: 8.1 MG/DL (ref 8.4–10.2)
CHLORIDE SERPL-SCNC: 107 MMOL/L (ref 96–108)
CO2 SERPL-SCNC: 29 MMOL/L (ref 21–32)
CREAT SERPL-MCNC: 1.03 MG/DL (ref 0.6–1.3)
EOSINOPHIL # BLD AUTO: 0.15 THOUSAND/ÂΜL (ref 0–0.61)
EOSINOPHIL NFR BLD AUTO: 1 % (ref 0–6)
ERYTHROCYTE [DISTWIDTH] IN BLOOD BY AUTOMATED COUNT: 15.4 % (ref 11.6–15.1)
GFR SERPL CREATININE-BSD FRML MDRD: 65 ML/MIN/1.73SQ M
GLUCOSE SERPL-MCNC: 138 MG/DL (ref 65–140)
HCT VFR BLD AUTO: 26.1 % (ref 36.5–49.3)
HCT VFR BLD AUTO: 29.4 % (ref 36.5–49.3)
HGB BLD-MCNC: 8.2 G/DL (ref 12–17)
HGB BLD-MCNC: 9.1 G/DL (ref 12–17)
HGB BLD-MCNC: 9.2 G/DL (ref 12–17)
IMM GRANULOCYTES # BLD AUTO: 0.1 THOUSAND/UL (ref 0–0.2)
IMM GRANULOCYTES NFR BLD AUTO: 1 % (ref 0–2)
LYMPHOCYTES # BLD AUTO: 1.86 THOUSANDS/ÂΜL (ref 0.6–4.47)
LYMPHOCYTES NFR BLD AUTO: 16 % (ref 14–44)
MCH RBC QN AUTO: 27.5 PG (ref 26.8–34.3)
MCHC RBC AUTO-ENTMCNC: 31 G/DL (ref 31.4–37.4)
MCV RBC AUTO: 89 FL (ref 82–98)
MONOCYTES # BLD AUTO: 1 THOUSAND/ÂΜL (ref 0.17–1.22)
MONOCYTES NFR BLD AUTO: 9 % (ref 4–12)
NEUTROPHILS # BLD AUTO: 8.56 THOUSANDS/ÂΜL (ref 1.85–7.62)
NEUTS SEG NFR BLD AUTO: 73 % (ref 43–75)
NRBC BLD AUTO-RTO: 0 /100 WBCS
PLATELET # BLD AUTO: 206 THOUSANDS/UL (ref 149–390)
PMV BLD AUTO: 10.6 FL (ref 8.9–12.7)
POTASSIUM SERPL-SCNC: 4.4 MMOL/L (ref 3.5–5.3)
PROCALCITONIN SERPL-MCNC: 0.26 NG/ML
RBC # BLD AUTO: 3.31 MILLION/UL (ref 3.88–5.62)
RH BLD: POSITIVE
SODIUM SERPL-SCNC: 140 MMOL/L (ref 135–147)
SPECIMEN EXPIRATION DATE: NORMAL
WBC # BLD AUTO: 11.72 THOUSAND/UL (ref 4.31–10.16)

## 2024-02-26 PROCEDURE — 85018 HEMOGLOBIN: CPT | Performed by: SPECIALIST

## 2024-02-26 PROCEDURE — 74178 CT ABD&PLV WO CNTR FLWD CNTR: CPT

## 2024-02-26 PROCEDURE — 80048 BASIC METABOLIC PNL TOTAL CA: CPT

## 2024-02-26 PROCEDURE — 84145 PROCALCITONIN (PCT): CPT

## 2024-02-26 PROCEDURE — 85014 HEMATOCRIT: CPT | Performed by: SPECIALIST

## 2024-02-26 PROCEDURE — 85025 COMPLETE CBC W/AUTO DIFF WBC: CPT

## 2024-02-26 PROCEDURE — 86850 RBC ANTIBODY SCREEN: CPT | Performed by: SPECIALIST

## 2024-02-26 PROCEDURE — P9016 RBC LEUKOCYTES REDUCED: HCPCS

## 2024-02-26 PROCEDURE — 86923 COMPATIBILITY TEST ELECTRIC: CPT

## 2024-02-26 PROCEDURE — 99233 SBSQ HOSP IP/OBS HIGH 50: CPT | Performed by: HOSPITALIST

## 2024-02-26 PROCEDURE — 88302 TISSUE EXAM BY PATHOLOGIST: CPT | Performed by: PATHOLOGY

## 2024-02-26 PROCEDURE — 86900 BLOOD TYPING SEROLOGIC ABO: CPT | Performed by: SPECIALIST

## 2024-02-26 PROCEDURE — 86901 BLOOD TYPING SEROLOGIC RH(D): CPT | Performed by: SPECIALIST

## 2024-02-26 PROCEDURE — C9113 INJ PANTOPRAZOLE SODIUM, VIA: HCPCS | Performed by: HOSPITALIST

## 2024-02-26 PROCEDURE — C9113 INJ PANTOPRAZOLE SODIUM, VIA: HCPCS | Performed by: NURSE PRACTITIONER

## 2024-02-26 RX ORDER — FUROSEMIDE 10 MG/ML
40 INJECTION INTRAMUSCULAR; INTRAVENOUS ONCE
Status: COMPLETED | OUTPATIENT
Start: 2024-02-26 | End: 2024-02-26

## 2024-02-26 RX ORDER — SODIUM CHLORIDE, SODIUM LACTATE, POTASSIUM CHLORIDE, CALCIUM CHLORIDE 600; 310; 30; 20 MG/100ML; MG/100ML; MG/100ML; MG/100ML
INJECTION, SOLUTION INTRAVENOUS CONTINUOUS PRN
Status: DISCONTINUED | OUTPATIENT
Start: 2024-02-26 | End: 2024-02-26

## 2024-02-26 RX ORDER — PANTOPRAZOLE SODIUM 40 MG/10ML
40 INJECTION, POWDER, LYOPHILIZED, FOR SOLUTION INTRAVENOUS EVERY 12 HOURS SCHEDULED
Status: DISCONTINUED | OUTPATIENT
Start: 2024-02-26 | End: 2024-03-01 | Stop reason: HOSPADM

## 2024-02-26 RX ORDER — SODIUM CHLORIDE, SODIUM LACTATE, POTASSIUM CHLORIDE, CALCIUM CHLORIDE 600; 310; 30; 20 MG/100ML; MG/100ML; MG/100ML; MG/100ML
50 INJECTION, SOLUTION INTRAVENOUS CONTINUOUS
Status: CANCELLED | OUTPATIENT
Start: 2024-02-26

## 2024-02-26 RX ORDER — SUCRALFATE 1 G/1
1 TABLET ORAL
Status: DISCONTINUED | OUTPATIENT
Start: 2024-02-26 | End: 2024-03-01 | Stop reason: HOSPADM

## 2024-02-26 RX ORDER — SUCCINYLCHOLINE/SOD CL,ISO/PF 100 MG/5ML
SYRINGE (ML) INTRAVENOUS AS NEEDED
Status: DISCONTINUED | OUTPATIENT
Start: 2024-02-26 | End: 2024-02-26

## 2024-02-26 RX ORDER — PROPOFOL 10 MG/ML
INJECTION, EMULSION INTRAVENOUS AS NEEDED
Status: DISCONTINUED | OUTPATIENT
Start: 2024-02-26 | End: 2024-02-26

## 2024-02-26 RX ORDER — PHENYLEPHRINE HCL IN 0.9% NACL 1 MG/10 ML
SYRINGE (ML) INTRAVENOUS AS NEEDED
Status: DISCONTINUED | OUTPATIENT
Start: 2024-02-26 | End: 2024-02-26

## 2024-02-26 RX ORDER — LIDOCAINE HYDROCHLORIDE 10 MG/ML
INJECTION, SOLUTION EPIDURAL; INFILTRATION; INTRACAUDAL; PERINEURAL AS NEEDED
Status: DISCONTINUED | OUTPATIENT
Start: 2024-02-26 | End: 2024-02-26

## 2024-02-26 RX ORDER — LOPERAMIDE HYDROCHLORIDE 2 MG/1
2 CAPSULE ORAL 4 TIMES DAILY PRN
Status: DISCONTINUED | OUTPATIENT
Start: 2024-02-26 | End: 2024-03-01 | Stop reason: HOSPADM

## 2024-02-26 RX ADMIN — APIXABAN 5 MG: 5 TABLET, FILM COATED ORAL at 08:57

## 2024-02-26 RX ADMIN — Medication 2.5 MG: at 16:15

## 2024-02-26 RX ADMIN — PIPERACILLIN AND TAZOBACTAM 3.38 G: 36; 4.5 INJECTION, POWDER, FOR SOLUTION INTRAVENOUS at 03:11

## 2024-02-26 RX ADMIN — PANTOPRAZOLE SODIUM 40 MG: 40 INJECTION, POWDER, FOR SOLUTION INTRAVENOUS at 10:45

## 2024-02-26 RX ADMIN — Medication 100 MG: at 15:10

## 2024-02-26 RX ADMIN — FUROSEMIDE 40 MG: 10 INJECTION, SOLUTION INTRAMUSCULAR; INTRAVENOUS at 16:11

## 2024-02-26 RX ADMIN — SUCRALFATE 1 G: 1 TABLET ORAL at 21:29

## 2024-02-26 RX ADMIN — Medication 100 MCG: at 15:26

## 2024-02-26 RX ADMIN — Medication 100 MCG: at 15:19

## 2024-02-26 RX ADMIN — PROPOFOL 150 MG: 10 INJECTION, EMULSION INTRAVENOUS at 15:10

## 2024-02-26 RX ADMIN — ATORVASTATIN CALCIUM 40 MG: 40 TABLET, FILM COATED ORAL at 08:57

## 2024-02-26 RX ADMIN — Medication 300 MCG: at 15:14

## 2024-02-26 RX ADMIN — LIDOCAINE HYDROCHLORIDE 50 MG: 10 INJECTION, SOLUTION EPIDURAL; INFILTRATION; INTRACAUDAL; PERINEURAL at 15:10

## 2024-02-26 RX ADMIN — PANTOPRAZOLE SODIUM 40 MG: 40 INJECTION, POWDER, FOR SOLUTION INTRAVENOUS at 21:29

## 2024-02-26 RX ADMIN — LOPERAMIDE HYDROCHLORIDE 2 MG: 2 CAPSULE ORAL at 13:49

## 2024-02-26 RX ADMIN — IOHEXOL 100 ML: 350 INJECTION, SOLUTION INTRAVENOUS at 11:30

## 2024-02-26 RX ADMIN — Medication 40 MG: at 15:25

## 2024-02-26 RX ADMIN — PIPERACILLIN AND TAZOBACTAM 3.38 G: 36; 4.5 INJECTION, POWDER, FOR SOLUTION INTRAVENOUS at 09:00

## 2024-02-26 RX ADMIN — FOLIC ACID TAB 400 MCG 400 MCG: 400 TAB at 08:57

## 2024-02-26 RX ADMIN — SODIUM CHLORIDE, SODIUM LACTATE, POTASSIUM CHLORIDE, AND CALCIUM CHLORIDE: .6; .31; .03; .02 INJECTION, SOLUTION INTRAVENOUS at 15:07

## 2024-02-26 RX ADMIN — Medication 200 MCG: at 15:22

## 2024-02-26 RX ADMIN — Medication 300 MCG: at 15:17

## 2024-02-26 RX ADMIN — Medication 300 MCG: at 15:12

## 2024-02-26 RX ADMIN — PIPERACILLIN AND TAZOBACTAM 3.38 G: 36; 4.5 INJECTION, POWDER, FOR SOLUTION INTRAVENOUS at 22:53

## 2024-02-26 NOTE — H&P
Procedure(s):    Esophagogastroduodenuoscopy with the indication(s) of suspected GI bleeding    Endoscopy Pre-Procedure Assessment:  Prior to the procedure, the patient is identified.  The patient's history, medications, and allergies have been reviewed.  The patient is competent.  The risks and benefits of the procedure procedure and the planned sedation have been discussed with the patient.  All questions have been answered and informed consent for the procedure has been obtained.    Vitals:    02/26/24 1420   BP: 130/60   Pulse: (!) 106   Resp: 20   Temp: 98.5 °F (36.9 °C)   SpO2: 90%       Physical Exam:  Physical Exam  HENT:      Nose: Nose normal.   Eyes:      Conjunctiva/sclera: Conjunctivae normal.   Cardiovascular:      Rate and Rhythm: Normal rate.   Pulmonary:      Effort: Pulmonary effort is normal.   Abdominal:      Palpations: Abdomen is soft.   Neurological:      General: No focal deficit present.      Mental Status: He is alert.   Psychiatric:         Mood and Affect: Mood normal.                Consent:    We have discussed the procedure in detail. We reviewed risks, benefits and alternative as well as protentional complications including and not limited to medication side effect , infection, bleeding, perforation and the potential need for surgery, ICU admission, CPR, as well as the need for blood product transfusion. Patient verbalized understanding and agreement. All patient questions were answered.     After reviewed the risks and benefits, the patient is deemed in satisfactory condition to undergo the procedure.  The anesthesia plan is to use monitored anesthesia care (MAC).      02/26/24

## 2024-02-26 NOTE — ASSESSMENT & PLAN NOTE
Seen in the ED on 2/20 for difficulty with urination. Found to be retaining urine and wynn cathter was placed  S/p wynn removed, urinary retention protocol

## 2024-02-26 NOTE — PLAN OF CARE

## 2024-02-26 NOTE — ASSESSMENT & PLAN NOTE
Patient states pruritic rash with vesicles started 2 months ago on right shoulder onto right back.    Currently without any active lesions with prior crusted lesions. No neuralgia but continues to have pruritus.    Given there is no active lesion with vesicles and prior crusted lesion will not need any antiviral treatment.  Acyclovir was stopped   Resolved, no further rash, no further puritis

## 2024-02-26 NOTE — ASSESSMENT & PLAN NOTE
Currently taking enalapril and lopressor outpatient  Okay to continue Lopressor  Stopped ACE-I  Avoid hypotension

## 2024-02-26 NOTE — PLAN OF CARE

## 2024-02-26 NOTE — SPEECH THERAPY NOTE
Speech Language/Pathology  Attempted to see pt for dysphagia therapy. Pt currently NPO d/t acute GI bleed. SLP will f/u as able/appropriate    Jaja Baker MS CCC-SLP  2/26/2024

## 2024-02-26 NOTE — ASSESSMENT & PLAN NOTE
History of CAD with remote history of stents x3 at [facility]   Has bovine aortic valve replacement  Continue Eliquis, statin, lisinopril, metoprolol  Holding Lisinopril and Eliquis

## 2024-02-26 NOTE — ANESTHESIA PREPROCEDURE EVALUATION
Procedure:  EGD    Relevant Problems   CARDIO   (+) Aortic stenosis   (+) Atrial fibrillation and flutter (HCC)   (+) Coronary artery disease   (+) Hyperlipemia   (+) Primary hypertension   (+) Sick sinus syndrome (HCC)      GI/HEPATIC   (+) GIB (gastrointestinal bleeding)      /RENAL   (+) Benign prostatic hyperplasia with urinary obstruction   (+) Calculus of kidney      PULMONARY   (+) Pleural effusion        Physical Exam    Airway    Mallampati score: II  TM Distance: >3 FB  Neck ROM: full     Dental   No notable dental hx     Cardiovascular  Cardiovascular exam normal    Pulmonary  Pulmonary exam normal     Other Findings    S/p AVR    Hb 9.1    Pacemaker        Left Ventricle: Moderate concentric left ventricular hypertrophy with normal LV cavity size and preserved function.  Estimated ejection fraction 65%.  No obvious segmental wall motion normality identified.  There is variability in contractility with rhythm.  Diastolic characterization was not assessed    Right Ventricle: The right ventricle appears enlarged with intact RV function.  Lead artifact seen within the right ventricle.    Left Atrium: The atrium is dilated.    Right Atrium: The atrium is dilated.    Aortic Valve: Patient is status post bioprosthetic aortic valve replacement.  Size of the prosthesis is not known at this time.  Measured transaortic velocity of 2.4 m/s corresponds to peak and mean gradients of 23 and 12 mmHg respectively.  Findings suggest appropriate leaflet excursion.  There is no clear insufficiency.    Mitral Valve: Heavy mitral annular calcification.  Measured mean transmitral inflow gradient of 6 mmHg suggest moderate mitral stenosis.  There is trace mitral vegetation.    Tricuspid Valve: Mild thickening of the tricuspid leaflets with mild insufficiency.  Measure tricuspid regurgitant velocity of 3.5 m/s corresponds to a gradient of 49 mmHg.  With addition of 10 mmHg for estimated right atrial pressure, estimated  pulmonary pressure is increased to 59 mmHg.    Pulmonic Valve: Grossly normal-appearing pulmonic leaflets with trace insufficiency.      Anesthesia Plan  ASA Score- 3     Anesthesia Type- general with ASA Monitors.         Additional Monitors:     Airway Plan: ETT.    Comment: Small bowel partial obstruction and not npo.       Plan Factors-Exercise tolerance (METS): <4 METS.    Chart reviewed. EKG reviewed. Imaging results reviewed. Existing labs reviewed. Patient summary reviewed.    Patient is not a current smoker. Patient not instructed to abstain from smoking on day of procedure. Patient did not smoke on day of surgery.    Obstructive sleep apnea risk education given perioperatively.        Induction-     Postoperative Plan-     Informed Consent- Anesthetic plan and risks discussed with patient.  I personally reviewed this patient with the CRNA. Discussed and agreed on the Anesthesia Plan with the CRNA..

## 2024-02-26 NOTE — ASSESSMENT & PLAN NOTE
Urine culture showing Candida albicans.  Likely contaminant.  Patient denies any urinary symptoms.  Currently on antibiotic for incarcerated inguinal hernia on right side and loculated pleural effusion with pneumonia  Cystitis less likely/ruled out

## 2024-02-26 NOTE — CASE MANAGEMENT
Case Management Progress Note    Patient name William Martel  Location /-01 MRN [id number]  : 1935 Date 2024       LOS (days): 5  Geometric Mean LOS (GMLOS) (days): 5.2  Days to GMLOS:0.3        OBJECTIVE:        Current admission status: Inpatient  Preferred Pharmacy:   [id number] - [place], [place] - [address]  [address] [facility] [address]  Phone: [phone] Fax: [phone]    [id number] - [place], [place] - [address] [facility]  [address] [facility] [address]  Phone: [phone] Fax: [phone]    [facility] - [facility] [address]  [address] [facility] [address]  Phone: [phone] Fax: [phone]    Primary Care Provider: Migdalia Reyez DO    [facility]  Secondary Insurance:     PROGRESS NOTE:    Patient with GIB, not stable for DC.  PT/OT notes from  support possible HH at time of dc.  Referrals sent.  CM will continue to follow for dc planning needs    DCP: [facility], referrals today

## 2024-02-26 NOTE — ANESTHESIA PREPROCEDURE EVALUATION
Procedure:  PRE-OP ONLY    Relevant Problems   CARDIO   (+) Aortic stenosis   (+) Atrial fibrillation and flutter (HCC)   (+) Coronary artery disease   (+) Hyperlipemia   (+) Primary hypertension   (+) Sick sinus syndrome (HCC)      GI/HEPATIC   (+) GIB (gastrointestinal bleeding)      /RENAL   (+) Benign prostatic hyperplasia with urinary obstruction   (+) Calculus of kidney      PULMONARY   (+) Pleural effusion        Physical Exam    Airway    Mallampati score: II  TM Distance: >3 FB  Neck ROM: full     Dental   No notable dental hx     Cardiovascular  Cardiovascular exam normal    Pulmonary  Pulmonary exam normal     Other Findings    S/p AVR    Hb 9.1    Pacemaker        Left Ventricle: Moderate concentric left ventricular hypertrophy with normal LV cavity size and preserved function.  Estimated ejection fraction 65%.  No obvious segmental wall motion normality identified.  There is variability in contractility with rhythm.  Diastolic characterization was not assessed    Right Ventricle: The right ventricle appears enlarged with intact RV function.  Lead artifact seen within the right ventricle.    Left Atrium: The atrium is dilated.    Right Atrium: The atrium is dilated.    Aortic Valve: Patient is status post bioprosthetic aortic valve replacement.  Size of the prosthesis is not known at this time.  Measured transaortic velocity of 2.4 m/s corresponds to peak and mean gradients of 23 and 12 mmHg respectively.  Findings suggest appropriate leaflet excursion.  There is no clear insufficiency.    Mitral Valve: Heavy mitral annular calcification.  Measured mean transmitral inflow gradient of 6 mmHg suggest moderate mitral stenosis.  There is trace mitral vegetation.    Tricuspid Valve: Mild thickening of the tricuspid leaflets with mild insufficiency.  Measure tricuspid regurgitant velocity of 3.5 m/s corresponds to a gradient of 49 mmHg.  With addition of 10 mmHg for estimated right atrial pressure, estimated  pulmonary pressure is increased to 59 mmHg.    Pulmonic Valve: Grossly normal-appearing pulmonic leaflets with trace insufficiency.      Anesthesia Plan  ASA Score- 3     Anesthesia Type- IV sedation with anesthesia with ASA Monitors.         Additional Monitors:     Airway Plan:            Plan Factors-Exercise tolerance (METS): <4 METS.    Chart reviewed. EKG reviewed. Imaging results reviewed. Existing labs reviewed. Patient summary reviewed.    Patient is not a current smoker. Patient not instructed to abstain from smoking on day of procedure. Patient did not smoke on day of surgery.    Obstructive sleep apnea risk education given perioperatively.        Induction-     Postoperative Plan-     Informed Consent- Anesthetic plan and risks discussed with patient.  I personally reviewed this patient with the CRNA. Discussed and agreed on the Anesthesia Plan with the CRNA..

## 2024-02-26 NOTE — ASSESSMENT & PLAN NOTE
- patient with acute anemia, melena overnight  - Hgb prior 13-14, trending down -- now at 9.1  - IV PPI BID ordered  - GI consult ordered  - High volume CT scan ordered  - NPO  - discussed with surgery above recs, notified GI on consult  - if this is not related to his incarcerated hernia, may benefit from transition to medicine as primary, we are following regardless and will discuss with surgery

## 2024-02-26 NOTE — PROGRESS NOTES
"Progress Note - General Surgery   William Martel 88 y.o. male MRN: [id number]  Unit/Bed#: -01 Encounter: [id number]    Assessment:  88 y.o. male POD5 s/p incarcerated right inguinal/scrotal hernia     -developed melena overnight. High-volume CT ordered, Eliquis held -LD last evening, GI consulted  -Hgb 9.1 (12.3, 13.8)    - Loculated right-sided pleural effusion POA.  Chest tube placed and removed 240ml reddish turbid fluid but dislodged 2/23.   - fluid analysis consistent with exudate negative culture, cytology pending   -Repeat CXR obtained 2/25 shows unchanged loculated right pleural effusion. Pt does not want chest tube replaced.  -On Zosyn with plan to convert to augmentin at d/c     - Afebrile, VSS and WNL, 94% on RA     - Speech eval completed - continue regular diet with thin liquids - suspects discomfort related to recent intubation and esophageal dysphagia  - Contact precaution due to Herpes zoster, HTN, a fib on eliquis restarted 2/23, CAD, aortic stenosis     Plan:  - GI consult   - IV abx - Zosyn, will need 2 weeks of abx - Augmentin on discharge with repeat CT chest in 4-6 weeks to check for progression per pulmonology  - Pain/nausea control PRN  - Appreciate SLIM assistance with co-morbid conditions  - PT/OT deem patient stable to return home  - Remains surgically stable but requires continued inpatient stay while resolving associated medical issues    Subjective:   Patient is slightly short of breath.  He has had multiple thick dark tarry bowel movement since yesterday.  He also states he is dizzy when he stands up to walk to the bathroom. He denies any nausea, chest pain, abdominal pain, incisional pain, or other complaints at this time.    Objective:   Blood pressure 130/60, pulse (!) 106, temperature 98.5 °F (36.9 °C), temperature source Oral, resp. rate 20, height 5' 11\" (1.803 m), weight 95.7 kg (210 lb 15.7 oz), SpO2 90%.,Body mass index is 29.43 kg/m².    Intake/Output Summary (Last 24 " hours) at 2/26/2024 1522  Last data filed at 2/26/2024 0635  Gross per 24 hour   Intake --   Output 1000 ml   Net -1000 ml     Invasive Devices       Peripheral Intravenous Line  Duration             Peripheral IV 02/25/24 Dorsal (posterior);Right Hand <1 day    Peripheral IV 02/26/24 Right;Upper;Ventral (anterior) Arm <1 day                  Physical Exam:   General: no acute distress, pt appears well and comfortable, resting in bed  Skin: warm and dry to touch  Pulmonary: normal effort at rest, stating with movement he does get short of breath but this is normal  Abdominal: Soft, nondistended, nontender abdomen, no guarding or rebound, incisional site in right groin is clean dry intact with glue, some ecchymosis surrounding the site, no erythema or drainage  Neuro: alert and oriented     Lab, Imaging and other studies:I have personally reviewed pertinent lab results.  , CBC:   Lab Results   Component Value Date    WBC 11.72 (H) 02/26/2024    HGB 9.2 (L) 02/26/2024    HCT 29.4 (L) 02/26/2024    MCV 89 02/26/2024     02/26/2024    RBC 3.31 (L) 02/26/2024    MCH 27.5 02/26/2024    MCHC 31.0 (L) 02/26/2024    RDW 15.4 (H) 02/26/2024    MPV 10.6 02/26/2024    NRBC 0 02/26/2024   , CMP:   Lab Results   Component Value Date    SODIUM 140 02/26/2024    K 4.4 02/26/2024     02/26/2024    CO2 29 02/26/2024    BUN 49 (H) 02/26/2024    CREATININE 1.03 02/26/2024    CALCIUM 8.1 (L) 02/26/2024    EGFR 65 02/26/2024     VTE Pharmacologic Prophylaxis: Eliquis  VTE Mechanical Prophylaxis: sequential compression device    Bety Rincon PA-C  2/26/2024

## 2024-02-26 NOTE — PROGRESS NOTES
[facility]  Progress Note  Name: William Martel I  MRN: [id number]  Unit/Bed#: -[facility] I Date of Admission: 2/21/2024   Date of Service: 2/26/2024 I Hospital Day: 5    Assessment/Plan   GIB (gastrointestinal bleeding)  Assessment & Plan  - patient with acute anemia, melena overnight  - Hgb prior 13-14, trending down -- now at 9.1  - IV PPI BID ordered  - GI consult ordered  - High volume CT scan ordered  - NPO  - discussed with surgery above recs, notified GI on consult  - if this is not related to his incarcerated hernia, may benefit from transition to medicine as primary, we are following regardless and will discuss with surgery      * Incarcerated inguinal hernia with SBO  Assessment & Plan  Patient admitted to surgical services, s/p Right - repair Incarcerated hernia inguinal with mesh on 2/21  Further management and diet advancement per surgery    Pleural effusion  Assessment & Plan  Presented as sepsis, now resolved. Likely due to right sided pneumonia and loculated effusion as well as incarcerated inguinal hernia (see under hernia)  Has been doing well after surgery.  Pulmonary was following s/p IR rt chest tube placement with accidental removal on 2/23/2024.  Patient refusing further chest tube placement after multiple education.    Still has persistent leukocytosis although improving.    Blood culture and body fluid culture without any growth.   Currently on IV Zosyn, Can switch to Augmentin as per pulmonary recommendation on discharge  Pulmonology following, repeat CXR remain with loculated effusion and he remains dyspneic (now complicated by anemia and melena/GIB)  Repeat CT chest in 4 to 6 weeks to monitor progression will be needed on discharge    Urinary retention  Assessment & Plan  Seen in the ED on 2/20 for difficulty with urination. Found to be retaining urine and wynn cathter was placed  S/p wynn removed, urinary retention protocol    Atrial fibrillation and  flutter (HCC)  Assessment & Plan  S/p pacemaker placement in 2021.  Continue metoprolol.  Hold Eliquis    Coronary artery disease  Assessment & Plan  History of CAD with remote history of stents x3 at [facility] [place]   Has bovine aortic valve replacement  Continue Eliquis, statin, lisinopril, metoprolol  Holding Lisinopril and Eliquis    Primary hypertension  Assessment & Plan  Currently taking enalapril and lopressor outpatient  Okay to continue Lopressor  Stopped ACE-I  Avoid hypotension    Herpes zoster without complication-resolved as of 2/26/2024  Assessment & Plan  Patient states pruritic rash with vesicles started 2 months ago on right shoulder onto right back.    Currently without any active lesions with prior crusted lesions. No neuralgia but continues to have pruritus.    Given there is no active lesion with vesicles and prior crusted lesion will not need any antiviral treatment.  Acyclovir was stopped   Resolved, no further rash, no further puritis    Acute cystitis with hematuria-resolved as of 2/26/2024  Assessment & Plan  Urine culture showing Candida albicans.  Likely contaminant.  Patient denies any urinary symptoms.  Currently on antibiotic for incarcerated inguinal hernia on right side and loculated pleural effusion with pneumonia  Cystitis less likely/ruled out               VTE Pharmacologic Prophylaxis: VTE Score: 8 High Risk (Score >/= 5) - Pharmacological DVT Prophylaxis Contraindicated. Sequential Compression Devices Ordered.    Mobility:   Basic Mobility Inpatient Raw Score: 18  JH-HLM Goal: 6: Walk 10 steps or more  JH-HLM Achieved: 6: Walk 10 steps or more  HLM Goal achieved. Continue to encourage appropriate mobility.    Patient Centered Rounds: I performed bedside rounds with nursing staff today.   Discussions with Specialists or Other Care Team Provider: none    Education and Discussions with Family / Patient: Updated  (wife) via phone.    Total Time Spent on Date of Encounter in care  of patient: 36 mins. This time was spent on one or more of the following: performing physical exam; counseling and coordination of care; obtaining or reviewing history; documenting in the medical record; reviewing/ordering tests, medications or procedures; communicating with other healthcare professionals and discussing with patient's family/caregivers.    Current Length of Stay: 5 day(s)  Current Patient Status: Inpatient   Certification Statement: The patient will continue to require additional inpatient hospital stay due to pleural effusion, hernia, and now GIB  Discharge Plan: Anticipate discharge in 48-72 hrs to discharge location to be determined pending rehab evaluations.    Code Status: Level 1 - Full Code    Subjective:   Patient is feeling fatigued and SoB, no issues with diet earlier tolerating well, also reporting dark stools overnight. , denies lightheaded or dizzy    Objective:     Vitals:   Temp (24hrs), Av.1 °F (36.7 °C), Min:97.9 °F (36.6 °C), Max:98.4 °F (36.9 °C)    Temp:  [97.9 °F (36.6 °C)-98.4 °F (36.9 °C)] 97.9 °F (36.6 °C)  HR:  [] 90  Resp:  [18-23] 18  BP: (105-132)/(42-64) 108/56  SpO2:  [93 %-98 %] 94 %  Body mass index is 29.43 kg/m².     Input and Output Summary (last 24 hours):     Intake/Output Summary (Last 24 hours) at 2024 1027  Last data filed at 2024 0635  Gross per 24 hour   Intake --   Output 1125 ml   Net -1125 ml       Physical Exam:   Physical Exam  Vitals and nursing note reviewed.   Constitutional:       General: He is not in acute distress.     Appearance: He is well-developed.   HENT:      Head: Normocephalic and atraumatic.   Eyes:      Conjunctiva/sclera: Conjunctivae normal.   Cardiovascular:      Rate and Rhythm: Normal rate.      Heart sounds: No murmur heard.  Pulmonary:      Effort: Pulmonary effort is normal.      Comments: Mildly reduced bilaterally, with no reduced breath sounds in the right lower lobe, do not appreciate any wheezing.  Did  appear slightly dyspneic on my exam  Abdominal:      General: There is no distension.      Palpations: Abdomen is soft.      Tenderness: There is no abdominal tenderness.   Musculoskeletal:         General: No swelling.      Cervical back: Neck supple.   Skin:     General: Skin is warm and dry.      Capillary Refill: Capillary refill takes less than 2 seconds.   Neurological:      Mental Status: He is alert and oriented to person, place, and time.   Psychiatric:         Mood and Affect: Mood normal.          Additional Data:     Labs:  Results from last 7 days   Lab Units 02/26/24  0539   WBC Thousand/uL 11.72*   HEMOGLOBIN g/dL 9.1*   HEMATOCRIT % 29.4*   PLATELETS Thousands/uL 206   NEUTROS PCT % 73   LYMPHS PCT % 16   MONOS PCT % 9   EOS PCT % 1     Results from last 7 days   Lab Units 02/26/24  0539 02/23/24  0439   SODIUM mmol/L 140 137   POTASSIUM mmol/L 4.4 4.1   CHLORIDE mmol/L 107 101   CO2 mmol/L 29 29   BUN mg/dL 49* 24   CREATININE mg/dL 1.03 1.28   ANION GAP mmol/L 4 7   CALCIUM mg/dL 8.1* 8.8   ALBUMIN g/dL  --  3.5   TOTAL BILIRUBIN mg/dL  --  0.98   ALK PHOS U/L  --  75   ALT U/L  --  6*   AST U/L  --  11*   GLUCOSE RANDOM mg/dL 138 163*     Results from last 7 days   Lab Units 02/21/24  1558   INR  1.17     Results from last 7 days   Lab Units 02/21/24  2026   POC GLUCOSE mg/dl 163*         Results from last 7 days   Lab Units 02/26/24  0539 02/23/24  0439 02/22/24  0452 02/21/24  1339 02/21/24  1046 02/21/24  1016   LACTIC ACID mmol/L  --   --   --  2.0 2.8*  --    PROCALCITONIN ng/ml 0.26* 0.14 0.16  --   --  0.10       Lines/Drains:  Invasive Devices       Peripheral Intravenous Line  Duration             Peripheral IV 02/25/24 Dorsal (posterior);Right Hand <1 day                          Imaging: No pertinent imaging reviewed.    Recent Cultures (last 7 days):   Results from last 7 days   Lab Units 02/22/24  1405 02/21/24  1107 02/21/24  0012   BLOOD CULTURE   --  No Growth After 4 Days.  No  Growth After 4 Days.  --    GRAM STAIN RESULT  No Polys or Bacteria seen  --   --    URINE CULTURE   --  60,000-69,000 cfu/ml Candida albicans* >100,000 cfu/ml Candida albicans*   BODY FLUID CULTURE, STERILE  No growth  --   --        Last 24 Hours Medication List:   Current Facility-Administered Medications   Medication Dose Route Frequency Provider Last Rate    atorvastatin  40 mg Oral Daily Josselin Boucher PA-C      folic acid  400 mcg Oral Daily Josselin Boucher PA-C      hydrALAZINE  5 mg Intravenous Q6H PRN Josselin Boucher PA-C      hydrOXYzine HCL  25 mg Oral Q6H PRN Mel S Neri, CRNP      melatonin  3 mg Oral HS PRN Mel S Neri, CRNP      metoprolol tartrate  50 mg Oral BID Josselin Boucher PA-C      naloxone  0.04 mg Intravenous Q1MIN PRN Josselin Boucher PA-C      oxyCODONE-acetaminophen  1 tablet Oral Q4H PRN Christopher Hendricks MD      pantoprazole  40 mg Intravenous Q12H [facility] Bertram Jensen DO      piperacillin-tazobactam  3.375 g Intravenous Q6H Celine Vega MD 3.375 g (02/26/24 0900)    senna  1 tablet Oral HS Juliana Goodman MD      tamsulosin  0.4 mg Oral Daily With Dinner Josselin Boucher PA-C          Today, Patient Was Seen By: Bertram Jensen DO    **Please Note: This note may have been constructed using a voice recognition system.**

## 2024-02-26 NOTE — ASSESSMENT & PLAN NOTE
Presented as sepsis, now resolved. Likely due to right sided pneumonia and loculated effusion as well as incarcerated inguinal hernia (see under hernia)  Has been doing well after surgery.  Pulmonary was following s/p IR rt chest tube placement with accidental removal on 2/23/2024.  Patient refusing further chest tube placement after multiple education.    Still has persistent leukocytosis although improving.    Blood culture and body fluid culture without any growth.   Currently on IV Zosyn, Can switch to Augmentin as per pulmonary recommendation on discharge  Pulmonology following, repeat CXR remain with loculated effusion and he remains dyspneic (now complicated by anemia and melena/GIB)  Repeat CT chest in 4 to 6 weeks to monitor progression will be needed on discharge

## 2024-02-27 LAB
ABO GROUP BLD BPU: NORMAL
ANION GAP SERPL CALCULATED.3IONS-SCNC: 5 MMOL/L
BPU ID: NORMAL
BUN SERPL-MCNC: 35 MG/DL (ref 5–25)
CALCIUM SERPL-MCNC: 7.8 MG/DL (ref 8.4–10.2)
CHLORIDE SERPL-SCNC: 107 MMOL/L (ref 96–108)
CO2 SERPL-SCNC: 29 MMOL/L (ref 21–32)
CREAT SERPL-MCNC: 1.11 MG/DL (ref 0.6–1.3)
CROSSMATCH: NORMAL
ERYTHROCYTE [DISTWIDTH] IN BLOOD BY AUTOMATED COUNT: 15.2 % (ref 11.6–15.1)
GFR SERPL CREATININE-BSD FRML MDRD: 59 ML/MIN/1.73SQ M
GLUCOSE SERPL-MCNC: 122 MG/DL (ref 65–140)
HCT VFR BLD AUTO: 28.2 % (ref 36.5–49.3)
HCT VFR BLD AUTO: 29.8 % (ref 36.5–49.3)
HCT VFR BLD AUTO: 30.8 % (ref 36.5–49.3)
HGB BLD-MCNC: 9.2 G/DL (ref 12–17)
HGB BLD-MCNC: 9.6 G/DL (ref 12–17)
HGB BLD-MCNC: 9.7 G/DL (ref 12–17)
MCH RBC QN AUTO: 28.6 PG (ref 26.8–34.3)
MCHC RBC AUTO-ENTMCNC: 32.6 G/DL (ref 31.4–37.4)
MCV RBC AUTO: 88 FL (ref 82–98)
PLATELET # BLD AUTO: 213 THOUSANDS/UL (ref 149–390)
PMV BLD AUTO: 11.1 FL (ref 8.9–12.7)
POTASSIUM SERPL-SCNC: 3.6 MMOL/L (ref 3.5–5.3)
RBC # BLD AUTO: 3.22 MILLION/UL (ref 3.88–5.62)
SODIUM SERPL-SCNC: 141 MMOL/L (ref 135–147)
UNIT DISPENSE STATUS: NORMAL
UNIT PRODUCT CODE: NORMAL
UNIT PRODUCT VOLUME: 350 ML
UNIT RH: NORMAL
WBC # BLD AUTO: 12.1 THOUSAND/UL (ref 4.31–10.16)

## 2024-02-27 PROCEDURE — 80048 BASIC METABOLIC PNL TOTAL CA: CPT | Performed by: HOSPITALIST

## 2024-02-27 PROCEDURE — 88112 CYTOPATH CELL ENHANCE TECH: CPT | Performed by: STUDENT IN AN ORGANIZED HEALTH CARE EDUCATION/TRAINING PROGRAM

## 2024-02-27 PROCEDURE — 97530 THERAPEUTIC ACTIVITIES: CPT

## 2024-02-27 PROCEDURE — 85014 HEMATOCRIT: CPT | Performed by: HOSPITALIST

## 2024-02-27 PROCEDURE — 85027 COMPLETE CBC AUTOMATED: CPT | Performed by: HOSPITALIST

## 2024-02-27 PROCEDURE — 85018 HEMOGLOBIN: CPT | Performed by: SPECIALIST

## 2024-02-27 PROCEDURE — 85018 HEMOGLOBIN: CPT | Performed by: HOSPITALIST

## 2024-02-27 PROCEDURE — 92526 ORAL FUNCTION THERAPY: CPT

## 2024-02-27 PROCEDURE — C9113 INJ PANTOPRAZOLE SODIUM, VIA: HCPCS | Performed by: NURSE PRACTITIONER

## 2024-02-27 PROCEDURE — 99233 SBSQ HOSP IP/OBS HIGH 50: CPT | Performed by: HOSPITALIST

## 2024-02-27 PROCEDURE — 97116 GAIT TRAINING THERAPY: CPT

## 2024-02-27 PROCEDURE — 88305 TISSUE EXAM BY PATHOLOGIST: CPT | Performed by: STUDENT IN AN ORGANIZED HEALTH CARE EDUCATION/TRAINING PROGRAM

## 2024-02-27 PROCEDURE — 85014 HEMATOCRIT: CPT | Performed by: SPECIALIST

## 2024-02-27 RX ADMIN — PIPERACILLIN AND TAZOBACTAM 3.38 G: 36; 4.5 INJECTION, POWDER, FOR SOLUTION INTRAVENOUS at 09:45

## 2024-02-27 RX ADMIN — PANTOPRAZOLE SODIUM 40 MG: 40 INJECTION, POWDER, FOR SOLUTION INTRAVENOUS at 09:45

## 2024-02-27 RX ADMIN — SODIUM CHLORIDE 1000 ML: 0.9 INJECTION, SOLUTION INTRAVENOUS at 11:47

## 2024-02-27 RX ADMIN — ATORVASTATIN CALCIUM 40 MG: 40 TABLET, FILM COATED ORAL at 09:45

## 2024-02-27 RX ADMIN — FOLIC ACID TAB 400 MCG 400 MCG: 400 TAB at 09:45

## 2024-02-27 RX ADMIN — SUCRALFATE 1 G: 1 TABLET ORAL at 11:47

## 2024-02-27 RX ADMIN — SUCRALFATE 1 G: 1 TABLET ORAL at 21:34

## 2024-02-27 RX ADMIN — SUCRALFATE 1 G: 1 TABLET ORAL at 18:12

## 2024-02-27 RX ADMIN — SUCRALFATE 1 G: 1 TABLET ORAL at 09:45

## 2024-02-27 RX ADMIN — PIPERACILLIN AND TAZOBACTAM 3.38 G: 36; 4.5 INJECTION, POWDER, FOR SOLUTION INTRAVENOUS at 04:12

## 2024-02-27 RX ADMIN — PIPERACILLIN AND TAZOBACTAM 3.38 G: 36; 4.5 INJECTION, POWDER, FOR SOLUTION INTRAVENOUS at 18:13

## 2024-02-27 RX ADMIN — TAMSULOSIN HYDROCHLORIDE 0.4 MG: 0.4 CAPSULE ORAL at 18:12

## 2024-02-27 RX ADMIN — STANDARDIZED SENNA CONCENTRATE 8.6 MG: 8.6 TABLET ORAL at 21:34

## 2024-02-27 RX ADMIN — PIPERACILLIN AND TAZOBACTAM 3.38 G: 36; 4.5 INJECTION, POWDER, FOR SOLUTION INTRAVENOUS at 21:34

## 2024-02-27 RX ADMIN — METOPROLOL TARTRATE 50 MG: 50 TABLET, FILM COATED ORAL at 09:45

## 2024-02-27 RX ADMIN — Medication 3 MG: at 21:34

## 2024-02-27 RX ADMIN — PANTOPRAZOLE SODIUM 40 MG: 40 INJECTION, POWDER, FOR SOLUTION INTRAVENOUS at 20:02

## 2024-02-27 RX ADMIN — METOPROLOL TARTRATE 50 MG: 50 TABLET, FILM COATED ORAL at 18:12

## 2024-02-27 NOTE — ASSESSMENT & PLAN NOTE
Seen in the ED on 2/20 for difficulty with urination. Found to be retaining urine and wynn cathter was placed  Was removed, on urinary retention protocol - replaced for EGD  Will remove when more ambulatory

## 2024-02-27 NOTE — PROGRESS NOTES
Patient:    MRN:  [id number]    Marie Request ID:  [id number]    Level of care reserved:  Home Health Agency    Partner Reserved:  [facility] [place] [address] [phone]    Clinical needs requested:  skilled nursing, physical therapy    Geography searched:  [phone]    Start of Service:    Request sent:  1:24pm EST on 2/26/2024 by Petrona Munoz    Partner reserved:  3:18pm EST on 2/27/2024 by Pascale Espinosa    Choice list shared:  11:21am EST on 2/27/2024 by Pascale Espinosa

## 2024-02-27 NOTE — PLAN OF CARE
Problem: PHYSICAL THERAPY ADULT  Goal: Performs mobility at highest level of function for planned discharge setting.  See evaluation for individualized goals.  Description: Treatment/Interventions: ADL retraining, Functional transfer training, LE strengthening/ROM, Elevations, Therapeutic exercise, Endurance training, Patient/family training, Equipment eval/education, Bed mobility, Gait training, Compensatory technique education, Spoke to nursing, Spoke to case management, OT  Equipment Recommended:  (walker-pt has)       See flowsheet documentation for full assessment, interventions and recommendations.  Outcome: Progressing  Note: Prognosis: Excellent  Problem List: Decreased strength, Impaired balance, Decreased mobility, Decreased endurance, Decreased safety awareness  Assessment: Patient seen for skilled PT session 2 this date with interventions to include there act for bed mobility, transfer training, balance and endurance progression as well as gait training with RW.  Pt noted to demonstrate improved I with functional mobility as well as activity tolerance with increased ambulation distance noted.  Patient alert and cooperative t/o.  Pt tolerated session well with rec for min/level III resources to home with HHPT upon D/C.  The patient's AM-PAC Basic Mobility Inpatient Short Form Raw Score is 19. A Raw score of greater than 16 suggests the patient may benefit from discharge to home with HHPT. Please also refer to the recommendation of the Physical Therapist for safe discharge planning.  Barriers to Discharge: None  Barriers to Discharge Comments: Lives with dtg  Rehab Resource Intensity Level, PT: III (Minimum Resource Intensity)    See flowsheet documentation for full assessment.

## 2024-02-27 NOTE — PHYSICAL THERAPY NOTE
PHYSICAL THERAPY TREATMENT NOTE  NAME:  William Martel  DATE: 02/27/24    Length Of Stay: 6  Performed at least 2 patient identifiers during session: Name and Birthday    TREATMENT FLOW SHEET:    02/27/24 1154   PT Last Visit   PT Visit Date 02/27/24   Pain Assessment   Pain Assessment Tool 0-10   Restrictions/Precautions   Weight Bearing Precautions Per Order No   Other Precautions Chair Alarm;Bed Alarm;Fall Risk   General   Chart Reviewed Yes   Additional Pertinent History Pt with SBO s/p REPAIR Incarcerated HERNIA INGUINAL (Right: Groin) 2/21/24 and then s/p right chest tube placement in IR 2/22/24. Chest tube dislodged 2/23/24, pt not interested in having chest tube replaced.   Response to Previous Treatment Patient with no complaints from previous session.   Cognition   Overall Cognitive Status WFL   Arousal/Participation Alert;Cooperative   Attention Within functional limits   Orientation Level Oriented X4   Memory Within functional limits   Following Commands Follows one step commands without difficulty   Bed Mobility   Supine to Sit 5  Supervision   Additional items Increased time required;Verbal cues   Additional Comments Patient completed bed mobility @ S with increased time and Vc's for initiation/sequencing.  HOB elevated approx 30 degrees   Transfers   Sit to Stand 5  Supervision   Additional items Increased time required;Verbal cues   Stand to Sit 5  Supervision   Additional items Increased time required;Verbal cues   Stand pivot 5  Supervision   Additional items Increased time required;Verbal cues   Additional Comments Patient completed all functional transfers at S with RW for UB support, increased time and min VC's   Ambulation/Elevation   Gait pattern Decreased foot clearance;Decreased hip extension;Decreased heel strike   Gait Assistance   (Sba)   Additional items Verbal cues   Assistive Device Rolling walker   Distance Patient ambulated 95' with Rw and SBA x 1 with VC's for AD management  during turns and increased foot clearance   Balance   Static Sitting Good   Dynamic Sitting Fair +   Static Standing Fair +   Dynamic Standing Fair   Ambulatory Fair   Endurance Deficit   Endurance Deficit No   Endurance Deficit Description Pt on RA with SPO2 WNL's t/o   Activity Tolerance   Activity Tolerance Patient tolerated treatment well   Nurse Made Aware Parmjit WILCOX   Assessment   Prognosis Excellent   Problem List Decreased strength;Impaired balance;Decreased mobility;Decreased endurance;Decreased safety awareness   Barriers to Discharge None   Barriers to Discharge Comments Lives with dtg   Goals   Patient Goals To return home   PT Treatment Day 2   Plan   Treatment/Interventions ADL retraining;Functional transfer training;LE strengthening/ROM;Elevations;Therapeutic exercise;Endurance training;Equipment eval/education;Gait training;Spoke to nursing   Progress Progressing toward goals   PT Frequency 3-5x/wk   Discharge Recommendation   Rehab Resource Intensity Level, PT III (Minimum Resource Intensity)   Equipment Recommended   (Pt has a walker)   AM-PAC Basic Mobility Inpatient   Turning in Flat Bed Without Bedrails 4   Lying on Back to Sitting on Edge of Flat Bed Without Bedrails 3   Moving Bed to Chair 3   Standing Up From Chair Using Arms 3   Walk in Room 3   Climb 3-5 Stairs With Railing 3   Basic Mobility Inpatient Raw Score 19   Basic Mobility Standardized Score 42.48   Highest Level Of Mobility   JH-HLM Goal 6: Walk 10 steps or more   JH-HLM Achieved 7: Walk 25 feet or more   End of Consult   Patient Position at End of Consult Bedside chair;Bed/Chair alarm activated;All needs within reach         Patient seen for skilled PT session 2 this date with interventions to include there act for bed mobility, transfer training, balance and endurance progression as well as gait training with RW.  Pt noted to demonstrate improved I with functional mobility as well as activity tolerance with increased ambulation  distance noted.  Patient alert and cooperative t/o.  Pt tolerated session well with rec for min/level III resources to home with HHPT upon D/C.  The patient's AM-PAC Basic Mobility Inpatient Short Form Raw Score is 19. A Raw score of greater than 16 suggests the patient may benefit from discharge to home with HHPT. Please also refer to the recommendation of the Physical Therapist for safe discharge planning.         Leeann Pierre, PT, MSPT

## 2024-02-27 NOTE — ASSESSMENT & PLAN NOTE
- patient with acute anemia, melena overnight  - Hgb prior 13-14, trending down mata 8.2  - S/p 1u PRBC  - CT high volume negative  - GI consult, now s/p EGD - found ulcer with evidence recent bleeding, none active  - recommending repeat EGD Friday - did discuss and could be return as outpatient if patient clinically stable before then  - did well on CLD, will advance to surgical soft  - Carafate ACHS  - trend Hgb, currently improved and stable, repeat this evening  - continue to hold AC

## 2024-02-27 NOTE — ASSESSMENT & PLAN NOTE
Presented as sepsis, now resolved. Likely due to right sided pneumonia and loculated effusion as well as incarcerated inguinal hernia (see under hernia)  Has been doing well after surgery.  Pulmonary was following s/p IR rt chest tube placement with accidental removal on 2/23/2024.  Patient refusing further chest tube placement after multiple education.    Still has persistent leukocytosis although improving.    Blood culture and body fluid culture without any growth.   Currently on IV Zosyn, Can switch to Augmentin as per pulmonary recommendation on discharge to complete total x2 weeks  Pulmonology following, repeat CXR remain with loculated effusion and he remains dyspneic (now complicated by anemia and melena/GIB)  CT High volume for GIB did note Small, improved loculated right effusion on 2/26  Repeat CT chest in 4 to 6 weeks to monitor progression will be needed on discharge  Has been weaned to room air

## 2024-02-27 NOTE — ASSESSMENT & PLAN NOTE
History of CAD with remote history of stents x3 at [facility]   Has bovine aortic valve replacement  Continue Eliquis, statin, lisinopril, metoprolol  Holding Lisinopril and Eliquis

## 2024-02-27 NOTE — SPEECH THERAPY NOTE
Speech Language/Pathology  Speech/Language Pathology Progress Note    Patient Name: William Martel  Today's Date: 2/27/2024     Problem List  Principal Problem:    Incarcerated inguinal hernia with SBO  Active Problems:    Primary hypertension    Coronary artery disease    Atrial fibrillation and flutter (HCC)    Urinary retention    Pleural effusion    GIB (gastrointestinal bleeding)    Past Medical History  Past Medical History:   Diagnosis Date    Acute urinary retention     Coronary artery disease     H/O atrial flutter     Herpes zoster     History of aortic valve replacement with bioprosthetic valve     Hyperlipemia     Hypertension     Pacemaker     Skin cancer, basal cell     Walker as ambulation aid     Wears dentures      Past Surgical History  Past Surgical History:   Procedure Laterality Date    A-V CARDIAC PACEMAKER INSERTION      AORTIC VALVE REPLACEMENT      BACK SURGERY      CARDIAC PACEMAKER PLACEMENT Left     2021    CATARACT EXTRACTION      CORONARY ANGIOPLASTY WITH STENT PLACEMENT      stents times 3    HERNIA REPAIR Right 2/21/2024    Procedure: REPAIR Incarcerated HERNIA INGUINAL WITH MESH;  Surgeon: Christopher Hendricks MD;  Location: [facility];  Service: General    IR CHEST TUBE PLACEMENT  2/22/2024    LUMBAR FUSION N/A 02/10/2023    Procedure: T9-L3 FUSION LUMBAR/THORACIC POSTERIOR MINIMALLY INVASIVE W ROBOT T10-T12 decompressive laminectomy;  Surgeon: Min Dillon MD;  Location: [facility];  Service: Neurosurgery    LA CYSTO BLADDER W/URETERAL CATHETERIZATION Right 01/13/2023    Procedure: CYSTOSCOPY RETROGRADE PYELOGRAM WITH INSERTION STENT URETERAL;  Surgeon: Frank D'Amico, MD;  Location: [facility];  Service: Urology    LA CYSTO BLADDER W/URETERAL CATHETERIZATION Right 01/26/2023    Procedure: CYSTOSCOPY WITH RETROGRADE PYELOGRAM;  Surgeon: Frank D'Amico, MD;  Location: [facility];  Service: Urology    LA CYSTO/URETERO W/LITHOTRIPSY &INDWELL STENT INSRT Right 01/26/2023     "Procedure: CYSTOSCOPY URETEROSCOPY WITH LITHOTRIPSY HOLMIUM LASER, RETROGRADE PYELOGRAM AND INSERTION STENT URETERAL;  Surgeon: Frank D'Amico, MD;  Location:  MAIN OR;  Service: Urology         Subjective:  Pt received sitting upright in bedside recliner consuming lunch tray.  Patient demonstrating minimal throat clearing as well as episodes of coughing x2 throughout our time together.  Pt reports \"I've been coughing for awhile now but it doesn't bother me.\"  Pt also reports he does better with straws because it allows him to take his time and drink a little less at a time.    Objective:  Thin via cup:  Pt with appropriate oral reception, AP transport and swallow timing is adequate.  Pt with appropriate hyolaryngeal rise, minimal throat clear throughout.    Thin via straw:  Pt demonstrating minimal overt throat clear and cough x2 throughout trials via straw.  Pt reporting \"oh I took too much.\"  He endorses that coughing during thin liquids is \"new\" but that he is \"so excited\" to be drinking he doesn't take his time.  Education provided.    Assessment:  Pt with no known dysphagia at this time, pt demonstrates awareness of overt need to cough during thin via straw.  Decreased insight overall, ST ongoing.    Plan/Recommendations:  Clear liquids ongoing per MD     "

## 2024-02-27 NOTE — ASSESSMENT & PLAN NOTE
Patient initially admitted to surgical services, now on medicine  s/p Right - repair Incarcerated hernia inguinal with mesh on 2/21  Further management per surgery who is continuing to follow

## 2024-02-27 NOTE — CASE MANAGEMENT
Case Management Discharge Planning Note    Patient name William Martel  Location /-01 MRN [id number]  : 1935 Date 2024       Current Admission Date: 2024  Current Admission Diagnosis:Incarcerated inguinal hernia with SBO   Patient Active Problem List    Diagnosis Date Noted    GIB (gastrointestinal bleeding) 2024    Incarcerated inguinal hernia with SBO 2024    Pleural effusion 2024    Pulmonary hypertension (HCC) 2024    Benign prostatic hyperplasia with urinary obstruction 2023    COVID-19 2023    Encounter for other orthopedic aftercare 02/15/2023    Other fracture of first lumbar vertebra, subsequent encounter for fracture with routine healing 02/15/2023    Presence of urogenital implants 02/15/2023    Repeated falls 02/15/2023    Urinary retention 02/15/2023    Acute pain due to trauma 02/15/2023    Fall 2023    Closed T12 fracture (HCC) 2023    H/O heart artery stent     Moderate mitral stenosis     Ureteral stone 2023    Calculus of kidney 2023    Atrial fibrillation and flutter (HCC) 2023    Pacemaker 2022    Primary hypertension     Hyperlipemia     Aortic stenosis     S/P AVR     Carotid artery disease ([facility])     Coronary artery disease     Sick sinus syndrome ([facility]) 2021    Stenosis of right carotid artery 2021    Carotid stenosis, asymptomatic, bilateral 2019    Coronary stent patent 2019    Carotid artery occlusion 07/15/2016    Vitamin D deficiency 07/15/2016    Aortic valve disorder 2016      LOS (days): 6  Geometric Mean LOS (GMLOS) (days): 9.9  Days to GMLOS:3.9     OBJECTIVE:  Risk of Unplanned Readmission Score: 13.19         Current admission status: Inpatient   Preferred Pharmacy:   [id number] - [place] - [address]  [address] [facility] [address]  Phone: [phone] Fax: [phone]    [id number] - [place], [place] - [address] [facility]  [address]  [facility] [address]  Phone: [phone] Fax: [phone]    [facility] - [place], [place] - [facility] [address]  [address] [facility] [address]  Phone: [phone] Fax: [phone]    Primary Care Provider: Migdalia Reyez DO    [facility]  Secondary Insurance:     DISCHARGE DETAILS:    Discharge planning discussed with:: patient  Freedom of Choice: Yes  Comments - Freedom of Choice: blanket A[facility]IN referral for HHC                     Requested Home Health Care         Is the patient interested in HHC at discharge?: Yes  Home Health Discipline requested:: Occupational Therapy, Physical Therapy, Nursing  Home Health Agency Name:: [facility]  A External Referral Reason (only applicable if external HHA name selected): Services not provided in network or near patient location  Home Health Follow-Up Provider:: PCP  Home Health Services Needed:: Evaluate Functional Status and Safety, Gait/ADL Training, Strengthening/Theraputic Exercises to Improve Function, Other (comment) (Medication Management)  Homebound Criteria Met:: Uses an Assist Device (i.e. cane, walker, etc)  Supporting Clincal Findings:: Limited Endurance, Fatigues Easliy in Short Distances    DME Referral Provided  Referral made for DME?: No    Other Referral/Resources/Interventions Provided:  Interventions: HHC  Referral Comments: [facility]    Would you like to participate in our [facility] service program?  : No - Declined    Treatment Team Recommendation: Home with Home Health Care  Discharge Destination Plan:: Home with Home Health Care  Transport at Discharge : Family            CM met with patient to review [facility] Provider Choice List: [facility] or [facility].  Patient contacted daughter to assure she was OK with [facility]. Patient chose [facility].  CM made AIDIN Providers aware of patients choice.    CM to follow

## 2024-02-27 NOTE — PROGRESS NOTES
[facility]  Progress Note  Name: William Martel I  MRN: [id number]  Unit/Bed#: -01 I Date of Admission: 2/21/2024   Date of Service: 2/27/2024 I Hospital Day: 6    Assessment/Plan   GIB (gastrointestinal bleeding)  Assessment & Plan  - patient with acute anemia, melena overnight  - Hgb prior 13-14, trending down mata 8.2  - S/p 1u PRBC  - CT high volume negative  - GI consult, now s/p EGD - found ulcer with evidence recent bleeding, none active  - recommending repeat EGD Friday - did discuss and could be return as outpatient if patient clinically stable before then  - did well on CLD, will advance to surgical soft  - Carafate ACHS  - trend Hgb, currently improved and stable, repeat this evening  - continue to hold AC      * Incarcerated inguinal hernia with SBO  Assessment & Plan  Patient initially admitted to surgical services, now on medicine  s/p Right - repair Incarcerated hernia inguinal with mesh on 2/21  Further management per surgery who is continuing to follow    Pleural effusion  Assessment & Plan  Presented as sepsis, now resolved. Likely due to right sided pneumonia and loculated effusion as well as incarcerated inguinal hernia (see under hernia)  Has been doing well after surgery.  Pulmonary was following s/p IR rt chest tube placement with accidental removal on 2/23/2024.  Patient refusing further chest tube placement after multiple education.    Still has persistent leukocytosis although improving.    Blood culture and body fluid culture without any growth.   Currently on IV Zosyn, Can switch to Augmentin as per pulmonary recommendation on discharge to complete total x2 weeks  Pulmonology following, repeat CXR remain with loculated effusion and he remains dyspneic (now complicated by anemia and melena/GIB)  CT High volume for GIB did note Small, improved loculated right effusion on 2/26  Repeat CT chest in 4 to 6 weeks to monitor progression will be needed on  discharge  Has been weaned to room air    Urinary retention  Assessment & Plan  Seen in the ED on 2/20 for difficulty with urination. Found to be retaining urine and wynn cathter was placed  Was removed, on urinary retention protocol - replaced for EGD  Will remove when more ambulatory    Atrial fibrillation and flutter (HCC)  Assessment & Plan  S/p pacemaker placement in 2021.  Continue metoprolol.  Hold Eliquis    Coronary artery disease  Assessment & Plan  History of CAD with remote history of stents x3 at [facility]   Has bovine aortic valve replacement  Continue Eliquis, statin, lisinopril, metoprolol  Holding Lisinopril and Eliquis    Primary hypertension  Assessment & Plan  Currently taking enalapril and lopressor outpatient  Okay to continue Lopressor  Stopped ACE-I  Avoid hypotension               VTE Pharmacologic Prophylaxis: VTE Score: 8 High Risk (Score >/= 5) - Pharmacological DVT Prophylaxis Contraindicated. Sequential Compression Devices Ordered.    Mobility:   Basic Mobility Inpatient Raw Score: 19  JH-HLM Goal: 6: Walk 10 steps or more  JH-HLM Achieved: 7: Walk 25 feet or more  HLM Goal achieved. Continue to encourage appropriate mobility.    Patient Centered Rounds: I performed bedside rounds with nursing staff today.   Discussions with Specialists or Other Care Team Provider: surgery    Education and Discussions with Family / Patient: Updated  (daughter) via phone.    Total Time Spent on Date of Encounter in care of patient: 35 mins. This time was spent on one or more of the following: performing physical exam; counseling and coordination of care; obtaining or reviewing history; documenting in the medical record; reviewing/ordering tests, medications or procedures; communicating with other healthcare professionals and discussing with patient's family/caregivers.    Current Length of Stay: 6 day(s)  Current Patient Status: Inpatient   Certification Statement: The patient will continue to  require additional inpatient hospital stay due to GIB, hernia s/p repair  Discharge Plan: Anticipate discharge in 24-48 hrs to home with home services.    Code Status: Level 1 - Full Code    Subjective:   Patient feeling much better today, still has some black stool but frequency decreasing, feeling much more energy today, reports no dyspnea and breathing is improved. Denies abd pain    Objective:     Vitals:   Temp (24hrs), Av.1 °F (36.7 °C), Min:97.7 °F (36.5 °C), Max:98.5 °F (36.9 °C)    Temp:  [97.7 °F (36.5 °C)-98.5 °F (36.9 °C)] 98.1 °F (36.7 °C)  HR:  [] 91  Resp:  [20-39] 39  BP: ()/(47-73) 142/67  SpO2:  [75 %-100 %] 98 %  Body mass index is 29.43 kg/m².     Input and Output Summary (last 24 hours):     Intake/Output Summary (Last 24 hours) at 2024 1431  Last data filed at 2024 1326  Gross per 24 hour   Intake 2550 ml   Output 2890 ml   Net -340 ml       Physical Exam:   Physical Exam  Vitals and nursing note reviewed.   Constitutional:       General: He is not in acute distress.     Appearance: He is well-developed.   HENT:      Head: Normocephalic and atraumatic.   Eyes:      Conjunctiva/sclera: Conjunctivae normal.   Cardiovascular:      Rate and Rhythm: Normal rate.      Heart sounds: No murmur heard.  Pulmonary:      Effort: Pulmonary effort is normal.      Comments: Mildly reduced bilaterally, with more notable reduced breath sounds in the right lower lobe, do not appreciate any wheezing.   Abdominal:      General: There is no distension.      Palpations: Abdomen is soft.      Tenderness: There is no abdominal tenderness.   Musculoskeletal:         General: No swelling.      Cervical back: Neck supple.   Skin:     General: Skin is warm and dry.   Neurological:      Mental Status: He is alert and oriented to person, place, and time.   Psychiatric:         Mood and Affect: Mood normal.          Additional Data:     Labs:  Results from last 7 days   Lab Units 24  0604  02/26/24  1205 02/26/24  0539   WBC Thousand/uL 12.10*  --  11.72*   HEMOGLOBIN g/dL 9.2*   < > 9.1*   HEMATOCRIT % 28.2*   < > 29.4*   PLATELETS Thousands/uL 213  --  206   NEUTROS PCT %  --   --  73   LYMPHS PCT %  --   --  16   MONOS PCT %  --   --  9   EOS PCT %  --   --  1    < > = values in this interval not displayed.     Results from last 7 days   Lab Units 02/27/24  0604 02/26/24  0539 02/23/24  0439   SODIUM mmol/L 141   < > 137   POTASSIUM mmol/L 3.6   < > 4.1   CHLORIDE mmol/L 107   < > 101   CO2 mmol/L 29   < > 29   BUN mg/dL 35*   < > 24   CREATININE mg/dL 1.11   < > 1.28   ANION GAP mmol/L 5   < > 7   CALCIUM mg/dL 7.8*   < > 8.8   ALBUMIN g/dL  --   --  3.5   TOTAL BILIRUBIN mg/dL  --   --  0.98   ALK PHOS U/L  --   --  75   ALT U/L  --   --  6*   AST U/L  --   --  11*   GLUCOSE RANDOM mg/dL 122   < > 163*    < > = values in this interval not displayed.     Results from last 7 days   Lab Units 02/21/24  1558   INR  1.17     Results from last 7 days   Lab Units 02/21/24  2026   POC GLUCOSE mg/dl 163*         Results from last 7 days   Lab Units 02/26/24  0539 02/23/24  0439 02/22/24  0452 02/21/24  1339 02/21/24  1046 02/21/24  1016   LACTIC ACID mmol/L  --   --   --  2.0 2.8*  --    PROCALCITONIN ng/ml 0.26* 0.14 0.16  --   --  0.10       Lines/Drains:  Invasive Devices       Peripheral Intravenous Line  Duration             Peripheral IV 02/25/24 Dorsal (posterior);Right Hand 1 day    Peripheral IV 02/26/24 Right;Upper;Ventral (anterior) Arm 1 day              Drain  Duration             Urethral Catheter 18 Fr. <1 day                  Urinary Catheter:  Goal for removal: Voiding trial when ambulation improves               Imaging: No pertinent imaging reviewed.    Recent Cultures (last 7 days):   Results from last 7 days   Lab Units 02/22/24  1405 02/21/24  1107 02/21/24  0012   BLOOD CULTURE   --  No Growth After 5 Days.  No Growth After 5 Days.  --    GRAM STAIN RESULT  No Polys or Bacteria  seen  --   --    URINE CULTURE   --  60,000-69,000 cfu/ml Candida albicans* >100,000 cfu/ml Candida albicans*   BODY FLUID CULTURE, STERILE  No growth  --   --        Last 24 Hours Medication List:   Current Facility-Administered Medications   Medication Dose Route Frequency Provider Last Rate    atorvastatin  40 mg Oral Daily Mel S Neri, CRNP      folic acid  400 mcg Oral Daily Mel S Neri, CRNP      hydrALAZINE  5 mg Intravenous Q6H PRN Mel S Neri, CRNP      hydrOXYzine HCL  25 mg Oral Q6H PRN Mel S Neri, CRNP      loperamide  2 mg Oral 4x Daily PRN Mle S Neri, CRNP      melatonin  3 mg Oral HS PRN Mel S Neri, CRNP      metoprolol tartrate  50 mg Oral BID Mel S Neri, CRNP      naloxone  0.04 mg Intravenous Q1MIN PRN Mel S Neri, CRNP      oxyCODONE-acetaminophen  1 tablet Oral Q4H PRN Mel S Neri, CRNP      pantoprazole  40 mg Intravenous Q12H RONAK Mel S Neri, CRNP      piperacillin-tazobactam  3.375 g Intravenous Q6H Mel S Neri, CRNP Stopped (02/27/24 1015)    senna  1 tablet Oral HS Mel S Neri, CRNP      simethicone (MYLICON) 40 mg in sterile water 300 mL   Irrigation PRN Brooke Ríos MD      sucralfate  1 g Oral 4x Daily (AC & HS) Mel S Neri, CRNP      tamsulosin  0.4 mg Oral Daily With Dinner Mel S Neri, CRNP          Today, Patient Was Seen By: Bertram Jensen DO    **Please Note: This note may have been constructed using a voice recognition system.**

## 2024-02-27 NOTE — PROGRESS NOTES
Progress Note - General Surgery   William Martel 88 y.o. male MRN: [id number]  Unit/Bed#: -01 Encounter: [id number]    Assessment:  88 y.o. male POD6 s/p incarcerated right inguinal/scrotal hernia     -developed melena s/p EGD 02/26/2024 with grade D esophagitis and bezoar with erosion in stomach with bleeding noted that stopped on its own-plan for repeat EGD Friday per GI-eliquis on hold    -Hgb 9.2 (9.6, 8.2, 9.2, 9.1, 12.3, 13.8)     - Loculated right-sided pleural effusion POA.  Chest tube placed and removed 240ml reddish turbid fluid but dislodged 2/23.   - fluid analysis consistent with exudate negative culture, cytology pending   -Repeat CXR obtained 2/25 shows unchanged loculated right pleural effusion. Pt does not want chest tube replaced.  -On Zosyn with plan to convert to augmentin at d/c     - Afebrile, resolving tachycardia, stable BP, 97% on 2L NC      - Speech eval completed - continue regular diet with thin liquids - suspects discomfort related to recent intubation and esophageal dysphagia    - Contact precaution due to Herpes zoster, HTN, a fib on eliquis, CAD, aortic stenosis     Plan:  -cont care per primary service  -transfuse PRN  - IV abx - Zosyn, will need 2 weeks of abx - Augmentin on discharge with repeat CT chest in 4-6 weeks to check for progression per pulmonology  - Pain/nausea control PRN  -appreciate GI input  -cont PPI BID, carafate and hold eliquis per GI recs  -adv to clears and then advance as tolerated per GI recs  - PT/OT deem patient stable to return home  - Remains surgically stable but requires continued inpatient stay while resolving associated medical issues    -discussed with primary service       Subjective/Objective     Subjective: He is feeling well today. Denies any further bleeding. He is asking for something to eat. Denies any abd pain.    Objective: Afebrile, resolving tachycardia, stable BP, 97% on 2L NC    Blood pressure 114/54, pulse 100, temperature 98.3  "°F (36.8 °C), temperature source Oral, resp. rate (!) 23, height 5' 11\" (1.803 m), weight 95.7 kg (210 lb 15.7 oz), SpO2 97%.,Body mass index is 29.43 kg/m².      Intake/Output Summary (Last 24 hours) at 2/27/2024 0805  Last data filed at 2/27/2024 0606  Gross per 24 hour   Intake 1210 ml   Output 2500 ml   Net -1290 ml       Invasive Devices       Peripheral Intravenous Line  Duration             Peripheral IV 02/25/24 Dorsal (posterior);Right Hand 1 day    Peripheral IV 02/26/24 Right;Upper;Ventral (anterior) Arm <1 day              Drain  Duration             Urethral Catheter 18 Fr. <1 day                    Physical Exam: General appearance: alert and oriented, in no acute distress  Abdomen: soft, non tender, non distended   Skin: incision well approximated   Neurologic: Grossly normal    Lab, Imaging and other studies:I have personally reviewed pertinent lab results.  , CBC:   Lab Results   Component Value Date    WBC 12.10 (H) 02/27/2024    HGB 9.2 (L) 02/27/2024    HCT 28.2 (L) 02/27/2024    MCV 88 02/27/2024     02/27/2024    RBC 3.22 (L) 02/27/2024    MCH 28.6 02/27/2024    MCHC 32.6 02/27/2024    RDW 15.2 (H) 02/27/2024    MPV 11.1 02/27/2024   , CMP:   Lab Results   Component Value Date    SODIUM 141 02/27/2024    K 3.6 02/27/2024     02/27/2024    CO2 29 02/27/2024    BUN 35 (H) 02/27/2024    CREATININE 1.11 02/27/2024    CALCIUM 7.8 (L) 02/27/2024    EGFR 59 02/27/2024     VTE Pharmacologic Prophylaxis: Sequential compression device (Venodyne)   VTE Mechanical Prophylaxis: sequential compression device  "

## 2024-02-28 LAB
ANION GAP SERPL CALCULATED.3IONS-SCNC: 5 MMOL/L
BUN SERPL-MCNC: 23 MG/DL (ref 5–25)
CALCIUM SERPL-MCNC: 7.4 MG/DL (ref 8.4–10.2)
CHLORIDE SERPL-SCNC: 107 MMOL/L (ref 96–108)
CO2 SERPL-SCNC: 26 MMOL/L (ref 21–32)
CREAT SERPL-MCNC: 0.98 MG/DL (ref 0.6–1.3)
ERYTHROCYTE [DISTWIDTH] IN BLOOD BY AUTOMATED COUNT: 15.4 % (ref 11.6–15.1)
GFR SERPL CREATININE-BSD FRML MDRD: 69 ML/MIN/1.73SQ M
GLUCOSE SERPL-MCNC: 132 MG/DL (ref 65–140)
HCT VFR BLD AUTO: 29.2 % (ref 36.5–49.3)
HGB BLD-MCNC: 9.5 G/DL (ref 12–17)
MCH RBC QN AUTO: 29 PG (ref 26.8–34.3)
MCHC RBC AUTO-ENTMCNC: 32.5 G/DL (ref 31.4–37.4)
MCV RBC AUTO: 89 FL (ref 82–98)
PLATELET # BLD AUTO: 212 THOUSANDS/UL (ref 149–390)
PMV BLD AUTO: 10.2 FL (ref 8.9–12.7)
POTASSIUM SERPL-SCNC: 3.5 MMOL/L (ref 3.5–5.3)
RBC # BLD AUTO: 3.28 MILLION/UL (ref 3.88–5.62)
SODIUM SERPL-SCNC: 138 MMOL/L (ref 135–147)
WBC # BLD AUTO: 11.26 THOUSAND/UL (ref 4.31–10.16)

## 2024-02-28 PROCEDURE — 30233N1 TRANSFUSION OF NONAUTOLOGOUS RED BLOOD CELLS INTO PERIPHERAL VEIN, PERCUTANEOUS APPROACH: ICD-10-PCS | Performed by: HOSPITALIST

## 2024-02-28 PROCEDURE — 99233 SBSQ HOSP IP/OBS HIGH 50: CPT | Performed by: HOSPITALIST

## 2024-02-28 PROCEDURE — 92526 ORAL FUNCTION THERAPY: CPT

## 2024-02-28 PROCEDURE — C9113 INJ PANTOPRAZOLE SODIUM, VIA: HCPCS | Performed by: NURSE PRACTITIONER

## 2024-02-28 PROCEDURE — 80048 BASIC METABOLIC PNL TOTAL CA: CPT | Performed by: HOSPITALIST

## 2024-02-28 PROCEDURE — 85027 COMPLETE CBC AUTOMATED: CPT | Performed by: HOSPITALIST

## 2024-02-28 RX ADMIN — Medication 3 MG: at 20:47

## 2024-02-28 RX ADMIN — PANTOPRAZOLE SODIUM 40 MG: 40 INJECTION, POWDER, FOR SOLUTION INTRAVENOUS at 20:41

## 2024-02-28 RX ADMIN — PIPERACILLIN AND TAZOBACTAM 3.38 G: 36; 4.5 INJECTION, POWDER, FOR SOLUTION INTRAVENOUS at 15:38

## 2024-02-28 RX ADMIN — PIPERACILLIN AND TAZOBACTAM 3.38 G: 36; 4.5 INJECTION, POWDER, FOR SOLUTION INTRAVENOUS at 20:41

## 2024-02-28 RX ADMIN — PANTOPRAZOLE SODIUM 40 MG: 40 INJECTION, POWDER, FOR SOLUTION INTRAVENOUS at 10:16

## 2024-02-28 RX ADMIN — ATORVASTATIN CALCIUM 40 MG: 40 TABLET, FILM COATED ORAL at 10:16

## 2024-02-28 RX ADMIN — SUCRALFATE 1 G: 1 TABLET ORAL at 10:30

## 2024-02-28 RX ADMIN — PIPERACILLIN AND TAZOBACTAM 3.38 G: 36; 4.5 INJECTION, POWDER, FOR SOLUTION INTRAVENOUS at 03:10

## 2024-02-28 RX ADMIN — METOPROLOL TARTRATE 50 MG: 50 TABLET, FILM COATED ORAL at 10:16

## 2024-02-28 RX ADMIN — TAMSULOSIN HYDROCHLORIDE 0.4 MG: 0.4 CAPSULE ORAL at 15:38

## 2024-02-28 RX ADMIN — SUCRALFATE 1 G: 1 TABLET ORAL at 06:23

## 2024-02-28 RX ADMIN — METOPROLOL TARTRATE 50 MG: 50 TABLET, FILM COATED ORAL at 17:46

## 2024-02-28 RX ADMIN — OXYCODONE HYDROCHLORIDE AND ACETAMINOPHEN 1 TABLET: 5; 325 TABLET ORAL at 20:47

## 2024-02-28 RX ADMIN — PIPERACILLIN AND TAZOBACTAM 3.38 G: 36; 4.5 INJECTION, POWDER, FOR SOLUTION INTRAVENOUS at 10:16

## 2024-02-28 RX ADMIN — SUCRALFATE 1 G: 1 TABLET ORAL at 21:00

## 2024-02-28 RX ADMIN — FOLIC ACID TAB 400 MCG 400 MCG: 400 TAB at 10:15

## 2024-02-28 RX ADMIN — SUCRALFATE 1 G: 1 TABLET ORAL at 15:38

## 2024-02-28 RX ADMIN — OXYCODONE HYDROCHLORIDE AND ACETAMINOPHEN 1 TABLET: 5; 325 TABLET ORAL at 03:12

## 2024-02-28 NOTE — PROGRESS NOTES
[facility]  Progress Note  Name: William Martel I  MRN: [id number]  Unit/Bed#: -01 I Date of Admission: 2/21/2024   Date of Service: 2/28/2024 I Hospital Day: 7    Assessment/Plan   GIB (gastrointestinal bleeding)  Assessment & Plan  - patient with acute anemia, melena overnight  - Hgb prior 13-14, trending down mata 8.2  - S/p 1u PRBC  - CT high volume negative  - GI consult, now s/p EGD - found ulcer with evidence recent bleeding, none active  - recommending repeat EGD Friday 3/1  - did well on CLD, will advance to surgical soft  - Carafate ACHS  - Hgb stable  - NPO at midnight on 2/29 for EGD 3/1      * Incarcerated inguinal hernia with SBO  Assessment & Plan  Patient initially admitted to surgical services, now on medicine  s/p Right - repair Incarcerated hernia inguinal with mesh on 2/21  Further management per surgery who is continuing to follow. He is surgically stable for discharge from their standpoint  Follow-up in general surgery clinic in 1 week for postoperative visit     Pleural effusion  Assessment & Plan  Presented as sepsis, now resolved. Likely due to right sided pneumonia and loculated effusion as well as incarcerated inguinal hernia (see under hernia)  Has been doing well after surgery.  Pulmonary was following s/p IR rt chest tube placement with accidental removal on 2/23/2024.  Patient refusing further chest tube placement after multiple education.    Still has persistent leukocytosis although improving.    Blood culture and body fluid culture without any growth.   Currently on IV Zosyn, Can switch to Augmentin as per pulmonary recommendation on discharge to complete total x2 weeks  Pulmonology following, repeat CXR remain with loculated effusion and he remains dyspneic (now complicated by anemia and melena/GIB)  CT High volume for GIB did note Small, improved loculated right effusion on 2/26  Repeat CT chest in 4 to 6 weeks to monitor progression will be  needed on discharge  Has been weaned to room air    Urinary retention  Assessment & Plan  Seen in the ED on 2/20 for difficulty with urination. Found to be retaining urine and wynn cathter was placed  S/p removed  Urinary retention protocol    Atrial fibrillation and flutter (HCC)  Assessment & Plan  S/p pacemaker placement in 2021.  Continue metoprolol.  Hold Eliquis    Coronary artery disease  Assessment & Plan  History of CAD with remote history of stents x3 at [facility]   Has bovine aortic valve replacement  Continue Eliquis, statin, lisinopril, metoprolol  Holding Lisinopril and Eliquis    Primary hypertension  Assessment & Plan  Currently taking enalapril and lopressor outpatient  Okay to continue Lopressor  Stopped ACE-I  Avoid hypotension               VTE Pharmacologic Prophylaxis: VTE Score: 8 High Risk (Score >/= 5) - Pharmacological DVT Prophylaxis Contraindicated. Sequential Compression Devices Ordered.    Mobility:   Basic Mobility Inpatient Raw Score: 20  JH-HLM Goal: 6: Walk 10 steps or more  JH-HLM Achieved: 7: Walk 25 feet or more  HLM Goal achieved. Continue to encourage appropriate mobility.    Patient Centered Rounds: I performed bedside rounds with nursing staff today.   Discussions with Specialists or Other Care Team Provider: GI    Education and Discussions with Family / Patient: Updated  (daughter) via phone.    Total Time Spent on Date of Encounter in care of patient: 46 mins. This time was spent on one or more of the following: performing physical exam; counseling and coordination of care; obtaining or reviewing history; documenting in the medical record; reviewing/ordering tests, medications or procedures; communicating with other healthcare professionals and discussing with patient's family/caregivers.    Current Length of Stay: 7 day(s)  Current Patient Status: Inpatient   Certification Statement: The patient will continue to require additional inpatient hospital stay due to  gib  Discharge Plan: Anticipate discharge in 48 hrs to home.    Code Status: Level 1 - Full Code    Subjective:   Patient feels clinically improved, no abd pain    Objective:     Vitals:   Temp (24hrs), Av °F (36.7 °C), Min:97.3 °F (36.3 °C), Max:98.3 °F (36.8 °C)    Temp:  [97.3 °F (36.3 °C)-98.3 °F (36.8 °C)] 98.1 °F (36.7 °C)  HR:  [68-96] 68  Resp:  [18-34] 20  BP: (116-147)/(56-68) 136/61  SpO2:  [96 %-98 %] 96 %  Body mass index is 29.43 kg/m².     Input and Output Summary (last 24 hours):     Intake/Output Summary (Last 24 hours) at 2024 1744  Last data filed at 2024 1500  Gross per 24 hour   Intake 940 ml   Output 1180 ml   Net -240 ml       Physical Exam:   Physical Exam  Vitals and nursing note reviewed.   Constitutional:       General: He is not in acute distress.     Appearance: He is well-developed.   HENT:      Head: Normocephalic and atraumatic.   Eyes:      Conjunctiva/sclera: Conjunctivae normal.   Cardiovascular:      Rate and Rhythm: Normal rate.      Heart sounds: No murmur heard.  Pulmonary:      Effort: Pulmonary effort is normal.      Comments: Mildly reduced bilaterally, with more notable reduced breath sounds in the right lower lobe, do not appreciate any wheezing.   Abdominal:      General: There is no distension.      Palpations: Abdomen is soft.      Tenderness: There is no abdominal tenderness.   Musculoskeletal:         General: No swelling.      Cervical back: Neck supple.   Skin:     General: Skin is warm and dry.   Neurological:      Mental Status: He is alert and oriented to person, place, and time.   Psychiatric:         Mood and Affect: Mood normal.          Additional Data:     Labs:  Results from last 7 days   Lab Units 24  0523 24  1205 24  0539   WBC Thousand/uL 11.26*   < > 11.72*   HEMOGLOBIN g/dL 9.5*   < > 9.1*   HEMATOCRIT % 29.2*   < > 29.4*   PLATELETS Thousands/uL 212   < > 206   NEUTROS PCT %  --   --  73   LYMPHS PCT %  --   --  16    MONOS PCT %  --   --  9   EOS PCT %  --   --  1    < > = values in this interval not displayed.     Results from last 7 days   Lab Units 02/28/24  0523 02/26/24  0539 02/23/24  0439   SODIUM mmol/L 138   < > 137   POTASSIUM mmol/L 3.5   < > 4.1   CHLORIDE mmol/L 107   < > 101   CO2 mmol/L 26   < > 29   BUN mg/dL 23   < > 24   CREATININE mg/dL 0.98   < > 1.28   ANION GAP mmol/L 5   < > 7   CALCIUM mg/dL 7.4*   < > 8.8   ALBUMIN g/dL  --   --  3.5   TOTAL BILIRUBIN mg/dL  --   --  0.98   ALK PHOS U/L  --   --  75   ALT U/L  --   --  6*   AST U/L  --   --  11*   GLUCOSE RANDOM mg/dL 132   < > 163*    < > = values in this interval not displayed.         Results from last 7 days   Lab Units 02/21/24  2026   POC GLUCOSE mg/dl 163*         Results from last 7 days   Lab Units 02/26/24  0539 02/23/24  0439 02/22/24  0452   PROCALCITONIN ng/ml 0.26* 0.14 0.16       Lines/Drains:  Invasive Devices       Peripheral Intravenous Line  Duration             Peripheral IV 02/28/24 Distal;Right;Ventral (anterior) Forearm <1 day                          Imaging: No pertinent imaging reviewed.    Recent Cultures (last 7 days):   Results from last 7 days   Lab Units 02/22/24  1405   GRAM STAIN RESULT  No Polys or Bacteria seen   BODY FLUID CULTURE, STERILE  No growth       Last 24 Hours Medication List:   Current Facility-Administered Medications   Medication Dose Route Frequency Provider Last Rate    atorvastatin  40 mg Oral Daily Mel S Neri, CRNP      folic acid  400 mcg Oral Daily Mel S Neri, CRNP      hydrALAZINE  5 mg Intravenous Q6H PRN Mel S Neri, CRNP      hydrOXYzine HCL  25 mg Oral Q6H PRN Mel S Neri, CRNP      loperamide  2 mg Oral 4x Daily PRN Mel S Neri, CRNP      melatonin  3 mg Oral HS PRN Mel S Neri, CRNP      metoprolol tartrate  50 mg Oral BID Mel S Neri, CRNP      naloxone  0.04 mg Intravenous Q1MIN PRN Mel S Neri, CRNP      oxyCODONE-acetaminophen  1 tablet Oral Q4H PRN Mel He, MICHAEL       pantoprazole  40 mg Intravenous Q12H [facility] Mel S Neri, CRNP      piperacillin-tazobactam  3.375 g Intravenous Q6H Mel S Neri, CRNP 3.375 g (02/28/24 1538)    senna  1 tablet Oral HS Mel S Neri, CRNP      simethicone (MYLICON) 40 mg in sterile water 300 mL   Irrigation PRN Brooke Ríos MD      sucralfate  1 g Oral 4x Daily (AC & HS) Mel S Neri, CRNP      tamsulosin  0.4 mg Oral Daily With Dinner Mel S Neri, CRNP          Today, Patient Was Seen By: Bertram Jensen DO    **Please Note: This note may have been constructed using a voice recognition system.**

## 2024-02-28 NOTE — PROGRESS NOTES
Progress Note - General Surgery   William Martel 88 y.o. male MRN: [id number]  Unit/Bed#: -01 Encounter: [id number]    Assessment:  88 y.o. male POD 7 s/p incarcerated right inguinal/scrotal hernia     -developed melena s/p EGD 02/26/2024 with grade D esophagitis and bezoar with erosion in stomach with bleeding noted that stopped on its own -plan for repeat EGD Friday per GI, eliquis to remain on hold    -Hgb 9.5 (9.7, 9.2, 9.6, 8.2, 9.2, 9.1, 12.3, 13.8)  -WBC 11.2 (12.1)    - Loculated right-sided pleural effusion POA.  Chest tube placed and removed 240ml reddish turbid fluid but dislodged 2/23.   - fluid analysis consistent with exudate negative culture, cytology pending   -Repeat CXR obtained 2/25 shows unchanged loculated right pleural effusion. Pt does not want chest tube replaced.  -On Zosyn with plan to convert to augmentin at d/c     - Afebrile, VSS and WNL on room air      -Tolerating surgical soft diet since yesterday, denies pain, nausea or further signs of GI bleed    - Dickens removed this a.m.    - Contact precaution due to Herpes zoster, HTN, a fib on eliquis, CAD, aortic stenosis     Plan:  -cont care per primary service.  Patient is cleared for discharge from a surgical perspective.  Following discharge, the outpatient surgery office will be reaching out to help make a follow-up appointment for 1 week from now.  - IV abx - Zosyn with Augmentin on discharge to complete 2-week course, recommended repeat CT chest in 4-6 weeks to check for progression per pulmonology  -Patient is scheduled to have repeat EGD on Friday, 3/1/2024  -cont PPI BID, carafate and hold eliquis per GI recs until after repeat EGD  -Continue soft surgical diet  - PT/OT deem patient stable to return home      Subjective/Objective     Subjective: He is feeling well today. Denies any further bleeding.  Tolerating diet.  Denies any abd or scrotal pain.    Objective:     Blood pressure 147/65, pulse 96, temperature (!) 97.3 °F  "(36.3 °C), temperature source Tympanic, resp. rate 18, height 5' 11\" (1.803 m), weight 95.7 kg (210 lb 15.7 oz), SpO2 96%.,Body mass index is 29.43 kg/m².      Intake/Output Summary (Last 24 hours) at 2/28/2024 1204  Last data filed at 2/28/2024 1016  Gross per 24 hour   Intake 1940 ml   Output 950 ml   Net 990 ml       Invasive Devices       Peripheral Intravenous Line  Duration             Peripheral IV 02/25/24 Dorsal (posterior);Right Hand 2 days                    Physical Exam:  General: VSS, NAD, alert, pleasant  Head normocephalic, atraumatic  Lungs normal respiratory effort  Heart RRR   Abd soft, no distension, NABSx4, NT, no masses or guarding.  Surgical incision in right groin with glue intact, no erythema edema or discharge.  Mild edema noted of skin of penis and scrotum.  Extremities: FAROM with good strength equal bilaterally, no edema  Neuro: A&Ox3, affect appropriate, distal sensation and muscular strength intact    Lab, Imaging and other studies:I have personally reviewed pertinent lab results.  , CBC:   Lab Results   Component Value Date    WBC 11.26 (H) 02/28/2024    HGB 9.5 (L) 02/28/2024    HCT 29.2 (L) 02/28/2024    MCV 89 02/28/2024     02/28/2024    RBC 3.28 (L) 02/28/2024    MCH 29.0 02/28/2024    MCHC 32.5 02/28/2024    RDW 15.4 (H) 02/28/2024    MPV 10.2 02/28/2024   , CMP:   Lab Results   Component Value Date    SODIUM 138 02/28/2024    K 3.5 02/28/2024     02/28/2024    CO2 26 02/28/2024    BUN 23 02/28/2024    CREATININE 0.98 02/28/2024    CALCIUM 7.4 (L) 02/28/2024    EGFR 69 02/28/2024     VTE Pharmacologic Prophylaxis: Sequential compression device (Venodyne)   VTE Mechanical Prophylaxis: sequential compression device  "

## 2024-02-28 NOTE — PLAN OF CARE
Problem: Potential for Falls  Goal: Patient will remain free of falls  Description: INTERVENTIONS:  - Educate patient/family on patient safety including physical limitations  - Instruct patient to call for assistance with activity   - Consult OT/PT to assist with strengthening/mobility   - Keep Call bell within reach  - Keep bed low and locked with side rails adjusted as appropriate  - Keep care items and personal belongings within reach  - Initiate and maintain comfort rounds  - Make Fall Risk Sign visible to staff  - Offer Toileting every 2 Hours, in advance of need  - Initiate/Maintain bed/chair alarm  - Apply yellow socks and bracelet for high fall risk patients  - Consider moving patient to room near nurses station  Outcome: Progressing     Problem: PAIN - ADULT  Goal: Verbalizes/displays adequate comfort level or baseline comfort level  Description: Interventions:  - Encourage patient to monitor pain and request assistance  - Assess pain using appropriate pain scale  - Administer analgesics based on type and severity of pain and evaluate response  - Implement non-pharmacological measures as appropriate and evaluate response  - Consider cultural and social influences on pain and pain management  - Notify physician/advanced practitioner if interventions unsuccessful or patient reports new pain  Outcome: Progressing     Problem: INFECTION - ADULT  Goal: Absence or prevention of progression during hospitalization  Description: INTERVENTIONS:  - Assess and monitor for signs and symptoms of infection  - Monitor lab/diagnostic results  - Monitor all insertion sites, i.e. indwelling lines, tubes, and drains  - Monitor endotracheal if appropriate and nasal secretions for changes in amount and color  - [place] appropriate cooling/warming therapies per order  - Administer medications as ordered  - Instruct and encourage patient and family to use good hand hygiene technique  - Identify and instruct in appropriate  isolation precautions for identified infection/condition  Outcome: Progressing  Goal: Absence of fever/infection during neutropenic period  Description: INTERVENTIONS:  - Monitor WBC    Outcome: Progressing     Problem: SAFETY ADULT  Goal: Patient will remain free of falls  Description: INTERVENTIONS:  - Educate patient/family on patient safety including physical limitations  - Instruct patient to call for assistance with activity   - Consult OT/PT to assist with strengthening/mobility   - Keep Call bell within reach  - Keep bed low and locked with side rails adjusted as appropriate  - Keep care items and personal belongings within reach  - Initiate and maintain comfort rounds  - Make Fall Risk Sign visible to staff  - Apply yellow socks and bracelet for high fall risk patients  - Consider moving patient to room near nurses station  Outcome: Progressing  Goal: Maintain or return to baseline ADL function  Description: INTERVENTIONS:  -  Assess patient's ability to carry out ADLs; assess patient's baseline for ADL function and identify physical deficits which impact ability to perform ADLs (bathing, care of mouth/teeth, toileting, grooming, dressing, etc.)  - Assess/evaluate cause of self-care deficits   - Assess range of motion  - Assess patient's mobility; develop plan if impaired  - Assess patient's need for assistive devices and provide as appropriate  - Encourage maximum independence but intervene and supervise when necessary  - Involve family in performance of ADLs  - Assess for home care needs following discharge   - Consider OT consult to assist with ADL evaluation and planning for discharge  - Provide patient education as appropriate  Outcome: Progressing  Goal: Maintains/Returns to pre admission functional level  Description: INTERVENTIONS:  - Perform AM-PAC 6 Click Basic Mobility/ Daily Activity assessment daily.  - Set and communicate daily mobility goal to care team and patient/family/caregiver.   - Out  of bed to chair 3 times a day   - Out of bed for meals 3 times a day  - Out of bed for toileting  - Record patient progress and toleration of activity level   Outcome: Progressing     Problem: DISCHARGE PLANNING  Goal: Discharge to home or other facility with appropriate resources  Description: INTERVENTIONS:  - Identify barriers to discharge w/patient and caregiver  - Arrange for needed discharge resources and transportation as appropriate  - Identify discharge learning needs (meds, wound care, etc.)  - Arrange for interpretive services to assist at discharge as needed  - Refer to Case Management Department for coordinating discharge planning if the patient needs post-hospital services based on physician/advanced practitioner order or complex needs related to functional status, cognitive ability, or social support system  Outcome: Progressing     Problem: Knowledge Deficit  Goal: Patient/family/caregiver demonstrates understanding of disease process, treatment plan, medications, and discharge instructions  Description: Complete learning assessment and assess knowledge base.  Interventions:  - Provide teaching at level of understanding  - Provide teaching via preferred learning methods  Outcome: Progressing     Problem: Nutrition/Hydration-ADULT  Goal: Nutrient/Hydration intake appropriate for improving, restoring or maintaining nutritional needs  Description: Monitor and assess patient's nutrition/hydration status for malnutrition. Collaborate with interdisciplinary team and initiate plan and interventions as ordered.  Monitor patient's weight and dietary intake as ordered or per policy. Utilize nutrition screening tool and intervene as necessary. Determine patient's food preferences and provide high-protein, high-caloric foods as appropriate.     INTERVENTIONS:  - Monitor oral intake, urinary output, labs, and treatment plans  - Assess nutrition and hydration status and recommend course of action  - Evaluate  amount of meals eaten  - Assist patient with eating if necessary   - Allow adequate time for meals  - Recommend/ encourage appropriate diets, oral nutritional supplements, and vitamin/mineral supplements  - Order, calculate, and assess calorie counts as needed  - Recommend, monitor, and adjust tube feedings and TPN/PPN based on assessed needs  - Assess need for intravenous fluids  - Provide specific nutrition/hydration education as appropriate  - Include patient/family/caregiver in decisions related to nutrition  Outcome: Progressing     Problem: Prexisting or High Potential for Compromised Skin Integrity  Goal: Skin integrity is maintained or improved  Description: INTERVENTIONS:  - Identify patients at risk for skin breakdown  - Assess and monitor skin integrity  - Assess and monitor nutrition and hydration status  - Monitor labs   - Assess for incontinence   - Turn and reposition patient  - Assist with mobility/ambulation  - Relieve pressure over bony prominences  - Avoid friction and shearing  - Provide appropriate hygiene as needed including keeping skin clean and dry  - Evaluate need for skin moisturizer/barrier cream  - Collaborate with interdisciplinary team   - Patient/family teaching  - Consider wound care consult   Outcome: Progressing

## 2024-02-28 NOTE — ASSESSMENT & PLAN NOTE
Patient initially admitted to surgical services, now on medicine  s/p Right - repair Incarcerated hernia inguinal with mesh on 2/21  Further management per surgery who is continuing to follow. He is surgically stable for discharge from their standpoint  Follow-up in general surgery clinic in 1 week for postoperative visit

## 2024-02-28 NOTE — SPEECH THERAPY NOTE
Speech Language/Pathology  Speech/Language Pathology Progress Note    Patient Name: William Martel  Today's Date: 2/28/2024     Problem List  Principal Problem:    Incarcerated inguinal hernia with SBO  Active Problems:    Primary hypertension    Coronary artery disease    Atrial fibrillation and flutter (HCC)    Urinary retention    Pleural effusion    GIB (gastrointestinal bleeding)    Past Medical History  Past Medical History:   Diagnosis Date    Acute urinary retention     Coronary artery disease     H/O atrial flutter     Herpes zoster     History of aortic valve replacement with bioprosthetic valve     Hyperlipemia     Hypertension     Pacemaker     Skin cancer, basal cell     Walker as ambulation aid     Wears dentures      Past Surgical History  Past Surgical History:   Procedure Laterality Date    A-V CARDIAC PACEMAKER INSERTION      AORTIC VALVE REPLACEMENT      BACK SURGERY      CARDIAC PACEMAKER PLACEMENT Left     2021    CATARACT EXTRACTION      CORONARY ANGIOPLASTY WITH STENT PLACEMENT      stents times 3    HERNIA REPAIR Right 2/21/2024    Procedure: REPAIR Incarcerated HERNIA INGUINAL WITH MESH;  Surgeon: Christopher Hendricks MD;  Location: [facility];  Service: General    IR CHEST TUBE PLACEMENT  2/22/2024    LUMBAR FUSION N/A 02/10/2023    Procedure: T9-L3 FUSION LUMBAR/THORACIC POSTERIOR MINIMALLY INVASIVE W ROBOT T10-T12 decompressive laminectomy;  Surgeon: Min Dillon MD;  Location: [facility];  Service: Neurosurgery    WI CYSTO BLADDER W/URETERAL CATHETERIZATION Right 01/13/2023    Procedure: CYSTOSCOPY RETROGRADE PYELOGRAM WITH INSERTION STENT URETERAL;  Surgeon: Frank D'Amico, MD;  Location: [facility];  Service: Urology    WI CYSTO BLADDER W/URETERAL CATHETERIZATION Right 01/26/2023    Procedure: CYSTOSCOPY WITH RETROGRADE PYELOGRAM;  Surgeon: Frank D'Amico, MD;  Location: [facility];  Service: Urology    WI CYSTO/URETERO W/LITHOTRIPSY &INDWELL STENT INSRT Right 01/26/2023     "Procedure: CYSTOSCOPY URETEROSCOPY WITH LITHOTRIPSY HOLMIUM LASER, RETROGRADE PYELOGRAM AND INSERTION STENT URETERAL;  Surgeon: Frank D'Amico, MD;  Location:  MAIN OR;  Service: Urology       Subjective:  Patient received sitting upright in bed watching television at my arrival.  Patient reports tolerance of present diet with increasing appetite as days progress.  Pt recently moved to surgical soft PO diet by medical care team.    Objective:  Thin via straw:  Pt with appropriate oral reception, demonstrates tolerance throughout with no overt s/s of penetration or aspiration.  Pt verbalizes \"I do good if I take my time.\"    Assessment:  Pt and NSG deny any further overt s/s of penetration or aspiration at this time.  Pt appears to be tolerating overall diet, no further ST warranted at this time.  Please reconsult with any changes.    Plan/Recommendations:  ST to sign off at this time, cont surgical soft lite diet and thins     "

## 2024-02-28 NOTE — ASSESSMENT & PLAN NOTE
- patient with acute anemia, melena overnight  - Hgb prior 13-14, trending down mata 8.2  - S/p 1u PRBC  - CT high volume negative  - GI consult, now s/p EGD - found ulcer with evidence recent bleeding, none active  - recommending repeat EGD Friday 3/1  - did well on CLD, will advance to surgical soft  - Carafate ACHS  - Hgb stable  - NPO at midnight on 2/29 for EGD 3/1

## 2024-02-28 NOTE — PLAN OF CARE
Problem: PAIN - ADULT  Goal: Verbalizes/displays adequate comfort level or baseline comfort level  Description: Interventions:  - Encourage patient to monitor pain and request assistance  - Assess pain using appropriate pain scale  - Administer analgesics based on type and severity of pain and evaluate response  - Implement non-pharmacological measures as appropriate and evaluate response  - Consider cultural and social influences on pain and pain management  - Notify physician/advanced practitioner if interventions unsuccessful or patient reports new pain  Outcome: Progressing     Problem: INFECTION - ADULT  Goal: Absence or prevention of progression during hospitalization  Description: INTERVENTIONS:  - Assess and monitor for signs and symptoms of infection  - Monitor lab/diagnostic results  - Monitor all insertion sites, i.e. indwelling lines, tubes, and drains  - Monitor endotracheal if appropriate and nasal secretions for changes in amount and color  - [place] appropriate cooling/warming therapies per order  - Administer medications as ordered  - Instruct and encourage patient and family to use good hand hygiene technique  - Identify and instruct in appropriate isolation precautions for identified infection/condition  Outcome: Progressing     Problem: Nutrition/Hydration-ADULT  Goal: Nutrient/Hydration intake appropriate for improving, restoring or maintaining nutritional needs  Description: Monitor and assess patient's nutrition/hydration status for malnutrition. Collaborate with interdisciplinary team and initiate plan and interventions as ordered.  Monitor patient's weight and dietary intake as ordered or per policy. Utilize nutrition screening tool and intervene as necessary. Determine patient's food preferences and provide high-protein, high-caloric foods as appropriate.     INTERVENTIONS:  - Monitor oral intake, urinary output, labs, and treatment plans  - Assess nutrition and hydration status and  recommend course of action  - Evaluate amount of meals eaten  - Assist patient with eating if necessary   - Allow adequate time for meals  - Recommend/ encourage appropriate diets, oral nutritional supplements, and vitamin/mineral supplements  - Order, calculate, and assess calorie counts as needed  - Recommend, monitor, and adjust tube feedings and TPN/PPN based on assessed needs  - Assess need for intravenous fluids  - Provide specific nutrition/hydration education as appropriate  - Include patient/family/caregiver in decisions related to nutrition  Outcome: Progressing

## 2024-02-28 NOTE — ASSESSMENT & PLAN NOTE
History of CAD with remote history of stents x3 at [facility]   Has bovine aortic valve replacement  Continue Eliquis, statin, lisinopril, metoprolol  Holding Lisinopril and Eliquis

## 2024-02-28 NOTE — ASSESSMENT & PLAN NOTE
Seen in the ED on 2/20 for difficulty with urination. Found to be retaining urine and wynn cathter was placed  S/p removed  Urinary retention protocol

## 2024-02-29 LAB
ERYTHROCYTE [DISTWIDTH] IN BLOOD BY AUTOMATED COUNT: 16.8 % (ref 11.6–15.1)
HCT VFR BLD AUTO: 28.6 % (ref 36.5–49.3)
HGB BLD-MCNC: 9 G/DL (ref 12–17)
MCH RBC QN AUTO: 28.7 PG (ref 26.8–34.3)
MCHC RBC AUTO-ENTMCNC: 31.5 G/DL (ref 31.4–37.4)
MCV RBC AUTO: 91 FL (ref 82–98)
PLATELET # BLD AUTO: 219 THOUSANDS/UL (ref 149–390)
PMV BLD AUTO: 10.5 FL (ref 8.9–12.7)
RBC # BLD AUTO: 3.14 MILLION/UL (ref 3.88–5.62)
WBC # BLD AUTO: 9.03 THOUSAND/UL (ref 4.31–10.16)

## 2024-02-29 PROCEDURE — C9113 INJ PANTOPRAZOLE SODIUM, VIA: HCPCS | Performed by: NURSE PRACTITIONER

## 2024-02-29 PROCEDURE — 85027 COMPLETE CBC AUTOMATED: CPT | Performed by: HOSPITALIST

## 2024-02-29 PROCEDURE — 99232 SBSQ HOSP IP/OBS MODERATE 35: CPT | Performed by: HOSPITALIST

## 2024-02-29 RX ADMIN — SUCRALFATE 1 G: 1 TABLET ORAL at 16:19

## 2024-02-29 RX ADMIN — SUCRALFATE 1 G: 1 TABLET ORAL at 21:10

## 2024-02-29 RX ADMIN — PANTOPRAZOLE SODIUM 40 MG: 40 INJECTION, POWDER, FOR SOLUTION INTRAVENOUS at 08:52

## 2024-02-29 RX ADMIN — HYDROXYZINE HYDROCHLORIDE 25 MG: 25 TABLET ORAL at 21:10

## 2024-02-29 RX ADMIN — Medication 3 MG: at 21:10

## 2024-02-29 RX ADMIN — SUCRALFATE 1 G: 1 TABLET ORAL at 12:12

## 2024-02-29 RX ADMIN — PIPERACILLIN AND TAZOBACTAM 3.38 G: 36; 4.5 INJECTION, POWDER, FOR SOLUTION INTRAVENOUS at 16:19

## 2024-02-29 RX ADMIN — OXYCODONE HYDROCHLORIDE AND ACETAMINOPHEN 1 TABLET: 5; 325 TABLET ORAL at 21:11

## 2024-02-29 RX ADMIN — FOLIC ACID TAB 400 MCG 400 MCG: 400 TAB at 08:52

## 2024-02-29 RX ADMIN — PANTOPRAZOLE SODIUM 40 MG: 40 INJECTION, POWDER, FOR SOLUTION INTRAVENOUS at 20:31

## 2024-02-29 RX ADMIN — METOPROLOL TARTRATE 50 MG: 50 TABLET, FILM COATED ORAL at 08:52

## 2024-02-29 RX ADMIN — PIPERACILLIN AND TAZOBACTAM 3.38 G: 36; 4.5 INJECTION, POWDER, FOR SOLUTION INTRAVENOUS at 04:21

## 2024-02-29 RX ADMIN — TAMSULOSIN HYDROCHLORIDE 0.4 MG: 0.4 CAPSULE ORAL at 16:19

## 2024-02-29 RX ADMIN — SUCRALFATE 1 G: 1 TABLET ORAL at 06:54

## 2024-02-29 RX ADMIN — PIPERACILLIN AND TAZOBACTAM 3.38 G: 36; 4.5 INJECTION, POWDER, FOR SOLUTION INTRAVENOUS at 08:51

## 2024-02-29 RX ADMIN — METOPROLOL TARTRATE 50 MG: 50 TABLET, FILM COATED ORAL at 18:21

## 2024-02-29 RX ADMIN — PIPERACILLIN AND TAZOBACTAM 3.38 G: 36; 4.5 INJECTION, POWDER, FOR SOLUTION INTRAVENOUS at 20:31

## 2024-02-29 RX ADMIN — ATORVASTATIN CALCIUM 40 MG: 40 TABLET, FILM COATED ORAL at 08:52

## 2024-02-29 NOTE — ASSESSMENT & PLAN NOTE
History of CAD with remote history of stents x3 at [facility]   Has bovine aortic valve replacement  Continue Eliquis, statin, lisinopril, metoprolol  Holding Lisinopril and Eliquis

## 2024-02-29 NOTE — PROGRESS NOTES
[facility]  Progress Note  Name: William Martel I  MRN: [id number]  Unit/Bed#: -01 I Date of Admission: 2/21/2024   Date of Service: 2/29/2024 I Hospital Day: 8    Assessment/Plan   GIB (gastrointestinal bleeding)  Assessment & Plan  - patient with acute anemia, melena overnight  - Hgb prior 13-14, trending down mata 8.2  - S/p 1u PRBC  - CT high volume negative  - GI consult, now s/p EGD - found ulcer with evidence recent bleeding, none active  - recommending repeat EGD Friday 3/1  - did well on CLD, will advance to surgical soft  - Carafate ACHS  - Hgb stable  - NPO at midnight on 2/29 for EGD 3/1  - Holding AC until f/u EGD      * Incarcerated inguinal hernia with SBO  Assessment & Plan  Patient initially admitted to surgical services, now on medicine  s/p Right - repair Incarcerated hernia inguinal with mesh on 2/21  Further management per surgery who is continuing to follow. He is surgically stable for discharge from their standpoint  Follow-up in general surgery clinic in 1 week for postoperative visit     Pleural effusion  Assessment & Plan  Presented as sepsis, now resolved. Likely due to right sided pneumonia and loculated effusion as well as incarcerated inguinal hernia (see under hernia)  Has been doing well after surgery.  Pulmonary was following s/p IR rt chest tube placement with accidental removal on 2/23/2024.  Patient refusing further chest tube placement after multiple education.    Still has persistent leukocytosis although improving.    Blood culture and body fluid culture without any growth.   Currently on IV Zosyn, Can switch to Augmentin as per pulmonary recommendation on discharge to complete total x2 weeks  Pulmonology following, repeat CXR remain with loculated effusion and he remains dyspneic (now complicated by anemia and melena/GIB)  CT High volume for GIB did note Small, improved loculated right effusion on 2/26  Repeat CT chest in 4 to 6 weeks to  monitor progression will be needed on discharge  Has been weaned to room air    Urinary retention  Assessment & Plan  Seen in the ED on 2/20 for difficulty with urination. Found to be retaining urine and wynn cathter was placed  S/p removed  Urinary retention protocol    Atrial fibrillation and flutter (HCC)  Assessment & Plan  S/p pacemaker placement in 2021.  Continue metoprolol.  Hold Eliquis    Coronary artery disease  Assessment & Plan  History of CAD with remote history of stents x3 at [facility]   Has bovine aortic valve replacement  Continue Eliquis, statin, lisinopril, metoprolol  Holding Lisinopril and Eliquis    Primary hypertension  Assessment & Plan  Currently taking enalapril and lopressor outpatient  Okay to continue Lopressor  Stopped ACE-I  Avoid hypotension               VTE Pharmacologic Prophylaxis: VTE Score: 8 High Risk (Score >/= 5) - Pharmacological DVT Prophylaxis Contraindicated. Sequential Compression Devices Ordered.    Mobility:   Basic Mobility Inpatient Raw Score: 23  JH-HLM Goal: 7: Walk 25 feet or more  JH-HLM Achieved: 7: Walk 25 feet or more  HLM Goal achieved. Continue to encourage appropriate mobility.    Patient Centered Rounds: I performed bedside rounds with nursing staff today.   Discussions with Specialists or Other Care Team Provider: GI    Education and Discussions with Family / Patient: Updated  (daughter) via phone.    Total Time Spent on Date of Encounter in care of patient: 27 mins. This time was spent on one or more of the following: performing physical exam; counseling and coordination of care; obtaining or reviewing history; documenting in the medical record; reviewing/ordering tests, medications or procedures; communicating with other healthcare professionals and discussing with patient's family/caregivers.    Current Length of Stay: 8 day(s)  Current Patient Status: Inpatient   Certification Statement: The patient will continue to require additional  inpatient hospital stay due to GIB  Discharge Plan: Anticipate discharge tomorrow to home.    Code Status: Level 1 - Full Code    Subjective:   Patient reports some symptoms of retention, feeling he is not getting all urine out. Is on urinary retention protocol. No other complaints    Objective:     Vitals:   Temp (24hrs), Av °F (36.7 °C), Min:97.6 °F (36.4 °C), Max:98.2 °F (36.8 °C)    Temp:  [97.6 °F (36.4 °C)-98.2 °F (36.8 °C)] 97.6 °F (36.4 °C)  HR:  [68-93] 87  Resp:  [18-20] 18  BP: (115-138)/(56-67) 136/65  SpO2:  [93 %-96 %] 93 %  Body mass index is 29.43 kg/m².     Input and Output Summary (last 24 hours):     Intake/Output Summary (Last 24 hours) at 2024 1328  Last data filed at 2024 0801  Gross per 24 hour   Intake 800 ml   Output 610 ml   Net 190 ml       Physical Exam:   Physical Exam  Vitals and nursing note reviewed.   Constitutional:       General: He is not in acute distress.     Appearance: He is well-developed.   HENT:      Head: Normocephalic and atraumatic.   Eyes:      Conjunctiva/sclera: Conjunctivae normal.   Cardiovascular:      Rate and Rhythm: Normal rate.      Heart sounds: No murmur heard.  Pulmonary:      Effort: Pulmonary effort is normal.      Comments: Mild reduced BS RLL  Abdominal:      General: There is no distension.      Palpations: Abdomen is soft.      Tenderness: There is no abdominal tenderness.   Musculoskeletal:         General: No swelling.      Cervical back: Neck supple.   Skin:     General: Skin is warm and dry.   Neurological:      Mental Status: He is alert and oriented to person, place, and time.   Psychiatric:         Mood and Affect: Mood normal.          Additional Data:     Labs:  Results from last 7 days   Lab Units 24  0422 24  1205 24  0539   WBC Thousand/uL 9.03   < > 11.72*   HEMOGLOBIN g/dL 9.0*   < > 9.1*   HEMATOCRIT % 28.6*   < > 29.4*   PLATELETS Thousands/uL 219   < > 206   NEUTROS PCT %  --   --  73   LYMPHS PCT %  --    --  16   MONOS PCT %  --   --  9   EOS PCT %  --   --  1    < > = values in this interval not displayed.     Results from last 7 days   Lab Units 02/28/24  0523 02/26/24  0539 02/23/24  0439   SODIUM mmol/L 138   < > 137   POTASSIUM mmol/L 3.5   < > 4.1   CHLORIDE mmol/L 107   < > 101   CO2 mmol/L 26   < > 29   BUN mg/dL 23   < > 24   CREATININE mg/dL 0.98   < > 1.28   ANION GAP mmol/L 5   < > 7   CALCIUM mg/dL 7.4*   < > 8.8   ALBUMIN g/dL  --   --  3.5   TOTAL BILIRUBIN mg/dL  --   --  0.98   ALK PHOS U/L  --   --  75   ALT U/L  --   --  6*   AST U/L  --   --  11*   GLUCOSE RANDOM mg/dL 132   < > 163*    < > = values in this interval not displayed.                 Results from last 7 days   Lab Units 02/26/24  0539 02/23/24  0439   PROCALCITONIN ng/ml 0.26* 0.14       Lines/Drains:  Invasive Devices       Peripheral Intravenous Line  Duration             Peripheral IV 02/28/24 Distal;Right;Ventral (anterior) Forearm <1 day                          Imaging: No pertinent imaging reviewed.    Recent Cultures (last 7 days):   Results from last 7 days   Lab Units 02/22/24  1405   GRAM STAIN RESULT  No Polys or Bacteria seen   BODY FLUID CULTURE, STERILE  No growth       Last 24 Hours Medication List:   Current Facility-Administered Medications   Medication Dose Route Frequency Provider Last Rate    atorvastatin  40 mg Oral Daily Mel S Neri, CRNP      folic acid  400 mcg Oral Daily Mel S Neri, CRNP      hydrALAZINE  5 mg Intravenous Q6H PRN Mel S Neri, CRNP      hydrOXYzine HCL  25 mg Oral Q6H PRN Mel S Neri, CRNP      loperamide  2 mg Oral 4x Daily PRN Mel S Neri, CRNP      melatonin  3 mg Oral HS PRN Mel S Neri, CRNP      metoprolol tartrate  50 mg Oral BID Mel S Neri, CRNP      naloxone  0.04 mg Intravenous Q1MIN PRN Mel S Neri, CRNP      oxyCODONE-acetaminophen  1 tablet Oral Q4H PRN Mel S Neri, CRNP      pantoprazole  40 mg Intravenous Q12H RONAK Mel S Neri, KARONNP       piperacillin-tazobactam  3.375 g Intravenous Q6H Mel S Neri, CRNP 3.375 g (02/29/24 0851)    senna  1 tablet Oral HS Mel S Neri, CRNP      simethicone (MYLICON) 40 mg in sterile water 300 mL   Irrigation PRN Saif Wil Ríos MD      sucralfate  1 g Oral 4x Daily (AC & HS) Mel S Neri, KARONNP      tamsulosin  0.4 mg Oral Daily With Dinner MICHAEL Miranda          Today, Patient Was Seen By: Bertram Jensen DO    **Please Note: This note may have been constructed using a voice recognition system.**

## 2024-02-29 NOTE — ASSESSMENT & PLAN NOTE
- patient with acute anemia, melena overnight  - Hgb prior 13-14, trending down mata 8.2  - S/p 1u PRBC  - CT high volume negative  - GI consult, now s/p EGD - found ulcer with evidence recent bleeding, none active  - recommending repeat EGD Friday 3/1  - did well on CLD, will advance to surgical soft  - Carafate ACHS  - Hgb stable  - NPO at midnight on 2/29 for EGD 3/1  - Holding AC until f/u EGD

## 2024-03-01 ENCOUNTER — ANESTHESIA (INPATIENT)
Dept: GASTROENTEROLOGY | Facility: HOSPITAL | Age: 89
DRG: 853 | End: 2024-03-01
Payer: COMMERCIAL

## 2024-03-01 ENCOUNTER — APPOINTMENT (INPATIENT)
Dept: GASTROENTEROLOGY | Facility: HOSPITAL | Age: 89
DRG: 853 | End: 2024-03-01
Payer: COMMERCIAL

## 2024-03-01 ENCOUNTER — ANESTHESIA EVENT (INPATIENT)
Dept: GASTROENTEROLOGY | Facility: HOSPITAL | Age: 89
DRG: 853 | End: 2024-03-01
Payer: COMMERCIAL

## 2024-03-01 VITALS
RESPIRATION RATE: 20 BRPM | WEIGHT: 210 LBS | DIASTOLIC BLOOD PRESSURE: 57 MMHG | OXYGEN SATURATION: 95 % | BODY MASS INDEX: 29.4 KG/M2 | TEMPERATURE: 97.8 F | HEIGHT: 71 IN | HEART RATE: 86 BPM | SYSTOLIC BLOOD PRESSURE: 114 MMHG

## 2024-03-01 LAB
ERYTHROCYTE [DISTWIDTH] IN BLOOD BY AUTOMATED COUNT: 17.1 % (ref 11.6–15.1)
HCT VFR BLD AUTO: 29.1 % (ref 36.5–49.3)
HGB BLD-MCNC: 9 G/DL (ref 12–17)
MCH RBC QN AUTO: 28.7 PG (ref 26.8–34.3)
MCHC RBC AUTO-ENTMCNC: 30.9 G/DL (ref 31.4–37.4)
MCV RBC AUTO: 93 FL (ref 82–98)
PLATELET # BLD AUTO: 236 THOUSANDS/UL (ref 149–390)
PMV BLD AUTO: 10.6 FL (ref 8.9–12.7)
RBC # BLD AUTO: 3.14 MILLION/UL (ref 3.88–5.62)
WBC # BLD AUTO: 9.68 THOUSAND/UL (ref 4.31–10.16)

## 2024-03-01 PROCEDURE — 99239 HOSP IP/OBS DSCHRG MGMT >30: CPT | Performed by: HOSPITALIST

## 2024-03-01 PROCEDURE — 0DB68ZX EXCISION OF STOMACH, VIA NATURAL OR ARTIFICIAL OPENING ENDOSCOPIC, DIAGNOSTIC: ICD-10-PCS | Performed by: HOSPITALIST

## 2024-03-01 PROCEDURE — 85027 COMPLETE CBC AUTOMATED: CPT | Performed by: HOSPITALIST

## 2024-03-01 PROCEDURE — 88305 TISSUE EXAM BY PATHOLOGIST: CPT | Performed by: PATHOLOGY

## 2024-03-01 PROCEDURE — C9113 INJ PANTOPRAZOLE SODIUM, VIA: HCPCS | Performed by: HOSPITALIST

## 2024-03-01 RX ORDER — SODIUM CHLORIDE, SODIUM LACTATE, POTASSIUM CHLORIDE, CALCIUM CHLORIDE 600; 310; 30; 20 MG/100ML; MG/100ML; MG/100ML; MG/100ML
INJECTION, SOLUTION INTRAVENOUS CONTINUOUS PRN
Status: DISCONTINUED | OUTPATIENT
Start: 2024-03-01 | End: 2024-03-01

## 2024-03-01 RX ORDER — SENNOSIDES 8.6 MG
8.6 TABLET ORAL
Qty: 30 TABLET | Refills: 0 | Status: SHIPPED | OUTPATIENT
Start: 2024-03-01

## 2024-03-01 RX ORDER — AMOXICILLIN AND CLAVULANATE POTASSIUM 875; 125 MG/1; MG/1
1 TABLET, FILM COATED ORAL EVERY 12 HOURS SCHEDULED
Qty: 10 TABLET | Refills: 0 | Status: SHIPPED | OUTPATIENT
Start: 2024-03-01 | End: 2024-03-06

## 2024-03-01 RX ORDER — ALBUTEROL SULFATE 2.5 MG/3ML
2.5 SOLUTION RESPIRATORY (INHALATION) EVERY 6 HOURS PRN
Status: COMPLETED | OUTPATIENT
Start: 2024-03-01 | End: 2024-03-01

## 2024-03-01 RX ORDER — SUCRALFATE 1 G/1
1 TABLET ORAL
Qty: 56 TABLET | Refills: 0 | Status: SHIPPED | OUTPATIENT
Start: 2024-03-01 | End: 2024-03-15

## 2024-03-01 RX ORDER — LIDOCAINE HYDROCHLORIDE 20 MG/ML
INJECTION, SOLUTION EPIDURAL; INFILTRATION; INTRACAUDAL; PERINEURAL AS NEEDED
Status: DISCONTINUED | OUTPATIENT
Start: 2024-03-01 | End: 2024-03-01

## 2024-03-01 RX ORDER — PANTOPRAZOLE SODIUM 40 MG/1
40 TABLET, DELAYED RELEASE ORAL 2 TIMES DAILY
Qty: 60 TABLET | Refills: 0 | Status: SHIPPED | OUTPATIENT
Start: 2024-03-01

## 2024-03-01 RX ORDER — SODIUM CHLORIDE, SODIUM LACTATE, POTASSIUM CHLORIDE, CALCIUM CHLORIDE 600; 310; 30; 20 MG/100ML; MG/100ML; MG/100ML; MG/100ML
25 INJECTION, SOLUTION INTRAVENOUS CONTINUOUS
Status: DISCONTINUED | OUTPATIENT
Start: 2024-03-01 | End: 2024-03-01 | Stop reason: HOSPADM

## 2024-03-01 RX ORDER — PROPOFOL 10 MG/ML
INJECTION, EMULSION INTRAVENOUS AS NEEDED
Status: DISCONTINUED | OUTPATIENT
Start: 2024-03-01 | End: 2024-03-01

## 2024-03-01 RX ORDER — OXYCODONE HYDROCHLORIDE AND ACETAMINOPHEN 5; 325 MG/1; MG/1
1 TABLET ORAL EVERY 6 HOURS PRN
Qty: 20 TABLET | Refills: 0 | Status: SHIPPED | OUTPATIENT
Start: 2024-03-01 | End: 2024-03-11

## 2024-03-01 RX ORDER — PHENYLEPHRINE HCL IN 0.9% NACL 1 MG/10 ML
SYRINGE (ML) INTRAVENOUS AS NEEDED
Status: DISCONTINUED | OUTPATIENT
Start: 2024-03-01 | End: 2024-03-01

## 2024-03-01 RX ADMIN — PANTOPRAZOLE SODIUM 40 MG: 40 INJECTION, POWDER, FOR SOLUTION INTRAVENOUS at 10:01

## 2024-03-01 RX ADMIN — Medication 200 MCG: at 08:02

## 2024-03-01 RX ADMIN — OXYCODONE HYDROCHLORIDE AND ACETAMINOPHEN 1 TABLET: 5; 325 TABLET ORAL at 01:46

## 2024-03-01 RX ADMIN — ATORVASTATIN CALCIUM 40 MG: 40 TABLET, FILM COATED ORAL at 10:01

## 2024-03-01 RX ADMIN — SUCRALFATE 1 G: 1 TABLET ORAL at 11:02

## 2024-03-01 RX ADMIN — PIPERACILLIN AND TAZOBACTAM 3.38 G: 36; 4.5 INJECTION, POWDER, FOR SOLUTION INTRAVENOUS at 02:53

## 2024-03-01 RX ADMIN — PROPOFOL 80 MG: 10 INJECTION, EMULSION INTRAVENOUS at 07:57

## 2024-03-01 RX ADMIN — PIPERACILLIN AND TAZOBACTAM 3.38 G: 36; 4.5 INJECTION, POWDER, FOR SOLUTION INTRAVENOUS at 11:00

## 2024-03-01 RX ADMIN — LIDOCAINE HYDROCHLORIDE 100 MG: 20 INJECTION, SOLUTION EPIDURAL; INFILTRATION; INTRACAUDAL; PERINEURAL at 07:57

## 2024-03-01 RX ADMIN — SODIUM CHLORIDE, SODIUM LACTATE, POTASSIUM CHLORIDE, AND CALCIUM CHLORIDE: .6; .31; .03; .02 INJECTION, SOLUTION INTRAVENOUS at 07:54

## 2024-03-01 RX ADMIN — METOPROLOL TARTRATE 50 MG: 50 TABLET, FILM COATED ORAL at 10:01

## 2024-03-01 RX ADMIN — ALBUTEROL SULFATE 2.5 MG: 2.5 SOLUTION RESPIRATORY (INHALATION) at 08:26

## 2024-03-01 RX ADMIN — FOLIC ACID TAB 400 MCG 400 MCG: 400 TAB at 10:00

## 2024-03-01 NOTE — ANESTHESIA POSTPROCEDURE EVALUATION
Post-Op Assessment Note    CV Status:  Stable    Pain management: satisfactory to patient       Mental Status:  Alert and awake   Hydration Status:  Stable   PONV Controlled:  None   Airway Patency:  Patent     Post Op Vitals Reviewed: Yes    No anethesia notable event occurred.    Staff: CRNA               BP   139/71   Temp   97.6   Pulse  83   Resp   18   SpO2   97

## 2024-03-01 NOTE — ASSESSMENT & PLAN NOTE
History of CAD with remote history of stents x3 at [facility]   Has bovine aortic valve replacement  Continue  statin, lisinopril, metoprolol  Holding Eliquis x10 days then resume

## 2024-03-01 NOTE — ASSESSMENT & PLAN NOTE
Presented as sepsis, now resolved. Likely due to right sided pneumonia and loculated effusion as well as incarcerated inguinal hernia (see under hernia)  Has been doing well after surgery.  Pulmonary was following s/p IR rt chest tube placement with accidental removal on 2/23/2024.  Patient refusing further chest tube placement after multiple education.    Still has persistent leukocytosis although improving.    Blood culture and body fluid culture without any growth.   On IV Zosyn during admission, Switched to Augmentin as per pulmonary recommendation on discharge to complete total x2 weeks - 5 remaining days upon discharge  Pulmonology following, repeat CXR remain with loculated effusion and he remains dyspneic (now complicated by anemia and melena/GIB)  CT High volume for GIB did note Small, improved loculated right effusion on 2/26  Repeat CT chest in 4 to 6 weeks to monitor progression will be needed on discharge  Has been weaned to room air  Follow up with Pulmonology, ambulatory referral made

## 2024-03-01 NOTE — DISCHARGE SUMMARY
[facility]  Discharge- William Martel 1935, 88 y.o. male MRN: [id number]  Unit/Bed#: -[facility] Encounter: [id number]  Primary Care Provider: Migdalia Reyez DO   Date and time admitted to hospital: 2/21/2024  9:45 AM    GIB (gastrointestinal bleeding)  Assessment & Plan  - patient with acute anemia, melena overnight  - Hgb prior 13-14, trending down mata 8.2  - S/p 1u PRBC  - CT high volume negative  - GI consult, now s/p EGD - found ulcer with evidence recent bleeding, none active  - recommending repeated EGD Friday 3/1  - Ulcer/gastritis diet  - Carafate ACHS x14 days  - Hgb stable  - Holding AC x10 days then resume  - Ppi BID indefinitely  - No NSAIDs, No EtOH  - Follow up with GI, ambulatory referral made      * Incarcerated inguinal hernia with SBO  Assessment & Plan  Patient initially admitted to surgical services, now on medicine  s/p Right - repair Incarcerated hernia inguinal with mesh on 2/21  Further management per surgery who is continuing to follow. He is surgically stable for discharge from their standpoint  Follow-up in general surgery clinic in 1 week for postoperative visit     Pleural effusion  Assessment & Plan  Presented as sepsis, now resolved. Likely due to right sided pneumonia and loculated effusion as well as incarcerated inguinal hernia (see under hernia)  Has been doing well after surgery.  Pulmonary was following s/p IR rt chest tube placement with accidental removal on 2/23/2024.  Patient refusing further chest tube placement after multiple education.    Still has persistent leukocytosis although improving.    Blood culture and body fluid culture without any growth.   On IV Zosyn during admission, Switched to Augmentin as per pulmonary recommendation on discharge to complete total x2 weeks - 5 remaining days upon discharge  Pulmonology following, repeat CXR remain with loculated effusion and he remains dyspneic (now complicated by anemia and  melena/GIB)  CT High volume for GIB did note Small, improved loculated right effusion on 2/26  Repeat CT chest in 4 to 6 weeks to monitor progression will be needed on discharge  Has been weaned to room air  Follow up with Pulmonology, ambulatory referral made    Urinary retention  Assessment & Plan  Seen in the ED on 2/20 for difficulty with urination. Found to be retaining urine and wynn cathter was placed  S/p removed, did well with urinary retention protocol  Continue flomax    Atrial fibrillation and flutter (HCC)  Assessment & Plan  S/p pacemaker placement in 2021.  Continue metoprolol.  Hold Eliquis x10 days then resume    Coronary artery disease  Assessment & Plan  History of CAD with remote history of stents x3 at [facility]   Has bovine aortic valve replacement  Continue  statin, lisinopril, metoprolol  Holding Eliquis x10 days then resume    Primary hypertension  Assessment & Plan  Currently taking enalapril and lopressor outpatient  Lopressor and ACE-I resumed        Medical Problems       Resolved Problems  Date Reviewed: 3/1/2024            Resolved    Acute cystitis with hematuria 2/26/2024     Resolved by  Bertram Jensen DO    Sepsis (HCC) 2/23/2024     Resolved by  Loretta Sandoval MD    Herpes zoster without complication 2/26/2024     Resolved by  Bertram Jensen DO    Small bowel obstruction (HCC) 2/23/2024     Resolved by  Loretta Sandoval MD    Right inguinal hernia 2/23/2024     Resolved by  Loretta Sandoval MD        Discharging Physician / Practitioner: Bertram Jensen DO  PCP: Migdalia Reyez DO  Admission Date:   Admission Orders (From admission, onward)       Ordered        02/21/24 1435  INPATIENT ADMISSION  Once                          Discharge Date: 03/01/24    Consultations During Hospital Stay:  Surgery, GI, Pulmonology    Procedures Performed:   EGD x2  repair of incarcerated right inguinal hernia     Significant Findings / Test Results:   CT high volume  bleeding scan abdomen pelvis   Final Result by Heike Panda MD (02/26 1216)      No evidence of active gastrointestinal bleed.      Persistent small bowel dilation may be ileus. Increasing luminal air and decreasing fluid. Resolved hernia with new right groin surgical change.      Persistent obstructing proximal right ureteral stone.      Other stable findings above.                  Workstation performed: [id number]         XR chest pa & lateral   Final Result by Dariusz Peoples MD (02/25 1321)      Unchanged loculated right pleural effusion.               Resident: Dariusz Light I, the attending radiologist, have reviewed the images and agree with the final report above.      Workstation performed: [id number]         XR chest pa & lateral   Final Result by Evans Romero MD (02/23 1214)      Loculated right-sided pleural effusion. No chest tube identified.            Workstation performed: [id number]         IR chest tube placement   Final Result by Yusef Cabrera MD (02/22 1425)   Impression:   Successful placement of a 10.2 [place] catheter into the right anterior loculated pleural pocket under ultrasound guidance. The catheter was connected to a Pleur-Evac on wall suction.            Workstation performed: [id number]         CT chest abdomen pelvis w contrast   Final Result by Anat Lockett MD (02/21 1347)   Loculated right pleural effusion with mild pleural enhancement. Given pleural plaques and possible history of asbestos exposure, recommend diagnostic/therapeutic thoracentesis.   Right inguinal hernia with neck of 4.2 cm, containing a loop of small bowel. Resultant small bowel obstruction.   Mild wall thickening in the distal esophagus. Consider correlation with nonemergent EGD.   Less than 2 mm proximal right ureteral calculus with mild hydronephrosis.   Bilateral nonobstructive renal calculi. Small renal cysts. Moderate right renal atrophy.   Chronic findings, as per the  body of the report.                     Workstation performed: [id number]           Lab Results   Component Value Date    WBC 9.68 03/01/2024    HGB 9.0 (L) 03/01/2024    HCT 29.1 (L) 03/01/2024    MCV 93 03/01/2024     03/01/2024     Lab Results   Component Value Date    SODIUM 138 02/28/2024    K 3.5 02/28/2024     02/28/2024    CO2 26 02/28/2024    BUN 23 02/28/2024    CREATININE 0.98 02/28/2024    GLUC 132 02/28/2024    CALCIUM 7.4 (L) 02/28/2024     EGD 2/26  Medium hiatal hernia  Severe grade D esophagitis with mucosal breaks measuring 5 mm or more, continuous between folds, covering 75% or more of the circumference in the GE junction; bleeding occurred before intervention. Bleeding stopped without intervention.  Bezoar (food and large blood clot) in the fundus of the stomach. No intervention was performed as the patient received Anticoagulation (factor X inhibitor) this morning  Moderate erythematous mucosa with erosion in the body of the stomach and antrum; there was stigmata of recent hemorrhage  The duodenal bulb, 1st part of the duodenum, 2nd part of the duodenum and 3rd part of the duodenum appeared normal.      EGD 3/1  Severe grade D esophagitis with mucosal breaks measuring 5 mm or more, continuous between folds, covering 75% or more of the circumference, showing friable and scarred mucosa in the GE junction; there was stigmata of recent hemorrhage. This seems to be at the Hiatal Hernia pinch and is progressing to a peptic stricture  5 cm hiatal hernia:  Hill classification: Grade IV  Abnormal mucosa with loss of vascular pattern in the body of the stomach and antrum, consistent with gastritis; performed cold forceps biopsy to rule out H. pylori  Single small ulcer in the fundus of the stomach with clean base (David III)  The duodenal bulb, 1st part of the duodenum, 2nd part of the duodenum and 3rd part of the duodenum appeared normal.      Incidental Findings:   See above   I reviewed  the above mentioned incidental findings with the patient and/or family and they expressed understanding.    Test Results Pending at Discharge (will require follow up):   Biopsy from Tissue Exam EGD 3/1       Outpatient Tests Requested:  none    Complications:  GIB    Reason for Admission: Incarcerated hernia    Hospital Course:   William Martel is a 88 y.o. male patient who originally presented to the hospital on 2/21/2024 due to abdominal pain and incarcerated hernia.  Patient was initially admitted to surgery service for an incarcerated hernia, patient was taken to the OR for hernia repair 2/21.  Patient presented with sepsis, as well, and was found to have a loculated right pleural effusion, went for thoracentesis on 2/22, suspected to be parapneumonic effusion.  He did have a chest tube placed which was unfortunately dislodged and removed.  Patient had clinically improved and chest tube was not replaced.  She did have imaging which showed improvement in the loculated effusion.  He was treated with Zosyn, and needs to complete an additional 5 days of antibiotics with Augmentin on discharge.  Unfortunately during the management of the above, patient developed significant melena and was found to have a GI bleed.  He went for an EGD on 2/26 which showed a gastric ulcer with sequela of recent bleeding, but was not actively bleeding at that time.  His Eliquis had already been held due to evidence of GI bleed.  He had a repeat EGD as recommended by GI on 3/1, findings as noted above.  Recommendations by GI to hold Eliquis for an additional 10 days and then resume, he was started on indefinite PPI twice daily, as well as a 14-day course of Carafate.  Patient will need follow-up with surgery for his incarcerated hernia status postrepair, gastroenterology for his GI bleed and the above findings on EGD, as well as pulmonology for pleural effusion and further imaging recommended a 4 to 6-week follow-up CT. Patient  "medically stable for discharge          Please see above list of diagnoses and related plan for additional information.     Condition at Discharge: good    Discharge Day Visit / Exam:   Subjective: Patient feels well, is highly anticipating discharge, no acute complaints  Vitals: Blood Pressure: 114/57 (03/01/24 0840)  Pulse: 86 (03/01/24 0840)  Temperature: 97.8 °F (36.6 °C) (03/01/24 0840)  Temp Source: Temporal (03/01/24 0737)  Respirations: 20 (03/01/24 0840)  Height: 5' 11\" (180.3 cm) (03/01/24 0737)  Weight - Scale: 95.3 kg (210 lb) (03/01/24 0737)  SpO2: 95 % (03/01/24 0840)  Exam:   Physical Exam  Vitals and nursing note reviewed.   Constitutional:       General: He is not in acute distress.     Appearance: He is well-developed.   HENT:      Head: Normocephalic and atraumatic.   Eyes:      Conjunctiva/sclera: Conjunctivae normal.   Cardiovascular:      Rate and Rhythm: Normal rate.      Heart sounds: No murmur heard.  Pulmonary:      Effort: Pulmonary effort is normal.      Comments: Mild reduced BS RLL  Abdominal:      General: There is no distension.      Palpations: Abdomen is soft.      Tenderness: There is no abdominal tenderness.   Musculoskeletal:         General: No swelling.      Cervical back: Neck supple.   Skin:     General: Skin is warm and dry.   Neurological:      Mental Status: He is alert and oriented to person, place, and time.   Psychiatric:         Mood and Affect: Mood normal.          Discussion with Family: Updated  (daughter) at bedside.    Discharge instructions/Information to patient and family:   See after visit summary for information provided to patient and family.      Provisions for Follow-Up Care:  See after visit summary for information related to follow-up care and any pertinent home health orders.      Mobility at time of Discharge:   Basic Mobility Inpatient Raw Score: 23  JH-HLM Goal: 7: Walk 25 feet or more  JH-HLM Achieved: 7: Walk 25 feet or more  HLM " Goal achieved. Continue to encourage appropriate mobility.     Disposition:   Home with VNA Services (Reminder: Complete face to face encounter)    Planned Readmission: no     Discharge Statement:  I spent 47 minutes discharging the patient. This time was spent on the day of discharge. I had direct contact with the patient on the day of discharge. Greater than 50% of the total time was spent examining patient, answering all patient questions, arranging and discussing plan of care with patient as well as directly providing post-discharge instructions.  Additional time then spent on discharge activities.    Discharge Medications:  See after visit summary for reconciled discharge medications provided to patient and/or family.      **Please Note: This note may have been constructed using a voice recognition system**

## 2024-03-01 NOTE — ASSESSMENT & PLAN NOTE
----- Message from Virtual Instruments CorporationDO john sent at 4/1/2021  6:36 AM EDT -----  Please let pt know that xray did not show anything abnormal. This suggests the nodule is not bony but may be more cartilage or a soft tissue cyst. We can either continue to monitor this or we could have him see a podiatrist to look at this further. Let me know what he'd like to do. Thanks! Seen in the ED on 2/20 for difficulty with urination. Found to be retaining urine and wynn cathter was placed  S/p removed, did well with urinary retention protocol  Continue flomax

## 2024-03-01 NOTE — H&P
Procedure(s):    Esophagogastroduodenuoscopy with the indication(s) of GI bleeding and risk stratification (resuming AC)    Endoscopy Pre-Procedure Assessment:  Prior to the procedure, the patient is identified.  The patient's history, medications, and allergies have been reviewed.  The patient is competent.  The risks and benefits of the procedure procedure and the planned sedation have been discussed with the patient.  All questions have been answered and informed consent for the procedure has been obtained.    Vitals:    03/01/24 0737   BP: 151/79   Pulse: 84   Resp: 18   Temp: 98.1 °F (36.7 °C)   SpO2: 97%       Physical Exam:  Physical Exam  HENT:      Nose: Nose normal.   Eyes:      Conjunctiva/sclera: Conjunctivae normal.   Cardiovascular:      Rate and Rhythm: Normal rate.   Pulmonary:      Effort: Pulmonary effort is normal.   Abdominal:      Palpations: Abdomen is soft.   Neurological:      General: No focal deficit present.      Mental Status: He is alert.   Psychiatric:         Mood and Affect: Mood normal.                Consent:    We have discussed the procedure in detail. We reviewed risks, benefits and alternative as well as protentional complications including and not limited to medication side effect , infection, bleeding, perforation and the potential need for surgery, ICU admission, CPR, as well as the need for blood product transfusion. Patient verbalized understanding and agreement. All patient questions were answered.     After reviewed the risks and benefits, the patient is deemed in satisfactory condition to undergo the procedure.  The anesthesia plan is to use monitored anesthesia care (MAC).      03/01/24

## 2024-03-01 NOTE — CASE MANAGEMENT
Case Management Discharge Planning Note    Patient name William Martel  Location [facility]/-01 MRN [id number]  : 1935 Date 3/1/2024       Current Admission Date: 2024  Current Admission Diagnosis:Incarcerated inguinal hernia with SBO   Patient Active Problem List    Diagnosis Date Noted    GIB (gastrointestinal bleeding) 2024    Incarcerated inguinal hernia with SBO 2024    Pleural effusion 2024    Pulmonary hypertension (HCC) 2024    Benign prostatic hyperplasia with urinary obstruction 2023    COVID-19 2023    Encounter for other orthopedic aftercare 02/15/2023    Other fracture of first lumbar vertebra, subsequent encounter for fracture with routine healing 02/15/2023    Presence of urogenital implants 02/15/2023    Repeated falls 02/15/2023    Urinary retention 02/15/2023    Acute pain due to trauma 02/15/2023    Fall 2023    Closed T12 fracture (HCC) 2023    H/O heart artery stent     Moderate mitral stenosis     Ureteral stone 2023    Calculus of kidney 2023    Atrial fibrillation and flutter (HCC) 2023    Pacemaker 2022    Primary hypertension     Hyperlipemia     Aortic stenosis     S/P AVR     Carotid artery disease ([facility])     Coronary artery disease     Sick sinus syndrome ([facility]) 2021    Stenosis of right carotid artery 2021    Carotid stenosis, asymptomatic, bilateral 2019    Coronary stent patent 2019    Carotid artery occlusion 07/15/2016    Vitamin D deficiency 07/15/2016    Aortic valve disorder 2016      LOS (days): 9  Geometric Mean LOS (GMLOS) (days): 9.9  Days to GMLOS:0.9     OBJECTIVE:  Risk of Unplanned Readmission Score: 13.65         Current admission status: Inpatient   Preferred Pharmacy:   [facility] - [place] [facility] - [facility] [address]  [facility] [address] [facility] [address] [address] [place] [address]  Phone: [phone] Fax: [phone]    Primary Care Provider: Migdalia Reyez DO    Primary  Insurance: LUIZ  REP  Secondary Insurance:     DISCHARGE DETAILS:     AVS was sent to [facility].

## 2024-03-01 NOTE — ASSESSMENT & PLAN NOTE
- patient with acute anemia, melena overnight  - Hgb prior 13-14, trending down mata 8.2  - S/p 1u PRBC  - CT high volume negative  - GI consult, now s/p EGD - found ulcer with evidence recent bleeding, none active  - recommending repeated EGD Friday 3/1  - Ulcer/gastritis diet  - Carafate ACHS x14 days  - Hgb stable  - Holding AC x10 days then resume  - Ppi BID indefinitely  - No NSAIDs, No EtOH  - Follow up with GI, ambulatory referral made

## 2024-03-01 NOTE — PLAN OF CARE
Problem: Potential for Falls  Goal: Patient will remain free of falls  Description: INTERVENTIONS:  - Educate patient/family on patient safety including physical limitations  - Instruct patient to call for assistance with activity   - Consult OT/PT to assist with strengthening/mobility   - Keep Call bell within reach  - Keep bed low and locked with side rails adjusted as appropriate  - Keep care items and personal belongings within reach  - Initiate and maintain comfort rounds  - Make Fall Risk Sign visible to staff  - Apply yellow socks and bracelet for high fall risk patients  - Consider moving patient to room near nurses station  Outcome: Progressing     Problem: PAIN - ADULT  Goal: Verbalizes/displays adequate comfort level or baseline comfort level  Description: Interventions:  - Encourage patient to monitor pain and request assistance  - Assess pain using appropriate pain scale  - Administer analgesics based on type and severity of pain and evaluate response  - Implement non-pharmacological measures as appropriate and evaluate response  - Consider cultural and social influences on pain and pain management  - Notify physician/advanced practitioner if interventions unsuccessful or patient reports new pain  Outcome: Progressing     Problem: INFECTION - ADULT  Goal: Absence or prevention of progression during hospitalization  Description: INTERVENTIONS:  - Assess and monitor for signs and symptoms of infection  - Monitor lab/diagnostic results  - Monitor all insertion sites, i.e. indwelling lines, tubes, and drains  - Monitor endotracheal if appropriate and nasal secretions for changes in amount and color  - [place] appropriate cooling/warming therapies per order  - Administer medications as ordered  - Instruct and encourage patient and family to use good hand hygiene technique  - Identify and instruct in appropriate isolation precautions for identified infection/condition  Outcome: Progressing  Goal: Absence  of fever/infection during neutropenic period  Description: INTERVENTIONS:  - Monitor WBC    Outcome: Progressing     Problem: SAFETY ADULT  Goal: Patient will remain free of falls  Description: INTERVENTIONS:  - Educate patient/family on patient safety including physical limitations  - Instruct patient to call for assistance with activity   - Consult OT/PT to assist with strengthening/mobility   - Keep Call bell within reach  - Keep bed low and locked with side rails adjusted as appropriate  - Keep care items and personal belongings within reach  - Initiate and maintain comfort rounds  - Make Fall Risk Sign visible to staff  - Apply yellow socks and bracelet for high fall risk patients  - Consider moving patient to room near nurses station  Outcome: Progressing  Goal: Maintain or return to baseline ADL function  Description: INTERVENTIONS:  -  Assess patient's ability to carry out ADLs; assess patient's baseline for ADL function and identify physical deficits which impact ability to perform ADLs (bathing, care of mouth/teeth, toileting, grooming, dressing, etc.)  - Assess/evaluate cause of self-care deficits   - Assess range of motion  - Assess patient's mobility; develop plan if impaired  - Assess patient's need for assistive devices and provide as appropriate  - Encourage maximum independence but intervene and supervise when necessary  - Involve family in performance of ADLs  - Assess for home care needs following discharge   - Consider OT consult to assist with ADL evaluation and planning for discharge  - Provide patient education as appropriate  Outcome: Progressing  Goal: Maintains/Returns to pre admission functional level  Description: INTERVENTIONS:  - Perform AM-PAC 6 Click Basic Mobility/ Daily Activity assessment daily.  - Set and communicate daily mobility goal to care team and patient/family/caregiver.   - Collaborate with rehabilitation services on mobility goals if consulted  - Out of bed for  toileting  - Record patient progress and toleration of activity level   Outcome: Progressing     Problem: DISCHARGE PLANNING  Goal: Discharge to home or other facility with appropriate resources  Description: INTERVENTIONS:  - Identify barriers to discharge w/patient and caregiver  - Arrange for needed discharge resources and transportation as appropriate  - Identify discharge learning needs (meds, wound care, etc.)  - Arrange for interpretive services to assist at discharge as needed  - Refer to Case Management Department for coordinating discharge planning if the patient needs post-hospital services based on physician/advanced practitioner order or complex needs related to functional status, cognitive ability, or social support system  Outcome: Progressing     Problem: Knowledge Deficit  Goal: Patient/family/caregiver demonstrates understanding of disease process, treatment plan, medications, and discharge instructions  Description: Complete learning assessment and assess knowledge base.  Interventions:  - Provide teaching at level of understanding  - Provide teaching via preferred learning methods  Outcome: Progressing     Problem: Nutrition/Hydration-ADULT  Goal: Nutrient/Hydration intake appropriate for improving, restoring or maintaining nutritional needs  Description: Monitor and assess patient's nutrition/hydration status for malnutrition. Collaborate with interdisciplinary team and initiate plan and interventions as ordered.  Monitor patient's weight and dietary intake as ordered or per policy. Utilize nutrition screening tool and intervene as necessary. Determine patient's food preferences and provide high-protein, high-caloric foods as appropriate.     INTERVENTIONS:  - Monitor oral intake, urinary output, labs, and treatment plans  - Assess nutrition and hydration status and recommend course of action  - Evaluate amount of meals eaten  - Assist patient with eating if necessary   - Allow adequate time for  meals  - Recommend/ encourage appropriate diets, oral nutritional supplements, and vitamin/mineral supplements  - Order, calculate, and assess calorie counts as needed  - Recommend, monitor, and adjust tube feedings and TPN/PPN based on assessed needs  - Assess need for intravenous fluids  - Provide specific nutrition/hydration education as appropriate  - Include patient/family/caregiver in decisions related to nutrition  Outcome: Progressing     Problem: Prexisting or High Potential for Compromised Skin Integrity  Goal: Skin integrity is maintained or improved  Description: INTERVENTIONS:  - Identify patients at risk for skin breakdown  - Assess and monitor skin integrity  - Assess and monitor nutrition and hydration status  - Monitor labs   - Assess for incontinence   - Turn and reposition patient  - Assist with mobility/ambulation  - Relieve pressure over bony prominences  - Avoid friction and shearing  - Provide appropriate hygiene as needed including keeping skin clean and dry  - Evaluate need for skin moisturizer/barrier cream  - Collaborate with interdisciplinary team   - Patient/family teaching  - Consider wound care consult   Outcome: Progressing

## 2024-03-04 PROCEDURE — 88305 TISSUE EXAM BY PATHOLOGIST: CPT | Performed by: PATHOLOGY

## 2024-03-04 NOTE — UTILIZATION REVIEW
NOTIFICATION OF ADMISSION DISCHARGE   This is a Notification of Discharge from [facility]. Please be advised that this patient has been discharge from our facility. Below you will find the admission and discharge date and time including the patient’s disposition.   UTILIZATION REVIEW CONTACT:  Rachael Montero  Utilization   Network Utilization Review Department  Phone: [phone] x carefully listen to the prompts. All voicemails are confidential.  Email: [email or web address]     ADMISSION INFORMATION  PRESENTATION DATE: 2/21/2024  9:45 AM  OBERVATION ADMISSION DATE:   INPATIENT ADMISSION DATE: 2/21/24  2:35 PM   DISCHARGE DATE: 3/1/2024  1:26 PM   DISPOSITION:Home with Home Health Care    Network Utilization Review Department  ATTENTION: Please call with any questions or concerns to [phone] and carefully listen to the prompts so that you are directed to the right person. All voicemails are confidential.   For Discharge needs, contact Care Management DC Support Team at [phone] opt. 2  Send all requests for admission clinical reviews, approved or denied determinations and any other requests to dedicated fax number below belonging to the campus where the patient is receiving treatment. List of dedicated fax numbers for the Facilities:  FACILITY NAME UR FAX NUMBER   ADMISSION DENIALS (Administrative/Medical Necessity) [phone]   DISCHARGE SUPPORT TEAM ([facility]) [phone]   PARENT CHILD HEALTH (Maternity/NICU/Pediatrics) [phone]   [facility] [phone]   [facility] [phone]   [facility] [phone]   [facility] [phone]   [facility] [phone]   [facility] [phone]   [facility] [phone]   [facility]  [place] [phone]   [facility] [phone]   [facility] [phone]   [facility] [phone]   [facility] [phone]

## 2024-03-18 DIAGNOSIS — I25.10 CORONARY ARTERY DISEASE INVOLVING NATIVE CORONARY ARTERY OF NATIVE HEART WITHOUT ANGINA PECTORIS: ICD-10-CM

## 2024-03-18 RX ORDER — METOPROLOL TARTRATE 50 MG/1
50 TABLET, FILM COATED ORAL 2 TIMES DAILY
Qty: 180 TABLET | Refills: 3 | Status: SHIPPED | OUTPATIENT
Start: 2024-03-18

## (undated) DEVICE — ADHESIVE SKIN HIGH VISCOSITY EXOFIN 1ML

## (undated) DEVICE — TUBING SUCTION 5MM X 12 FT

## (undated) DEVICE — DRAPE SURGIKIT SADDLE BAG

## (undated) DEVICE — 3M™ TEGADERM™ TRANSPARENT FILM DRESSING FRAME STYLE, 1624W, 2-3/8 IN X 2-3/4 IN (6 CM X 7 CM), 100/CT 4CT/CASE: Brand: 3M™ TEGADERM™

## (undated) DEVICE — SUPPLY FEE STD

## (undated) DEVICE — ASTOUND IMPERVIOUS SURGICAL GOWN: Brand: CONVERTORS

## (undated) DEVICE — BETHLEHEM UNIVERSAL MINOR GEN: Brand: CARDINAL HEALTH

## (undated) DEVICE — SUT VICRYL 2-0 CT-1 27 IN J259H

## (undated) DEVICE — BIPOLAR SEALER 23-113-1 AQM 2.3: Brand: AQUAMANTYS™

## (undated) DEVICE — TOOL MR8-23TD3030 MR8 TWST DRIL 3MMX30MM: Brand: MIDAS REX

## (undated) DEVICE — DRAPE SHEET THREE QUARTER

## (undated) DEVICE — DURASEAL EXACT SPINE 5ML

## (undated) DEVICE — SUT CHROMIC 3-0 SH 27 IN G122H

## (undated) DEVICE — SUT VICRYL 3-0 REEL 54 IN J285G

## (undated) DEVICE — POV-IOD SOLUTION 4OZ BT

## (undated) DEVICE — TELFA NON-ADHERENT ABSORBENT DRESSING: Brand: TELFA

## (undated) DEVICE — PREP SURGICAL PURPREP 26ML

## (undated) DEVICE — SCREW 54850046545 4.75VOYAGER ATS 6.5X45
Type: IMPLANTABLE DEVICE | Site: SPINE LUMBAR | Status: NON-FUNCTIONAL
Brand: CD HORIZON® SOLERA® VOYAGER™ SPINAL SYSTEM
Removed: 2023-02-10

## (undated) DEVICE — 3M™ TEGADERM™ TRANSPARENT FILM DRESSING FRAME STYLE, 1626W, 4 IN X 4-3/4 IN (10 CM X 12 CM), 50/CT 4CT/CASE: Brand: 3M™ TEGADERM™

## (undated) DEVICE — PACK TUR

## (undated) DEVICE — 200 MICRON SINGLE-USE HOLMIUM FIBER ASSEMBLY WITH FLAT TIP: Brand: OPTILITE

## (undated) DEVICE — SUT VICRYL 4-0 PS-2 27 IN J426H

## (undated) DEVICE — TOOL MR8-15BA50 MR8 15CM BALL 5MM: Brand: MIDAS REX MR8

## (undated) DEVICE — JACKSON TABLE FOAM POSITIONING KIT: Brand: CARDINAL HEALTH

## (undated) DEVICE — Device

## (undated) DEVICE — DRESSING MEPILEX AG BORDER 4 X 8 IN

## (undated) DEVICE — CHLORHEXIDINE 4PCT 4 OZ

## (undated) DEVICE — INTENDED FOR TISSUE SEPARATION, AND OTHER PROCEDURES THAT REQUIRE A SHARP SURGICAL BLADE TO PUNCTURE OR CUT.: Brand: BARD-PARKER SAFETY BLADES SIZE 15, STERILE

## (undated) DEVICE — GLOVE INDICATOR PI UNDERGLOVE SZ 6.5 BLUE

## (undated) DEVICE — GAUZE SPONGES,16 PLY: Brand: CURITY

## (undated) DEVICE — SNAP KOVER: Brand: UNBRANDED

## (undated) DEVICE — SINGLE PORT MANIFOLD: Brand: NEPTUNE 2

## (undated) DEVICE — SYRINGE 10ML LL

## (undated) DEVICE — MARKER REFLECTIVE RADIOPAQUE SPHERE

## (undated) DEVICE — SWABSTCK, BENZOIN TINCTURE, 1/PK, STRL: Brand: APLICARE

## (undated) DEVICE — SHEATH URETERAL ACCESS 12/14FR 45CM PROXIS

## (undated) DEVICE — GLOVE SRG BIOGEL 8

## (undated) DEVICE — CATH URETERAL 5FR X 70 CM FLEX TIP POLYUR BARD

## (undated) DEVICE — MONITORING SPINAL IMPULSE CASE FEE

## (undated) DEVICE — LUBRICANT SURGILUBE TUBE 4 OZ  FLIP TOP

## (undated) DEVICE — GUIDEWIRE STRGHT TIP 0.035 IN  SOLO PLUS

## (undated) DEVICE — SPECIMEN CONTAINER STERILE PEEL PACK

## (undated) DEVICE — JP PERF DRN SIL FLT 7MM FULL: Brand: CARDINAL HEALTH

## (undated) DEVICE — DRESSING MEPILEX AG BORDER POST-OP 4 X 8 IN

## (undated) DEVICE — 4-PORT MANIFOLD: Brand: NEPTUNE 2

## (undated) DEVICE — URETEROSCOPE DIGITAL FLEXIBLE SNGL USE WISCOPE

## (undated) DEVICE — DRAPE SHEET X-LG

## (undated) DEVICE — MEDI-VAC YANKAUER SUCTION HANDLE W/STRAIGHT TIP & CONTROL VENT: Brand: CARDINAL HEALTH

## (undated) DEVICE — TIBURON SPLIT SHEET: Brand: CONVERTORS

## (undated) DEVICE — ANTIBACTERIAL VIOLET BRAIDED (POLYGLACTIN 910), SYNTHETIC ABSORBABLE SUTURE: Brand: COATED VICRYL

## (undated) DEVICE — MAZOR X SPINE DISP KIT

## (undated) DEVICE — SPONGE PVP SCRUB WING STERILE

## (undated) DEVICE — SUT PDS II 2-0 CT-2 27 IN Z333H

## (undated) DEVICE — GLOVE INDICATOR PI UNDERGLOVE SZ 8.5 BLUE

## (undated) DEVICE — PENCIL ELECTROSURG E-Z CLEAN -0035H

## (undated) DEVICE — GUIDEWIRE ANGLED TIP 0.035 IN SOLO PLUS

## (undated) DEVICE — SCREW SCHANZ 4 X 120MM MAZOR

## (undated) DEVICE — HEMOSTATIC MATRIX SURGIFLO 8ML W/THROMBIN

## (undated) DEVICE — SUT VICRYL PLUS 3-0 RB-1 CR/8 18 IN VCP713D

## (undated) DEVICE — TRAY FOLEY 16FR URIMETER SURESTEP

## (undated) DEVICE — SUT VICRYL 3-0 SH 27 IN J416H

## (undated) DEVICE — DRESSING MEPILEX AG BORDER POST-OP 4 X 12 IN

## (undated) DEVICE — 3M™ STERI-STRIP™ COMPOUND BENZOIN TINCTURE 40 BAGS/CARTON 4 CARTONS/CASE C1544: Brand: 3M™ STERI-STRIP™

## (undated) DEVICE — SURGICAL CLIPPER BLADE GENERAL USE

## (undated) DEVICE — INTENDED FOR TISSUE SEPARATION, AND OTHER PROCEDURES THAT REQUIRE A SHARP SURGICAL BLADE TO PUNCTURE OR CUT.: Brand: BARD-PARKER ® CARBON RIB-BACK BLADES

## (undated) DEVICE — STRL PENROSE DRAIN 18" X 1/4": Brand: CARDINAL HEALTH

## (undated) DEVICE — ELECTRODE BLADE MOD  E-Z CLEAN 6.5IN -0014M

## (undated) DEVICE — URETERAL DUAL LUMEN CATH

## (undated) DEVICE — JACKSON-PRATT 100CC BULB RESERVOIR: Brand: CARDINAL HEALTH

## (undated) DEVICE — 3M™ STERI-STRIP™ REINFORCED ADHESIVE SKIN CLOSURES, R1546, 1/4 IN X 4 IN (6 MM X 100 MM), 10 STRIPS/ENVELOPE: Brand: 3M™ STERI-STRIP™

## (undated) DEVICE — PLASTIC ADHESIVE BANDAGE: Brand: CURITY

## (undated) DEVICE — 3M™ STERI-STRIP™ REINFORCED ADHESIVE SKIN CLOSURES, R1547, 1/2 IN X 4 IN (12 MM X 100 MM), 6 STRIPS/ENVELOPE: Brand: 3M™ STERI-STRIP™

## (undated) DEVICE — ELECTRODE BLADE E-Z CLEAN 4IN -0014A

## (undated) DEVICE — NGAGE, NITINOL STONE EXTRACTOR: Brand: NGAGE

## (undated) DEVICE — DRAPE MICROSCOPE OPMI PENTERO

## (undated) DEVICE — PAD GROUNDING DUAL ADULT

## (undated) DEVICE — MAT FLOOR STEP DRI 24 X 36 IN N-STRL

## (undated) DEVICE — ENDOSCOPIC VALVE WITH ADAPTER.: Brand: SURSEAL® II

## (undated) DEVICE — SINGLE-USE DIGITAL FLEXIBLE URETEROSCOPE: Brand: APTRA

## (undated) DEVICE — DISPOSABLE OR TOWEL: Brand: CARDINAL HEALTH

## (undated) DEVICE — DRAPE C-ARMOUR

## (undated) DEVICE — SHEATH URETERAL ACCESS 10/12FR 35CM PROXIS

## (undated) DEVICE — URO CATCHER BAG STERILE 0-UC32

## (undated) DEVICE — GLOVE SRG BIOGEL 6.5

## (undated) DEVICE — NEEDLE 25G X 1 1/2

## (undated) DEVICE — BETHLEHEM UNIVERSAL SPINE, KIT: Brand: CARDINAL HEALTH

## (undated) DEVICE — SCD SEQUENTIAL COMPRESSION COMFORT SLEEVE MEDIUM KNEE LENGTH: Brand: KENDALL SCD

## (undated) DEVICE — CHLORAPREP HI-LITE 26ML ORANGE

## (undated) DEVICE — DISPOSABLE EQUIPMENT COVER: Brand: SMALL TOWEL DRAPE